# Patient Record
Sex: MALE | Race: WHITE | NOT HISPANIC OR LATINO | Employment: OTHER | URBAN - METROPOLITAN AREA
[De-identification: names, ages, dates, MRNs, and addresses within clinical notes are randomized per-mention and may not be internally consistent; named-entity substitution may affect disease eponyms.]

---

## 2017-04-20 ENCOUNTER — OFFICE VISIT (OUTPATIENT)
Dept: CARDIOLOGY | Facility: CLINIC | Age: 82
End: 2017-04-20
Attending: INTERNAL MEDICINE
Payer: MEDICARE

## 2017-04-20 VITALS
SYSTOLIC BLOOD PRESSURE: 130 MMHG | BODY MASS INDEX: 28.95 KG/M2 | WEIGHT: 191 LBS | DIASTOLIC BLOOD PRESSURE: 80 MMHG | HEIGHT: 68 IN | HEART RATE: 79 BPM

## 2017-04-20 DIAGNOSIS — I73.9: Primary | ICD-10-CM

## 2017-04-20 DIAGNOSIS — R53.83 FATIGUE, UNSPECIFIED TYPE: ICD-10-CM

## 2017-04-20 DIAGNOSIS — I25.10 CORONARY ARTERY DISEASE INVOLVING NATIVE CORONARY ARTERY OF NATIVE HEART WITHOUT ANGINA PECTORIS: ICD-10-CM

## 2017-04-20 DIAGNOSIS — I65.23 BILATERAL CAROTID ARTERY STENOSIS: ICD-10-CM

## 2017-04-20 PROCEDURE — 99214 OFFICE O/P EST MOD 30 MIN: CPT | Performed by: INTERNAL MEDICINE

## 2017-04-20 NOTE — LETTER
4/20/2017    Yefri Sotelo MD  [facility]   [address]  [facility] [address]    RE: Dangelo Hernández       Dear Colleague,    REASON FOR VISIT:  Followup for peripheral arterial disease.      PRIMARY CARE PHYSICIAN:  Dr. Yefri Sotelo.      I had the pleasure of seeing Dangelo Hernández at the [facility] in [place] this morning.  He is a very pleasant 81-year-old gentleman with known coronary artery disease, status post PCI to the LAD in 2010, mantle cell non-Hodgkin's lymphoma status post radiation, mild to moderate bilateral internal carotid artery stenosis and peripheral vascular disease with chronic right lower extremity claudication.      I saw him last September for an evaluation of his right lower extremity claudication.  ABIs prior to that visit were significantly abnormal in the right leg suggesting significant inflow disease.  His left leg did not have any significant arterial insufficiency.  We discussed therapeutic options at that point.  He opted to continue with a walking program.      Since our last visit, the patient's right lower extremity symptoms have remained stable.  He appears to be still limited.  He now gets right calf tightness with minimal activity such as walking a quarter of a mile.  Symptoms go away with rest.  He denies any resting symptoms or other symptoms concerning for critical limb ischemia.      His main concern today is that of fatigue and dizziness which have been ongoing over the last few months.  He has daytime somnolence and all he wants to do is sleep all day.  The dizzy spells are usually more noticeable when he gets up in the morning but persist in the background all day.  He has not had any episodes where he lost consciousness.  He denies palpitations.  He denies heart failure symptoms including orthopnea, PND or lower extremity edema.     Outpatient Encounter Prescriptions as of 4/20/2017   Medication Sig Dispense  Refill     omeprazole (PRILOSEC) 20 MG capsule Take 1 capsule (20 mg) by mouth daily (Patient not taking: Reported on 6/29/2017) 90 capsule 4     fluticasone (FLONASE) 50 MCG/ACT nasal spray Spray 2 sprays into both nostrils daily 48 g 03     azelastine (ASTELIN) 0.1 % nasal spray Spray 1 spray into both nostrils 2 times daily 3 Bottle 3     acetaminophen (TYLENOL) 325 MG tablet Take 325-650 mg by mouth every 6 hours as needed for mild pain or pain       atorvastatin (LIPITOR) 80 MG tablet Take 1 tablet (80 mg) by mouth daily 90 tablet 3     metoprolol (TOPROL-XL) 25 MG 24 hr tablet Take 1 tablet (25 mg) by mouth daily 90 tablet 12     Glucosamine-Chondroitin (OSTEO BI-FLEX REGULAR STRENGTH PO) Take 1 tablet by mouth daily       gabapentin 8 % GEL Apply thinly 2-3 times per day. 30 g 3     ASPIRIN 81 MG PO TABS ONE DAILY  3     MULTI VITAMIN MENS OR 1 tablet daily       [DISCONTINUED] triamcinolone (KENALOG) 0.5 % cream Apply topically daily 6    15 gram tubes at one time 90 g 1     No facility-administered encounter medications on file as of 4/20/2017.       IMPRESSION, REPORT AND PLAN:   1.  Peripheral arterial disease.   2.  Right lower extremity claudication, stable.   3.  Known mild to moderate bilateral carotid artery stenosis.   4.  Coronary artery disease status post prior PCI, stable.   5.  Fatigue.   6.  Dizziness.      He remains stable from a peripheral arterial disease standpoint.  However, he is still limited by the right lower extremity claudication.  This is most likely due to an SFA or popliteal disease.  I did explain to the patient that if his symptoms interfere with his quality of life, then revascularization is a good option for him.  He will think about this and will let me know if he wants to proceed with an angiogram and possible revascularization.      Given the chronic dizziness, I will obtain a repeat carotid ultrasound, although I doubt progression of his carotid disease explains the  dizziness. He is scheduled to see a neurologist in the upcoming weeks.      I will also obtain an echocardiogram given the fatigue as well as overnight oximetry.  I suspect he has undiagnosed sleep apnea.      It was a pleasure seeing Mr. Hernández in the clinic this morning.  I appreciate the opportunity to be part of this patient's care.     Sincerely,    Robinson Robbins MD

## 2017-04-20 NOTE — PROGRESS NOTES
HPI and Plan:   See dictation    Orders Placed This Encounter   Procedures     US Carotid Bilateral     Overnight oximetry study     Echocardiogram       No orders of the defined types were placed in this encounter.      Medications Discontinued During This Encounter   Medication Reason     triamcinolone (KENALOG) 0.5 % cream Stopped by Patient         Encounter Diagnoses   Name Primary?     Claudication, class III (H) Yes     Fatigue, unspecified type      Coronary artery disease involving native coronary artery of native heart without angina pectoris      Bilateral carotid artery stenosis        CURRENT MEDICATIONS:  Current Outpatient Prescriptions   Medication Sig Dispense Refill     omeprazole (PRILOSEC) 20 MG capsule Take 1 capsule (20 mg) by mouth daily 90 capsule 4     fluticasone (FLONASE) 50 MCG/ACT nasal spray Spray 2 sprays into both nostrils daily 48 g 03     azelastine (ASTELIN) 0.1 % nasal spray Spray 1 spray into both nostrils 2 times daily 3 Bottle 3     acetaminophen (TYLENOL) 325 MG tablet Take 325-650 mg by mouth every 6 hours as needed for mild pain or pain       atorvastatin (LIPITOR) 80 MG tablet Take 1 tablet (80 mg) by mouth daily 90 tablet 3     metoprolol (TOPROL-XL) 25 MG 24 hr tablet Take 1 tablet (25 mg) by mouth daily 90 tablet 12     Glucosamine-Chondroitin (OSTEO BI-FLEX REGULAR STRENGTH PO) Take 1 tablet by mouth daily       gabapentin 8 % GEL Apply thinly 2-3 times per day. 30 g 3     ASPIRIN 81 MG PO TABS ONE DAILY  3     MULTI VITAMIN MENS OR 1 tablet daily         ALLERGIES     Allergies   Allergen Reactions     Amoxicillin Diarrhea            Lisinopril Cough       PAST MEDICAL HISTORY:  Past Medical History:   Diagnosis Date      Coronary artery disease, 4/ 2010, status post drug-eluting stent placement to the LAD and balloon angioplasty to the obtuse marginal.  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    LDL goal <70      Coronary artery disease     stents placed 6/10/2013     Cryptogenic organizing pneumonia (H) 2012     GERD (gastroesophageal reflux disease)      History of blood disorder 2011     Hypertension      Hypertension goal BP (blood pressure) < 140/90 2010     Malignant neoplasm (H)     BMT; Mantle cell lymphoma     Mantle Cell Lymphoma S/P Autologous Transplant 2010       PAST SURGICAL HISTORY:  Past Surgical History:   Procedure Laterality Date     BRONCHOSCOPY (RIGID OR FLEXIBLE), DIAGNOSTIC  2011    Procedure:COMBINED BRONCHOSCOPY (RIGID OR FLEXIBLE), LAVAGE; Surgeon:REYNA BAILEY; Location:[facility]     COLONOSCOPY       ENT SURGERY      tonsils     ORTHOPEDIC SURGERY      rt knee arthroscopy     SURGICAL HISTORY OF -       tonsils     SURGICAL HISTORY OF -   84    1.Exam under anesthesia, 2.Arthroscopy, 3.Arthroscopic medial meniscectomy, 4.arthroscopic trimming of patellar articular cartilage.     SURGICAL HISTORY OF -       vasectomy     SURGICAL HISTORY OF -   90    colonoscopy     SURGICAL HISTORY OF -   10/20/92    flexible sigmoidoscopy     SURGICAL HISTORY OF -   2000    colonoscopy and polypectomy     THORACOSCOPIC BIOPSY LUNG  2011    Procedure:THORACOSCOPIC BIOPSY LUNG; Video Assisted Right Thoracoscopy with Biopsy; Surgeon:JIM EPSTEIN; Location:[facility]       FAMILY HISTORY:  Family History   Problem Relation Age of Onset     CANCER Mother      Lung--did not smoke     Cardiovascular Father      MI age 73     Lipids Sister      Hypertension Sister      CANCER Sister      Ovarian- at age 65       SOCIAL HISTORY:  Social History     Social History     Marital status:      Spouse name: N/A     Number of children: N/A     Years of education: N/A     Occupational History      Retired     Social History Main Topics     Smoking status: Former Smoker      "Types: Cigarettes     Quit date: 1/1/1975     Smokeless tobacco: Former User      Comment: started smoking at 21 years of age     Alcohol use 0.0 oz/week     0 Standard drinks or equivalent per week      Comment: every other day     Drug use: No     Sexual activity: Yes     Partners: Female     Other Topics Concern     Parent/Sibling W/ Cabg, Mi Or Angioplasty Before 65f 55m? No     Special Diet No     Exercise Yes     active     Social History Narrative       Review of Systems:  Skin:  not assessed       Eyes:  Positive for glasses    ENT:  Negative      Respiratory:  Positive for dyspnea on exertion     Cardiovascular:    Positive for;fatigue;dizziness chest discomfort   Gastroenterology: Negative      Genitourinary:  Negative      Musculoskeletal:  Positive for joint stiffness;back pain    Neurologic:  Positive for numbness or tingling of feet    Psychiatric:  Negative      Heme/Lymph/Imm:  Positive for allergies    Endocrine:  Negative        Physical Exam:  Vitals: /80  Pulse 79  Ht 1.727 m (5' 8\")  Wt 86.6 kg (191 lb)  BMI 29.04 kg/m2    Constitutional:  cooperative;in no acute distress        Skin:  warm and dry to the touch;no apparent skin lesions or masses noted        Head:  normocephalic        Eyes:           ENT:  no pallor or cyanosis        Neck:  carotid pulses are full and equal bilaterally;JVP normal;no carotid bruit        Chest:  clear to auscultation          Cardiac: no murmurs, gallops or rubs detected irregularly irregular rhythm                Abdomen:  abdomen soft;no masses;non-tender        Vascular:     2+         1+   2+         1+            Extremities and Back:  no edema              Neurological:  no gross motor deficits              CC  Robinson Robbins MD   [facility]  [address]  [place] [address]              "

## 2017-04-20 NOTE — MR AVS SNAPSHOT
After Visit Summary   4/20/2017    Dangelo Hernández    MRN: [id number]           Patient Information     Date Of Birth          1935        Visit Information        Provider Department      4/20/2017 9:15 AM Robinson Robbins MD [facility] [place]        Today's Diagnoses     Claudication, class III (H)    -  1    Fatigue, unspecified type        Coronary artery disease involving native coronary artery of native heart without angina pectoris        Bilateral carotid artery stenosis           Follow-ups after your visit        Your next 10 appointments already scheduled     May 02, 2017 10:30 AM CDT   Ech Complete with [id number]   [facility] CV Echocardiography ([facility] at [facility])    [address]  [place] [id number] [phone]           1.  Please bring or wear a comfortable two-piece outfit. 2.  You may eat, drink and take your normal medicines. 3.  For any questions that cannot be answered, please contact the ordering physician            May 02, 2017 11:30 AM CDT   US CAROTID BILATERAL with [id number]   [facility] MVI Ultrasound ([facility])    [address]  [address]  [place] [address]   [phone]           Please bring a list of your medicines (including vitamins, minerals and over-the-counter drugs). Also, tell your doctor about any allergies you may have. Wear comfortable clothes and leave your valuables at home.  You do not need to do anything special to prepare for your exam.  Please call the Imaging Department at your exam site with any questions.            Oct 05, 2017 10:00 AM CDT   Masonic Lab Draw with  MASONIC LAB DRAW   [facility] Masonic Lab Draw ([facility])    [address] [id number] [phone]            Oct 05, 2017 10:30 AM CDT   Return with Abida Lott  "MD   [facility] Blood and Marrow Transplant ([facility])    [address] [address] [phone]              Future tests that were ordered for you today     Open Future Orders        Priority Expected Expires Ordered    US Carotid Bilateral Routine 2017    Echocardiogram Routine 2017    Overnight oximetry study Routine 2017            Who to contact     If you have questions or need follow up information about today's clinic visit or your schedule please contact [facility] [place] directly at [phone].  Normal or non-critical lab and imaging results will be communicated to you by [employer], letter or phone within 4 business days after the clinic has received the results. If you do not hear from us within 7 days, please contact the clinic through [employer] or phone. If you have a critical or abnormal lab result, we will notify you by phone as soon as possible.  Submit refill requests through [employer] or call your pharmacy and they will forward the refill request to us. Please allow 3 business days for your refill to be completed.          Additional Information About Your Visit        [employer] Information     [employer] lets you send messages to your doctor, view your test results, renew your prescriptions, schedule appointments and more. To sign up, go to [email or web address] . Click on \"Log in\" on the left side of the screen, which will take you to the Welcome page. Then click on \"Sign up Now\" on the right side of the page.     You will be asked to enter the access code listed below, as well as some personal information. Please follow the directions to create your username and password.     Your access code is: [email or web address]  Expires: 2017 10:51 AM     Your access code will  in 90 days. If you need help or a new code, please call your " "[facility] or [phone].        Care EveryWhere ID     This is your Care EveryWhere ID. This could be used by other organizations to access your [place] medical records  [id number]        Your Vitals Were     Pulse Height BMI (Body Mass Index)             79 1.727 m (5' 8\") 29.04 kg/m2          Blood Pressure from Last 3 Encounters:   04/20/17 130/80   10/06/16 143/77   09/13/16 115/63    Weight from Last 3 Encounters:   04/20/17 86.6 kg (191 lb)   10/06/16 81.4 kg (179 lb 7.3 oz)   09/13/16 84.8 kg (187 lb)              We Performed the Following     Follow-Up with Cardiologist        Primary Care Provider Office Phone # Fax #    Yefri Sotelo [phone]       [facility] [address] [facility] [address]        Thank you!     Thank you for choosing [facility] [place]  for your care. Our goal is always to provide you with excellent care. Hearing back from our patients is one way we can continue to improve our services. Please take a few minutes to complete the written survey that you may receive in the mail after your visit with us. Thank you!             Your Updated Medication List - Protect others around you: Learn how to safely use, store and throw away your medicines at www.disposemymeds.org.          This list is accurate as of: 4/20/17 10:51 AM.  Always use your most recent med list.                   Brand Name Dispense Instructions for use    aspirin 81 MG tablet      ONE DAILY       atorvastatin 80 MG tablet    LIPITOR    90 tablet    Take 1 tablet (80 mg) by mouth daily       azelastine 0.1 % spray    ASTELIN    3 Bottle    Spray 1 spray into both nostrils 2 times daily       fluticasone 50 MCG/ACT spray    FLONASE    48 g    Spray 2 sprays into both nostrils daily       gabapentin 8 % Gel topical PLO cream     30 g    Apply thinly 2-3 times per day.       metoprolol 25 MG 24 hr tablet    TOPROL-XL    90 tablet    " Take 1 tablet (25 mg) by mouth daily       MULTI VITAMIN MENS PO      1 tablet daily       omeprazole 20 MG CR capsule    priLOSEC    90 capsule    Take 1 capsule (20 mg) by mouth daily       OSTEO BI-FLEX REGULAR STRENGTH PO      Take 1 tablet by mouth daily       TYLENOL 325 MG tablet   Generic drug:  acetaminophen      Take 325-650 mg by mouth every 6 hours as needed for mild pain or pain

## 2017-04-21 NOTE — PROGRESS NOTES
REASON FOR VISIT:  Followup for peripheral arterial disease.      PRIMARY CARE PHYSICIAN:  Dr. Yefri Sotelo.      HISTORY OF PRESENT ILLNESS:  I had the pleasure of seeing Dangelo Hernández at the [facility] in [place] this morning.  He is a very pleasant 81-year-old gentleman with known coronary artery disease, status post PCI to the LAD in 2010, mantle cell non-Hodgkin's lymphoma status post radiation, mild to moderate bilateral internal carotid artery stenosis and peripheral vascular disease with chronic right lower extremity claudication.      I saw him last September for an evaluation of his right lower extremity claudication.  ABIs prior to that visit were significantly abnormal in the right leg suggesting significant inflow disease.  His left leg did not have any significant arterial insufficiency.  We discussed therapeutic options at that point.  He opted to continue with a walking program.      Since our last visit, the patient's right lower extremity symptoms have remained stable.  He appears to be still limited.  He now gets right calf tightness with minimal activity such as walking a quarter of a mile.  Symptoms go away with rest.  He denies any resting symptoms or other symptoms concerning for critical limb ischemia.      His main concern today is that of fatigue and dizziness which have been ongoing over the last few months.  He has daytime somnolence and all he wants to do is sleep all day.  The dizzy spells are usually more noticeable when he gets up in the morning but persist in the background all day.  He has not had any episodes where he lost consciousness.  He denies palpitations.  He denies heart failure symptoms including orthopnea, PND or lower extremity edema.      IMPRESSION, REPORT AND PLAN:   1.  Peripheral arterial disease.   2.  Right lower extremity claudication, stable.   3.  Known mild to moderate bilateral carotid artery stenosis.   4.  Coronary artery  disease status post prior PCI, stable.   5.  Fatigue.   6.  Dizziness.      He remains stable from a peripheral arterial disease standpoint.  However, he is still limited by the right lower extremity claudication.  This is most likely due to an SFA or popliteal disease.  I did explain to the patient that if his symptoms interfere with his quality of life, then revascularization is a good option for him.  He will think about this and will let me know if he wants to proceed with an angiogram and possible revascularization.      Given the chronic dizziness, I will obtain a repeat carotid ultrasound, although I doubt progression of his carotid disease explains the dizziness. He is scheduled to see a neurologist in the upcoming weeks.      I will also obtain an echocardiogram given the fatigue as well as overnight oximetry.  I suspect he has undiagnosed sleep apnea.      It was a pleasure seeing Mr. Hernández in the clinic this morning.  I appreciate the opportunity to be part of this patient's care.      cc:   [place] [address]         NOBLE SAMUEL MD             D: 2017 10:49   T: 2017 03:33   MT: HERMAN      Name:     ISAIAH HERNÁNDEZ   MRN:      [id number]        Account:      [id number]   :      1935           Service Date: 2017      Document: [id number]

## 2017-05-02 ENCOUNTER — HOSPITAL ENCOUNTER (OUTPATIENT)
Dept: CARDIOLOGY | Facility: CLINIC | Age: 82
Discharge: HOME | End: 2017-05-02
Attending: INTERNAL MEDICINE | Admitting: INTERNAL MEDICINE
Payer: MEDICARE

## 2017-05-02 ENCOUNTER — HOSPITAL ENCOUNTER (OUTPATIENT)
Dept: ULTRASOUND IMAGING | Facility: CLINIC | Age: 82
Discharge: HOME | End: 2017-05-02
Attending: INTERNAL MEDICINE | Admitting: INTERNAL MEDICINE
Payer: MEDICARE

## 2017-05-02 ENCOUNTER — TRANSFERRED RECORDS (OUTPATIENT)
Dept: CARDIOLOGY | Facility: CLINIC | Age: 82
End: 2017-05-02

## 2017-05-02 DIAGNOSIS — I25.10 CORONARY ARTERY DISEASE INVOLVING NATIVE CORONARY ARTERY OF NATIVE HEART WITHOUT ANGINA PECTORIS: ICD-10-CM

## 2017-05-02 DIAGNOSIS — I65.23 BILATERAL CAROTID ARTERY STENOSIS: ICD-10-CM

## 2017-05-02 DIAGNOSIS — R53.83 FATIGUE, UNSPECIFIED TYPE: ICD-10-CM

## 2017-05-02 PROCEDURE — 93306 TTE W/DOPPLER COMPLETE: CPT | Mod: 26 | Performed by: INTERNAL MEDICINE

## 2017-05-02 PROCEDURE — 93880 EXTRACRANIAL BILAT STUDY: CPT | Mod: 26 | Performed by: INTERNAL MEDICINE

## 2017-05-02 PROCEDURE — 93880 EXTRACRANIAL BILAT STUDY: CPT

## 2017-05-02 RX ADMIN — SULFUR HEXAFLUORIDE 5 ML: KIT at 11:15

## 2017-05-04 ENCOUNTER — TELEPHONE (OUTPATIENT)
Dept: CARDIOLOGY | Facility: CLINIC | Age: 82
End: 2017-05-04

## 2017-05-04 DIAGNOSIS — I65.23 BILATERAL CAROTID ARTERY STENOSIS: Primary | ICD-10-CM

## 2017-05-04 NOTE — TELEPHONE ENCOUNTER
Notes Recorded by Robinson Robbins MD on 5/3/2017 at 5:02 PM  Ultrasound reviewed. Moderate left ICA stenosis and mild right ICA stenosis. Repeat ultrasound in 1 yr    Tried to call patient to review results above. Left voicemail with Dr. Robbins's result and left team 4 number to call with any questions or concerns. US for 1 year.

## 2017-05-04 NOTE — TELEPHONE ENCOUNTER
Notes Recorded by Robinson Robbins MD on 5/3/2017 at 5:07 PM  Echo reviewed. No significant abnormality      Spoke to patient about US result below and echo result above. Pt verbalized understanding. No further questions.

## 2017-06-29 ENCOUNTER — ONCOLOGY VISIT (OUTPATIENT)
Dept: TRANSPLANT | Facility: CLINIC | Age: 82
End: 2017-06-29
Attending: INTERNAL MEDICINE
Payer: MEDICARE

## 2017-06-29 ENCOUNTER — APPOINTMENT (OUTPATIENT)
Dept: LAB | Facility: CLINIC | Age: 82
End: 2017-06-29
Attending: INTERNAL MEDICINE
Payer: MEDICARE

## 2017-06-29 VITALS
HEART RATE: 60 BPM | WEIGHT: 192.4 LBS | BODY MASS INDEX: 29.25 KG/M2 | TEMPERATURE: 97.6 F | OXYGEN SATURATION: 96 % | RESPIRATION RATE: 16 BRPM | SYSTOLIC BLOOD PRESSURE: 146 MMHG | DIASTOLIC BLOOD PRESSURE: 77 MMHG

## 2017-06-29 DIAGNOSIS — E78.00 HYPERCHOLESTEROLEMIA: Primary | ICD-10-CM

## 2017-06-29 DIAGNOSIS — K21.9 GASTROESOPHAGEAL REFLUX DISEASE WITHOUT ESOPHAGITIS: ICD-10-CM

## 2017-06-29 DIAGNOSIS — C83.10 MANTLE CELL LYMPHOMA, UNSPECIFIED BODY REGION (H): ICD-10-CM

## 2017-06-29 LAB
ALBUMIN SERPL-MCNC: 3.8 G/DL (ref 3.4–5)
ALP SERPL-CCNC: 74 U/L (ref 40–150)
ALT SERPL W P-5'-P-CCNC: 32 U/L (ref 0–70)
ANION GAP SERPL CALCULATED.3IONS-SCNC: 9 MMOL/L (ref 3–14)
AST SERPL W P-5'-P-CCNC: 18 U/L (ref 0–45)
BASOPHILS # BLD AUTO: 0 10E9/L (ref 0–0.2)
BASOPHILS NFR BLD AUTO: 0.7 %
BILIRUB SERPL-MCNC: 0.5 MG/DL (ref 0.2–1.3)
BUN SERPL-MCNC: 19 MG/DL (ref 7–30)
CALCIUM SERPL-MCNC: 9.1 MG/DL (ref 8.5–10.1)
CHLORIDE SERPL-SCNC: 107 MMOL/L (ref 94–109)
CHOLEST SERPL-MCNC: 168 MG/DL
CO2 SERPL-SCNC: 25 MMOL/L (ref 20–32)
CREAT SERPL-MCNC: 1.07 MG/DL (ref 0.66–1.25)
DIFFERENTIAL METHOD BLD: NORMAL
EOSINOPHIL # BLD AUTO: 0.2 10E9/L (ref 0–0.7)
EOSINOPHIL NFR BLD AUTO: 3.6 %
ERYTHROCYTE [DISTWIDTH] IN BLOOD BY AUTOMATED COUNT: 13 % (ref 10–15)
GFR SERPL CREATININE-BSD FRML MDRD: 66 ML/MIN/1.7M2
GLUCOSE SERPL-MCNC: 95 MG/DL (ref 70–99)
HCT VFR BLD AUTO: 42.1 % (ref 40–53)
HDLC SERPL-MCNC: 62 MG/DL
HGB BLD-MCNC: 14.2 G/DL (ref 13.3–17.7)
IMM GRANULOCYTES # BLD: 0 10E9/L (ref 0–0.4)
IMM GRANULOCYTES NFR BLD: 0.4 %
LDLC SERPL CALC-MCNC: 77 MG/DL
LYMPHOCYTES # BLD AUTO: 1.4 10E9/L (ref 0.8–5.3)
LYMPHOCYTES NFR BLD AUTO: 26 %
MCH RBC QN AUTO: 32.3 PG (ref 26.5–33)
MCHC RBC AUTO-ENTMCNC: 33.7 G/DL (ref 31.5–36.5)
MCV RBC AUTO: 96 FL (ref 78–100)
MONOCYTES # BLD AUTO: 0.5 10E9/L (ref 0–1.3)
MONOCYTES NFR BLD AUTO: 8.5 %
NEUTROPHILS # BLD AUTO: 3.4 10E9/L (ref 1.6–8.3)
NEUTROPHILS NFR BLD AUTO: 60.8 %
NONHDLC SERPL-MCNC: 106 MG/DL
NRBC # BLD AUTO: 0 10*3/UL
NRBC BLD AUTO-RTO: 0 /100
PLATELET # BLD AUTO: 223 10E9/L (ref 150–450)
POTASSIUM SERPL-SCNC: 4 MMOL/L (ref 3.4–5.3)
PROT SERPL-MCNC: 7.2 G/DL (ref 6.8–8.8)
RBC # BLD AUTO: 4.4 10E12/L (ref 4.4–5.9)
SODIUM SERPL-SCNC: 140 MMOL/L (ref 133–144)
TRIGL SERPL-MCNC: 148 MG/DL
WBC # BLD AUTO: 5.5 10E9/L (ref 4–11)

## 2017-06-29 PROCEDURE — 99212 OFFICE O/P EST SF 10 MIN: CPT

## 2017-06-29 PROCEDURE — 80053 COMPREHEN METABOLIC PANEL: CPT | Performed by: INTERNAL MEDICINE

## 2017-06-29 PROCEDURE — 85025 COMPLETE CBC W/AUTO DIFF WBC: CPT | Performed by: INTERNAL MEDICINE

## 2017-06-29 PROCEDURE — 80061 LIPID PANEL: CPT | Performed by: INTERNAL MEDICINE

## 2017-06-29 PROCEDURE — 36415 COLL VENOUS BLD VENIPUNCTURE: CPT

## 2017-06-29 PROCEDURE — 36415 COLL VENOUS BLD VENIPUNCTURE: CPT | Performed by: INTERNAL MEDICINE

## 2017-06-29 NOTE — PATIENT INSTRUCTIONS
bob in 1 year with labs prior    No Template for July,2018 yet.  Patient will call to schedule    Anais  BMT

## 2017-06-29 NOTE — PROGRESS NOTES
BMT Visit  DOS 6/29/2017     Disease and Treatment History:   1. Mantle cell lymphoma, statusR-CHOP X 4 then status post autologous stem cell transplant in 10/2010 in ongoing CR  2.  Significant pulmonary issues:  --pulmonary infiltrates and BAL with Candida glabrata in 07/2011  - progressive infiltrates with VATS in 09/2011 that showed organizing pneumonia with negative tissue cultures, who was felt to have a cryptogenic organizing pneumonia.   - Treated with high-dose steroids from 10/2011 with tapering through 03/2012 with a good clinical and radiological response,  -- Subsequent relapse/progression 04/2012.  A repeat BAL on 04/19/2012 showed filamentous fungus that was not able to be speciated further, and he saw Pulmonary in 05/2012, and at that point was started back on Voriconazole on prednisone.   -- Long prednisone taper: CT imaging on 6/21/13 showed worsened pulmonary infiltrates and he underwent bronchoscopy which initially showed filamentous fungus but then grew out as Raquel Dubliniensis/Albicans. Treated with v-fend/fluconazole combination  -- Summer 2013: stress test that showed abnormalities and led to a cardiac catheterization with the placement of 3 stents.   -- Pulmonary evaluation August 2013 and CT scans showed some GGO and the question of pulmonary edema was raised given the recent cardiac issues. Thus, he was monitored without steroids  -- 10/2013 Pulmonary: Stable 9/2013 CT changes and observed due to no major symptoms    Current HPI: Overall he is doing well.I last saw him he has been doing well. He notes he has some blockage in his right leg and the cardiologist is recommending a stent. He notes no fevers or chills, no chest pain or SOB, no nausea or vomiting, no diarrhea, no rash. Breathing stable. No concerns.    REVIEW OF SYSTEMS:  A 10-point review of systems is otherwise negative.      PHYSICAL EXAMINATION:   /77  Pulse 60  Temp 97.6  F (36.4  C) (Oral)  Resp 16  Wt 87.3 kg  (192 lb 6.4 oz)  SpO2 96%  BMI 29.25 kg/m2    GENERAL:  He looks quite well.  He is in no acute distress. KPS 90  HEENT:  No icterus.  OP clear and upper plates in  LYMPH:  He has no palpable cervical or supraclavicular lymphadenopathy.   LUNGS:  Generally clear.  Right lower lung with some rhonchi that clears  CARDIAC EXAM:  Regular.   ABDOMEN:  Soft and nontender.  No HSM  EXTREMITIES: no edema, no inguinal DORIE          ASSESSMENT AND PLAN:  Mr. Delong is a very pleasant 82-year-old gentleman with mantle cell lymphoma in ongoing remission, now 6 years out from an autologous stem cell transplant.     1.  Mantle cell lymphoma.  He remains in an ongoing complete remission by clinical evaluation. Continue visits at 1 year      2.  Pulmonary.  Cryptogenic organizing pneumonia currently not being treated and stable clinically. No active symptoms. Stable    3.  Infectious disease.  No active infections. Encouraged him to get his flu shot in the fall  -- s/p 2 year vaccines 4/2013 and got MMR in 9/2013. Did get shingles shot in 2014 as well    4.  Cardiology.  current metoprolol, lipid lower drug. Will have intervention for possible stenosis in right LE artery. Check lipid panel today    5.  Hematology.  His counts are stable.     6. Renal: Normal function     FINAL PLAN:    1. RTC in 1 year with labs  2. Lipid panel today    Abida Lott MD      Addendum 6/30/2017:  Results for ISAIAH DELONG (MRN [id number]) as of 6/30/2017 14:19   Ref. Range 6/29/2017 10:15   Cholesterol Latest Ref Range: <200 mg/dL 168   HDL Cholesterol Latest Ref Range: >39 mg/dL 62   LDL Cholesterol Calculated Latest Ref Range: <100 mg/dL 77   Non HDL Cholesterol Latest Ref Range: <130 mg/dL 106   Triglycerides Latest Ref Range: <150 mg/dL 148       Called Isaiah with the results and left a message for him to call with any questions.    Abida Lott

## 2017-06-29 NOTE — MR AVS SNAPSHOT
After Visit Summary   6/29/2017    Dangelo Hernández    MRN: [id number]           Patient Information     Date Of Birth          1935        Visit Information        Provider Department      6/29/2017 11:00 AM Abida Lott MD [facility] Trans[facility]        Today's Diagnoses     Hypercholesterolemia    -  1    Gastroesophageal reflux disease without esophagitis        Mantle cell lymphoma, unspecified body region (H)              [facility] ([facility])  [address] [place] [address]  Phone: [phone]  Clinic Hours:   Monday-Thursday:7am to 7pm   Friday: 7am to 5pm   Weekends and holidays:    8am to noon (in general)  If your fever is 100.5  or greater,   call the clinic.  After hours call the   hospital at [phone] or   [phone]. Ask for the BMT   fellow on-call           Care Instructions    bob in 1 year with labs prior              Follow-ups after your visit        Future tests that were ordered for you today     Open Future Orders        Priority Expected Expires Ordered    Comprehensive metabolic panel Routine 6/7/2018 6/28/2018 6/29/2017    CBC with platelets differential Routine 6/7/2018 6/28/2018 6/29/2017            Who to contact     If you have questions or need follow up information about today's clinic visit or your schedule please contact [facility] directly at [phone].  Normal or non-critical lab and imaging results will be communicated to you by MyChart, letter or phone within 4 business days after the clinic has received the results. If you do not hear from us within 7 days, please contact the clinic through MyChart or phone. If you have a critical or abnormal lab result, we will notify you by phone as soon as possible.  Submit refill requests through [employer] or call your pharmacy and they will forward the refill request to us. Please allow 3 business days for your refill to be  "completed.          Additional Information About Your Visit        [employer] Information     [employer] lets you send messages to your doctor, view your test results, renew your prescriptions, schedule appointments and more. To sign up, go to [email or web address] . Click on \"Log in\" on the left side of the screen, which will take you to the Welcome page. Then click on \"Sign up Now\" on the right side of the page.     You will be asked to enter the access code listed below, as well as some personal information. Please follow the directions to create your username and password.     Your access code is: [email or web address]  Expires: 2017 10:51 AM     Your access code will  in 90 days. If you need help or a new code, please call your [place] clinic or [phone].        Care EveryWhere ID     This is your Care EveryWhere ID. This could be used by other organizations to access your [place] medical records  [id number]        Your Vitals Were     Pulse Temperature Respirations Pulse Oximetry BMI (Body Mass Index)       60 97.6  F (36.4  C) (Oral) 16 96% 29.25 kg/m2        Blood Pressure from Last 3 Encounters:   17 146/77   17 130/80   10/06/16 143/77    Weight from Last 3 Encounters:   17 87.3 kg (192 lb 6.4 oz)   17 86.6 kg (191 lb)   10/06/16 81.4 kg (179 lb 7.3 oz)              We Performed the Following     CBC with platelets differential     Comprehensive metabolic panel     Lipid panel reflex to direct LDL        Recent Review Flowsheet Data     BMT Recent Results Latest Ref Rng & Units 10/15/2015 2016 2016 2016 2016 10/6/2016 2017    WBC 4.0 - 11.0 10e9/L 5.2 - - - 5.9 6.7 5.5    Hemoglobin 13.3 - 17.7 g/dL 12.5(L) - - - 13.4 14.4 14.2    Platelet Count 150 - 450 10e9/L 160 - - - 199 198 223    Neutrophils (Absolute) 1.6 - 8.3 10e9/L 3.5 - - - 3.8 4.6 3.4    INR 0.86 - 1.14 - - - - - - -    Sodium 133 - 144 mmol/L 137 - 142 140 139 141 140    Potassium 3.4 - " 5.3 mmol/L 3.9 - 4.0 4.0 4.0 4.1 4.0    Chloride 94 - 109 mmol/L 105 - 108 107 107 107 107    Glucose 70 - 99 mg/dL 91 - 98 97 116(H) 89 95    Urea Nitrogen 7 - 30 mg/dL 16 - 14 18 19 19 19    Creatinine 0.66 - 1.25 mg/dL 0.95 - 1.16 1.04 1.08 1.29(H) 1.07    Calcium (Total) 8.5 - 10.1 mg/dL 8.3(L) - 9.0 8.9 9.0 9.2 9.1    Protein (Total) 6.8 - 8.8 g/dL 6.1(L) - - - 6.7(L) 7.1 7.2    Albumin 3.4 - 5.0 g/dL 3.3(L) - - - 3.9 3.9 3.8    Alkaline Phosphatase 40 - 150 U/L 57 - - - 78 71 74    AST 0 - 45 U/L 22 - - - 21 23 18    ALT 0 - 70 U/L 32 36 40 33 33 35 32    MCV 78 - 100 fl 95 - - - 95 96 96               Primary Care Provider Office Phone # Fax #    Yefri Mejia Sotelo [phone]       [place] [address]        Equal Access to Services     PETRA GRAMAJO AH: Hadii aad ku hadasho Soomaali, waaxda luqadaha, qaybta kaalmada adeegyada, waxay idiin haycharli lunsford. So [facility] [phone].    ATENCIÓN: Si habla español, tiene a mcneal disposición servicios gratuitos de asistencia lingüística. Llame al [phone].    We comply with applicable federal civil rights laws and [place] laws. We do not discriminate on the basis of race, color, national origin, age, disability sex, sexual orientation or gender identity.            Thank you!     Thank you for choosing [facility]PL[facility]  for your care. Our goal is always to provide you with excellent care. Hearing back from our patients is one way we can continue to improve our services. Please take a few minutes to complete the written survey that you may receive in the mail after your visit with us. Thank you!             Your Updated Medication List - Protect others around you: Learn how to safely use, store and throw away your medicines at www.disposemymeds.org.          This list is accurate as of: 6/29/17 11:34 AM.  Always use your most recent med list.                   Brand  Name Dispense Instructions for use Diagnosis    ALEVE PO      Take 275 mg by mouth        aspirin 81 MG tablet      ONE DAILY    Routine general medical examination at a health care facility       atorvastatin 80 MG tablet    LIPITOR    90 tablet    Take 1 tablet (80 mg) by mouth daily    Coronary artery disease involving native coronary artery of native heart with angina pectoris (H)       azelastine 0.1 % spray    ASTELIN    3 Bottle    Spray 1 spray into both nostrils 2 times daily    Eczema, unspecified type       fluticasone 50 MCG/ACT spray    FLONASE    48 g    Spray 2 sprays into both nostrils daily    PND (post-nasal drip)       gabapentin 8 % Gel topical PLO cream     30 g    Apply thinly 2-3 times per day.    Lymphoma (H), Cryptogenic organising pneumonia, Candida glabrata infection       metoprolol 25 MG 24 hr tablet    TOPROL-XL    90 tablet    Take 1 tablet (25 mg) by mouth daily    Coronary artery disease involving native coronary artery of native heart with angina pectoris (H)       MULTI VITAMIN MENS PO      1 tablet daily    Routine general medical examination at a health care facility       omeprazole 20 MG CR capsule    priLOSEC    90 capsule    Take 1 capsule (20 mg) by mouth daily    Gastroesophageal reflux disease without esophagitis       OSTEO BI-FLEX REGULAR STRENGTH PO      Take 1 tablet by mouth daily        TYLENOL 325 MG tablet   Generic drug:  acetaminophen      Take 325-650 mg by mouth every 6 hours as needed for mild pain or pain

## 2017-07-08 ENCOUNTER — HEALTH MAINTENANCE LETTER (OUTPATIENT)
Age: 82
End: 2017-07-08

## 2017-08-08 DIAGNOSIS — I25.119 CORONARY ARTERY DISEASE INVOLVING NATIVE CORONARY ARTERY OF NATIVE HEART WITH ANGINA PECTORIS (H): ICD-10-CM

## 2017-08-08 RX ORDER — METOPROLOL SUCCINATE 25 MG/1
25 TABLET, EXTENDED RELEASE ORAL DAILY
Qty: 90 TABLET | Refills: 2 | Status: SHIPPED | OUTPATIENT
Start: 2017-08-08 | End: 2017-10-03

## 2017-08-08 RX ORDER — ATORVASTATIN CALCIUM 80 MG/1
80 TABLET, FILM COATED ORAL DAILY
Qty: 90 TABLET | Refills: 2 | Status: SHIPPED | OUTPATIENT
Start: 2017-08-08 | End: 2017-10-03

## 2017-10-03 ENCOUNTER — OFFICE VISIT (OUTPATIENT)
Dept: FAMILY MEDICINE | Facility: CLINIC | Age: 82
End: 2017-10-03
Payer: MEDICARE

## 2017-10-03 VITALS
BODY MASS INDEX: 29.5 KG/M2 | SYSTOLIC BLOOD PRESSURE: 124 MMHG | WEIGHT: 194 LBS | OXYGEN SATURATION: 92 % | HEART RATE: 77 BPM | TEMPERATURE: 97.5 F | DIASTOLIC BLOOD PRESSURE: 68 MMHG

## 2017-10-03 DIAGNOSIS — L30.9 ECZEMA, UNSPECIFIED TYPE: ICD-10-CM

## 2017-10-03 DIAGNOSIS — R09.82 PND (POST-NASAL DRIP): ICD-10-CM

## 2017-10-03 DIAGNOSIS — N40.1 BENIGN PROSTATIC HYPERPLASIA WITH LOWER URINARY TRACT SYMPTOMS, SYMPTOM DETAILS UNSPECIFIED: ICD-10-CM

## 2017-10-03 DIAGNOSIS — Z23 NEED FOR PROPHYLACTIC VACCINATION AND INOCULATION AGAINST INFLUENZA: Primary | ICD-10-CM

## 2017-10-03 DIAGNOSIS — I25.119 CORONARY ARTERY DISEASE INVOLVING NATIVE CORONARY ARTERY OF NATIVE HEART WITH ANGINA PECTORIS (H): ICD-10-CM

## 2017-10-03 DIAGNOSIS — J18.9 PNEUMONIA, ORGANISM UNSPECIFIED(486): ICD-10-CM

## 2017-10-03 PROCEDURE — 90662 IIV NO PRSV INCREASED AG IM: CPT | Performed by: FAMILY MEDICINE

## 2017-10-03 PROCEDURE — G0008 ADMIN INFLUENZA VIRUS VAC: HCPCS | Performed by: FAMILY MEDICINE

## 2017-10-03 PROCEDURE — 99213 OFFICE O/P EST LOW 20 MIN: CPT | Mod: 25 | Performed by: FAMILY MEDICINE

## 2017-10-03 RX ORDER — AZITHROMYCIN 250 MG/1
TABLET, FILM COATED ORAL
Qty: 40 TABLET | Refills: 0 | Status: SHIPPED | OUTPATIENT
Start: 2017-10-03 | End: 2018-09-25

## 2017-10-03 RX ORDER — TAMSULOSIN HYDROCHLORIDE 0.4 MG/1
0.4 CAPSULE ORAL DAILY
Qty: 90 CAPSULE | Refills: 3 | Status: SHIPPED | OUTPATIENT
Start: 2017-10-03 | End: 2018-09-25

## 2017-10-03 RX ORDER — ATORVASTATIN CALCIUM 80 MG/1
80 TABLET, FILM COATED ORAL DAILY
Qty: 90 TABLET | Refills: 3 | Status: SHIPPED | OUTPATIENT
Start: 2017-10-03 | End: 2018-09-25

## 2017-10-03 RX ORDER — AZELASTINE 1 MG/ML
1 SPRAY, METERED NASAL 2 TIMES DAILY
Qty: 3 BOTTLE | Refills: 3 | Status: SHIPPED | OUTPATIENT
Start: 2017-10-03 | End: 2018-09-25

## 2017-10-03 RX ORDER — METOPROLOL SUCCINATE 25 MG/1
25 TABLET, EXTENDED RELEASE ORAL DAILY
Qty: 90 TABLET | Refills: 2 | Status: SHIPPED | OUTPATIENT
Start: 2017-10-03 | End: 2018-09-25

## 2017-10-03 RX ORDER — FLUTICASONE PROPIONATE 50 MCG
2 SPRAY, SUSPENSION (ML) NASAL DAILY
Qty: 48 G | Refills: 3 | Status: SHIPPED | OUTPATIENT
Start: 2017-10-03 | End: 2018-09-25

## 2017-10-03 ASSESSMENT — PAIN SCALES - GENERAL: PAINLEVEL: NO PAIN (0)

## 2017-10-03 NOTE — PROGRESS NOTES
SUBJECTIVE:   Dangelo Hernández is a 82 year old male who presents to clinic today for the following health issues:    Green drainage draining down the back of his throat and from his nose. He complains of pressure in the maxillary sinuses. He has no fever chills or sweats. He gets this about once a year.      Pt wants medication he had before for his sinuses and wants to keep some on hand for later he is going to [place] in 3 weeks.        Problem list and histories reviewed & adjusted, as indicated.  Additional history: as documented        Reviewed and updated as needed this visit by clinical staff  Tobacco  Allergies  Med Hx  Surg Hx  Fam Hx  Soc Hx      Reviewed and updated as needed this visit by Provider      Vitals: /68 (BP Location: Right arm, Patient Position: Chair, Cuff Size: Adult Large)  Pulse 77  Temp 97.5  F (36.4  C) (Tympanic)  Wt 194 lb (88 kg)  SpO2 92%  BMI 29.5 kg/m2  BMI= Body mass index is 29.5 kg/(m^2).    Medical, surgical, family, social histories, allergies and meds reviewed and updated.    ROS:  General: No change in weight, sleep or appetite.  Normal energy.  No fever or chills  Resp: No coughing, wheezing or shortness of breath  CV: No chest pains or palpitations  GI: No nausea, vomiting,  heartburn, abdominal pain, diarrhea, constipation or change in bowel habits    Exam:  GENERAL APPEARANCE ADULT: Alert, no acute distress  HENT: frontal sinus tenderness bilateral, maxillary sinus tenderness bilateral  NECK: No adenopathy,masses or thyromegaly  RESP: lungs clear to auscultation     ASSESSMENT:  (Z23) Need for prophylactic vaccination and inoculation against influenza  (primary encounter diagnosis)  Comment:   Plan: FLU VACCINE, INCREASED ANTIGEN, PRESV FREE, AGE        65+ [33440], ADMIN INFLUENZA (For MEDICARE         Patients ONLY) []            (J18.9) PNEUMONIA, ORGANISM NOS  Comment:   Plan: azithromycin (ZITHROMAX) 250 MG tablet            (R09.82) PND  (post-nasal drip)  Comment: Stable on medications.  Plan: fluticasone (FLONASE) 50 MCG/ACT spray            (L30.9) Eczema, unspecified type  Comment: Stable on medications.  Plan: azelastine (ASTELIN) 0.1 % spray            (I25.119) Coronary artery disease involving native coronary artery of native heart with angina pectoris (H)  Comment: Stable on medications.  Plan: metoprolol (TOPROL-XL) 25 MG 24 hr tablet,         atorvastatin (LIPITOR) 80 MG tablet            (N40.1) Benign prostatic hyperplasia with lower urinary tract symptoms, symptom details unspecified  Comment:   Plan: tamsulosin (FLOMAX) 0.4 MG capsule              PLAN:  Orders Placed This Encounter     FLU VACCINE, INCREASED ANTIGEN, PRESV FREE, AGE 65+ [30034]     ADMIN INFLUENZA (For MEDICARE Patients ONLY) []     azithromycin (ZITHROMAX) 250 MG tablet     fluticasone (FLONASE) 50 MCG/ACT spray     azelastine (ASTELIN) 0.1 % spray     metoprolol (TOPROL-XL) 25 MG 24 hr tablet     atorvastatin (LIPITOR) 80 MG tablet     tamsulosin (FLOMAX) 0.4 MG capsule       There are no Patient Instructions on file for this visit.      Yefri Sotelo               Injectable Influenza Immunization Documentation    1.  Is the person to be vaccinated sick today?   No    2. Does the person to be vaccinated have an allergy to a component   of the vaccine?   No    3. Has the person to be vaccinated ever had a serious reaction   to influenza vaccine in the past?   No    4. Has the person to be vaccinated ever had Guillain-Barré syndrome?   No    Form completed by Nadiya Caraballo CMA

## 2017-10-03 NOTE — MR AVS SNAPSHOT
"              After Visit Summary   10/3/2017    Dangelo Hernández    MRN: [id number]           Patient Information     Date Of Birth          1935        Visit Information        Provider Department      10/3/2017 3:00 PM Yefri Sotelo MD [facility]        Today's Diagnoses     Need for prophylactic vaccination and inoculation against influenza    -  1    PNEUMONIA, ORGANISM NOS        PND (post-nasal drip)        Eczema, unspecified type        Coronary artery disease involving native coronary artery of native heart with angina pectoris (H)        Benign prostatic hyperplasia with lower urinary tract symptoms, symptom details unspecified           Follow-ups after your visit        Who to contact     If you have questions or need follow up information about today's clinic visit or your schedule please contact [facility] directly at [phone].  Normal or non-critical lab and imaging results will be communicated to you by MyChart, letter or phone within 4 business days after the clinic has received the results. If you do not hear from us within 7 days, please contact the clinic through MyChart or phone. If you have a critical or abnormal lab result, we will notify you by phone as soon as possible.  Submit refill requests through [employer] or call your pharmacy and they will forward the refill request to us. Please allow 3 business days for your refill to be completed.          Additional Information About Your Visit        MyChart Information     [employer] lets you send messages to your doctor, view your test results, renew your prescriptions, schedule appointments and more. To sign up, go to [email or web address] . Click on \"Log in\" on the left side of the screen, which will take you to the Welcome page. Then click on \"Sign up Now\" on the right side of the page.     You will be asked to enter the access code listed below, as well as some personal " information. Please follow the directions to create your username and password.     Your access code is: [email or web address]  Expires: 2018  4:12 PM     Your access code will  in 90 days. If you need help or a new code, please call your [place] clinic or [phone].        Care EveryWhere ID     This is your Care EveryWhere ID. This could be used by other organizations to access your [place] medical records  [id number]        Your Vitals Were     Pulse Temperature Pulse Oximetry BMI (Body Mass Index)          77 97.5  F (36.4  C) (Tympanic) 92% 29.5 kg/m2         Blood Pressure from Last 3 Encounters:   10/03/17 124/68   17 146/77   17 130/80    Weight from Last 3 Encounters:   10/03/17 194 lb (88 kg)   17 192 lb 6.4 oz (87.3 kg)   17 191 lb (86.6 kg)              We Performed the Following     ADMIN INFLUENZA (For MEDICARE Patients ONLY) []     FLU VACCINE, INCREASED ANTIGEN, PRESV FREE, AGE 65+ [01762]          Today's Medication Changes          These changes are accurate as of: 10/3/17  4:12 PM.  If you have any questions, ask your nurse or doctor.               Start taking these medicines.        Dose/Directions    azithromycin 250 MG tablet   Commonly known as:  ZITHROMAX   Used for:  Pneumonia, organism unspecified(486)   Started by:  Yefri Sotelo MD        2 tabs today and 1 tab qday for 4 more days   Quantity:  40 tablet   Refills:  0       tamsulosin 0.4 MG capsule   Commonly known as:  FLOMAX   Used for:  Benign prostatic hyperplasia with lower urinary tract symptoms, symptom details unspecified   Started by:  Yefri Sotelo MD        Dose:  0.4 mg   Take 1 capsule (0.4 mg) by mouth daily   Quantity:  90 capsule   Refills:  3         Stop taking these medicines if you haven't already. Please contact your care team if you have questions.     omeprazole 20 MG CR capsule   Commonly known as:  priLOSEC   Stopped by:  Yefri Sotelo MD                 Where to get your medicines      These medications were sent to [id number] - [place], [place] - [address]  [address] [address]     Phone:  [phone]     atorvastatin 80 MG tablet    azelastine 0.1 % spray    azithromycin 250 MG tablet    fluticasone 50 MCG/ACT spray    metoprolol 25 MG 24 hr tablet    tamsulosin 0.4 MG capsule                Primary Care Provider Office Phone # Fax #    Yefri Mejia Sotelo -[phone] [address] [address] [facility] [address]        Equal Access to Services     [facility] AVILA : Hadii aad ku hadasho Soomaali, waaxda luqadaha, qaybta kaalmada adeegyada, waxay victoriain haydietern aderod [place]. So [facility] [phone].    ATENCIÓN: Si habla español, tiene a mcneal disposición servicios gratuitos de asistencia lingüística. [facility] [phone].    We comply with applicable federal civil rights laws and [place] laws. We do not discriminate on the basis of race, color, national origin, age, disability, sex, sexual orientation, or gender identity.            Thank you!     Thank you for choosing [facility]  for your care. Our goal is always to provide you with excellent care. Hearing back from our patients is one way we can continue to improve our services. Please take a few minutes to complete the written survey that you may receive in the mail after your visit with us. Thank you!             Your Updated Medication List - Protect others around you: Learn how to safely use, store and throw away your medicines at www.disposemymeds.org.          This list is accurate as of: 10/3/17  4:12 PM.  Always use your most recent med list.                   Brand Name Dispense Instructions for use Diagnosis    ALEVE PO      Take 275 mg by mouth        aspirin 81 MG tablet      ONE DAILY    Routine general medical examination at a health care facility       atorvastatin 80 MG  tablet    LIPITOR    90 tablet    Take 1 tablet (80 mg) by mouth daily    Coronary artery disease involving native coronary artery of native heart with angina pectoris (H)       azelastine 0.1 % spray    ASTELIN    3 Bottle    Spray 1 spray into both nostrils 2 times daily    Eczema, unspecified type       azithromycin 250 MG tablet    ZITHROMAX    40 tablet    2 tabs today and 1 tab qday for 4 more days    Pneumonia, organism unspecified(486)       fluticasone 50 MCG/ACT spray    FLONASE    48 g    Spray 2 sprays into both nostrils daily    PND (post-nasal drip)       gabapentin 8 % Gel topical PLO cream     30 g    Apply thinly 2-3 times per day.    Lymphoma (H), Cryptogenic organising pneumonia, Candida glabrata infection       metoprolol 25 MG 24 hr tablet    TOPROL-XL    90 tablet    Take 1 tablet (25 mg) by mouth daily    Coronary artery disease involving native coronary artery of native heart with angina pectoris (H)       MULTI VITAMIN MENS PO      1 tablet daily    Routine general medical examination at a health care facility       OSTEO BI-FLEX REGULAR STRENGTH PO      Take 1 tablet by mouth daily        tamsulosin 0.4 MG capsule    FLOMAX    90 capsule    Take 1 capsule (0.4 mg) by mouth daily    Benign prostatic hyperplasia with lower urinary tract symptoms, symptom details unspecified       TYLENOL 325 MG tablet   Generic drug:  acetaminophen      Take 325-650 mg by mouth every 6 hours as needed for mild pain or pain

## 2017-10-03 NOTE — NURSING NOTE
"Chief Complaint   Patient presents with     Sinus Problem     pt wants medication he had before for his sinuses and wants to keep some on hand for later he is going to [place] in 3 weeks      Flu Shot       Initial /68 (BP Location: Right arm, Patient Position: Chair, Cuff Size: Adult Large)  Pulse 77  Temp 97.5  F (36.4  C) (Tympanic)  Wt 194 lb (88 kg)  SpO2 92%  BMI 29.5 kg/m2 Estimated body mass index is 29.5 kg/(m^2) as calculated from the following:    Height as of 4/20/17: 5' 8\" (1.727 m).    Weight as of this encounter: 194 lb (88 kg).  Medication Reconciliation: complete   Nadiya Caraballo CMA       "

## 2017-10-11 ENCOUNTER — TELEPHONE (OUTPATIENT)
Dept: FAMILY MEDICINE | Facility: CLINIC | Age: 82
End: 2017-10-11

## 2017-10-11 NOTE — TELEPHONE ENCOUNTER
Reviewed with  and pt to have #40 since pt has frequent sinus infections and [place].  Confirmed with Jorge.  [place]anti

## 2017-10-11 NOTE — TELEPHONE ENCOUNTER
Reason for Call:  Other call back    Detailed comments: Rosalia from [facility] is questioning the quantity of the Z-sharif that he was prescribed.   The qty given was # 40.  Please advise.    Phone Number Patient can be reached at: Other phone number:  Rosalia Neves's [phone]    Best Time: any    Can we leave a detailed message on this number? YES    Call taken on 10/11/2017 at 10:26 AM by Soheila Lopez

## 2017-11-29 ENCOUNTER — TELEPHONE (OUTPATIENT)
Dept: FAMILY MEDICINE | Facility: CLINIC | Age: 82
End: 2017-11-29

## 2017-11-29 DIAGNOSIS — L30.9 ECZEMA, UNSPECIFIED TYPE: ICD-10-CM

## 2017-11-29 NOTE — TELEPHONE ENCOUNTER
Per fax received from [facility] - Azelastine is not covered by patient's Insurance Company  Dr. Sotelo - Please choose:  1.  Change medication that is not covered to a different medication and send new prescription to patient's pharmacy?  2.  Patient will need to pay for the non-covered medication out-of-pocket?   3.  Try for Prior Authorization with Insurance Company to get medication covered?        P.A. Phone #:  [phone]       P.A.  ID#:   [id number]       Key:  [id number]       Patient Last Name:  Edgar       :   1935

## 2017-11-30 NOTE — TELEPHONE ENCOUNTER
I'm not able to find any active coverage for patient.  The Key listed below does not work and I've tried BC/BS, and two different BC/BS Medicare accounts and nothing is showing as active.

## 2017-11-30 NOTE — TELEPHONE ENCOUNTER
Pt in [place].  Will go to [facility] there, today to make sure his Insurance is correct/up to date.  Rerun PA?  [email or web address]

## 2017-12-01 RX ORDER — AZELASTINE 1 MG/ML
SPRAY, METERED NASAL
Qty: 3 BOTTLE | Refills: 0 | Status: SHIPPED | OUTPATIENT
Start: 2017-12-01 | End: 2018-09-25

## 2017-12-01 NOTE — TELEPHONE ENCOUNTER
Will have to wait to see if we hear from [place] [facility].  All insurance we have listed indicates patient does not have active coverage.

## 2018-02-12 DIAGNOSIS — R09.82 PND (POST-NASAL DRIP): ICD-10-CM

## 2018-02-12 NOTE — TELEPHONE ENCOUNTER
"Requested Prescriptions   Pending Prescriptions Disp Refills     fluticasone (FLONASE) 50 MCG/ACT spray [Pharmacy Med Name: FLUTICASONE 50MCG ZAKIYA SP (120SP) RX] 48 mL 0     Sig: SHAKE LIQUID AND USE 2 SPRAYS IN EACH NOSTRIL DAILY    Inhaled Steroids Protocol Passed    2/12/2018  2:36 PM       Passed - Patient is age 12 or older       Passed - Recent or future visit with authorizing provider's specialty    Patient had office visit in the last year or has a visit in the next 30 days with authorizing provider.  See \"Patient Info\" tab in inbasket, or \"Choose Columns\" in Meds & Orders section of the refill encounter.             Last Written Prescription Date:  10/3/17  Last Fill Quantity: 48g,  # refills: 3   Last office visit: 10/3/2017 with prescribing provider:  10/3/17   Future Office Visit:      "

## 2018-02-13 RX ORDER — FLUTICASONE PROPIONATE 50 MCG
SPRAY, SUSPENSION (ML) NASAL
Qty: 48 ML | Refills: 1 | Status: SHIPPED | OUTPATIENT
Start: 2018-02-13 | End: 2018-09-25

## 2018-04-23 ENCOUNTER — ONCOLOGY VISIT (OUTPATIENT)
Dept: TRANSPLANT | Facility: CLINIC | Age: 83
End: 2018-04-23
Attending: INTERNAL MEDICINE
Payer: MEDICARE

## 2018-04-23 VITALS
SYSTOLIC BLOOD PRESSURE: 122 MMHG | BODY MASS INDEX: 29.19 KG/M2 | TEMPERATURE: 97.4 F | DIASTOLIC BLOOD PRESSURE: 80 MMHG | HEART RATE: 69 BPM | OXYGEN SATURATION: 95 % | WEIGHT: 192 LBS

## 2018-04-23 DIAGNOSIS — E78.5 HYPERLIPIDEMIA LDL GOAL <100: Primary | ICD-10-CM

## 2018-04-23 DIAGNOSIS — C83.10 MANTLE CELL LYMPHOMA, UNSPECIFIED BODY REGION (H): ICD-10-CM

## 2018-04-23 DIAGNOSIS — R53.83 FATIGUE, UNSPECIFIED TYPE: ICD-10-CM

## 2018-04-23 DIAGNOSIS — E78.5 HYPERLIPIDEMIA LDL GOAL <100: ICD-10-CM

## 2018-04-23 DIAGNOSIS — M54.6 THORACIC BACK PAIN, UNSPECIFIED BACK PAIN LATERALITY, UNSPECIFIED CHRONICITY: ICD-10-CM

## 2018-04-23 LAB
ALBUMIN SERPL-MCNC: 3.9 G/DL (ref 3.4–5)
ALP SERPL-CCNC: 71 U/L (ref 40–150)
ALT SERPL W P-5'-P-CCNC: 34 U/L (ref 0–70)
ANION GAP SERPL CALCULATED.3IONS-SCNC: 8 MMOL/L (ref 3–14)
AST SERPL W P-5'-P-CCNC: 24 U/L (ref 0–45)
BASOPHILS # BLD AUTO: 0.2 10E9/L (ref 0–0.2)
BASOPHILS NFR BLD AUTO: 3.5 %
BILIRUB SERPL-MCNC: 0.6 MG/DL (ref 0.2–1.3)
BUN SERPL-MCNC: 19 MG/DL (ref 7–30)
CALCIUM SERPL-MCNC: 9.2 MG/DL (ref 8.5–10.1)
CHLORIDE SERPL-SCNC: 109 MMOL/L (ref 94–109)
CHOLEST SERPL-MCNC: 170 MG/DL
CO2 SERPL-SCNC: 24 MMOL/L (ref 20–32)
CREAT SERPL-MCNC: 1.06 MG/DL (ref 0.66–1.25)
DIFFERENTIAL METHOD BLD: ABNORMAL
EOSINOPHIL # BLD AUTO: 0.5 10E9/L (ref 0–0.7)
EOSINOPHIL NFR BLD AUTO: 8 %
ERYTHROCYTE [DISTWIDTH] IN BLOOD BY AUTOMATED COUNT: 13.2 % (ref 10–15)
GFR SERPL CREATININE-BSD FRML MDRD: 67 ML/MIN/1.7M2
GLUCOSE SERPL-MCNC: 96 MG/DL (ref 70–99)
HCT VFR BLD AUTO: 42.4 % (ref 40–53)
HDLC SERPL-MCNC: 54 MG/DL
HGB BLD-MCNC: 14.9 G/DL (ref 13.3–17.7)
LDLC SERPL CALC-MCNC: 79 MG/DL
LYMPHOCYTES # BLD AUTO: 1.5 10E9/L (ref 0.8–5.3)
LYMPHOCYTES NFR BLD AUTO: 25.7 %
MCH RBC QN AUTO: 33.3 PG (ref 26.5–33)
MCHC RBC AUTO-ENTMCNC: 35.1 G/DL (ref 31.5–36.5)
MCV RBC AUTO: 95 FL (ref 78–100)
MONOCYTES # BLD AUTO: 0.9 10E9/L (ref 0–1.3)
MONOCYTES NFR BLD AUTO: 14.2 %
NEUTROPHILS # BLD AUTO: 2.9 10E9/L (ref 1.6–8.3)
NEUTROPHILS NFR BLD AUTO: 48.6 %
NONHDLC SERPL-MCNC: 116 MG/DL
PLATELET # BLD AUTO: 216 10E9/L (ref 150–450)
PLATELET # BLD EST: ABNORMAL 10*3/UL
POTASSIUM SERPL-SCNC: 4.3 MMOL/L (ref 3.4–5.3)
PROT SERPL-MCNC: 7.4 G/DL (ref 6.8–8.8)
RBC # BLD AUTO: 4.47 10E12/L (ref 4.4–5.9)
RBC MORPH BLD: NORMAL
SODIUM SERPL-SCNC: 140 MMOL/L (ref 133–144)
TRIGL SERPL-MCNC: 185 MG/DL
TSH SERPL DL<=0.005 MIU/L-ACNC: 3.44 MU/L (ref 0.4–4)
WBC # BLD AUTO: 6 10E9/L (ref 4–11)

## 2018-04-23 PROCEDURE — 85025 COMPLETE CBC W/AUTO DIFF WBC: CPT | Performed by: INTERNAL MEDICINE

## 2018-04-23 PROCEDURE — 80053 COMPREHEN METABOLIC PANEL: CPT | Performed by: INTERNAL MEDICINE

## 2018-04-23 PROCEDURE — G0463 HOSPITAL OUTPT CLINIC VISIT: HCPCS

## 2018-04-23 PROCEDURE — 80061 LIPID PANEL: CPT | Performed by: INTERNAL MEDICINE

## 2018-04-23 PROCEDURE — 36415 COLL VENOUS BLD VENIPUNCTURE: CPT | Performed by: INTERNAL MEDICINE

## 2018-04-23 PROCEDURE — 84443 ASSAY THYROID STIM HORMONE: CPT | Performed by: INTERNAL MEDICINE

## 2018-04-23 ASSESSMENT — PAIN SCALES - GENERAL: PAINLEVEL: NO PAIN (0)

## 2018-04-23 NOTE — PATIENT INSTRUCTIONS
MRI thoracic spine    Ortho spine referral    [place] bmt in 1 year with labs prior    Pt wants to come in early May/mid May for his 1 year f/u so he can stay in [place] longer. Pt said he would call to schedule as it gets closer and I added him to the wait list.     Cesilia

## 2018-04-23 NOTE — NURSING NOTE
"Oncology Rooming Note    April 23, 2018 11:42 AM   Dangelo Hernández is a 82 year old male who presents for:    No chief complaint on file.    Initial Vitals: /80 (BP Location: Right arm, Patient Position: Right side, Cuff Size: Adult Regular)  Pulse 69  Temp 97.4  F (36.3  C) (Oral)  Wt 87.1 kg (192 lb)  SpO2 95%  BMI 29.19 kg/m2 Estimated body mass index is 29.19 kg/(m^2) as calculated from the following:    Height as of 4/20/17: 1.727 m (5' 8\").    Weight as of this encounter: 87.1 kg (192 lb). Body surface area is 2.04 meters squared.  No Pain (0) Comment: Data Unavailable   No LMP for male patient.  Allergies reviewed: Yes  Medications reviewed: Yes    Medications: Medication refills not needed today.  Pharmacy name entered into [employer]:    [facility] - [place], [place] - [address] [facility] [address] [address] [facility] [address] [facility] AT [facility] & [address] [address] - [place] [address] [address] AT [facility] OF [facility]    Clinical concerns: pt is taking a multivitamin that has a little extra vitamin.   Pt said he is a little sleepy.     6 minutes for nursing intake (face to face time)     Herminia Hinojosa MA                "
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
## 2018-04-28 ENCOUNTER — OFFICE VISIT (OUTPATIENT)
Dept: URGENT CARE | Facility: URGENT CARE | Age: 83
End: 2018-04-28
Payer: MEDICARE

## 2018-04-28 VITALS
TEMPERATURE: 97.7 F | SYSTOLIC BLOOD PRESSURE: 154 MMHG | BODY MASS INDEX: 29.65 KG/M2 | HEART RATE: 88 BPM | WEIGHT: 195 LBS | OXYGEN SATURATION: 96 % | DIASTOLIC BLOOD PRESSURE: 79 MMHG

## 2018-04-28 DIAGNOSIS — S05.02XA ABRASION OF LEFT CORNEA, INITIAL ENCOUNTER: Primary | ICD-10-CM

## 2018-04-28 PROCEDURE — 99213 OFFICE O/P EST LOW 20 MIN: CPT | Performed by: NURSE PRACTITIONER

## 2018-04-28 RX ORDER — POLYMYXIN B SULFATE AND TRIMETHOPRIM 1; 10000 MG/ML; [USP'U]/ML
1 SOLUTION OPHTHALMIC EVERY 4 HOURS
Qty: 3 ML | Refills: 0 | Status: SHIPPED | OUTPATIENT
Start: 2018-04-28 | End: 2018-05-05

## 2018-04-28 NOTE — PATIENT INSTRUCTIONS
Continue to monitor if not improving should be seen by opthalmology or in the Emergency department.    Follow-up with your primary care provider next week and as needed.    Indications for emergent return to emergency department discussed with patient, who verbalized good understanding and agreement.  Patient understands the limitations of today's evaluation.         Corneal Abrasion    You have received a scratch or scrape (abrasion) to your cornea. The cornea is the clear part in the front of the eye. This sensitive area is very painful when injured. You may make tears frequently, and your vision may be blurry until the injury heals. You may be sensitive to light.  This part of the body heals quickly. You can expect the pain to go away within 24 to 48 hours. If the abrasion is large or deep, your doctor may apply an eye patch, although this is not always done. An antibiotic ointment or eye drops may also be used to prevent infection.  Numbing drops may be used to relieve the pain temporarily so that your eyes can be examined. But these drops can t be prescribed for home use because that would prevent healing and lead to more serious problems. Also, if you can t feel your eye, there is a chance of accidentally injuring it further without knowing it.  Home care    A cold pack may be applied over the eye (or eye patch) for 20 minutes at a time, to reduce pain. To make a cold pack, put ice cubes in a plastic bag that seals at the top. Wrap the bag in a clean, thin towel or cloth.    You may use acetaminophen or ibuprofen to control pain, unless another pain medicine was prescribed. Note: If you have chronic liver or kidney disease or have ever had a stomach ulcer or GI bleeding, talk with your doctor before using these medicines.    Rest your eyes and don t read until symptoms are gone.    If you use contact lenses, don t wear them until all symptoms are gone.    If your vision is affected by the corneal abrasion or  if an eye patch was applied, don t drive a motor vehicle or operate machinery until all symptoms are gone. You may have trouble judging distances using only one eye.    If your eyes are sensitive to light, try wearing sunglasses, or stay indoors until symptoms go away.  Follow-up care  Follow up with your healthcare provider, or as advised.    If no patch was put on your eye and the pain continues for more than 48 hours, you should have another exam. Contact your healthcare provider to arrange this.    If your eye was patched and you were asked to remove the patch yourself, see your healthcare provider. Contact your healthcare provider if you still have pain after the patch is removed.    If you were given a return appointment for patch removal and re-examination, be sure to keep the appointment. Leaving the patch in place longer than advised could be harmful.  When to seek medical advice  Call your healthcare provider right away if any of these occur.    Increasing eye pain or pain that does not improve after 24 hours    Discharge from the eye    Increasing redness of the eye or swelling of the eyelids    Worsening vision    Symptoms get worse after the abrasion has healed  Date Last Reviewed: 7/1/2017 2000-2017 [employer]. [address] [place], [place] [id number]. All rights reserved. This information is not intended as a substitute for professional medical care. Always follow your healthcare professional's instructions.        Particle in the Eye  The conjunctiva is a thin membrane in the eye. It covers the white of the eye and the inside of the eyelid. A very small object such as an eyelash or dirt can become trapped under the eyelid. This is called a conjunctival foreign body. This can be very irritating to the eye, no matter how small the object is. If the exam shows that you no longer have a particle in your eye, any discomfort should go away within the next 24 hours.  Home care     Hold a  cool compress over the sore eye to relieve pain and swelling.     Put a cool compress on the eye that hurts. A cool compress is a towel soaked in cool water. Do this 3 to 4 times a day. It will help ease redness and swelling.    You can use artificial tears to ease irritation and redness, unless another medicine was prescribed. You can buy artificial tears at drugstores.    You can use over-the-counter pain medicine such as acetaminophen or ibuprofen to control pain, unless another pain medicine was prescribed. If you have chronic liver or kidney disease, or if you have ever had a stomach ulcer or gastrointestinal bleeding, talk with your doctor before using these medicines.    If the foreign body causes a scratch on your cornea, the healthcare provider will likely prescribe an antibiotic eye drop.  Follow-up care  Follow up with your healthcare provider, or as advised.  When to seek medical advice  Contact your healthcare provider right away if any of these occur:    Increased swelling of the eyelid    Increased pain or redness in the eye    Drainage from the eye    Redness in the skin around the eye  Date Last Reviewed: 5/1/2017 2000-2017 [employer]. [address] [place] [address], [place] [address]. All rights reserved. This information is not intended as a substitute for professional medical care. Always follow your healthcare professional's instructions.

## 2018-04-28 NOTE — MR AVS SNAPSHOT
After Visit Summary   4/28/2018    Dangelo Hernández    MRN: [id number]           Patient Information     Date Of Birth          1935        Visit Information        Provider Department      4/28/2018 9:10 AM Caridad Solorio APRN CNP [facility]        Today's Diagnoses     Abrasion of left cornea, initial encounter    -  1      Care Instructions    Continue to monitor if not improving should be seen by opthalmology or in the Emergency department.    Follow-up with your primary care provider next week and as needed.    Indications for emergent return to emergency department discussed with patient, who verbalized good understanding and agreement.  Patient understands the limitations of today's evaluation.         Corneal Abrasion    You have received a scratch or scrape (abrasion) to your cornea. The cornea is the clear part in the front of the eye. This sensitive area is very painful when injured. You may make tears frequently, and your vision may be blurry until the injury heals. You may be sensitive to light.  This part of the body heals quickly. You can expect the pain to go away within 24 to 48 hours. If the abrasion is large or deep, your doctor may apply an eye patch, although this is not always done. An antibiotic ointment or eye drops may also be used to prevent infection.  Numbing drops may be used to relieve the pain temporarily so that your eyes can be examined. But these drops can t be prescribed for home use because that would prevent healing and lead to more serious problems. Also, if you can t feel your eye, there is a chance of accidentally injuring it further without knowing it.  Home care    A cold pack may be applied over the eye (or eye patch) for 20 minutes at a time, to reduce pain. To make a cold pack, put ice cubes in a plastic bag that seals at the top. Wrap the bag in a clean, thin towel or cloth.    You may use acetaminophen or  ibuprofen to control pain, unless another pain medicine was prescribed. Note: If you have chronic liver or kidney disease or have ever had a stomach ulcer or GI bleeding, talk with your doctor before using these medicines.    Rest your eyes and don t read until symptoms are gone.    If you use contact lenses, don t wear them until all symptoms are gone.    If your vision is affected by the corneal abrasion or if an eye patch was applied, don t drive a motor vehicle or operate machinery until all symptoms are gone. You may have trouble judging distances using only one eye.    If your eyes are sensitive to light, try wearing sunglasses, or stay indoors until symptoms go away.  Follow-up care  Follow up with your healthcare provider, or as advised.    If no patch was put on your eye and the pain continues for more than 48 hours, you should have another exam. Contact your healthcare provider to arrange this.    If your eye was patched and you were asked to remove the patch yourself, see your healthcare provider. Contact your healthcare provider if you still have pain after the patch is removed.    If you were given a return appointment for patch removal and re-examination, be sure to keep the appointment. Leaving the patch in place longer than advised could be harmful.  When to seek medical advice  Call your healthcare provider right away if any of these occur.    Increasing eye pain or pain that does not improve after 24 hours    Discharge from the eye    Increasing redness of the eye or swelling of the eyelids    Worsening vision    Symptoms get worse after the abrasion has healed  Date Last Reviewed: 7/1/2017 2000-2017 [employer]. [address] [place] [address], [place] [address]. All rights reserved. This information is not intended as a substitute for professional medical care. Always follow your healthcare professional's instructions.        Particle in the Eye  The conjunctiva is a thin membrane in the  eye. It covers the white of the eye and the inside of the eyelid. A very small object such as an eyelash or dirt can become trapped under the eyelid. This is called a conjunctival foreign body. This can be very irritating to the eye, no matter how small the object is. If the exam shows that you no longer have a particle in your eye, any discomfort should go away within the next 24 hours.  Home care     Hold a cool compress over the sore eye to relieve pain and swelling.     Put a cool compress on the eye that hurts. A cool compress is a towel soaked in cool water. Do this 3 to 4 times a day. It will help ease redness and swelling.    You can use artificial tears to ease irritation and redness, unless another medicine was prescribed. You can buy artificial tears at [employer].    You can use over-the-counter pain medicine such as acetaminophen or ibuprofen to control pain, unless another pain medicine was prescribed. If you have chronic liver or kidney disease, or if you have ever had a stomach ulcer or gastrointestinal bleeding, talk with your doctor before using these medicines.    If the foreign body causes a scratch on your cornea, the healthcare provider will likely prescribe an antibiotic eye drop.  Follow-up care  Follow up with your healthcare provider, or as advised.  When to seek medical advice  Contact your healthcare provider right away if any of these occur:    Increased swelling of the eyelid    Increased pain or redness in the eye    Drainage from the eye    Redness in the skin around the eye  Date Last Reviewed: 5/1/2017 2000-2017 [employer]. [address] [place] [address]. All rights reserved. This information is not intended as a substitute for professional medical care. Always follow your healthcare professional's instructions.                Follow-ups after your visit        Your next 10 appointments already scheduled     May 18, 2018 11:00 AM CDT   (Arrive by 10:45  AM)   MR THORACIC SPINE W/O & W CONTRAST with UUMR2   [facility] [place] ([facility] [place])    [address] [facility] [address] [phone]           Take your medicines as usual, unless your doctor tells you not to. Bring a list of your current medicines to your exam (including vitamins, minerals and over-the-counter drugs).  You may or may not receive intravenous (IV) contrast for this exam pending the discretion of the Radiologist.  You do not need to do anything special to prepare.  The MRI machine uses a strong magnet. Please wear clothes without metal (snaps, zippers). A sweatsuit works well, or we may give you a hospital gown.  Please remove any body piercings and hair extensions before you arrive. You will also remove watches, jewelry, hairpins, wallets, dentures, partial dental plates and hearing aids. You may wear contact lenses, and you may be able to wear your rings. We have a safe place to keep your personal items, but it is safer to leave them at home.  **IMPORTANT** THE INSTRUCTIONS BELOW ARE ONLY FOR THOSE PATIENTS WHO HAVE BEEN PRESCRIBED SEDATION OR GENERAL ANESTHESIA DURING THEIR MRI PROCEDURE:  IF YOUR DOCTOR PRESCRIBED ORAL SEDATION (take medicine to help you relax during your exam):   You must get the medicine from your doctor (oral medication) before you arrive. Bring the medicine to the exam. Do not take it at home. You ll be told when to take it upon arriving for your exam.   Arrive one hour early. Bring someone who can take you home after the test. Your medicine will make you sleepy. After the exam, you may not drive, take a bus or take a taxi by yourself.  IF YOUR DOCTOR PRESCRIBED IV SEDATION:   Arrive one hour early. Bring someone who can take you home after the test. Your medicine will make you sleepy. After the exam, you may not drive, take a bus or take a taxi by yourself.   No eating 6 hours before your exam. You may  "have clear liquids up until 4 hours before your exam. (Clear liquids include water, clear tea, black coffee and fruit juice without pulp.)  IF YOUR DOCTOR PRESCRIBED ANESTHESIA (be asleep for your exam):   Arrive 1 1/2 hours early. Bring someone who can take you home after the test. You may not drive, take a bus or take a taxi by yourself.   No eating 8 hours before your exam. You may have clear liquids up until 4 hours before your exam. (Clear liquids include water, clear tea, black coffee and fruit juice without pulp.)   You will spend four to five hours in the recovery room.  Please call the Imaging Department at your exam site with any questions.            May 18, 2018  2:40 PM CDT   (Arrive by 2:25 PM)   New Patient Visit with Ludin Schulz MD   [facility] ([facility])    [address] [place] [address] [phone]              Who to contact     If you have questions or need follow up information about today's clinic visit or your schedule please contact [facility] directly at [phone].  Normal or non-critical lab and imaging results will be communicated to you by MyChart, letter or phone within 4 business days after the clinic has received the results. If you do not hear from us within 7 days, please contact the clinic through [employer] (by [employer])hart or phone. If you have a critical or abnormal lab result, we will notify you by phone as soon as possible.  Submit refill requests through [employer] or call your pharmacy and they will forward the refill request to us. Please allow 3 business days for your refill to be completed.          Additional Information About Your Visit        MyChart Information     [employer] lets you send messages to your doctor, view your test results, renew your prescriptions, schedule appointments and more. To sign up, go to [email or web address] . Click on \"Log in\" on the left side of " "the screen, which will take you to the Welcome page. Then click on \"Sign up Now\" on the right side of the page.     You will be asked to enter the access code listed below, as well as some personal information. Please follow the directions to create your username and password.     Your access code is: [email or web address]  Expires: 2018  6:31 AM     Your access code will  in 90 days. If you need help or a new code, please call your [facility] or [phone].        Care EveryWhere ID     This is your Care EveryWhere ID. This could be used by other organizations to access your [facility] medical records  [id number]        Your Vitals Were     Pulse Temperature Pulse Oximetry BMI (Body Mass Index)          88 97.7  F (36.5  C) (Tympanic) 96% 29.65 kg/m2         Blood Pressure from Last 3 Encounters:   18 154/79   18 122/80   10/03/17 124/68    Weight from Last 3 Encounters:   18 195 lb (88.5 kg)   18 192 lb (87.1 kg)   10/03/17 194 lb (88 kg)              Today, you had the following     No orders found for display         Today's Medication Changes          These changes are accurate as of 18  9:34 AM.  If you have any questions, ask your nurse or doctor.               Start taking these medicines.        Dose/Directions    trimethoprim-polymyxin b ophthalmic solution   Commonly known as:  POLYTRIM   Used for:  Abrasion of left cornea, initial encounter        Dose:  1 drop   Place 1 drop Into the left eye every 4 hours for 7 days   Quantity:  3 mL   Refills:  0            Where to get your medicines      These medications were sent to [facility] [address] [place], [place] - [address] [place] [address] AT [facility] & [address]  [address] [facility] [address]     Phone:  [phone]     trimethoprim-polymyxin b ophthalmic solution                Primary Care Provider Office Phone # Fax #    Yefri Sotelo [phone]       [address]  [facility] [address]        Equal Access to Services     JOSESITOTARYN AVILA : Hadii josefa medina hadmaria alejandrasally Sogarrisonali, waaxda luqadaha, qaybta kagabygarrison villalobos, dipesh bettsfernandothania lunsford. So [facility] [phone].    ATENCIÓN: Si habla español, tiene a mcneal disposición servicios gratuitos de asistencia lingüística. Llame al [phone].    We comply with applicable federal civil rights laws and [place] laws. We do not discriminate on the basis of race, color, national origin, age, disability, sex, sexual orientation, or gender identity.            Thank you!     Thank you for choosing [facility]  for your care. Our goal is always to provide you with excellent care. Hearing back from our patients is one way we can continue to improve our services. Please take a few minutes to complete the written survey that you may receive in the mail after your visit with us. Thank you!             Your Updated Medication List - Protect others around you: Learn how to safely use, store and throw away your medicines at www.disposemymeds.org.          This list is accurate as of 4/28/18  9:34 AM.  Always use your most recent med list.                   Brand Name Dispense Instructions for use Diagnosis    ALEVE PO      Take 275 mg by mouth        aspirin 81 MG tablet      ONE DAILY    Routine general medical examination at a health care facility       atorvastatin 80 MG tablet    LIPITOR    90 tablet    Take 1 tablet (80 mg) by mouth daily    Coronary artery disease involving native coronary artery of native heart with angina pectoris (H)       * azelastine 0.1 % spray    ASTELIN    3 Bottle    Spray 1 spray into both nostrils 2 times daily    Eczema, unspecified type       * azelastine 0.1 % spray    ASTELIN    3 Bottle    SPRAY 1 SPRAY INTO BOTH NOSTRILS TWICE DAILY    Eczema, unspecified type       azithromycin 250 MG tablet    ZITHROMAX    40 tablet    2 tabs today and 1 tab qday for 4 more  days    Pneumonia, organism unspecified(486)       * fluticasone 50 MCG/ACT spray    FLONASE    48 g    Spray 2 sprays into both nostrils daily    PND (post-nasal drip)       * fluticasone 50 MCG/ACT spray    FLONASE    48 mL    SHAKE LIQUID AND USE 2 SPRAYS IN EACH NOSTRIL DAILY    PND (post-nasal drip)       gabapentin 8 % Gel topical PLO cream     30 g    Apply thinly 2-3 times per day.    Lymphoma (H), Cryptogenic organising pneumonia, Candida glabrata infection       metoprolol succinate 25 MG 24 hr tablet    TOPROL-XL    90 tablet    Take 1 tablet (25 mg) by mouth daily    Coronary artery disease involving native coronary artery of native heart with angina pectoris (H)       MULTI VITAMIN MENS PO      1 tablet daily    Routine general medical examination at a health care facility       OSTEO BI-FLEX REGULAR STRENGTH PO      Take 1 tablet by mouth daily        tamsulosin 0.4 MG capsule    FLOMAX    90 capsule    Take 1 capsule (0.4 mg) by mouth daily    Benign prostatic hyperplasia with lower urinary tract symptoms, symptom details unspecified       trimethoprim-polymyxin b ophthalmic solution    POLYTRIM    3 mL    Place 1 drop Into the left eye every 4 hours for 7 days    Abrasion of left cornea, initial encounter       TYLENOL 325 MG tablet   Generic drug:  acetaminophen      Take 325-650 mg by mouth every 6 hours as needed for mild pain or pain        * Notice:  This list has 4 medication(s) that are the same as other medications prescribed for you. Read the directions carefully, and ask your doctor or other care provider to review them with you.

## 2018-04-28 NOTE — PROGRESS NOTES
SUBJECTIVE:  Dangelo Hernández is a 82 year old male who presents complaining of moderate left eye discomfort and watering for 1 day(s).   Details:  Associated Signs and Syptoms: none  Treatment measures tried include: none  Contact wearer : Yes     Past Medical History:   Diagnosis Date      Coronary artery disease, 4/ 2010, status post drug-eluting stent placement to the LAD and balloon angioplasty to the obtuse marginal.  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       Social History   Substance Use Topics     Smoking status: Former Smoker     Types: Cigarettes     Quit date: 1/1/1975     Smokeless tobacco: Former User      Comment: started smoking at 21 years of age     Alcohol use 0.0 oz/week     0 Standard drinks or equivalent per week      Comment: every other day     Current Outpatient Prescriptions   Medication Sig Dispense Refill     trimethoprim-polymyxin b (POLYTRIM) ophthalmic solution Place 1 drop Into the left eye every 4 hours for 7 days 3 mL 0     acetaminophen (TYLENOL) 325 MG tablet Take 325-650 mg by mouth every 6 hours as needed for mild pain or pain       ASPIRIN 81 MG PO TABS ONE DAILY  3     atorvastatin (LIPITOR) 80 MG tablet Take 1 tablet (80 mg) by mouth daily 90 tablet 3     azelastine (ASTELIN) 0.1 % spray Spray 1 spray into both nostrils 2 times daily 3 Bottle 3     azelastine (ASTELIN) 0.1 % spray SPRAY 1 SPRAY INTO BOTH NOSTRILS TWICE DAILY  3 Bottle 0     azithromycin (ZITHROMAX) 250 MG tablet 2 tabs today and 1 tab qday for 4 more days 40 tablet 0     fluticasone (FLONASE) 50 MCG/ACT spray Spray 2 sprays into both nostrils daily 48 g 03     fluticasone (FLONASE) 50 MCG/ACT spray SHAKE LIQUID AND USE 2 SPRAYS IN EACH NOSTRIL DAILY 48 mL 1     gabapentin 8 % GEL Apply thinly 2-3 times per day. 30 g 3     Glucosamine-Chondroitin (OSTEO BI-FLEX REGULAR STRENGTH PO) Take 1 tablet by mouth daily       metoprolol (TOPROL-XL) 25 MG 24 hr tablet Take 1 tablet (25 mg) by mouth daily 90 tablet 2     MULTI VITAMIN MENS OR 1 tablet daily       Naproxen Sodium (ALEVE PO) Take 275 mg by mouth       tamsulosin (FLOMAX) 0.4 MG capsule Take 1 capsule (0.4 mg) by mouth daily 90 capsule 3       ROS:  ROS neg other than the symptoms noted above in the HPI.      OBJECTIVE:  /79 (BP Location: Right arm)  Pulse 88  Temp 97.7  F (36.5  C) (Tympanic)  Wt 195 lb (88.5 kg)  SpO2 96%  BMI 29.65 kg/m2  General: no acute distress  Eye exam: Left eye: lids normal; PERRL, EOM's intact; mild conjunctival injection;  foreign body noted; Proparacaine used for local anesthesia; Fluorescein stain; 1 areas mild corneal abrasion noted lower lateral cornea.  Rinsed with saline Foreign body irrigated out.   EXAM:  Constitutional: healthy, alert and no distress   Cardiovascular: negative, PMI normal. No lifts, heaves, or thrills. RRR. No murmurs, clicks gallops or rub  Respiratory: negative, Percussion normal. Good diaphragmatic excursion. Lungs clear  ENT: ENT exam normal, no neck nodes or sinus tenderness  SKIN: no suspicious lesions or rashes  LYMPH: Normal cervical lymph nodes    ASSESSMENT:    ICD-10-CM    1. Abrasion of left cornea, initial encounter S05.02XA trimethoprim-polymyxin b (POLYTRIM) ophthalmic solution       Patient Instructions     Continue to monitor if not improving should be seen by opthalmology or in the Emergency department.    Follow-up with your primary  care provider next week and as needed.    Indications for emergent return to emergency department discussed with patient, who verbalized good understanding and agreement.  Patient understands the limitations of today's evaluation.         Corneal Abrasion    You have received a scratch or scrape (abrasion) to your cornea. The cornea is the clear part in the front of the eye. This sensitive area is very painful when injured. You may make tears frequently, and your vision may be blurry until the injury heals. You may be sensitive to light.  This part of the body heals quickly. You can expect the pain to go away within 24 to 48 hours. If the abrasion is large or deep, your doctor may apply an eye patch, although this is not always done. An antibiotic ointment or eye drops may also be used to prevent infection.  Numbing drops may be used to relieve the pain temporarily so that your eyes can be examined. But these drops can t be prescribed for home use because that would prevent healing and lead to more serious problems. Also, if you can t feel your eye, there is a chance of accidentally injuring it further without knowing it.  Home care    A cold pack may be applied over the eye (or eye patch) for 20 minutes at a time, to reduce pain. To make a cold pack, put ice cubes in a plastic bag that seals at the top. Wrap the bag in a clean, thin towel or cloth.    You may use acetaminophen or ibuprofen to control pain, unless another pain medicine was prescribed. Note: If you have chronic liver or kidney disease or have ever had a stomach ulcer or GI bleeding, talk with your doctor before using these medicines.    Rest your eyes and don t read until symptoms are gone.    If you use contact lenses, don t wear them until all symptoms are gone.    If your vision is affected by the corneal abrasion or if an eye patch was applied, don t drive a motor vehicle or operate machinery until all symptoms are gone. You may have trouble  judging distances using only one eye.    If your eyes are sensitive to light, try wearing sunglasses, or stay indoors until symptoms go away.  Follow-up care  Follow up with your healthcare provider, or as advised.    If no patch was put on your eye and the pain continues for more than 48 hours, you should have another exam. Contact your healthcare provider to arrange this.    If your eye was patched and you were asked to remove the patch yourself, see your healthcare provider. Contact your healthcare provider if you still have pain after the patch is removed.    If you were given a return appointment for patch removal and re-examination, be sure to keep the appointment. Leaving the patch in place longer than advised could be harmful.  When to seek medical advice  Call your healthcare provider right away if any of these occur.    Increasing eye pain or pain that does not improve after 24 hours    Discharge from the eye    Increasing redness of the eye or swelling of the eyelids    Worsening vision    Symptoms get worse after the abrasion has healed  Date Last Reviewed: 7/1/2017 2000-2017 [employer]. [address] [place] [address]. All rights reserved. This information is not intended as a substitute for professional medical care. Always follow your healthcare professional's instructions.        Particle in the Eye  The conjunctiva is a thin membrane in the eye. It covers the white of the eye and the inside of the eyelid. A very small object such as an eyelash or dirt can become trapped under the eyelid. This is called a conjunctival foreign body. This can be very irritating to the eye, no matter how small the object is. If the exam shows that you no longer have a particle in your eye, any discomfort should go away within the next 24 hours.  Home care     Hold a cool compress over the sore eye to relieve pain and swelling.     Put a cool compress on the eye that hurts. A cool compress is  a towel soaked in cool water. Do this 3 to 4 times a day. It will help ease redness and swelling.    You can use artificial tears to ease irritation and redness, unless another medicine was prescribed. You can buy artificial tears at drugstores.    You can use over-the-counter pain medicine such as acetaminophen or ibuprofen to control pain, unless another pain medicine was prescribed. If you have chronic liver or kidney disease, or if you have ever had a stomach ulcer or gastrointestinal bleeding, talk with your doctor before using these medicines.    If the foreign body causes a scratch on your cornea, the healthcare provider will likely prescribe an antibiotic eye drop.  Follow-up care  Follow up with your healthcare provider, or as advised.  When to seek medical advice  Contact your healthcare provider right away if any of these occur:    Increased swelling of the eyelid    Increased pain or redness in the eye    Drainage from the eye    Redness in the skin around the eye  Date Last Reviewed: 5/1/2017 2000-2017 [employer]. [address] [place] [address], [place] [address]. All rights reserved. This information is not intended as a substitute for professional medical care. Always follow your healthcare professional's instructions.            MURALI Vera CNP

## 2018-05-14 NOTE — TELEPHONE ENCOUNTER
FUTURE VISIT INFORMATION      FUTURE VISIT INFORMATION:    Date: 5/18/18    Time:     Location: [facility]  REFERRAL INFORMATION:    Referring provider:  Dr. Abida Lott    Referring providers clinic:   BMT    Reason for visit/diagnosis  Thoracic back pain, appt per pt with BMT, Records and referral in [facility]    RECORDS REQUESTED FROM:       Clinic name Comments Records Status Imaging Status    Dr. Abida Lott referring internal internal                                   RECORDS STATUS

## 2018-05-18 ENCOUNTER — OFFICE VISIT (OUTPATIENT)
Dept: ORTHOPEDICS | Facility: CLINIC | Age: 83
End: 2018-05-18
Payer: MEDICARE

## 2018-05-18 ENCOUNTER — TELEPHONE (OUTPATIENT)
Dept: PALLIATIVE MEDICINE | Facility: CLINIC | Age: 83
End: 2018-05-18

## 2018-05-18 ENCOUNTER — HOSPITAL ENCOUNTER (OUTPATIENT)
Dept: MRI IMAGING | Facility: CLINIC | Age: 83
Discharge: HOME | End: 2018-05-18
Attending: INTERNAL MEDICINE | Admitting: INTERNAL MEDICINE
Payer: MEDICARE

## 2018-05-18 ENCOUNTER — PRE VISIT (OUTPATIENT)
Dept: ORTHOPEDICS | Facility: CLINIC | Age: 83
End: 2018-05-18

## 2018-05-18 VITALS — BODY MASS INDEX: 30.61 KG/M2 | WEIGHT: 195 LBS | HEIGHT: 67 IN

## 2018-05-18 DIAGNOSIS — M54.6 THORACIC BACK PAIN, UNSPECIFIED BACK PAIN LATERALITY, UNSPECIFIED CHRONICITY: ICD-10-CM

## 2018-05-18 DIAGNOSIS — M54.14 RADICULAR PAIN OF THORACIC REGION: ICD-10-CM

## 2018-05-18 DIAGNOSIS — M51.24 THORACIC DISC HERNIATION: Primary | ICD-10-CM

## 2018-05-18 PROCEDURE — 72157 MRI CHEST SPINE W/O & W/DYE: CPT

## 2018-05-18 PROCEDURE — A9585 GADOBUTROL INJECTION: HCPCS | Performed by: INTERNAL MEDICINE

## 2018-05-18 PROCEDURE — 25000128 H RX IP 250 OP 636: Performed by: INTERNAL MEDICINE

## 2018-05-18 RX ORDER — GADOBUTROL 604.72 MG/ML
10 INJECTION INTRAVENOUS ONCE
Status: COMPLETED | OUTPATIENT
Start: 2018-05-18 | End: 2018-05-18

## 2018-05-18 RX ADMIN — GADOBUTROL 9 ML: 604.72 INJECTION INTRAVENOUS at 11:31

## 2018-05-18 ASSESSMENT — ENCOUNTER SYMPTOMS
NECK PAIN: 0
MYALGIAS: 1
MUSCLE WEAKNESS: 1
STIFFNESS: 1
ARTHRALGIAS: 1
JOINT SWELLING: 0
BACK PAIN: 1
MUSCLE CRAMPS: 1
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
## 2018-05-18 NOTE — MR AVS SNAPSHOT
After Visit Summary   5/18/2018    Dangelo Hernández    MRN: [id number]           Patient Information     Date Of Birth          1935        Visit Information        Provider Department      5/18/2018 2:40 PM Ludin Schulz MD [facility]        Today's Diagnoses     Thoracic disc herniation    -  1       Follow-ups after your visit        Your next 10 appointments already scheduled     Jun 01, 2018  9:45 AM CDT   Radiology Injections with Mejia Quesada MD   [facility] ([facility])    [address]  [address]  [facility] [place] [address]   [phone]            Brennan 15, 2018 12:30 PM CDT   (Arrive by 12:15 PM)   Return Visit with Ludin Schulz MD   [facility] ([facility])    [address]  4th [address]  [place] [id number] [phone]            May 09, 2019 11:30 AM CDT   [facility] with  [facility]   [facility] ([facility])    [address]  [address]  [place] [id number] [phone]            May 09, 2019 12:00 PM CDT   Return with Abida Lott MD   [facility] ([facility])    [address]  [address]  [place] [id number] [phone]              Future tests that were ordered for you today     Open Future Orders        Priority Expected Expires Ordered    X-ray cervical/thoracic epidural injection Routine 5/18/2018 5/18/2019 5/18/2018            Who to contact     Please call your clinic at [phone] to:    Ask questions about your health    Make or cancel appointments    Discuss your medicines    Learn about your test results    Speak to your doctor            Additional Information About Your Visit        MyChart Information     [employer] is an electronic gateway that provides easy, online  "access to your medical records. With [employer], you can request a clinic appointment, read your test results, renew a prescription or communicate with your care team.     To sign up for [employer] visit the website at [email or web address] (formerly [email or web address])   You will be asked to enter the access code listed below, as well as some personal information. Please follow the directions to create your username and password.     Your access code is: [email or web address]  Expires: 2018  6:31 AM     Your access code will  in 90 days. If you need help or a new code, please contact your [facility] or call [phone] for assistance.        Care EveryWhere ID     This is your Care EveryWhere ID. This could be used by other organizations to access your [place] medical records  [id number]        Your Vitals Were     Height BMI (Body Mass Index)                1.702 m (5' 7\") 30.54 kg/m2           Blood Pressure from Last 3 Encounters:   18 154/79   18 122/80   10/03/17 124/68    Weight from Last 3 Encounters:   18 88.5 kg (195 lb)   18 88.5 kg (195 lb)   18 87.1 kg (192 lb)               Primary Care Provider Office Phone # Fax #    Yefri Mejia Sotelo [phone] [address] [address]        Equal Access to Services     [facility]: Hadii josefa ku hadasho Soomaali, waaxda luqadaha, qaybta kaalmada adeegyada, dipesh lunsford. So [facility] [phone].    ATENCIÓN: Si habla español, tiene a mcneal disposición servicios gratuitos de asistencia lingüística. Llame al [phone].    We comply with applicable federal civil rights laws and [place] laws. We do not discriminate on the basis of race, color, national origin, age, disability, sex, sexual orientation, or gender identity.            Thank you!     Thank you for choosing [facility]  for your care. Our goal is always to provide you with excellent " care. Hearing back from our patients is one way we can continue to improve our services. Please take a few minutes to complete the written survey that you may receive in the mail after your visit with us. Thank you!             Your Updated Medication List - Protect others around you: Learn how to safely use, store and throw away your medicines at www.disposemymeds.org.          This list is accurate as of 5/18/18  3:19 PM.  Always use your most recent med list.                   Brand Name Dispense Instructions for use Diagnosis    ALEVE PO      Take 275 mg by mouth        aspirin 81 MG tablet      ONE DAILY    Routine general medical examination at a health care facility       atorvastatin 80 MG tablet    LIPITOR    90 tablet    Take 1 tablet (80 mg) by mouth daily    Coronary artery disease involving native coronary artery of native heart with angina pectoris (H)       * azelastine 0.1 % spray    ASTELIN    3 Bottle    Spray 1 spray into both nostrils 2 times daily    Eczema, unspecified type       * azelastine 0.1 % spray    ASTELIN    3 Bottle    SPRAY 1 SPRAY INTO BOTH NOSTRILS TWICE DAILY    Eczema, unspecified type       azithromycin 250 MG tablet    ZITHROMAX    40 tablet    2 tabs today and 1 tab qday for 4 more days    Pneumonia, organism unspecified(486)       * fluticasone 50 MCG/ACT spray    FLONASE    48 g    Spray 2 sprays into both nostrils daily    PND (post-nasal drip)       * fluticasone 50 MCG/ACT spray    FLONASE    48 mL    SHAKE LIQUID AND USE 2 SPRAYS IN EACH NOSTRIL DAILY    PND (post-nasal drip)       gabapentin 8 % Gel topical PLO cream     30 g    Apply thinly 2-3 times per day.    Lymphoma (H), Cryptogenic organising pneumonia, Candida glabrata infection       metoprolol succinate 25 MG 24 hr tablet    TOPROL-XL    90 tablet    Take 1 tablet (25 mg) by mouth daily    Coronary artery disease involving native coronary artery of native heart with angina pectoris (H)       MULTI VITAMIN  MENS PO      1 tablet daily    Routine general medical examination at a health care facility       OSTEO BI-FLEX REGULAR STRENGTH PO      Take 1 tablet by mouth daily        tamsulosin 0.4 MG capsule    FLOMAX    90 capsule    Take 1 capsule (0.4 mg) by mouth daily    Benign prostatic hyperplasia with lower urinary tract symptoms, symptom details unspecified       TYLENOL 325 MG tablet   Generic drug:  acetaminophen      Take 325-650 mg by mouth every 6 hours as needed for mild pain or pain        * Notice:  This list has 4 medication(s) that are the same as other medications prescribed for you. Read the directions carefully, and ask your doctor or other care provider to review them with you.

## 2018-06-01 ENCOUNTER — HOSPITAL ENCOUNTER (OUTPATIENT)
Dept: RADIOLOGY | Facility: CLINIC | Age: 83
Discharge: HOME | End: 2018-06-01
Attending: PREVENTIVE MEDICINE | Admitting: PREVENTIVE MEDICINE
Payer: MEDICARE

## 2018-06-01 ENCOUNTER — RADIOLOGY INJECTION OFFICE VISIT (OUTPATIENT)
Dept: PALLIATIVE MEDICINE | Facility: CLINIC | Age: 83
End: 2018-06-01
Payer: MEDICARE

## 2018-06-01 VITALS — SYSTOLIC BLOOD PRESSURE: 135 MMHG | RESPIRATION RATE: 14 BRPM | HEART RATE: 77 BPM | DIASTOLIC BLOOD PRESSURE: 89 MMHG

## 2018-06-01 DIAGNOSIS — M51.34 THORACIC DEGENERATIVE DISC DISEASE: ICD-10-CM

## 2018-06-01 DIAGNOSIS — M51.24 THORACIC DISC HERNIATION: ICD-10-CM

## 2018-06-01 DIAGNOSIS — M47.24 OSTEOARTHRITIS OF SPINE WITH RADICULOPATHY, THORACIC REGION: ICD-10-CM

## 2018-06-01 DIAGNOSIS — M51.24 THORACIC DISC HERNIATION: Primary | ICD-10-CM

## 2018-06-01 PROCEDURE — 25000125 ZZHC RX 250: Performed by: ANESTHESIOLOGY

## 2018-06-01 PROCEDURE — 27210202 XR CERVICAL/THORACIC EPIDURAL INJ, INCL IMAGING: Mod: TC

## 2018-06-01 PROCEDURE — 62321 NJX INTERLAMINAR CRV/THRC: CPT | Performed by: ANESTHESIOLOGY

## 2018-06-01 PROCEDURE — 25000128 H RX IP 250 OP 636: Performed by: ANESTHESIOLOGY

## 2018-06-01 RX ORDER — DEXAMETHASONE SODIUM PHOSPHATE 10 MG/ML
20 INJECTION, SOLUTION INTRAMUSCULAR; INTRAVENOUS ONCE
Status: COMPLETED | OUTPATIENT
Start: 2018-06-01 | End: 2018-06-01

## 2018-06-01 RX ORDER — BUPIVACAINE HYDROCHLORIDE 5 MG/ML
10 INJECTION, SOLUTION PERINEURAL ONCE
Status: COMPLETED | OUTPATIENT
Start: 2018-06-01 | End: 2018-06-01

## 2018-06-01 RX ORDER — LIDOCAINE HYDROCHLORIDE 10 MG/ML
5 INJECTION, SOLUTION EPIDURAL; INFILTRATION; INTRACAUDAL; PERINEURAL ONCE
Status: COMPLETED | OUTPATIENT
Start: 2018-06-01 | End: 2018-06-01

## 2018-06-01 RX ORDER — LIDOCAINE HYDROCHLORIDE AND EPINEPHRINE 10; 10 MG/ML; UG/ML
1 INJECTION, SOLUTION INFILTRATION; PERINEURAL ONCE
Status: COMPLETED | OUTPATIENT
Start: 2018-06-01 | End: 2018-06-01

## 2018-06-01 RX ORDER — IOPAMIDOL 612 MG/ML
3 INJECTION, SOLUTION INTRATHECAL ONCE
Status: COMPLETED | OUTPATIENT
Start: 2018-06-01 | End: 2018-06-01

## 2018-06-01 RX ORDER — METHYLPREDNISOLONE ACETATE 40 MG/ML
40 INJECTION, SUSPENSION INTRA-ARTICULAR; INTRALESIONAL; INTRAMUSCULAR; SOFT TISSUE ONCE
Status: COMPLETED | OUTPATIENT
Start: 2018-06-01 | End: 2018-06-01

## 2018-06-01 RX ADMIN — IOPAMIDOL 2 ML: 612 INJECTION, SOLUTION INTRATHECAL at 10:56

## 2018-06-01 RX ADMIN — DEXAMETHASONE SODIUM PHOSPHATE 5 MG: 10 INJECTION, SOLUTION INTRAMUSCULAR; INTRAVENOUS at 10:53

## 2018-06-01 RX ADMIN — METHYLPREDNISOLONE ACETATE 40 MG: 40 INJECTION, SUSPENSION INTRA-ARTICULAR; INTRALESIONAL; INTRAMUSCULAR; SOFT TISSUE at 10:53

## 2018-06-01 RX ADMIN — LIDOCAINE HYDROCHLORIDE 4 ML: 10 INJECTION, SOLUTION EPIDURAL; INFILTRATION; INTRACAUDAL; PERINEURAL at 10:55

## 2018-06-01 RX ADMIN — BUPIVACAINE HYDROCHLORIDE 1 ML: 5 INJECTION, SOLUTION EPIDURAL; INTRACAUDAL at 10:54

## 2018-06-01 RX ADMIN — LIDOCAINE HYDROCHLORIDE,EPINEPHRINE BITARTRATE 1 ML: 10; .01 INJECTION, SOLUTION INFILTRATION; PERINEURAL at 10:55

## 2018-06-01 NOTE — DISCHARGE INSTRUCTIONS
[facility]   Procedure Discharge Instructions    Today you saw:    Dr. Mejia Quesada     You had an:  Epidural steroid injection       Medications used:  Lidocaine   Bupivacaine   Dexamethasone Depo-medrol  IsoVue            Be cautious when walking. Numbness and/or weakness in the lower extremities may occur for up to 6-8 hours after the procedure due to effect of the local anesthetic    Do not drive for 6 hours. The effect of the local anesthetic could slow your reflexes.     You may resume your regular activities after 24 hours    Avoid strenuous activity for the first 24 hours    You may shower, however avoid swimming, tub baths or hot tubs for 24 hours following your procedure    You may have a mild to moderate increase in pain for several days following the injection.    It may take up to 14 days for the steroid medication to start working although you may feel the effect as early as a few days after the procedure.       You may use ice packs for 10-15 minutes, 3 to 4 times a day at the injection site for comfort    Do not use heat to painful areas for 6 to 8 hours. This will give the local anesthetic time to wear off and prevent you from accidentally burning your skin.     You may use anti-inflammatory medications (such as Ibuprofen or Aleve or Advil) or Tylenol for pain control if necessary    If you experience any of the following, call the Pain Clinic during work hours at [phone] or the Provider Line after hours at [phone]:  -Fever over 100 degree F  -Swelling, bleeding, redness, drainage, warmth at the injection site  -Progressive weakness or numbness in your legs or arms  -Loss of bowel or bladder function  -Unusual headache that is not relieved by Tylenol or other pain reliever  -Unusual new onset of pain that is not improving

## 2018-06-01 NOTE — IP AVS SNAPSHOT
MRN:[id number]                      After Visit Summary   6/1/2018    Dangelo Hernández    MRN: [id number]           Visit Information        Provider Department      6/1/2018  9:45 AM [facility]           Review of your medicines      UNREVIEWED medicines. Ask your doctor about these medicines        Dose / Directions    ALEVE PO        Dose:  275 mg   Take 275 mg by mouth   Refills:  0       aspirin 81 MG tablet   Used for:  Routine general medical examination at a health care facility        ONE DAILY   Refills:  3       atorvastatin 80 MG tablet   Commonly known as:  LIPITOR   Used for:  Coronary artery disease involving native coronary artery of native heart with angina pectoris (H)        Dose:  80 mg   Take 1 tablet (80 mg) by mouth daily   Quantity:  90 tablet   Refills:  3       * azelastine 0.1 % spray   Commonly known as:  ASTELIN   Used for:  Eczema, unspecified type        Dose:  1 spray   Spray 1 spray into both nostrils 2 times daily   Quantity:  3 Bottle   Refills:  3       * azelastine 0.1 % spray   Commonly known as:  ASTELIN   Used for:  Eczema, unspecified type        SPRAY 1 SPRAY INTO BOTH NOSTRILS TWICE DAILY   Quantity:  3 Bottle   Refills:  0       azithromycin 250 MG tablet   Commonly known as:  ZITHROMAX   Used for:  Pneumonia, organism unspecified(486)        2 tabs today and 1 tab qday for 4 more days   Quantity:  40 tablet   Refills:  0       * fluticasone 50 MCG/ACT spray   Commonly known as:  FLONASE   Used for:  PND (post-nasal drip)        Dose:  2 spray   Spray 2 sprays into both nostrils daily   Quantity:  48 g   Refills:  03       * fluticasone 50 MCG/ACT spray   Commonly known as:  FLONASE   Used for:  PND (post-nasal drip)        SHAKE LIQUID AND USE 2 SPRAYS IN EACH NOSTRIL DAILY   Quantity:  48 mL   Refills:  1       gabapentin 8 % Gel topical PLO cream   Used for:  Lymphoma (H), Cryptogenic organising pneumonia, Candida  glabrata infection        Apply thinly 2-3 times per day.   Quantity:  30 g   Refills:  3       metoprolol succinate 25 MG 24 hr tablet   Commonly known as:  TOPROL-XL   Used for:  Coronary artery disease involving native coronary artery of native heart with angina pectoris (H)        Dose:  25 mg   Take 1 tablet (25 mg) by mouth daily   Quantity:  90 tablet   Refills:  2       MULTI VITAMIN MENS PO   Used for:  Routine general medical examination at a health care facility        1 tablet daily   Refills:  0       OSTEO BI-FLEX REGULAR STRENGTH PO        Dose:  1 tablet   Take 1 tablet by mouth daily   Refills:  0       tamsulosin 0.4 MG capsule   Commonly known as:  FLOMAX   Used for:  Benign prostatic hyperplasia with lower urinary tract symptoms, symptom details unspecified        Dose:  0.4 mg   Take 1 capsule (0.4 mg) by mouth daily   Quantity:  90 capsule   Refills:  3       TYLENOL 325 MG tablet   Generic drug:  acetaminophen        Dose:  325-650 mg   Take 325-650 mg by mouth every 6 hours as needed for mild pain or pain   Refills:  0       * Notice:  This list has 4 medication(s) that are the same as other medications prescribed for you. Read the directions carefully, and ask your doctor or other care provider to review them with you.             Protect others around you: Learn how to safely use, store and throw away your medicines at www.disposemymeds.org.         Follow-ups after your visit        Your next 10 appointments already scheduled     Brennan 15, 2018 12:30 PM CDT   (Arrive by 12:15 PM)   Return Visit with Ludin Schulz MD   [facility] ([facility] [place])    [address]  4th [address]  [facility] [id number] [phone]            May 09, 2019 11:30 AM CDT   [facility] with  [facility]   [facility] ([facility])    [address]  [address]  [facility] [id number] [phone]             May 09, 2019 12:00 PM CDT   Return with Abida Lott MD   [facility] ([facility] [place])    [address]  [address]  [place] [address] [phone]               Care Instructions        Further instructions from your care team       [facility]   Procedure Discharge Instructions    Today you saw:    Dr. Mejia Quesada     You had an:  Epidural steroid injection       Medications used:  Lidocaine   Bupivacaine   Dexamethasone Depo-medrol  IsoVue            Be cautious when walking. Numbness and/or weakness in the lower extremities may occur for up to 6-8 hours after the procedure due to effect of the local anesthetic    Do not drive for 6 hours. The effect of the local anesthetic could slow your reflexes.     You may resume your regular activities after 24 hours    Avoid strenuous activity for the first 24 hours    You may shower, however avoid swimming, tub baths or hot tubs for 24 hours following your procedure    You may have a mild to moderate increase in pain for several days following the injection.    It may take up to 14 days for the steroid medication to start working although you may feel the effect as early as a few days after the procedure.       You may use ice packs for 10-15 minutes, 3 to 4 times a day at the injection site for comfort    Do not use heat to painful areas for 6 to 8 hours. This will give the local anesthetic time to wear off and prevent you from accidentally burning your skin.     You may use anti-inflammatory medications (such as Ibuprofen or Aleve or Advil) or Tylenol for pain control if necessary    If you experience any of the following, call the Pain Clinic during work hours at [phone] or the Provider Line after hours at [phone]:  -Fever over 100 degree F  -Swelling, bleeding, redness, drainage, warmth at the injection site  -Progressive weakness or numbness in your legs or  "arms  -Loss of bowel or bladder function  -Unusual headache that is not relieved by Tylenol or other pain reliever  -Unusual new onset of pain that is not improving         Additional Information About Your Visit        [email or web address] Information     [employer] lets you send messages to your doctor, view your test results, renew your prescriptions, schedule appointments and more. To sign up, go to [email or web address] . Click on \"Log in\" on the left side of the screen, which will take you to the Welcome page. Then click on \"Sign up Now\" on the right side of the page.     You will be asked to enter the access code listed below, as well as some personal information. Please follow the directions to create your username and password.     Your access code is: [email or web address]  Expires: 2018  6:31 AM     Your access code will  in 90 days. If you need help or a new code, please call your [facility] clinic or [phone].        Care EveryWhere ID     This is your Care EveryWhere ID. This could be used by other organizations to access your [facility] medical records  [id number]        Your Vitals Were     Blood Pressure Pulse Respirations             141/76 77 18          Primary Care Provider Office Phone # Fax #    Yefri Mejia Sotelo [phone]      Equal Access to Services     PETRA GRAMAJO AH: Hadii josefa ku hadasho Soomaali, waaxda luqadaha, qaybta kaalmada adeegyada, dipesh kaminski . So [facility] [phone].    ATENCIÓN: Si habla español, tiene a mcneal disposición servicios gratuitos de asistencia lingüística. Llame al [phone].    We comply with applicable federal civil rights laws and [place] laws. We do not discriminate on the basis of race, color, national origin, age, disability, sex, sexual orientation, or gender identity.            Thank you!     Thank you for choosing [facility] for your care. Our goal is always to provide you with excellent care. Hearing back from our " patients is one way we can continue to improve our services. Please take a few minutes to complete the written survey that you may receive in the mail after you visit with us. Thank you!             Medication List: This is a list of all your medications and when to take them. Check marks below indicate your daily home schedule. Keep this list as a reference.      Medications           Morning Afternoon Evening Bedtime As Needed    ALEVE PO   Take 275 mg by mouth                                aspirin 81 MG tablet   ONE DAILY                                atorvastatin 80 MG tablet   Commonly known as:  LIPITOR   Take 1 tablet (80 mg) by mouth daily                                * azelastine 0.1 % spray   Commonly known as:  ASTELIN   Spray 1 spray into both nostrils 2 times daily                                * azelastine 0.1 % spray   Commonly known as:  ASTELIN   SPRAY 1 SPRAY INTO BOTH NOSTRILS TWICE DAILY                                azithromycin 250 MG tablet   Commonly known as:  ZITHROMAX   2 tabs today and 1 tab qday for 4 more days                                * fluticasone 50 MCG/ACT spray   Commonly known as:  FLONASE   Spray 2 sprays into both nostrils daily                                * fluticasone 50 MCG/ACT spray   Commonly known as:  FLONASE   SHAKE LIQUID AND USE 2 SPRAYS IN EACH NOSTRIL DAILY                                gabapentin 8 % Gel topical PLO cream   Apply thinly 2-3 times per day.                                metoprolol succinate 25 MG 24 hr tablet   Commonly known as:  TOPROL-XL   Take 1 tablet (25 mg) by mouth daily                                MULTI VITAMIN MENS PO   1 tablet daily                                OSTEO BI-FLEX REGULAR STRENGTH PO   Take 1 tablet by mouth daily                                tamsulosin 0.4 MG capsule   Commonly known as:  FLOMAX   Take 1 capsule (0.4 mg) by mouth daily                                TYLENOL 325 MG tablet   Take 325-650 mg  by mouth every 6 hours as needed for mild pain or pain   Generic drug:  acetaminophen                                * Notice:  This list has 4 medication(s) that are the same as other medications prescribed for you. Read the directions carefully, and ask your doctor or other care provider to review them with you.

## 2018-06-01 NOTE — PROGRESS NOTES
Pre-procedure Intake    Have you been fasting? No     If yes, for how long?    Are you taking a prescribed blood thinner such as coumadin, Plavix, Xarelto?    No    If yes, when did you take your last dose?     Do you take aspirin?  Yes -   ASA    If cervical procedure, have you held aspirin for 6 days?   No     Do you have any allergies to contrast dye, iodine, steroid and/or numbing medications?  NO    Are you currently taking antibiotics or have an active infection?  NO    Have you had a fever/elevated temperature within the past week? NO    Are you currently taking oral steroids? NO    Do you have a ? Yes       Are you pregnant or breastfeeding?  Not Applicable    Are the vital signs normal?  Yes

## 2018-06-01 NOTE — PROGRESS NOTES
Pre procedure Diagnosis: thoracic radiculopathy, thoracic degenerative disc disease   Post procedure Diagnosis: Same  Procedure performed: thoracic interlaminar epidural steroid injection at T6-7, fluoroscopically guided, contrast controlled  Anesthesia: local  Complications: multiple attempts were required to access epidural space due to bony contact and arthritic spine  Operators: Mejia Quesada MD     Indications:   Dangelo Hernández is a 82 year old male was sent by Dr. Ludin Schulz for thoracic epidural steroid injection.  The patient has a history of chronic mid thoracic pain with a tightness around his chest wall.  Examination shows tenderness to palpation at approximately T6-7 with mildly restricted movement due to discomfort.  he has tried conservative treatment including physical therapy, medications.    MRI was done on 5/18/18 which showed   Findings:  No significant change in chronic T6 compression fracture with  associated 40-50% loss of anterior vertebral body height.   Stable chronic T12 superior end plate compression fracture with  approximately 30-40% loss of anterior vertebral body height.     No retropulsed fracture fragments or suspicious marrow signal  abnormality. No new fracture.     Normal alignment of the thoracic vertebrae. Slight exaggeration of the  normal thoracic kyphosis. Multilevel disc degeneration with loss of  disc height and intradiscal T2 signal. Central disc protrusions at  T6-7 indents the ventral aspect of the cord. Small right central disc  protrusion at T7-T8. No significant spinal canal or neural foraminal  stenosis. Normal cord signal. The tip the conus medullaris is at  approximately L1. No abnormal foci of intrathecal enhancement.  Paraspinous soft tissues are unremarkable.         Impression:   1. No significant change since 8/31/2015. 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  2. Stable mild thoracic spondylosis. No spinal canal or neural  foraminal  stenosis.       Options/alternatives, benefits and risks were discussed with the patient including but not limited to bleeding, infection, no pain relief, tissue trauma, exposure to radiation, reaction to medications, spinal cord injury, dural puncture, weakness, numbness and headache.  Questions were answered to his satisfaction and he wishes to proceed. Voluntary informed consent was obtained and signed.     Vitals were reviewed: Yes  141/76    77 16  Allergies were reviewed:  Yes   Medications were reviewed:  Yes   Pre-procedure pain score: 3/10    Procedure:  The patient's medical history, medications, and allergies were reviewed and reconciled.  After obtaining signed informed consent, the patient was brought into the procedure suite and was placed in a prone position on the procedure table.   A Pause for the Cause was performed.  Patient was prepped and draped in the usual sterile fashion.     The T6-7 interspace was identified with AP fluoroscopy.  A total of 4 ml of 1% lidocaine was used to anesthetize the skin and subcutaneous tissues for a left paramedian approach ( midline approach was attempted but unsuccessful due to bony contact).    A 20 gauge 3.5 inch Touhy needle was advanced utilizing intermittent AP and Lateral fluoroscopy and air for loss of resistance.  The epidural space was encountered on the third pass without difficulties.  Aspiration for blood and CSF was negative.  Needle position was verified by injecting 2 ml of Isovue 300 utilizing real-time fluoroscopy that showed good needle placement and epidural spread without signs of intravascular or intrathecal uptake.  Isovue wasted:  13 ml.    Then, after repeated negative aspiration for blood or CSF, a test dose was performed by injecting 1 ml of Lidocaine 1% with epinephrine into the epidural space which was negative for heart rate or blood pressure changes, tinnitus, metallic taste, lower extremity paresthesias, or other complaints.    Then,  after repeated negative aspiration for blood or CSF, a combination of Depomedrol 40 mg, Dexamethasone 5 mg, Bupivicaine 0.5% 1 ml, diluted with 2.5 ml of normal saline to a total injectate volume of 5 ml was injected into the epidural space in a slow and incremental fashion and the needle was restyletted and withdrawn.  All injected medications were preservative free.    The injection site was cleaned and a sterile dressing was applied.    The patient tolerated the procedure well without complications and was taken to the recovery room for continued observation.    Images were saved to PACS.    Post-procedure pain score: 0/10  Follow-up includes:   -f/u phone call in one week  -f/u with referring provider    Mejia Quesada MD  [facility] [place]

## 2018-06-01 NOTE — IP AVS SNAPSHOT
[facility]    [address]    [facility] [address]    Phone:  [phone]    Fax:  [phone]                                       After Visit Summary   6/1/2018    Dangelo Hernández    MRN: [id number]           After Visit Summary Signature Page     I have received my discharge instructions, and my questions have been answered. I have discussed any challenges I see with this plan with the nurse or doctor.    ..........................................................................................................................................  Patient/Patient Representative Signature      ..........................................................................................................................................  Patient Representative Print Name and Relationship to Patient    ..................................................               ................................................  Date                                            Time    ..........................................................................................................................................  Reviewed by Signature/Title    ...................................................              ..............................................  Date                                                            Time

## 2018-06-08 ENCOUNTER — TELEPHONE (OUTPATIENT)
Dept: RADIOLOGY | Facility: CLINIC | Age: 83
End: 2018-06-08

## 2018-06-08 NOTE — TELEPHONE ENCOUNTER
Patient had a thoracic interlaminar epidural steroid injection at T6-7 on 6/1/18.  Called patient for an update.      Left message that we were calling for an update about how he was doing after the injection.  LM that if he has any problems or questions to call the clinic at [phone].

## 2018-06-15 ENCOUNTER — OFFICE VISIT (OUTPATIENT)
Dept: ORTHOPEDICS | Facility: CLINIC | Age: 83
End: 2018-06-15
Payer: MEDICARE

## 2018-06-15 VITALS — WEIGHT: 195 LBS | HEIGHT: 67 IN | BODY MASS INDEX: 30.61 KG/M2

## 2018-06-15 DIAGNOSIS — M54.16 LUMBAR RADICULAR PAIN: Primary | ICD-10-CM

## 2018-06-15 DIAGNOSIS — M51.26 LUMBAR DISC HERNIATION: ICD-10-CM

## 2018-06-15 NOTE — MR AVS SNAPSHOT
After Visit Summary   6/15/2018    Dangelo Hernández    MRN: [id number]           Patient Information     Date Of Birth          1935        Visit Information        Provider Department      6/15/2018 12:30 PM Ludin Schulz MD [facility]        Today's Diagnoses     Lumbar radicular pain    -  1      Care Instructions    The patient will be contacted with the results.           Follow-ups after your visit        Your next 10 appointments already scheduled     Jun 19, 2018  2:45 PM CDT   MR LUMBAR SPINE W/O CONTRAST with [id number]   [facility] MRI ([facility])    [address] [address]   [phone]           Take your medicines as usual, unless your doctor tells you not to. Bring a list of your current medicines to your exam (including vitamins, minerals and over-the-counter drugs). Also bring the results of similar scans you may have had.  Please remove any body piercings and hair extensions before you arrive.  Follow your doctor s orders. If you do not, we may have to postpone your exam.  You may or may not receive IV contrast for this exam pending the discretion of the Radiologist.  You do not need to do anything special to prepare.  The MRI machine uses a strong magnet. Please wear clothes without metal (snaps, zippers). A sweatsuit works well, or we may give you a hospital gown.   **IMPORTANT** THE INSTRUCTIONS BELOW ARE ONLY FOR THOSE PATIENTS WHO HAVE BEEN PRESCRIBED SEDATION OR GENERAL ANESTHESIA DURING THEIR MRI PROCEDURE:  IF YOUR DOCTOR PRESCRIBED ORAL SEDATION (take medicine to help you relax during your exam):   You must get the medicine from your doctor (oral medication) before you arrive. Bring the medicine to the exam. Do not take it at home. You ll be told when to take it upon arriving for your exam.   Arrive one hour early. Bring someone who can take you home after the test. Your medicine will make you sleepy.  After the exam, you may not drive, take a bus or take a taxi by yourself.  IF YOUR DOCTOR PRESCRIBED IV SEDATION:   Arrive one hour early. Bring someone who can take you home after the test. Your medicine will make you sleepy. After the exam, you may not drive, take a bus or take a taxi by yourself.   No eating 6 hours before your exam. You may have clear liquids up until 4 hours before your exam. (Clear liquids include water, clear tea, black coffee and fruit juice without pulp.)  IF YOUR DOCTOR PRESCRIBED ANESTHESIA (be asleep for your exam):   Arrive 1 1/2 hours early. Bring someone who can take you home after the test. You may not drive, take a bus or take a taxi by yourself.   No eating 8 hours before your exam. You may have clear liquids up until 4 hours before your exam. (Clear liquids include water, clear tea, black coffee and fruit juice without pulp.)   You will spend four to five hours in the recovery room.  Please call the Imaging Department at your exam site with any questions.            May 09, 2019 11:30 AM CDT   [facility] Draw with  [facility] DRAW   [facility] Lab Draw ([facility])    [address] [place] [address]  [address] [place] [id number] [phone]            May 09, 2019 12:00 PM CDT   Return with Abida Lott MD   [facility] Blood and Marrow Transplant ([facility])    [address] [place] [address]  [address] [place] [id number] [phone]              Future tests that were ordered for you today     Open Future Orders        Priority Expected Expires Ordered    MRI Lumbar spine w/o contrast Routine  6/15/2019 6/15/2018            Who to contact     Please call your clinic at [phone] to:    Ask questions about your health    Make or cancel appointments    Discuss your medicines    Learn about your test results    Speak to your doctor            Additional Information About Your Visit        Care EveryWhere ID      "This is your Care EveryWhere ID. This could be used by other organizations to access your [place] medical records  [id number]        Your Vitals Were     Height BMI (Body Mass Index)                1.702 m (5' 7\") 30.54 kg/m2           Blood Pressure from Last 3 Encounters:   06/01/18 135/89   04/28/18 154/79   04/23/18 122/80    Weight from Last 3 Encounters:   06/15/18 88.5 kg (195 lb)   05/18/18 88.5 kg (195 lb)   04/28/18 88.5 kg (195 lb)               Primary Care Provider Office Phone # Fax #    Yefri Mejia Sotelo [phone] [address] [facility] [address]        Equal Access to Services     PETRA GRAMAJO : Hadii josefa medina hadasho Soomaali, waaxda luqadaha, qaybta kaalmada adeegyada, dipesh kaminski . So [facility] [phone].    ATENCIÓN: Si habla español, tiene a mcneal disposición servicios gratuitos de asistencia lingüística. [facility] [phone].    We comply with applicable federal civil rights laws and [place] laws. We do not discriminate on the basis of race, color, national origin, age, disability, sex, sexual orientation, or gender identity.            Thank you!     Thank you for choosing [facility]  for your care. Our goal is always to provide you with excellent care. Hearing back from our patients is one way we can continue to improve our services. Please take a few minutes to complete the written survey that you may receive in the mail after your visit with us. Thank you!             Your Updated Medication List - Protect others around you: Learn how to safely use, store and throw away your medicines at www.disposemymeds.org.          This list is accurate as of 6/15/18  1:33 PM.  Always use your most recent med list.                   Brand Name Dispense Instructions for use Diagnosis    ALEVE PO      Take 275 mg by mouth        aspirin 81 MG tablet      ONE DAILY    Routine general medical examination at a [facility]" facility       atorvastatin 80 MG tablet    LIPITOR    90 tablet    Take 1 tablet (80 mg) by mouth daily    Coronary artery disease involving native coronary artery of native heart with angina pectoris (H)       * azelastine 0.1 % spray    ASTELIN    3 Bottle    Spray 1 spray into both nostrils 2 times daily    Eczema, unspecified type       * azelastine 0.1 % spray    ASTELIN    3 Bottle    SPRAY 1 SPRAY INTO BOTH NOSTRILS TWICE DAILY    Eczema, unspecified type       azithromycin 250 MG tablet    ZITHROMAX    40 tablet    2 tabs today and 1 tab qday for 4 more days    Pneumonia, organism unspecified(486)       * fluticasone 50 MCG/ACT spray    FLONASE    48 g    Spray 2 sprays into both nostrils daily    PND (post-nasal drip)       * fluticasone 50 MCG/ACT spray    FLONASE    48 mL    SHAKE LIQUID AND USE 2 SPRAYS IN EACH NOSTRIL DAILY    PND (post-nasal drip)       gabapentin 8 % Gel topical PLO cream     30 g    Apply thinly 2-3 times per day.    Lymphoma (H), Cryptogenic organising pneumonia, Candida glabrata infection       metoprolol succinate 25 MG 24 hr tablet    TOPROL-XL    90 tablet    Take 1 tablet (25 mg) by mouth daily    Coronary artery disease involving native coronary artery of native heart with angina pectoris (H)       MULTI VITAMIN MENS PO      1 tablet daily    Routine general medical examination at a health care facility       OSTEO BI-FLEX REGULAR STRENGTH PO      Take 1 tablet by mouth daily        tamsulosin 0.4 MG capsule    FLOMAX    90 capsule    Take 1 capsule (0.4 mg) by mouth daily    Benign prostatic hyperplasia with lower urinary tract symptoms, symptom details unspecified       TYLENOL 325 MG tablet   Generic drug:  acetaminophen      Take 325-650 mg by mouth every 6 hours as needed for mild pain or pain        * Notice:  This list has 4 medication(s) that are the same as other medications prescribed for you. Read the directions carefully, and ask your doctor or other care provider  to review them with you.

## 2018-06-15 NOTE — PROGRESS NOTES
HISTORY OF PRESENT ILLNESS  Mr. Hernández is a pleasant 83 year old year old male who presents to clinic today for followup for T6/7 translaminar injection  He states that he also has lower leg and hip pain and low back pain when he walks and bends a lot.   He does feel improvement since his thoracic injection  He wants to know what else can be done about his low back      MEDICAL HISTORY  Patient Active Problem List   Diagnosis     Tobacco use disorder     Benign Colon Polyps     Mantle Cell Lymphoma S/P Autologous Transplant     Combined hyperlipidemia      Coronary artery disease, 4/ 2010, status post drug-eluting stent placement to the LAD and balloon angioplasty to the obtuse marginal.      Hypertension goal BP (blood pressure) < 140/90     Encounter for long-term current use of medication     History of blood disorder     Pneumonia, candidial (H)     Pneumonia in mycoses (H)     Acute renal failure with other specified pathological lesion in kidney (H)     GERD (gastroesophageal reflux disease)     Cryptogenic organizing pneumonia (H)     Advanced directives, counseling/discussion       Current Outpatient Prescriptions   Medication Sig Dispense Refill     acetaminophen (TYLENOL) 325 MG tablet Take 325-650 mg by mouth every 6 hours as needed for mild pain or pain       ASPIRIN 81 MG PO TABS ONE DAILY  3     atorvastatin (LIPITOR) 80 MG tablet Take 1 tablet (80 mg) by mouth daily 90 tablet 3     azelastine (ASTELIN) 0.1 % spray SPRAY 1 SPRAY INTO BOTH NOSTRILS TWICE DAILY 3 Bottle 0     azelastine (ASTELIN) 0.1 % spray Spray 1 spray into both nostrils 2 times daily 3 Bottle 3     azithromycin (ZITHROMAX) 250 MG tablet 2 tabs today and 1 tab qday for 4 more days 40 tablet 0     fluticasone (FLONASE) 50 MCG/ACT spray SHAKE LIQUID AND USE 2 SPRAYS IN EACH NOSTRIL DAILY 48 mL 1     fluticasone (FLONASE) 50 MCG/ACT spray Spray 2 sprays into both nostrils daily 48 g 03     gabapentin 8 % GEL Apply thinly 2-3 times per  "day. 30 g 3     Glucosamine-Chondroitin (OSTEO BI-FLEX REGULAR STRENGTH PO) Take 1 tablet by mouth daily       metoprolol (TOPROL-XL) 25 MG 24 hr tablet Take 1 tablet (25 mg) by mouth daily 90 tablet 2     MULTI VITAMIN MENS OR 1 tablet daily       Naproxen Sodium (ALEVE PO) Take 275 mg by mouth       tamsulosin (FLOMAX) 0.4 MG capsule Take 1 capsule (0.4 mg) by mouth daily 90 capsule 3       Allergies   Allergen Reactions     Amoxicillin Diarrhea            Lisinopril Cough       Family History   Problem Relation Age of Onset     CANCER Mother      Lung--did not smoke     Cardiovascular Father      MI age 73     Lipids Sister      Hypertension Sister      CANCER Sister      Ovarian- at age 65       Additional medical/Social/Surgical histories reviewed in [facility] and updated as appropriate.     REVIEW OF SYSTEMS (6/15/2018)  10 point ROS of systems including Constitutional, Eyes, Respiratory, Cardiovascular, Gastroenterology, Genitourinary, Integumentary, Musculoskeletal, Psychiatric were all negative except for pertinent positives noted in my HPI.     PHYSICAL EXAM  Vitals:    06/15/18 1245   Weight: 88.5 kg (195 lb)   Height: 1.702 m (5' 7\")     Vital Signs: Ht 1.702 m (5' 7\")  Wt 88.5 kg (195 lb)  BMI 30.54 kg/m2 Patient declined being weighed. Body mass index is 30.54 kg/(m^2).    General  - normal appearance, in no obvious distress  CV  - normal peripheral perfusion  Pulm  - normal respiratory pattern, non-labored  Musculoskeletal - lumbar spine  - stance: normal gait without limp, no obvious leg length discrepancy, normal heel and toe walk  - inspection: normal bone and joint alignment, no obvious scoliosis  - palpation: no paravertebral or bony tenderness  - ROM: flexion exacerbates pain, normal extension, sidebending, rotation  - strength: lower extremities 5/5 in all planes  - special tests:  (+) straight leg raise- bilaterally  (+) slump test- low back  Neuro  - patellar and Achilles DTRs 2+ " bilaterally, bilateral lower extremity sensory deficit throughout L5 distribution, grossly normal coordination, normal muscle tone  Skin  - no ecchymosis, erythema, warmth, or induration, no obvious rash  Psych  - interactive, appropriate, normal mood and affect    ASSESSMENT & PLAN  84 Yo male with thoracic and lumbar ddd, radicular pain, improved, not resolved  Reviewed and ordered lumbar MRI  Ordered NISHA for after MRI  Referral to pain clinic  Cont. HEP/PT exercises  F/u after NISHA for lumbar spine    Ludin Schulz MD, CAQSM

## 2018-06-15 NOTE — LETTER
6/15/2018       RE: Dangelo Hernández  [address] [facility] [address]     Dear Colleague,    Thank you for referring your patient, Dangelo Hernández, to the [facility] at [facility]. Please see a copy of my visit note below.    HISTORY OF PRESENT ILLNESS  Mr. Hernández is a pleasant 83 year old year old male who presents to clinic today for followup for T6/7 translaminar injection  He states that he also has lower leg and hip pain and low back pain when he walks and bends a lot.   He does feel improvement since his thoracic injection  He wants to know what else can be done about his low back      MEDICAL HISTORY  Patient Active Problem List   Diagnosis     Tobacco use disorder     Benign Colon Polyps     Mantle Cell Lymphoma S/P Autologous Transplant     Combined hyperlipidemia      Coronary artery disease, 4/ 2010, status post drug-eluting stent placement to the LAD and balloon angioplasty to the obtuse marginal.      Hypertension goal BP (blood pressure) < 140/90     Encounter for long-term current use of medication     History of blood disorder     Pneumonia, candidial (H)     Pneumonia in mycoses (H)     Acute renal failure with other specified pathological lesion in kidney (H)     GERD (gastroesophageal reflux disease)     Cryptogenic organizing pneumonia (H)     Advanced directives, counseling/discussion       Current Outpatient Prescriptions   Medication Sig Dispense Refill     acetaminophen (TYLENOL) 325 MG tablet Take 325-650 mg by mouth every 6 hours as needed for mild pain or pain       ASPIRIN 81 MG PO TABS ONE DAILY  3     atorvastatin (LIPITOR) 80 MG tablet Take 1 tablet (80 mg) by mouth daily 90 tablet 3     azelastine (ASTELIN) 0.1 % spray SPRAY 1 SPRAY INTO BOTH NOSTRILS TWICE DAILY 3 Bottle 0     azelastine (ASTELIN) 0.1 % spray Spray 1 spray into both nostrils 2 times daily 3 Bottle 3     azithromycin (ZITHROMAX) 250 MG tablet 2 tabs  "today and 1 tab qday for 4 more days 40 tablet 0     fluticasone (FLONASE) 50 MCG/ACT spray SHAKE LIQUID AND USE 2 SPRAYS IN EACH NOSTRIL DAILY 48 mL 1     fluticasone (FLONASE) 50 MCG/ACT spray Spray 2 sprays into both nostrils daily 48 g 03     gabapentin 8 % GEL Apply thinly 2-3 times per day. 30 g 3     Glucosamine-Chondroitin (OSTEO BI-FLEX REGULAR STRENGTH PO) Take 1 tablet by mouth daily       metoprolol (TOPROL-XL) 25 MG 24 hr tablet Take 1 tablet (25 mg) by mouth daily 90 tablet 2     MULTI VITAMIN MENS OR 1 tablet daily       Naproxen Sodium (ALEVE PO) Take 275 mg by mouth       tamsulosin (FLOMAX) 0.4 MG capsule Take 1 capsule (0.4 mg) by mouth daily 90 capsule 3       Allergies   Allergen Reactions     Amoxicillin Diarrhea            Lisinopril Cough       Family History   Problem Relation Age of Onset     CANCER Mother      Lung--did not smoke     Cardiovascular Father      MI age 73     Lipids Sister      Hypertension Sister      CANCER Sister      Ovarian- at age 65       Additional medical/Social/Surgical histories reviewed in [facility] and updated as appropriate.     REVIEW OF SYSTEMS (6/15/2018)  10 point ROS of systems including Constitutional, Eyes, Respiratory, Cardiovascular, Gastroenterology, Genitourinary, Integumentary, Musculoskeletal, Psychiatric were all negative except for pertinent positives noted in my HPI.     PHYSICAL EXAM  Vitals:    06/15/18 1245   Weight: 88.5 kg (195 lb)   Height: 1.702 m (5' 7\")     Vital Signs: Ht 1.702 m (5' 7\")  Wt 88.5 kg (195 lb)  BMI 30.54 kg/m2 Patient declined being weighed. Body mass index is 30.54 kg/(m^2).    General  - normal appearance, in no obvious distress  CV  - normal peripheral perfusion  Pulm  - normal respiratory pattern, non-labored  Musculoskeletal - lumbar spine  - stance: normal gait without limp, no obvious leg length discrepancy, normal heel and toe walk  - inspection: normal bone and joint alignment, no obvious scoliosis  - " palpation: no paravertebral or bony tenderness  - ROM: flexion exacerbates pain, normal extension, sidebending, rotation  - strength: lower extremities 5/5 in all planes  - special tests:  (+) straight leg raise- bilaterally  (+) slump test- low back  Neuro  - patellar and Achilles DTRs 2+ bilaterally, bilateral lower extremity sensory deficit throughout L5 distribution, grossly normal coordination, normal muscle tone  Skin  - no ecchymosis, erythema, warmth, or induration, no obvious rash  Psych  - interactive, appropriate, normal mood and affect    ASSESSMENT & PLAN  82 Yo male with thoracic and lumbar ddd, radicular pain, improved, not resolved  Reviewed and ordered lumbar MRI  Ordered NISHA for after MRI  Referral to pain clinic  Cont. HEP/PT exercises  F/u after NISHA for lumbar spine    Ludin Schulz MD, CAQSM

## 2018-06-19 ENCOUNTER — HOSPITAL ENCOUNTER (OUTPATIENT)
Dept: MRI IMAGING | Facility: CLINIC | Age: 83
Discharge: HOME | End: 2018-06-19
Attending: PREVENTIVE MEDICINE | Admitting: PREVENTIVE MEDICINE
Payer: MEDICARE

## 2018-06-19 DIAGNOSIS — M54.16 LUMBAR RADICULAR PAIN: ICD-10-CM

## 2018-06-19 PROCEDURE — 72148 MRI LUMBAR SPINE W/O DYE: CPT

## 2018-06-19 NOTE — PROGRESS NOTES
MRI Lumbar Spine without contrast enhancement completed.      Images and History sent to radiologist at [facility]      GIULIA Munguia(R)(CT)(MR)  MRI Technolgist

## 2018-06-19 NOTE — PROGRESS NOTES
"MRI Technologist:  [facility]  Care Flow Form / SBAR  =================================================================    1. Patient was greeted by MRI Technologist in MRI.  2. MRI Technologist confirmed patient Identifiers per [place] Policy.    A. Spelling of Last Name    B. Date of Birth    C. Confirmation of Examination and body part  3. MRI Technologist reviews MRI Screening Form then signed.  4. MRI Technologist explained procedure to patient.    A. MRI Technologist confirms with patient their understanding of information.  5. Patient is taken into MRI Suite by: Ana Maria  6. Patient is position for MRI examination by:  Ana Maria  7. Patient is given \"Patient Comfort Aides\"    A. Headphones for music of patients choosing    B. Emergency Call light with explanations of use    C. Bolster under knees  8. Patient is asked if any other wishes. none  9. MRI Examination is Started    A. MRI Technologist confirms with patient that they can hear them.    B. MRI Technologist talked to patient in between sequences.  =================================================================  AFTER MRI PROCEDURE:  =================================================================    10 MRI Examination is Completed    A. MRI Technologist goes into room to assist patient off table    B. MRI Technologist explains to patient about process for getting results.    C. MRI Technologist confirms with patient the information.    D. Patient is asked to take survey for [employer]      Patient Responds:  Yes    E. Patient is walked back to changing room by assistant    F. Patient is asked if any more questions.    F. Patient shown to [employer] Survey Station and then shown way out.  =================================================================    GIULIA Munguia(R)(CT)(MR)  MRI Technologist  [facility]    "

## 2018-06-25 ENCOUNTER — TELEPHONE (OUTPATIENT)
Dept: ORTHOPEDICS | Facility: CLINIC | Age: 83
End: 2018-06-25

## 2018-06-25 NOTE — TELEPHONE ENCOUNTER
[facility] Call Center    Phone Message    May a detailed message be left on voicemail: yes    Reason for Call: Requesting Results   Name/type of test: MRI  Date of test: 06/19/2018  Was test done at a location other than [facility] (Please fill in the location if not [facility])?: Yes: [facility]      Action Taken: Message routed to:  [facility] Orthopedics

## 2018-06-27 ENCOUNTER — TELEPHONE (OUTPATIENT)
Dept: PALLIATIVE MEDICINE | Facility: CLINIC | Age: 83
End: 2018-06-27

## 2018-06-27 DIAGNOSIS — M51.369 DDD (DEGENERATIVE DISC DISEASE), LUMBAR: Primary | ICD-10-CM

## 2018-06-27 NOTE — TELEPHONE ENCOUNTER
Received call from patient who stated that he had really good relief from his injection for the first three days. States that it is still working somewhat and that Dr. Schulz stated he could get another injection and patient is wanting another injection. Advised that I don't have an order so I am unable to schedule and also that it may be too soon to repeat injection (done 6/1). Phone #[phone]      Lay Zhou    [facility]

## 2018-06-29 ENCOUNTER — TELEPHONE (OUTPATIENT)
Dept: ORTHOPEDICS | Facility: CLINIC | Age: 83
End: 2018-06-29

## 2018-06-29 NOTE — TELEPHONE ENCOUNTER
The patient was contacted today and a voicemail was left.  The patient was contacted today to go over his lumbar MRI. Dr. Schulz is recommending a lumbar NISHA if the patient would like to proceed. The patient was left with our call back number to return my call.

## 2018-06-29 NOTE — TELEPHONE ENCOUNTER
[facility] Call Center    Phone Message    May a detailed message be left on voicemail: yes    Reason for Call: Other: Pt is waiting for MRI results, please call to discuss     Action Taken: Message routed to:  [facility]

## 2018-06-29 NOTE — TELEPHONE ENCOUNTER
Pt had a thoracic NISHA on 6/1/18. The referral was from Dr. Schulz at [facility].  If he feels pt needs another injection then he needs to place an order if indicated.    Called cell and relayed the above.  He says they are not calling him back.    Routed to Luz Umana ATC to review.    Tatianna Alexander RN-BSN  [facility]

## 2018-07-02 ENCOUNTER — TELEPHONE (OUTPATIENT)
Dept: PALLIATIVE MEDICINE | Facility: CLINIC | Age: 83
End: 2018-07-02

## 2018-07-02 NOTE — TELEPHONE ENCOUNTER
Left voicemail for patient to schedule Lumbar NISHA.        Kinsey TORRES    [facility]

## 2018-07-03 ENCOUNTER — TELEPHONE (OUTPATIENT)
Dept: ORTHOPEDICS | Facility: CLINIC | Age: 83
End: 2018-07-03

## 2018-07-03 DIAGNOSIS — M51.24 THORACIC DISC HERNIATION: Primary | ICD-10-CM

## 2018-07-03 NOTE — TELEPHONE ENCOUNTER
Pre-screening Questions for Radiology Injections:    Injection to be done at which interventional clinic site? [facility] [place] [facility] [place]   [facility] instruct patient to arrive 30 minutes before the scheduled appointment time.     Procedure ordered by Dr. Schulz (under Imaging)    Procedure ordered? Lumbar Epidural Steroid Injection    What insurance would patient like us to bill for this procedure? Medicare, Blue Plus      Worker's comp or MVA (motor vehicle accident) -Any injection DO NOT SCHEDULE and route to Kinsey Meza.      [employer] - For SI joint injections, DO NOT SCHEDULE and route Elvi Malcolm. [employer] NO PA REQUIRED EFFECTIVE 11/1/2017      [employer]- MBB's must be scheduled at LEAST two weeks apart      [employer] - Any injection besides hip/shoulder/knee joint DO NOT SCHEDULE and route to Elvi Malcolm. She will obtain PA and call pt back to schedule procedure or notify pt of denial.       [employer]-Route to Elvi for review    Any chance of pregnancy? NO   If YES, do NOT schedule and route to RN pool    Is an  needed? No     Patient has a drive home? (mandatory) YES: INFORMED    Is patient taking any blood thinners (plavix, coumadin, jantoven, warfarin, heparin, pradaxa or dabigatran )? No   If hold needed, do NOT schedule, route to RN pool     Is patient taking any aspirin products? Yes - Pt takes 81mg daily; instructed to hold 0 day(s) prior to procedure.      If more than 325mg/day do NOT schedule; route to RN pool     For CERVICAL procedures, hold all aspirin products for 6 days.      Does the patient have a bleeding or clotting disorder? No     If YES, okay to schedule AND route to RN nurse pool    **For any patients with platelet count <100, must be forwarded to provider**    Is patient diabetic?  No  If YES, have them bring their glucometer.    Does patient have an active infection or treated for one within the past week? No     Is patient currently  taking any antibiotics?  No     For patients on chronic, preventative, or prophylactic antibiotics, procedures may be scheduled.     For patients on antibiotics for active or recent infection:    Alvino Aranda Burton, Snitzer-antibiotic course must have been completed for 4 days    Is patient currently taking any steroid medications? (i.e. Prednisone, Medrol)  No     For patients on steroid medications:    Alvino Aranda Burton, Snitzer-steroid course must have been completed for 4 days    Reviewed with patient:  If you are started on any steroids or antibiotics between now and your appointment, you must contact us because it may affect our ability to perform your procedure.  Yes    Is patient actively being treated for cancer or immunocompromised? No  If YES, do NOT schedule and route to RN pool     Are you able to get on and off an exam table with minimal or no assistance? Yes  If NO, do NOT schedule and route to RN pool    Are you able to roll over and lay on your stomach with minimal or no assistance? Yes  If NO, do NOT schedule and route to RN pool     Any allergies to contrast dye, iodine, shellfish, or numbing and steroid medications? No  If YES, route to RN pool AND add allergy information to appointment notes    Allergies: Amoxicillin and Lisinopril      Has the patient had a flu shot or any other vaccinations within 7 days before or after the procedure.  No     Does patient have an MRI/CT?  YES: MRI  (SI joint, hip injections, lumbar sympathetic blocks, and stellate ganglion blocks do not require an MRI)    Was the MRI done w/in the last 3 years?  Yes    Was MRI done at [facility]? Yes      If not, where was it done? N/A       If MRI was not done at [facility], [facility] or [facility] do NOT schedule and route to nursing.  If pt has an imaging disc, the injection may be scheduled but pt has to bring disc to appt. If they show up w/out disc the injection cannot be done    Reminders  (please tell patient if applicable):       Instructed pt to arrive 30 minutes early for IV start if this is for a cervical procedure, ALL sympathetic (stellate ganglion, hypogastric, or lumbar sympathetic block) and all sedation procedures (RFA, spinal cord stimulation trials).  Not Applicable   -IVs are not routinely placed for Dr. Bazzi cervical cases   -Dr. Rivera: IVs for cervical ESIs and cervical TBDs (not CMBBs/facet inj)      If NPO for sedation, informed patient that it is okay to take medications with sips of water (except if they are to hold blood thinners).  Not Applicable   *DO take blood pressure medication if it is prescribed*      If this is for a cervical NISHA, informed patient that aspirin needs to be held for 6 days.   Not Applicable      For all patients not having spinal cord stimulator (SCS) trials or radiofrequency ablations (RFAs), informed patient:    IV sedation is not provided for this procedure.  If you feel that an oral anti-anxiety medication is needed, you can discuss this further with your referring provider or primary care provider.  The Pain Clinic provider will discuss specifics of what the procedure includes at your appointment.  Most procedures last 10-20 minutes.  We use numbing medications to help with any discomfort during the procedure.  Not Applicable      Do not schedule procedures requiring IV placement in the first appointment of the day or first appointment after lunch. Do NOT schedule at 0745, 0815 or 1245. N/A      For patients 85 or older we recommend having an adult stay w/ them for the remainder of the day.   N/A    Does the patient have any questions?  NO  Lay Zhou  [facility]

## 2018-07-03 NOTE — TELEPHONE ENCOUNTER
The patient was contacted today and a voicemail was left. It was explained to the patient that he can have a repeated Thoracic NISHA, at least one week after the lumbar NISHA. An order was placed today.

## 2018-07-09 ENCOUNTER — TELEPHONE (OUTPATIENT)
Dept: PALLIATIVE MEDICINE | Facility: CLINIC | Age: 83
End: 2018-07-09

## 2018-07-09 NOTE — TELEPHONE ENCOUNTER
Pre-screening Questions for Radiology Injections:     Injection to be done at which interventional clinic site? [facility] [place]                        [facility] instruct patient to arrive 30 minutes before the scheduled appointment time.      Procedure ordered by Dr. Schulz (under Imaging)     Procedure ordered? Thoracic Epidural Steroid Injection     What insurance would patient like us to bill for this procedure? Medicare, Blue Plus       Worker's comp or MVA (motor vehicle accident) -Any injection DO NOT SCHEDULE and route to Kinsey Meza.       [employer] - For SI joint injections, DO NOT SCHEDULE and route Elvi Malcolm. [employer] NO PA REQUIRED EFFECTIVE 11/1/2017       [employer]- MBB's must be scheduled at LEAST two weeks apart       [employer] - Any injection besides hip/shoulder/knee joint DO NOT SCHEDULE and route to Elvi Malcolm. She will obtain PA and call pt back to schedule procedure or notify pt of denial.        [employer]-Route to Elvi for review     Any chance of pregnancy? NO   If YES, do NOT schedule and route to RN pool     Is an  needed? No     Patient has a drive home? (mandatory) YES: INFORMED     Is patient taking any blood thinners (plavix, coumadin, jantoven, warfarin, heparin, pradaxa or dabigatran )? No   If hold needed, do NOT schedule, route to RN pool     Is patient taking any aspirin products? Yes - Pt takes 81mg daily; instructed to hold 0 day(s) prior to procedure.      If more than 325mg/day do NOT schedule; route to RN pool     For CERVICAL procedures, hold all aspirin products for 6 days.      Does the patient have a bleeding or clotting disorder? No     If YES, okay to schedule AND route to RN nurse pool    **For any patients with platelet count <100, must be forwarded to provider**     Is patient diabetic?  No  If YES, have them bring their glucometer.     Does patient have an active infection or treated for one within the past  week? No     Is patient currently taking any antibiotics?  No     For patients on chronic, preventative, or prophylactic antibiotics, procedures may be scheduled.     For patients on antibiotics for active or recent infection:    Alvino Aranda Burton, Snitzer-antibiotic course must have been completed for 4 days     Is patient currently taking any steroid medications? (i.e. Prednisone, Medrol)  No     For patients on steroid medications:    Alvino Aranda Burton, Snitzer-steroid course must have been completed for 4 days     Reviewed with patient:  If you are started on any steroids or antibiotics between now and your appointment, you must contact us because it may affect our ability to perform your procedure.  Yes     Is patient actively being treated for cancer or immunocompromised? No  If YES, do NOT schedule and route to RN pool      Are you able to get on and off an exam table with minimal or no assistance? Yes  If NO, do NOT schedule and route to RN pool     Are you able to roll over and lay on your stomach with minimal or no assistance? Yes  If NO, do NOT schedule and route to RN pool     Any allergies to contrast dye, iodine, shellfish, or numbing and steroid medications? No  If YES, route to RN pool AND add allergy information to appointment notes     Allergies: Amoxicillin and Lisinopril      Has the patient had a flu shot or any other vaccinations within 7 days before or after the procedure.  No     Does patient have an MRI/CT?  YES: MRI  (SI joint, hip injections, lumbar sympathetic blocks, and stellate ganglion blocks do not require an MRI)    Was the MRI done w/in the last 3 years?  Yes    Was MRI done at [facility]? Yes      If not, where was it done? N/A        If MRI was not done at [facility], [facility] or [facility] do NOT schedule and route to nursing.  If pt has an imaging disc, the injection may be scheduled but pt has to bring disc to appt. If they show up w/out disc the  injection cannot be done     Reminders (please tell patient if applicable):       Instructed pt to arrive 30 minutes early for IV start if this is for a cervical procedure, ALL sympathetic (stellate ganglion, hypogastric, or lumbar sympathetic block) and all sedation procedures (RFA, spinal cord stimulation trials).  Not Applicable   -IVs are not routinely placed for Dr. Bazzi cervical cases   -Dr. Rivera: IVs for cervical ESIs and cervical TBDs (not CMBBs/facet inj)       If NPO for sedation, informed patient that it is okay to take medications with sips of water (except if they are to hold blood thinners).  Not Applicable                        *DO take blood pressure medication if it is prescribed*       If this is for a cervical NISHA, informed patient that aspirin needs to be held for 6 days.   Not Applicable       For all patients not having spinal cord stimulator (SCS) trials or radiofrequency ablations (RFAs), informed patient:     IV sedation is not provided for this procedure.  If you feel that an oral anti-anxiety medication is needed, you can discuss this further with your referring provider or primary care provider.  The Pain Clinic provider will discuss specifics of what the procedure includes at your appointment.  Most procedures last 10-20 minutes.  We use numbing medications to help with any discomfort during the procedure.  Not Applicable       Do not schedule procedures requiring IV placement in the first appointment of the day or first appointment after lunch. Do NOT schedule at 0745, 0815 or 1245. N/A       For patients 85 or older we recommend having an adult stay w/ them for the remainder of the day.   N/A     Does the patient have any questions?  RISHI TORRES    [facility]

## 2018-07-19 ENCOUNTER — RADIOLOGY INJECTION OFFICE VISIT (OUTPATIENT)
Dept: PALLIATIVE MEDICINE | Facility: CLINIC | Age: 83
End: 2018-07-19
Payer: MEDICARE

## 2018-07-19 ENCOUNTER — HOSPITAL ENCOUNTER (OUTPATIENT)
Dept: RADIOLOGY | Facility: CLINIC | Age: 83
Discharge: HOME | End: 2018-07-19
Attending: PREVENTIVE MEDICINE | Admitting: PREVENTIVE MEDICINE
Payer: MEDICARE

## 2018-07-19 VITALS — RESPIRATION RATE: 16 BRPM | SYSTOLIC BLOOD PRESSURE: 147 MMHG | DIASTOLIC BLOOD PRESSURE: 68 MMHG | HEART RATE: 66 BPM

## 2018-07-19 DIAGNOSIS — M54.16 LUMBAR RADICULOPATHY: Primary | ICD-10-CM

## 2018-07-19 DIAGNOSIS — M99.43 CONNECTIVE TISSUE STENOSIS OF NEURAL CANAL OF LUMBAR REGION: ICD-10-CM

## 2018-07-19 DIAGNOSIS — M51.369 DDD (DEGENERATIVE DISC DISEASE), LUMBAR: ICD-10-CM

## 2018-07-19 PROCEDURE — 25000125 ZZHC RX 250: Performed by: ANESTHESIOLOGY

## 2018-07-19 PROCEDURE — 25000128 H RX IP 250 OP 636: Performed by: ANESTHESIOLOGY

## 2018-07-19 PROCEDURE — 62323 NJX INTERLAMINAR LMBR/SAC: CPT | Performed by: ANESTHESIOLOGY

## 2018-07-19 PROCEDURE — 27210202 XR LUMBAR TRANSLAMINAR INJ INCL IMAGING: Mod: TC

## 2018-07-19 RX ORDER — LIDOCAINE HYDROCHLORIDE AND EPINEPHRINE 10; 10 MG/ML; UG/ML
1 INJECTION, SOLUTION INFILTRATION; PERINEURAL ONCE
Status: COMPLETED | OUTPATIENT
Start: 2018-07-19 | End: 2018-07-19

## 2018-07-19 RX ORDER — DEXAMETHASONE SODIUM PHOSPHATE 10 MG/ML
20 INJECTION, SOLUTION INTRAMUSCULAR; INTRAVENOUS ONCE
Status: COMPLETED | OUTPATIENT
Start: 2018-07-19 | End: 2018-07-19

## 2018-07-19 RX ORDER — METHYLPREDNISOLONE ACETATE 40 MG/ML
40 INJECTION, SUSPENSION INTRA-ARTICULAR; INTRALESIONAL; INTRAMUSCULAR; SOFT TISSUE ONCE
Status: COMPLETED | OUTPATIENT
Start: 2018-07-19 | End: 2018-07-19

## 2018-07-19 RX ORDER — BUPIVACAINE HYDROCHLORIDE 5 MG/ML
10 INJECTION, SOLUTION PERINEURAL ONCE
Status: COMPLETED | OUTPATIENT
Start: 2018-07-19 | End: 2018-07-19

## 2018-07-19 RX ORDER — LIDOCAINE HYDROCHLORIDE 10 MG/ML
5 INJECTION, SOLUTION EPIDURAL; INFILTRATION; INTRACAUDAL; PERINEURAL ONCE
Status: COMPLETED | OUTPATIENT
Start: 2018-07-19 | End: 2018-07-19

## 2018-07-19 RX ORDER — IOPAMIDOL 612 MG/ML
3 INJECTION, SOLUTION INTRATHECAL ONCE
Status: COMPLETED | OUTPATIENT
Start: 2018-07-19 | End: 2018-07-19

## 2018-07-19 RX ADMIN — DEXAMETHASONE SODIUM PHOSPHATE 5 MG: 10 INJECTION, SOLUTION INTRAMUSCULAR; INTRAVENOUS at 07:57

## 2018-07-19 RX ADMIN — LIDOCAINE HYDROCHLORIDE,EPINEPHRINE BITARTRATE 1.5 ML: 10; .01 INJECTION, SOLUTION INFILTRATION; PERINEURAL at 07:57

## 2018-07-19 RX ADMIN — IOPAMIDOL 3 ML: 612 INJECTION, SOLUTION INTRATHECAL at 07:57

## 2018-07-19 RX ADMIN — METHYLPREDNISOLONE ACETATE 40 MG: 40 INJECTION, SUSPENSION INTRA-ARTICULAR; INTRALESIONAL; INTRAMUSCULAR; SOFT TISSUE at 07:57

## 2018-07-19 RX ADMIN — BUPIVACAINE HYDROCHLORIDE 1 ML: 5 INJECTION, SOLUTION EPIDURAL; INTRACAUDAL at 07:57

## 2018-07-19 RX ADMIN — LIDOCAINE HYDROCHLORIDE 1 ML: 10 INJECTION, SOLUTION EPIDURAL; INFILTRATION; INTRACAUDAL; PERINEURAL at 07:58

## 2018-07-19 ASSESSMENT — PAIN SCALES - GENERAL
PAINLEVEL: MILD PAIN (3)
PAINLEVEL: MILD PAIN (2)

## 2018-07-19 NOTE — PROGRESS NOTES
Pre procedure Diagnosis: lumbar radiculopathy, lumbar degenerative disc disease   Post procedure Diagnosis: Same  Procedure performed: lumbar interlaminar epidural steroid injection at L4-5, fluoroscopically guided, contrast controlled  Anesthesia: local  Complications: none  Operators: Mejia Quesada MD     Indications:   Dangelo Hernández is a 83 year old male was sent by Dr. Schulz for lumbar epidural steroid injection.  The patient has a history of chronic lower back pain with pain into the hips and lower extremities.  Examination shows lumbar tenderness, mildly antalgic and forward flexed gait with equivocal SLR.  he has tried conservative treatment including activity modifications, medications, and interventional procedures.    MRI was done on 6/19/18 which showed   FINDINGS: Five lumbar vertebrae are assumed. Apophyseal joint  degenerative arthrosis is noted, greatest at L4-L5 and L5-S1, without  periarticular marrow edema or degenerative spondylolisthesis. There is  multilevel degenerative disc disease, most advanced at L4-L5 where  there is also right lateral mild discogenic endplate reactive marrow  edema. Vertebral body heights are normal. The conus medullaris is  unremarkable in appearance on the sagittal images.      L1-L2: No disc bulge or herniation. No central or foraminal stenosis.     L2-L3: Minimal annular bulging. No central or foraminal stenosis.     L3-L4: Diffuse annular bulge and mild bilateral foraminal stenosis. No  central stenosis.     L4-L5: Prominence of the epidural fat, ligamentum flavum thickening,  and diffuse annular bulge results in moderate to severe central  stenosis. Moderate to severe right and moderate left foraminal  stenosis.     L5-S1: No disc bulge or herniation. No central or foraminal stenosis.       IMPRESSION:  1. Multilevel degenerative disc disease, most advanced at L4-L5 where  there is also mild right lateral discogenic endplate reactive  marrow  edema.  2. 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  3. L4-L5 moderate to severe central stenosis.  4. Multilevel foraminal stenosis, greatest on the right at L4-L5.    Options/alternatives, benefits and risks were discussed with the patient including but not limited to bleeding, infection, no pain relief, tissue trauma, exposure to radiation, reaction to medications, spinal cord injury, dural puncture, weakness, numbness and headache.  Questions were answered to his satisfaction and he wishes to proceed. Voluntary informed consent was obtained and signed.     Vitals were reviewed: Yes  /63 (Patient Position: Sitting)  Pulse 89  Resp 16  Allergies were reviewed:  Yes   Medications were reviewed:  Yes   Pre-procedure pain score: 3/10 lower back and 4/10 lower extremity    Procedure:  The patient's medical history, medications, and allergies were reviewed and reconciled.  After obtaining signed informed consent, the patient was brought into the procedure suite and was placed in a prone position on the procedure table.   A Pause for the Cause was performed.  Patient was prepped and draped in the usual sterile fashion.     The L4-5 interspace was identified with AP fluoroscopy.  A total of 3ml of 1% lidocaine was used to anesthetize the skin and subcutaneous tissues for a midline approach.    A 20 gauge 3.5 inch Touhy needle was advanced utilizing intermittent AP and Lateral fluoroscopy and air for loss of resistance.  The epidural space was encountered on the first pass without difficulties.  Aspiration for blood and CSF was negative.  Needle position was verified by injecting 1 ml of Isovue 300 utilizing real-time fluoroscopy that showed good needle placement and epidural spread without signs of intravascular or intrathecal uptake.  Isovue wasted:  14 ml.    Then, after repeated negative aspiration for blood or CSF, a test dose was performed by injecting 1.5 ml of Lidocaine 1%  with epinephrine into the epidural space which was negative for heart rate or blood pressure changes, tinnitus, metallic taste, lower extremity paresthesias, or other complaints.    Then, after repeated negative aspiration for blood or CSF, a combination of Depomedrol 40 mg, Dexamethasone 5 mg, Bupivicaine 0.5% 1 ml, diluted with 2.5 ml of normal saline to a total injectate volume of 5 ml was injected into the epidural space in a slow and incremental fashion and the needle was restyletted and withdrawn.  All injected medications were preservative free.    The injection site was cleaned and a sterile dressing was applied.    The patient tolerated the procedure well without complications and was taken to the recovery room for continued observation.    Images were saved to PACS.    Post-procedure pain score: 3/10 lower back and 0/10 lower extremity  Follow-up includes:   -f/u phone call in one week  -f/u with referring provider    Mejia Quesada MD  [facility]

## 2018-07-19 NOTE — NURSING NOTE
Pre-procedure Intake    Have you been fasting? NA    If yes, for how long?     Are you taking a prescribed blood thinner such as coumadin, Plavix, Xarelto?    No    If yes, when did you take your last dose?     Do you take aspirin?  Yes -   ASA    If cervical procedure, have you held aspirin for 6 days?   NA    Do you have any allergies to contrast dye, iodine, steroid and/or numbing medications?  NO    Are you currently taking antibiotics or have an active infection?  NO    Have you had a fever/elevated temperature within the past week? NO    Are you currently taking oral steroids? NO    Do you have a ? Yes       Are you pregnant or breastfeeding?  Not Applicable    Are the vital signs normal?  Yes

## 2018-07-19 NOTE — MR AVS SNAPSHOT
MRN:[id number]                      After Visit Summary   7/19/2018    Dangelo Hernández    MRN: [id number]           Visit Information        Provider Department      7/19/2018 7:45 AM Mejia Camp MD [facility]        Your next 10 appointments already scheduled     Aug 07, 2018  7:45 AM CDT   Radiology Injections with Mejia Quesada MD   [facility] ([facility])    [address]  [address]  [place] [address]   [phone]            May 09, 2019 11:30 AM CDT   [facility] with  [facility]   [facility] Draw ([facility])    [address]  [address]  [place] [id number] [phone]            May 09, 2019 12:00 PM CDT   Return with Abida Lott MD   [facility] Blood and Marrow Transplant ([facility])    [address]  [address]  [place] [id number] [phone]              Care Instructions    [facility]   Procedure Discharge Instructions    Today you saw:    Dr. Mejia Quesada      You had an: Lumbar Epidural steroid injection        Medications used:  Lidocaine   Marcaine   Decadron Depo-medrol Iso Lazarus            Be cautious when walking. Numbness and/or weakness in the lower extremities may occur for up to 6-8 hours after the procedure due to effect of the local anesthetic    Do not drive for 6 hours. The effect of the local anesthetic could slow your reflexes.     You may resume your regular activities after 24 hours    Avoid strenuous activity for the first 24 hours    You may shower, however avoid swimming, tub baths or hot tubs for 24 hours following your procedure    You may have a mild to moderate increase in pain for several days following the injection.    It may take up to 14 days for the steroid medication to start working although you  may feel the effect as early as a few days after the procedure.       You may use ice packs for 10-15 minutes, 3 to 4 times a day at the injection site for comfort    Do not use heat to painful areas for 6 to 8 hours. This will give the local anesthetic time to wear off and prevent you from accidentally burning your skin.     You may use anti-inflammatory medications (such as Ibuprofen or Aleve or Advil) or Tylenol for pain control if necessary    If you experience any of the following, call the pain center nursing line during work hours at [phone] or the after hours provider line at [phone]:  -Fever over 100 degree F  -Swelling, bleeding, redness, drainage, warmth at the injection site  -Progressive weakness or numbness in your legs  -Loss of bowel or bladder function  -Unusual headache that is not relieved by Tylenol or other pain reliever  -Unusual new onset of pain that is not improving      Phone #s:  Appointment line: [phone];  Nurse line: [phone]                 Care EveryWhere ID     This is your Care EveryWhere ID. This could be used by other organizations to access your [place] medical records  [id number]        Equal Access to Services     PETRA GRAMAJO : Chaparrita Gama, chente erwin, dipesh knight. So [facility] [phone].    ATENCIÓN: Si habla español, tiene a mcneal disposición servicios gratuitos de asistencia lingüística. Aryan al [phone].    We comply with applicable federal civil rights laws and [place] laws. We do not discriminate on the basis of race, color, national origin, age, disability, sex, sexual orientation, or gender identity.

## 2018-07-19 NOTE — PATIENT INSTRUCTIONS
[facility]   Procedure Discharge Instructions    Today you saw:    Dr. Mejia Quesada      You had an: Lumbar Epidural steroid injection        Medications used:  Lidocaine   Marcaine   Decadron Depo-medrol Iso Lazarus            Be cautious when walking. Numbness and/or weakness in the lower extremities may occur for up to 6-8 hours after the procedure due to effect of the local anesthetic    Do not drive for 6 hours. The effect of the local anesthetic could slow your reflexes.     You may resume your regular activities after 24 hours    Avoid strenuous activity for the first 24 hours    You may shower, however avoid swimming, tub baths or hot tubs for 24 hours following your procedure    You may have a mild to moderate increase in pain for several days following the injection.    It may take up to 14 days for the steroid medication to start working although you may feel the effect as early as a few days after the procedure.       You may use ice packs for 10-15 minutes, 3 to 4 times a day at the injection site for comfort    Do not use heat to painful areas for 6 to 8 hours. This will give the local anesthetic time to wear off and prevent you from accidentally burning your skin.     You may use anti-inflammatory medications (such as Ibuprofen or Aleve or Advil) or Tylenol for pain control if necessary    If you experience any of the following, call the pain center nursing line during work hours at [phone] or the after hours provider line at [phone]:  -Fever over 100 degree F  -Swelling, bleeding, redness, drainage, warmth at the injection site  -Progressive weakness or numbness in your legs  -Loss of bowel or bladder function  -Unusual headache that is not relieved by Tylenol or other pain reliever  -Unusual new onset of pain that is not improving      Phone #s:  Appointment line: [phone];  Nurse line: [phone]

## 2018-07-27 ENCOUNTER — TELEPHONE (OUTPATIENT)
Dept: RADIOLOGY | Facility: CLINIC | Age: 83
End: 2018-07-27

## 2018-07-27 NOTE — TELEPHONE ENCOUNTER
Patient had a lumbar interlaminar epidural steroid injection at L4-5  on 7/19/18.  Called patient for an update.      Left message that we were calling for an update about how he was doing after the injection.  LM that if he has any problems or questions to call the clinic at [phone].

## 2018-08-07 ENCOUNTER — RADIOLOGY INJECTION OFFICE VISIT (OUTPATIENT)
Dept: PALLIATIVE MEDICINE | Facility: CLINIC | Age: 83
End: 2018-08-07
Payer: MEDICARE

## 2018-08-07 ENCOUNTER — HOSPITAL ENCOUNTER (OUTPATIENT)
Dept: RADIOLOGY | Facility: CLINIC | Age: 83
Discharge: HOME | End: 2018-08-07
Attending: PREVENTIVE MEDICINE | Admitting: PREVENTIVE MEDICINE
Payer: MEDICARE

## 2018-08-07 VITALS — SYSTOLIC BLOOD PRESSURE: 143 MMHG | DIASTOLIC BLOOD PRESSURE: 65 MMHG | HEART RATE: 62 BPM | RESPIRATION RATE: 15 BRPM

## 2018-08-07 DIAGNOSIS — M51.24 THORACIC DISC HERNIATION: ICD-10-CM

## 2018-08-07 DIAGNOSIS — M47.24 OSTEOARTHRITIS OF SPINE WITH RADICULOPATHY, THORACIC REGION: ICD-10-CM

## 2018-08-07 DIAGNOSIS — M51.34 THORACIC DEGENERATIVE DISC DISEASE: Primary | ICD-10-CM

## 2018-08-07 PROCEDURE — 25000125 ZZHC RX 250: Performed by: ANESTHESIOLOGY

## 2018-08-07 PROCEDURE — 25000128 H RX IP 250 OP 636: Performed by: ANESTHESIOLOGY

## 2018-08-07 PROCEDURE — 27210202 XR CERVICAL/THORACIC EPIDURAL INJ, INCL IMAGING: Mod: TC

## 2018-08-07 PROCEDURE — 62321 NJX INTERLAMINAR CRV/THRC: CPT | Performed by: ANESTHESIOLOGY

## 2018-08-07 RX ORDER — DEXAMETHASONE SODIUM PHOSPHATE 10 MG/ML
20 INJECTION, SOLUTION INTRAMUSCULAR; INTRAVENOUS ONCE
Status: DISCONTINUED | OUTPATIENT
Start: 2018-08-07 | End: 2018-08-07 | Stop reason: CLARIF

## 2018-08-07 RX ORDER — DEXAMETHASONE SODIUM PHOSPHATE 10 MG/ML
10 INJECTION, SOLUTION INTRAMUSCULAR; INTRAVENOUS ONCE
Status: COMPLETED | OUTPATIENT
Start: 2018-08-07 | End: 2018-08-07

## 2018-08-07 RX ORDER — EPINEPHRINE 1 MG/ML
1 INJECTION, SOLUTION, CONCENTRATE INTRAVENOUS ONCE
Status: DISCONTINUED | OUTPATIENT
Start: 2018-08-07 | End: 2018-08-07 | Stop reason: CLARIF

## 2018-08-07 RX ORDER — LIDOCAINE HYDROCHLORIDE AND EPINEPHRINE 10; 10 MG/ML; UG/ML
1 INJECTION, SOLUTION INFILTRATION; PERINEURAL ONCE
Status: COMPLETED | OUTPATIENT
Start: 2018-08-07 | End: 2018-08-07

## 2018-08-07 RX ORDER — METHYLPREDNISOLONE ACETATE 40 MG/ML
40 INJECTION, SUSPENSION INTRA-ARTICULAR; INTRALESIONAL; INTRAMUSCULAR; SOFT TISSUE ONCE
Status: COMPLETED | OUTPATIENT
Start: 2018-08-07 | End: 2018-08-07

## 2018-08-07 RX ORDER — LIDOCAINE HYDROCHLORIDE 10 MG/ML
5 INJECTION, SOLUTION EPIDURAL; INFILTRATION; INTRACAUDAL; PERINEURAL ONCE
Status: COMPLETED | OUTPATIENT
Start: 2018-08-07 | End: 2018-08-07

## 2018-08-07 RX ORDER — IOPAMIDOL 612 MG/ML
15 INJECTION, SOLUTION INTRATHECAL ONCE
Status: COMPLETED | OUTPATIENT
Start: 2018-08-07 | End: 2018-08-07

## 2018-08-07 RX ADMIN — LIDOCAINE HYDROCHLORIDE,EPINEPHRINE BITARTRATE 1.5 ML: 10; .01 INJECTION, SOLUTION INFILTRATION; PERINEURAL at 08:10

## 2018-08-07 RX ADMIN — METHYLPREDNISOLONE ACETATE 40 MG: 40 INJECTION, SUSPENSION INTRA-ARTICULAR; INTRALESIONAL; INTRAMUSCULAR; SOFT TISSUE at 08:10

## 2018-08-07 RX ADMIN — LIDOCAINE HYDROCHLORIDE 5 ML: 10 INJECTION, SOLUTION EPIDURAL; INFILTRATION; INTRACAUDAL; PERINEURAL at 08:01

## 2018-08-07 RX ADMIN — IOPAMIDOL 1.5 ML: 612 INJECTION, SOLUTION INTRATHECAL at 08:01

## 2018-08-07 RX ADMIN — DEXAMETHASONE SODIUM PHOSPHATE 5 MG: 10 INJECTION, SOLUTION INTRAMUSCULAR; INTRAVENOUS at 08:01

## 2018-08-07 ASSESSMENT — PAIN SCALES - GENERAL
PAINLEVEL: MILD PAIN (3)
PAINLEVEL: MILD PAIN (3)

## 2018-08-07 NOTE — NURSING NOTE
Pre-procedure Intake    Have you been fasting? No     If yes, for how long?     Are you taking a prescribed blood thinner such as coumadin, Plavix, Xarelto?    No    If yes, when did you take your last dose?     Do you take aspirin?  Yes -   ASA    If cervical procedure, have you held aspirin for 6 days?   NA    Do you have any allergies to contrast dye, iodine, steroid and/or numbing medications?  NO    Are you currently taking antibiotics or have an active infection?  NO    Have you had a fever/elevated temperature within the past week? NO    Are you currently taking oral steroids? NO    Do you have a ? Yes       Are you pregnant or breastfeeding?  Not Applicable    Are the vital signs normal?  Yes

## 2018-08-07 NOTE — MR AVS SNAPSHOT
MRN:[id number]                      After Visit Summary   8/7/2018    Dangelo Hernández    MRN: [id number]           Visit Information        Provider Department      8/7/2018 7:45 AM Mejia Camp MD [facility] [place]        Your next 10 appointments already scheduled     May 09, 2019 11:30 AM CDT   Masonic Lab Draw with  MASONIC LAB DRAW   [facility] Masonic Lab Draw ([facility])    [address]  [address]  [facility] [address]   [phone]            May 09, 2019 12:00 PM CDT   Return with Abida Lott MD   [facility] Blood and Marrow Transplant ([facility])    [address]  [address]  [facility] [address]   [phone]              Care Instructions    [facility]   Procedure Discharge Instructions    Today you saw:    Dr. Mejia Quesada     You had an:  Thoracic Epidural steroid injection      Medications used:  Lidocaine   Bupivacaine   Dexamethasone Depo-medrol             Be cautious when walking. Numbness and/or weakness in the lower extremities may occur for up to 6-8 hours after the procedure due to effect of the local anesthetic    Do not drive for 6 hours. The effect of the local anesthetic could slow your reflexes.     You may resume your regular activities after 24 hours    Avoid strenuous activity for the first 24 hours    You may shower, however avoid swimming, tub baths or hot tubs for 24 hours following your procedure    You may have a mild to moderate increase in pain for several days following the injection.    It may take up to 14 days for the steroid medication to start working although you may feel the effect as early as a few days after the procedure.       You may use ice packs for 10-15 minutes, 3 to 4 times a day at the injection site for comfort    Do not use heat to painful areas for 6 to 8 hours. This will give the local  anesthetic time to wear off and prevent you from accidentally burning your skin.     You may use anti-inflammatory medications (such as Ibuprofen or Aleve or Advil) or Tylenol for pain control if necessary    If you experience any of the following, call the Pain Clinic during work hours at [phone] or the Provider Line after hours at [phone]:  -Fever over 100 degree F  -Swelling, bleeding, redness, drainage, warmth at the injection site  -Progressive weakness or numbness in your legs or arms  -Loss of bowel or bladder function  -Unusual headache that is not relieved by Tylenol or other pain reliever  -Unusual new onset of pain that is not improving             Care EveryWhere ID     This is your Care EveryWhere ID. This could be used by other organizations to access your [facility] medical records  [id number]        Equal Access to Services     PETRA GRAMAJO : Chaparrita Gama, chente erwin, dipesh knight. So [facility] [phone].    ATENCIÓN: Si habla español, tiene a mcneal disposición servicios gratuitos de asistencia lingüística. Llame al [phone].    We comply with applicable federal civil rights laws and [place] laws. We do not discriminate on the basis of race, color, national origin, age, disability, sex, sexual orientation, or gender identity.

## 2018-08-07 NOTE — PROGRESS NOTES
Pre procedure Diagnosis: thoracic radiculopathy, thoracic degenerative disc disease   Post procedure Diagnosis: Same  Procedure performed: thoracic interlaminar epidural steroid injection at T6-7, fluoroscopically guided, contrast controlled  Anesthesia: local  Complications: none  Operators: Mejia Quesada MD      Indications:   Dangelo Hernández is a 82 year old male was sent by Dr. Ludin Schulz for repeat thoracic epidural steroid injection.  The patient has a history of chronic mid thoracic pain with a tightness around his chest wall.  Examination shows tenderness to palpation at approximately T6-7 with mildly restricted movement due to discomfort.  he has tried conservative treatment including physical therapy, medications.     MRI was done on 5/18/18 which showed   Findings:  No significant change in chronic T6 compression fracture with  associated 40-50% loss of anterior vertebral body height.   Stable chronic T12 superior end plate compression fracture with  approximately 30-40% loss of anterior vertebral body height.      No retropulsed fracture fragments or suspicious marrow signal  abnormality. No new fracture.      Normal alignment of the thoracic vertebrae. Slight exaggeration of the  normal thoracic kyphosis. Multilevel disc degeneration with loss of  disc height and intradiscal T2 signal. Central disc protrusions at  T6-7 indents the ventral aspect of the cord. Small right central disc  protrusion at T7-T8. No significant spinal canal or neural foraminal  stenosis. Normal cord signal. The tip the conus medullaris is at  approximately L1. No abnormal foci of intrathecal enhancement.  Paraspinous soft tissues are unremarkable.        Impression:   1. No significant change since 8/31/2015. 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  2. Stable mild thoracic spondylosis. No spinal canal or neural  foraminal stenosis.      Options/alternatives, benefits and risks were discussed with the  patient including but not limited to bleeding, infection, no pain relief, tissue trauma, exposure to radiation, reaction to medications, spinal cord injury, dural puncture, weakness, numbness and headache.  Questions were answered to his satisfaction and he wishes to proceed. Voluntary informed consent was obtained and signed.      Vitals were reviewed: Yes  /65  Pulse 62  Resp 15  Allergies were reviewed:  Yes   Medications were reviewed:  Yes   Pre-procedure pain score: 3/10     Procedure:  The patient's medical history, medications, and allergies were reviewed and reconciled.  After obtaining signed informed consent, the patient was brought into the procedure suite and was placed in a prone position on the procedure table.   A Pause for the Cause was performed.  Patient was prepped and draped in the usual sterile fashion.      The T6-7 interspace was identified with AP fluoroscopy.  A total of 2 ml of 1% lidocaine was used to anesthetize the skin and subcutaneous tissues for a left paramedian approach.    A 20 gauge 3.5 inch Touhy needle was advanced utilizing intermittent AP and Lateral fluoroscopy and air for loss of resistance.  The epidural space was encountered on the first pass without difficulties.  Aspiration for blood and CSF was negative.  Needle position was verified by injecting 1.5 ml of Isovue 300 utilizing real-time fluoroscopy that showed good needle placement and epidural spread without signs of intravascular or intrathecal uptake.  Isovue wasted:  13.5 ml.     Then, after repeated negative aspiration for blood or CSF, a test dose was performed by injecting 1.5 ml of Lidocaine 1% with epinephrine into the epidural space which was negative for heart rate or blood pressure changes, tinnitus, metallic taste, lower extremity paresthesias, or other complaints.     Then, after repeated negative aspiration for blood or CSF, a combination of Depomedrol 40 mg, Dexamethasone 5 mg, Bupivicaine 0.5%  1 ml, diluted with 2.5 ml of normal saline to a total injectate volume of 5 ml was injected into the epidural space in a slow and incremental fashion and the needle was restyletted and withdrawn.  All injected medications were preservative free.     The injection site was cleaned and a sterile dressing was applied.     The patient tolerated the procedure well without complications and was taken to the recovery room for continued observation.    Images were saved to PACS.     Post-procedure pain score: 3/10  Follow-up includes:   -f/u phone call in one week  -f/u with referring provider     Mejia Quesada MD  [facility]

## 2018-08-07 NOTE — IP AVS SNAPSHOT
[facility]    [address]    [facility] [address]    Phone:  [phone]    Fax:  [phone]                                       After Visit Summary   8/7/2018    Dangelo Hernández    MRN: [id number]           After Visit Summary Signature Page     I have received my discharge instructions, and my questions have been answered. I have discussed any challenges I see with this plan with the nurse or doctor.    ..........................................................................................................................................  Patient/Patient Representative Signature      ..........................................................................................................................................  Patient Representative Print Name and Relationship to Patient    ..................................................               ................................................  Date                                            Time    ..........................................................................................................................................  Reviewed by Signature/Title    ...................................................              ..............................................  Date                                                            Time

## 2018-08-07 NOTE — PATIENT INSTRUCTIONS
[facility]   Procedure Discharge Instructions    Today you saw:    Dr. Mejia Quesada     You had an:  Thoracic Epidural steroid injection      Medications used:  Lidocaine   Bupivacaine   Dexamethasone Depo-medrol IsoVue            Be cautious when walking. Numbness and/or weakness in the lower extremities may occur for up to 6-8 hours after the procedure due to effect of the local anesthetic    Do not drive for 6 hours. The effect of the local anesthetic could slow your reflexes.     You may resume your regular activities after 24 hours    Avoid strenuous activity for the first 24 hours    You may shower, however avoid swimming, tub baths or hot tubs for 24 hours following your procedure    You may have a mild to moderate increase in pain for several days following the injection.    It may take up to 14 days for the steroid medication to start working although you may feel the effect as early as a few days after the procedure.       You may use ice packs for 10-15 minutes, 3 to 4 times a day at the injection site for comfort    Do not use heat to painful areas for 6 to 8 hours. This will give the local anesthetic time to wear off and prevent you from accidentally burning your skin.     You may use anti-inflammatory medications (such as Ibuprofen or Aleve or Advil) or Tylenol for pain control if necessary    If you experience any of the following, call the Pain Clinic during work hours at [phone] or the Provider Line after hours at [phone]:  -Fever over 100 degree F  -Swelling, bleeding, redness, drainage, warmth at the injection site  -Progressive weakness or numbness in your legs or arms  -Loss of bowel or bladder function  -Unusual headache that is not relieved by Tylenol or other pain reliever  -Unusual new onset of pain that is not improving

## 2018-08-10 ENCOUNTER — HOSPITAL ENCOUNTER (EMERGENCY)
Facility: CLINIC | Age: 83
Discharge: HOME | End: 2018-08-10
Attending: EMERGENCY MEDICINE | Admitting: EMERGENCY MEDICINE
Payer: MEDICARE

## 2018-08-10 ENCOUNTER — APPOINTMENT (OUTPATIENT)
Dept: CT IMAGING | Facility: CLINIC | Age: 83
End: 2018-08-10
Attending: EMERGENCY MEDICINE
Payer: MEDICARE

## 2018-08-10 VITALS
TEMPERATURE: 97.9 F | RESPIRATION RATE: 18 BRPM | OXYGEN SATURATION: 97 % | SYSTOLIC BLOOD PRESSURE: 121 MMHG | HEART RATE: 86 BPM | DIASTOLIC BLOOD PRESSURE: 66 MMHG

## 2018-08-10 DIAGNOSIS — S01.81XA FACIAL LACERATION, INITIAL ENCOUNTER: ICD-10-CM

## 2018-08-10 DIAGNOSIS — S09.90XA CLOSED HEAD INJURY WITHOUT LOSS OF CONSCIOUSNESS, INITIAL ENCOUNTER: ICD-10-CM

## 2018-08-10 PROCEDURE — 99284 EMERGENCY DEPT VISIT MOD MDM: CPT | Mod: 25 | Performed by: EMERGENCY MEDICINE

## 2018-08-10 PROCEDURE — 12014 RPR F/E/E/N/L/M 5.1-7.5 CM: CPT | Mod: Z6 | Performed by: EMERGENCY MEDICINE

## 2018-08-10 PROCEDURE — 99284 EMERGENCY DEPT VISIT MOD MDM: CPT | Mod: 25

## 2018-08-10 PROCEDURE — 70450 CT HEAD/BRAIN W/O DYE: CPT

## 2018-08-10 PROCEDURE — 12014 RPR F/E/E/N/L/M 5.1-7.5 CM: CPT

## 2018-08-10 ASSESSMENT — ENCOUNTER SYMPTOMS
WEAKNESS: 0
WOUND: 1
CARDIOVASCULAR NEGATIVE: 1
NAUSEA: 0
DIZZINESS: 0
MUSCULOSKELETAL NEGATIVE: 1
CHILLS: 0
HEADACHES: 0
COUGH: 0
VOMITING: 0
NECK STIFFNESS: 0
SHORTNESS OF BREATH: 0
CHEST TIGHTNESS: 0
FEVER: 0
CONFUSION: 0
NECK PAIN: 0
NUMBNESS: 0
SORE THROAT: 0

## 2018-08-10 NOTE — ED NOTES
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 Left side of forehead. Lac about 2 inches long. Bleeding at present. 4x4's applied and some pressure. Denies loc or getting dizzy and falling. Pt states after this happened started feeling weak in the knees.

## 2018-08-10 NOTE — ED AVS SNAPSHOT
[facility] Emergency Department    [address] [address]    Phone:  [phone]    Fax:  [phone]                                       Dangelo Hernández   MRN: [id number]    Department:  [facility]   Date of Visit:  8/10/2018           Patient Information     Date Of Birth          1935        Your diagnoses for this visit were:     Closed head injury without loss of consciousness, initial encounter     Facial laceration, initial encounter        You were seen by Maykel Goodwin MD.      Follow-up Information     Follow up with Yefri Sotelo MD.    Specialties:  Family Practice, Occupational Medicine    Why:  For suture removal        Follow up with [facility].    Specialty:  EMERGENCY MEDICINE    Why:  If symptoms worsen, see had injury instruction sheet.  Or for evidence of wound infection.     Contact information:    [address] [place] [address] [phone]    Additional information:    The medical center is located at   [address] [place] [address] (between [facility] and   [facility] in [place], [place] north   of [place]).      Discharge References/Attachments     HEAD INJURY, NO WAKE-UP (ADULT) (ENGLISH)    LACERATION, FACE: SUTURE OR TAPE (CHILD) (ENGLISH)      Your next 10 appointments already scheduled     Sep 06, 2018 10:30 AM CDT   US CAROTID BILATERAL with VINEET   [facility] Echocardiography ([facility])    [address] [place] [id number] [phone]           Please bring a list of your medicines (including vitamins, minerals and over-the-counter drugs). Also, tell your doctor about any allergies you may have. Wear comfortable clothes and leave your valuables at home.  You do not need to do anything special to prepare for your exam.  Please call the Imaging Department at your exam site with any questions.            Sep 12, 2018  9:15 AM CDT    Return Visit with Robinson Robbins MD   [facility] ([facility])    [address]  [place] [address] [phone] OPT 2            May 09, 2019 11:30 AM CDT   Masonic Lab Draw with  MASONIC LAB DRAW   [facility] Masonic Lab Draw ([facility])    [address]  [address]  [facility] [id number] [phone]            May 09, 2019 12:00 PM CDT   Return with Abida Lott MD   [facility] Blood and Marrow Transplant ([facility])    [address]  [address]  [facility] [id number] [phone]              24 Hour Appointment Hotline       To make an appointment at any [facility], call [phone] ([phone]). If you don't have a family doctor or clinic, we will help you find one. [facility] are conveniently located to serve the needs of you and your family.             Review of your medicines      Our records show that you are taking the medicines listed below. If these are incorrect, please call your family doctor or clinic.        Dose / Directions Last dose taken    ALEVE PO   Dose:  275 mg        Take 275 mg by mouth   Refills:  0        aspirin 81 MG tablet        ONE DAILY   Refills:  3        atorvastatin 80 MG tablet   Commonly known as:  LIPITOR   Dose:  80 mg   Quantity:  90 tablet        Take 1 tablet (80 mg) by mouth daily   Refills:  3        * azelastine 0.1 % nasal spray   Commonly known as:  ASTELIN   Dose:  1 spray   Quantity:  3 Bottle        Spray 1 spray into both nostrils 2 times daily   Refills:  3        * azelastine 0.1 % nasal spray   Commonly known as:  ASTELIN   Quantity:  3 Bottle        SPRAY 1 SPRAY INTO BOTH NOSTRILS TWICE DAILY   Refills:  0        azithromycin 250 MG tablet   Commonly known as:  ZITHROMAX   Quantity:  40 tablet        2 tabs today and 1 tab qday for 4 more days   Refills:  0        * fluticasone 50  MCG/ACT spray   Commonly known as:  FLONASE   Dose:  2 spray   Quantity:  48 g        Spray 2 sprays into both nostrils daily   Refills:  03        * fluticasone 50 MCG/ACT spray   Commonly known as:  FLONASE   Quantity:  48 mL        SHAKE LIQUID AND USE 2 SPRAYS IN EACH NOSTRIL DAILY   Refills:  1        gabapentin 8 % Gel topical PLO cream   Quantity:  30 g        Apply thinly 2-3 times per day.   Refills:  3        metoprolol succinate 25 MG 24 hr tablet   Commonly known as:  TOPROL-XL   Dose:  25 mg   Quantity:  90 tablet        Take 1 tablet (25 mg) by mouth daily   Refills:  2        MULTI VITAMIN MENS PO        1 tablet daily   Refills:  0        OSTEO BI-FLEX REGULAR STRENGTH PO   Dose:  1 tablet        Take 1 tablet by mouth daily   Refills:  0        tamsulosin 0.4 MG capsule   Commonly known as:  FLOMAX   Dose:  0.4 mg   Quantity:  90 capsule        Take 1 capsule (0.4 mg) by mouth daily   Refills:  3        TYLENOL 325 MG tablet   Dose:  325-650 mg   Generic drug:  acetaminophen        Take 325-650 mg by mouth every 6 hours as needed for mild pain or pain   Refills:  0        * Notice:  This list has 4 medication(s) that are the same as other medications prescribed for you. Read the directions carefully, and ask your doctor or other care provider to review them with you.            Procedures and tests performed during your visit     CT Head w/o Contrast      Orders Needing Specimen Collection     None      Pending Results     No orders found from 8/8/2018 to 8/11/2018.            Pending Culture Results     No orders found from 8/8/2018 to 8/11/2018.            Pending Results Instructions     If you had any lab results that were not finalized at the time of your Discharge, you can call the ED Lab Result RN at [phone]. You will be contacted by this team for any positive Lab results or changes in treatment. The nurses are available 7 days a week from 10A to 6:30P.  You can leave a message 24 hours  per day and they will return your call.        Test Results From Your Hospital Stay        8/10/2018  7:25 PM      Narrative     CT SCAN OF THE HEAD WITHOUT CONTRAST   8/10/2018 7:05 PM     HISTORY: Left forehead closed head injury and laceration.    TECHNIQUE:  Axial images of the head and coronal reformations without  IV contrast material. Radiation dose for this scan was reduced using  automated exposure control, adjustment of the mA and/or kV according  to patient size, or iterative reconstruction technique.    COMPARISON: None.    FINDINGS:  There is generalized atrophy of the brain.  There is low  attenuation in the white matter of the cerebral hemispheres consistent  with sequelae of small vessel ischemic disease. There is no evidence  of intracranial hemorrhage, mass, acute infarct or anomaly.     The visualized portions of the sinuses and mastoids appear normal.  There is a left lateral frontotemporal scalp hematoma with no  underlying fracture.        Impression     IMPRESSION:   1. Left lateral frontotemporal scalp hematoma.  2. No evidence of fracture or intracranial trauma.  3.  Atrophy of the brain.  White matter changes consistent with  sequelae of small vessel ischemic disease.      KIMMY PORTILLO MD                Thank you for choosing [facility]       Thank you for choosing [facility] for your care. Our goal is always to provide you with excellent care. Hearing back from our patients is one way we can continue to improve our services. Please take a few minutes to complete the written survey that you may receive in the mail after you visit with us. Thank you!        Care EveryWhere ID     This is your Care EveryWhere ID. This could be used by other organizations to access your [facility] medical records  [id number]        Equal Access to Services     PETRA GRAMAJO : Chaparrita Gama, chente erwin, dipesh knight. So [facility]  [phone].    ATENCIÓN: Si habla español, tiene a mcneal disposición servicios gratuitos de asistencia lingüística. Llame al [phone].    We comply with applicable federal civil rights laws and [place] laws. We do not discriminate on the basis of race, color, national origin, age, disability, sex, sexual orientation, or gender identity.            After Visit Summary       This is your record. Keep this with you and show to your community pharmacist(s) and doctor(s) at your next visit.

## 2018-08-10 NOTE — ED PROVIDER NOTES
"  History     Chief Complaint   Patient presents with     Head Injury     was working on his trailer, was struck in the head by an unknown object.     HPI  Dangelo Hernández is a 83 year old male who struck in the head by a piece of angle iron that he was removing from a wall while working on his trailer.  Injury occurred a short time ago.  No LOC.  No headache, nausea or vomiting.  He has a large, deep left forehead laceration.  No visual or neurologic deficit or abnormality.  No neck or back pain or injury.  No other injury or trauma.  He is unsure if his tetanus immunization status current are up-to-date.  Anticoagulation therapy: Baby aspirin daily, no other anticoagulants and therapy.  He was alone but has family in the area, here with him now.    Previous Records Reviewed:  Td/Tdap  09/26/2013            Problem List:    Patient Active Problem List    Diagnosis Date Noted     Acute renal failure with other specified pathological lesion in kidney (H) 11/27/2011     Priority: High     Problem list name updated by automated process. Provider to review       Hypertension goal BP (blood pressure) < 140/90 12/09/2010     Priority: High     Advanced directives, counseling/discussion 08/12/2015     Priority: Medium     Patient does not have an Advance/Health Care Directive (HCD), given \"What is Advance Care Planning?\" flyer.    Yumiko Segura  August 12, 2015         Cryptogenic organizing pneumonia (H) 12/07/2012     Priority: Medium     GERD (gastroesophageal reflux disease) 05/02/2012     Priority: Medium     Pneumonia, candidial (H) 09/09/2011     Priority: Medium     Pneumonia in mycoses (H) 09/09/2011     Priority: Medium     History of blood disorder 04/06/2011     Priority: Medium     Encounter for long-term current use of medication 03/16/2011     Priority: Medium     Problem list name updated by automated process. Provider to review        Coronary artery disease, 4/ 2010, status post drug-eluting stent " placement to the LAD and balloon angioplasty to the obtuse marginal.  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          Past Medical History:    Past Medical History:   Diagnosis Date      Coronary artery disease, 4/ 2010, status post drug-eluting stent placement to the LAD and balloon angioplasty to the obtuse marginal.  12/8/2010     Acute kidney failure NEC 11/27/2011     Arthritis      Basal cell carcinoma      Blood transfusion      Colon polyps      Combined hyperlipidemia 10/31/2010     Coronary artery disease      Cryptogenic organizing pneumonia (H) 12/7/2012     GERD (gastroesophageal reflux disease)      History of blood disorder 4/6/2011     Hypertension      Hypertension goal BP (blood pressure) < 140/90 12/9/2010     Malignant neoplasm (H)      Mantle Cell Lymphoma S/P Autologous Transplant 5/12/2010       Past Surgical History:    Past Surgical History:   Procedure Laterality Date     BRONCHOSCOPY (RIGID OR FLEXIBLE), DIAGNOSTIC  7/14/2011    Procedure:COMBINED BRONCHOSCOPY (RIGID OR FLEXIBLE), LAVAGE; Surgeon:REYNA BAILEY; Location:[facility]     COLONOSCOPY       ENT SURGERY      tonsils     ORTHOPEDIC SURGERY      rt knee arthroscopy     SURGICAL HISTORY OF -       tonsils     SURGICAL HISTORY OF -   1/19/84    1.Exam under anesthesia, 2.Arthroscopy, 3.Arthroscopic medial meniscectomy, 4.arthroscopic trimming of patellar articular cartilage.     SURGICAL HISTORY OF -       vasectomy     SURGICAL HISTORY OF -   2/19/90    colonoscopy     SURGICAL HISTORY OF -   10/20/92    flexible sigmoidoscopy     SURGICAL HISTORY OF -   6/14/2000    colonoscopy and polypectomy      THORACOSCOPIC BIOPSY LUNG  2011    Procedure:THORACOSCOPIC BIOPSY LUNG; Video Assisted Right Thoracoscopy with Biopsy; Surgeon:JIM EPSTEIN; Location: OR       Family History:    Family History   Problem Relation Age of Onset     Cancer Mother      Lung--did not smoke     Cardiovascular Father      MI age 73     Lipids Sister      Hypertension Sister      Cancer Sister      Ovarian- at age 65       Social History:  Marital Status:   [5]  Social History   Substance Use Topics     Smoking status: Former Smoker     Types: Cigarettes     Quit date: 1975     Smokeless tobacco: Former User      Comment: started smoking at 21 years of age     Alcohol use 0.0 oz/week     0 Standard drinks or equivalent per week      Comment: every other day        Medications:      acetaminophen (TYLENOL) 325 MG tablet   ASPIRIN 81 MG PO TABS   atorvastatin (LIPITOR) 80 MG tablet   azelastine (ASTELIN) 0.1 % spray   azelastine (ASTELIN) 0.1 % spray   azithromycin (ZITHROMAX) 250 MG tablet   fluticasone (FLONASE) 50 MCG/ACT spray   fluticasone (FLONASE) 50 MCG/ACT spray   gabapentin 8 % GEL   Glucosamine-Chondroitin (OSTEO BI-FLEX REGULAR STRENGTH PO)   metoprolol (TOPROL-XL) 25 MG 24 hr tablet   MULTI VITAMIN MENS OR   Naproxen Sodium (ALEVE PO)   tamsulosin (FLOMAX) 0.4 MG capsule       Review of Systems   Constitutional: Negative for chills and fever.   HENT: Negative.  Negative for sore throat.    Eyes: Negative for visual disturbance.   Respiratory: Negative for cough, chest tightness and shortness of breath.    Cardiovascular: Negative.    Gastrointestinal: Negative for nausea and vomiting.   Genitourinary: Negative.  Negative for decreased urine volume and urgency.   Musculoskeletal: Negative.  Negative for neck pain and neck stiffness.   Skin: Positive for wound ( Left forehead laceration).   Neurological: Negative for dizziness, weakness, numbness and headaches.   Psychiatric/Behavioral: Negative  for behavioral problems and confusion.   All other systems reviewed and are negative.      Physical Exam   BP: 144/72  Pulse: 86  Temp: 97.5  F (36.4  C)  Resp: 18  SpO2: 99 %      Physical Exam   Constitutional: He is oriented to person, place, and time. He appears well-developed and well-nourished. No distress.   HENT:   Head: Normocephalic. Head is with contusion and with laceration. Head is without raccoon's eyes and without Hoover's sign.       Right Ear: Tympanic membrane, external ear and ear canal normal. No drainage. No hemotympanum.   Left Ear: Tympanic membrane, external ear and ear canal normal. No drainage. No hemotympanum.   Nose: Nose normal. No rhinorrhea.   Mouth/Throat: Oropharynx is clear and moist.   Eyes: Conjunctivae and EOM are normal. Pupils are equal, round, and reactive to light. No scleral icterus.   Neck: Normal range of motion and full passive range of motion without pain. Neck supple. No JVD present. No spinous process tenderness and no muscular tenderness present. No tracheal deviation and normal range of motion present. No thyromegaly present.   Cardiovascular: Normal rate, regular rhythm, normal heart sounds and intact distal pulses.    Pulmonary/Chest: Effort normal and breath sounds normal. No respiratory distress.   Musculoskeletal: Normal range of motion. He exhibits no edema.        Cervical back: Normal. He exhibits normal range of motion, no tenderness and no bony tenderness.        Thoracic back: Normal. He exhibits normal range of motion, no tenderness and no bony tenderness.        Lumbar back: Normal. He exhibits normal range of motion, no tenderness and no bony tenderness.   Neurological: He is alert and oriented to person, place, and time. He has normal strength. No cranial nerve deficit ( 2-12 intact) or sensory deficit. Coordination and gait normal. GCS eye subscore is 4. GCS verbal subscore is 5. GCS motor subscore is 6.   Skin: Skin is warm and dry. No rash noted. He  is not diaphoretic. No erythema. No pallor.   Psychiatric: He has a normal mood and affect. His behavior is normal.   Nursing note and vitals reviewed.      ED Course     ED Course     Laceration repair  Date/Time: 8/10/2018 8:15 PM  Performed by: MONICA MAYORGA  Authorized by: MONICA MAYORGA   Consent: Verbal consent obtained.  Risks and benefits: risks, benefits and alternatives were discussed  Consent given by: patient  Patient understanding: patient states understanding of the procedure being performed  Patient consent: the patient's understanding of the procedure matches consent given  Imaging studies: imaging studies available  Body area: head/neck  Location details: forehead  Laceration length: 6 cm  Contaminated: Wound is clean, uncontaminated.  Foreign bodies: no foreign bodies  Tendon involvement: none  Nerve involvement: none  Vascular damage: yes  Anesthesia: local infiltration    Anesthesia:  Local Anesthetic: bupivacaine 0.25% without epinephrine    Sedation:  Patient sedated: no  Preparation: Patient was prepped and draped in the usual sterile fashion.  Irrigation solution: saline  Amount of cleaning: standard  Debridement: none  Degree of undermining: none  Skin closure: Ethilon (12 sutures of 5-0 Ethilon)  Subcutaneous closure: 4-0 Vicryl (5 subQ sutures)  Number of sutures: 17  Technique: complex (2 layer closure)  Approximation: close  Approximation difficulty: complex (2 layer closure)  Dressing: antibiotic ointment  Patient tolerance: Patient tolerated the procedure well with no immediate complications                       Results for orders placed or performed during the hospital encounter of 08/10/18 (from the past 24 hour(s))   CT Head w/o Contrast    Narrative    CT SCAN OF THE HEAD WITHOUT CONTRAST   8/10/2018 7:05 PM     HISTORY: Left forehead closed head injury and laceration.    TECHNIQUE:  Axial images of the head and coronal reformations without  IV contrast material. Radiation  dose for this scan was reduced using  automated exposure control, adjustment of the mA and/or kV according  to patient size, or iterative reconstruction technique.    COMPARISON: None.    FINDINGS:  There is generalized atrophy of the brain.  There is low  attenuation in the white matter of the cerebral hemispheres consistent  with sequelae of small vessel ischemic disease. There is no evidence  of intracranial hemorrhage, mass, acute infarct or anomaly.     The visualized portions of the sinuses and mastoids appear normal.  There is a left lateral frontotemporal scalp hematoma with no  underlying fracture.      Impression    IMPRESSION:   1. Left lateral frontotemporal scalp hematoma.  2. No evidence of fracture or intracranial trauma.  3.  Atrophy of the brain.  White matter changes consistent with  sequelae of small vessel ischemic disease.      KIMMY PORTILLO MD       Medications - No data to display    Assessments & Plan (with Medical Decision Making)   Elderly patient with close head injury without LOC, and large deep left forehead laceration which was closed primarily. Neurologically and stable and intact throughout his ED evaluation with GCS 15 at time of discharge.  Head CT negative.  He was discharged with head injury instructions and wound care instructions and will have sutures removed in clinic in 5 days.  Family will monitor him and check on him frequently.    I have reviewed the nursing notes.    I have reviewed the findings, diagnosis, plan and need for follow up with the patient.      New Prescriptions    No medications on file       Final diagnoses:   Closed head injury without loss of consciousness, initial encounter   Facial laceration, initial encounter       8/10/2018   [facility] EMERGENCY DEPARTMENT     Maykel Goodwin MD  08/10/18 4805

## 2018-08-10 NOTE — ED AVS SNAPSHOT
[facility] Emergency Department    [address] [address]    Phone:  [phone]    Fax:  [phone]                                       Dangelo Hernández   MRN: [id number]    Department:  [facility] Emergency Department   Date of Visit:  8/10/2018           After Visit Summary Signature Page     I have received my discharge instructions, and my questions have been answered. I have discussed any challenges I see with this plan with the nurse or doctor.    ..........................................................................................................................................  Patient/Patient Representative Signature      ..........................................................................................................................................  Patient Representative Print Name and Relationship to Patient    ..................................................               ................................................  Date                                            Time    ..........................................................................................................................................  Reviewed by Signature/Title    ...................................................              ..............................................  Date                                                            Time

## 2018-08-14 ENCOUNTER — TELEPHONE (OUTPATIENT)
Dept: PALLIATIVE MEDICINE | Facility: CLINIC | Age: 83
End: 2018-08-14

## 2018-08-14 NOTE — TELEPHONE ENCOUNTER
Patient had a thoracic interlaminar epidural steroid injection at T6-7  on 8/7/18.  Called patient for an update.      Left message that we were calling for an update about how he was doing after the injection.  LM that if he has any problems or questions to call the clinic at [phone].

## 2018-08-16 ENCOUNTER — ALLIED HEALTH/NURSE VISIT (OUTPATIENT)
Dept: FAMILY MEDICINE | Facility: CLINIC | Age: 83
End: 2018-08-16
Payer: MEDICARE

## 2018-08-16 DIAGNOSIS — Z48.02 VISIT FOR SUTURE REMOVAL: Primary | ICD-10-CM

## 2018-08-16 PROCEDURE — 99207 ZZC NO CHARGE NURSE ONLY: CPT

## 2018-08-16 NOTE — MR AVS SNAPSHOT
After Visit Summary   8/16/2018    Dangelo Hernández    MRN: [id number]           Patient Information     Date Of Birth          1935        Visit Information        Provider Department      8/16/2018 10:30 AM FL NB RN [facility]        Today's Diagnoses     Visit for suture removal    -  1       Follow-ups after your visit        Your next 10 appointments already scheduled     Sep 06, 2018 10:30 AM CDT   US CAROTID BILATERAL with WYECHVUS   [facility]chocardi[facility] ([facility])    [address] [facility] [address] [phone]           Please bring a list of your medicines (including vitamins, minerals and over-the-counter drugs). Also, tell your doctor about any allergies you may have. Wear comfortable clothes and leave your valuables at home.  You do not need to do anything special to prepare for your exam.  Please call the Imaging Department at your exam site with any questions.            Sep 12, 2018  9:15 AM CDT   Return Visit with Robinson Robbnis MD   [facility] ([facility])    [address] [place] [id number] [phone] OPT 2            May 09, 2019 11:30 AM CDT   [facility] Lab Draw with  [facility]AS[facility] LAB DRAW   [facility] Masonic Lab Draw ([facility])    [address] [place] [address]  [address] [place] [address] [phone]            May 09, 2019 12:00 PM CDT   Return with Abida Lott MD   [facility] Blood and Marrow Transplant ([facility])    [address] [place] [address]  [address] [place] [id number] [phone]              Who to contact     If you have questions or need follow up information about today's clinic visit or your schedule please contact [facility] directly at [phone].  Normal or non-critical lab and imaging results will be communicated to you by MyChart, letter  or phone within 4 business days after the clinic has received the results. If you do not hear from us within 7 days, please contact the clinic through EDANhart or phone. If you have a critical or abnormal lab result, we will notify you by phone as soon as possible.  Submit refill requests through [employer] or call your pharmacy and they will forward the refill request to us. Please allow 3 business days for your refill to be completed.          Additional Information About Your Visit        Care EveryWhere ID     This is your Care EveryWhere ID. This could be used by other organizations to access your [place] medical records  [id number]         Blood Pressure from Last 3 Encounters:   08/10/18 121/66   08/07/18 143/65   07/19/18 147/68    Weight from Last 3 Encounters:   06/15/18 195 lb (88.5 kg)   05/18/18 195 lb (88.5 kg)   04/28/18 195 lb (88.5 kg)              Today, you had the following     No orders found for display       Primary Care Provider    Yefri Sotelo MD       No address on file        Equal Access to Services     [facility]: Hadii aad ku hadasho Soomaali, waaxda luqadaha, qaybta kaalmada adeegyada, waxay victoriain emely kaminski . So [facility] [phone].    ATENCIÓN: Si habla español, tiene a mcneal disposición servicios gratuitos de asistencia lingüística. Ll[facility] [phone].    We comply with applicable federal civil rights laws and [place] laws. We do not discriminate on the basis of race, color, national origin, age, disability, sex, sexual orientation, or gender identity.            Thank you!     Thank you for choosing [facility]  for your care. Our goal is always to provide you with excellent care. Hearing back from our patients is one way we can continue to improve our services. Please take a few minutes to complete the written survey that you may receive in the mail after your visit with us. Thank you!             Your Updated Medication List -  Protect others around you: Learn how to safely use, store and throw away your medicines at www.disposemymeds.org.          This list is accurate as of 8/16/18 10:52 AM.  Always use your most recent med list.                   Brand Name Dispense Instructions for use Diagnosis    ALEVE PO      Take 275 mg by mouth        aspirin 81 MG tablet      ONE DAILY    Routine general medical examination at a health care facility       atorvastatin 80 MG tablet    LIPITOR    90 tablet    Take 1 tablet (80 mg) by mouth daily    Coronary artery disease involving native coronary artery of native heart with angina pectoris (H)       * azelastine 0.1 % nasal spray    ASTELIN    3 Bottle    Spray 1 spray into both nostrils 2 times daily    Eczema, unspecified type       * azelastine 0.1 % nasal spray    ASTELIN    3 Bottle    SPRAY 1 SPRAY INTO BOTH NOSTRILS TWICE DAILY    Eczema, unspecified type       azithromycin 250 MG tablet    ZITHROMAX    40 tablet    2 tabs today and 1 tab qday for 4 more days    Pneumonia, organism unspecified(486)       * fluticasone 50 MCG/ACT spray    FLONASE    48 g    Spray 2 sprays into both nostrils daily    PND (post-nasal drip)       * fluticasone 50 MCG/ACT spray    FLONASE    48 mL    SHAKE LIQUID AND USE 2 SPRAYS IN EACH NOSTRIL DAILY    PND (post-nasal drip)       gabapentin 8 % Gel topical PLO cream     30 g    Apply thinly 2-3 times per day.    Lymphoma (H), Cryptogenic organising pneumonia, Candida glabrata infection       metoprolol succinate 25 MG 24 hr tablet    TOPROL-XL    90 tablet    Take 1 tablet (25 mg) by mouth daily    Coronary artery disease involving native coronary artery of native heart with angina pectoris (H)       MULTI VITAMIN MENS PO      1 tablet daily    Routine general medical examination at a health care facility       OSTEO BI-FLEX REGULAR STRENGTH PO      Take 1 tablet by mouth daily        tamsulosin 0.4 MG capsule    FLOMAX    90 capsule    Take 1 capsule (0.4 mg)  by mouth daily    Benign prostatic hyperplasia with lower urinary tract symptoms, symptom details unspecified       TYLENOL 325 MG tablet   Generic drug:  acetaminophen      Take 325-650 mg by mouth every 6 hours as needed for mild pain or pain        * Notice:  This list has 4 medication(s) that are the same as other medications prescribed for you. Read the directions carefully, and ask your doctor or other care provider to review them with you.

## 2018-08-16 NOTE — NURSING NOTE
Dangelo Hernández presents to the clinic today for removal of sutures.  The patient has had the sutures in place for 6 days.  There has been no history of infection or drainage.  12 sutures are seen located on the left side of his head.  The wound is healing well with no signs of infection.  Tetanus status is up to date.   All sutures were easily removed today.  Routine wound care discussed.  The patient will follow up as needed.

## 2018-09-06 ENCOUNTER — HOSPITAL ENCOUNTER (OUTPATIENT)
Dept: CARDIOLOGY | Facility: CLINIC | Age: 83
Discharge: HOME | End: 2018-09-06
Attending: FAMILY MEDICINE | Admitting: FAMILY MEDICINE
Payer: MEDICARE

## 2018-09-06 DIAGNOSIS — I65.23 BILATERAL CAROTID ARTERY STENOSIS: ICD-10-CM

## 2018-09-06 PROCEDURE — 93880 EXTRACRANIAL BILAT STUDY: CPT

## 2018-09-06 PROCEDURE — 93880 EXTRACRANIAL BILAT STUDY: CPT | Mod: 26 | Performed by: INTERNAL MEDICINE

## 2018-09-07 ENCOUNTER — TELEPHONE (OUTPATIENT)
Dept: PALLIATIVE MEDICINE | Facility: CLINIC | Age: 83
End: 2018-09-07

## 2018-09-07 NOTE — TELEPHONE ENCOUNTER
Pt calling in wanting another Injection with Dr. Sweetie Quesada in lower back for a f/u. Please place new order.      Karly COYNE    [facility] [place]

## 2018-09-07 NOTE — TELEPHONE ENCOUNTER
Writer called pt back and informed that he will need to call Dr. Schulz for injection order.    Basil Ambriz, RN  Care Coordinator   [facility] [place]

## 2018-09-12 ENCOUNTER — OFFICE VISIT (OUTPATIENT)
Dept: CARDIOLOGY | Facility: CLINIC | Age: 83
End: 2018-09-12
Payer: MEDICARE

## 2018-09-12 VITALS
HEART RATE: 73 BPM | DIASTOLIC BLOOD PRESSURE: 79 MMHG | BODY MASS INDEX: 30.13 KG/M2 | HEIGHT: 67 IN | WEIGHT: 192 LBS | SYSTOLIC BLOOD PRESSURE: 136 MMHG

## 2018-09-12 DIAGNOSIS — E78.2 COMBINED HYPERLIPIDEMIA: ICD-10-CM

## 2018-09-12 DIAGNOSIS — I10 HYPERTENSION GOAL BP (BLOOD PRESSURE) < 140/90: ICD-10-CM

## 2018-09-12 DIAGNOSIS — R06.09 DYSPNEA ON EXERTION: Primary | ICD-10-CM

## 2018-09-12 DIAGNOSIS — I73.9: ICD-10-CM

## 2018-09-12 DIAGNOSIS — R53.83 OTHER FATIGUE: ICD-10-CM

## 2018-09-12 PROCEDURE — 99214 OFFICE O/P EST MOD 30 MIN: CPT | Performed by: INTERNAL MEDICINE

## 2018-09-12 NOTE — PROGRESS NOTES
HPI and Plan:   See dictation    Orders Placed This Encounter   Procedures     NM Lexiscan stress test (nuc card)     US Low Ext Arterial Dop Seg Pres w Ex (PANTERA with Exercise)       No orders of the defined types were placed in this encounter.      There are no discontinued medications.      Encounter Diagnoses   Name Primary?     Hypertension goal BP (blood pressure) < 140/90      Combined hyperlipidemia      Dyspnea on exertion Yes     Other fatigue      Claudication, class III (H)        CURRENT MEDICATIONS:  Current Outpatient Prescriptions   Medication Sig Dispense Refill     acetaminophen (TYLENOL) 325 MG tablet Take 325-650 mg by mouth every 6 hours as needed for mild pain or pain       ASPIRIN 81 MG PO TABS ONE DAILY  3     atorvastatin (LIPITOR) 80 MG tablet Take 1 tablet (80 mg) by mouth daily 90 tablet 3     azelastine (ASTELIN) 0.1 % spray SPRAY 1 SPRAY INTO BOTH NOSTRILS TWICE DAILY 3 Bottle 0     azelastine (ASTELIN) 0.1 % spray Spray 1 spray into both nostrils 2 times daily 3 Bottle 3     fluticasone (FLONASE) 50 MCG/ACT spray SHAKE LIQUID AND USE 2 SPRAYS IN EACH NOSTRIL DAILY 48 mL 1     fluticasone (FLONASE) 50 MCG/ACT spray Spray 2 sprays into both nostrils daily 48 g 03     Glucosamine-Chondroitin (OSTEO BI-FLEX REGULAR STRENGTH PO) Take 1 tablet by mouth daily       metoprolol (TOPROL-XL) 25 MG 24 hr tablet Take 1 tablet (25 mg) by mouth daily 90 tablet 2     MULTI VITAMIN MENS OR 1 tablet daily       Naproxen Sodium (ALEVE PO) Take 275 mg by mouth       tamsulosin (FLOMAX) 0.4 MG capsule Take 1 capsule (0.4 mg) by mouth daily 90 capsule 3     azithromycin (ZITHROMAX) 250 MG tablet 2 tabs today and 1 tab qday for 4 more days 40 tablet 0     gabapentin 8 % GEL Apply thinly 2-3 times per day. 30 g 3       ALLERGIES     Allergies   Allergen Reactions     Amoxicillin Diarrhea            Lisinopril Cough       PAST MEDICAL HISTORY:  Past Medical History:   Diagnosis Date      Coronary artery disease,  2010, status post drug-eluting stent placement to the LAD and balloon angioplasty to the obtuse marginal.  2010     Acute kidney failure NEC 2011     Arthritis     osteoarthritis     Basal cell carcinoma      Blood transfusion      Colon polyps      Combined hyperlipidemia 10/31/2010    LDL goal <70      Coronary artery disease     stents placed 6/10/2013     Cryptogenic organizing pneumonia (H) 2012     GERD (gastroesophageal reflux disease)      History of blood disorder 2011     Hypertension      Hypertension goal BP (blood pressure) < 140/90 2010     Malignant neoplasm (H)     BMT; Mantle cell lymphoma     Mantle Cell Lymphoma S/P Autologous Transplant 2010       PAST SURGICAL HISTORY:  Past Surgical History:   Procedure Laterality Date     BRONCHOSCOPY (RIGID OR FLEXIBLE), DIAGNOSTIC  2011    Procedure:COMBINED BRONCHOSCOPY (RIGID OR FLEXIBLE), LAVAGE; Surgeon:REYNA BAILEY; Location:[facility]     COLONOSCOPY       ENT SURGERY      tonsils     ORTHOPEDIC SURGERY      rt knee arthroscopy     SURGICAL HISTORY OF -       tonsils     SURGICAL HISTORY OF -   84    1.Exam under anesthesia, 2.Arthroscopy, 3.Arthroscopic medial meniscectomy, 4.arthroscopic trimming of patellar articular cartilage.     SURGICAL HISTORY OF -       vasectomy     SURGICAL HISTORY OF -   90    colonoscopy     SURGICAL HISTORY OF -   10/20/92    flexible sigmoidoscopy     SURGICAL HISTORY OF -   2000    colonoscopy and polypectomy     THORACOSCOPIC BIOPSY LUNG  2011    Procedure:THORACOSCOPIC BIOPSY LUNG; Video Assisted Right Thoracoscopy with Biopsy; Surgeon:JIM EPSTEIN; Location: OR       FAMILY HISTORY:  Family History   Problem Relation Age of Onset     Cancer Mother      Lung--did not smoke     Cardiovascular Father      MI age 73     Lipids Sister      Hypertension Sister      Cancer Sister      Ovarian- at age 65       SOCIAL HISTORY:  Social History     Social  "History     Marital status:      Spouse name: N/A     Number of children: N/A     Years of education: N/A     Occupational History      Retired     Social History Main Topics     Smoking status: Former Smoker     Types: Cigarettes     Quit date: 1/1/1975     Smokeless tobacco: Former User      Comment: started smoking at 21 years of age     Alcohol use 0.0 oz/week     0 Standard drinks or equivalent per week      Comment: every other day     Drug use: No     Sexual activity: Yes     Partners: Female     Other Topics Concern     Parent/Sibling W/ Cabg, Mi Or Angioplasty Before 65f 55m? No     Special Diet No     Exercise Yes     active     Social History Narrative       Review of Systems:  Skin:  Negative       Eyes:  Positive for glasses    ENT:  Negative      Respiratory:  Positive for dyspnea on exertion     Cardiovascular:    Positive for;lightheadedness    Gastroenterology: Negative      Genitourinary:  not assessed      Musculoskeletal:  Positive for joint stiffness;back pain    Neurologic:  Positive for numbness or tingling of feet    Psychiatric:  Negative      Heme/Lymph/Imm:  Positive for allergies    Endocrine:  Negative        Physical Exam:  Vitals: /79  Pulse 73  Ht 1.702 m (5' 7\")  Wt 87.1 kg (192 lb)  BMI 30.07 kg/m2    Constitutional:  cooperative;in no acute distress        Skin:  warm and dry to the touch;no apparent skin lesions or masses noted          Head:  normocephalic        Eyes:           Lymph:      ENT:  no pallor or cyanosis        Neck:  carotid pulses are full and equal bilaterally;JVP normal;no carotid bruit        Respiratory:  clear to auscultation         Cardiac: no murmurs, gallops or rubs detected irregularly irregular rhythm                  2+         1+   2+         1+            GI:  abdomen soft;no masses;non-tender        Extremities and Muscular Skeletal:  no edema              Neurological:  no gross motor deficits        Psych:           CC  No " referring provider defined for this encounter.

## 2018-09-12 NOTE — LETTER
9/12/2018    Yefri Sotelo MD  [address]  [facility] [address]    RE: Dangelo Hernández       Dear Colleague,    I had the pleasure of seeing Dangelo Hernánedz in the [facility].    HPI and Plan:   See dictation    Orders Placed This Encounter   Procedures     NM Lexiscan stress test (nuc card)     US Low Ext Arterial Dop Seg Pres w Ex (PANTERA with Exercise)       No orders of the defined types were placed in this encounter.      There are no discontinued medications.      Encounter Diagnoses   Name Primary?     Hypertension goal BP (blood pressure) < 140/90      Combined hyperlipidemia      Dyspnea on exertion Yes     Other fatigue      Claudication, class III (H)        CURRENT MEDICATIONS:  Current Outpatient Prescriptions   Medication Sig Dispense Refill     acetaminophen (TYLENOL) 325 MG tablet Take 325-650 mg by mouth every 6 hours as needed for mild pain or pain       ASPIRIN 81 MG PO TABS ONE DAILY  3     atorvastatin (LIPITOR) 80 MG tablet Take 1 tablet (80 mg) by mouth daily 90 tablet 3     azelastine (ASTELIN) 0.1 % spray SPRAY 1 SPRAY INTO BOTH NOSTRILS TWICE DAILY 3 Bottle 0     azelastine (ASTELIN) 0.1 % spray Spray 1 spray into both nostrils 2 times daily 3 Bottle 3     fluticasone (FLONASE) 50 MCG/ACT spray SHAKE LIQUID AND USE 2 SPRAYS IN EACH NOSTRIL DAILY 48 mL 1     fluticasone (FLONASE) 50 MCG/ACT spray Spray 2 sprays into both nostrils daily 48 g 03     Glucosamine-Chondroitin (OSTEO BI-FLEX REGULAR STRENGTH PO) Take 1 tablet by mouth daily       metoprolol (TOPROL-XL) 25 MG 24 hr tablet Take 1 tablet (25 mg) by mouth daily 90 tablet 2     MULTI VITAMIN MENS OR 1 tablet daily       Naproxen Sodium (ALEVE PO) Take 275 mg by mouth       tamsulosin (FLOMAX) 0.4 MG capsule Take 1 capsule (0.4 mg) by mouth daily 90 capsule 3     azithromycin (ZITHROMAX) 250 MG tablet 2 tabs today and 1 tab qday for 4 more days 40 tablet 0     gabapentin 8 % GEL  Apply thinly 2-3 times per day. 30 g 3       ALLERGIES     Allergies   Allergen Reactions     Amoxicillin Diarrhea            Lisinopril Cough       PAST MEDICAL HISTORY:  Past Medical History:   Diagnosis Date      Coronary artery disease, 4/ 2010, status post drug-eluting stent placement to the LAD and balloon angioplasty to the obtuse marginal.  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       PAST SURGICAL HISTORY:  Past Surgical History:   Procedure Laterality Date     BRONCHOSCOPY (RIGID OR FLEXIBLE), DIAGNOSTIC  7/14/2011    Procedure:COMBINED BRONCHOSCOPY (RIGID OR FLEXIBLE), LAVAGE; Surgeon:REYNA BAILEY; Location:[facility]     COLONOSCOPY       ENT SURGERY      tonsils     ORTHOPEDIC SURGERY      rt knee arthroscopy     SURGICAL HISTORY OF -       tonsils     SURGICAL HISTORY OF -   1/19/84    1.Exam under anesthesia, 2.Arthroscopy, 3.Arthroscopic medial meniscectomy, 4.arthroscopic trimming of patellar articular cartilage.     SURGICAL HISTORY OF -       vasectomy     SURGICAL HISTORY OF -   2/19/90    colonoscopy     SURGICAL HISTORY OF -   10/20/92    flexible sigmoidoscopy     SURGICAL HISTORY OF -   6/14/2000    colonoscopy and polypectomy     THORACOSCOPIC BIOPSY LUNG  9/12/2011    Procedure:THORACOSCOPIC BIOPSY LUNG; Video Assisted Right Thoracoscopy with Biopsy; Surgeon:JIM EPSTEIN; Location: OR       FAMILY HISTORY:  Family History   Problem  "Relation Age of Onset     Cancer Mother      Lung--did not smoke     Cardiovascular Father      MI age 73     Lipids Sister      Hypertension Sister      Cancer Sister      Ovarian- at age 65       SOCIAL HISTORY:  Social History     Social History     Marital status:      Spouse name: N/A     Number of children: N/A     Years of education: N/A     Occupational History      Retired     Social History Main Topics     Smoking status: Former Smoker     Types: Cigarettes     Quit date: 1975     Smokeless tobacco: Former User      Comment: started smoking at 21 years of age     Alcohol use 0.0 oz/week     0 Standard drinks or equivalent per week      Comment: every other day     Drug use: No     Sexual activity: Yes     Partners: Female     Other Topics Concern     Parent/Sibling W/ Cabg, Mi Or Angioplasty Before 65f 55m? No     Special Diet No     Exercise Yes     active     Social History Narrative       Review of Systems:  Skin:  Negative       Eyes:  Positive for glasses    ENT:  Negative      Respiratory:  Positive for dyspnea on exertion     Cardiovascular:    Positive for;lightheadedness    Gastroenterology: Negative      Genitourinary:  not assessed      Musculoskeletal:  Positive for joint stiffness;back pain    Neurologic:  Positive for numbness or tingling of feet    Psychiatric:  Negative      Heme/Lymph/Imm:  Positive for allergies    Endocrine:  Negative        Physical Exam:  Vitals: /79  Pulse 73  Ht 1.702 m (5' 7\")  Wt 87.1 kg (192 lb)  BMI 30.07 kg/m2    Constitutional:  cooperative;in no acute distress        Skin:  warm and dry to the touch;no apparent skin lesions or masses noted          Head:  normocephalic        Eyes:           Lymph:      ENT:  no pallor or cyanosis        Neck:  carotid pulses are full and equal bilaterally;JVP normal;no carotid bruit        Respiratory:  clear to auscultation         Cardiac: no murmurs, gallops or rubs detected irregularly irregular " rhythm                  2+         1+   2+         1+            GI:  abdomen soft;no masses;non-tender        Extremities and Muscular Skeletal:  no edema              Neurological:  no gross motor deficits        Psych:           CC  No referring provider defined for this encounter.                Thank you for allowing me to participate in the care of your patient.      Sincerely,     Robinson Robbins MD, MD     [facility]    cc:   No referring provider defined for this encounter.

## 2018-09-12 NOTE — PROGRESS NOTES
Service Date: 09/12/2018      REASON FOR VISIT:  Followup for peripheral arterial disease and coronary artery disease.      HISTORY OF PRESENT ILLNESS:  I had the pleasure of seeing Dangelo Hernández at the [facility] in [place] this morning.  He is a very pleasant 83-year-old gentleman with known coronary artery disease, status post prior PCI to the LAD in 2010, mantle cell non-Hodgkin's lymphoma status post radiation, mild to moderate bilateral carotid artery stenosis and peripheral arterial disease with chronic right lower extremity claudication.      I last saw him over a year and a half ago.  At that time he complained of persistent right lower extremity claudication which was not lifestyle limiting.  Since then, his symptoms have progressed.  The claudication appears to be interfering with his quality of life at this point.  He can walk perhaps a block before he has to stop because of calf tightness.  He denies any resting symptoms.      Additionally, he has developed progressive dyspnea on exertion over the past year.  He gets short of breath if he climbs a flight of stairs.  He denies chest pain, palpitations, presyncope or syncope.      IMPRESSION, REPORT AND PLAN:   1.  Coronary artery disease, status post prior PCI.   2.  Progressive dyspnea on exertion.   3.  Peripheral arterial disease with lifestyle-limiting right lower extremity claudication.   4.  Fatigue.      He unfortunately has developed progressive right lower extremity claudication.  The symptoms have progressed over the last year and a half and appear to be now interfering with his quality of life.  I recommended that we do an PANTERA with exercise to assess the severity of his arterial insufficiency.  If the PANTERA is severely abnormal, then we certainly could consider an angiogram with an eye towards endovascular revascularization, which he is favoring at this point.      Additionally, the patient's dyspnea has progressed  over the last year.  This is quite concerning for him.  He has known coronary artery disease and has had previous LAD stenting.  Therefore, we need to rule out ischemia as a potential cause of his worsening dyspnea.  We will do a nuclear stress test to rule out underlying ischemia.      I will discuss the results of the aforementioned studies when they become available.      It was a pleasure seeing Mr. Hernández in the clinic this morning.  I appreciate the opportunity to be part of this patient's care.         NOBLE SAMUEL MD             D: 2018   T: 2018   MT: HERMAN      Name:     ISAIAH HERNÁNDEZ   MRN:      [id number]        Account:      [id number]   :      1935           Service Date: 2018      Document: [id number]

## 2018-09-12 NOTE — LETTER
9/12/2018      Yferi Sotelo MD  [address]  [facility] [address]      RE: Dangelo Hernández       Dear Colleague,    I had the pleasure of seeing Dangelo Hernández in the [facility].    Service Date: 09/12/2018      REASON FOR VISIT:  Followup for peripheral arterial disease and coronary artery disease.      HISTORY OF PRESENT ILLNESS:  I had the pleasure of seeing Dangelo Hernández at the [facility] in [place] this morning.  He is a very pleasant 83-year-old gentleman with known coronary artery disease, status post prior PCI to the LAD in 2010, mantle cell non-Hodgkin's lymphoma status post radiation, mild to moderate bilateral carotid artery stenosis and peripheral arterial disease with chronic right lower extremity claudication.      I last saw him over a year and a half ago.  At that time he complained of persistent right lower extremity claudication which was not lifestyle limiting.  Since then, his symptoms have progressed.  The claudication appears to be interfering with his quality of life at this point.  He can walk perhaps a block before he has to stop because of calf tightness.  He denies any resting symptoms.      Additionally, he has developed progressive dyspnea on exertion over the past year.  He gets short of breath if he climbs a flight of stairs.  He denies chest pain, palpitations, presyncope or syncope.      IMPRESSION, REPORT AND PLAN:   1.  Coronary artery disease, status post prior PCI.   2.  Progressive dyspnea on exertion.   3.  Peripheral arterial disease with lifestyle-limiting right lower extremity claudication.   4.  Fatigue.      He unfortunately has developed progressive right lower extremity claudication.  The symptoms have progressed over the last year and a half and appear to be now interfering with his quality of life.  I recommended that we do an PANTERA with exercise to assess the severity of his arterial  insufficiency.  If the PANTERA is severely abnormal, then we certainly could consider an angiogram with an eye towards endovascular revascularization, which he is favoring at this point.      Additionally, the patient's dyspnea has progressed over the last year.  This is quite concerning for him.  He has known coronary artery disease and has had previous LAD stenting.  Therefore, we need to rule out ischemia as a potential cause of his worsening dyspnea.  We will do a nuclear stress test to rule out underlying ischemia.      I will discuss the results of the aforementioned studies when they become available.      It was a pleasure seeing Mr. Hernández in the clinic this morning.  I appreciate the opportunity to be part of this patient's care.         NOBLE SAMUEL MD             D: 2018   T: 2018   MT: HERMAN      Name:     ISAIAH HERNÁNDEZ   MRN:      [id number]        Account:      [id number]   :      1935           Service Date: 2018      Document: [id number]         Outpatient Encounter Prescriptions as of 2018   Medication Sig Dispense Refill     acetaminophen (TYLENOL) 325 MG tablet Take 325-650 mg by mouth every 6 hours as needed for mild pain or pain       ASPIRIN 81 MG PO TABS ONE DAILY  3     atorvastatin (LIPITOR) 80 MG tablet Take 1 tablet (80 mg) by mouth daily 90 tablet 3     azelastine (ASTELIN) 0.1 % spray SPRAY 1 SPRAY INTO BOTH NOSTRILS TWICE DAILY 3 Bottle 0     azelastine (ASTELIN) 0.1 % spray Spray 1 spray into both nostrils 2 times daily 3 Bottle 3     fluticasone (FLONASE) 50 MCG/ACT spray SHAKE LIQUID AND USE 2 SPRAYS IN EACH NOSTRIL DAILY 48 mL 1     fluticasone (FLONASE) 50 MCG/ACT spray Spray 2 sprays into both nostrils daily 48 g 03     Glucosamine-Chondroitin (OSTEO BI-FLEX REGULAR STRENGTH PO) Take 1 tablet by mouth daily       metoprolol (TOPROL-XL) 25 MG 24 hr tablet Take 1 tablet (25 mg) by mouth daily 90 tablet 2     MULTI VITAMIN MENS OR 1 tablet daily        Naproxen Sodium (ALEVE PO) Take 275 mg by mouth       tamsulosin (FLOMAX) 0.4 MG capsule Take 1 capsule (0.4 mg) by mouth daily 90 capsule 3     azithromycin (ZITHROMAX) 250 MG tablet 2 tabs today and 1 tab qday for 4 more days 40 tablet 0     gabapentin 8 % GEL Apply thinly 2-3 times per day. 30 g 3     No facility-administered encounter medications on file as of 9/12/2018.        Again, thank you for allowing me to participate in the care of your patient.      Sincerely,    Robinson Robbins MD, MD     [facility]

## 2018-09-12 NOTE — MR AVS SNAPSHOT
After Visit Summary   9/12/2018    Dangelo Hernández    MRN: [id number]           Patient Information     Date Of Birth          1935        Visit Information        Provider Department      9/12/2018 9:15 AM Robinson Robbins MD [facility]        Today's Diagnoses     Dyspnea on exertion    -  1    Hypertension goal BP (blood pressure) < 140/90        Combined hyperlipidemia        Other fatigue        Claudication, class III (H)           Follow-ups after your visit        Your next 10 appointments already scheduled     May 09, 2019 11:30 AM CDT   Masonic Lab Draw with  MASONIC LAB DRAW   [facility] Masonic Lab Draw ([facility])    [address]  [address]  [facility] [id number] [phone]            May 09, 2019 12:00 PM CDT   Return with Abida Lott MD   [facility] Blood and Marrow Transplant ([facility])    [address]  [address]  [facility] [id number] [phone]              Future tests that were ordered for you today     Open Future Orders        Priority Expected Expires Ordered    US Low Ext Arterial Dop Seg Pres w Ex (PANTERA with Exercise) Routine 9/19/2018 9/12/2019 9/12/2018    NM Lexiscan stress test (nuc card) Routine 9/19/2018 9/12/2019 9/12/2018            Who to contact     If you have questions or need follow up information about today's clinic visit or your schedule please contact [facility] directly at [phone].  Normal or non-critical lab and imaging results will be communicated to you by MyChart, letter or phone within 4 business days after the clinic has received the results. If you do not hear from us within 7 days, please contact the clinic through MyChart or phone. If you have a critical or abnormal lab result, we will notify you by phone as soon as possible.  Submit refill requests through [employer] or  "call your pharmacy and they will forward the refill request to us. Please allow 3 business days for your refill to be completed.          Additional Information About Your Visit        Care EveryWhere ID     This is your Care EveryWhere ID. This could be used by other organizations to access your [place] medical records  [id number]        Your Vitals Were     Pulse Height BMI (Body Mass Index)             73 1.702 m (5' 7\") 30.07 kg/m2          Blood Pressure from Last 3 Encounters:   09/12/18 136/79   08/10/18 121/66   08/07/18 143/65    Weight from Last 3 Encounters:   09/12/18 87.1 kg (192 lb)   06/15/18 88.5 kg (195 lb)   05/18/18 88.5 kg (195 lb)               Primary Care Provider Office Phone # Fax #    Yefri Mejia Sotelo [phone] [address] [facility] [address]        Equal Access to Services     PETRA GRAMAJO AH: Hadii aad ku hadasho Soomaali, waaxda luqadaha, qaybta kaalmada adeegyada, waxay victoriain haydietern mariana khwilliam kaminski . So [facility] [phone].    ATENCIÓN: Si habla español, tiene a mcneal disposición servicios gratuitos de asistencia lingüística. Chikisame al [phone].    We comply with applicable federal civil rights laws and [place] laws. We do not discriminate on the basis of race, color, national origin, age, disability, sex, sexual orientation, or gender identity.            Thank you!     Thank you for choosing [facility]  for your care. Our goal is always to provide you with excellent care. Hearing back from our patients is one way we can continue to improve our services. Please take a few minutes to complete the written survey that you may receive in the mail after your visit with us. Thank you!             Your Updated Medication List - Protect others around you: Learn how to safely use, store and throw away your medicines at www.disposemymeds.org.          This list is accurate as of 9/12/18 11:14 AM.  Always use " your most recent med list.                   Brand Name Dispense Instructions for use Diagnosis    ALEVE PO      Take 275 mg by mouth        aspirin 81 MG tablet      ONE DAILY    Routine general medical examination at a health care facility       atorvastatin 80 MG tablet    LIPITOR    90 tablet    Take 1 tablet (80 mg) by mouth daily    Coronary artery disease involving native coronary artery of native heart with angina pectoris (H)       * azelastine 0.1 % nasal spray    ASTELIN    3 Bottle    Spray 1 spray into both nostrils 2 times daily    Eczema, unspecified type       * azelastine 0.1 % nasal spray    ASTELIN    3 Bottle    SPRAY 1 SPRAY INTO BOTH NOSTRILS TWICE DAILY    Eczema, unspecified type       azithromycin 250 MG tablet    ZITHROMAX    40 tablet    2 tabs today and 1 tab qday for 4 more days    Pneumonia, organism unspecified(486)       * fluticasone 50 MCG/ACT spray    FLONASE    48 g    Spray 2 sprays into both nostrils daily    PND (post-nasal drip)       * fluticasone 50 MCG/ACT spray    FLONASE    48 mL    SHAKE LIQUID AND USE 2 SPRAYS IN EACH NOSTRIL DAILY    PND (post-nasal drip)       gabapentin 8 % Gel topical PLO cream     30 g    Apply thinly 2-3 times per day.    Lymphoma (H), Cryptogenic organising pneumonia, Candida glabrata infection       metoprolol succinate 25 MG 24 hr tablet    TOPROL-XL    90 tablet    Take 1 tablet (25 mg) by mouth daily    Coronary artery disease involving native coronary artery of native heart with angina pectoris (H)       MULTI VITAMIN MENS PO      1 tablet daily    Routine general medical examination at a health care facility       OSTEO BI-FLEX REGULAR STRENGTH PO      Take 1 tablet by mouth daily        tamsulosin 0.4 MG capsule    FLOMAX    90 capsule    Take 1 capsule (0.4 mg) by mouth daily    Benign prostatic hyperplasia with lower urinary tract symptoms, symptom details unspecified       TYLENOL 325 MG tablet   Generic drug:  acetaminophen      Take  325-650 mg by mouth every 6 hours as needed for mild pain or pain        * Notice:  This list has 4 medication(s) that are the same as other medications prescribed for you. Read the directions carefully, and ask your doctor or other care provider to review them with you.

## 2018-09-19 ENCOUNTER — HOSPITAL ENCOUNTER (OUTPATIENT)
Dept: NUCLEAR MEDICINE | Facility: CLINIC | Age: 83
Setting detail: NUCLEAR MEDICINE
Discharge: HOME | End: 2018-09-19
Attending: INTERNAL MEDICINE | Admitting: INTERNAL MEDICINE
Payer: MEDICARE

## 2018-09-19 DIAGNOSIS — R06.09 DYSPNEA ON EXERTION: ICD-10-CM

## 2018-09-19 PROCEDURE — 78452 HT MUSCLE IMAGE SPECT MULT: CPT | Mod: 26 | Performed by: INTERNAL MEDICINE

## 2018-09-19 PROCEDURE — A9502 TC99M TETROFOSMIN: HCPCS | Performed by: INTERNAL MEDICINE

## 2018-09-19 PROCEDURE — 78452 HT MUSCLE IMAGE SPECT MULT: CPT

## 2018-09-19 PROCEDURE — 93017 CV STRESS TEST TRACING ONLY: CPT

## 2018-09-19 PROCEDURE — 25000128 H RX IP 250 OP 636: Performed by: INTERNAL MEDICINE

## 2018-09-19 PROCEDURE — 93016 CV STRESS TEST SUPVJ ONLY: CPT | Performed by: INTERNAL MEDICINE

## 2018-09-19 PROCEDURE — 34300033 ZZH RX 343: Performed by: INTERNAL MEDICINE

## 2018-09-19 PROCEDURE — 93018 CV STRESS TEST I&R ONLY: CPT | Performed by: INTERNAL MEDICINE

## 2018-09-19 RX ORDER — REGADENOSON 0.08 MG/ML
0.4 INJECTION, SOLUTION INTRAVENOUS ONCE
Status: COMPLETED | OUTPATIENT
Start: 2018-09-19 | End: 2018-09-19

## 2018-09-19 RX ADMIN — TETROFOSMIN 10.1 MCI.: 1.38 INJECTION, POWDER, LYOPHILIZED, FOR SOLUTION INTRAVENOUS at 08:05

## 2018-09-19 RX ADMIN — TETROFOSMIN 30.2 MCI.: 1.38 INJECTION, POWDER, LYOPHILIZED, FOR SOLUTION INTRAVENOUS at 10:00

## 2018-09-19 RX ADMIN — REGADENOSON 0.4 MG: 0.08 INJECTION, SOLUTION INTRAVENOUS at 09:54

## 2018-09-20 ENCOUNTER — TELEPHONE (OUTPATIENT)
Dept: CARDIOLOGY | Facility: CLINIC | Age: 83
End: 2018-09-20

## 2018-09-20 DIAGNOSIS — I65.23 BILATERAL CAROTID ARTERY STENOSIS: Primary | ICD-10-CM

## 2018-09-20 NOTE — TELEPHONE ENCOUNTER
Patient returned call and left VM advising he would like to review results of his NM lexiscan stress test. RN returned patient's call and left VM for patient to call clinic to discuss.

## 2018-09-20 NOTE — TELEPHONE ENCOUNTER
Notes Recorded by Robinson Robbins MD on 9/19/2018 at 1:29 PM  Moderate bilateral ICA stenosis. Repeat ultrasound in 1 yr    Notes Recorded by Robinson Robbins MD on 9/19/2018 at 2:09 PM  Normal ischemia on stress    Orders placed for repeat carotid US in 1 year. Contacted patient to review stress test and carotid US results as well as Dr. Robbins's comments. Patient did not answer. Left message for patient to call back.

## 2018-09-21 NOTE — TELEPHONE ENCOUNTER
Spoke with patient regarding results and Dr. Robbins's recommendations and comments. Patient verbalized understanding and agreed with plan of care. No further questions.

## 2018-09-25 ENCOUNTER — OFFICE VISIT (OUTPATIENT)
Dept: FAMILY MEDICINE | Facility: CLINIC | Age: 83
End: 2018-09-25
Payer: MEDICARE

## 2018-09-25 VITALS
OXYGEN SATURATION: 94 % | RESPIRATION RATE: 18 BRPM | HEART RATE: 87 BPM | DIASTOLIC BLOOD PRESSURE: 68 MMHG | HEIGHT: 67 IN | BODY MASS INDEX: 29.98 KG/M2 | WEIGHT: 191 LBS | TEMPERATURE: 98.6 F | SYSTOLIC BLOOD PRESSURE: 128 MMHG

## 2018-09-25 DIAGNOSIS — L30.9 ECZEMA, UNSPECIFIED TYPE: ICD-10-CM

## 2018-09-25 DIAGNOSIS — Z23 NEED FOR PROPHYLACTIC VACCINATION AND INOCULATION AGAINST INFLUENZA: Primary | ICD-10-CM

## 2018-09-25 DIAGNOSIS — N40.1 BENIGN PROSTATIC HYPERPLASIA WITH LOWER URINARY TRACT SYMPTOMS, SYMPTOM DETAILS UNSPECIFIED: ICD-10-CM

## 2018-09-25 DIAGNOSIS — I25.119 CORONARY ARTERY DISEASE INVOLVING NATIVE CORONARY ARTERY OF NATIVE HEART WITH ANGINA PECTORIS (H): ICD-10-CM

## 2018-09-25 DIAGNOSIS — J18.9 PNEUMONIA, ORGANISM UNSPECIFIED(486): ICD-10-CM

## 2018-09-25 PROCEDURE — 99213 OFFICE O/P EST LOW 20 MIN: CPT | Mod: 25 | Performed by: FAMILY MEDICINE

## 2018-09-25 PROCEDURE — 90662 IIV NO PRSV INCREASED AG IM: CPT | Performed by: FAMILY MEDICINE

## 2018-09-25 PROCEDURE — G0008 ADMIN INFLUENZA VIRUS VAC: HCPCS | Performed by: FAMILY MEDICINE

## 2018-09-25 RX ORDER — AZITHROMYCIN 250 MG/1
TABLET, FILM COATED ORAL
Qty: 24 TABLET | Refills: 0 | Status: SHIPPED | OUTPATIENT
Start: 2018-09-25 | End: 2019-09-10

## 2018-09-25 RX ORDER — TAMSULOSIN HYDROCHLORIDE 0.4 MG/1
0.4 CAPSULE ORAL DAILY
Qty: 90 CAPSULE | Refills: 3 | Status: SHIPPED | OUTPATIENT
Start: 2018-09-25 | End: 2019-09-10

## 2018-09-25 RX ORDER — ATORVASTATIN CALCIUM 80 MG/1
80 TABLET, FILM COATED ORAL DAILY
Qty: 90 TABLET | Refills: 3 | Status: SHIPPED | OUTPATIENT
Start: 2018-09-25 | End: 2019-09-10

## 2018-09-25 RX ORDER — AZELASTINE 1 MG/ML
1 SPRAY, METERED NASAL 2 TIMES DAILY
Qty: 3 BOTTLE | Refills: 3 | Status: SHIPPED | OUTPATIENT
Start: 2018-09-25 | End: 2020-09-11

## 2018-09-25 RX ORDER — METOPROLOL SUCCINATE 25 MG/1
25 TABLET, EXTENDED RELEASE ORAL DAILY
Qty: 90 TABLET | Refills: 3 | Status: SHIPPED | OUTPATIENT
Start: 2018-09-25 | End: 2019-09-10

## 2018-09-25 ASSESSMENT — PAIN SCALES - GENERAL: PAINLEVEL: NO PAIN (0)

## 2018-09-25 NOTE — MR AVS SNAPSHOT
After Visit Summary   9/25/2018    Dangelo Hernández    MRN: [id number]           Patient Information     Date Of Birth          1935        Visit Information        Provider Department      9/25/2018 9:20 AM Dony Acuña MD [facility]        Today's Diagnoses     Need for prophylactic vaccination and inoculation against influenza    -  1    Coronary artery disease involving native coronary artery of native heart with angina pectoris (H)        Eczema, unspecified type        Benign prostatic hyperplasia with lower urinary tract symptoms, symptom details unspecified        PNEUMONIA, ORGANISM NOS           Follow-ups after your visit        Your next 10 appointments already scheduled     Oct 04, 2018  3:00 PM CDT   US LOWER EXTREMITY ARTERIAL DOPPLER SEG PRESSURES W EXER BILAT with WYECHVUS   [facility] Echocardiography ([facility])    [address] [address] [phone]           How do I prepare for my exam? (Food and drink instructions) No Food and Drink Restrictions.  How do I prepare for my exam? (Other instructions) You do not need to do anything special to prepare for your exam.  What should I wear: Wear comfortable clothes.  How long does the exam take: Most ultrasounds take 30 to 60 minutes.  What should I bring: Bring a list of your medicines, including vitamins, minerals and over-the-counter drugs. It is safest to leave personal items at home.  Do I need a :  No  is needed.  What do I need to tell my doctor: Tell your doctor about any allergies you may have.  What should I do after the exam: No restrictions, You may resume normal activities.  What is this test: An ultrasound uses sound waves to make pictures of the body. Sound waves do not cause pain. The only discomfort may be the pressure of the wand against your skin or full bladder.  Who should I call with questions: If you have any questions, please  "call the Imaging Department where you will have your exam. Directions, parking instructions, and other information is available on our website, [email or web address].            May 09, 2019 11:30 AM CDT   Masonic Lab Draw with  MASONIC LAB DRAW   [facility] Masonic Lab Draw ([facility])    [address]  [address]  [facility] [id number] [phone]            May 09, 2019 12:00 PM CDT   Return with Abida Lott MD   [facility] Blood and Marrow Transplant ([facility])    [address]  [address]  [facility] [id number] [phone]              Who to contact     If you have questions or need follow up information about today's clinic visit or your schedule please contact [facility] directly at [phone].  Normal or non-critical lab and imaging results will be communicated to you by MyChart, letter or phone within 4 business days after the clinic has received the results. If you do not hear from us within 7 days, please contact the clinic through MyChart or phone. If you have a critical or abnormal lab result, we will notify you by phone as soon as possible.  Submit refill requests through [employer] or call your pharmacy and they will forward the refill request to us. Please allow 3 business days for your refill to be completed.          Additional Information About Your Visit        Care EveryWhere ID     This is your Care EveryWhere ID. This could be used by other organizations to access your [place] medical records  [id number]        Your Vitals Were     Pulse Temperature Respirations Height Pulse Oximetry BMI (Body Mass Index)    87 98.6  F (37  C) 18 5' 7\" (1.702 m) 94% 29.91 kg/m2       Blood Pressure from Last 3 Encounters:   09/25/18 128/68   09/12/18 136/79   08/10/18 121/66    Weight from Last 3 Encounters:   09/25/18 191 lb (86.6 kg)   09/12/18 192 lb (87.1 kg)   06/15/18 195 lb (88.5 kg)              We " Performed the Following     ADMIN INFLUENZA (For MEDICARE Patients ONLY) []     FLU VACCINE, INCREASED ANTIGEN, PRESV FREE, AGE 65+ [95901]          Today's Medication Changes          These changes are accurate as of 9/25/18 10:11 AM.  If you have any questions, ask your nurse or doctor.               These medicines have changed or have updated prescriptions.        Dose/Directions    azelastine 0.1 % nasal spray   Commonly known as:  ASTELIN   This may have changed:  Another medication with the same name was removed. Continue taking this medication, and follow the directions you see here.   Used for:  Eczema, unspecified type   Changed by:  Dony Acuña MD        Dose:  1 spray   Spray 1 spray into both nostrils 2 times daily   Quantity:  3 Bottle   Refills:  3         Stop taking these medicines if you haven't already. Please contact your care team if you have questions.     fluticasone 50 MCG/ACT spray   Commonly known as:  FLONASE   Stopped by:  Dony Acuña MD                Where to get your medicines      [facility] [address] [place] [address] AT [facility] [address] [address]     Phone:  [phone]     atorvastatin 80 MG tablet    azelastine 0.1 % nasal spray    azithromycin 250 MG tablet    metoprolol succinate 25 MG 24 hr tablet    tamsulosin 0.4 MG capsule                Primary Care Provider Office Phone # Fax #    Yefri Mejia Sotelo [phone] [address] [facility] [address]        Equal Access to Services     [facility] AH: Hadii aad ku hadasho Soomaali, waaxda luqadaha, qaybta kaalmada adeegyada, waxay victoriain haycharli lunsford. So [facility] [phone].    ATENCIÓN: Si habla español, tiene a mcneal disposición servicios gratuitos de asistencia lingüística. Llame al [phone].    We comply with applicable federal civil rights laws and [place] laws.  We do not discriminate on the basis of race, color, national origin, age, disability, sex, sexual orientation, or gender identity.            Thank you!     Thank you for choosing [facility]  for your care. Our goal is always to provide you with excellent care. Hearing back from our patients is one way we can continue to improve our services. Please take a few minutes to complete the written survey that you may receive in the mail after your visit with us. Thank you!             Your Updated Medication List - Protect others around you: Learn how to safely use, store and throw away your medicines at www.disposemymeds.org.          This list is accurate as of 9/25/18 10:11 AM.  Always use your most recent med list.                   Brand Name Dispense Instructions for use Diagnosis    ALEVE PO      Take 275 mg by mouth        aspirin 81 MG tablet      ONE DAILY    Routine general medical examination at a health care facility       atorvastatin 80 MG tablet    LIPITOR    90 tablet    Take 1 tablet (80 mg) by mouth daily    Coronary artery disease involving native coronary artery of native heart with angina pectoris (H)       azelastine 0.1 % nasal spray    ASTELIN    3 Bottle    Spray 1 spray into both nostrils 2 times daily    Eczema, unspecified type       azithromycin 250 MG tablet    ZITHROMAX    24 tablet    2 tabs today and 1 tab qday for 4 more days    Pneumonia, organism unspecified(486)       gabapentin 8 % Gel topical PLO cream     30 g    Apply thinly 2-3 times per day.    Lymphoma (H), Cryptogenic organising pneumonia, Candida glabrata infection       metoprolol succinate 25 MG 24 hr tablet    TOPROL-XL    90 tablet    Take 1 tablet (25 mg) by mouth daily    Coronary artery disease involving native coronary artery of native heart with angina pectoris (H)       MULTI VITAMIN MENS PO      1 tablet daily    Routine general medical examination at a health care facility       OSTEO BI-FLEX  REGULAR STRENGTH PO      Take 1 tablet by mouth daily        tamsulosin 0.4 MG capsule    FLOMAX    90 capsule    Take 1 capsule (0.4 mg) by mouth daily    Benign prostatic hyperplasia with lower urinary tract symptoms, symptom details unspecified       TYLENOL 325 MG tablet   Generic drug:  acetaminophen      Take 325-650 mg by mouth every 6 hours as needed for mild pain or pain

## 2018-09-25 NOTE — PROGRESS NOTES
"  SUBJECTIVE:   Dangelo Hernández is a 83 year old male who presents to clinic today for the following health issues:      Hyperlipidemia Follow-Up      Rate your low fat/cholesterol diet?: good    Taking statin?  No    Other lipid medications/supplements?:  none    Hypertension Follow-up      Outpatient blood pressures are not being checked.    Low Salt Diet: not monitoring salt      Amount of exercise or physical activity: None    Problems taking medications regularly: No    Medication side effects: none    Diet: regular (no restrictions)            Problem list and histories reviewed & adjusted, as indicated.  Additional history:         Reviewed and updated as needed this visit by clinical staff  Tobacco  Allergies  Meds       Reviewed and updated as needed this visit by Provider      Further history obtained, clarified or corrected by physician:  He is going to [place] for the winter and he wants refills of his prescriptions.  He has always been given Zithromax in the past that he can use while he is down there if he gets respiratory illness.    OBJECTIVE:  /68  Pulse 87  Temp 98.6  F (37  C)  Resp 18  Ht 5' 7\" (1.702 m)  Wt 191 lb (86.6 kg)  SpO2 94%  BMI 29.91 kg/m2  LUNGS: clear to auscultation, normal breath sounds  CV: RRR without murmur  ABD: BS+, soft, nontender, no masses, no hepatosplenomegaly  EXTREMITIES: without joint tenderness, swelling or erythema.  No muscle tenderness or abnormality.  SKIN: No rashes or abnormalities  NEURO:non focal exam    ASSESSMENT:     Coronary artery disease involving native coronary artery of native heart with angina pectoris (H)  Eczema, unspecified type  Benign prostatic hyperplasia with lower urinary tract symptoms, symptom details unspecified  Pneumonia, organism unspecified(486)  Need for prophylactic vaccination and inoculation against influenza    PLAN:  Orders Placed This Encounter     FLU VACCINE, INCREASED ANTIGEN, PRESV FREE, AGE 65+ [95715] "     ADMIN INFLUENZA (For MEDICARE Patients ONLY) []     atorvastatin (LIPITOR) 80 MG tablet     azelastine (ASTELIN) 0.1 % nasal spray     metoprolol succinate (TOPROL-XL) 25 MG 24 hr tablet     tamsulosin (FLOMAX) 0.4 MG capsule     azithromycin (ZITHROMAX) 250 MG tablet           Injectable Influenza Immunization Documentation    1.  Is the person to be vaccinated sick today?   No    2. Does the person to be vaccinated have an allergy to a component   of the vaccine?   No  Egg Allergy Algorithm Link    3. Has the person to be vaccinated ever had a serious reaction   to influenza vaccine in the past?   No    4. Has the person to be vaccinated ever had Guillain-Barré syndrome?   No    Form completed by Yani Segura CMA

## 2019-01-24 ENCOUNTER — TELEPHONE (OUTPATIENT)
Dept: FAMILY MEDICINE | Facility: CLINIC | Age: 84
End: 2019-01-24

## 2019-01-24 NOTE — TELEPHONE ENCOUNTER
Reason for Call:  Other prescription    Detailed comments: Pt is now in [place], and wanting to fill remainder of the Zithromax that was prescribed on  18, pharmacy has called and wanting to know if that is possible is was written for 24 tabs, pt at that time got 6 tables please advise.   Disp Refills Start End JJ   azithromycin (ZITHROMAX) 250 MG tablet 24 tablet 0 2018  No   Si tabs today and 1 tab qday for 4 more days   Sent to pharmacy as: azithromycin (ZITHROMAX) 250 MG tablet   Class: E-Prescribe   Order: [id number]   E-Prescribing Status: Receipt confirmed by pharmacy (2018  9:44 AM CDT)     Rosalia manuel [facility] in [place], [phone]   Call taken on 2019 at 1:10 PM by Natalie Mcgarry

## 2019-05-09 ENCOUNTER — ONCOLOGY VISIT (OUTPATIENT)
Dept: TRANSPLANT | Facility: CLINIC | Age: 84
End: 2019-05-09
Attending: INTERNAL MEDICINE
Payer: MEDICARE

## 2019-05-09 VITALS
SYSTOLIC BLOOD PRESSURE: 123 MMHG | BODY MASS INDEX: 30.56 KG/M2 | WEIGHT: 195.1 LBS | OXYGEN SATURATION: 97 % | DIASTOLIC BLOOD PRESSURE: 69 MMHG | TEMPERATURE: 97.5 F | HEART RATE: 98 BPM

## 2019-05-09 DIAGNOSIS — Z00.00 HEALTHCARE MAINTENANCE: ICD-10-CM

## 2019-05-09 DIAGNOSIS — C83.10 MANTLE CELL LYMPHOMA, UNSPECIFIED BODY REGION (H): ICD-10-CM

## 2019-05-09 DIAGNOSIS — Z00.00 HEALTHCARE MAINTENANCE: Primary | ICD-10-CM

## 2019-05-09 DIAGNOSIS — R06.02 SOB (SHORTNESS OF BREATH): ICD-10-CM

## 2019-05-09 DIAGNOSIS — J84.89 ORGANIZING PNEUMONIA (H): ICD-10-CM

## 2019-05-09 DIAGNOSIS — J84.89 ORGANIZING PNEUMONIA (H): Primary | ICD-10-CM

## 2019-05-09 DIAGNOSIS — R53.83 FATIGUE, UNSPECIFIED TYPE: ICD-10-CM

## 2019-05-09 LAB
ALBUMIN SERPL-MCNC: 3.9 G/DL (ref 3.4–5)
ALP SERPL-CCNC: 86 U/L (ref 40–150)
ALT SERPL W P-5'-P-CCNC: 37 U/L (ref 0–70)
ANION GAP SERPL CALCULATED.3IONS-SCNC: 8 MMOL/L (ref 3–14)
AST SERPL W P-5'-P-CCNC: 29 U/L (ref 0–45)
BASOPHILS # BLD AUTO: 0 10E9/L (ref 0–0.2)
BASOPHILS NFR BLD AUTO: 0.6 %
BILIRUB SERPL-MCNC: 0.6 MG/DL (ref 0.2–1.3)
BUN SERPL-MCNC: 15 MG/DL (ref 7–30)
CALCIUM SERPL-MCNC: 9.5 MG/DL (ref 8.5–10.1)
CHLORIDE SERPL-SCNC: 108 MMOL/L (ref 94–109)
CHOLEST SERPL-MCNC: 170 MG/DL
CO2 SERPL-SCNC: 24 MMOL/L (ref 20–32)
CREAT SERPL-MCNC: 1.04 MG/DL (ref 0.66–1.25)
DIFFERENTIAL METHOD BLD: NORMAL
EOSINOPHIL # BLD AUTO: 0.2 10E9/L (ref 0–0.7)
EOSINOPHIL NFR BLD AUTO: 3.5 %
ERYTHROCYTE [DISTWIDTH] IN BLOOD BY AUTOMATED COUNT: 13.2 % (ref 10–15)
GFR SERPL CREATININE-BSD FRML MDRD: 67 ML/MIN/{1.73_M2}
GLUCOSE SERPL-MCNC: 95 MG/DL (ref 70–99)
HCT VFR BLD AUTO: 42.3 % (ref 40–53)
HDLC SERPL-MCNC: 50 MG/DL
HGB BLD-MCNC: 14 G/DL (ref 13.3–17.7)
IMM GRANULOCYTES # BLD: 0 10E9/L (ref 0–0.4)
IMM GRANULOCYTES NFR BLD: 0.2 %
LDLC SERPL CALC-MCNC: 82 MG/DL
LYMPHOCYTES # BLD AUTO: 1.5 10E9/L (ref 0.8–5.3)
LYMPHOCYTES NFR BLD AUTO: 23.2 %
MCH RBC QN AUTO: 31.5 PG (ref 26.5–33)
MCHC RBC AUTO-ENTMCNC: 33.1 G/DL (ref 31.5–36.5)
MCV RBC AUTO: 95 FL (ref 78–100)
MONOCYTES # BLD AUTO: 0.5 10E9/L (ref 0–1.3)
MONOCYTES NFR BLD AUTO: 7.3 %
NEUTROPHILS # BLD AUTO: 4.1 10E9/L (ref 1.6–8.3)
NEUTROPHILS NFR BLD AUTO: 65.2 %
NONHDLC SERPL-MCNC: 120 MG/DL
NRBC # BLD AUTO: 0 10*3/UL
NRBC BLD AUTO-RTO: 0 /100
PLATELET # BLD AUTO: 221 10E9/L (ref 150–450)
POTASSIUM SERPL-SCNC: 4 MMOL/L (ref 3.4–5.3)
PROT SERPL-MCNC: 7.3 G/DL (ref 6.8–8.8)
RBC # BLD AUTO: 4.45 10E12/L (ref 4.4–5.9)
SODIUM SERPL-SCNC: 140 MMOL/L (ref 133–144)
TRIGL SERPL-MCNC: 194 MG/DL
TSH SERPL DL<=0.005 MIU/L-ACNC: 2.8 MU/L (ref 0.4–4)
WBC # BLD AUTO: 6.3 10E9/L (ref 4–11)

## 2019-05-09 PROCEDURE — G0463 HOSPITAL OUTPT CLINIC VISIT: HCPCS | Mod: ZF

## 2019-05-09 PROCEDURE — 36415 COLL VENOUS BLD VENIPUNCTURE: CPT

## 2019-05-09 PROCEDURE — 84443 ASSAY THYROID STIM HORMONE: CPT | Performed by: INTERNAL MEDICINE

## 2019-05-09 PROCEDURE — 80053 COMPREHEN METABOLIC PANEL: CPT | Performed by: INTERNAL MEDICINE

## 2019-05-09 PROCEDURE — 82784 ASSAY IGA/IGD/IGG/IGM EACH: CPT | Performed by: INTERNAL MEDICINE

## 2019-05-09 PROCEDURE — 85025 COMPLETE CBC W/AUTO DIFF WBC: CPT | Performed by: INTERNAL MEDICINE

## 2019-05-09 PROCEDURE — 80061 LIPID PANEL: CPT

## 2019-05-09 ASSESSMENT — PAIN SCALES - GENERAL: PAINLEVEL: NO PAIN (0)

## 2019-05-09 NOTE — PROGRESS NOTES
"BMT Visit  5/9/2019     Disease and Treatment History:   1. Mantle cell lymphoma, statusR-CHOP X 4 then status post autologous stem cell transplant in 10/2010 in ongoing CR  2.  Significant pulmonary issues:  --pulmonary infiltrates and BAL with Candida glabrata in 07/2011  - progressive infiltrates with VATS in 09/2011 that showed organizing pneumonia with negative tissue cultures, who was felt to have a cryptogenic organizing pneumonia.   - Treated with high-dose steroids from 10/2011 with tapering through 03/2012 with a good clinical and radiological response,  -- Subsequent relapse/progression 04/2012.  A repeat BAL on 04/19/2012 showed filamentous fungus that was not able to be speciated further, and he saw Pulmonary in 05/2012, and at that point was started back on Voriconazole on prednisone.   -- Long prednisone taper: CT imaging on 6/21/13 showed worsened pulmonary infiltrates and he underwent bronchoscopy which initially showed filamentous fungus but then grew out as Raquel Dubliniensis/Albicans. Treated with v-fend/fluconazole combination  -- Summer 2013: stress test that showed abnormalities and led to a cardiac catheterization with the placement of 3 stents.   -- Pulmonary evaluation August 2013 and CT scans showed some GGO and the question of pulmonary edema was raised given the recent cardiac issues. Thus, he was monitored without steroids  -- 10/2013 Pulmonary: Stable 9/2013 CT changes and observed due to no major symptoms    Current HPI: I last saw him a year ago. Since then he notes that he is \"running out of gas\" much more. A bit more coughing and sputum and more LEES. No chest pain, no fevers or chills, no chest pain, no nausea or vomiting, no diarrhea, no bleeding or bruising. Does have some joint aches and pains more in the hips and his lower back is still an issue. Wants to get back into see the spine doc he saw last year.     REVIEW OF SYSTEMS:  A 10-point review of systems is otherwise " negative.      PHYSICAL EXAMINATION:   /69 (BP Location: Right arm, Patient Position: Sitting, Cuff Size: Adult Regular)   Pulse 98   Temp 97.5  F (36.4  C) (Oral)   Wt 88.5 kg (195 lb 1.6 oz)   SpO2 97%   BMI 30.56 kg/m      GENERAL:  He looks quite well.  He is in no acute distress. KPS 90  HEENT:  No icterus.  OP clear and upper plates in  LYMPH:  He has no palpable cervical or supraclavicular lymphadenopathy.   LUNGS:  Generally clear.  Right lower lung with some rhonchi. No wheezing  CARDIAC EXAM:  Regular.   ABDOMEN:  Soft and nontender.  No HSM  EXTREMITIES: no edema, no inguinal DORIE     Labs:  Results for ISAIAH DELONG (MRN [id number]) as of 5/9/2019 11:37   Ref. Range 5/9/2019 10:29   Sodium Latest Ref Range: 133 - 144 mmol/L 140   Potassium Latest Ref Range: 3.4 - 5.3 mmol/L 4.0   Chloride Latest Ref Range: 94 - 109 mmol/L 108   Carbon Dioxide Latest Ref Range: 20 - 32 mmol/L 24   Urea Nitrogen Latest Ref Range: 7 - 30 mg/dL 15   Creatinine Latest Ref Range: 0.66 - 1.25 mg/dL 1.04   GFR Estimate Latest Ref Range: >60 mL/min/1.73_m2 67   GFR Estimate If Black Latest Ref Range: >60 mL/min/1.73_m2 77   Calcium Latest Ref Range: 8.5 - 10.1 mg/dL 9.5   Anion Gap Latest Ref Range: 3 - 14 mmol/L 8   Albumin Latest Ref Range: 3.4 - 5.0 g/dL 3.9   Protein Total Latest Ref Range: 6.8 - 8.8 g/dL 7.3   Bilirubin Total Latest Ref Range: 0.2 - 1.3 mg/dL 0.6   Alkaline Phosphatase Latest Ref Range: 40 - 150 U/L 86   ALT Latest Ref Range: 0 - 70 U/L 37   AST Latest Ref Range: 0 - 45 U/L 29   TSH Latest Ref Range: 0.40 - 4.00 mU/L 2.80   Glucose Latest Ref Range: 70 - 99 mg/dL 95   WBC Latest Ref Range: 4.0 - 11.0 10e9/L 6.3   Hemoglobin Latest Ref Range: 13.3 - 17.7 g/dL 14.0   Hematocrit Latest Ref Range: 40.0 - 53.0 % 42.3   Platelet Count Latest Ref Range: 150 - 450 10e9/L 221   RBC Count Latest Ref Range: 4.4 - 5.9 10e12/L 4.45   MCV Latest Ref Range: 78 - 100 fl 95   MCH Latest Ref Range: 26.5 -  33.0 pg 31.5   MCHC Latest Ref Range: 31.5 - 36.5 g/dL 33.1   RDW Latest Ref Range: 10.0 - 15.0 % 13.2   Diff Method Unknown Automated Method   % Neutrophils Latest Units: % 65.2   % Lymphocytes Latest Units: % 23.2   % Monocytes Latest Units: % 7.3   % Eosinophils Latest Units: % 3.5   % Basophils Latest Units: % 0.6   % Immature Granulocytes Latest Units: % 0.2   Nucleated RBCs Latest Ref Range: 0 /100 0   Absolute Neutrophil Latest Ref Range: 1.6 - 8.3 10e9/L 4.1   Absolute Lymphocytes Latest Ref Range: 0.8 - 5.3 10e9/L 1.5   Absolute Monocytes Latest Ref Range: 0.0 - 1.3 10e9/L 0.5   Absolute Eosinophils Latest Ref Range: 0.0 - 0.7 10e9/L 0.2   Absolute Basophils Latest Ref Range: 0.0 - 0.2 10e9/L 0.0   Abs Immature Granulocytes Latest Ref Range: 0 - 0.4 10e9/L 0.0   Absolute Nucleated RBC Unknown 0.0          ASSESSMENT AND PLAN:  Mr. Hernández is a very pleasant 82-year-old gentleman with mantle cell lymphoma in ongoing remission, now nearing 9 years out from an autologous stem cell transplant.     1.  Mantle cell lymphoma.  He remains in an ongoing complete remission by clinical evaluation. Continue visits at 1 year. As he initially presented with elevated WBC with circulating mantle cells I think that clinical exam and CBC is adequate for following this far out from his initial transplant and deferring imaging.     2.  Pulmonary.  Cryptogenic organizing pneumonia currently not being treated. Last PFTs in 2013 and hasn't seen pulmonary for a few years. Seems more short winded and some lung changes on exam so want him to re-establish with pulmonary. Ordered PFTs and pulmonary consult    3.  Infectious disease.  No active infections. Encouraged him to get his flu shot in the fall  -- s/p 2 year vaccines 4/2013 and got MMR in 9/2013. Did get shingles shot in 2014 as well    4.  Cardiology.  current metoprolol, lipid lower drug. Repeat lipids today    5.  Hematology.  His counts are stable.     6. Renal: Normal  function    7. Ortho: Ongoing back pain: Encouraged him to go see the doc he saw last year about another injection which he thought helped some       FINAL PLAN:    Pulmonary referral and PFTs within next 2-4 weeks     bob bmt in 1 year with labs    Abida Lott

## 2019-05-09 NOTE — NURSING NOTE
"Oncology Rooming Note    May 9, 2019 11:27 AM   Dangelo Hernández is a 83 year old male who presents for:    Chief Complaint   Patient presents with     RECHECK     RTN-Mantle cell lymphoma     Initial Vitals: There were no vitals taken for this visit. Estimated body mass index is 30.56 kg/m  as calculated from the following:    Height as of 9/25/18: 1.702 m (5' 7\").    Weight as of an earlier encounter on 5/9/19: 88.5 kg (195 lb 1.6 oz). There is no height or weight on file to calculate BSA.  Data Unavailable Comment: Data Unavailable   No LMP for male patient.  Allergies reviewed: Yes  Medications reviewed: Yes    Medications: Medication refills not needed today.  Pharmacy name entered into [facility]:    [facility] - [place], [place] - [address] [id number] - [place], [place] - [address] [facility] & [address] [facility] ST[facility] [id number] - [place], [place] - [facility] [address] [facility]    Clinical concerns: Referral to spine doctor that saw last year.    Ivon Cain [facility]              "

## 2019-05-10 LAB — IGG SERPL-MCNC: 475 MG/DL (ref 695–1620)

## 2019-05-16 ENCOUNTER — ANCILLARY PROCEDURE (OUTPATIENT)
Dept: GENERAL RADIOLOGY | Facility: CLINIC | Age: 84
End: 2019-05-16
Attending: INTERNAL MEDICINE
Payer: MEDICARE

## 2019-05-16 DIAGNOSIS — R06.02 SOB (SHORTNESS OF BREATH): ICD-10-CM

## 2019-05-16 DIAGNOSIS — J84.89 ORGANIZING PNEUMONIA (H): ICD-10-CM

## 2019-05-19 LAB
DLCOCOR-%PRED-PRE: 52 %
DLCOCOR-PRE: 11.49 ML/MIN/MMHG
DLCOUNC-%PRED-PRE: 51 %
DLCOUNC-PRE: 11.29 ML/MIN/MMHG
DLCOUNC-PRED: 22.07 ML/MIN/MMHG
ERV-%PRED-PRE: 105 %
ERV-PRE: 0.58 L
ERV-PRED: 0.55 L
EXPTIME-PRE: 7.34 SEC
FEF2575-%PRED-PRE: 86 %
FEF2575-PRE: 1.52 L/SEC
FEF2575-PRED: 1.76 L/SEC
FEFMAX-%PRED-PRE: 96 %
FEFMAX-PRE: 6.23 L/SEC
FEFMAX-PRED: 6.45 L/SEC
FEV1-%PRED-PRE: 80 %
FEV1-PRE: 1.93 L
FEV1FEV6-PRE: 77 %
FEV1FEV6-PRED: 76 %
FEV1FVC-PRE: 76 %
FEV1FVC-PRED: 76 %
FEV1SVC-PRE: 73 %
FEV1SVC-PRED: 60 %
FIFMAX-PRE: 3.66 L/SEC
FRCPLETH-%PRED-PRE: 56 %
FRCPLETH-PRE: 2.05 L
FRCPLETH-PRED: 3.62 L
FVC-%PRED-PRE: 79 %
FVC-PRE: 2.53 L
FVC-PRED: 3.2 L
IC-%PRED-PRE: 60 %
IC-PRE: 2.06 L
IC-PRED: 3.43 L
RVPLETH-%PRED-PRE: 52 %
RVPLETH-PRE: 1.47 L
RVPLETH-PRED: 2.78 L
TLCPLETH-%PRED-PRE: 63 %
TLCPLETH-PRE: 4.11 L
TLCPLETH-PRED: 6.52 L
VA-%PRED-PRE: 59 %
VA-PRE: 3.37 L
VC-%PRED-PRE: 66 %
VC-PRE: 2.64 L
VC-PRED: 3.98 L

## 2019-05-22 NOTE — TELEPHONE ENCOUNTER
RECORDS STATUS - ALL OTHER DIAGNOSIS      RECORDS RECEIVED FROM: [facility]   DATE RECEIVED: 5/22/19   NOTES STATUS DETAILS   OFFICE NOTE from referring provider Abida Fleming MD, last OV 5/9/19   OFFICE NOTE from medical oncologist Epic    DISCHARGE SUMMARY from hospital NA    DISCHARGE REPORT from the ER NA    [facility], 4/19/12,  7/14/11    Thoroscopy - 9/12/11   MEDICATION LIST [facility]    CLINICAL TRIAL TREATMENTS TO DATE     LABS     PATHOLOGY REPORTS [facility] 9/12/11   ANYTHING RELATED TO DIAGNOSIS Epic 5/9/19   GENONOMIC TESTING     TYPE:     IMAGING (NEED IMAGES & REPORT)     CT SCANS     MRI     MAMMO     ULTRASOUND     PET     XR PACS 5/16/19

## 2019-05-23 ENCOUNTER — OFFICE VISIT (OUTPATIENT)
Dept: PULMONOLOGY | Facility: CLINIC | Age: 84
End: 2019-05-23
Attending: INTERNAL MEDICINE
Payer: MEDICARE

## 2019-05-23 ENCOUNTER — PRE VISIT (OUTPATIENT)
Dept: PULMONOLOGY | Facility: CLINIC | Age: 84
End: 2019-05-23

## 2019-05-23 VITALS
WEIGHT: 196 LBS | DIASTOLIC BLOOD PRESSURE: 73 MMHG | BODY MASS INDEX: 30.7 KG/M2 | TEMPERATURE: 97.1 F | OXYGEN SATURATION: 95 % | SYSTOLIC BLOOD PRESSURE: 122 MMHG | HEART RATE: 88 BPM

## 2019-05-23 DIAGNOSIS — R06.02 SOB (SHORTNESS OF BREATH): Primary | ICD-10-CM

## 2019-05-23 PROCEDURE — G0463 HOSPITAL OUTPT CLINIC VISIT: HCPCS | Mod: ZF

## 2019-05-23 NOTE — NURSING NOTE
"Oncology Rooming Note    May 23, 2019 5:35 PM   Dangelo Hernández is a 83 year old male who presents for:    Chief Complaint   Patient presents with     Consult     New Eval- Lung nodule     Initial Vitals: There were no vitals taken for this visit. Estimated body mass index is 30.56 kg/m  as calculated from the following:    Height as of 9/25/18: 1.702 m (5' 7\").    Weight as of 5/9/19: 88.5 kg (195 lb 1.6 oz). There is no height or weight on file to calculate BSA.  Data Unavailable Comment: Data Unavailable   No LMP for male patient.  Allergies reviewed: Yes  Medications reviewed: Yes    Medications: Medication refills not needed today.  Pharmacy name entered into [facility]:    [facility] - [place], [place] - [address] [place] [address] DRUG STORE [id number] - [place], [place] - [address] [facility] AT [facility] & [address] [facility] [id number] - [place], [place] - [address] [facility] [place] [facility]    Clinical concerns:  none    Ivon Cain [facility]              "

## 2019-05-23 NOTE — PROGRESS NOTES
Reason for Visit  Dangelo Hernández is a 83 year old year old male who is being seen for Consult (New Eval- Lung nodule)    Pulmonary HPI  Mr. Hernández is a 82 yo gentleman with a past medical history significant for mantle cell lymphoma s/p autologous PBSCT in 10/10 who was subsequently noted to have C. Glabarata on bronchial washings for which he was treated with V-Fend. At the completion of therapy, he continued to be SOB and a subsequent VATS biopsy revealed , therapy with steroids was initiated for 6 months and a few months after completion of therapy his clinical and radiographic findings had relapsed and he was re-initiated on steroids and V-Fend for filamentous fungi on a BAL.    Last seen almost 4 years ago. Since his last visit he developed SOB for the past 1-1.5 years; since that time he has had a gradual worsening in his symptoms.  Now is only able to walk 1/2 a block at that time, can only walk one flight of stairs without stopping.  When he was last seen he could walk one mile and at least one flight of stairs.  Has a productive cough of thick clear secretions, no blood.  No fevers or chills.  No chest pain.  Denies any respiratory illnesses from the time he was last seen and 1-1.5 years ago- no pneumonia.  Since the return of his symptoms he has not seen anyone in clinic for his increased SOB.  Chest x-ray done on 5-16 showed increased bilateral fluffy infiltrates, PFT's with restrictive lung volumes and significant change compared to 2013..      The patient was seen and examined by Rajendra Flanagan           Current Outpatient Medications   Medication     acetaminophen (TYLENOL) 325 MG tablet     ASPIRIN 81 MG PO TABS     atorvastatin (LIPITOR) 80 MG tablet     azelastine (ASTELIN) 0.1 % nasal spray     azithromycin (ZITHROMAX) 250 MG tablet     fluticasone (FLONASE) 50 MCG/ACT nasal spray     Glucosamine-Chondroitin (OSTEO BI-FLEX REGULAR STRENGTH PO)     metoprolol succinate (TOPROL-XL) 25 MG  24 hr tablet     MULTI VITAMIN MENS OR     Naproxen Sodium (ALEVE PO)     tamsulosin (FLOMAX) 0.4 MG capsule     gabapentin 8 % GEL     No current facility-administered medications for this visit.      Allergies   Allergen Reactions     Amoxicillin Diarrhea            Lisinopril Cough     Past Medical History:   Diagnosis Date      Coronary artery disease, 4/ 2010, status post drug-eluting stent placement to the LAD and balloon angioplasty to the obtuse marginal.  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       Past Surgical History:   Procedure Laterality Date     BRONCHOSCOPY (RIGID OR FLEXIBLE), DIAGNOSTIC  7/14/2011    Procedure:COMBINED BRONCHOSCOPY (RIGID OR FLEXIBLE), LAVAGE; Surgeon:REYNA BAILEY; Location:[facility]     COLONOSCOPY       ENT SURGERY      tonsils     ORTHOPEDIC SURGERY      rt knee arthroscopy     SURGICAL HISTORY OF -       tonsils     SURGICAL HISTORY OF -   1/19/84    1.Exam under anesthesia, 2.Arthroscopy, 3.Arthroscopic medial meniscectomy, 4.arthroscopic trimming of patellar articular cartilage.     SURGICAL HISTORY OF -       vasectomy     SURGICAL HISTORY OF -   2/19/90    colonoscopy     SURGICAL HISTORY OF -   10/20/92    flexible sigmoidoscopy     SURGICAL HISTORY OF -   6/14/2000    colonoscopy and polypectomy     THORACOSCOPIC BIOPSY LUNG  9/12/2011    Procedure:THORACOSCOPIC BIOPSY LUNG; Video Assisted Right Thoracoscopy with Biopsy;  Surgeon:JIM EPSTEIN; Location: OR       Social History     Socioeconomic History     Marital status:      Spouse name: Not on file     Number of children: Not on file     Years of education: Not on file     Highest education level: Not on file   Occupational History     Employer: RETIRED   Social Needs     Financial resource strain: Not on file     Food insecurity:     Worry: Not on file     Inability: Not on file     Transportation needs:     Medical: Not on file     Non-medical: Not on file   Tobacco Use     Smoking status: Former Smoker     Types: Cigarettes     Last attempt to quit: 1975     Years since quittin.4     Smokeless tobacco: Former User     Tobacco comment: started smoking at 21 years of age   Substance and Sexual Activity     Alcohol use: Yes     Alcohol/week: 0.0 oz     Comment: every other day     Drug use: No     Sexual activity: Yes     Partners: Female   Lifestyle     Physical activity:     Days per week: Not on file     Minutes per session: Not on file     Stress: Not on file   Relationships     Social connections:     Talks on phone: Not on file     Gets together: Not on file     Attends Latter-day service: Not on file     Active member of club or organization: Not on file     Attends meetings of clubs or organizations: Not on file     Relationship status: Not on file     Intimate partner violence:     Fear of current or ex partner: Not on file     Emotionally abused: Not on file     Physically abused: Not on file     Forced sexual activity: Not on file   Other Topics Concern     Parent/sibling w/ CABG, MI or angioplasty before 65F 55M? No      Service Not Asked     Blood Transfusions Not Asked     Caffeine Concern Not Asked     Occupational Exposure Not Asked     Hobby Hazards Not Asked     Sleep Concern Not Asked     Stress Concern Not Asked     Weight Concern Not Asked     Special Diet No     Back Care Not Asked     Exercise Yes     Comment: active      Bike Helmet Not Asked     Seat Belt Not Asked     Self-Exams Not Asked   Social History Narrative     Not on file     FH:  Reviewed with patient in clinic.  No family history of pulmonary disease  ROS Pulmonary  A complete ROS was otherwise negative except as noted in the HPI.  /73 (BP Location: Right arm, Patient Position: Sitting, Cuff Size: Adult Regular)   Pulse 88   Temp 97.1  F (36.2  C) (Oral)   Wt 88.9 kg (196 lb)   SpO2 95%   BMI 30.70 kg/m    Exam:   GENERAL APPEARANCE: Well developed, well nourished, alert, and in no apparent distress.  EYES: PERRL, EOMI  HENT: Nasal mucosa with no edema and no hyperemia. No nasal polyps.  EARS: Canals clear, TMs normal  MOUTH: Oral mucosa is moist, without any lesions, no tonsillar enlargement, no oropharyngeal exudate.  NECK: supple, no masses, no thyromegaly.  LYMPHATICS: No significant axillary, cervical, or supraclavicular nodes.  RESP: Mildly decreaed air flow throughout.  No crackles. No rhonchi. No wheezes.  CV: Normal S1, S2, regular rhythm, normal rate. No murmur.  No rub. No gallop. No LE edema.   ABDOMEN:  Bowel sounds normal, soft, nontender, no HSM or masses.   MS: extremities normal. No clubbing. No cyanosis.  SKIN: no rash on limited exam  NEURO: Mentation intact, speech normal, normal strength and tone, normal gait and stance  PSYCH: mentation appears normal. and affect normal/bright  Results:  Full pulmonary function tests were personally reviewed in clinic  FVC 2.53 (79%), FEV-1 1.95 (80%), FEV-1/FVC 76%  TLC 63%, RV 52%, FRC 56%  cDLCO 52%  No airflow limitation.  Restrictive lung volumes. Decreased diffusion capacity.  Compared to 9-26-13 the FVC and FEV-1 are significantly decreased whereas the FEV-1/FVC is unchanged.  Lung volumes are significantly decreased.     No results found for this or any previous visit (from the past 168 hour(s)).    Assessment and plan:   83 year old male with a past medical history significant for mantle cell  lymphoma s/p autologous PBSCT, HTN, and relapsed .   Had remained off prednisone for over one year.  Also with previous history of fungal pneumonia.  Now with recurrence of symptoms for past 1-1.5 years and CXR changes with decrease in PFT's.  Overall concerning for recurrence of infection or , with duration of symptoms suspect this is a recurrence of the .  Needs further invasive work-up with imaging and bronchoscopy.    1. CT chest in am- will review tomorrow  2. Six minute walk test to assess for desaturation  3. Bronchoscopy with BAL to exclude infection  4. Will hold off on starting steroids at present; if cultures from bronchoscopy are negative will start on prednisone 40 or 60 mg/day  5. F/U CT chest at time of clinic follow-up    RTC in 6 weeks.  Patient to call with any questions or concerns prior to his next clinic visit

## 2019-05-23 NOTE — LETTER
5/23/2019       RE: Dangelo Hernández  [address] [facility] [address]     Dear Colleague,    Thank you for referring your patient, Dangelo Hernández, to the [facility] at [facility]. Please see a copy of my visit note below.    Reason for Visit  Dangelo Hernández is a 83 year old year old male who is being seen for Consult (New Eval- Lung nodule)    Pulmonary HPI  Mr. 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 At the completion of therapy, he continued to be SOB and a subsequent VATS biopsy revealed , therapy with steroids was initiated for 6 months and a few months after completion of therapy his clinical and radiographic findings had relapsed and he was re-initiated on steroids and V-Fend for filamentous fungi on a BAL.    Last seen almost 4 years ago. Since his last visit he developed SOB for the past 1-1.5 years; since that time he has had a gradual worsening in his symptoms.  Now is only able to walk 1/2 a block at that time, can only walk one flight of stairs without stopping.  When he was last seen he could walk one mile and at least one flight of stairs.  Has a productive cough of thick clear secretions, no blood.  No fevers or chills.  No chest pain.  Denies any respiratory illnesses from the time he was last seen and 1-1.5 years ago- no pneumonia.  Since the return of his symptoms he has not seen anyone in clinic for his increased SOB.  Chest x-ray done on 5-16 showed increased bilateral fluffy infiltrates, PFT's with restrictive lung volumes and significant change compared to 2013..      The patient was seen and examined by Rajendra Flanagan           Current Outpatient Medications   Medication     acetaminophen (TYLENOL) 325 MG tablet      ASPIRIN 81 MG PO TABS     atorvastatin (LIPITOR) 80 MG tablet     azelastine (ASTELIN) 0.1 % nasal spray     azithromycin (ZITHROMAX) 250 MG tablet     fluticasone (FLONASE) 50 MCG/ACT nasal spray     Glucosamine-Chondroitin (OSTEO BI-FLEX REGULAR STRENGTH PO)     metoprolol succinate (TOPROL-XL) 25 MG 24 hr tablet     MULTI VITAMIN MENS OR     Naproxen Sodium (ALEVE PO)     tamsulosin (FLOMAX) 0.4 MG capsule     gabapentin 8 % GEL     No current facility-administered medications for this visit.      Allergies   Allergen Reactions     Amoxicillin Diarrhea            Lisinopril Cough     Past Medical History:   Diagnosis Date      Coronary artery disease, 4/ 2010, status post drug-eluting stent placement to the LAD and balloon angioplasty to the obtuse marginal.  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       Past Surgical History:   Procedure Laterality Date     BRONCHOSCOPY (RIGID OR FLEXIBLE), DIAGNOSTIC  7/14/2011    Procedure:COMBINED BRONCHOSCOPY (RIGID OR FLEXIBLE), LAVAGE; Surgeon:REYNA BAILEY; Location:[facility]     COLONOSCOPY       ENT SURGERY      tonsils     ORTHOPEDIC SURGERY      rt knee arthroscopy     SURGICAL HISTORY OF -       tonsils     SURGICAL HISTORY OF -   1/19/84    1.Exam under anesthesia, 2.Arthroscopy, 3.Arthroscopic medial meniscectomy, 4.arthroscopic trimming of patellar articular cartilage.     SURGICAL HISTORY OF -       vasectomy     SURGICAL  HISTORY OF -   90    colonoscopy     SURGICAL HISTORY OF -   10/20/92    flexible sigmoidoscopy     SURGICAL HISTORY OF -   2000    colonoscopy and polypectomy     THORACOSCOPIC BIOPSY LUNG  2011    Procedure:THORACOSCOPIC BIOPSY LUNG; Video Assisted Right Thoracoscopy with Biopsy; Surgeon:JIM EPSTEIN; Location: OR       Social History     Socioeconomic History     Marital status:      Spouse name: Not on file     Number of children: Not on file     Years of education: Not on file     Highest education level: Not on file   Occupational History     Employer: RETIRED   Social Needs     Financial resource strain: Not on file     Food insecurity:     Worry: Not on file     Inability: Not on file     Transportation needs:     Medical: Not on file     Non-medical: Not on file   Tobacco Use     Smoking status: Former Smoker     Types: Cigarettes     Last attempt to quit: 1975     Years since quittin.4     Smokeless tobacco: Former User     Tobacco comment: started smoking at 21 years of age   Substance and Sexual Activity     Alcohol use: Yes     Alcohol/week: 0.0 oz     Comment: every other day     Drug use: No     Sexual activity: Yes     Partners: Female   Lifestyle     Physical activity:     Days per week: Not on file     Minutes per session: Not on file     Stress: Not on file   Relationships     Social connections:     Talks on phone: Not on file     Gets together: Not on file     Attends Sikhism service: Not on file     Active member of club or organization: Not on file     Attends meetings of clubs or organizations: Not on file     Relationship status: Not on file     Intimate partner violence:     Fear of current or ex partner: Not on file     Emotionally abused: Not on file     Physically abused: Not on file     Forced sexual activity: Not on file   Other Topics Concern     Parent/sibling w/ CABG, MI or angioplasty before 65F 55M? No      Service Not Asked      Blood Transfusions Not Asked     Caffeine Concern Not Asked     Occupational Exposure Not Asked     Hobby Hazards Not Asked     Sleep Concern Not Asked     Stress Concern Not Asked     Weight Concern Not Asked     Special Diet No     Back Care Not Asked     Exercise Yes     Comment: active     Bike Helmet Not Asked     Seat Belt Not Asked     Self-Exams Not Asked   Social History Narrative     Not on file     FH:  Reviewed with patient in clinic.  No family history of pulmonary disease  ROS Pulmonary  A complete ROS was otherwise negative except as noted in the HPI.  /73 (BP Location: Right arm, Patient Position: Sitting, Cuff Size: Adult Regular)   Pulse 88   Temp 97.1  F (36.2  C) (Oral)   Wt 88.9 kg (196 lb)   SpO2 95%   BMI 30.70 kg/m     Exam:   GENERAL APPEARANCE: Well developed, well nourished, alert, and in no apparent distress.  EYES: PERRL, EOMI  HENT: Nasal mucosa with no edema and no hyperemia. No nasal polyps.  EARS: Canals clear, TMs normal  MOUTH: Oral mucosa is moist, without any lesions, no tonsillar enlargement, no oropharyngeal exudate.  NECK: supple, no masses, no thyromegaly.  LYMPHATICS: No significant axillary, cervical, or supraclavicular nodes.  RESP: Mildly decreaed air flow throughout.  No crackles. No rhonchi. No wheezes.  CV: Normal S1, S2, regular rhythm, normal rate. No murmur.  No rub. No gallop. No LE edema.   ABDOMEN:  Bowel sounds normal, soft, nontender, no HSM or masses.   MS: extremities normal. No clubbing. No cyanosis.  SKIN: no rash on limited exam  NEURO: Mentation intact, speech normal, normal strength and tone, normal gait and stance  PSYCH: mentation appears normal. and affect normal/bright  Results:  Full pulmonary function tests were personally reviewed in clinic  FVC 2.53 (79%), FEV-1 1.95 (80%), FEV-1/FVC 76%  TLC 63%, RV 52%, FRC 56%  cDLCO 52%  No airflow limitation.  Restrictive lung volumes. Decreased diffusion capacity.  Compared to 9-26-13 the FVC  and FEV-1 are significantly decreased whereas the FEV-1/FVC is unchanged.  Lung volumes are significantly decreased.     No results found for this or any previous visit (from the past 168 hour(s)).    Assessment and plan:   83 year old male with a past medical history significant for mantle cell lymphoma s/p autologous PBSCT, HTN, and relapsed .   Had remained off prednisone for over one year.  Also with previous history of fungal pneumonia.  Now with recurrence of symptoms for past 1-1.5 years and CXR changes with decrease in PFT's.  Overall concerning for recurrence of infection or , with duration of symptoms suspect this is a recurrence of the .  Needs further invasive work-up with imaging and bronchoscopy.    1. CT chest in am- will review tomorrow  2. Six minute walk test to assess for desaturation  3. Bronchoscopy with BAL to exclude infection  4. Will hold off on starting steroids at present; if cultures from bronchoscopy are negative will start on prednisone 40 or 60 mg/day  5. F/U CT chest at time of clinic follow-up    RTC in 6 weeks.  Patient to call with any questions or concerns prior to his next clinic visit            Again, thank you for allowing me to participate in the care of your patient.      Sincerely,    Rajendra Flanagan MD

## 2019-05-24 ENCOUNTER — TELEPHONE (OUTPATIENT)
Dept: PULMONOLOGY | Facility: CLINIC | Age: 84
End: 2019-05-24

## 2019-05-24 ENCOUNTER — ANCILLARY PROCEDURE (OUTPATIENT)
Dept: CT IMAGING | Facility: CLINIC | Age: 84
End: 2019-05-24
Attending: INTERNAL MEDICINE
Payer: MEDICARE

## 2019-05-24 DIAGNOSIS — R06.02 SOB (SHORTNESS OF BREATH): ICD-10-CM

## 2019-05-24 DIAGNOSIS — R93.89 ABNORMAL FINDING ON IMAGING: Primary | ICD-10-CM

## 2019-05-24 LAB
6 MIN WALK (FT): 900 FT
6 MIN WALK (M): 274 M

## 2019-05-24 NOTE — TELEPHONE ENCOUNTER
Contacted by Dr Flanagan to schedule patient with bronchoscopy BAL for next Wed 5/29/19 at 12 pm with 11 am check in on the first floor of hospital in endoscopy. Instructions include: must have , no food after midnight and no fluids after 6 am, hold medications until after exam,  will have IV sedation and throat numbed, no talking during procedure, tell nurse after exam if having SOB, chest pain or more than flecks of blood with cough, results begin to return in 1 week but may take some results 6-8 weeks, risk factors given, allow 4 hrs for total time and number given to call clinic with any questions.

## 2019-05-28 ENCOUNTER — TELEPHONE (OUTPATIENT)
Dept: GASTROENTEROLOGY | Facility: CLINIC | Age: 84
End: 2019-05-28

## 2019-05-28 NOTE — TELEPHONE ENCOUNTER
Per Dr. Perlman via Carlene Goodwin, contacted patient with request that he arrive at 0900 for 1000 Bronchoscopy 5/29/19.  Adjusted NPO time to to 0500 with no solid food /p 2200 tonight.  Pt voices comprehension.  He, patient, states he is able to come to Endoscopy at 0900 without any difficulty.     Lanette Vaughn RN  [facility] Endoscopy

## 2019-05-29 ENCOUNTER — HOSPITAL ENCOUNTER (OUTPATIENT)
Facility: CLINIC | Age: 84
Discharge: HOME | End: 2019-05-29
Attending: INTERNAL MEDICINE | Admitting: INTERNAL MEDICINE
Payer: MEDICARE

## 2019-05-29 VITALS
SYSTOLIC BLOOD PRESSURE: 133 MMHG | DIASTOLIC BLOOD PRESSURE: 64 MMHG | OXYGEN SATURATION: 92 % | HEART RATE: 67 BPM | RESPIRATION RATE: 12 BRPM

## 2019-05-29 LAB
BRONCHOSCOPY: NORMAL
COPATH REPORT: NORMAL
GRAM STN SPEC: ABNORMAL
SPECIMEN SOURCE: ABNORMAL

## 2019-05-29 PROCEDURE — 87102 FUNGUS ISOLATION CULTURE: CPT | Performed by: INTERNAL MEDICINE

## 2019-05-29 PROCEDURE — G0500 MOD SEDAT ENDO SERVICE >5YRS: HCPCS | Performed by: INTERNAL MEDICINE

## 2019-05-29 PROCEDURE — 87116 MYCOBACTERIA CULTURE: CPT | Performed by: INTERNAL MEDICINE

## 2019-05-29 PROCEDURE — 87633 RESP VIRUS 12-25 TARGETS: CPT | Performed by: INTERNAL MEDICINE

## 2019-05-29 PROCEDURE — 25000128 H RX IP 250 OP 636: Performed by: INTERNAL MEDICINE

## 2019-05-29 PROCEDURE — 25000125 ZZHC RX 250: Performed by: INTERNAL MEDICINE

## 2019-05-29 PROCEDURE — 87015 SPECIMEN INFECT AGNT CONCNTJ: CPT | Performed by: INTERNAL MEDICINE

## 2019-05-29 PROCEDURE — 87205 SMEAR GRAM STAIN: CPT | Performed by: INTERNAL MEDICINE

## 2019-05-29 PROCEDURE — 88108 CYTOPATH CONCENTRATE TECH: CPT | Performed by: INTERNAL MEDICINE

## 2019-05-29 PROCEDURE — 88312 SPECIAL STAINS GROUP 1: CPT | Performed by: INTERNAL MEDICINE

## 2019-05-29 PROCEDURE — 89051 BODY FLUID CELL COUNT: CPT | Performed by: INTERNAL MEDICINE

## 2019-05-29 PROCEDURE — 87070 CULTURE OTHR SPECIMN AEROBIC: CPT | Performed by: INTERNAL MEDICINE

## 2019-05-29 PROCEDURE — 87206 SMEAR FLUORESCENT/ACID STAI: CPT | Performed by: INTERNAL MEDICINE

## 2019-05-29 PROCEDURE — 31624 DX BRONCHOSCOPE/LAVAGE: CPT | Performed by: INTERNAL MEDICINE

## 2019-05-29 PROCEDURE — 87305 ASPERGILLUS AG IA: CPT | Performed by: INTERNAL MEDICINE

## 2019-05-29 RX ORDER — FENTANYL CITRATE 50 UG/ML
INJECTION, SOLUTION INTRAMUSCULAR; INTRAVENOUS PRN
Status: DISCONTINUED | OUTPATIENT
Start: 2019-05-29 | End: 2019-05-29 | Stop reason: HOSPADM

## 2019-05-29 RX ORDER — MAGNESIUM HYDROXIDE 1200 MG/15ML
LIQUID ORAL PRN
Status: DISCONTINUED | OUTPATIENT
Start: 2019-05-29 | End: 2019-05-29 | Stop reason: HOSPADM

## 2019-05-29 RX ORDER — LIDOCAINE HYDROCHLORIDE 20 MG/ML
SOLUTION OROPHARYNGEAL PRN
Status: DISCONTINUED | OUTPATIENT
Start: 2019-05-29 | End: 2019-05-29 | Stop reason: HOSPADM

## 2019-05-29 RX ORDER — LIDOCAINE HYDROCHLORIDE 40 MG/ML
INJECTION, SOLUTION RETROBULBAR PRN
Status: DISCONTINUED | OUTPATIENT
Start: 2019-05-29 | End: 2019-05-29 | Stop reason: HOSPADM

## 2019-05-30 LAB
APPEARANCE FLD: CLEAR
COLOR FLD: COLORLESS
FLUAV H1 2009 PAND RNA SPEC QL NAA+PROBE: NEGATIVE
FLUAV H1 RNA SPEC QL NAA+PROBE: NEGATIVE
FLUAV H3 RNA SPEC QL NAA+PROBE: NEGATIVE
FLUAV RNA SPEC QL NAA+PROBE: NEGATIVE
FLUBV RNA SPEC QL NAA+PROBE: NEGATIVE
HADV DNA SPEC QL NAA+PROBE: NEGATIVE
HADV DNA SPEC QL NAA+PROBE: NEGATIVE
HMPV RNA SPEC QL NAA+PROBE: NEGATIVE
HPIV1 RNA SPEC QL NAA+PROBE: NEGATIVE
HPIV2 RNA SPEC QL NAA+PROBE: NEGATIVE
HPIV3 RNA SPEC QL NAA+PROBE: NEGATIVE
LYMPHOCYTES NFR FLD MANUAL: 36 %
MICROBIOLOGIST REVIEW: NORMAL
MONOS+MACROS NFR FLD MANUAL: 58 %
NEUTS BAND NFR FLD MANUAL: 6 %
RHINOVIRUS RNA SPEC QL NAA+PROBE: NEGATIVE
RSV RNA SPEC QL NAA+PROBE: NEGATIVE
RSV RNA SPEC QL NAA+PROBE: NEGATIVE
SPECIMEN SOURCE FLD: NORMAL
SPECIMEN SOURCE: NORMAL
WBC # FLD AUTO: 239 /UL

## 2019-05-31 LAB
ACID FAST STN SPEC QL: NORMAL
ACID FAST STN SPEC QL: NORMAL
ASPERGILLUS GALACTOMANNAN ANTIGEN BAL: NEGATIVE
BACTERIA SPEC CULT: NORMAL
CMV DNA SPEC NAA+PROBE-ACNC: NORMAL [IU]/ML
CMV DNA SPEC NAA+PROBE-LOG#: NORMAL {LOG_IU}/ML
FIO2-PRE: 21 %
GALACTOMANNAN AG SERPL-ACNC: 0.05
SPECIMEN SOURCE: NORMAL

## 2019-06-11 ENCOUNTER — TELEPHONE (OUTPATIENT)
Dept: PULMONOLOGY | Facility: CLINIC | Age: 84
End: 2019-06-11

## 2019-06-11 DIAGNOSIS — J84.116 CRYPTOGENIC ORGANIZING PNEUMONIA (H): Primary | ICD-10-CM

## 2019-06-11 NOTE — TELEPHONE ENCOUNTER
[facility] Call Center    Phone Message    May a detailed message be left on voicemail: yes    Reason for Call: Other: Pt stated he had testing done a week ago and he would like to discuss the results, please call     Action Taken: Message routed to:  [facility]

## 2019-06-11 NOTE — TELEPHONE ENCOUNTER
[facility] Call Center    Phone Message    May a detailed message be left on voicemail: yes    Reason for Call: Other: Dangelo is calling again today asking for someone to call him with results from his biopsy.   He asked for a call back asap.      Action Taken: Message routed to:  [facility] pulm

## 2019-06-12 DIAGNOSIS — J84.116 CRYPTOGENIC ORGANIZING PNEUMONIA (H): Primary | ICD-10-CM

## 2019-06-12 RX ORDER — PREDNISONE 20 MG/1
TABLET ORAL
Qty: 68 TABLET | Refills: 0 | Status: SHIPPED | OUTPATIENT
Start: 2019-06-12 | End: 2019-09-10 | Stop reason: ALTCHOICE

## 2019-06-12 RX ORDER — SULFAMETHOXAZOLE/TRIMETHOPRIM 800-160 MG
TABLET ORAL
Qty: 12 TABLET | Refills: 3 | Status: SHIPPED | OUTPATIENT
Start: 2019-06-12 | End: 2019-09-10

## 2019-06-12 NOTE — TELEPHONE ENCOUNTER
"Patient calling wanting to know about bronch results. Everything neg except gram stain. Patient wants to know if will be treating him and if clinic can move up his apt from July. Only symptom is clear \"chunks\" of sputum with coughing at night times 3 and during day with cough times 6. Denies fevers and night sweats. Dr Flanagan will initiate Prednisone 40mg and Bactrim (1 double strength tab 3 times per week). Patient to return to clinic in 1 month with chest ct after starting Prednisone. Will make patient aware of plan.  "

## 2019-06-26 LAB
BACTERIA SPEC CULT: NORMAL
FUNGUS SPEC CULT: NORMAL
SPECIMEN SOURCE: NORMAL
SPECIMEN SOURCE: NORMAL

## 2019-07-16 ENCOUNTER — OFFICE VISIT (OUTPATIENT)
Dept: PULMONOLOGY | Facility: CLINIC | Age: 84
End: 2019-07-16
Attending: INTERNAL MEDICINE
Payer: MEDICARE

## 2019-07-16 ENCOUNTER — ANCILLARY PROCEDURE (OUTPATIENT)
Dept: CT IMAGING | Facility: CLINIC | Age: 84
End: 2019-07-16
Attending: INTERNAL MEDICINE
Payer: MEDICARE

## 2019-07-16 VITALS
TEMPERATURE: 96.9 F | SYSTOLIC BLOOD PRESSURE: 139 MMHG | OXYGEN SATURATION: 95 % | WEIGHT: 195.2 LBS | BODY MASS INDEX: 30.57 KG/M2 | DIASTOLIC BLOOD PRESSURE: 69 MMHG | HEART RATE: 66 BPM

## 2019-07-16 DIAGNOSIS — J84.89 ORGANIZING PNEUMONIA (H): Primary | ICD-10-CM

## 2019-07-16 DIAGNOSIS — J84.116 CRYPTOGENIC ORGANIZING PNEUMONIA (H): ICD-10-CM

## 2019-07-16 PROCEDURE — G0463 HOSPITAL OUTPT CLINIC VISIT: HCPCS | Mod: ZF

## 2019-07-16 RX ORDER — SULFAMETHOXAZOLE/TRIMETHOPRIM 800-160 MG
1 TABLET ORAL
Qty: 15 TABLET | Refills: 3 | Status: SHIPPED | OUTPATIENT
Start: 2019-07-16 | End: 2019-09-10

## 2019-07-16 RX ORDER — PREDNISONE 20 MG/1
40 TABLET ORAL DAILY
Qty: 60 TABLET | Refills: 3 | Status: SHIPPED | OUTPATIENT
Start: 2019-07-16 | End: 2019-10-15

## 2019-07-16 ASSESSMENT — PAIN SCALES - GENERAL: PAINLEVEL: NO PAIN (0)

## 2019-07-16 NOTE — LETTER
7/16/2019       RE: Dangelo Henrández  [address] [facility] [address]     Dear Colleague,    Thank you for referring your patient, Dangelo Hernández, to the [facility] CANCER CLINIC at [facility]. Please see a copy of my visit note below.    Reason for Visit  Dangelo Hernández is a 84 year old year old male who is being seen for No chief complaint on file.    Pulmonary HPI  Mr. 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 At the completion of therapy, he continued to be SOB and a subsequent VATS biopsy revealed , therapy with steroids was initiated for 6 months and a few months after completion of therapy his clinical and radiographic findings had relapsed and he was re-initiated on steroids and V-Fend for filamentous fungi on a BAL.    Last seen almost two months ago. After his last visit he underwent bronchoscopy with BAL, all cultures and AGM were negative from bronchoscopy.  After the return of culture results being negative he was started on prednisone.  Since his last visit he underwent a 6 minute walk test that did not show desaturation with walking.  He states his cough and SOB is not really changed. Cough is loose and productive and he can produce sputum without difficulty.  Is able to do work around his house and cabin without too much difficulty, states reaches a limit and needs to stop; overall this has not significantly changed. Review of CT today shows that all areas of GGO and infiltrate are improved but are not completely resolved.    The patient was seen and examined by Rajendra Flanagan           Current Outpatient Medications   Medication     acetaminophen (TYLENOL) 325 MG tablet     ASPIRIN 81 MG PO TABS     atorvastatin (LIPITOR) 80 MG tablet      azelastine (ASTELIN) 0.1 % nasal spray     azithromycin (ZITHROMAX) 250 MG tablet     fluticasone (FLONASE) 50 MCG/ACT nasal spray     gabapentin 8 % GEL     Glucosamine-Chondroitin (OSTEO BI-FLEX REGULAR STRENGTH PO)     metoprolol succinate (TOPROL-XL) 25 MG 24 hr tablet     MULTI VITAMIN MENS OR     Naproxen Sodium (ALEVE PO)     predniSONE (DELTASONE) 20 MG tablet     sulfamethoxazole-trimethoprim (BACTRIM DS/SEPTRA DS) 800-160 MG tablet     tamsulosin (FLOMAX) 0.4 MG capsule     No current facility-administered medications for this visit.      Allergies   Allergen Reactions     Amoxicillin Diarrhea            Lisinopril Cough     Past Medical History:   Diagnosis Date      Coronary artery disease, 4/ 2010, status post drug-eluting stent placement to the LAD and balloon angioplasty to the obtuse marginal.  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       Past Surgical History:   Procedure Laterality Date     BRONCHOSCOPY (RIGID OR FLEXIBLE), DIAGNOSTIC  7/14/2011    Procedure:COMBINED BRONCHOSCOPY (RIGID OR FLEXIBLE), LAVAGE; Surgeon:REYNA BAILEY; Location: GI     BRONCHOSCOPY (RIGID OR FLEXIBLE), DIAGNOSTIC N/A 5/29/2019    Procedure: BRONCHOSCOPY, WITH BRONCHOALVEOLAR LAVAGE;  Surgeon: Perlman, David Morris, MD;  Location: [facility]     COLONOSCOPY       ENT SURGERY      tonsils     ORTHOPEDIC SURGERY      rt knee arthroscopy     SURGICAL HISTORY OF -       tonsils      SURGICAL HISTORY OF -   84    1.Exam under anesthesia, 2.Arthroscopy, 3.Arthroscopic medial meniscectomy, 4.arthroscopic trimming of patellar articular cartilage.     SURGICAL HISTORY OF -       vasectomy     SURGICAL HISTORY OF -   90    colonoscopy     SURGICAL HISTORY OF -   10/20/92    flexible sigmoidoscopy     SURGICAL HISTORY OF -   2000    colonoscopy and polypectomy     THORACOSCOPIC BIOPSY LUNG  2011    Procedure:THORACOSCOPIC BIOPSY LUNG; Video Assisted Right Thoracoscopy with Biopsy; Surgeon:JIM EPSTEIN; Location: OR       Social History     Socioeconomic History     Marital status:      Spouse name: Not on file     Number of children: Not on file     Years of education: Not on file     Highest education level: Not on file   Occupational History     Employer: RETIRED   Social Needs     Financial resource strain: Not on file     Food insecurity:     Worry: Not on file     Inability: Not on file     Transportation needs:     Medical: Not on file     Non-medical: Not on file   Tobacco Use     Smoking status: Former Smoker     Types: Cigarettes     Last attempt to quit: 1975     Years since quittin.5     Smokeless tobacco: Former User     Tobacco comment: started smoking at 21 years of age   Substance and Sexual Activity     Alcohol use: Yes     Alcohol/week: 0.0 oz     Comment: every other day     Drug use: No     Sexual activity: Yes     Partners: Female   Lifestyle     Physical activity:     Days per week: Not on file     Minutes per session: Not on file     Stress: Not on file   Relationships     Social connections:     Talks on phone: Not on file     Gets together: Not on file     Attends Confucianist service: Not on file     Active member of club or organization: Not on file     Attends meetings of clubs or organizations: Not on file     Relationship status: Not on file     Intimate partner violence:     Fear of current or ex partner: Not on file      Emotionally abused: Not on file     Physically abused: Not on file     Forced sexual activity: Not on file   Other Topics Concern     Parent/sibling w/ CABG, MI or angioplasty before 65F 55M? No      Service Not Asked     Blood Transfusions Not Asked     Caffeine Concern Not Asked     Occupational Exposure Not Asked     Hobby Hazards Not Asked     Sleep Concern Not Asked     Stress Concern Not Asked     Weight Concern Not Asked     Special Diet No     Back Care Not Asked     Exercise Yes     Comment: active     Bike Helmet Not Asked     Seat Belt Not Asked     Self-Exams Not Asked   Social History Narrative     Not on file       ROS Pulmonary  A complete ROS was otherwise negative except as noted in the HPI.  There were no vitals taken for this visit.  Exam:   GENERAL APPEARANCE: Well developed, well nourished, alert, and in no apparent distress.  EYES: PERRL, EOMI  HENT: Nasal mucosa with no edema and no hyperemia. No nasal polyps.  EARS: Canals clear, TMs normal  MOUTH: Oral mucosa is moist, without any lesions, no tonsillar enlargement, no oropharyngeal exudate.  NECK: supple, no masses, no thyromegaly.  LYMPHATICS: No significant axillary, cervical, or supraclavicular nodes.  RESP:  Mild decreased air flow throughout.  No crackles. No rhonchi. No wheezes.  CV: Normal S1, S2, regular rhythm, normal rate. No murmur.  No rub. No gallop. No LE edema.   ABDOMEN:  Bowel sounds normal, soft, nontender, no HSM or masses.   MS: extremities normal. No clubbing. No cyanosis.  SKIN: no rash on limited exam  NEURO: Mentation intact, speech normal, normal strength and tone, normal gait and stance  PSYCH: mentation appears normal. and affect normal/bright  Results:  No results found for this or any previous visit (from the past 168 hour(s)).    Assessment and plan:   84 year old male with a past medical history significant for mantle cell lymphoma s/p autologous PBSCT, HTN, and relapsed .   Had remained off  prednisone for over one year.  Also with previous history of fungal pneumonia.   Represented to clinic with recurrence of symptoms for past 1-1.5 years and CXR changes with decrease in PFT's.  Overall concerning for recurrence of infection or , with duration of symptoms suspect this is a recurrence of the .   Bronchoscopy with BAL performed and all cultures were negative; started on prednisone 40 mg one month ago.  CT chest is significantly improved but it is interesting that he does not feel that symptoms are improved.     1. Continue steroids at 40 mg per day for another month  2. F/U CT chest at time of clinic follow-up  3. Asked patient to monitor his respiratory symptoms     RTC in  1 month.  Patient to call with any questions or concerns prior to his next clinic visit          Again, thank you for allowing me to participate in the care of your patient.      Sincerely,    Rajendra Flanagan MD       no weight loss/no fever/no night sweats/no chills

## 2019-07-16 NOTE — NURSING NOTE
"Oncology Rooming Note    July 16, 2019 2:57 PM   Dangelo Hernández is a 84 year old male who presents for:    Chief Complaint   Patient presents with     RECHECK     RTN- Cryptogenic organizing pneumonia     Initial Vitals: /69   Pulse 66   Temp 96.9  F (36.1  C) (Oral)   Wt 88.5 kg (195 lb 3.2 oz)   SpO2 95%   BMI 30.57 kg/m   Estimated body mass index is 30.57 kg/m  as calculated from the following:    Height as of 9/25/18: 1.702 m (5' 7\").    Weight as of this encounter: 88.5 kg (195 lb 3.2 oz). Body surface area is 2.05 meters squared.  No Pain (0) Comment: Data Unavailable   No LMP for male patient.  Allergies reviewed: Yes  Medications reviewed: Yes    Medications: Medication refills not needed today.  Pharmacy name entered into [employer]:    [facility] [place] - [address] [facility] [address] - [facility] [address] [facility] [address] [facility] [address] [facility] [address] - [place], [place] - [facility] [address] AT [facility] [place] [facility]    Clinical concerns: none       Yady Moses CMA              "

## 2019-07-16 NOTE — PROGRESS NOTES
Reason for Visit  Dangelo Hernández is a 84 year old year old male who is being seen for No chief complaint on file.    Pulmonary HPI  Mr. 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 At the completion of therapy, he continued to be SOB and a subsequent VATS biopsy revealed , therapy with steroids was initiated for 6 months and a few months after completion of therapy his clinical and radiographic findings had relapsed and he was re-initiated on steroids and V-Fend for filamentous fungi on a BAL.    Last seen almost two months ago. After his last visit he underwent bronchoscopy with BAL, all cultures and AGM were negative from bronchoscopy.  After the return of culture results being negative he was started on prednisone.  Since his last visit he underwent a 6 minute walk test that did not show desaturation with walking.  He states his cough and SOB is not really changed. Cough is loose and productive and he can produce sputum without difficulty.  Is able to do work around his house and cabin without too much difficulty, states reaches a limit and needs to stop; overall this has not significantly changed. Review of CT today shows that all areas of GGO and infiltrate are improved but are not completely resolved.    The patient was seen and examined by Rajendra Flanagan           Current Outpatient Medications   Medication     acetaminophen (TYLENOL) 325 MG tablet     ASPIRIN 81 MG PO TABS     atorvastatin (LIPITOR) 80 MG tablet     azelastine (ASTELIN) 0.1 % nasal spray     azithromycin (ZITHROMAX) 250 MG tablet     fluticasone (FLONASE) 50 MCG/ACT nasal spray     gabapentin 8 % GEL     Glucosamine-Chondroitin (OSTEO BI-FLEX REGULAR STRENGTH PO)     metoprolol succinate (TOPROL-XL) 25 MG 24 hr tablet     MULTI VITAMIN MENS OR      Naproxen Sodium (ALEVE PO)     predniSONE (DELTASONE) 20 MG tablet     sulfamethoxazole-trimethoprim (BACTRIM DS/SEPTRA DS) 800-160 MG tablet     tamsulosin (FLOMAX) 0.4 MG capsule     No current facility-administered medications for this visit.      Allergies   Allergen Reactions     Amoxicillin Diarrhea            Lisinopril Cough     Past Medical History:   Diagnosis Date      Coronary artery disease, 4/ 2010, status post drug-eluting stent placement to the LAD and balloon angioplasty to the obtuse marginal.  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       Past Surgical History:   Procedure Laterality Date     BRONCHOSCOPY (RIGID OR FLEXIBLE), DIAGNOSTIC  7/14/2011    Procedure:COMBINED BRONCHOSCOPY (RIGID OR FLEXIBLE), LAVAGE; Surgeon:REYNA BAILEY; Location: GI     BRONCHOSCOPY (RIGID OR FLEXIBLE), DIAGNOSTIC N/A 5/29/2019    Procedure: BRONCHOSCOPY, WITH BRONCHOALVEOLAR LAVAGE;  Surgeon: Perlman, David Morris, MD;  Location:  GI     COLONOSCOPY       ENT SURGERY      tonsils     ORTHOPEDIC SURGERY      rt knee arthroscopy     SURGICAL HISTORY OF -       tonsils     SURGICAL HISTORY OF -   1/19/84    1.Exam under anesthesia, 2.Arthroscopy, 3.Arthroscopic medial meniscectomy, 4.arthroscopic trimming of patellar articular cartilage.     SURGICAL HISTORY OF -       vasectomy     SURGICAL HISTORY OF -   2/19/90    colonoscopy     SURGICAL HISTORY OF -   10/20/92     flexible sigmoidoscopy     SURGICAL HISTORY OF -   2000    colonoscopy and polypectomy     THORACOSCOPIC BIOPSY LUNG  2011    Procedure:THORACOSCOPIC BIOPSY LUNG; Video Assisted Right Thoracoscopy with Biopsy; Surgeon:JIM EPSTEIN; Location: OR       Social History     Socioeconomic History     Marital status:      Spouse name: Not on file     Number of children: Not on file     Years of education: Not on file     Highest education level: Not on file   Occupational History     Employer: RETIRED   Social Needs     Financial resource strain: Not on file     Food insecurity:     Worry: Not on file     Inability: Not on file     Transportation needs:     Medical: Not on file     Non-medical: Not on file   Tobacco Use     Smoking status: Former Smoker     Types: Cigarettes     Last attempt to quit: 1975     Years since quittin.5     Smokeless tobacco: Former User     Tobacco comment: started smoking at 21 years of age   Substance and Sexual Activity     Alcohol use: Yes     Alcohol/week: 0.0 oz     Comment: every other day     Drug use: No     Sexual activity: Yes     Partners: Female   Lifestyle     Physical activity:     Days per week: Not on file     Minutes per session: Not on file     Stress: Not on file   Relationships     Social connections:     Talks on phone: Not on file     Gets together: Not on file     Attends [employer] service: Not on file     Active member of club or organization: Not on file     Attends meetings of clubs or organizations: Not on file     Relationship status: Not on file     Intimate partner violence:     Fear of current or ex partner: Not on file     Emotionally abused: Not on file     Physically abused: Not on file     Forced sexual activity: Not on file   Other Topics Concern     Parent/sibling w/ CABG, MI or angioplasty before 65F 55M? No      Service Not Asked     Blood Transfusions Not Asked     Caffeine Concern Not Asked      Occupational Exposure Not Asked     Hobby Hazards Not Asked     Sleep Concern Not Asked     Stress Concern Not Asked     Weight Concern Not Asked     Special Diet No     Back Care Not Asked     Exercise Yes     Comment: active     Bike Helmet Not Asked     Seat Belt Not Asked     Self-Exams Not Asked   Social History Narrative     Not on file       ROS Pulmonary  A complete ROS was otherwise negative except as noted in the HPI.  There were no vitals taken for this visit.  Exam:   GENERAL APPEARANCE: Well developed, well nourished, alert, and in no apparent distress.  EYES: PERRL, EOMI  HENT: Nasal mucosa with no edema and no hyperemia. No nasal polyps.  EARS: Canals clear, TMs normal  MOUTH: Oral mucosa is moist, without any lesions, no tonsillar enlargement, no oropharyngeal exudate.  NECK: supple, no masses, no thyromegaly.  LYMPHATICS: No significant axillary, cervical, or supraclavicular nodes.  RESP:  Mild decreased air flow throughout.  No crackles. No rhonchi. No wheezes.  CV: Normal S1, S2, regular rhythm, normal rate. No murmur.  No rub. No gallop. No LE edema.   ABDOMEN:  Bowel sounds normal, soft, nontender, no HSM or masses.   MS: extremities normal. No clubbing. No cyanosis.  SKIN: no rash on limited exam  NEURO: Mentation intact, speech normal, normal strength and tone, normal gait and stance  PSYCH: mentation appears normal. and affect normal/bright  Results:  No results found for this or any previous visit (from the past 168 hour(s)).    Assessment and plan:   84 year old male with a past medical history significant for mantle cell lymphoma s/p autologous PBSCT, HTN, and relapsed .   Had remained off prednisone for over one year.  Also with previous history of fungal pneumonia.  Represented to clinic with recurrence of symptoms for past 1-1.5 years and CXR changes with decrease in PFT's.  Overall concerning for recurrence of infection or , with duration of symptoms suspect this is a recurrence  of the .  Bronchoscopy with BAL performed and all cultures were negative; started on prednisone 40 mg one month ago.  CT chest is significantly improved but it is interesting that he does not feel that symptoms are improved.     1. Continue steroids at 40 mg per day for another month  2. F/U CT chest at time of clinic follow-up  3. Asked patient to monitor his respiratory symptoms     RTC in 1 month.  Patient to call with any questions or concerns prior to his next clinic visit

## 2019-07-25 LAB
MYCOBACTERIUM SPEC CULT: NORMAL
MYCOBACTERIUM SPEC CULT: NORMAL
SPECIMEN SOURCE: NORMAL

## 2019-08-22 ENCOUNTER — OFFICE VISIT (OUTPATIENT)
Dept: PULMONOLOGY | Facility: CLINIC | Age: 84
End: 2019-08-22
Attending: INTERNAL MEDICINE
Payer: MEDICARE

## 2019-08-22 ENCOUNTER — ANCILLARY PROCEDURE (OUTPATIENT)
Dept: CT IMAGING | Facility: CLINIC | Age: 84
End: 2019-08-22
Attending: INTERNAL MEDICINE
Payer: MEDICARE

## 2019-08-22 VITALS
WEIGHT: 196 LBS | HEIGHT: 67 IN | RESPIRATION RATE: 16 BRPM | SYSTOLIC BLOOD PRESSURE: 103 MMHG | OXYGEN SATURATION: 94 % | HEART RATE: 94 BPM | DIASTOLIC BLOOD PRESSURE: 63 MMHG | TEMPERATURE: 98 F | BODY MASS INDEX: 30.76 KG/M2

## 2019-08-22 DIAGNOSIS — J84.89 ORGANIZING PNEUMONIA (H): ICD-10-CM

## 2019-08-22 DIAGNOSIS — J84.89 ORGANIZING PNEUMONIA (H): Primary | ICD-10-CM

## 2019-08-22 PROCEDURE — G0463 HOSPITAL OUTPT CLINIC VISIT: HCPCS | Mod: ZF

## 2019-08-22 ASSESSMENT — PAIN SCALES - GENERAL: PAINLEVEL: NO PAIN (0)

## 2019-08-22 ASSESSMENT — MIFFLIN-ST. JEOR: SCORE: 1547.68

## 2019-08-22 NOTE — NURSING NOTE
"Oncology Rooming Note    August 22, 2019 3:03 PM   Dangelo Hernández is a 84 year old male who presents for:    Chief Complaint   Patient presents with     RECHECK     Return: Mantle Cell Lymphoma      Initial Vitals: /63 (BP Location: Right arm, Patient Position: Sitting, Cuff Size: Adult Regular)   Pulse 94   Temp 98  F (36.7  C) (Oral)   Resp 16   Ht 1.702 m (5' 7\")   Wt 88.9 kg (196 lb)   SpO2 94%   BMI 30.70 kg/m   Estimated body mass index is 30.7 kg/m  as calculated from the following:    Height as of this encounter: 1.702 m (5' 7\").    Weight as of this encounter: 88.9 kg (196 lb). Body surface area is 2.05 meters squared.  No Pain (0) Comment: Data Unavailable   No LMP for male patient.  Allergies reviewed: Yes  Medications reviewed: Yes    Medications: Medication refills not needed today.  Pharmacy name entered into [facility]:    [facility] - [place], [place] - [address] [id number] - [place], [place] - [address] [facility] & [address] [id number] - [place], [place] - [address] AT [facility] [place] [facility]    Clinical concerns: N/A        Linda Saini CMA              "

## 2019-08-22 NOTE — PROGRESS NOTES
Reason for Visit  Dangelo Hernández is a 84 year old year old male who is being seen for No chief complaint on file.    Pulmonary HPI  Mr. 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 At the completion of therapy, he continued to be SOB and a subsequent VATS biopsy revealed , therapy with steroids was initiated for 6 months and a few months after completion of therapy his clinical and radiographic findings had relapsed and he was re-initiated on steroids and V-Fend for filamentous fungi on a BAL.    Last seen one month ago. Since his last visit he thinks his breathing is better compared to 5-19 and thinks is also better since he was last seen on some days.  Is able to walk and was cutting wood this week. Thinks that heat and humidity significantly affects his breathing.  Still with productive cough that is chronic.  Also has noticed increased bilateral hip pain with difficulty walking.  Has been seen in Orthopedic clinic in the past, received hip and lower thoracic injections with benefit.    The patient was seen and examined by Rajendra Flanagan MD           Current Outpatient Medications   Medication     acetaminophen (TYLENOL) 325 MG tablet     ASPIRIN 81 MG PO TABS     atorvastatin (LIPITOR) 80 MG tablet     azelastine (ASTELIN) 0.1 % nasal spray     azithromycin (ZITHROMAX) 250 MG tablet     fluticasone (FLONASE) 50 MCG/ACT nasal spray     gabapentin 8 % GEL     Glucosamine-Chondroitin (OSTEO BI-FLEX REGULAR STRENGTH PO)     metoprolol succinate (TOPROL-XL) 25 MG 24 hr tablet     MULTI VITAMIN MENS OR     Naproxen Sodium (ALEVE PO)     predniSONE (DELTASONE) 20 MG tablet     predniSONE (DELTASONE) 20 MG tablet     sulfamethoxazole-trimethoprim (BACTRIM DS/SEPTRA DS) 800-160 MG tablet     sulfamethoxazole-trimethoprim  (BACTRIM DS/SEPTRA DS) 800-160 MG tablet     tamsulosin (FLOMAX) 0.4 MG capsule     No current facility-administered medications for this visit.      Allergies   Allergen Reactions     Amoxicillin Diarrhea            Lisinopril Cough     Past Medical History:   Diagnosis Date      Coronary artery disease, 4/ 2010, status post drug-eluting stent placement to the LAD and balloon angioplasty to the obtuse marginal.  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       Past Surgical History:   Procedure Laterality Date     BRONCHOSCOPY (RIGID OR FLEXIBLE), DIAGNOSTIC  7/14/2011    Procedure:COMBINED BRONCHOSCOPY (RIGID OR FLEXIBLE), LAVAGE; Surgeon:REYNA BAILEY; Location:[facility]     BRONCHOSCOPY (RIGID OR FLEXIBLE), DIAGNOSTIC N/A 5/29/2019    Procedure: BRONCHOSCOPY, WITH BRONCHOALVEOLAR LAVAGE;  Surgeon: Perlman, David Morris, MD;  Location: [facility]     COLONOSCOPY       ENT SURGERY      tonsils     ORTHOPEDIC SURGERY      rt knee arthroscopy     SURGICAL HISTORY OF -       tonsils     SURGICAL HISTORY OF -   1/19/84    1.Exam under anesthesia, 2.Arthroscopy, 3.Arthroscopic medial meniscectomy, 4.arthroscopic trimming of patellar articular cartilage.     SURGICAL HISTORY OF -       vasectomy     SURGICAL HISTORY OF -   2/19/90    colonoscopy     SURGICAL HISTORY OF -   10/20/92    flexible sigmoidoscopy     SURGICAL HISTORY OF -   6/14/2000    colonoscopy and polypectomy      THORACOSCOPIC BIOPSY LUNG  2011    Procedure:THORACOSCOPIC BIOPSY LUNG; Video Assisted Right Thoracoscopy with Biopsy; Surgeon:JIM EPSTEIN; Location: OR       Social History     Socioeconomic History     Marital status:      Spouse name: Not on file     Number of children: Not on file     Years of education: Not on file     Highest education level: Not on file   Occupational History     Employer: RETIRED   Social Needs     Financial resource strain: Not on file     Food insecurity:     Worry: Not on file     Inability: Not on file     Transportation needs:     Medical: Not on file     Non-medical: Not on file   Tobacco Use     Smoking status: Former Smoker     Types: Cigarettes     Last attempt to quit: 1975     Years since quittin.6     Smokeless tobacco: Former User     Tobacco comment: started smoking at 21 years of age   Substance and Sexual Activity     Alcohol use: Yes     Alcohol/week: 0.0 oz     Comment: every other day     Drug use: No     Sexual activity: Yes     Partners: Female   Lifestyle     Physical activity:     Days per week: Not on file     Minutes per session: Not on file     Stress: Not on file   Relationships     Social connections:     Talks on phone: Not on file     Gets together: Not on file     Attends Confucianism service: Not on file     Active member of club or organization: Not on file     Attends meetings of clubs or organizations: Not on file     Relationship status: Not on file     Intimate partner violence:     Fear of current or ex partner: Not on file     Emotionally abused: Not on file     Physically abused: Not on file     Forced sexual activity: Not on file   Other Topics Concern     Parent/sibling w/ CABG, MI or angioplasty before 65F 55M? No      Service Not Asked     Blood Transfusions Not Asked     Caffeine Concern Not Asked     Occupational Exposure Not Asked     Hobby Hazards Not Asked     Sleep Concern Not Asked     Stress Concern  Not Asked     Weight Concern Not Asked     Special Diet No     Back Care Not Asked     Exercise Yes     Comment: active     Bike Helmet Not Asked     Seat Belt Not Asked     Self-Exams Not Asked   Social History Narrative     Not on file       ROS Pulmonary  A complete ROS was otherwise negative except as noted in the HPI.  There were no vitals taken for this visit.  Exam:   GENERAL APPEARANCE: Well developed, well nourished, alert, and in no apparent distress.  EYES: PERRL, EOMI  HENT: Nasal mucosa with no edema and no hyperemia. No nasal polyps.  EARS: Canals clear, TMs normal  MOUTH: Oral mucosa is moist, without any lesions, no tonsillar enlargement, no oropharyngeal exudate.  NECK: supple, no masses, no thyromegaly.  LYMPHATICS: No significant axillary, cervical, or supraclavicular nodes.  RESP: Mildly decreased air flow throughout.  No crackles. No rhonchi. No wheezes.  CV: Normal S1, S2, regular rhythm, normal rate. No murmur.  No rub. No gallop. No LE edema.   ABDOMEN:  Bowel sounds normal, soft, nontender, no HSM or masses.   MS: extremities normal. No clubbing. No cyanosis.  SKIN: no rash on limited exam  NEURO: Mentation intact, speech normal, normal strength and tone, normal gait and stance  PSYCH: mentation appears normal. and affect normal/bright  Results:  No results found for this or any previous visit (from the past 168 hour(s)).    Assessment and plan:   84 year old male with a past medical history significant for mantle cell lymphoma s/p autologous PBSCT, HTN, and relapsed .   Had remained off prednisone for over one year.  Also with previous history of fungal pneumonia.  Represented to clinic with recurrence of symptoms for past 1-1.5 years and CXR changes with decrease in PFT's.  Overall concerning for recurrence of infection or , with duration of symptoms suspect this is a recurrence of the .  Bronchoscopy with BAL performed and all cultures were negative; started on prednisone 40 mg  one month ago.  CT chest is somewhat improved since his last visit with symptoms of shortness of breath better since May. Chronic back and hip pain, needs Orthopedics follow-up.    1. Decrease prednisone to 30 mg per day for one month then decrease to 20 mg until seen back in clinic  2. F/U CT chest at time of clinic follow-up  3. Asked patient to monitor his respiratory symptoms  4.Suggested follow-up in Orthopedics clinic for back and him pain, last seen one year ago, received injections after those visits     RTC in 2 months with repeat CT.  Patient to call with any questions or concerns prior to his next clinic visit

## 2019-08-22 NOTE — LETTER
8/22/2019       RE: Dangelo Hernández  [address] [facility] [address]     Dear Colleague,    Thank you for referring your patient, Dangelo Hernández, to the [facility] at [facility]. Please see a copy of my visit note below.    Reason for Visit  Dangelo Hernández is a 84 year old year old male who is being seen for No chief complaint on file.    Pulmonary HPI  Mr. 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 At the completion of therapy, he continued to be SOB and a subsequent VATS biopsy revealed , therapy with steroids was initiated for 6 months and a few months after completion of therapy his clinical and radiographic findings had relapsed and he was re-initiated on steroids and V-Fend for filamentous fungi on a BAL.    Last seen one month ago. Since his last visit he thinks his breathing is better compared to 5-19 and thinks is also better since he was last seen on some days.  Is able to walk and was cutting wood this week. Thinks that heat and humidity significantly affects his breathing.  Still with productive cough that is chronic.  Also has noticed increased bilateral hip pain with difficulty walking.  Has been seen in Orthopedic clinic in the past, received hip and lower thoracic injections with benefit.    The patient was seen and examined by Rajendra Flanagan MD           Current Outpatient Medications   Medication     acetaminophen (TYLENOL) 325 MG tablet     ASPIRIN 81 MG PO TABS     atorvastatin (LIPITOR) 80 MG tablet     azelastine (ASTELIN) 0.1 % nasal spray     azithromycin (ZITHROMAX) 250 MG tablet     fluticasone (FLONASE) 50 MCG/ACT nasal spray     gabapentin 8 % GEL     Glucosamine-Chondroitin (OSTEO BI-FLEX REGULAR STRENGTH PO)      metoprolol succinate (TOPROL-XL) 25 MG 24 hr tablet     MULTI VITAMIN MENS OR     Naproxen Sodium (ALEVE PO)     predniSONE (DELTASONE) 20 MG tablet     predniSONE (DELTASONE) 20 MG tablet     sulfamethoxazole-trimethoprim (BACTRIM DS/SEPTRA DS) 800-160 MG tablet     sulfamethoxazole-trimethoprim (BACTRIM DS/SEPTRA DS) 800-160 MG tablet     tamsulosin (FLOMAX) 0.4 MG capsule     No current facility-administered medications for this visit.      Allergies   Allergen Reactions     Amoxicillin Diarrhea            Lisinopril Cough     Past Medical History:   Diagnosis Date      Coronary artery disease, 4/ 2010, status post drug-eluting stent placement to the LAD and balloon angioplasty to the obtuse marginal.  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       Past Surgical History:   Procedure Laterality Date     BRONCHOSCOPY (RIGID OR FLEXIBLE), DIAGNOSTIC  7/14/2011    Procedure:COMBINED BRONCHOSCOPY (RIGID OR FLEXIBLE), LAVAGE; Surgeon:REYNA BAILEY; Location:[facility]     BRONCHOSCOPY (RIGID OR FLEXIBLE), DIAGNOSTIC N/A 5/29/2019    Procedure: BRONCHOSCOPY, WITH BRONCHOALVEOLAR LAVAGE;  Surgeon: Perlman, David Morris, MD;  Location: [facility]     COLONOSCOPY       ENT SURGERY      tonsils     ORTHOPEDIC SURGERY      rt knee arthroscopy     SURGICAL HISTORY OF -       tonsils     SURGICAL HISTORY OF -   1/19/84    1.Exam under anesthesia, 2.Arthroscopy, 3.Arthroscopic medial  meniscectomy, 4.arthroscopic trimming of patellar articular cartilage.     SURGICAL HISTORY OF -       vasectomy     SURGICAL HISTORY OF -   90    colonoscopy     SURGICAL HISTORY OF -   10/20/92    flexible sigmoidoscopy     SURGICAL HISTORY OF -   2000    colonoscopy and polypectomy     THORACOSCOPIC BIOPSY LUNG  2011    Procedure:THORACOSCOPIC BIOPSY LUNG; Video Assisted Right Thoracoscopy with Biopsy; Surgeon:JIM EPSTEIN; Location: OR       Social History     Socioeconomic History     Marital status:      Spouse name: Not on file     Number of children: Not on file     Years of education: Not on file     Highest education level: Not on file   Occupational History     Employer: RETIRED   Social Needs     Financial resource strain: Not on file     Food insecurity:     Worry: Not on file     Inability: Not on file     Transportation needs:     Medical: Not on file     Non-medical: Not on file   Tobacco Use     Smoking status: Former Smoker     Types: Cigarettes     Last attempt to quit: 1975     Years since quittin.6     Smokeless tobacco: Former User     Tobacco comment: started smoking at 21 years of age   Substance and Sexual Activity     Alcohol use: Yes     Alcohol/week: 0.0 oz     Comment: every other day     Drug use: No     Sexual activity: Yes     Partners: Female   Lifestyle     Physical activity:     Days per week: Not on file     Minutes per session: Not on file     Stress: Not on file   Relationships     Social connections:     Talks on phone: Not on file     Gets together: Not on file     Attends [employer] service: Not on file     Active member of club or organization: Not on file     Attends meetings of clubs or organizations: Not on file     Relationship status: Not on file     Intimate partner violence:     Fear of current or ex partner: Not on file     Emotionally abused: Not on file     Physically abused: Not on file     Forced sexual activity: Not on  file   Other Topics Concern     Parent/sibling w/ CABG, MI or angioplasty before 65F 55M? No      Service Not Asked     Blood Transfusions Not Asked     Caffeine Concern Not Asked     Occupational Exposure Not Asked     Hobby Hazards Not Asked     Sleep Concern Not Asked     Stress Concern Not Asked     Weight Concern Not Asked     Special Diet No     Back Care Not Asked     Exercise Yes     Comment: active     Bike Helmet Not Asked     Seat Belt Not Asked     Self-Exams Not Asked   Social History Narrative     Not on file       ROS Pulmonary  A complete ROS was otherwise negative except as noted in the HPI.  There were no vitals taken for this visit.  Exam:   GENERAL APPEARANCE: Well developed, well nourished, alert, and in no apparent distress.  EYES: PERRL, EOMI  HENT: Nasal mucosa with no edema and no hyperemia. No nasal polyps.  EARS: Canals clear, TMs normal  MOUTH: Oral mucosa is moist, without any lesions, no tonsillar enlargement, no oropharyngeal exudate.  NECK: supple, no masses, no thyromegaly.  LYMPHATICS: No significant axillary, cervical, or supraclavicular nodes.  RESP: Mildly decreased air flow throughout.  No crackles. No rhonchi. No wheezes.  CV: Normal S1, S2, regular rhythm, normal rate. No murmur.  No rub. No gallop. No LE edema.   ABDOMEN:  Bowel sounds normal, soft, nontender, no HSM or masses.   MS: extremities normal. No clubbing. No cyanosis.  SKIN: no rash on limited exam  NEURO: Mentation intact, speech normal, normal strength and tone, normal gait and stance  PSYCH: mentation appears normal. and affect normal/bright  Results:  No results found for this or any previous visit (from the past 168 hour(s)).    Assessment and plan:   84 year old male with a past medical history significant for mantle cell lymphoma s/p autologous PBSCT, HTN, and relapsed .   Had remained off prednisone for over one year.  Also with previous history of fungal pneumonia.  Represented to clinic with  recurrence of symptoms for past 1-1.5 years and CXR changes with decrease in PFT's.  Overall concerning for recurrence of infection or , with duration of symptoms suspect this is a recurrence of the .  Bronchoscopy with BAL performed and all cultures were negative; started on prednisone 40 mg one month ago.  CT chest is somewhat improved since his last visit with symptoms of shortness of breath better since May. Chronic back and hip pain, needs Orthopedics follow-up.    1. Decrease prednisone to 3 0 mg per day for one month then decrease to 20 mg until seen back in clinic  2. F/U CT chest at time of clinic follow-up  3. Asked patient to monitor his respiratory symptoms  4.Suggested follow-up in Orthopedics clinic for back and him pain, last seen one year ago, received injections after those visits     RTC in 2 months with repeat CT.  Patient to call with any questions or concerns prior to his next clinic visit          Again, thank you for allowing me to participate in the care of your patient.      Sincerely,    Rajendra Flanagan MD

## 2019-09-10 ENCOUNTER — DOCUMENTATION ONLY (OUTPATIENT)
Dept: CARE COORDINATION | Facility: CLINIC | Age: 84
End: 2019-09-10

## 2019-09-10 ENCOUNTER — OFFICE VISIT (OUTPATIENT)
Dept: FAMILY MEDICINE | Facility: CLINIC | Age: 84
End: 2019-09-10
Payer: MEDICARE

## 2019-09-10 VITALS
WEIGHT: 190 LBS | HEIGHT: 67 IN | OXYGEN SATURATION: 94 % | HEART RATE: 97 BPM | RESPIRATION RATE: 15 BRPM | TEMPERATURE: 97.8 F | SYSTOLIC BLOOD PRESSURE: 118 MMHG | DIASTOLIC BLOOD PRESSURE: 80 MMHG | BODY MASS INDEX: 29.82 KG/M2

## 2019-09-10 DIAGNOSIS — I25.119 CORONARY ARTERY DISEASE INVOLVING NATIVE CORONARY ARTERY OF NATIVE HEART WITH ANGINA PECTORIS (H): ICD-10-CM

## 2019-09-10 DIAGNOSIS — B37.0 THRUSH: ICD-10-CM

## 2019-09-10 DIAGNOSIS — Z00.00 ROUTINE GENERAL MEDICAL EXAMINATION AT A HEALTH CARE FACILITY: ICD-10-CM

## 2019-09-10 DIAGNOSIS — Z00.00 ENCOUNTER FOR MEDICARE ANNUAL WELLNESS EXAM: Primary | ICD-10-CM

## 2019-09-10 DIAGNOSIS — G31.84 MCI (MILD COGNITIVE IMPAIRMENT): ICD-10-CM

## 2019-09-10 DIAGNOSIS — N40.1 BENIGN PROSTATIC HYPERPLASIA WITH LOWER URINARY TRACT SYMPTOMS, SYMPTOM DETAILS UNSPECIFIED: ICD-10-CM

## 2019-09-10 DIAGNOSIS — Z23 NEED FOR PROPHYLACTIC VACCINATION AND INOCULATION AGAINST INFLUENZA: ICD-10-CM

## 2019-09-10 PROCEDURE — 90662 IIV NO PRSV INCREASED AG IM: CPT | Performed by: NURSE PRACTITIONER

## 2019-09-10 PROCEDURE — 99213 OFFICE O/P EST LOW 20 MIN: CPT | Mod: 25 | Performed by: NURSE PRACTITIONER

## 2019-09-10 PROCEDURE — G0438 PPPS, INITIAL VISIT: HCPCS | Performed by: NURSE PRACTITIONER

## 2019-09-10 PROCEDURE — G0008 ADMIN INFLUENZA VIRUS VAC: HCPCS | Performed by: NURSE PRACTITIONER

## 2019-09-10 RX ORDER — ATORVASTATIN CALCIUM 80 MG/1
80 TABLET, FILM COATED ORAL DAILY
Qty: 90 TABLET | Refills: 3 | Status: SHIPPED | OUTPATIENT
Start: 2019-09-10 | End: 2020-08-31

## 2019-09-10 RX ORDER — METOPROLOL SUCCINATE 25 MG/1
25 TABLET, EXTENDED RELEASE ORAL DAILY
Qty: 90 TABLET | Refills: 3 | Status: SHIPPED | OUTPATIENT
Start: 2019-09-10 | End: 2022-06-03

## 2019-09-10 RX ORDER — TAMSULOSIN HYDROCHLORIDE 0.4 MG/1
0.4 CAPSULE ORAL DAILY
Qty: 90 CAPSULE | Refills: 3 | Status: SHIPPED | OUTPATIENT
Start: 2019-09-10 | End: 2020-08-31

## 2019-09-10 RX ORDER — NYSTATIN 100000/ML
500000 SUSPENSION, ORAL (FINAL DOSE FORM) ORAL 4 TIMES DAILY
Qty: 400 ML | Refills: 0 | Status: SHIPPED | OUTPATIENT
Start: 2019-09-10 | End: 2020-07-08

## 2019-09-10 ASSESSMENT — MIFFLIN-ST. JEOR: SCORE: 1516.49

## 2019-09-10 NOTE — PROGRESS NOTES
"SUBJECTIVE:   Dangelo Hernández is a 84 year old male who presents for Preventive Visit.  {PVP to remind patient that this is not necessarily a physical exam; physical exam may or may not be done:762295::\"click delete button to remove this line now\"}  {PVP to inform patient that additional E&M charge may apply, if additional problems addressed:103399::\"click delete button to remove this line now\"}  Are you in the first 12 months of your Medicare Part B coverage?  { :756742::\"No\"}    Physical Health:    In general, how would you rate your overall physical health? { :134651}    Outside of work, how many days during the week do you exercise? { :049708}    Outside of work, approximately how many minutes a day do you exercise?{ :430176}    If you drink alcohol do you typically have >3 drinks per day or >7 drinks per week? { :621185}    Do you usually eat at least 4 servings of fruit and vegetables a day, include whole grains & fiber and avoid regularly eating high fat or \"junk\" foods? { :339029::\"Yes\"}    Do you have any problems taking medications regularly?  { :074775::\"No\"}    Do you have any side effects from medications? { :761676}    Needs assistance for the following daily activities: { :915568}    Which of the following safety concerns are present in your home?  { :365476::\"none identified\"}     Hearing impairment: { :089414}    In the past 6 months, have you been bothered by leaking of urine? { :593284}    Mental Health:    In general, how would you rate your overall mental or emotional health? { :212815}  PHQ-2 Score:      Do you feel safe in your environment? { :171377}    Do you have a Health Care Directive? { :482982}    Additional concerns to address?  {If YES, notify patient they may be responsible for a copay, coinsurance or deductible if the visit today includes services such as checking on a sore throat, having an x-ray or lab test, or treating and evaluating a new or existing condition " ":497796::\"No\"}    Fall risk:  { :817861}  {If any of the above assessments are answered yes, consider ordering appropriate referrals (Optional):719665::\"click delete button to remove this line now\"}  Cognitive Screening: { :710684}    {Do you have sleep apnea, excessive snoring or daytime drowsiness? (Optional):326865}    {Outside tests to abstract? :052573}    {additional problems to add (Optional):631312}    Reviewed and updated as needed this visit by clinical staff         Reviewed and updated as needed this visit by Provider        Social History     Tobacco Use     Smoking status: Former Smoker     Types: Cigarettes     Last attempt to quit: 1975     Years since quittin.7     Smokeless tobacco: Former User     Tobacco comment: started smoking at 21 years of age   Substance Use Topics     Alcohol use: Yes     Alcohol/week: 0.0 oz     Comment: every other day                           Current providers sharing in care for this patient include: {Rooming staff:  Please update Care Team in Rooming Activity, refresh this note and then delete this statement}  Patient Care Team:  Dony Acuña MD as PCP - General (Family Practice)  Abida Lott MD as BMT Physician  Merly Ashraf LICSW as  (Transplant)  Rajendra Flanagan MD as MD (Internal Medicine)  Yoselin Townsend, GEMINI as BMT Nurse Coordinator  Ludin Schulz MD as MD (Family Medicine - Sports Medicine)  Dony Acuña MD as Assigned PCP  Rajendra Flanagan MD as MD (Pulmonary)    The following health maintenance items are reviewed in Epic and correct as of today:  Health Maintenance   Topic Date Due     MEDICARE ANNUAL WELLNESS VISIT  2010     ZOSTER IMMUNIZATION (2 of 3) 2014     FALL RISK ASSESSMENT  10/03/2018     PHQ-2  2019     INFLUENZA VACCINE (1) 2019     ADVANCE CARE PLANNING  2020     DTAP/TDAP/TD IMMUNIZATION (6 - Td) 2023     COLONOSCOPY  10/03/2027     PNEUMOCOCCAL IMMUNIZATION " "65+ HIGH/HIGHEST RISK  Completed     IPV IMMUNIZATION  Aged Out     MENINGITIS IMMUNIZATION  Aged Out     {Chronicprobdata (Optional):776941}  {Decision Support (Optional):398396}    ROS:  {ROS COMP:834367}    OBJECTIVE:   There were no vitals taken for this visit. Estimated body mass index is 30.7 kg/m  as calculated from the following:    Height as of 19: 1.702 m (5' 7\").    Weight as of 19: 88.9 kg (196 lb).  EXAM:   {Exam :918885}    {Diagnostic Test Results (Optional):870938::\"Diagnostic Test Results:\",\"Labs reviewed in Epic\"}    ASSESSMENT / PLAN:   {Diag Picklist:193615}    End of Life Planning:  Patient currently has an advanced directive: { :823091}    COUNSELING:  {Medicare Counselin}    Estimated body mass index is 30.7 kg/m  as calculated from the following:    Height as of 19: 1.702 m (5' 7\").    Weight as of 19: 88.9 kg (196 lb).    {Weight Management Plan (ACO) Complete if BMI is abnormal-  Ages 18-64  BMI >24.9.  Age 65+ with BMI <23 or >30 (Optional):642383}     reports that he quit smoking about 44 years ago. His smoking use included cigarettes. He has quit using smokeless tobacco.  {Tobacco Cessation -- Complete if patient is a smoker (Optional):114360}    Appropriate preventive services were discussed with this patient, including applicable screening as appropriate for cardiovascular disease, diabetes, osteopenia/osteoporosis, and glaucoma.  As appropriate for age/gender, discussed screening for colorectal cancer, prostate cancer, breast cancer, and cervical cancer. Checklist reviewing preventive services available has been given to the patient.    Reviewed patients plan of care and provided an AVS. The {CarePlan:645862} for Dangelo meets the Care Plan requirement. This Care Plan has been established and reviewed with the {PATIENT, FAMILY MEMBER, CAREGIVER:294675}.    Counseling Resources:  ATP IV Guidelines  Pooled Cohorts Equation Calculator  Breast Cancer Risk " Calculator  FRAX Risk Assessment  ICSI Preventive Guidelines  Dietary Guidelines for Americans, 2010  USDA's MyPlate  ASA Prophylaxis  Lung CA Screening    Ashely Grimm NP  [facility] [place]

## 2019-09-10 NOTE — PROGRESS NOTES
"  SUBJECTIVE:   Dangelo Hernández is a 84 year old male who presents for Preventive Visit.  Are you in the first 12 months of your Medicare Part B coverage?  No    Physical Health:    In general, how would you rate your overall physical health? fair    Outside of work, how many days during the week do you exercise? 4-5 days/week    Outside of work, approximately how many minutes a day do you exercise?30-45 minutes    If you drink alcohol do you typically have >3 drinks per day or >7 drinks per week? No    Do you usually eat at least 4 servings of fruit and vegetables a day, include whole grains & fiber and avoid regularly eating high fat or \"junk\" foods? NO    Do you have any problems taking medications regularly?  No    Do you have any side effects from medications? Prednisone is making his face red and his feels like he has alot of pressure in his head.     Needs assistance for the following daily activities: no assistance needed    Which of the following safety concerns are present in your home?  lack of grab bars in the bathroom     Hearing impairment: No    In the past 6 months, have you been bothered by leaking of urine? no    Mental Health:    In general, how would you rate your overall mental or emotional health? good  PHQ-2 Score:  0    Do you feel safe in your environment? Yes    Do you have a Health Care Directive? No: Advance care planning reviewed with patient; information given to patient to review. Patient was given Health Care Directive to bring home and update.      Additional concerns to address?  YES  ENT Symptoms             Symptoms: cc Present Absent Comment   Fever/Chills   x    Fatigue  x     Muscle Aches   x    Eye Irritation  x     Sneezing   x    Nasal Goyo/Drg  x     Sinus Pressure/Pain   x    Loss of smell   x    Dental pain   x    Sore Throat x x     Swollen Glands  x     Ear Pain/Fullness   x    Cough  x     Wheeze  x     Chest Pain   x    Shortness of breath   x    Rash   x  "   Other   x      Symptom duration:  1 month   Symptom severity:  moderate    Treatments tried:  Patient is currently taking Prednisone, has had throat pain since he started taking prednisone for lung infection   Contacts:  none      Fall risk:  Fallen 2 or more times in the past year?: No  Any fall with injury in the past year?: No  click delete button to remove this line now  Cognitive Screenin) Repeat 3 items (Leader, Season, Table)    2) Clock draw: NORMAL  3) 3 item recall: Recalls 1 object   Results: 1-2 items recalled: PROBABLE COGNITIVE IMPAIRMENT    Mini-CogTM Copyright S Fadi. Licensed by the author for use in [employer]; reprinted with permission (enid@[facility]). All rights reserved.      Do you have sleep apnea, excessive snoring or daytime drowsiness?: yes, daytime drowsiness    Reviewed and updated as needed this visit by clinical staff  Tobacco  Allergies  Meds  Problems  Med Hx  Surg Hx  Fam Hx  Soc Hx          Reviewed and updated as needed this visit by Provider  Tobacco  Allergies  Meds  Problems  Med Hx  Surg Hx  Fam Hx        Social History     Tobacco Use     Smoking status: Former Smoker     Types: Cigarettes     Last attempt to quit: 1975     Years since quittin.7     Smokeless tobacco: Former User     Tobacco comment: started smoking at 21 years of age   Substance Use Topics     Alcohol use: Yes     Alcohol/week: 0.0 oz     Comment: every other day                           Current providers sharing in care for this patient include:   Patient Care Team:  Dony Acuña MD as PCP - General (Family Practice)  Abida Lott MD as BMT Physician  Merly Ashraf LICSW as  (Transplant)  Rajendra Flanagan MD as MD (Internal Medicine)  Yoselin Townsend RN as BMT Nurse Coordinator  Ludin Schulz MD as MD (Family Medicine - Sports Medicine)  Dony Acuña MD as Assigned PCP  Rajendra Flanagan MD as MD (Pulmonary)    The following  health maintenance items are reviewed in Epic and correct as of today:  Health Maintenance   Topic Date Due     MEDICARE ANNUAL WELLNESS VISIT  03/24/2010     ZOSTER IMMUNIZATION (2 of 3) 12/11/2014     FALL RISK ASSESSMENT  10/03/2018     PHQ-2  01/01/2019     INFLUENZA VACCINE (1) 09/01/2019     ADVANCE CARE PLANNING  08/12/2020     DTAP/TDAP/TD IMMUNIZATION (6 - Td) 09/26/2023     COLONOSCOPY  10/03/2027     PNEUMOCOCCAL IMMUNIZATION 65+ HIGH/HIGHEST RISK  Completed     IPV IMMUNIZATION  Aged Out     MENINGITIS IMMUNIZATION  Aged Out     BP Readings from Last 3 Encounters:   09/10/19 118/80   08/22/19 103/63   07/16/19 139/69    Wt Readings from Last 3 Encounters:   09/10/19 86.2 kg (190 lb)   08/22/19 88.9 kg (196 lb)   07/16/19 88.5 kg (195 lb 3.2 oz)                  Patient Active Problem List   Diagnosis     Tobacco use disorder     Benign Colon Polyps     Mantle Cell Lymphoma S/P Autologous Transplant     Combined hyperlipidemia      Coronary artery disease, 4/ 2010, status post drug-eluting stent placement to the LAD and balloon angioplasty to the obtuse marginal.      Hypertension goal BP (blood pressure) < 140/90     Encounter for long-term current use of medication     History of blood disorder     Pneumonia, candidial (H)     Pneumonia in mycoses     Acute renal failure with other specified pathological lesion in kidney (H)     GERD (gastroesophageal reflux disease)     Cryptogenic organizing pneumonia (H)     Advanced directives, counseling/discussion     Past Surgical History:   Procedure Laterality Date     BRONCHOSCOPY (RIGID OR FLEXIBLE), DIAGNOSTIC  7/14/2011    Procedure:COMBINED BRONCHOSCOPY (RIGID OR FLEXIBLE), LAVAGE; Surgeon:REYNA BAILEY; Location: GI     BRONCHOSCOPY (RIGID OR FLEXIBLE), DIAGNOSTIC N/A 5/29/2019    Procedure: BRONCHOSCOPY, WITH BRONCHOALVEOLAR LAVAGE;  Surgeon: Perlman, David Morris, MD;  Location:  GI     COLONOSCOPY       ENT SURGERY      tonsils     ORTHOPEDIC  SURGERY      rt knee arthroscopy     SURGICAL HISTORY OF -       tonsils     SURGICAL HISTORY OF -   84    1.Exam under anesthesia, 2.Arthroscopy, 3.Arthroscopic medial meniscectomy, 4.arthroscopic trimming of patellar articular cartilage.     SURGICAL HISTORY OF -       vasectomy     SURGICAL HISTORY OF -   90    colonoscopy     SURGICAL HISTORY OF -   10/20/92    flexible sigmoidoscopy     SURGICAL HISTORY OF -   2000    colonoscopy and polypectomy     THORACOSCOPIC BIOPSY LUNG  2011    Procedure:THORACOSCOPIC BIOPSY LUNG; Video Assisted Right Thoracoscopy with Biopsy; Surgeon:JIM EPSTEIN; Location: OR       Social History     Tobacco Use     Smoking status: Former Smoker     Types: Cigarettes     Last attempt to quit: 1975     Years since quittin.7     Smokeless tobacco: Former User     Tobacco comment: started smoking at 21 years of age   Substance Use Topics     Alcohol use: Yes     Alcohol/week: 0.0 oz     Comment: every other day     Family History   Problem Relation Age of Onset     Cancer Mother         Lung--did not smoke     Cardiovascular Father         MI age 73     Lipids Sister      Hypertension Sister      Cancer Sister         Ovarian- at age 65         Current Outpatient Medications   Medication Sig Dispense Refill     acetaminophen (TYLENOL) 325 MG tablet Take 325-650 mg by mouth every 6 hours as needed for mild pain or pain       ASPIRIN 81 MG PO TABS ONE DAILY  3     atorvastatin (LIPITOR) 80 MG tablet Take 1 tablet (80 mg) by mouth daily 90 tablet 3     azelastine (ASTELIN) 0.1 % nasal spray Spray 1 spray into both nostrils 2 times daily 3 Bottle 3     Glucosamine-Chondroitin (OSTEO BI-FLEX REGULAR STRENGTH PO) Take 1 tablet by mouth daily       metoprolol succinate ER (TOPROL-XL) 25 MG 24 hr tablet Take 1 tablet (25 mg) by mouth daily 90 tablet 3     MULTI VITAMIN MENS OR 1 tablet daily       Naproxen Sodium (ALEVE PO) Take 275 mg by mouth        "nystatin (MYCOSTATIN) 539817 UNIT/ML suspension Take 5 mLs (500,000 Units) by mouth 4 times daily 400 mL 0     predniSONE (DELTASONE) 20 MG tablet Take 2 tablets (40 mg) by mouth daily (Patient taking differently: Take 30 mg by mouth daily ) 60 tablet 3     tamsulosin (FLOMAX) 0.4 MG capsule Take 1 capsule (0.4 mg) by mouth daily 90 capsule 3     Allergies   Allergen Reactions     Amoxicillin Diarrhea            Lisinopril Cough     ROS:  CONSTITUTIONAL: NEGATIVE for fever, chills, change in weight  INTEGUMENTARY/SKIN: NEGATIVE for worrisome rashes, moles or lesions  ENT/MOUTH: POSITIVE for sore throat  RESP: NEGATIVE for significant cough or SOB  CV: NEGATIVE for chest pain, palpitations or peripheral edema' Hx of CAD and wants referral to a new cardiologist  : Hx of BHP taking Flomax which is helpful  PSYCHIATRIC: NEGATIVE for changes in mood or affect  ROS otherwise negative    OBJECTIVE:   /80   Pulse 97   Temp 97.8  F (36.6  C) (Tympanic)   Resp 15   Ht 1.695 m (5' 6.75\")   Wt 86.2 kg (190 lb)   SpO2 94%   BMI 29.98 kg/m   Estimated body mass index is 29.98 kg/m  as calculated from the following:    Height as of this encounter: 1.695 m (5' 6.75\").    Weight as of this encounter: 86.2 kg (190 lb).  EXAM:   GENERAL: healthy, alert and no distress  EYES: Eyes grossly normal to inspection, PERRL and conjunctivae and sclerae normal  HENT: normal cephalic/atraumatic, ear canals and TM's normal, nose and mouth without ulcers or lesions, oral mucous membranes moist and patient has white coating on back of tongue and upper palate with erythema  NECK: no adenopathy, no asymmetry, masses, or scars and thyroid normal to palpation  RESP: lungs clear to auscultation - no rales, rhonchi or wheezes  CV: regular rate and rhythm, normal S1 S2, no S3 or S4, no murmur, click or rub, no peripheral edema and peripheral pulses strong  ABDOMEN: soft, nontender, no hepatosplenomegaly, no masses and bowel sounds " normal  MS: no gross musculoskeletal defects noted, no edema  SKIN: no suspicious lesions or rashes  BACK: no CVA tenderness, no paralumbar tenderness  PSYCH: mentation appears normal, affect normal/bright  LYMPH: no cervical adenopathy    Diagnostic Test Results:  Labs reviewed in Epic    ASSESSMENT / PLAN:   1. Encounter for Medicare annual wellness exam  Patient seems to have some mild cognitive changes.  He knows the time but did not look at which hand was the little or big one when drawing his clock and only remembered one of 3 recall words.  He is currently living at home alone but will be having his great grand-daughter moving into his apartment in the basement.  He is getting around okay with falls.  Recommend making appointment for shingles vaccine, received flu vaccination today.  Mood is good with no depression or anxiety.  Recommend follow-up in 1 year.    2. Need for prophylactic vaccination and inoculation against influenza  - FLU VACCINE, INCREASED ANTIGEN, PRESV FREE, AGE 65+ [34280]  - Vaccine Administration, Initial [22451]    3. Coronary artery disease involving native coronary artery of native heart with angina pectoris (H)  Refill on Metoprolol and Lipitor for 1 year since he recently had labs completed.  Referral placed for Cardiology at the [facility] and phone number given to make appointment to establish with a new cardiologist since he did not like the last one and is due for yearly visit.  - atorvastatin (LIPITOR) 80 MG tablet; Take 1 tablet (80 mg) by mouth daily  Dispense: 90 tablet; Refill: 3  - metoprolol succinate ER (TOPROL-XL) 25 MG 24 hr tablet; Take 1 tablet (25 mg) by mouth daily  Dispense: 90 tablet; Refill: 3  - CARDIOLOGY EVAL ADULT REFERRAL    4. Benign prostatic hyperplasia with lower urinary tract symptoms, symptom details unspecified  Stable.  Refill given for 1 year.  - tamsulosin (FLOMAX) 0.4 MG capsule; Take 1 capsule (0.4 mg) by mouth daily  Dispense: 90 capsule; Refill:  "3    5. Thrush  Ordered Nystatin for treatment.  Follow-up in clinic if any worsening or persistent symptoms despite this treatment.  - nystatin (MYCOSTATIN) 242895 UNIT/ML suspension; Take 5 mLs (500,000 Units) by mouth 4 times daily  Dispense: 400 mL; Refill: 0    6. MCI (mild cognitive impairment)  No major concerns and he will be having a family member living with him.  Recommend follow-up in 1 year for recheck.    7. Routine general medical examination at a health care facility  Patient is here today for medication refills and exam.  Labs reviewed and were recently completed.  Patient is due for cardiology follow-up and does not want to go back to [facility].  He is seen for his treatments at the [facility] and would like referral there and this was placed in the computer with phone number provided to patient.  He does have some cognitive delays but is oriented to person, place and time and most of it is not hearing instructions and short term memory issues.  No concerns at this time for fall risk.  Patient is keeping up with vision and dental care.  Recommended Shingles vaccine.  Received flu vaccine today.  Follow-up in 1 year.      End of Life Planning:  Patient currently has an advanced directive: No.  I have verified the patient's ablity to prepare an advanced directive/make health care decisions.  Literature was provided to assist patient in preparing an advanced directive.  Recommended that he read through this with his daughter and fill it out and get it notarized and returned to clinic because he wants to be a DNR/DNI.    COUNSELING:  Reviewed preventive health counseling, as reflected in patient instructions       Regular exercise       Healthy diet/nutrition       Vision screening       Dental care       Immunizations    Vaccinated for: Influenza      Estimated body mass index is 29.98 kg/m  as calculated from the following:    Height as of this encounter: 1.695 m (5' 6.75\").    Weight as of this " encounter: 86.2 kg (190 lb).    Weight management plan: Discussed healthy diet and exercise guidelines     reports that he quit smoking about 44 years ago. His smoking use included cigarettes. He has quit using smokeless tobacco.      Appropriate preventive services were discussed with this patient, including applicable screening as appropriate for cardiovascular disease, diabetes, osteopenia/osteoporosis, and glaucoma.  As appropriate for age/gender, discussed screening for colorectal cancer, prostate cancer, breast cancer, and cervical cancer. Checklist reviewing preventive services available has been given to the patient.    Reviewed patients plan of care and provided an AVS. The Basic Care Plan (routine screening as documented in Health Maintenance) for Dangelo meets the Care Plan requirement. This Care Plan has been established and reviewed with the Patient.    Ashely Grimm NP  [facility]

## 2019-09-10 NOTE — PATIENT INSTRUCTIONS
1.  Use Nystatin as directed until symptoms subside.  2.  If you continue to have persistent symptoms, recommend follow-up in clinic.  3.  Make appointment in 1 year for labs, medication refills and follow-up.  4. Call to Schedule an appointment with Cardiology at the [facility], [phone]    Personalized Prevention Plan  You are due for the preventive services outlined below.  Your care team is available to assist you in scheduling these services.  If you have already completed any of these items, please share that information with your care team to update in your medical record.  Health Maintenance Due   Topic Date Due     Annual Wellness Visit  03/24/2010     Zoster (Shingles) Vaccine (2 of 3) 12/11/2014     FALL RISK ASSESSMENT  10/03/2018     PHQ-2  01/01/2019     Flu Vaccine (1) 09/01/2019        Preventive Health Recommendations:     See your health care provider every year to    Review health changes.     Discuss preventive care.      Review your medicines if your doctor has prescribed any.      Talk with your health care provider about whether you should have a test to screen for prostate cancer (PSA).    Every 3 years, have a diabetes test (fasting glucose). If you are at risk for diabetes, you should have this test more often.    Every 5 years, have a cholesterol test. Have this test more often if you are at risk for high cholesterol or heart disease.     Talk to with your health care provider about screening for Abdominal Aortic Aneurysm if you have a family history of AAA or have a history of smoking.    Shots:     Get a flu shot each year.     Get a tetanus shot every 10 years.     Talk to your doctor about your pneumonia vaccines. There are now two you should receive - Pneumovax (PPSV 23) and Prevnar (PCV 13).     Talk to your pharmacist about a shingles vaccine.     Talk to your doctor about the hepatitis B vaccine.  Nutrition:     Eat at least 5 servings of fruits and vegetables each day.     Eat  whole-grain bread, whole-wheat pasta and brown rice instead of white grains and rice.     Get adequate Calcium and Vitamin D.   Lifestyle    Exercise for at least 150 minutes a week (30 minutes a day, 5 days a week). This will help you control your weight and prevent disease.     Limit alcohol to one drink per day.     No smoking.     Wear sunscreen to prevent skin cancer.    See your dentist every six months for an exam and cleaning.    See your eye doctor every 1 to 2 years to screen for conditions such as glaucoma, macular degeneration, cataracts, etc.    Personalized Prevention Plan  You are due for the preventive services outlined below.  Your care team is available to assist you in scheduling these services.  If you have already completed any of these items, please share that information with your care team to update in your medical record.  Health Maintenance Due   Topic Date Due     Annual Wellness Visit  03/24/2010     Zoster (Shingles) Vaccine (2 of 3) 12/11/2014     FALL RISK ASSESSMENT  10/03/2018     PHQ-2  01/01/2019     Flu Vaccine (1) 09/01/2019

## 2019-10-01 ENCOUNTER — OFFICE VISIT (OUTPATIENT)
Dept: ORTHOPEDICS | Facility: CLINIC | Age: 84
End: 2019-10-01
Payer: MEDICARE

## 2019-10-01 DIAGNOSIS — M51.26 LUMBAR DISC HERNIATION: ICD-10-CM

## 2019-10-01 DIAGNOSIS — M51.24 THORACIC DISC HERNIATION: Primary | ICD-10-CM

## 2019-10-01 DIAGNOSIS — M54.16 LUMBAR RADICULAR PAIN: ICD-10-CM

## 2019-10-01 NOTE — PROGRESS NOTES
HISTORY OF PRESENT ILLNESS  Mr. Hernández is a pleasant 84 year old year old male who presents to clinic today for followup for lumbar pain  Has not had much improvement since last year when he had his last injection  Would like another one    MEDICAL HISTORY  Patient Active Problem List   Diagnosis     Tobacco use disorder     Benign Colon Polyps     Mantle Cell Lymphoma S/P Autologous Transplant     Combined hyperlipidemia      Coronary artery disease, 4/ 2010, status post drug-eluting stent placement to the LAD and balloon angioplasty to the obtuse marginal.      Hypertension goal BP (blood pressure) < 140/90     Encounter for long-term current use of medication     History of blood disorder     Pneumonia, candidial (H)     Pneumonia in mycoses     Acute renal failure with other specified pathological lesion in kidney (H)     GERD (gastroesophageal reflux disease)     Cryptogenic organizing pneumonia (H)     Advanced directives, counseling/discussion       Current Outpatient Medications   Medication Sig Dispense Refill     acetaminophen (TYLENOL) 325 MG tablet Take 325-650 mg by mouth every 6 hours as needed for mild pain or pain       ASPIRIN 81 MG PO TABS ONE DAILY  3     atorvastatin (LIPITOR) 80 MG tablet Take 1 tablet (80 mg) by mouth daily 90 tablet 3     azelastine (ASTELIN) 0.1 % nasal spray Spray 1 spray into both nostrils 2 times daily 3 Bottle 3     metoprolol succinate ER (TOPROL-XL) 25 MG 24 hr tablet Take 1 tablet (25 mg) by mouth daily 90 tablet 3     MULTI VITAMIN MENS OR 1 tablet daily       Naproxen Sodium (ALEVE PO) Take 275 mg by mouth       nystatin (MYCOSTATIN) 253066 UNIT/ML suspension Take 5 mLs (500,000 Units) by mouth 4 times daily 400 mL 0     predniSONE (DELTASONE) 20 MG tablet Take 2 tablets (40 mg) by mouth daily (Patient taking differently: Take 10 mg by mouth daily ) 60 tablet 3     tamsulosin (FLOMAX) 0.4 MG capsule Take 1 capsule (0.4 mg) by mouth daily 90 capsule 3      Glucosamine-Chondroitin (OSTEO BI-FLEX REGULAR STRENGTH PO) Take 1 tablet by mouth daily         Allergies   Allergen Reactions     Amoxicillin Diarrhea            Lisinopril Cough       Family History   Problem Relation Age of Onset     Cancer Mother         Lung--did not smoke     Cardiovascular Father         MI age 73     Lipids Sister      Hypertension Sister      Cancer Sister         Ovarian- at age 65       Additional medical/Social/Surgical histories reviewed in [facility] and updated as appropriate.     REVIEW OF SYSTEMS (10/1/2019)  10 point ROS of systems including Constitutional, Eyes, Respiratory, Cardiovascular, Gastroenterology, Genitourinary, Integumentary, Musculoskeletal, Psychiatric were all negative except for pertinent positives noted in my HPI.     PHYSICAL EXAM  VSS    General  - normal appearance, in no obvious distress  CV  - normal peripheral perfusion  Pulm  - normal respiratory pattern, non-labored  Musculoskeletal - lumbar spine  - stance: normal gait without limp, no obvious leg length discrepancy, normal heel and toe walk  - inspection: normal bone and joint alignment, no obvious scoliosis  - palpation: no paravertebral or bony tenderness  - ROM: flexion slightly exacerbates pain, normal extension, sidebending, rotation  - strength: lower extremities 5/5 in all planes  - special tests:  (-) straight leg raise  (+) slump test  Neuro  - patellar and Achilles DTRs 2+ bilaterally, no lower extremity sensory deficit throughout L5 distribution, grossly normal coordination, normal muscle tone  Skin  - no ecchymosis, erythema, warmth, or induration, no obvious rash  Psych  - interactive, appropriate, normal mood and affect  ASSESSMENT & PLAN  85 yo male with lumbar ddd, radicular pain, worse  Lumbar MRI ordered  Will consider lumbar NISHA  Consider PT    Ludin Schulz MD, CAQSM

## 2019-10-01 NOTE — LETTER
Date:October 2, 2019      Patient was self referred, no letter generated. Do not send.        [facility] Information

## 2019-10-01 NOTE — LETTER
10/1/2019       RE: Dangelo Hernández  [address] [facility] [address]     Dear Colleague,    Thank you for referring your patient, Dangelo Hernández, to the [facility] at [facility]. Please see a copy of my visit note below.    HISTORY OF PRESENT ILLNESS  Mr. Hernández is a pleasant 84 year old year old male who presents to clinic today for followup for lumbar pain  Has not had much improvement since last year when he had his last injection  Would like another one    MEDICAL HISTORY  Patient Active Problem List   Diagnosis     Tobacco use disorder     Benign Colon Polyps     Mantle Cell Lymphoma S/P Autologous Transplant     Combined hyperlipidemia      Coronary artery disease, 4/ 2010, status post drug-eluting stent placement to the LAD and balloon angioplasty to the obtuse marginal.      Hypertension goal BP (blood pressure) < 140/90     Encounter for long-term current use of medication     History of blood disorder     Pneumonia, candidial (H)     Pneumonia in mycoses     Acute renal failure with other specified pathological lesion in kidney (H)     GERD (gastroesophageal reflux disease)     Cryptogenic organizing pneumonia (H)     Advanced directives, counseling/discussion       Current Outpatient Medications   Medication Sig Dispense Refill     acetaminophen (TYLENOL) 325 MG tablet Take 325-650 mg by mouth every 6 hours as needed for mild pain or pain       ASPIRIN 81 MG PO TABS ONE DAILY  3     atorvastatin (LIPITOR) 80 MG tablet Take 1 tablet (80 mg) by mouth daily 90 tablet 3     azelastine (ASTELIN) 0.1 % nasal spray Spray 1 spray into both nostrils 2 times daily 3 Bottle 3     metoprolol succinate ER (TOPROL-XL) 25 MG 24 hr tablet Take 1 tablet (25 mg) by mouth daily 90 tablet 3     MULTI VITAMIN MENS OR 1 tablet daily       Naproxen Sodium (ALEVE PO) Take 275 mg by mouth       nystatin (MYCOSTATIN) 277946 UNIT/ML suspension Take 5 mLs  (500,000 Units) by mouth 4 times daily 400 mL 0     predniSONE (DELTASONE) 20 MG tablet Take 2 tablets (40 mg) by mouth daily (Patient taking differently: Take 10 mg by mouth daily ) 60 tablet 3     tamsulosin (FLOMAX) 0.4 MG capsule Take 1 capsule (0.4 mg) by mouth daily 90 capsule 3     Glucosamine-Chondroitin (OSTEO BI-FLEX REGULAR STRENGTH PO) Take 1 tablet by mouth daily         Allergies   Allergen Reactions     Amoxicillin Diarrhea            Lisinopril Cough       Family History   Problem Relation Age of Onset     Cancer Mother         Lung--did not smoke     Cardiovascular Father         MI age 73     Lipids Sister      Hypertension Sister      Cancer Sister         Ovarian- at age 65       Additional medical/Social/Surgical histories reviewed in [facility] and updated as appropriate.     REVIEW OF SYSTEMS (10/1/2019)  10 point ROS of systems including Constitutional, Eyes, Respiratory, Cardiovascular, Gastroenterology, Genitourinary, Integumentary, Musculoskeletal, Psychiatric were all negative except for pertinent positives noted in my HPI.     PHYSICAL EXAM  VSS    General  - normal appearance, in no obvious distress  CV  - normal peripheral perfusion  Pulm  - normal respiratory pattern, non-labored  Musculoskeletal - lumbar spine  - stance: normal gait without limp, no obvious leg length discrepancy, normal heel and toe walk  - inspection: normal bone and joint alignment, no obvious scoliosis  - palpation: no paravertebral or bony tenderness  - ROM: flexion slightly exacerbates pain, normal extension, sidebending, rotation  - strength: lower extremities 5/5 in all planes  - special tests:  (-) straight leg raise  (+) slump test  Neuro  - patellar and Achilles DTRs 2+ bilaterally, no lower extremity sensory deficit throughout L5 distribution, grossly normal coordination, normal muscle tone  Skin  - no ecchymosis, erythema, warmth, or induration, no obvious rash  Psych  - interactive, appropriate, normal  mood and affect  ASSESSMENT & PLAN  83 yo male with lumbar ddd, radicular pain, worse  Lumbar MRI ordered  Will consider lumbar NISHA  Consider PT    Ludin Schulz MD, CAM    Again, thank you for allowing me to participate in the care of your patient.      Sincerely,    Ludin Schulz MD

## 2019-10-01 NOTE — NURSING NOTE
Reason For Visit:   Chief Complaint   Patient presents with     RECHECK     Bilateral hip pain and stiffness       There were no vitals taken for this visit.    Pain Assessment  Patient Currently in Pain: Yes  Patient's Stated Pain Goal: 5  Primary Pain Location: Hip  Other Pain Locations: small of back     Shilpa Arnold LPN

## 2019-10-02 ENCOUNTER — HOSPITAL ENCOUNTER (OUTPATIENT)
Dept: MRI IMAGING | Facility: CLINIC | Age: 84
Discharge: HOME | End: 2019-10-02
Attending: PREVENTIVE MEDICINE | Admitting: PREVENTIVE MEDICINE
Payer: MEDICARE

## 2019-10-02 DIAGNOSIS — M51.26 LUMBAR DISC HERNIATION: ICD-10-CM

## 2019-10-02 DIAGNOSIS — M54.16 LUMBAR RADICULAR PAIN: ICD-10-CM

## 2019-10-02 PROCEDURE — 72148 MRI LUMBAR SPINE W/O DYE: CPT

## 2019-10-15 ENCOUNTER — HOSPITAL ENCOUNTER (INPATIENT)
Facility: CLINIC | Age: 84
LOS: 2 days | Discharge: HOME | DRG: 247 | End: 2019-10-17
Attending: INTERNAL MEDICINE | Admitting: INTERNAL MEDICINE
Payer: MEDICARE

## 2019-10-15 ENCOUNTER — APPOINTMENT (OUTPATIENT)
Dept: GENERAL RADIOLOGY | Facility: CLINIC | Age: 84
End: 2019-10-15
Attending: STUDENT IN AN ORGANIZED HEALTH CARE EDUCATION/TRAINING PROGRAM
Payer: MEDICARE

## 2019-10-15 ENCOUNTER — HOSPITAL ENCOUNTER (EMERGENCY)
Facility: CLINIC | Age: 84
Discharge: ANOTHER HOSPITAL | End: 2019-10-15
Attending: STUDENT IN AN ORGANIZED HEALTH CARE EDUCATION/TRAINING PROGRAM | Admitting: STUDENT IN AN ORGANIZED HEALTH CARE EDUCATION/TRAINING PROGRAM
Payer: MEDICARE

## 2019-10-15 VITALS
WEIGHT: 190 LBS | SYSTOLIC BLOOD PRESSURE: 153 MMHG | TEMPERATURE: 98 F | OXYGEN SATURATION: 96 % | DIASTOLIC BLOOD PRESSURE: 87 MMHG | RESPIRATION RATE: 32 BRPM | BODY MASS INDEX: 29.98 KG/M2 | HEART RATE: 89 BPM

## 2019-10-15 DIAGNOSIS — M54.16 LUMBAR RADICULOPATHY: Primary | ICD-10-CM

## 2019-10-15 DIAGNOSIS — R06.02 SHORT OF BREATH ON EXERTION: ICD-10-CM

## 2019-10-15 DIAGNOSIS — M51.369 LUMBAR DEGENERATIVE DISC DISEASE: ICD-10-CM

## 2019-10-15 DIAGNOSIS — R79.89 ELEVATED TROPONIN: ICD-10-CM

## 2019-10-15 DIAGNOSIS — I24.9 ACS (ACUTE CORONARY SYNDROME) (H): Primary | ICD-10-CM

## 2019-10-15 LAB
ALBUMIN SERPL-MCNC: 3.7 G/DL (ref 3.4–5)
ALP SERPL-CCNC: 71 U/L (ref 40–150)
ALT SERPL W P-5'-P-CCNC: 46 U/L (ref 0–70)
ANION GAP SERPL CALCULATED.3IONS-SCNC: 8 MMOL/L (ref 3–14)
AST SERPL W P-5'-P-CCNC: 23 U/L (ref 0–45)
BASOPHILS # BLD AUTO: 0 10E9/L (ref 0–0.2)
BASOPHILS NFR BLD AUTO: 0.2 %
BILIRUB SERPL-MCNC: 0.5 MG/DL (ref 0.2–1.3)
BUN SERPL-MCNC: 24 MG/DL (ref 7–30)
CALCIUM SERPL-MCNC: 9.2 MG/DL (ref 8.5–10.1)
CHLORIDE SERPL-SCNC: 107 MMOL/L (ref 94–109)
CO2 SERPL-SCNC: 25 MMOL/L (ref 20–32)
CREAT SERPL-MCNC: 1.09 MG/DL (ref 0.66–1.25)
DIFFERENTIAL METHOD BLD: ABNORMAL
EOSINOPHIL # BLD AUTO: 0 10E9/L (ref 0–0.7)
EOSINOPHIL NFR BLD AUTO: 0.2 %
ERYTHROCYTE [DISTWIDTH] IN BLOOD BY AUTOMATED COUNT: 15.1 % (ref 10–15)
ERYTHROCYTE [DISTWIDTH] IN BLOOD BY AUTOMATED COUNT: 15.4 % (ref 10–15)
GFR SERPL CREATININE-BSD FRML MDRD: 63 ML/MIN/{1.73_M2}
GLUCOSE SERPL-MCNC: 147 MG/DL (ref 70–99)
HCT VFR BLD AUTO: 41 % (ref 40–53)
HCT VFR BLD AUTO: 42.7 % (ref 40–53)
HGB BLD-MCNC: 13.9 G/DL (ref 13.3–17.7)
HGB BLD-MCNC: 13.9 G/DL (ref 13.3–17.7)
IMM GRANULOCYTES # BLD: 0.1 10E9/L (ref 0–0.4)
IMM GRANULOCYTES NFR BLD: 1.2 %
LYMPHOCYTES # BLD AUTO: 0.9 10E9/L (ref 0.8–5.3)
LYMPHOCYTES NFR BLD AUTO: 10 %
MCH RBC QN AUTO: 31.9 PG (ref 26.5–33)
MCH RBC QN AUTO: 32.7 PG (ref 26.5–33)
MCHC RBC AUTO-ENTMCNC: 32.6 G/DL (ref 31.5–36.5)
MCHC RBC AUTO-ENTMCNC: 33.9 G/DL (ref 31.5–36.5)
MCV RBC AUTO: 97 FL (ref 78–100)
MCV RBC AUTO: 98 FL (ref 78–100)
MONOCYTES # BLD AUTO: 0.4 10E9/L (ref 0–1.3)
MONOCYTES NFR BLD AUTO: 4.4 %
NEUTROPHILS # BLD AUTO: 7.2 10E9/L (ref 1.6–8.3)
NEUTROPHILS NFR BLD AUTO: 84 %
NRBC # BLD AUTO: 0 10*3/UL
NRBC BLD AUTO-RTO: 0 /100
PLATELET # BLD AUTO: 188 10E9/L (ref 150–450)
PLATELET # BLD AUTO: 190 10E9/L (ref 150–450)
POTASSIUM SERPL-SCNC: 4.3 MMOL/L (ref 3.4–5.3)
PROT SERPL-MCNC: 6.9 G/DL (ref 6.8–8.8)
RBC # BLD AUTO: 4.25 10E12/L (ref 4.4–5.9)
RBC # BLD AUTO: 4.36 10E12/L (ref 4.4–5.9)
SODIUM SERPL-SCNC: 140 MMOL/L (ref 133–144)
TROPONIN I SERPL-MCNC: 0.12 UG/L (ref 0–0.04)
TROPONIN I SERPL-MCNC: 0.17 UG/L (ref 0–0.04)
WBC # BLD AUTO: 8.3 10E9/L (ref 4–11)
WBC # BLD AUTO: 8.6 10E9/L (ref 4–11)

## 2019-10-15 PROCEDURE — 25000128 H RX IP 250 OP 636: Performed by: STUDENT IN AN ORGANIZED HEALTH CARE EDUCATION/TRAINING PROGRAM

## 2019-10-15 PROCEDURE — 96375 TX/PRO/DX INJ NEW DRUG ADDON: CPT | Performed by: STUDENT IN AN ORGANIZED HEALTH CARE EDUCATION/TRAINING PROGRAM

## 2019-10-15 PROCEDURE — 25000132 ZZH RX MED GY IP 250 OP 250 PS 637: Mod: GY | Performed by: PHYSICIAN ASSISTANT

## 2019-10-15 PROCEDURE — 90471 IMMUNIZATION ADMIN: CPT | Performed by: STUDENT IN AN ORGANIZED HEALTH CARE EDUCATION/TRAINING PROGRAM

## 2019-10-15 PROCEDURE — 84484 ASSAY OF TROPONIN QUANT: CPT | Performed by: STUDENT IN AN ORGANIZED HEALTH CARE EDUCATION/TRAINING PROGRAM

## 2019-10-15 PROCEDURE — 85025 COMPLETE CBC W/AUTO DIFF WBC: CPT | Performed by: STUDENT IN AN ORGANIZED HEALTH CARE EDUCATION/TRAINING PROGRAM

## 2019-10-15 PROCEDURE — 99285 EMERGENCY DEPT VISIT HI MDM: CPT | Mod: 25 | Performed by: STUDENT IN AN ORGANIZED HEALTH CARE EDUCATION/TRAINING PROGRAM

## 2019-10-15 PROCEDURE — 99223 1ST HOSP IP/OBS HIGH 75: CPT | Mod: AI | Performed by: PHYSICIAN ASSISTANT

## 2019-10-15 PROCEDURE — 71045 X-RAY EXAM CHEST 1 VIEW: CPT

## 2019-10-15 PROCEDURE — 85027 COMPLETE CBC AUTOMATED: CPT | Performed by: PHYSICIAN ASSISTANT

## 2019-10-15 PROCEDURE — 36415 COLL VENOUS BLD VENIPUNCTURE: CPT | Performed by: PHYSICIAN ASSISTANT

## 2019-10-15 PROCEDURE — 80053 COMPREHEN METABOLIC PANEL: CPT | Performed by: STUDENT IN AN ORGANIZED HEALTH CARE EDUCATION/TRAINING PROGRAM

## 2019-10-15 PROCEDURE — 93010 ELECTROCARDIOGRAM REPORT: CPT | Mod: Z6 | Performed by: STUDENT IN AN ORGANIZED HEALTH CARE EDUCATION/TRAINING PROGRAM

## 2019-10-15 PROCEDURE — 027034Z DILATION OF CORONARY ARTERY, ONE ARTERY WITH DRUG-ELUTING INTRALUMINAL DEVICE, PERCUTANEOUS APPROACH: ICD-10-PCS | Performed by: INTERNAL MEDICINE

## 2019-10-15 PROCEDURE — 85520 HEPARIN ASSAY: CPT | Performed by: INTERNAL MEDICINE

## 2019-10-15 PROCEDURE — 96374 THER/PROPH/DIAG INJ IV PUSH: CPT | Performed by: STUDENT IN AN ORGANIZED HEALTH CARE EDUCATION/TRAINING PROGRAM

## 2019-10-15 PROCEDURE — 36415 COLL VENOUS BLD VENIPUNCTURE: CPT | Performed by: INTERNAL MEDICINE

## 2019-10-15 PROCEDURE — B2111ZZ FLUOROSCOPY OF MULTIPLE CORONARY ARTERIES USING LOW OSMOLAR CONTRAST: ICD-10-PCS | Performed by: INTERNAL MEDICINE

## 2019-10-15 PROCEDURE — 21000001 ZZH R&B HEART CARE

## 2019-10-15 PROCEDURE — 90715 TDAP VACCINE 7 YRS/> IM: CPT | Performed by: STUDENT IN AN ORGANIZED HEALTH CARE EDUCATION/TRAINING PROGRAM

## 2019-10-15 PROCEDURE — 25000132 ZZH RX MED GY IP 250 OP 250 PS 637: Mod: GY | Performed by: STUDENT IN AN ORGANIZED HEALTH CARE EDUCATION/TRAINING PROGRAM

## 2019-10-15 PROCEDURE — 84484 ASSAY OF TROPONIN QUANT: CPT | Performed by: PHYSICIAN ASSISTANT

## 2019-10-15 RX ORDER — ASPIRIN 81 MG/1
324 TABLET, CHEWABLE ORAL ONCE
Status: COMPLETED | OUTPATIENT
Start: 2019-10-15 | End: 2019-10-15

## 2019-10-15 RX ORDER — SENNOSIDES 8.6 MG
650 CAPSULE ORAL EVERY 4 HOURS PRN
COMMUNITY
Start: 2023-01-01 | End: 2023-01-01

## 2019-10-15 RX ORDER — LISINOPRIL 2.5 MG/1
2.5 TABLET ORAL DAILY
Status: DISCONTINUED | OUTPATIENT
Start: 2019-10-16 | End: 2019-10-15

## 2019-10-15 RX ORDER — NALOXONE HYDROCHLORIDE 0.4 MG/ML
.1-.4 INJECTION, SOLUTION INTRAMUSCULAR; INTRAVENOUS; SUBCUTANEOUS
Status: DISCONTINUED | OUTPATIENT
Start: 2019-10-15 | End: 2019-10-16

## 2019-10-15 RX ORDER — ATORVASTATIN CALCIUM 80 MG/1
80 TABLET, FILM COATED ORAL DAILY
Status: DISCONTINUED | OUTPATIENT
Start: 2019-10-16 | End: 2019-10-17 | Stop reason: HOSPADM

## 2019-10-15 RX ORDER — TAMSULOSIN HYDROCHLORIDE 0.4 MG/1
0.4 CAPSULE ORAL DAILY
Status: DISCONTINUED | OUTPATIENT
Start: 2019-10-16 | End: 2019-10-17 | Stop reason: HOSPADM

## 2019-10-15 RX ORDER — LISINOPRIL 2.5 MG/1
2.5 TABLET ORAL DAILY
Status: DISCONTINUED | OUTPATIENT
Start: 2019-10-15 | End: 2019-10-15

## 2019-10-15 RX ORDER — ACETAMINOPHEN 650 MG/1
650 SUPPOSITORY RECTAL EVERY 4 HOURS PRN
Status: DISCONTINUED | OUTPATIENT
Start: 2019-10-15 | End: 2019-10-17 | Stop reason: HOSPADM

## 2019-10-15 RX ORDER — HYDRALAZINE HYDROCHLORIDE 20 MG/ML
10 INJECTION INTRAMUSCULAR; INTRAVENOUS EVERY 4 HOURS PRN
Status: DISCONTINUED | OUTPATIENT
Start: 2019-10-15 | End: 2019-10-17 | Stop reason: HOSPADM

## 2019-10-15 RX ORDER — PREDNISONE 20 MG/1
20 TABLET ORAL DAILY
Status: DISCONTINUED | OUTPATIENT
Start: 2019-10-16 | End: 2019-10-17 | Stop reason: HOSPADM

## 2019-10-15 RX ORDER — ALUMINA, MAGNESIA, AND SIMETHICONE 2400; 2400; 240 MG/30ML; MG/30ML; MG/30ML
30 SUSPENSION ORAL EVERY 4 HOURS PRN
Status: DISCONTINUED | OUTPATIENT
Start: 2019-10-15 | End: 2019-10-17 | Stop reason: HOSPADM

## 2019-10-15 RX ORDER — ONDANSETRON 2 MG/ML
4 INJECTION INTRAMUSCULAR; INTRAVENOUS EVERY 6 HOURS PRN
Status: DISCONTINUED | OUTPATIENT
Start: 2019-10-15 | End: 2019-10-17 | Stop reason: HOSPADM

## 2019-10-15 RX ORDER — ONDANSETRON 4 MG/1
4 TABLET, ORALLY DISINTEGRATING ORAL EVERY 6 HOURS PRN
Status: DISCONTINUED | OUTPATIENT
Start: 2019-10-15 | End: 2019-10-17 | Stop reason: HOSPADM

## 2019-10-15 RX ORDER — METOPROLOL SUCCINATE 25 MG/1
25 TABLET, EXTENDED RELEASE ORAL DAILY
Status: DISCONTINUED | OUTPATIENT
Start: 2019-10-16 | End: 2019-10-17 | Stop reason: HOSPADM

## 2019-10-15 RX ORDER — ASPIRIN 81 MG/1
81 TABLET, CHEWABLE ORAL DAILY
Status: DISCONTINUED | OUTPATIENT
Start: 2019-10-16 | End: 2019-10-16

## 2019-10-15 RX ORDER — HEPARIN SODIUM 10000 [USP'U]/100ML
12 INJECTION, SOLUTION INTRAVENOUS CONTINUOUS
Status: DISCONTINUED | OUTPATIENT
Start: 2019-10-15 | End: 2019-10-15 | Stop reason: HOSPADM

## 2019-10-15 RX ORDER — ACETAMINOPHEN 500 MG
1000 TABLET ORAL 2 TIMES DAILY
Status: DISCONTINUED | OUTPATIENT
Start: 2019-10-15 | End: 2019-10-17 | Stop reason: HOSPADM

## 2019-10-15 RX ORDER — LIDOCAINE 40 MG/G
CREAM TOPICAL
Status: DISCONTINUED | OUTPATIENT
Start: 2019-10-15 | End: 2019-10-17 | Stop reason: HOSPADM

## 2019-10-15 RX ORDER — PREDNISONE 20 MG/1
5 TABLET ORAL DAILY
COMMUNITY
End: 2021-05-20

## 2019-10-15 RX ORDER — PROCHLORPERAZINE MALEATE 5 MG
5 TABLET ORAL EVERY 6 HOURS PRN
Status: DISCONTINUED | OUTPATIENT
Start: 2019-10-15 | End: 2019-10-17 | Stop reason: HOSPADM

## 2019-10-15 RX ORDER — HEPARIN SODIUM 10000 [USP'U]/100ML
450 INJECTION, SOLUTION INTRAVENOUS CONTINUOUS
Status: DISCONTINUED | OUTPATIENT
Start: 2019-10-15 | End: 2019-10-16

## 2019-10-15 RX ORDER — ACETAMINOPHEN 325 MG/1
650 TABLET ORAL EVERY 4 HOURS PRN
Status: DISCONTINUED | OUTPATIENT
Start: 2019-10-15 | End: 2019-10-17 | Stop reason: HOSPADM

## 2019-10-15 RX ORDER — PROCHLORPERAZINE 25 MG
12.5 SUPPOSITORY, RECTAL RECTAL EVERY 12 HOURS PRN
Status: DISCONTINUED | OUTPATIENT
Start: 2019-10-15 | End: 2019-10-17 | Stop reason: HOSPADM

## 2019-10-15 RX ORDER — NYSTATIN 100000/ML
500000 SUSPENSION, ORAL (FINAL DOSE FORM) ORAL 4 TIMES DAILY
Status: DISCONTINUED | OUTPATIENT
Start: 2019-10-15 | End: 2019-10-17 | Stop reason: HOSPADM

## 2019-10-15 RX ADMIN — NYSTATIN 500000 UNITS: 100000 SUSPENSION ORAL at 22:00

## 2019-10-15 RX ADMIN — HEPARIN SODIUM 12 UNITS/KG/HR: 10000 INJECTION, SOLUTION INTRAVENOUS at 17:48

## 2019-10-15 RX ADMIN — Medication 4500 UNITS: at 17:48

## 2019-10-15 RX ADMIN — ACETAMINOPHEN 1000 MG: 500 TABLET, FILM COATED ORAL at 21:13

## 2019-10-15 RX ADMIN — ASPIRIN 81 MG 324 MG: 81 TABLET ORAL at 15:57

## 2019-10-15 RX ADMIN — CLOSTRIDIUM TETANI TOXOID ANTIGEN (FORMALDEHYDE INACTIVATED), CORYNEBACTERIUM DIPHTHERIAE TOXOID ANTIGEN (FORMALDEHYDE INACTIVATED), BORDETELLA PERTUSSIS TOXOID ANTIGEN (GLUTARALDEHYDE INACTIVATED), BORDETELLA PERTUSSIS FILAMENTOUS HEMAGGLUTININ ANTIGEN (FORMALDEHYDE INACTIVATED), BORDETELLA PERTUSSIS PERTACTIN ANTIGEN, AND BORDETELLA PERTUSSIS FIMBRIAE 2/3 ANTIGEN 0.5 ML: 5; 2; 2.5; 5; 3; 5 INJECTION, SUSPENSION INTRAMUSCULAR at 14:59

## 2019-10-15 NOTE — Clinical Note
The first balloon was inserted into the left anterior descending and proximal left anterior descending.Max pressure = 14 rae. Total duration = 28 seconds.

## 2019-10-15 NOTE — Clinical Note
Stent deployed in the proximal left anterior descending. Max pressure = 11 rae. Total duration = 21 seconds.

## 2019-10-15 NOTE — ED NOTES
Pt here today with skin tear to the LLE from a fall in his garden a few days ago. Feeling SOB more this morning than usual. Better now. On RA. No s/s of distress.

## 2019-10-15 NOTE — ED NOTES
..DATE:  10/15/2019   TIME OF RECEIPT FROM LAB:  0912  LAB TEST:  trop  LAB VALUE:  0.121  RESULTS GIVEN WITH READ-BACK TO (PROVIDER)chintan  TIME LAB VALUE REPORTED TO PROVIDER:   1535

## 2019-10-15 NOTE — Clinical Note
The first balloon was inserted into the left anterior descending.Max pressure = 16 rae. Total duration = 20 seconds.     Max pressure = 18 rae. Total duration = 20 seconds.    Balloon reinflated a second time: Max pressure = 18 rae. Total duration = 20 seconds.

## 2019-10-15 NOTE — Clinical Note
The first balloon was inserted into the left anterior descending and proximal left anterior descending.Max pressure = 12 rae. Total duration = 23 seconds.

## 2019-10-15 NOTE — PHARMACY-ANTICOAGULATION SERVICE
Patient to start the heparin C-V/Vascular protocol with a goal anti 10a level of 0.15-0.35. Using a HT of 66.75 inches and a WT of 86.2 kg, a dosing WT of 73.8 kg was calculated. Based on this dosing WT, give patient a heparin bolus of 4500 units from the bag and then start a drip at 700 units/hr. Check the patient's anti 10a level 6 hours after starting the drip at ~0000.   Per C-V/Vascular protocol.    Missy MullerD.

## 2019-10-15 NOTE — Clinical Note
The first balloon was inserted into the left anterior descending.Max pressure = 16 rae. Total duration = 18 seconds.     Max pressure = 16 rae. Total duration = 20 seconds.    Balloon reinflated a second time: Max pressure = 16 rae. Total duration = 20 seconds.

## 2019-10-15 NOTE — Clinical Note
The first balloon was inserted into the left anterior descending and proximal left anterior descending.Max pressure = 14 rae. Total duration = 25 seconds.

## 2019-10-15 NOTE — ED PROVIDER NOTES
"  History     Chief Complaint   Patient presents with     Shortness of Breath     SOB for 1 month     Laceration     Lac L FA 1 week ago     HPI  Dangelo Hernández is a 84 year old male with past medical history which includes hypertension, mantle cell lymphoma S/P autologous stem cell transplant with subsequent cryptogenic organizing pneumonia and candidal pneumonia evaluation of 1 month of progressive shortness of breath with exertion.  Patient says that today in particular his symptoms were worse with ambulation, however he denies chest pain or discomfort associated with symptoms.  Regardless he is concerned about the possibility of heart condition as he has known CAD with previous ODIN.  He denies fever, chills, chest pain, back pain, abdominal pain, or gastrointestinal symptoms.  He is scheduled to meet with his pulmonologist Dr. Flanagan of [facility] later this month.  Secondarily patient says that he suffered a left forearm skin injury 2 weeks ago and the wound is slow to heal.     Allergies:  Allergies   Allergen Reactions     Amoxicillin Diarrhea            Lisinopril Cough       Problem List:    Patient Active Problem List    Diagnosis Date Noted     Acute renal failure with other specified pathological lesion in kidney (H) 11/27/2011     Priority: High     Problem list name updated by automated process. Provider to review       Hypertension goal BP (blood pressure) < 140/90 12/09/2010     Priority: High     Advanced directives, counseling/discussion 08/12/2015     Priority: Medium     Patient does not have an Advance/Health Care Directive (HCD), given \"What is Advance Care Planning?\" flyer.    Yumiko Segura  August 12, 2015         Cryptogenic organizing pneumonia (H) 12/07/2012     Priority: Medium     GERD (gastroesophageal reflux disease) 05/02/2012     Priority: Medium     Pneumonia, candidial (H) 09/09/2011     Priority: Medium     Pneumonia in mycoses 09/09/2011     Priority: Medium     History of blood " disorder 04/06/2011     Priority: Medium     Encounter for long-term current use of medication 03/16/2011     Priority: Medium     Problem list name updated by automated process. Provider to review        Coronary artery disease, 4/ 2010, status post drug-eluting stent placement to the LAD and balloon angioplasty to the obtuse marginal.  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          Past Medical History:    Past Medical History:   Diagnosis Date      Coronary artery disease, 4/ 2010, status post drug-eluting stent placement to the LAD and balloon angioplasty to the obtuse marginal.  12/8/2010     Acute kidney failure NEC 11/27/2011     Arthritis      Basal cell carcinoma      Blood transfusion      Colon polyps      Combined hyperlipidemia 10/31/2010     Coronary artery disease      Cryptogenic organizing pneumonia (H) 12/7/2012     GERD (gastroesophageal reflux disease)      History of blood disorder 4/6/2011     Hypertension      Hypertension goal BP (blood pressure) < 140/90 12/9/2010     Malignant neoplasm (H)      Mantle Cell Lymphoma S/P Autologous Transplant 5/12/2010       Past Surgical History:    Past Surgical History:   Procedure Laterality Date     BRONCHOSCOPY (RIGID OR FLEXIBLE), DIAGNOSTIC  7/14/2011    Procedure:COMBINED BRONCHOSCOPY (RIGID OR FLEXIBLE), LAVAGE; Surgeon:REYNA BAILEY; Location: GI     BRONCHOSCOPY (RIGID OR FLEXIBLE), DIAGNOSTIC N/A 5/29/2019    Procedure: BRONCHOSCOPY, WITH BRONCHOALVEOLAR LAVAGE;  Surgeon: Perlman, David Morris, MD;  Location: [facility] GI     COLONOSCOPY       ENT SURGERY      tonsils     ORTHOPEDIC SURGERY      rt knee arthroscopy      SURGICAL HISTORY OF -       tonsils     SURGICAL HISTORY OF -   86    1.Exam under anesthesia, 2.Arthroscopy, 3.Arthroscopic medial meniscectomy, 4.arthroscopic trimming of patellar articular cartilage.     SURGICAL HISTORY OF -       vasectomy     SURGICAL HISTORY OF -   90    colonoscopy     SURGICAL HISTORY OF -   10/20/92    flexible sigmoidoscopy     SURGICAL HISTORY OF -   2000    colonoscopy and polypectomy     THORACOSCOPIC BIOPSY LUNG  2011    Procedure:THORACOSCOPIC BIOPSY LUNG; Video Assisted Right Thoracoscopy with Biopsy; Surgeon:JIM EPSTEIN; Location: OR       Family History:    Family History   Problem Relation Age of Onset     Cancer Mother         Lung--did not smoke     Cardiovascular Father         MI age 73     Lipids Sister      Hypertension Sister      Cancer Sister         Ovarian- at age 65       Social History:  Marital Status:   [5]  Social History     Tobacco Use     Smoking status: Former Smoker     Types: Cigarettes     Last attempt to quit: 1975     Years since quittin.8     Smokeless tobacco: Former User     Tobacco comment: started smoking at 21 years of age   Substance Use Topics     Alcohol use: Yes     Alcohol/week: 0.0 standard drinks     Comment: every other day     Drug use: No        Medications:    acetaminophen (TYLENOL 8 HOUR ARTHRITIS PAIN) 650 MG CR tablet  ASPIRIN 81 MG PO TABS  atorvastatin (LIPITOR) 80 MG tablet  azelastine (ASTELIN) 0.1 % nasal spray  metoprolol succinate ER (TOPROL-XL) 25 MG 24 hr tablet  MULTI VITAMIN MENS OR  nystatin (MYCOSTATIN) 720292 UNIT/ML suspension  predniSONE (DELTASONE) 20 MG tablet  tamsulosin (FLOMAX) 0.4 MG capsule          Review of Systems  Constitutional:  Negative for fever or recent illness.  Cardiovascular:  Negative for chest pain or pressure.  Respiratory: Positive for shortness of breath with minimal exertional activity.  For cough or hemoptysis.  Gastrointestinal:   Negative for abdominal pain, nausea, vomiting, or diarrhea.    Musculoskeletal: Here for neck or back pain.  Neurological:  Negative for headache.    All others reviewed and are negative.      Physical Exam   BP: 123/76  Pulse: 117( irreg)  Heart Rate: 95  Temp: 98  F (36.7  C)  Resp: 26  Weight: 86.2 kg (190 lb)  SpO2: 98 %      Physical Exam  Constitutional:  Well developed, well nourished.  Appears nontoxic and in no acute distress.    HENT:  Normocephalic and atraumatic.  Symmetric in appearance.  Eyes:  Conjunctivae are normal.  Neck:  Neck supple.  Cardiovascular:  No cyanosis.  RRR, not tachycardic on exam.  No audible murmurs noted.  No lower extremity edema or asymmetry.   Respiratory:  Effort normal without sign of respiratory distress.  CTAB without diminished regions.    Gastrointestinal:  Soft nondistended abdomen.  Nontender and without guarding.  No rigidity or rebound tenderness.  Negative Huang's sign.  Negative McBurney's point.    Musculoskeletal:  Moves extremities spontaneously.  Neurological:  Patient is alert.  Skin:  Skin is warm and dry.  Psychiatric:  Normal mood and affect.      ED Course        Procedures                 EKG Interpretation:      Interpreted by: Philippe Kelley  Time reviewed: Upon completion    Symptoms at time of EKG: Asymptomatic   Rhythm: Sinus  Rate: Normal  Axis: Left  Conduction: None atypical   ST Segments/ T Waves: No pathologic ST-elevations or T-wave abnormalities.  Q Waves: None  Comparison to prior: Similar morphology to previous     Clinical Impression: No sign of ischemia         Critical Care time:  none               Results for orders placed or performed during the hospital encounter of 10/15/19 (from the past 24 hour(s))   CBC with platelets differential   Result Value Ref Range    WBC 8.6 4.0 - 11.0 10e9/L    RBC Count 4.36 (L) 4.4 - 5.9 10e12/L    Hemoglobin 13.9 13.3 - 17.7 g/dL    Hematocrit 42.7 40.0 - 53.0 %    MCV 98 78 - 100 fl    MCH 31.9  26.5 - 33.0 pg    MCHC 32.6 31.5 - 36.5 g/dL    RDW 15.1 (H) 10.0 - 15.0 %    Platelet Count 190 150 - 450 10e9/L    Diff Method Automated Method     % Neutrophils 84.0 %    % Lymphocytes 10.0 %    % Monocytes 4.4 %    % Eosinophils 0.2 %    % Basophils 0.2 %    % Immature Granulocytes 1.2 %    Nucleated RBCs 0 0 /100    Absolute Neutrophil 7.2 1.6 - 8.3 10e9/L    Absolute Lymphocytes 0.9 0.8 - 5.3 10e9/L    Absolute Monocytes 0.4 0.0 - 1.3 10e9/L    Absolute Eosinophils 0.0 0.0 - 0.7 10e9/L    Absolute Basophils 0.0 0.0 - 0.2 10e9/L    Abs Immature Granulocytes 0.1 0 - 0.4 10e9/L    Absolute Nucleated RBC 0.0    Comprehensive metabolic panel   Result Value Ref Range    Sodium 140 133 - 144 mmol/L    Potassium 4.3 3.4 - 5.3 mmol/L    Chloride 107 94 - 109 mmol/L    Carbon Dioxide 25 20 - 32 mmol/L    Anion Gap 8 3 - 14 mmol/L    Glucose 147 (H) 70 - 99 mg/dL    Urea Nitrogen 24 7 - 30 mg/dL    Creatinine 1.09 0.66 - 1.25 mg/dL    GFR Estimate 63 >60 mL/min/[1.73_m2]    GFR Estimate If Black 73 >60 mL/min/[1.73_m2]    Calcium 9.2 8.5 - 10.1 mg/dL    Bilirubin Total 0.5 0.2 - 1.3 mg/dL    Albumin 3.7 3.4 - 5.0 g/dL    Protein Total 6.9 6.8 - 8.8 g/dL    Alkaline Phosphatase 71 40 - 150 U/L    ALT 46 0 - 70 U/L    AST 23 0 - 45 U/L   Troponin I   Result Value Ref Range    Troponin I ES 0.121 (HH) 0.000 - 0.045 ug/L       Medications   Tdap (tetanus-diphtheria-acell pertussis) (ADACEL) injection 0.5 mL (0.5 mLs Intramuscular Given 10/15/19 0038)   aspirin (ASA) chewable tablet 324 mg (324 mg Oral Given 10/15/19 7478)       Assessments & Plan (with Medical Decision Making)   Dangelo Hernández is a 84 year old male who presented to the department for evaluation of 1 month of progressive shortness of breath with dyspnea out of proportion to baseline ambulatory status, however today seemed increasingly severe with minimal ambulation.  He denies chest pain, fever or respiratory infectious symptoms.  Patient was mildly  tachypneic and tachycardic on arrival but readily improved while resting on the gurney.  Lung sounds clear to auscultation, no lower extremity pain or swelling, low suspicion for CHF or VTE.  EKG morphology similar to previous on file and without sign of active ischemia, however to his troponin is significantly elevated at 0.121 and previous values on file were within reference range.  Etiology of elevated troponin is unknown at this time, no history of CHF or ARF, must consider recent cardiac injury.  Consulted [facility]ist who has recommended chest radiograph and anticoagulation.  Although he is asymptomatic at rest in the department, symptoms were more intense today and could represent acute injury.  The patient and his significant other have verbalized an understanding, all questions answered, and in agreement with the transfer plan at this time.        Disclaimer:  This note consists of symbols derived from keyboarding, dictation, and/or voice recognition software.  As a result, there may be errors in the script that have gone undetected.  Please consider this when interpreting information found in the chart.        I have reviewed the nursing notes.    I have reviewed the findings, diagnosis, plan and need for follow up with the patient.       New Prescriptions    No medications on file       Final diagnoses:   Short of breath on exertion   Elevated troponin       10/15/2019   [facility] EMERGENCY DEPARTMENT     Philippe Kelley DO  10/15/19 1709

## 2019-10-16 ENCOUNTER — SURGERY (OUTPATIENT)
Age: 84
End: 2019-10-16
Payer: MEDICARE

## 2019-10-16 ENCOUNTER — APPOINTMENT (OUTPATIENT)
Dept: CARDIOLOGY | Facility: CLINIC | Age: 84
DRG: 247 | End: 2019-10-16
Attending: PHYSICIAN ASSISTANT
Payer: MEDICARE

## 2019-10-16 LAB
CHOLEST SERPL-MCNC: 138 MG/DL
CHOLEST SERPL-MCNC: 140 MG/DL
HDLC SERPL-MCNC: 51 MG/DL
HDLC SERPL-MCNC: 54 MG/DL
KCT BLD-ACNC: 336 SEC (ref 75–150)
KCT BLD-ACNC: 364 SEC (ref 75–150)
LDLC SERPL CALC-MCNC: 66 MG/DL
LDLC SERPL CALC-MCNC: 71 MG/DL
LMWH PPP CHRO-ACNC: 0.99 IU/ML
LMWH PPP CHRO-ACNC: 1.08 IU/ML
LMWH PPP CHRO-ACNC: 1.17 IU/ML
NONHDLC SERPL-MCNC: 86 MG/DL
NONHDLC SERPL-MCNC: 87 MG/DL
TRIGL SERPL-MCNC: 101 MG/DL
TRIGL SERPL-MCNC: 78 MG/DL
TROPONIN I SERPL-MCNC: 0.16 UG/L (ref 0–0.04)

## 2019-10-16 PROCEDURE — 93454 CORONARY ARTERY ANGIO S&I: CPT | Performed by: INTERNAL MEDICINE

## 2019-10-16 PROCEDURE — C1725 CATH, TRANSLUMIN NON-LASER: HCPCS | Performed by: INTERNAL MEDICINE

## 2019-10-16 PROCEDURE — 92928 PRQ TCAT PLMT NTRAC ST 1 LES: CPT | Mod: LD | Performed by: INTERNAL MEDICINE

## 2019-10-16 PROCEDURE — 25800030 ZZH RX IP 258 OP 636: Performed by: NURSE PRACTITIONER

## 2019-10-16 PROCEDURE — 99153 MOD SED SAME PHYS/QHP EA: CPT | Performed by: INTERNAL MEDICINE

## 2019-10-16 PROCEDURE — 93010 ELECTROCARDIOGRAM REPORT: CPT | Performed by: INTERNAL MEDICINE

## 2019-10-16 PROCEDURE — 93005 ELECTROCARDIOGRAM TRACING: CPT

## 2019-10-16 PROCEDURE — 25000128 H RX IP 250 OP 636: Performed by: INTERNAL MEDICINE

## 2019-10-16 PROCEDURE — 85520 HEPARIN ASSAY: CPT | Performed by: PHYSICIAN ASSISTANT

## 2019-10-16 PROCEDURE — C1874 STENT, COATED/COV W/DEL SYS: HCPCS | Performed by: INTERNAL MEDICINE

## 2019-10-16 PROCEDURE — 25000132 ZZH RX MED GY IP 250 OP 250 PS 637: Mod: GY | Performed by: PHYSICIAN ASSISTANT

## 2019-10-16 PROCEDURE — 93454 CORONARY ARTERY ANGIO S&I: CPT | Mod: 26 | Performed by: INTERNAL MEDICINE

## 2019-10-16 PROCEDURE — 36415 COLL VENOUS BLD VENIPUNCTURE: CPT | Performed by: INTERNAL MEDICINE

## 2019-10-16 PROCEDURE — C1769 GUIDE WIRE: HCPCS | Performed by: INTERNAL MEDICINE

## 2019-10-16 PROCEDURE — 27210794 ZZH OR GENERAL SUPPLY STERILE: Performed by: INTERNAL MEDICINE

## 2019-10-16 PROCEDURE — 40000264 ECHOCARDIOGRAM COMPLETE

## 2019-10-16 PROCEDURE — 25000131 ZZH RX MED GY IP 250 OP 636 PS 637: Mod: GY | Performed by: PHYSICIAN ASSISTANT

## 2019-10-16 PROCEDURE — 21000001 ZZH R&B HEART CARE

## 2019-10-16 PROCEDURE — 99152 MOD SED SAME PHYS/QHP 5/>YRS: CPT | Performed by: INTERNAL MEDICINE

## 2019-10-16 PROCEDURE — 85347 COAGULATION TIME ACTIVATED: CPT

## 2019-10-16 PROCEDURE — 25000132 ZZH RX MED GY IP 250 OP 250 PS 637: Mod: GY | Performed by: INTERNAL MEDICINE

## 2019-10-16 PROCEDURE — 36415 COLL VENOUS BLD VENIPUNCTURE: CPT | Performed by: PHYSICIAN ASSISTANT

## 2019-10-16 PROCEDURE — 93306 TTE W/DOPPLER COMPLETE: CPT | Mod: 26 | Performed by: INTERNAL MEDICINE

## 2019-10-16 PROCEDURE — C1760 CLOSURE DEV, VASC: HCPCS | Performed by: INTERNAL MEDICINE

## 2019-10-16 PROCEDURE — 99232 SBSQ HOSP IP/OBS MODERATE 35: CPT | Performed by: STUDENT IN AN ORGANIZED HEALTH CARE EDUCATION/TRAINING PROGRAM

## 2019-10-16 PROCEDURE — C9600 PERC DRUG-EL COR STENT SING: HCPCS | Mod: LD | Performed by: INTERNAL MEDICINE

## 2019-10-16 PROCEDURE — 85520 HEPARIN ASSAY: CPT | Performed by: INTERNAL MEDICINE

## 2019-10-16 PROCEDURE — 40000902 ZZH STATISTIC WOC PT EDUCATION, 16-30 MIN

## 2019-10-16 PROCEDURE — G0463 HOSPITAL OUTPT CLINIC VISIT: HCPCS

## 2019-10-16 PROCEDURE — 25000132 ZZH RX MED GY IP 250 OP 250 PS 637: Mod: GY | Performed by: STUDENT IN AN ORGANIZED HEALTH CARE EDUCATION/TRAINING PROGRAM

## 2019-10-16 PROCEDURE — 25000132 ZZH RX MED GY IP 250 OP 250 PS 637: Mod: GY | Performed by: NURSE PRACTITIONER

## 2019-10-16 PROCEDURE — 80061 LIPID PANEL: CPT | Performed by: PHYSICIAN ASSISTANT

## 2019-10-16 PROCEDURE — 84484 ASSAY OF TROPONIN QUANT: CPT | Performed by: PHYSICIAN ASSISTANT

## 2019-10-16 PROCEDURE — C1887 CATHETER, GUIDING: HCPCS | Performed by: INTERNAL MEDICINE

## 2019-10-16 PROCEDURE — 99222 1ST HOSP IP/OBS MODERATE 55: CPT | Mod: 25 | Performed by: INTERNAL MEDICINE

## 2019-10-16 PROCEDURE — 25500064 ZZH RX 255 OP 636: Performed by: INTERNAL MEDICINE

## 2019-10-16 PROCEDURE — 25000125 ZZHC RX 250: Performed by: INTERNAL MEDICINE

## 2019-10-16 PROCEDURE — 80061 LIPID PANEL: CPT | Performed by: INTERNAL MEDICINE

## 2019-10-16 DEVICE — STENT SYNERGY DRUG ELUTING 3.00X16MM  H7493926016300: Type: IMPLANTABLE DEVICE | Status: FUNCTIONAL

## 2019-10-16 RX ORDER — NALOXONE HYDROCHLORIDE 0.4 MG/ML
.1-.4 INJECTION, SOLUTION INTRAMUSCULAR; INTRAVENOUS; SUBCUTANEOUS
Status: DISCONTINUED | OUTPATIENT
Start: 2019-10-16 | End: 2019-10-17 | Stop reason: HOSPADM

## 2019-10-16 RX ORDER — NALOXONE HYDROCHLORIDE 0.4 MG/ML
.2-.4 INJECTION, SOLUTION INTRAMUSCULAR; INTRAVENOUS; SUBCUTANEOUS
Status: ACTIVE | OUTPATIENT
Start: 2019-10-16 | End: 2019-10-17

## 2019-10-16 RX ORDER — LORAZEPAM 2 MG/ML
0.5 INJECTION INTRAMUSCULAR
Status: DISCONTINUED | OUTPATIENT
Start: 2019-10-16 | End: 2019-10-16 | Stop reason: HOSPADM

## 2019-10-16 RX ORDER — IOPAMIDOL 755 MG/ML
INJECTION, SOLUTION INTRAVASCULAR
Status: DISCONTINUED | OUTPATIENT
Start: 2019-10-16 | End: 2019-10-16 | Stop reason: HOSPADM

## 2019-10-16 RX ORDER — FENTANYL CITRATE 50 UG/ML
INJECTION, SOLUTION INTRAMUSCULAR; INTRAVENOUS
Status: DISCONTINUED | OUTPATIENT
Start: 2019-10-16 | End: 2019-10-16 | Stop reason: HOSPADM

## 2019-10-16 RX ORDER — FENTANYL CITRATE 50 UG/ML
25-50 INJECTION, SOLUTION INTRAMUSCULAR; INTRAVENOUS
Status: ACTIVE | OUTPATIENT
Start: 2019-10-16 | End: 2019-10-17

## 2019-10-16 RX ORDER — DOBUTAMINE HYDROCHLORIDE 200 MG/100ML
2-20 INJECTION INTRAVENOUS CONTINUOUS PRN
Status: DISCONTINUED | OUTPATIENT
Start: 2019-10-16 | End: 2019-10-16 | Stop reason: HOSPADM

## 2019-10-16 RX ORDER — EPTIFIBATIDE 2 MG/ML
2 INJECTION, SOLUTION INTRAVENOUS CONTINUOUS PRN
Status: DISCONTINUED | OUTPATIENT
Start: 2019-10-16 | End: 2019-10-16 | Stop reason: HOSPADM

## 2019-10-16 RX ORDER — LIDOCAINE 40 MG/G
CREAM TOPICAL
Status: DISCONTINUED | OUTPATIENT
Start: 2019-10-16 | End: 2019-10-16

## 2019-10-16 RX ORDER — NITROGLYCERIN 0.4 MG/1
0.4 TABLET SUBLINGUAL EVERY 5 MIN PRN
Status: DISCONTINUED | OUTPATIENT
Start: 2019-10-16 | End: 2019-10-17 | Stop reason: HOSPADM

## 2019-10-16 RX ORDER — HEPARIN SODIUM 10000 [USP'U]/100ML
100-1000 INJECTION, SOLUTION INTRAVENOUS CONTINUOUS PRN
Status: DISCONTINUED | OUTPATIENT
Start: 2019-10-16 | End: 2019-10-16 | Stop reason: HOSPADM

## 2019-10-16 RX ORDER — SODIUM CHLORIDE 9 MG/ML
INJECTION, SOLUTION INTRAVENOUS CONTINUOUS
Status: DISCONTINUED | OUTPATIENT
Start: 2019-10-16 | End: 2019-10-16 | Stop reason: HOSPADM

## 2019-10-16 RX ORDER — DIPHENHYDRAMINE HCL 25 MG
25 CAPSULE ORAL EVERY 6 HOURS PRN
Status: DISCONTINUED | OUTPATIENT
Start: 2019-10-16 | End: 2019-10-17 | Stop reason: HOSPADM

## 2019-10-16 RX ORDER — ASPIRIN 81 MG/1
81 TABLET ORAL DAILY
Status: DISCONTINUED | OUTPATIENT
Start: 2019-10-17 | End: 2019-10-17 | Stop reason: HOSPADM

## 2019-10-16 RX ORDER — EPTIFIBATIDE 2 MG/ML
180 INJECTION, SOLUTION INTRAVENOUS EVERY 10 MIN PRN
Status: DISCONTINUED | OUTPATIENT
Start: 2019-10-16 | End: 2019-10-16 | Stop reason: HOSPADM

## 2019-10-16 RX ORDER — POTASSIUM CHLORIDE 1500 MG/1
20 TABLET, EXTENDED RELEASE ORAL
Status: DISCONTINUED | OUTPATIENT
Start: 2019-10-16 | End: 2019-10-16 | Stop reason: HOSPADM

## 2019-10-16 RX ORDER — SODIUM CHLORIDE 9 MG/ML
INJECTION, SOLUTION INTRAVENOUS CONTINUOUS
Status: ACTIVE | OUTPATIENT
Start: 2019-10-16 | End: 2019-10-16

## 2019-10-16 RX ORDER — ATROPINE SULFATE 0.1 MG/ML
0.5 INJECTION INTRAVENOUS EVERY 5 MIN PRN
Status: ACTIVE | OUTPATIENT
Start: 2019-10-16 | End: 2019-10-17

## 2019-10-16 RX ORDER — ARGATROBAN 1 MG/ML
150 INJECTION, SOLUTION INTRAVENOUS
Status: DISCONTINUED | OUTPATIENT
Start: 2019-10-16 | End: 2019-10-16 | Stop reason: HOSPADM

## 2019-10-16 RX ORDER — ARGATROBAN 1 MG/ML
350 INJECTION, SOLUTION INTRAVENOUS
Status: DISCONTINUED | OUTPATIENT
Start: 2019-10-16 | End: 2019-10-16 | Stop reason: HOSPADM

## 2019-10-16 RX ORDER — HEPARIN SODIUM 1000 [USP'U]/ML
INJECTION, SOLUTION INTRAVENOUS; SUBCUTANEOUS
Status: DISCONTINUED | OUTPATIENT
Start: 2019-10-16 | End: 2019-10-16 | Stop reason: HOSPADM

## 2019-10-16 RX ORDER — LORAZEPAM 0.5 MG/1
0.5 TABLET ORAL
Status: DISCONTINUED | OUTPATIENT
Start: 2019-10-16 | End: 2019-10-16 | Stop reason: HOSPADM

## 2019-10-16 RX ORDER — NITROGLYCERIN 20 MG/100ML
.07-2 INJECTION INTRAVENOUS CONTINUOUS PRN
Status: DISCONTINUED | OUTPATIENT
Start: 2019-10-16 | End: 2019-10-16 | Stop reason: HOSPADM

## 2019-10-16 RX ORDER — FLUMAZENIL 0.1 MG/ML
0.2 INJECTION, SOLUTION INTRAVENOUS
Status: ACTIVE | OUTPATIENT
Start: 2019-10-16 | End: 2019-10-17

## 2019-10-16 RX ORDER — ACETAMINOPHEN 325 MG/1
650 TABLET ORAL EVERY 4 HOURS PRN
Status: DISCONTINUED | OUTPATIENT
Start: 2019-10-16 | End: 2019-10-16

## 2019-10-16 RX ADMIN — ATORVASTATIN CALCIUM 80 MG: 80 TABLET, FILM COATED ORAL at 10:31

## 2019-10-16 RX ADMIN — NYSTATIN 500000 UNITS: 100000 SUSPENSION ORAL at 10:32

## 2019-10-16 RX ADMIN — DIPHENHYDRAMINE HYDROCHLORIDE 25 MG: 25 CAPSULE ORAL at 19:06

## 2019-10-16 RX ADMIN — ACETAMINOPHEN 1000 MG: 500 TABLET, FILM COATED ORAL at 10:32

## 2019-10-16 RX ADMIN — METOPROLOL SUCCINATE 25 MG: 25 TABLET, EXTENDED RELEASE ORAL at 10:31

## 2019-10-16 RX ADMIN — LIDOCAINE HYDROCHLORIDE 10 ML: 10 INJECTION, SOLUTION EPIDURAL; INFILTRATION; INTRACAUDAL; PERINEURAL at 15:50

## 2019-10-16 RX ADMIN — ASPIRIN 325 MG: 325 TABLET, DELAYED RELEASE ORAL at 12:48

## 2019-10-16 RX ADMIN — IOPAMIDOL 160 ML: 755 INJECTION, SOLUTION INTRAVENOUS at 16:30

## 2019-10-16 RX ADMIN — FENTANYL CITRATE 50 MCG: 50 INJECTION, SOLUTION INTRAMUSCULAR; INTRAVENOUS at 15:47

## 2019-10-16 RX ADMIN — NYSTATIN 500000 UNITS: 100000 SUSPENSION ORAL at 21:05

## 2019-10-16 RX ADMIN — ACETAMINOPHEN 1000 MG: 500 TABLET, FILM COATED ORAL at 21:04

## 2019-10-16 RX ADMIN — NYSTATIN 500000 UNITS: 100000 SUSPENSION ORAL at 12:48

## 2019-10-16 RX ADMIN — TICAGRELOR 180 MG: 90 TABLET ORAL at 16:03

## 2019-10-16 RX ADMIN — TAMSULOSIN HYDROCHLORIDE 0.4 MG: 0.4 CAPSULE ORAL at 10:32

## 2019-10-16 RX ADMIN — PREDNISONE 20 MG: 20 TABLET ORAL at 10:31

## 2019-10-16 RX ADMIN — MIDAZOLAM 1 MG: 1 INJECTION INTRAMUSCULAR; INTRAVENOUS at 15:53

## 2019-10-16 RX ADMIN — FENTANYL CITRATE 50 MCG: 50 INJECTION, SOLUTION INTRAMUSCULAR; INTRAVENOUS at 15:53

## 2019-10-16 RX ADMIN — HEPARIN SODIUM 8000 UNITS: 1000 INJECTION, SOLUTION INTRAVENOUS; SUBCUTANEOUS at 16:03

## 2019-10-16 RX ADMIN — MIDAZOLAM 1 MG: 1 INJECTION INTRAMUSCULAR; INTRAVENOUS at 15:47

## 2019-10-16 RX ADMIN — NYSTATIN 500000 UNITS: 100000 SUSPENSION ORAL at 19:06

## 2019-10-16 RX ADMIN — HUMAN ALBUMIN MICROSPHERES AND PERFLUTREN 9 ML: 10; .22 INJECTION, SOLUTION INTRAVENOUS at 12:15

## 2019-10-16 RX ADMIN — SODIUM CHLORIDE: 9 INJECTION, SOLUTION INTRAVENOUS at 12:51

## 2019-10-16 ASSESSMENT — ACTIVITIES OF DAILY LIVING (ADL)
ADLS_ACUITY_SCORE: 13

## 2019-10-16 NOTE — H&P
Admitted:     10/15/2019      PRIMARY CARE PROVIDER:  Dony Acuña MD.        CHIEF COMPLAINT:  Progressive dyspnea on exertion.      HISTORY OF PRESENT ILLNESS:  Dangelo Hernández is an 84-year-old gentleman with past medical history of mantle cell lymphoma, status post autologous transplant with subsequent development of relapse cryptogenic organizing pneumonia, currently on daily prednisone, hyperlipidemia, hypertension, coronary artery disease, GERD, chronic back pain as well as lung and mild cognitive impairment who presented today as a direct admission from [facility] due to findings of an NSTEMI.  The patient initially presented to the Emergency Department at [facility] for evaluation of progressive shortness of breath with exertion as well as a wound check.  The patient reports a 1-month history of progressive dyspnea on exertion.  The patient reports that his breathing is somewhat labored at baseline.  He does have a chronic productive cough related to his underlying pulmonary changes.  However, he admits to significant symptom worsening over the past 1 month.  He states that he at this point is minimally able to ambulate 50 feet without development of severe shortness of breath requiring him to rest.  Today, he reports similar symptoms upon minimal exertion, however, had interval development of chest tightness described as a band-like distribution across anterior inferior chest.  He also describes as a burning sensation substernally and radiating up into the jaw line which he initially attributed to a probable thrush infection given his chronic steroid use; however, does note that the sensation had not improved with his nystatin suspension.  Out of concern, he elected to present to the Emergency Department for further evaluation.      On arrival in the Emergency Department, the patient was noted to be hemodynamically stable.  EKG was obtained and was negative for ST or T-wave changes.   Basic laboratory studies included a CMP, CBC and troponin which was significant for an elevated troponin at 0.121.  Patient also had a chest x-ray obtained which was negative for acute findings.  He was started on a heparin drip and discussed with Hospitalist Service for NSTEMI.      The patient presently is evaluated in hospital room.  He currently is resting comfortably in bed.  Denies any shortness of breath, chest pain, jaw pain or recurrence of a burning sensation.  He also states that the chest tightness has resolved.  He states that within the last month he has been in his otherwise normal state of health.  He is due to follow up with his pulmonologist next week with a repeat noncontrast CT scan and reevaluation of his chronic steroids.  Otherwise, denies any change in chronic cough.  No recent fevers, chills.      PAST MEDICAL HISTORY:     1.  Mantle cell lymphoma, status post autologous transplant with subsequent development of a cryptogenic organizing pneumonia, status post treatment with Vfend.  Patient has since relapsed.  He is followed closely by Pulmonology at the [facility] as an outpatient.  Most recent bronchoscopy and BAL were negative for evidence of infection.  He at this time maintains on prednisone 20 mg daily, due to follow up within 1 week.   2.  Hypertension.   3.  Hyperlipidemia.   4.  Coronary artery disease, status post drug-eluting stent to the LAD as well as a balloon angioplasty to the obtuse marginal in 04/2010.  The patient has been followed with serial Lexiscan stress imaging, most recently performed in 09/2018 which was negative for wall motion abnormalities and indicated an EF of 66%.   5.  GERD.   6.  Chronic back pain.   7.  Carotid artery stenosis.   8.  Carotid ultrasound performed in 09/2018 indicated 50%-69% stenosis of the left ICA, unchanged from prior, as well less than 50% stenosis in the right ICA.   9.  BPH.   10.  Mild cognitive impairment.      PAST  SURGICAL HISTORY:   1.  Lung biopsy.   2.  Tonsillectomy, remote.   3.  Bronchoscopy.   4.  Coronary angiogram.      PRIOR TO ADMISSION MEDICATIONS:    Prior to Admission medications    Medication Sig Last Dose Taking? Auth Provider   acetaminophen (TYLENOL 8 HOUR ARTHRITIS PAIN) 650 MG CR tablet Take 1,300 mg by mouth 2 times daily 10/15/2019 at am Yes Reported, Patient   aspirin (ASA) 81 MG EC tablet Take 1 tablet (81 mg) by mouth daily  Yes Steve Dickerson MD   atorvastatin (LIPITOR) 80 MG tablet Take 1 tablet (80 mg) by mouth daily 10/15/2019 at Unknown time Yes Ashely Grimm NP   azelastine (ASTELIN) 0.1 % nasal spray Spray 1 spray into both nostrils 2 times daily 10/14/2019 at pm Yes Dony Acuña MD   metoprolol succinate ER (TOPROL-XL) 25 MG 24 hr tablet Take 1 tablet (25 mg) by mouth daily 10/15/2019 at Unknown time Yes Ashely Grimm NP   MULTI VITAMIN MENS OR Take 1 tablet by mouth daily  10/15/2019 at Unknown time Yes Reported, Patient   nystatin (MYCOSTATIN) 457665 UNIT/ML suspension Take 5 mLs (500,000 Units) by mouth 4 times daily 10/15/2019 at am Yes Ashely Grimm NP   predniSONE (DELTASONE) 20 MG tablet Take 20 mg by mouth daily 10/15/2019 at am Yes Unknown, Entered By History   tamsulosin (FLOMAX) 0.4 MG capsule Take 1 capsule (0.4 mg) by mouth daily 10/15/2019 at Unknown time Yes Ashely Grimm NP   ticagrelor (BRILINTA) 90 MG tablet Take 1 tablet (90 mg) by mouth every 12 hours   Dony Acuña MD           ALLERGIES:   1.  AMOXICILLIN, CAUSING DIARRHEA.   2.  LISINOPRIL, CAUSING A COUGH.      FAMILY HISTORY:  Father with history of coronary artery disease with MI at the age of 73.  Mother with history of lung cancer.      SOCIAL HISTORY:  The patient currently lives in [place], [place].  He is a former smoker, quit approximately 45-50 years ago.  Does not consume alcohol on a regular basis.      REVIEW OF SYSTEMS:  A 10-point review of systems was performed  and is otherwise negative.  Please refer to HPI.      PHYSICAL EXAMINATION:   VITAL SIGNS:  Temperature 97.8, heart rate of 98, respiratory rate 20, blood pressure 144/92, SpO2 97% on room air.   GENERAL:  This is a well-developed, well-nourished gentleman who appears comfortable.   HEENT:  Head is normocephalic.  Pupils equal, round, react to light bilaterally.  EOMs are intact bilaterally.  Nose, mouth are patent.  Mucous membranes are moist.   LUNGS:  Clear to auscultation bilaterally without wheeze or crackles.   CARDIOVASCULAR:  Regular rate and rhythm.  There is a soft systolic murmur present.  Normal S1 and S2.   ABDOMEN:  Obese, nontender.   EXTREMITIES:  The patient is spontaneously moving bilateral upper and lower extremities.  Radial and pedal pulses are 2+ bilaterally.   SKIN:  Warm and dry, no rash, no pedal edema.      LABORATORY DATA AND IMAGING:  As reviewed in Epic and as noted in HPI.      ASSESSMENT AND PLAN:  Dangelo Hernández is an 84-year-old gentleman with past medical history of coronary artery disease, hyperlipidemia, hypertension, GERD and mantle cell lymphoma, status post autologous transplant who presented to the Emergency Department for a 1-month history of progressive dyspnea on exertion.  He was identified to have elevated troponin with symptoms concerning for cardiac etiology.  He is being admitted under inpatient status for further evaluation.   1.  Non-ST elevation myocardial infarction.  The patient with a history of coronary artery disease, status post drug-eluting stent placement to the LAD in 2010.  Most recent Lexiscan stress imaging was performed in 09/2018 and was without wall motion abnormalities with an EF of 66%.  The patient reports progressive dyspnea on exertion with minimal exertion today, associated with substernal chest discomfort, burning sensation substernally with radiation into the jaw.  The patient with associated elevated troponin initially to 0.121, on repeat here  on arrival at 0.165.  We will continue on IV heparin infusion.  He will receive daily baby aspirin, statin and beta blocker.  The patient has reported ALLERGY TO  LISINOPRIL as an outpatient.  Will attempt losartan low-dose.  Cardiology consult has been requested.  Echocardiogram will be ordered.  The patient will be monitored on telemetry.   2.  History of mantle cell lymphoma, status post autologous transplant with subsequent development of a relapse cryptogenic organizing pneumonia.  Currently followed by Pulmonology as an outpatient.  Has chronic cough, has remained unchanged.  The most recent cultures have been negative for abrupt infection.  At this time, we will continue on patient's prior to admission prednisone 20 mg daily   3.  Left forearm skin tear.  The patient reports a skin tear approximately 2 weeks ago.  He has been attempting to treat this with warm water soaks each day, but states that the wound has been nonhealing.  Will ask Wound Care to evaluate.   4.  Benign prostatic hypertrophy.  Continue prior to admission tamsulosin.   5.  Deep venous thrombosis prophylaxis, on heparin drip.      This patient was staffed with Dr. Yamile Rajan who independently interviewed and evaluated patient and was in agreement with the above-mentioned plan.         YAMILE RAJAN MD       As dictated by AZAEL HILARIO PA-C            D: 10/15/2019   T: 10/15/2019   MT: AIDA      Name:     ISAIAH DELONG   MRN:      [id number]        Account:      [id number]   :      1935        Admitted:     10/15/2019                   Document: [id number]       cc: Dony Acuña MD

## 2019-10-16 NOTE — PLAN OF CARE
VSS on RA.  Tele: SR.  Denies pain or shortness of breath.  BR until 1845.  R groin site CDI.  Plan for CR in AM then hopeful discharge.  Pt mention a MD had suggested a carotid US and would like this done prior to discharge if able.  Call light within reach.  Will continue to monitor.

## 2019-10-16 NOTE — PROGRESS NOTES
[facility]  WO Nurse Inpatient Wound Assessment   Reason for consultation: Evaluate and treat Left anterior forearm wound     Assessment  Left anterior forearm wound due to Trauma. Pt report sustaining a skin tear at home approx 2 weeks ago that has been slow to heal. He has been soaking daily. No evidence of prior skin flap remnants noted. Wound is down to dermis with small amount of slough noted. Periwound intact and WNL.   Status: initial assessment    Treatment Plan  Left anterior forearm wound: Daily and PRN when soiled  1. Cleanse with soap and water or microklenz  2. Apply iodosorb to wound bed using q-tip or directly to mepilex- about the size of a pea   3. Label Mepilex with T, date/time and initials. Once home may use gauze and tape if prefers  4. Apply melilex so that wound is in center of dressing     Orders Written  WO Nurse follow-up plan:signing off  Nursing to notify the Provider(s) and re-consult the WO Nurse if wound(s) deteriorates or new skin concern.    Patient History  According to provider note(s):  Dangelo Hernández is an 84-year-old gentleman with past medical history of coronary artery disease, hyperlipidemia, hypertension, GERD and mantle cell lymphoma, status post autologous transplant who presented to the Emergency Department for a 1-month history of progressive dyspnea on exertion.  He was identified to have elevated troponin with symptoms concerning for cardiac etiology.  He is being admitted under inpatient status for further evaluation.     Objective Data  Containment of urine/stool: Continent of bowel and Continent of bladder    Active Diet Order  Orders Placed This Encounter      NPO for Medical/Clinical Reasons Except for: Meds      Output:   I/O last 3 completed shifts:  In: -   Out: 700 [Urine:700]    Risk Assessment:   Sensory Perception: (P) 3-->slightly limited  Moisture: (P) 4-->rarely moist  Activity: (P) 3-->walks occasionally  Mobility: (P) 3-->slightly  limited  Nutrition: (P) 3-->adequate  Friction and Shear: (P) 3-->no apparent problem  Raul Score: (P) 19                          Labs:   Recent Labs   Lab 10/15/19  2034 10/15/19  1415   ALBUMIN  --  3.7   HGB 13.9 13.9   WBC 8.3 8.6       Physical Exam  Skin inspection: focused left arm    Wound Location:  Left anterior forearm    Date of last photo 10/19/19  Wound History:  The patient reports a skin tear approximately 2 weeks ago.  He has been attempting to treat this with warm water soaks each day, but states that the wound has been nonhealing  Measurements (length x width x depth, in cm) 1.5 cm x 1.2 cm  x  0.1 cm   Wound Base:  dermis, granulation tissue and slough  Tunneling N/A  Undermining N/A  Palpation of the wound bed: normal   Periwound skin: intact, no evidence of previous skin flap present  Color: normal and consistent with surrounding tissue  Temperature: normal   Drainage:, moderate  Description of drainage: serosanguinous  Odor: none  Pain: during dressing change, tender    Interventions  Current support surface: Standard  Atmos Air mattress  Current off-loading measures: Pillows under calves  Visual inspection of wound(s) completed  Wound Care: done per plan of care  Supplies: gathered, placed at the bedside, discussed with RN, discussed with family and discussed with patient  Education provided: plan of care and wound progress  Discussed plan of care with Patient, Family and Nurse    Carlene Woodson RN

## 2019-10-16 NOTE — PLAN OF CARE
Neuro- A&Ox4  Most Recent Vitals- Temp: 97.8  F (36.6  C) Temp src: Oral BP: 98/49   Heart Rate: 79 Resp: 18 SpO2: 96 % O2 Device: None (Room air)    Tele/Cardiac- SR, denies CP, HR dropped into high 30's on two separate occasions, however HR increased and returned to baseline after 6-8 seconds.  Resp- stable on RA  Activity- SBA  Pain- denies  Drips- Heparin @ 700units/hr  Drains/Tubes- PIV  Skin- LUE skin tear-WOC to see today  GI/- WDL  Aggression Color- Green  Plan- Cardiology consult  Misc- Pt has been NPO since midnight    Yady Keen, RN

## 2019-10-16 NOTE — PROGRESS NOTES
[facility]    Medicine Progress Note - Hospitalist Service       Date of Admission:  10/15/2019  Date of Service: 10/16/2019    Assessment & Plan      Dangelo Hernández is an 84-year-old gentleman with past medical history of coronary artery disease, hyperlipidemia, hypertension, GERD and mantle cell lymphoma, status post autologous transplant who presented to the Emergency Department for a 1-month history of progressive dyspnea on exertion.  He was identified to have elevated troponin with symptoms concerning for cardiac etiology.  He is being admitted under inpatient status for further evaluation.     1.  Non-ST elevation myocardial infarction.  The patient with a history of coronary artery disease, status post drug-eluting stent placement to the LAD in 2010.  Most recent Lexiscan stress imaging was performed in 09/2018 and was without wall motion abnormalities with an EF of 66%.  The patient reports progressive dyspnea on exertion with minimal exertion today, associated with substernal chest discomfort, burning sensation substernally with radiation into the jaw.  The patient with associated elevated troponin initially to 0.121, on repeat here on arrival at 0.165.    Plan:  - We will continue on IV heparin infusion.    - daily baby aspirin, statin and beta blocker.   - The patient has reported ALLERGY TO  LISINOPRIL as an outpatient. Thus on  losartan low-dose.    - Cardiology consult has been requested -> plan for coronary angiogram  - ECHO pending  - Continue telemetry    2.  History of mantle cell lymphoma, status post autologous transplant with subsequent development of a relapse cryptogenic organizing pneumonia.   Assessment: Currently followed by Pulmonology as an outpatient.  Has chronic cough, has remained unchanged.  The most recent cultures have been negative for abrupt infection.   Plan:  - continue on patient's prior to admission prednisone 20 mg daily.     3.  Left forearm skin tear.  The  patient reports a skin tear approximately 2 weeks ago.  He has been attempting to treat this with warm water soaks each day, but states that the wound has been nonhealing.  Will ask Wound Care to evaluate.     4.  Benign prostatic hypertrophy.  Continue prior to admission tamsulosin.       Diet: NPO for Medical/Clinical Reasons Except for: Meds    DVT Prophylaxis: Heparin  Ascencio Catheter: not present  Code Status: DNR/DNI      Disposition Plan   Expected discharge: 1-2 days, recommended to prior living arrangement once Cardiology work up completed.  Entered: Steve Dickerson MD 10/16/2019, 1:24 PM       The patient's care was discussed with the Bedside Nurse and Patient.    Steve Dickerson MD  Hospitalist Service  [facility]    ______________________________________________________________________    Interval History     No CP/SOB  No nausea/vomiting or abdominal pain  No fevers or chills. No new cough or joint pain.     Data reviewed today: I reviewed all medications, new labs and imaging results over the last 24 hours. I personally reviewed no images or EKG's today.    Physical Exam   Vital Signs: Temp: 98.4  F (36.9  C) Temp src: Oral BP: (!) 143/59   Heart Rate: 82 Resp: 18 SpO2: 98 % O2 Device: None (Room air)    Weight: 188 lbs 9.6 oz    Constitutional: awake, alert, cooperative, no apparent distress.   Respiratory: CTABL   Cardiovascular: RRR with no m/r/g   GI: Normal bowel sounds, soft, non-distended, non-tender.   Skin: normal skin color, texture, turgor   Musculoskeletal: There is no redness, warmth, or swelling of the joints.  Neurologic: Awake, alert, oriented to name, place and time.  Neuropsychiatric: normal mood and affect      Data   Recent Labs   Lab 10/16/19  0240 10/15/19  2034 10/15/19  1415   WBC  --  8.3 8.6   HGB  --  13.9 13.9   MCV  --  97 98   PLT  --  188 190   NA  --   --  140   POTASSIUM  --   --  4.3   CHLORIDE  --   --  107   CO2  --   --  25   BUN  --   --  24   CR  --   --   1.09   ANIONGAP  --   --  8   CHAVEZ  --   --  9.2   GLC  --   --  147*   ALBUMIN  --   --  3.7   PROTTOTAL  --   --  6.9   BILITOTAL  --   --  0.5   ALKPHOS  --   --  71   ALT  --   --  46   AST  --   --  23   TROPI 0.156* 0.165* 0.121*     Recent Results (from the past 24 hour(s))   XR Chest Port 1 View    Narrative    CHEST ONE VIEW PORTABLE  10/15/2019 6:17 PM     HISTORY: Shortness of breath.     COMPARISON: Chest x-ray 5/16/2019.      Impression    IMPRESSION: Portable chest. Lungs are clear. Heart is normal in size.  No pneumothorax.         GAIL GONZALEZ MD     Medications     - MEDICATION INSTRUCTIONS -       - MEDICATION INSTRUCTIONS -       - MEDICATION INSTRUCTIONS -       HEParin 450 Units/hr (10/16/19 1250)     - MEDICATION INSTRUCTIONS -       - MEDICATION INSTRUCTIONS -       - MEDICATION INSTRUCTIONS -       sodium chloride 150 mL/hr at 10/16/19 1251       acetaminophen  1,000 mg Oral BID     aspirin  325 mg Oral Daily     atorvastatin  80 mg Oral Daily     metoprolol succinate ER  25 mg Oral Daily     nystatin  500,000 Units Oral 4x Daily     predniSONE  20 mg Oral Daily     sodium chloride (PF)  3 mL Intracatheter Q8H     tamsulosin  0.4 mg Oral Daily

## 2019-10-16 NOTE — PROVIDER NOTIFICATION
DATE:  10/16/2019   TIME OF RECEIPT FROM LAB:  0142  LAB TEST:  Hep 10A  LAB VALUE:  1.17  RESULTS GIVEN WITH READ-BACK TO (PROVIDER):  Ludin Brooks [facility]    TIME LAB VALUE REPORTED TO PROVIDER:   0143    Heparin gtt stopped and dosing adjusted to be restarted at new rate at 0256

## 2019-10-16 NOTE — PROVIDER NOTIFICATION
MD Notification    Notified Person: MD    Notified Person Name: Dr Ramirez-Hospitalist    Notification Date/Time: 10/15/19    Notification Interaction: FYI page sent    Purpose of Notification: troponin increased from 0.121 to 0.165    Orders Received: NA FYI page sent    Comments:

## 2019-10-16 NOTE — CONSULTS
Pt with know CAD s/p LAD and OM stents now with accelerated angina for one month with LEES    trops elevated  IV Heparin    Angiogram ordered   Consent signed    Orders in    FULL CONSULT DICTATED #[id number]  ROSELYN--SEE OTHER NOTE  ALL THE CONSULT WAS DONE BY ME

## 2019-10-16 NOTE — CONSULTS
Consult Date:  10/16/2019   ALL OBSERVATIONS PLANS ARE MINE PT EXAMINED BY ME  40 MIN VISIT ROSELYN  REASON FOR CONSULTATION:  Shortness of breath and new chest pain with elevated troponins and with a history of coronary artery disease      REQUESTING PROVIDER:  Bjorn An PA-C      HISTORY OF PRESENT ILLNESS:  Dangelo Hernández is an 84-year-old gentleman who historically has been followed by Dr. Robbins in our group and has an appointment next week with Dr. Ashraf to establish care with a new cardiologist.      He has a medical history significant of mantle cell lymphoma, status post autologous transplant with subsequent development of relapse cryptogenic-organizing pneumonia, prednisone-dependent.  He has dyslipidemia, hypertension with previous stenting to the LAD in 2010 and subsequent atherectomy with placement of 3 drug-eluting stents to the circumflex in 2013.  He has esophageal reflux disease and chronic back pain.  Chart also indicates he had acute renal failure in 2011; the details are unclear.  His renal function has normalized to a creatinine of 1.09.      He fell and had a wound occur on his left forearm, and he reports going into the emergency room to have this assessed yesterday.  While he was there, he related a history of progressive shortness of breath above his baseline upon exertion, as well as chest tightness like a band across his chest.  This was reminiscent of his previous cardiac symptoms.  This can occur at 50 feet or less, predictably with activity, and it resolves with rest.  This is associated with a burning up to the jaw.  He relates this to probable chronic thrush; however, it was not relieved with his nystatin.      Twelve-lead EKG shows no compelling ST-T wave changes.  Troponin is mildly elevated at 0.121, 0.165, 0.156.  He is on IV heparin infusion and is currently pain-free.      The patient had a nuclear stress test 1 year ago that showed no evidence of ischemia.        OTHER  LABORATORY DATA:  Sodium 140, potassium 4.3, BUN 24, creatinine 1.09.  ALT 46, AST 23.  Last LDL in May of this year was 82, HDL 50 and triglycerides 194.  Previous A1c a few years ago was normal.  TSH 2.8 done in 05/2019.  Hemoglobin 13.9, WBC normal, platelet count 188,000; he states he took Plavix in the past subsequent to previous stenting with no evidence of bleeding.      PAST MEDICAL HISTORY:     1.  Mantle cell lymphoma, status post autologous transplant with subsequent development of cryptogenic-organizing pneumonia, status post treatment with Vfend with relapse.  He is followed closely by Pulmonology (Dr. Flanagan, [facility]).  Recent workup was negative for infection, and he remains on 20 mg of prednisone.   2.  Hypertension.   3.  Dyslipidemia.   4.  Coronary artery disease, status post LAD stents in 2010 with atherectomy and placement of 3 drug-eluting stents to the circumflex in 2013.   5.  Known carotid disease at 50% to 69% bilaterally.   6.  Claudication, which he states improved and he canceled his PANTERA that was suggested by Dr. Robbins.   7.  Benign prostatic hypertrophy.   6.  Mild cognitive impairment.      PAST SURGICAL HISTORY:  Lung biopsy, tonsillectomy, bronchoscopies.      ALLERGIES TO MEDICATIONS:  INCLUDE AMOXICILLIN, WHICH CAUSES DIARRHEA, AND LISINOPRIL, WHICH CAUSES A COUGH.      FAMILY HISTORY:  His father had an MI at the age of 73.  His mother has lung cancer.      SOCIAL HISTORY:  He lives in [place], [place].  He is a previous smoker but quit in the 1970s.  He has approximately a 6-pack of beer every 3 months.  He is a retired  and farris.  He was previously in the [employer].  He is a  and has 2 children.      REVIEW OF SYSTEMS:  A full 10-point review of systems was performed and is otherwise negative, other than outlined in the HPI.      PHYSICAL EXAMINATION:   VITAL SIGNS:  Afebrile 98.4, heart rate 82 in a sinus rhythm.  Blood pressure  variable between 98/49 up to 143/59, respiratory rate 18, O2 sat 98% on room air.   GENERAL:  This is a well-developed, well-nourished gentleman who is in no acute distress. Currently states he is pain-free.   HEENT:  The head is normocephalic, atraumatic.  Pupils are round and reactive to light and accommodation.  EOMs are intact.  Mucous membranes are moist.   CHEST:  Normal respiratory excursion.   LUNGS:  A few tubular breath sounds at the left base, otherwise clear with no wheezes or crackles.   CARDIOVASCULAR:  S1, S2 with a soft systolic murmur heard at the left upper sternal border.  I also hear this at the base and the apex.  Regular rate and rhythm.   ABDOMEN:  Soft, nontender with no obvious hepatosplenomegaly.  N   NECK:  Bilateral carotid bruits.  Normal JVP.   EXTREMITIES:  No femoral bruits.  Pedal pulses are weak, 1+ at best.  He admits to a reduced sensation in his feet from chemotherapy and a sense of them feeling cold.   SKIN:  Warm and dry.  He has an abrasion that is bandaged on his left forearm.      ASSESSMENT AND PLAN:  This patient has been seen in tandem by Dr. Crandall and his plan has fully been reviewed with him.  This is a patient with known coronary artery disease who now presents with symptoms consistent with accelerated angina associated with shortness of breath, some radiation to the jaw with elevated troponin level.  No compelling EKG changes consistent with likely non-STEMI.        1.  We would recommend a coronary angiogram.  I reviewed the risks of this procedure including infection, bleeding, iatrogenic vessel wall trauma, heart attack, stroke, reactions to contrast, nephropathy and death.  He accepts the risks and is willing to proceed.  Consents have been signed.  He has no obvious contraindication to dual-antiplatelet therapy and has tolerated this well in the past.  As I noted, he has had previous interventions to his LAD and circumflex, including rotablader to the  circumflex with calcifications with 3 drug-eluting stents.   2.  Left ventricular function historically has been preserved.  An echocardiogram is ordered.  A previous echo showed preserved LV function with aortic sclerosis.  No evidence of significant stenosis.  Right heart function is normal.  RVSP previously could not be approximated due to inadequate TR jet.   3.  Dyslipidemia:  He is on Lipitor 80 mg a day.  I will order a lipid profile to reassess since it has been many months since this has been checked.   4.  Hypertension:  Variable control.  Continue metoprolol 25 mg per day.  He has a cough from lisinopril, but certainly an ARB could be considered pending his blood pressures upcoming.   5.  History of mantle cell lymphoma:  Status post autologous transplant with subsequent development of relapse cryptogenic-organizing pneumonia, currently on steroids, managed by Pulmonology at the [facility].  Chronic prednisone dose at 20 mg per day currently.   6.  Left forearm skin tear:  Occurred 2 weeks ago.  Wound Care evaluation has been requested.   7.  Benign prostatic hypertrophy.   8.  Peripheral arterial disease with previous claudication, which he states has resolved.  He canceled an PANTERA that was ordered in the past.  He has bilateral carotid disease, 50% to 69%.  An ultrasound should be performed as an outpatient, as it has been about 1 year since he has been imaged.      Thank you very much for this consultation.  We will be happy to follow with you.         BETTY DEMPSEY MD       As dictated by NATASHA BANEGAS NP            D: 10/16/2019   T: 10/16/2019   MT: MARIO      Name:     ISAIAH DELONG   MRN:      [id number]        Account:       [id number]   :      1935           Consult Date:  10/16/2019      Document: [id number]       cc: AZAEL HILARIO PA-C

## 2019-10-16 NOTE — PLAN OF CARE
RN: pt alert, oriented. Soft spoken, good sense of humor.  Up with SBA to bathroom, voided and had BM today.  Denies chest pain, nausea, headache, and states not short of breath.  Systolic murmur noted, lungs sound clear slightly diminished bases.  Dressing placed to L arm skin tear.  Heparin adjusted as ordered, Normal saline started 2 hrs prior to angiogram.  To angio in 1500 hour.  Plan pending results of angio; pt hopes to have a good supper tonight.

## 2019-10-16 NOTE — PHARMACY-ADMISSION MEDICATION HISTORY
Admission medication history interview status for the (Not on file)  admission is complete. See EPIC admission navigator for prior to admission medications     Medication history source reliability:Good    Actions taken by pharmacist (provider contacted, etc): Med list completed at [facility] by med scribe     Additional medication history information not noted on PTA med list :None    Medication reconciliation/reorder completed by provider prior to medication history? No    Time spent in this activity: 5 minutes    Prior to Admission medications    Medication Sig Last Dose Taking? Auth Provider   acetaminophen (TYLENOL 8 HOUR ARTHRITIS PAIN) 650 MG CR tablet Take 1,300 mg by mouth 2 times daily 10/15/2019 at am Yes Reported, Patient   ASPIRIN 81 MG PO TABS ONE DAILY 10/15/2019 at Unknown time Yes    atorvastatin (LIPITOR) 80 MG tablet Take 1 tablet (80 mg) by mouth daily 10/15/2019 at Unknown time Yes Ashely Grimm NP   azelastine (ASTELIN) 0.1 % nasal spray Spray 1 spray into both nostrils 2 times daily 10/14/2019 at pm Yes Dony Acuña MD   metoprolol succinate ER (TOPROL-XL) 25 MG 24 hr tablet Take 1 tablet (25 mg) by mouth daily 10/15/2019 at Unknown time Yes Ashely Grimm NP   MULTI VITAMIN MENS OR Take 1 tablet by mouth daily  10/15/2019 at Unknown time Yes Reported, Patient   nystatin (MYCOSTATIN) 661438 UNIT/ML suspension Take 5 mLs (500,000 Units) by mouth 4 times daily 10/15/2019 at am Yes Ashely Grimm NP   predniSONE (DELTASONE) 20 MG tablet Take 20 mg by mouth daily 10/15/2019 at am Yes Unknown, Entered By History   tamsulosin (FLOMAX) 0.4 MG capsule Take 1 capsule (0.4 mg) by mouth daily 10/15/2019 at Unknown time Yes Ashely Grimm NP

## 2019-10-16 NOTE — CONSULTS
PT SEEN AND EXAMINED BY ME  No clear evidence of ACS (pt troponin flat elevated) given history of progressive symptoms and prior pci most expeditious would be cath today  Pt agrees, risk/benefit discussed with pt and no contraindication to DAPT   Last echo 2017  Has syst murmur and given lung issue will get echo today also

## 2019-10-16 NOTE — PLAN OF CARE
PT/CR:  Orders received and chart reviewed. Per chart review, trops trending up 0.121 to 0.165. Plan for coronary angiogram this date. Will hold PT/CR.

## 2019-10-17 ENCOUNTER — APPOINTMENT (OUTPATIENT)
Dept: PHYSICAL THERAPY | Facility: CLINIC | Age: 84
DRG: 247 | End: 2019-10-17
Attending: PHYSICIAN ASSISTANT
Payer: MEDICARE

## 2019-10-17 ENCOUNTER — APPOINTMENT (OUTPATIENT)
Dept: ULTRASOUND IMAGING | Facility: CLINIC | Age: 84
DRG: 247 | End: 2019-10-17
Attending: STUDENT IN AN ORGANIZED HEALTH CARE EDUCATION/TRAINING PROGRAM
Payer: MEDICARE

## 2019-10-17 VITALS
SYSTOLIC BLOOD PRESSURE: 122 MMHG | WEIGHT: 187.17 LBS | DIASTOLIC BLOOD PRESSURE: 70 MMHG | OXYGEN SATURATION: 98 % | BODY MASS INDEX: 29.54 KG/M2 | RESPIRATION RATE: 18 BRPM | TEMPERATURE: 97.5 F

## 2019-10-17 LAB
ANION GAP SERPL CALCULATED.3IONS-SCNC: 7 MMOL/L (ref 3–14)
BUN SERPL-MCNC: 18 MG/DL (ref 7–30)
CALCIUM SERPL-MCNC: 8.6 MG/DL (ref 8.5–10.1)
CHLORIDE SERPL-SCNC: 105 MMOL/L (ref 94–109)
CO2 SERPL-SCNC: 26 MMOL/L (ref 20–32)
CREAT SERPL-MCNC: 1.05 MG/DL (ref 0.66–1.25)
ERYTHROCYTE [DISTWIDTH] IN BLOOD BY AUTOMATED COUNT: 15.5 % (ref 10–15)
GFR SERPL CREATININE-BSD FRML MDRD: 66 ML/MIN/{1.73_M2}
GLUCOSE SERPL-MCNC: 114 MG/DL (ref 70–99)
HCT VFR BLD AUTO: 36.9 % (ref 40–53)
HGB BLD-MCNC: 12.2 G/DL (ref 13.3–17.7)
MCH RBC QN AUTO: 32 PG (ref 26.5–33)
MCHC RBC AUTO-ENTMCNC: 33.1 G/DL (ref 31.5–36.5)
MCV RBC AUTO: 97 FL (ref 78–100)
PLATELET # BLD AUTO: 160 10E9/L (ref 150–450)
POTASSIUM SERPL-SCNC: 3.8 MMOL/L (ref 3.4–5.3)
RBC # BLD AUTO: 3.81 10E12/L (ref 4.4–5.9)
SODIUM SERPL-SCNC: 138 MMOL/L (ref 133–144)
WBC # BLD AUTO: 7 10E9/L (ref 4–11)

## 2019-10-17 PROCEDURE — 85027 COMPLETE CBC AUTOMATED: CPT | Performed by: STUDENT IN AN ORGANIZED HEALTH CARE EDUCATION/TRAINING PROGRAM

## 2019-10-17 PROCEDURE — 97161 PT EVAL LOW COMPLEX 20 MIN: CPT | Mod: GP

## 2019-10-17 PROCEDURE — 25000132 ZZH RX MED GY IP 250 OP 250 PS 637: Mod: GY | Performed by: STUDENT IN AN ORGANIZED HEALTH CARE EDUCATION/TRAINING PROGRAM

## 2019-10-17 PROCEDURE — 25000132 ZZH RX MED GY IP 250 OP 250 PS 637: Mod: GY | Performed by: INTERNAL MEDICINE

## 2019-10-17 PROCEDURE — 36415 COLL VENOUS BLD VENIPUNCTURE: CPT | Performed by: STUDENT IN AN ORGANIZED HEALTH CARE EDUCATION/TRAINING PROGRAM

## 2019-10-17 PROCEDURE — 93880 EXTRACRANIAL BILAT STUDY: CPT

## 2019-10-17 PROCEDURE — 80048 BASIC METABOLIC PNL TOTAL CA: CPT | Performed by: STUDENT IN AN ORGANIZED HEALTH CARE EDUCATION/TRAINING PROGRAM

## 2019-10-17 PROCEDURE — 25000132 ZZH RX MED GY IP 250 OP 250 PS 637: Mod: GY | Performed by: PHYSICIAN ASSISTANT

## 2019-10-17 PROCEDURE — 97110 THERAPEUTIC EXERCISES: CPT | Mod: GP

## 2019-10-17 PROCEDURE — 99239 HOSP IP/OBS DSCHRG MGMT >30: CPT | Performed by: STUDENT IN AN ORGANIZED HEALTH CARE EDUCATION/TRAINING PROGRAM

## 2019-10-17 PROCEDURE — 99232 SBSQ HOSP IP/OBS MODERATE 35: CPT | Performed by: INTERNAL MEDICINE

## 2019-10-17 PROCEDURE — 25000131 ZZH RX MED GY IP 250 OP 636 PS 637: Mod: GY | Performed by: PHYSICIAN ASSISTANT

## 2019-10-17 RX ADMIN — ASPIRIN 81 MG: 81 TABLET, DELAYED RELEASE ORAL at 09:52

## 2019-10-17 RX ADMIN — DIPHENHYDRAMINE HYDROCHLORIDE 25 MG: 25 CAPSULE ORAL at 09:57

## 2019-10-17 RX ADMIN — ATORVASTATIN CALCIUM 80 MG: 80 TABLET, FILM COATED ORAL at 09:52

## 2019-10-17 RX ADMIN — METOPROLOL SUCCINATE 25 MG: 25 TABLET, EXTENDED RELEASE ORAL at 09:52

## 2019-10-17 RX ADMIN — TICAGRELOR 90 MG: 90 TABLET ORAL at 06:19

## 2019-10-17 RX ADMIN — PREDNISONE 20 MG: 20 TABLET ORAL at 09:52

## 2019-10-17 RX ADMIN — ACETAMINOPHEN 1000 MG: 500 TABLET, FILM COATED ORAL at 09:52

## 2019-10-17 RX ADMIN — TAMSULOSIN HYDROCHLORIDE 0.4 MG: 0.4 CAPSULE ORAL at 09:52

## 2019-10-17 RX ADMIN — NYSTATIN 500000 UNITS: 100000 SUSPENSION ORAL at 09:52

## 2019-10-17 ASSESSMENT — ACTIVITIES OF DAILY LIVING (ADL)
ADLS_ACUITY_SCORE: 13

## 2019-10-17 NOTE — CONSULTS
Medication coverage check for Brilinta. $25 monthly copay.    Lay Galvez CphT  [facility] Discharge Pharmacy Liaison  Liaison Cell: [phone]

## 2019-10-17 NOTE — PROGRESS NOTES
[facility]    Cardiology Progress Note    Date of Service: 10/17/2019   pt seen and examined by me-all decisions observations mine   Visit time 40min  aria  HOME TODAY PER IM  Assessment & Plan   Dangelo Hernández is a 84 year old male with past medical history of Mantle cell lymphoma, status post autologous transplant with subsequent development of cryptogenic-organizing pneumonia, status post treatment with Vfend with relapse.  He is followed closely by Pulmonology (Dr. Flanagan, [facility]) on 20 mg of prednisone, Hypertension, Dyslipidemia, Coronary artery disease, status post LAD stents in 2010 with atherectomy and placement of 3 drug-eluting stents to the circumflex in 2013,Known carotid disease at 50% to 69% bilaterally,Claudication, which he states improved and he canceled his PANTERA that was suggested by Dr. Robbins,Benign prostatic hypertrophy,Mild cognitive impairment. This patient was admitted on 10/15/2019 with symptoms of one month progressive LEES associated with exertional chest tightness.      1. Non-stemi  Hx  Coronary artery disease, status post LAD stents in 2010 with atherectomy and placement of 3 drug-eluting stents to the circumflex in 2013.   Trop 0.165    THIS ADMIT: S/p coronary angiogram with ODIN to proximal LAD(3.0 X 16mm Synergy ODIN) , I reviewed film nice result in Lad prox, mod dz in Lcx   RCA   but pt codominant and the rca was  Likely small will need follow-up nuc gxt in 1 yr to follow-up on lcx    LAD provides collateral flow to LAD  Circumflex 40%  RCA occluded with collateral flow from LAD  Brilinta/ASA minimum of one year  EF 55-60%; RV preserved  Metoprolol  Ace stopped due to low BP  Creatinine normal post angiogram  platlet and hemoglobin stable  Cardiac rehab  Home today if tolerates rehab  He wants to keep his visit next week with Dr. Ashraf who he wants to follow with long term    2.  Hypertension-controlled  Add ace back in future if BP  alllows    3.  Dyslipidemia-continue Lipitor 80mg daily  LDL 71 HDL 51 TG normal    5.  Known carotid disease at 50% to 69% bilaterally.   Carotid ultrasound ordered by hospitalist-moderate dz no change    6.  Claudication, which he states improved and he canceled his PANTERA that was suggested by Dr. Robbins.   7.  Benign prostatic hypertrophy.   8.  Mild cognitive impairment.   9. Chronic lung issues as above  10. Mild to moderate aortic stenosis with mean gradient 16 MANA 1.2cm2; mild AI    Follow up with Dr. Flanagan at the [facility] for pulmonary care  Dr. Ashraf for Cardiology care     Pt seen and examined by me  All decisions mine    Interval History   No chest pain  Review of Systems:  The Review of Systems is negative other than noted in the HPI    Physical Exam   Temp: 97.5  F (36.4  C) Temp src: Oral BP: 102/64   Heart Rate: 95 Resp: 18 SpO2: 98 % O2 Device: None (Room air)    Vitals:    10/15/19 2001 10/16/19 0703 10/17/19 0440   Weight: 87.7 kg (193 lb 4.8 oz) 85.5 kg (188 lb 9.6 oz) 84.9 kg (187 lb 2.7 oz)     Vital Signs with Ranges  Temp:  [97.5  F (36.4  C)-97.7  F (36.5  C)] 97.5  F (36.4  C)  Heart Rate:  [77-95] 95  Resp:  [18] 18  BP: (102-134)/(58-86) 102/64  SpO2:  [96 %-98 %] 98 %  I/O last 3 completed shifts:  In: 967.5 [P.O.:600; I.V.:367.5]  Out: 400 [Urine:400]    Constitutional     alert and oriented, in no acute distress.     Skin     warm and dry to touch    ENT     no pallor or cyanosis    Neck    Supple, JVP normal,bilateral carotid bruits    Chest     no tenderness to palpation     Lungs  Fibrotic crackles 1/2 up right lung and left base    Cardiac  regular rhythm, S1 normal, S2 normal, No S3 or S4, I/VI systolic murmurs lUBS no rubs    Abdomen     abdomen soft, bowel sounds normoactive, no hepatosplenomegaly    Extremities and Back     no clubbing, cyanosis. No edema observed.  Right groin with soft hematoma; no hum or bruit   Reduce pulses in feet      Neurological     no gross motor deficits  noted, affect appropriate, oriented to time, person and place.        Medications     Continuing ACE inhibitor/ARB/ARNI from home medication list OR ACE inhibitor/ARB order already placed during this visit       - MEDICATION INSTRUCTIONS -       - MEDICATION INSTRUCTIONS -       - MEDICATION INSTRUCTIONS -       - MEDICATION INSTRUCTIONS -       - MEDICATION INSTRUCTIONS -       - MEDICATION INSTRUCTIONS -       - MEDICATION INSTRUCTIONS -         acetaminophen  1,000 mg Oral BID     aspirin  81 mg Oral Daily     atorvastatin  80 mg Oral Daily     metoprolol succinate ER  25 mg Oral Daily     nystatin  500,000 Units Oral 4x Daily     predniSONE  20 mg Oral Daily     sodium chloride (PF)  3 mL Intracatheter Q8H     tamsulosin  0.4 mg Oral Daily     ticagrelor  90 mg Oral Q12H          Data:     ROUTINE IP LABS (Last four results)  BMP  Recent Labs   Lab 10/17/19  0527 10/15/19  1415    140   POTASSIUM 3.8 4.3   CHLORIDE 105 107   CHAVEZ 8.6 9.2   CO2 26 25   BUN 18 24   CR 1.05 1.09   * 147*     CHOLESTEROL/HEPATIC  Recent Labs   Lab 10/16/19  1704 10/16/19  0240 10/15/19  1415   CHOL 138 140  --    TRIG 78 101  --    LDL 71 66  --    HDL 51 54  --    ALT  --   --  46   AST  --   --  23     CBC  Recent Labs   Lab 10/17/19  0527 10/15/19  2034 10/15/19  1415   WBC 7.0 8.3 8.6   RBC 3.81* 4.25* 4.36*   HGB 12.2* 13.9 13.9   HCT 36.9* 41.0 42.7   MCV 97 97 98   MCH 32.0 32.7 31.9   MCHC 33.1 33.9 32.6   RDW 15.5* 15.4* 15.1*    188 190     TROP:   Recent Labs   Lab 10/16/19  0240 10/15/19  2034 10/15/19  1415   TROPI 0.156* 0.165* 0.121*      BNP:  No results for input(s): NTBNPI in the last 168 hours.  INRNo lab results found in last 7 days.  TSH   Date Value Ref Range Status   05/09/2019 2.80 0.40 - 4.00 mU/L Final       EKG results:  Reviewed if available     Imaging:  No results found for this or any previous visit (from the past 24 hour(s)).  Telemetry:  sinus

## 2019-10-17 NOTE — PLAN OF CARE
Discharge Planner PT   Patient plan for discharge: Home, agreeable to phase 2 outpatient cardiac rehab  Current status: Pt is 84 year old male adm on 10/15/19 with SOB, underwent angio on 10/16/19 with stent placed to LAD. At baseline, pt is independent without use of assistive device, lives alone in own house with several stairs to enter. PT/CR orders received, chart reviewed, evaluation completed and treatment initiated. Pt is independent with bed mobility, transfers, and ambulation. Pt able to negotiate 5 stairs without difficulty. Pt amb 10 minutes in halls at good pace, vital signs stable, OMNI rating of 1/10. CR education handouts provided.  Barriers to return to prior living situation: None from a mobility standpoint  Recommendations for discharge: Home with outpatient cardiac rehab phase 2  Rationale for recommendations: Pt is able to complete all mobility necessary for discharge to home, recommend phase 2 cardiac rehab as an outpatient to further progress aerobic activity level in monitored setting and for education to promote optimal heart health.       Entered by: Yumiko Mcknight 10/17/2019 2:47 PM      Cardiac Rehab Discharge Summary    Reason for therapy discharge:    Discharged to home with outpatient therapy.    Progress towards therapy goal(s). See goals on Care Plan in [facility] electronic health record for goal details.  Goals not met.  Barriers to achieving goals:   discharge from facility and discharge on same date as initial evaluation.    Therapy recommendation(s):    Continued therapy is recommended.  Rationale/Recommendations:  phase 2 outpatient cardiac rehab to further progress activity in monitored setting and for education to promote optimal heart health.

## 2019-10-17 NOTE — PLAN OF CARE
Neuro- A&Ox4  Most Recent Vitals- Temp: 97.7  F (36.5  C) Temp src: Oral BP: 107/58   Heart Rate: 77 Resp: 18 SpO2: 96 % O2 Device: None (Room air)    Tele/Cardiac- SR, denies CP  Resp- stable on RA  Activity- Independent  Pain- denies  Drips- none, SL  Drains/Tubes- PIV  Skin- R groin site from angiogram had a slow leak overnight, thrombix applied, pressure held, and new dressing applied. Now currently stable and not leaking. LUE wound-WOC following  GI/- WDL  Aggression Color- Green  Plan- Cardiac rehab today and then probable discharge home.  Misc- Pt is requesting to get carotid US done while in the hospital.    Yady Keen RN

## 2019-10-17 NOTE — CONSULTS
"NUTRITION EDUCATION    REASON FOR ASSESSMENT:  Nutrition education on American Heart Association (AHA) Heart Healthy Diet.    NUTRITION HISTORY:  Information obtained from patient.   - Dangelo thinks he's probably heard about the heart healthy diet before, but doesn't recall a formal education session.   - He has had \"high numbers\" since his blood lipid levels were first checked back in his 40s. At that time he was put on Lipitor. \"My hands never used to get cold when my cholesterol levels were high.. then when they improved my hands and feet would get cold.\"     - Pt does not do much cooking at home. He may do hamburgers, baked potatoes on the grill. If not, he'll \"go to town\". He does not frequent one particular restaurant.   - He eats fruit everyday, but likely does not get enough vegetables.   - Likes beef, occasionally eats fish/chicken.   - [place] - \"I should be there now\". Unsure if he will f/u in cardiac rehab due to this.    CURRENT DIET ORDER:  Low sat fat, Na <2400 mg    NUTRITION DIAGNOSIS:  Food- and nutrition-related knowledge deficit R/t heart healthy diet AEB patient verbalizes no formal diet ed in past, relies on restaurant meals.      INTERVENTIONS:  Nutrition Prescription:    Recommended AHA Heart Healthy Diet    Implementation:     Nutrition Education (Content):  a) reviewed Heart Healthy Diet guidelines  b) provided heart healthy diet handout    Nutrition Education (Application):  a) Discussed current eating habits and recommended alternative food choices    Anticipate good compliance    Diet Education - refer to Education flowsheet    Goals:    Patient verbalizes understanding of diet    All of the above goals met during education session    Follow Up/Monitoring:    Provided RD contact information for future questions    Jessica Núñez RD,   [facility] [place], [address], [place]   Pager: [phone]  Weekend Pager: [phone]      "

## 2019-10-17 NOTE — DISCHARGE SUMMARY
[facility]  Hospitalist Discharge Summary       Date of Admission:  10/15/2019  Date of Discharge:  10/17/2019  Discharging Provider: Steve Dickerson MD      Discharge Diagnoses     ACS  NSTEMI    Follow-ups Needed After Discharge   Follow-up Appointments     Follow-up and recommended labs and tests       Follow up with primary care provider, Dony Acuña, within 7 days for   hospital follow- up.  The following labs/tests are recommended: None.           Unresulted Labs Ordered in the Past 30 Days of this Admission     No orders found from 9/15/2019 to 10/16/2019.        Discharge Disposition   Discharged to home  Condition at discharge: Stable    Hospital Course      Dangelo Hernández is an 84-year-old gentleman with past medical history of coronary artery disease, hyperlipidemia, hypertension, GERD and mantle cell lymphoma, status post autologous transplant who presented to the Emergency Department for a 1-month history of progressive dyspnea on exertion.  He was identified to have elevated troponin with symptoms concerning for cardiac etiology/ACS.    1.  Non-ST elevation myocardial infarction       Hyperlipidemia.  Assessment: The patient with a history of coronary artery disease, status post drug-eluting stent placement to the LAD in 2010.  Most recent Lexiscan stress imaging was performed in 09/2018 and was without wall motion abnormalities with an EF of 66%.  The patient reports progressive dyspnea on exertion with minimal exertion today, associated with substernal chest discomfort, burning sensation substernally with radiation into the jaw.  The patient with associated elevated troponin initially to 0.121, on repeat here on arrival at 0.165.  Coronary angiogram 10/16, s/p Successful PCI with ODIN placement in the proximal LAD.  Plan:  - daily baby aspirin 81 mg lifelong, atorvastatin 80 mg daily and Metoprolol 25 mg daily.   - Brilinta BID for 1 year  - The patient has reported ALLERGY TO   LISINOPRIL as an outpatient.    ECHO 10/16  The left ventricle is normal in size. Left ventricular systolic function is  normal. The visual ejection fraction is estimated at 55-60%. Grade I or early  diastolic dysfunction. Diastolic Doppler findings (E/E' ratio and/or other  parameters) suggest left ventricular filling pressures are increased. No  regional wall motion abnormalities noted. There is no thrombus seen in the  left ventricle.  The right ventricle is normal size. The right ventricular systolic function is  normal.  Trace to mild mitral and tricuspid regurgitation.  There is mild (1+) aortic regurgitation. Mild to moderate valvular aortic  stenosis. The mean AoV pressure gradient is 16.3 mmHg. The peak AoV pressure  gradient is 28.0 mmHg. The calculated aortic valve are is 1.2 cm^2.  No pericardial effusion.  In direct comparison to the previous study dated 05/02/2017, there has been a  mild interval progression in transaortic gradients.  -------------------------------------------------------------------------------------  2.  History of mantle cell lymphoma, status post autologous transplant with subsequent development of a relapse cryptogenic organizing pneumonia.   Assessment: Currently followed by Pulmonology as an outpatient.  Has chronic cough, has remained unchanged.  The most recent cultures have been negative for abrupt infection.   Plan:  - continue on patient's prior to admission prednisone 20 mg daily.     3.  Left forearm skin tear.  The patient reports a skin tear approximately 2 weeks ago. Stable, follow as outpatient.    4.  Benign prostatic hypertrophy.  Continue prior to admission tamsulosin.      5.  Known carotid disease at 50% to 69% bilaterally.   Carotid ultrasound this admission shows only moderate dz, overall no change compared to 09/2018 carotid US.     6. Mild Cognitive Impairment. Stable.     Consultations This Hospital Stay   CARDIAC REHAB IP CONSULT  CARDIOLOGY IP  CONSULT  PHARMACY TO DOSE HEPARIN  WOUND OSTOMY CONTINENCE NURSE  IP CONSULT  NUTRITION SERVICES ADULT IP CONSULT  CARDIAC REHAB IP CONSULT  PHARMACY IP CONSULT  PHARMACY IP CONSULT  PHARMACY LIAISON FOR MEDICATION COVERAGE CONSULT  SMOKING CESSATION PROGRAM IP CONSULT  SMOKING CESSATION PROGRAM IP CONSULT    Code Status   DNR/DNI    Time Spent on this Encounter   I, Steve Dickerson MD, personally saw the patient today and spent greater than 30 minutes discharging this patient.       Steve Dickerson MD  [facility]  ______________________________________________________________________    Physical Exam   Vital Signs: Temp: 97.5  F (36.4  C) Temp src: Oral BP: 114/64   Heart Rate: 97 Resp: 18 SpO2: 98 % O2 Device: None (Room air)    Weight: 187 lbs 2.73 oz       Primary Care Physician   Dony Acuña    Discharge Orders      CARDIAC REHAB REFERRAL      Reason for your hospital stay    You had chest pain and needed cardiology evaluation.     Follow-up and recommended labs and tests     Follow up with primary care provider, Dony Acuña, within 7 days for hospital follow- up.  The following labs/tests are recommended: None.     Activity    Your activity upon discharge: activity as tolerated     Diet    Follow this diet upon discharge: Orders Placed This Encounter      Combination Diet Low Saturated Fat Na <2400mg Diet       Significant Results and Procedures   Most Recent 3 CBC's:  Recent Labs   Lab Test 10/17/19  0527 10/15/19  2034 10/15/19  1415   WBC 7.0 8.3 8.6   HGB 12.2* 13.9 13.9   MCV 97 97 98    188 190     Most Recent 3 BMP's:  Recent Labs   Lab Test 10/17/19  0527 10/15/19  1415 05/09/19  1029    140 140   POTASSIUM 3.8 4.3 4.0   CHLORIDE 105 107 108   CO2 26 25 24   BUN 18 24 15   CR 1.05 1.09 1.04   ANIONGAP 7 8 8   CHAVEZ 8.6 9.2 9.5   * 147* 95     Most Recent 2 LFT's:  Recent Labs   Lab Test 10/15/19  1415 05/09/19  1029   AST 23 29   ALT 46 37   ALKPHOS 71 86   BILITOTAL 0.5  0.6     Most Recent 3 INR's:  Recent Labs   Lab Test 09/29/15  1115 09/29/15  1022 06/10/13  0655   INR 0.94 Unsatisfactory specimen - clotted 0.97     Most Recent 3 Troponin's:  Recent Labs   Lab Test 10/16/19  0240 10/15/19  2034 10/15/19  1415   TROPI 0.156* 0.165* 0.121*     Most Recent 3 BNP's:  Recent Labs   Lab Test 09/29/15  1115 09/29/15  1022   NTBNPI 184 177   ,   Results for orders placed or performed during the hospital encounter of 10/15/19   XR Chest Port 1 View    Narrative    CHEST ONE VIEW PORTABLE  10/15/2019 6:17 PM     HISTORY: Shortness of breath.     COMPARISON: Chest x-ray 5/16/2019.      Impression    IMPRESSION: Portable chest. Lungs are clear. Heart is normal in size.  No pneumothorax.         GAIL GONZALEZ MD       Discharge Medications   Current Discharge Medication List      START taking these medications    Details   aspirin (ASA) 81 MG EC tablet Take 1 tablet (81 mg) by mouth daily  Qty: 30 tablet, Refills: 0    Comments: Future refills by PCP Dr. Dony Acuña with phone number [phone].  Associated Diagnoses: ACS (acute coronary syndrome) (H)      ticagrelor (BRILINTA) 90 MG tablet Take 1 tablet (90 mg) by mouth every 12 hours  Qty: 60 tablet, Refills: 0    Comments: Future refills by PCP Dr. Dony Acuña with phone number [phone].  Associated Diagnoses: ACS (acute coronary syndrome) (H)         CONTINUE these medications which have NOT CHANGED    Details   acetaminophen (TYLENOL 8 HOUR ARTHRITIS PAIN) 650 MG CR tablet Take 1,300 mg by mouth 2 times daily      atorvastatin (LIPITOR) 80 MG tablet Take 1 tablet (80 mg) by mouth daily  Qty: 90 tablet, Refills: 3    Associated Diagnoses: Coronary artery disease involving native coronary artery of native heart with angina pectoris (H)      azelastine (ASTELIN) 0.1 % nasal spray Spray 1 spray into both nostrils 2 times daily  Qty: 3 Bottle, Refills: 3    Associated Diagnoses: Eczema, unspecified type      metoprolol succinate ER  (TOPROL-XL) 25 MG 24 hr tablet Take 1 tablet (25 mg) by mouth daily  Qty: 90 tablet, Refills: 3    Associated Diagnoses: Coronary artery disease involving native coronary artery of native heart with angina pectoris (H)      MULTI VITAMIN MENS OR Take 1 tablet by mouth daily     Associated Diagnoses: Routine general medical examination at a health care facility      nystatin (MYCOSTATIN) 906435 UNIT/ML suspension Take 5 mLs (500,000 Units) by mouth 4 times daily  Qty: 400 mL, Refills: 0    Associated Diagnoses: Thrush      predniSONE (DELTASONE) 20 MG tablet Take 20 mg by mouth daily      tamsulosin (FLOMAX) 0.4 MG capsule Take 1 capsule (0.4 mg) by mouth daily  Qty: 90 capsule, Refills: 3    Associated Diagnoses: Benign prostatic hyperplasia with lower urinary tract symptoms, symptom details unspecified         STOP taking these medications       ASPIRIN 81 MG PO TABS Comments:   Reason for Stopping:             Allergies   Allergies   Allergen Reactions     Amoxicillin Diarrhea            Lisinopril Cough

## 2019-10-17 NOTE — DISCHARGE INSTRUCTIONS
Left anterior forearm wound: Daily and PRN when soiled  1. Cleanse with soap and water or microklenz  2. Apply iodosorb to wound bed using q-tip or directly to mepilex- about the size of a pea   3. Label Mepilex with T, date/time and initials. Once home may use gauze and tape if prefers  4. Apply melilex so that wound is in center of dressing     Cardiac Angiogram Discharge Instructions - Femoral    After you go home:      Have an adult stay with you until tomorrow.    Drink extra fluids for 2 days.    You may resume your normal diet.    No smoking       For 24 hours - due to the sedation you received:    Relax and take it easy.    Do NOT make any important or legal decisions.    Do NOT drive or operate machines at home or at work.    Do NOT drink alcohol.    Care of Groin Puncture Site:      For the first 24 hrs - check the puncture site every 1-2 hours while awake.    For 2 days, when you cough, sneeze, laugh or move your bowels, hold your hand over the puncture site and press firmly.    Remove the bandaid after 24 hours. If there is minor oozing, apply another bandaid and remove it after 12 hours.    It is normal to have a small bruise or pea size lump at the site.    You may shower tomorrow. Do NOT take a bath, or use a hot tub or pool for at least 3 days. Do NOT scrub the site. Do not use lotion or powder near the puncture site.    Activity:            For 2 days:    No stooping or squatting    Do NOT do any heavy activity such as exercise, lifting, or straining.     No housework, yard work or any activity that make you sweat    Do NOT lift more than 10 pounds    Bleeding:      If you start bleeding from the site in your groin, lie down flat and press firmly on/above the site for 10 minutes.     Once bleeding stops, lay flat for 2 hours.     Call [facility] as soon as you can.       Call 911 right away if you have heavy bleeding or bleeding that does not stop.      Medicines:      If you are taking an  antiplatelet medication such as Plavix, Brilinta or Effient, do not stop taking it until you talk to your cardiologist.      If you are on Metformin (Glucophage), do not restart it until you have blood tests (within 2 to 3 days after discharge).  After you have your blood drawn, you may restart the Metformin.     Take your medications, including blood thinners, unless your provider tells you not to.  If you take Coumadin (Warfarin), have your INR checked by your provider in  3-5 days. Call your clinic to schedule this.    If you have stopped any medicines, check with your provider about when to restart them.    Follow Up Appointments:      Follow up with [facility] Nurse Practitioner at [facility] of patient preference in 7-10 days.    Call the clinic if:      You have increased pain or a large or growing hard lump around the site.    The site is red, swollen, hot or tender.    Blood or fluid is draining from the site.    You have chills or a fever greater than 101 F (38 C).    Your leg feels numb, cool or changes color.    You have hives, a rash or unusual itching.    New pain in the back or belly that you cannot control with Tylenol.    Any questions or concerns.          [facility] [place]:    [phone] [facility] (7 days a week)

## 2019-10-17 NOTE — PROGRESS NOTES
10/17/19 1132   Quick Adds   Type of Visit Initial PT Evaluation   Living Environment   Lives With alone   Living Arrangements house   Home Accessibility stairs to enter home   Number of Stairs, Main Entrance 3   Stair Railings, Main Entrance railings on both sides of stairs   Transportation Anticipated car, drives self   Living Environment Comment Pt reports [place] in [place], will soon be travelling to [place] where he spends the winter.   Self-Care   Usual Activity Tolerance good   Current Activity Tolerance moderate   Regular Exercise No   Equipment Currently Used at Home none   Activity/Exercise/Self-Care Comment Pt does not do any formal exercise but reports he is active taking care of his own home and land.   Functional Level Prior   Ambulation 0-->independent   Transferring 0-->independent   Fall history within last six months no   Prior Functional Level Comment Pt reports he is independent without use of assistive device, drives, cooks, cleans, manages own meds   General Information   Onset of Illness/Injury or Date of Surgery - Date 10/15/19   Referring Physician Robinson Robbins MD   Patient/Family Goals Statement To go home   Pertinent History of Current Problem (include personal factors and/or comorbidities that impact the POC) Pt is 84 year old male adm on 10/15/19 with SOB and increased troponin, angio done 10/16/19 with stent placed to LAD.    Precautions/Limitations no known precautions/limitations   Cognitive Status Examination   Orientation orientation to person, place and time   Level of Consciousness alert   Pain Assessment   Patient Currently in Pain No   Range of Motion (ROM)   ROM Comment B LE ROM WFL   Strength   Strength Comments B LE strength WFL   Bed Mobility   Bed Mobility Comments Independent   Transfer Skills   Transfer Comments Independent   Gait   Gait Comments Independent   Balance   Balance Comments No LOB with standing functional tasks   Sensory Examination  "  Sensory Perception Comments Denies numbness/tingling   General Therapy Interventions   Planned Therapy Interventions risk factor education;home program guidelines;progressive activity/exercise   Clinical Impression   Criteria for Skilled Therapeutic Intervention yes, treatment indicated   PT Diagnosis Impaired activity tolerance   Influenced by the following impairments Decreased endurance   Functional limitations due to impairments Decreased ability to participate in daily tasks   Clinical Presentation Stable/Uncomplicated   Clinical Presentation Rationale Current presentation, [facility]   Clinical Decision Making (Complexity) Low complexity   Therapy Frequency 2x/day   Predicted Duration of Therapy Intervention (days/wks) 2 days   Anticipated Discharge Disposition Home with Outpatient Therapy   Risk & Benefits of therapy have been explained Yes   Patient, Family & other staff in agreement with plan of care Yes   [facility] [employer] TM \"6 Clicks\"   2016, Trustees of [facility], under license to [employer].  All rights reserved.   6 Clicks Short Forms Basic Mobility Inpatient Short Form   [facility]  \"6 Clicks\" V.2 Basic Mobility Inpatient Short Form   1. Turning from your back to your side while in a flat bed without using bedrails? 4 - None   2. Moving from lying on your back to sitting on the side of a flat bed without using bedrails? 4 - None   3. Moving to and from a bed to a chair (including a wheelchair)? 4 - None   4. Standing up from a chair using your arms (e.g., wheelchair, or bedside chair)? 4 - None   5. To walk in hospital room? 4 - None   6. Climbing 3-5 steps with a railing? 4 - None   Basic Mobility Raw Score (Score out of 24.Lower scores equate to lower levels of function) 24   Total Evaluation Time   Total Evaluation Time (Minutes) 10     "

## 2019-10-17 NOTE — PLAN OF CARE
VSS on RA.  Tele: SR.  Up ind.  R groin site CDI.  AVS explained to pt and grand daughter.  All questions answered.  Medications sent with pt.

## 2019-10-18 ENCOUNTER — OFFICE VISIT (OUTPATIENT)
Dept: FAMILY MEDICINE | Facility: CLINIC | Age: 84
End: 2019-10-18
Payer: MEDICARE

## 2019-10-18 ENCOUNTER — PATIENT OUTREACH (OUTPATIENT)
Dept: CARE COORDINATION | Facility: CLINIC | Age: 84
End: 2019-10-18

## 2019-10-18 ENCOUNTER — TELEPHONE (OUTPATIENT)
Dept: CARDIOLOGY | Facility: CLINIC | Age: 84
End: 2019-10-18

## 2019-10-18 VITALS
HEART RATE: 106 BPM | WEIGHT: 195 LBS | DIASTOLIC BLOOD PRESSURE: 68 MMHG | TEMPERATURE: 96.9 F | RESPIRATION RATE: 20 BRPM | OXYGEN SATURATION: 97 % | SYSTOLIC BLOOD PRESSURE: 120 MMHG | BODY MASS INDEX: 30.77 KG/M2

## 2019-10-18 DIAGNOSIS — Z71.89 OTHER SPECIFIED COUNSELING: ICD-10-CM

## 2019-10-18 DIAGNOSIS — I24.9 ACS (ACUTE CORONARY SYNDROME) (H): ICD-10-CM

## 2019-10-18 LAB — INTERPRETATION ECG - MUSE: NORMAL

## 2019-10-18 PROCEDURE — 99213 OFFICE O/P EST LOW 20 MIN: CPT | Performed by: FAMILY MEDICINE

## 2019-10-18 NOTE — TELEPHONE ENCOUNTER
Patient was evaluated by cardiology while inpatient for NSTEMI ( S/p coronary angiogram with ODIN to proximal LAD(3.0 X 16mm Synergy ODIN). Called patient and left VM to discuss any post hospital d/c questions he may have, review medication changes, and confirm f/u appts. RN advised patient in  that he has an apt scheduled on 10/24/19 with Dr. Ashraf. Patient advised in  to call clinic with any cardiac related questions or concerns prior to this scheduled courtney't. RN also advised patient in  to call clinic ASAP if he did not receive his rx for Brilinta.               10/17/19 cardiology progress note  Assessment & Plan     Dangelo Hernández is a 84 year old male with past medical history of Mantle cell lymphoma, status post autologous transplant with subsequent development of cryptogenic-organizing pneumonia, status post treatment with Vfend with relapse.  He is followed closely by Pulmonology (Dr. Flanagan, [facility]) on 20 mg of prednisone, Hypertension, Dyslipidemia, Coronary artery disease, status post LAD stents in 2010 with atherectomy and placement of 3 drug-eluting stents to the circumflex in 2013,Known carotid disease at 50% to 69% bilaterally,Claudication, which he states improved and he canceled his PANTERA that was suggested by Dr. Robbins,Benign prostatic hypertrophy,Mild cognitive impairment. This patient was admitted on 10/15/2019 with symptoms of one month progressive LEES associated with exertional chest tightness.        1. Non-stemi  Hx  Coronary artery disease, status post LAD stents in 2010 with atherectomy and placement of 3 drug-eluting stents to the circumflex in 2013.   Trop 0.165     THIS ADMIT: S/p coronary angiogram with ODIN to proximal LAD(3.0 X 16mm Synergy ODIN) , I reviewed film nice result in Lad prox, mod dz in Lcx   RCA   but pt codominant and the rca was  Likely small will need follow-up nuc gxt in 1 yr to follow-up on lcx     LAD provides collateral flow to  LAD  Circumflex 40%  RCA occluded with collateral flow from LAD  Brilinta/ASA minimum of one year  EF 55-60%; RV preserved  Metoprolol  Ace stopped due to low BP  Creatinine normal post angiogram  platlet and hemoglobin stable  Cardiac rehab  Home today if tolerates rehab  He wants to keep his visit next week with Dr. Ashraf who he wants to follow with long term     2.  Hypertension-controlled  Add ace back in future if BP alllows     3.  Dyslipidemia-continue Lipitor 80mg daily  LDL 71 HDL 51 TG normal     5.  Known carotid disease at 50% to 69% bilaterally.   Carotid ultrasound ordered by hospitalist-moderate dz no change     6.  Claudication, which he states improved and he canceled his PANTERA that was suggested by Dr. Robbins.   7.  Benign prostatic hypertrophy.   8.  Mild cognitive impairment.   9. Chronic lung issues as above  10. Mild to moderate aortic stenosis with mean gradient 16 MANA 1.2cm2; mild AI     [facility]  Dr. Ashraf for Cardiology care     Pt seen and examined by me  All decisions mine

## 2019-10-18 NOTE — PROGRESS NOTES
Clinic Care Coordination Contact    Situation: Patient chart reviewed by care coordinator.    Background: 84 year old male inpatient at [facility] from 10/15/19 to 10/17/19 with diagnosi of ACS and NSTEMI with stent placement.     Assessment: patient has medical history of coronary artery disease, hyperlipidemia, hypertension, GERD and mantle cell lymphoma, status post autologous transplant. Patient had stent placement in 2010 with most recent Lexiscan in 9/2018 with ejection fraction of 66%    Patient was seen this am by PCP in follow up.    Patient has had call from cardiology specialty care coordinator today and has follow up appointments scheduled for next week. Patient has been referred to cardiac rehab.      Plan/Recommendations: Will not open to primary clinic care coordination at this time.  Remain availalle if needed.      Paris Badillo RN, [facility] - Primary Care Clinic RN Coordinator  [facility]   10/18/2019    11:45 AM  [phone]

## 2019-10-18 NOTE — PROGRESS NOTES
Subjective     Dangelo Hernández is a 84 year old male who presents to clinic today for the following health issues:    HPI     Chief Complaint   Patient presents with     Hypertension     Derm Problem     left forearm has a sore that is not healing patient states he thinks the sore has been there x 2 weeks .       Hypertension Follow-up      Do you check your blood pressure regularly outside of the clinic? Yes     Are you following a low salt diet? Yes    Are your blood pressures ever more than 140 on the top number (systolic) OR more   than 90 on the bottom number (diastolic), for example 140/90? No      How many servings of fruits and vegetables do you eat daily?  2-3    On average, how many sweetened beverages do you drink each day (soda, juice, sweet tea, etc)?   0    How many days per week do you miss taking your medication? 0                Reviewed and updated as needed this visit by Provider         Review of Systems         Objective    /68   Pulse 106   Temp 96.9  F (36.1  C)   Resp 20   Wt 88.5 kg (195 lb)   SpO2 97%   BMI 30.77 kg/m    Body mass index is 30.77 kg/m .  Physical Exam               Further history obtained, clarified or corrected by physician:  He is doing well after stent placement earlier this week.  He is not having any pain or discomfort.  He will follow-up with cardiology next week.    OBJECTIVE:  /68   Pulse 106   Temp 96.9  F (36.1  C)   Resp 20   Wt 88.5 kg (195 lb)   SpO2 97%   BMI 30.77 kg/m    LUNGS: clear to auscultation, normal breath sounds  CV: RRR without murmur  ABD: BS+, soft, nontender, no masses, no hepatosplenomegaly  EXTREMITIES: without joint tenderness, swelling or erythema.  No muscle tenderness or abnormality.  SKIN: No rashes or abnormalities  NEURO:non focal exam    ASSESSMENT:  ACS (acute coronary syndrome) (H)    PLAN:  I filled out a handicap parking sticker for him  Gradual increase of activity  Follow-up with cardiology as  scheduled.

## 2019-10-18 NOTE — PATIENT INSTRUCTIONS
Our Clinic hours are:  Mondays    7:20 am - 7 pm  Tues -  Fri  7:20 am - 5 pm    Clinic Phone: [phone]    The clinic lab opens at 7:30 am Mon - Fri and appointments are required.    [facility]. [phone]  Monday  8 am - 7pm  Tues - Fri 8 am - 5:30 pm

## 2019-10-22 ENCOUNTER — ANCILLARY PROCEDURE (OUTPATIENT)
Dept: CT IMAGING | Facility: CLINIC | Age: 84
End: 2019-10-22
Attending: INTERNAL MEDICINE
Payer: MEDICARE

## 2019-10-22 ENCOUNTER — OFFICE VISIT (OUTPATIENT)
Dept: PULMONOLOGY | Facility: CLINIC | Age: 84
End: 2019-10-22
Attending: INTERNAL MEDICINE
Payer: MEDICARE

## 2019-10-22 VITALS
SYSTOLIC BLOOD PRESSURE: 118 MMHG | OXYGEN SATURATION: 98 % | TEMPERATURE: 97.5 F | RESPIRATION RATE: 18 BRPM | WEIGHT: 194.8 LBS | HEART RATE: 98 BPM | DIASTOLIC BLOOD PRESSURE: 73 MMHG | BODY MASS INDEX: 30.74 KG/M2

## 2019-10-22 DIAGNOSIS — J84.89 ORGANIZING PNEUMONIA (H): Primary | ICD-10-CM

## 2019-10-22 DIAGNOSIS — J84.89 ORGANIZING PNEUMONIA (H): ICD-10-CM

## 2019-10-22 ASSESSMENT — PAIN SCALES - GENERAL: PAINLEVEL: NO PAIN (0)

## 2019-10-22 NOTE — NURSING NOTE
"Oncology Rooming Note    October 22, 2019 2:50 PM   Dangelo Hernández is a 84 year old male who presents for:    Chief Complaint   Patient presents with     Oncology Clinic Visit     Pt is here for a rtn for Pneumonia      Initial Vitals: Blood Pressure 118/73   Pulse 98   Temperature 97.5  F (36.4  C) (Oral)   Respiration 18   Weight 88.4 kg (194 lb 12.8 oz)   Oxygen Saturation 98%   Body Mass Index 30.74 kg/m   Estimated body mass index is 30.74 kg/m  as calculated from the following:    Height as of 9/10/19: 1.695 m (5' 6.75\").    Weight as of this encounter: 88.4 kg (194 lb 12.8 oz). Body surface area is 2.04 meters squared.  No Pain (0) Comment: Data Unavailable   No LMP for male patient.  Allergies reviewed: Yes  Medications reviewed: Yes    Medications: Medication refills not needed today.  Pharmacy name entered into [employer]:    [facility] - [place], [place] - [address] [id number] - [facility] [address] [facility] [address] [id number] - [place], [place] - [address] AT [facility] [place] [facility]    Clinical concerns: none       Herminia Hinojosa MA              "

## 2019-10-22 NOTE — PROGRESS NOTES
Reason for Visit  Dangelo Hernández is a 84 year old year old male who is being seen for Oncology Clinic Visit (Pt is here for a rtn for Pneumonia )    Pulmonary HPI  Mr. 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 At the completion of therapy, he continued to be SOB and a subsequent VATS biopsy revealed , therapy with steroids was initiated for 6 months and a few months after completion of therapy his clinical and radiographic findings had relapsed and he was re-initiated on steroids and V-Fend for filamentous fungi on a BAL.    Last seen two months ago.  Presented to ED on 10-15 with abrupt onset of increased SOB; EKG did not reveal any acute changes but troponin was elevated. Admitted to the hospital and cardiac cath was performed with PCI with ODIN applied to the LAD. Was on 20 mg of prednisone at that admission. States since his last clinic visit that he does not feel significantly different in terms of SOB.  Has had some increase in cough. Tries to remain active but is limited by breathing. Weight is unchanged since he has been on prednisone.  Did not feel significantly better on 40 mg of prednisone although CT significantly improved.  Review of repeat CT today does not show any significant improvement compared to 8-19.  Is planning on going to [place] for several months for Winter.    The patient was seen and examined by Rajendra Flanagan MD           Current Outpatient Medications   Medication     acetaminophen (TYLENOL 8 HOUR ARTHRITIS PAIN) 650 MG CR tablet     aspirin (ASA) 81 MG EC tablet     atorvastatin (LIPITOR) 80 MG tablet     azelastine (ASTELIN) 0.1 % nasal spray     metoprolol succinate ER (TOPROL-XL) 25 MG 24 hr tablet     MULTI VITAMIN MENS OR     nystatin (MYCOSTATIN) 111237 UNIT/ML suspension      predniSONE (DELTASONE) 20 MG tablet     tamsulosin (FLOMAX) 0.4 MG capsule     ticagrelor (BRILINTA) 90 MG tablet     No current facility-administered medications for this visit.      Allergies   Allergen Reactions     Amoxicillin Diarrhea            Lisinopril Cough     Past Medical History:   Diagnosis Date      Coronary artery disease, 4/ 2010, status post drug-eluting stent placement to the LAD and balloon angioplasty to the obtuse marginal.  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       Past Surgical History:   Procedure Laterality Date     BRONCHOSCOPY (RIGID OR FLEXIBLE), DIAGNOSTIC  7/14/2011    Procedure:COMBINED BRONCHOSCOPY (RIGID OR FLEXIBLE), LAVAGE; Surgeon:REYNA BAILEY; Location:[facility]     BRONCHOSCOPY (RIGID OR FLEXIBLE), DIAGNOSTIC N/A 5/29/2019    Procedure: BRONCHOSCOPY, WITH BRONCHOALVEOLAR LAVAGE;  Surgeon: Perlman, David Morris, MD;  Location: [facility]     COLONOSCOPY       CV HEART CATHETERIZATION WITH POSSIBLE INTERVENTION N/A 10/16/2019    Procedure: Heart Catheterization with Possible Intervention;  Surgeon: Robinson Robbins MD;  Location:  HEART CARDIAC CATH LAB     ENT SURGERY      tonsils     ORTHOPEDIC SURGERY      rt knee arthroscopy     SURGICAL HISTORY OF -       tonsils     SURGICAL HISTORY OF -   1/19/84    1.Exam under anesthesia, 2.Arthroscopy, 3.Arthroscopic medial meniscectomy, 4.arthroscopic trimming of patellar articular cartilage.      SURGICAL HISTORY OF -       vasectomy     SURGICAL HISTORY OF -   90    colonoscopy     SURGICAL HISTORY OF -   10/20/92    flexible sigmoidoscopy     SURGICAL HISTORY OF -   2000    colonoscopy and polypectomy     THORACOSCOPIC BIOPSY LUNG  2011    Procedure:THORACOSCOPIC BIOPSY LUNG; Video Assisted Right Thoracoscopy with Biopsy; Surgeon:JIM EPSTEIN; Location: OR       Social History     Socioeconomic History     Marital status:      Spouse name: Not on file     Number of children: Not on file     Years of education: Not on file     Highest education level: Not on file   Occupational History     Employer: RETIRED   Social Needs     Financial resource strain: Not on file     Food insecurity:     Worry: Not on file     Inability: Not on file     Transportation needs:     Medical: Not on file     Non-medical: Not on file   Tobacco Use     Smoking status: Former Smoker     Types: Cigarettes     Last attempt to quit: 1975     Years since quittin.8     Smokeless tobacco: Former User     Tobacco comment: started smoking at 21 years of age   Substance and Sexual Activity     Alcohol use: Yes     Alcohol/week: 0.0 standard drinks     Comment: every other day     Drug use: No     Sexual activity: Yes     Partners: Female   Lifestyle     Physical activity:     Days per week: Not on file     Minutes per session: Not on file     Stress: Not on file   Relationships     Social connections:     Talks on phone: Not on file     Gets together: Not on file     Attends Voodoo service: Not on file     Active member of club or organization: Not on file     Attends meetings of clubs or organizations: Not on file     Relationship status: Not on file     Intimate partner violence:     Fear of current or ex partner: Not on file     Emotionally abused: Not on file     Physically abused: Not on file     Forced sexual activity: Not on file   Other Topics Concern     Parent/sibling w/ CABG,  MI or angioplasty before 65F 55M? No      Service Not Asked     Blood Transfusions Not Asked     Caffeine Concern Not Asked     Occupational Exposure Not Asked     Hobby Hazards Not Asked     Sleep Concern Not Asked     Stress Concern Not Asked     Weight Concern Not Asked     Special Diet No     Back Care Not Asked     Exercise Yes     Comment: active     Bike Helmet Not Asked     Seat Belt Not Asked     Self-Exams Not Asked   Social History Narrative     Not on file       ROS Pulmonary  A complete ROS was otherwise negative except as noted in the HPI.  /73   Pulse 98   Temp 97.5  F (36.4  C) (Oral)   Resp 18   Wt 88.4 kg (194 lb 12.8 oz)   SpO2 98%   BMI 30.74 kg/m    Exam:   GENERAL APPEARANCE: Well developed, well nourished, alert, and in no apparent distress.  EYES: PERRL, EOMI  HENT: Nasal mucosa with no edema and no hyperemia. No nasal polyps.  EARS: Canals clear, TMs normal  MOUTH: Oral mucosa is moist, without any lesions, no tonsillar enlargement, no oropharyngeal exudate.  NECK: supple, no masses, no thyromegaly.  LYMPHATICS: No significant axillary, cervical, or supraclavicular nodes.  RESP: Mild decreased air flow throughout.  Crackles in bilateral bases. No rhonchi. No wheezes.  CV: Normal S1, S2, regular rhythm, normal rate. No murmur.  No rub. No gallop. No LE edema.   ABDOMEN:  Bowel sounds normal, soft, nontender, no HSM or masses.   MS: extremities normal. No clubbing. No cyanosis.  SKIN: no rash on limited exam  NEURO: Mentation intact, speech normal, normal strength and tone, normal gait and stance  PSYCH: mentation appears normal. and affect normal/bright  Results:  Recent Results (from the past 168 hour(s))   Troponin I - Now then in 6 hours x 2    Collection Time: 10/15/19  8:34 PM   Result Value Ref Range    Troponin I ES 0.165 (HH) 0.000 - 0.045 ug/L   CBC with platelets    Collection Time: 10/15/19  8:34 PM   Result Value Ref Range    WBC 8.3 4.0 - 11.0 10e9/L    RBC  Count 4.25 (L) 4.4 - 5.9 10e12/L    Hemoglobin 13.9 13.3 - 17.7 g/dL    Hematocrit 41.0 40.0 - 53.0 %    MCV 97 78 - 100 fl    MCH 32.7 26.5 - 33.0 pg    MCHC 33.9 31.5 - 36.5 g/dL    RDW 15.4 (H) 10.0 - 15.0 %    Platelet Count 188 150 - 450 10e9/L   Heparin 10a Level    Collection Time: 10/15/19 11:52 PM   Result Value Ref Range    Heparin 10A Level 1.17 (HH) IU/mL   Troponin I - Now then in 6 hours x 2    Collection Time: 10/16/19  2:40 AM   Result Value Ref Range    Troponin I ES 0.156 (HH) 0.000 - 0.045 ug/L   Lipid Profile    Collection Time: 10/16/19  2:40 AM   Result Value Ref Range    Cholesterol 140 <200 mg/dL    Triglycerides 101 <150 mg/dL    HDL Cholesterol 54 >39 mg/dL    LDL Cholesterol Calculated 66 <100 mg/dL    Non HDL Cholesterol 86 <130 mg/dL   Heparin Xa (10a) Level    Collection Time: 10/16/19  5:41 AM   Result Value Ref Range    Heparin 10A Level 0.99 IU/mL   Heparin Xa level (AM Draw)    Collection Time: 10/16/19  9:12 AM   Result Value Ref Range    Heparin 10A Level 1.08 (HH) IU/mL   Activated clotting time POCT    Collection Time: 10/16/19  4:10 PM   Result Value Ref Range    Activated Clot Time 364 (H) 75 - 150 sec   Activated clotting time POCT    Collection Time: 10/16/19  4:20 PM   Result Value Ref Range    Activated Clot Time 336 (H) 75 - 150 sec   Lipid Profile    Collection Time: 10/16/19  5:04 PM   Result Value Ref Range    Cholesterol 138 <200 mg/dL    Triglycerides 78 <150 mg/dL    HDL Cholesterol 51 >39 mg/dL    LDL Cholesterol Calculated 71 <100 mg/dL    Non HDL Cholesterol 87 <130 mg/dL   EKG 12-lead, tracing only     Collection Time: 10/16/19  5:27 PM   Result Value Ref Range    Interpretation ECG Click View Image link to view waveform and result    CBC with platelets    Collection Time: 10/17/19  5:27 AM   Result Value Ref Range    WBC 7.0 4.0 - 11.0 10e9/L    RBC Count 3.81 (L) 4.4 - 5.9 10e12/L    Hemoglobin 12.2 (L) 13.3 - 17.7 g/dL    Hematocrit 36.9 (L) 40.0 - 53.0 %     MCV 97 78 - 100 fl    MCH 32.0 26.5 - 33.0 pg    MCHC 33.1 31.5 - 36.5 g/dL    RDW 15.5 (H) 10.0 - 15.0 %    Platelet Count 160 150 - 450 10e9/L   Basic metabolic panel    Collection Time: 10/17/19  5:27 AM   Result Value Ref Range    Sodium 138 133 - 144 mmol/L    Potassium 3.8 3.4 - 5.3 mmol/L    Chloride 105 94 - 109 mmol/L    Carbon Dioxide 26 20 - 32 mmol/L    Anion Gap 7 3 - 14 mmol/L    Glucose 114 (H) 70 - 99 mg/dL    Urea Nitrogen 18 7 - 30 mg/dL    Creatinine 1.05 0.66 - 1.25 mg/dL    GFR Estimate 66 >60 mL/min/[1.73_m2]    GFR Estimate If Black 76 >60 mL/min/[1.73_m2]    Calcium 8.6 8.5 - 10.1 mg/dL       Assessment and plan:   84 year old male with a past medical history significant for mantle cell lymphoma s/p autologous PBSCT, HTN, and relapsed . Had remained off prednisone for over one year.  Also with previous history of fungal pneumonia.  Represented to clinic with recurrence of symptoms for past 1-1.5 years and CXR changes with decrease in PFT's.  Overall concerning for recurrence of infection or , with duration of symptoms suspected this was a recurrence of the .  Bronchoscopy with BAL performed and all cultures were negative; started on prednisone 40 mg.  CT chest improved while on 40 mg of prednisone but leveled off with decrease to 20 mg; symptoms never fully improved.  Recently found to ischemic cardiac changes- s/p stent but has not felt significantly better after that procedure.  Unclear if has ongoing cardiac ischemia, not fully treated  or another underlying interstitial lung disease.  Some component of deconditioning is likely present.  Unclear if prednisone is helping him at this time.    1. Decrease prednisone to 10 mg per day  2. Follow-up with Cardiologist regarding if needs further work-up for cardiac ischemia  3. Agree with CardoPulmonary rehab  4. If not better after Cardiac rehab will need to re-examine lung issue- would need lung biopsy (via bronchoscope) when safe to  do off of antiplatelet therapy versus trial of higher dose of prednisone.    RTC will be arranged in 6 months with return from [place], will schedule CT at that time.  Asked him to contact clinic if develops increased SOB or cough while in [place].

## 2019-10-22 NOTE — LETTER
10/22/2019       RE: Dangelo Hernández  [address] [facility] [address]     Dear Colleague,    Thank you for referring your patient, Dangelo Hernández, to the [facility] at [facility]. Please see a copy of my visit note below.    Reason for Visit  Dangelo Hernández is a 84 year old year old male who is being seen for Oncology Clinic Visit (Pt is here for a rtn for Pneumonia )    Pulmonary HPI  Mr. 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 At the completion of therapy, he continued to be SOB and a subsequent VATS biopsy revealed , therapy with steroids was initiated for 6 months and a few months after completion of therapy his clinical and radiographic findings had relapsed and he was re-initiated on steroids and V-Fend for filamentous fungi on a BAL.    Last seen two months ago.  Presented to ED on 10-15 with abrupt onset of increased SOB; EKG did not reveal any acute changes but troponin was elevated. Admitted to the hospital and cardiac cath was performed with PCI with ODIN applied to the LAD. Was on 20 mg of prednisone at that admission. States since his last clinic visit that he does not feel significantly different in terms of SOB.  Has had some increase in cough. Tries to remain active but is limited by breathing. Weight is unchanged since he has been on prednisone.  Did not feel significantly better on 40 mg of prednisone although CT significantly improved.  Review of repeat CT today does not show any significant improvement compared to 8-19.  Is planning on going to [place] for several months for Winter.    The patient was seen and examined by Rajendra Flanagan MD           Current Outpatient Medications   Medication     acetaminophen  (TYLENOL 8 HOUR ARTHRITIS PAIN) 650 MG CR tablet     aspirin (ASA) 81 MG EC tablet     atorvastatin (LIPITOR) 80 MG tablet     azelastine (ASTELIN) 0.1 % nasal spray     metoprolol succinate ER (TOPROL-XL) 25 MG 24 hr tablet     MULTI VITAMIN MENS OR     nystatin (MYCOSTATIN) 316384 UNIT/ML suspension     predniSONE (DELTASONE) 20 MG tablet     tamsulosin (FLOMAX) 0.4 MG capsule     ticagrelor (BRILINTA) 90 MG tablet     No current facility-administered medications for this visit.      Allergies   Allergen Reactions     Amoxicillin Diarrhea            Lisinopril Cough     Past Medical History:   Diagnosis Date      Coronary artery disease, 4/ 2010, status post drug-eluting stent placement to the LAD and balloon angioplasty to the obtuse marginal.  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       Past Surgical History:   Procedure Laterality Date     BRONCHOSCOPY (RIGID OR FLEXIBLE), DIAGNOSTIC  7/14/2011    Procedure:COMBINED BRONCHOSCOPY (RIGID OR FLEXIBLE), LAVAGE; Surgeon:REYNA BAILEY; Location: GI     BRONCHOSCOPY (RIGID OR FLEXIBLE), DIAGNOSTIC N/A 5/29/2019    Procedure: BRONCHOSCOPY, WITH BRONCHOALVEOLAR LAVAGE;  Surgeon: Perlman, David Morris, MD;  Location:  GI     COLONOSCOPY       CV HEART CATHETERIZATION WITH POSSIBLE INTERVENTION N/A 10/16/2019    Procedure: Heart Catheterization with Possible Intervention;  Surgeon: Robinson Robbins MD;  Location: [facility]  CARDIAC CATH LAB     ENT SURGERY      tonsils     ORTHOPEDIC SURGERY      rt knee arthroscopy     SURGICAL HISTORY OF -       tonsils     SURGICAL HISTORY OF -   86    1.Exam under anesthesia, 2.Arthroscopy, 3.Arthroscopic medial meniscectomy, 4.arthroscopic trimming of patellar articular cartilage.     SURGICAL HISTORY OF -       vasectomy     SURGICAL HISTORY OF -   90    colonoscopy     SURGICAL HISTORY OF -   10/20/92    flexible sigmoidoscopy     SURGICAL HISTORY OF -   2000    colonoscopy and polypectomy     THORACOSCOPIC BIOPSY LUNG  2011    Procedure:THORACOSCOPIC BIOPSY LUNG; Video Assisted Right Thoracoscopy with Biopsy; Surgeon:JIM EPSTEIN; Location: OR       Social History     Socioeconomic History     Marital status:      Spouse name: Not on file     Number of children: Not on file     Years of education: Not on file     Highest education level: Not on file   Occupational History     Employer: RETIRED   Social Needs     Financial resource strain: Not on file     Food insecurity:     Worry: Not on file     Inability: Not on file     Transportation needs:     Medical: Not on file     Non-medical: Not on file   Tobacco Use     Smoking status: Former Smoker     Types: Cigarettes     Last attempt to quit: 1975     Years since quittin.8     Smokeless tobacco: Former User     Tobacco comment: started smoking at 21 years of age   Substance and Sexual Activity     Alcohol use: Yes     Alcohol/week: 0.0 standard drinks     Comment: every other day     Drug use: No     Sexual activity: Yes     Partners: Female   Lifestyle     Physical activity:     Days per week: Not on file     Minutes per session: Not on file     Stress: Not on file   Relationships     Social connections:     Talks on phone: Not on file     Gets together: Not on file     Attends Sikhism service: Not on file     Active member of club or organization: Not on file     Attends meetings of clubs or  organizations: Not on file     Relationship status: Not on file     Intimate partner violence:     Fear of current or ex partner: Not on file     Emotionally abused: Not on file     Physically abused: Not on file     Forced sexual activity: Not on file   Other Topics Concern     Parent/sibling w/ CABG, MI or angioplasty before 65F 55M? No      Service Not Asked     Blood Transfusions Not Asked     Caffeine Concern Not Asked     Occupational Exposure Not Asked     Hobby Hazards Not Asked     Sleep Concern Not Asked     Stress Concern Not Asked     Weight Concern Not Asked     Special Diet No     Back Care Not Asked     Exercise Yes     Comment: active     Bike Helmet Not Asked     Seat Belt Not Asked     Self-Exams Not Asked   Social History Narrative     Not on file       ROS Pulmonary  A complete ROS was otherwise negative except as noted in the HPI.  /73   Pulse 98   Temp 97.5  F (36.4  C) (Oral)   Resp 18   Wt 88.4 kg (194 lb 12.8 oz)   SpO2 98%   BMI 30.74 kg/m     Exam:   GENERAL APPEARANCE: Well developed, well nourished, alert, and in no apparent distress.  EYES: PERRL, EOMI  HENT: Nasal mucosa with no edema and no hyperemia. No nasal polyps.  EARS: Canals clear, TMs normal  MOUTH: Oral mucosa is moist, without any lesions, no tonsillar enlargement, no oropharyngeal exudate.  NECK: supple, no masses, no thyromegaly.  LYMPHATICS: No significant axillary, cervical, or supraclavicular nodes.  RESP: Mild decreased air flow throughout.  Crackles in bilateral bases. No rhonchi. No wheezes.  CV: Normal S1, S2, regular rhythm, normal rate. No murmur.  No rub. No gallop. No LE edema.   ABDOMEN:  Bowel sounds normal, soft, nontender, no HSM or masses.   MS: extremities normal. No clubbing. No cyanosis.  SKIN: no rash on limited exam  NEURO: Mentation intact, speech normal, normal strength and tone, normal gait and stance  PSYCH: mentation appears normal. and affect normal/bright  Results:  Recent  Results (from the past 168 hour(s))   Troponin I - Now then in 6 hours x 2    Collection Time: 10/15/19  8:34 PM   Result Value Ref Range    Troponin I ES 0.165 (HH) 0.000 - 0.045 ug/L   CBC with platelets    Collection Time: 10/15/19  8:34 PM   Result Value Ref Range    WBC 8.3 4.0 - 11.0 10e9/L    RBC Count 4.25 (L) 4.4 - 5.9 10e12/L    Hemoglobin 13.9 13.3 - 17.7 g/dL    Hematocrit 41.0 40.0 - 53.0 %    MCV 97 78 - 100 fl    MCH 32.7 26.5 - 33.0 pg    MCHC 33.9 31.5 - 36.5 g/dL    RDW 15.4 (H) 10.0 - 15.0 %    Platelet Count 188 150 - 450 10e9/L   Heparin 10a Level    Collection Time: 10/15/19 11:52 PM   Result Value Ref Range    Heparin 10A Level 1.17 (HH) IU/mL   Troponin I - Now then in 6 hours x 2    Collection Time: 10/16/19  2:40 AM   Result Value Ref Range    Troponin I ES 0.156 (HH) 0.000 - 0.045 ug/L   Lipid Profile    Collection Time: 10/16/19  2:40 AM   Result Value Ref Range    Cholesterol 140 <200 mg/dL    Triglycerides 101 <150 mg/dL    HDL Cholesterol 54 >39 mg/dL    LDL Cholesterol Calculated 66 <100 mg/dL    Non HDL Cholesterol 86 <130 mg/dL   Heparin Xa (10a) Level    Collection Time: 10/16/19  5:41 AM   Result Value Ref Range    Heparin 10A Level 0.99 IU/mL   Heparin Xa level (AM Draw)    Collection Time: 10/16/19  9:12 AM   Result Value Ref Range    Heparin 10A Level 1.08 (HH) IU/mL   Activated clotting time POCT    Collection Time: 10/16/19  4:10 PM   Result Value Ref Range    Activated Clot Time 364 (H) 75 - 150 sec   Activated clotting time POCT    Collection Time: 10/16/19  4:20 PM   Result Value Ref Range    Activated Clot Time 336 (H) 75 - 150 sec   Lipid Profile    Collection Time: 10/16/19  5:04 PM   Result Value Ref Range    Cholesterol 138 <200 mg/dL    Triglycerides 78 <150 mg/dL    HDL Cholesterol 51 >39 mg/dL    LDL Cholesterol Calculated 71 <100 mg/dL    Non HDL Cholesterol 87 <130 mg/dL   EKG 12-lead, tracing only     Collection Time: 10/16/19  5:27 PM   Result Value Ref Range     Interpretation ECG Click View Image link to view waveform and result    CBC with platelets    Collection Time: 10/17/19  5:27 AM   Result Value Ref Range    WBC 7.0 4.0 - 11.0 10e9/L    RBC Count 3.81 (L) 4.4 - 5.9 10e12/L    Hemoglobin 12.2 (L) 13.3 - 17.7 g/dL    Hematocrit 36.9 (L) 40.0 - 53.0 %    MCV 97 78 - 100 fl    MCH 32.0 26.5 - 33.0 pg    MCHC 33.1 31.5 - 36.5 g/dL    RDW 15.5 (H) 10.0 - 15.0 %    Platelet Count 160 150 - 450 10e9/L   Basic metabolic panel    Collection Time: 10/17/19  5:27 AM   Result Value Ref Range    Sodium 138 133 - 144 mmol/L    Potassium 3.8 3.4 - 5.3 mmol/L    Chloride 105 94 - 109 mmol/L    Carbon Dioxide 26 20 - 32 mmol/L    Anion Gap 7 3 - 14 mmol/L    Glucose 114 (H) 70 - 99 mg/dL    Urea Nitrogen 18 7 - 30 mg/dL    Creatinine 1.05 0.66 - 1.25 mg/dL    GFR Estimate 66 >60 mL/min/[1.73_m2]    GFR Estimate If Black 76 >60 mL/min/[1.73_m2]    Calcium 8.6 8.5 - 10.1 mg/dL       Assessment and plan:   84 year old male with a past medical history significant for mantle cell lymphoma s/p autologous PBSCT, HTN, and relapsed . Had remained off prednisone for over one year.  Also with previous history of fungal pneumonia.  Represented to clinic with recurrence of symptoms for past 1-1.5 years and CXR changes with decrease in PFT's.  Overall concerning for recurrence of infection or , with duration of symptoms suspected this was a recurrence of the .  Bronchoscopy with BAL performed and all cultures were negative; started on prednisone 40 mg.  CT chest improved while on 40 mg of prednisone but leveled off with decrease to 20 mg; symptoms never fully improved.  Recently found to ischemic cardiac changes- s/p stent but has not felt significantly better after that procedure.  Unclear if has ongoing cardiac ischemia, not fully treated  or another underlying interstitial lung disease.  Some component of deconditioning is likely present.  Unclear if prednisone is helping him at  this time.    1. Decrease prednisone to 10 mg per day  2. Follow-up with Cardiologist regarding if needs further work-up for cardiac ischemia  3. Agree with CardoPulmonary rehab  4. If not better after Cardiac rehab will need to re-examine lung issue- would need lung biopsy (via bronchoscope) when safe to do off of antiplatelet therapy versus trial of higher dose of prednisone.    RTC will be arranged in 6 months with return from [place], will schedule CT at that time.  Asked him to contact clinic if develops increased SOB or cough while in [place].          Again, thank you for allowing me to participate in the care of your patient.      Sincerely,    Rajendra Flanagan MD

## 2019-10-23 ENCOUNTER — TELEPHONE (OUTPATIENT)
Dept: PULMONOLOGY | Facility: CLINIC | Age: 84
End: 2019-10-23

## 2019-10-23 NOTE — TELEPHONE ENCOUNTER
Contacted by patient to state that he was to see Dr Flanagan yesterday and should have asked for Zpak for weakness and SOB. He denies cough and sputum production as well as fevers. Messaged Dr Flanagan about any advisement he has for patient.    Left voice mail that Dr Flanagan wants patient to see cardiology tomorrow and wait for their evaluation to determine treatment plan.

## 2019-10-24 ENCOUNTER — OFFICE VISIT (OUTPATIENT)
Dept: CARDIOLOGY | Facility: CLINIC | Age: 84
End: 2019-10-24
Payer: MEDICARE

## 2019-10-24 ENCOUNTER — HOSPITAL ENCOUNTER (OUTPATIENT)
Dept: CARDIAC REHAB | Facility: CLINIC | Age: 84
End: 2019-10-24
Attending: INTERNAL MEDICINE
Payer: MEDICARE

## 2019-10-24 VITALS
WEIGHT: 193.5 LBS | BODY MASS INDEX: 30.37 KG/M2 | SYSTOLIC BLOOD PRESSURE: 100 MMHG | DIASTOLIC BLOOD PRESSURE: 60 MMHG | HEIGHT: 67 IN | HEART RATE: 84 BPM

## 2019-10-24 DIAGNOSIS — E78.2 COMBINED HYPERLIPIDEMIA: ICD-10-CM

## 2019-10-24 DIAGNOSIS — I24.9 ACS (ACUTE CORONARY SYNDROME) (H): ICD-10-CM

## 2019-10-24 DIAGNOSIS — I25.118 CORONARY ARTERY DISEASE OF NATIVE HEART WITH STABLE ANGINA PECTORIS, UNSPECIFIED VESSEL OR LESION TYPE (H): ICD-10-CM

## 2019-10-24 DIAGNOSIS — I65.23 BILATERAL CAROTID ARTERY STENOSIS: Primary | ICD-10-CM

## 2019-10-24 DIAGNOSIS — I10 HYPERTENSION GOAL BP (BLOOD PRESSURE) < 140/90: ICD-10-CM

## 2019-10-24 PROCEDURE — 40000116 ZZH STATISTIC OP CR VISIT

## 2019-10-24 PROCEDURE — 93797 PHYS/QHP OP CAR RHAB WO ECG: CPT

## 2019-10-24 PROCEDURE — 93798 PHYS/QHP OP CAR RHAB W/ECG: CPT

## 2019-10-24 PROCEDURE — 40000575 ZZH STATISTIC OP CARDIAC VISIT #2

## 2019-10-24 PROCEDURE — 99214 OFFICE O/P EST MOD 30 MIN: CPT | Performed by: INTERNAL MEDICINE

## 2019-10-24 RX ORDER — CLOPIDOGREL BISULFATE 75 MG/1
75 TABLET ORAL DAILY
Qty: 90 TABLET | Refills: 3 | Status: SHIPPED | OUTPATIENT
Start: 2019-10-24 | End: 2022-06-03

## 2019-10-24 RX ORDER — LOSARTAN POTASSIUM 25 MG/1
25 TABLET ORAL DAILY
Qty: 90 TABLET | Refills: 3 | Status: SHIPPED | OUTPATIENT
Start: 2019-10-24 | End: 2022-06-03

## 2019-10-24 ASSESSMENT — MIFFLIN-ST. JEOR: SCORE: 1532.37

## 2019-10-24 NOTE — PATIENT INSTRUCTIONS
Stop Brillinta in the morning.    Start Plavix with a 600 mg (8 tablet) loading dose tomorrow morning, then take 75 mg daily.     Stop metoprolol. Start losartan 25 mg daily.     Stress test in [place] and then see one of our providers for follow up. We will do a lab then too.    Carotid ultrasound and of the legs next fall to look for progression.    [facility]  [phone]

## 2019-10-24 NOTE — LETTER
10/24/2019    Dony Acuña MD  [id number]  [facility] [address]    RE: Dangelo Hernández       Dear Colleague,    I had the pleasure of seeing Dangelo Hernández in the [facility].    HPI and Plan:   We had the pleasure of seeing Dangelo Hernández as a shared visit today in cardiology clinic for hospital and follow up. He is a pleasant 84 year old patient of Dr. Ashraf.    Mr. Hernández has a past medical history significant for coronary artery disease.  He had PCI to the LAD in 2010 and then to OM1 in 2013.  I have reviewed his cath images from 2013, and he did have significant disease in his RCA at that time.  They did FFR of the ostial RCA, but there was a significant mid RCA lesion; FFR was recorded at 0.94.  It is a very tortuous vessel, however.  Mr. Hernández reports after his first stent in 2010, his shortness of breath completely went away and he felt like a new person.  He felt a little bit better after his stent in 2013, but his symptoms of shortness of breath have continued.  He has slowed down as a result of this.  He has PAD and Dr. Robbins previously recommend ABIs for further evaluation last year, the patient did not set this up. Previous PANTERA 8/2013 showed .55 of the right leg with exercise.  He has a history of mantle cell non-Hodgkin's lymphoma for which he underwent whole body radiation, but that is felt to be in remission.  He has bilateral carotid artery stenosis with an ultrasound done this year showing bilateral internal carotid artery and 50%-69% of the left internal carotid artery.     He presented to [facility] with NSTEMI 10/17/19. He had proximal LAD stenting with a 3.0 x 16 mm Synergy ODIN. RCA developed . The left circumflex had moderate disease and will need further evaluation. His echocardiogram whose EF 55-60%, mean AoV gradietn of 16.3 with MANA 1.2.    Today he is a little disappointed that he still has pretty significant  shortness of breath.  His O2 sat monitor at home consistently reads above 97%.  He is been seen by pulmonology this week.  They are interested in doing a biopsy, he will have to wait at least 6 months of antiplatelet therapy and ideally a year before he could hold dual antiplatelet therapy for biopsy.  We discussed that he is on Brilinta and this can worsen shortness of breath.  We also discussed the beta-blockade can exasperate symptoms.  Will make adjustments to those medications.  He denies any recurrent chest pain.  He also denies any claudication symptoms.  His walking is mostly limited by breathing, he gets breathless just walking from the lobby into the exam room.     Labs Reviewed: Cholesterol 138, LDL 71, creatinine 1.05    Physical Exam  Please see Below     Assessment and Plan  1. Recent NSTEMI with proximal LAD stenting. Preserved EF on echo.  of RCA and moderate L Circumflex disease. On statin., ASA, toprol and Brilinta. Will swticth Brillinta to Plavix with a loading dose. Dobutamine stress echo to look at residual circ lesion.  2. Shortness of breath. His home O2 sats are consistently in the upper 90s. This is being treated by pulmonology. He is on prednisone and antifungals. However, we will switch Brillinta to plavix to see if this imporves his symptoms.  We will also switch beta-blocker to ARB.  3. Carotid aretery stenosis. Will repeat carotid US in a year. Continue statin and ASA.  4. Hypertension. Will switch beta blocker to losartan and check labs in a week, maybe this will help his respiratory symptoms.  5. PAD. Previously abnormal ABIs. Will repeat in a year. Continue asa and statin.     Thank you for allowing me to care for Dangelo Hernández today.    MURALI Doan, CNP  Cardiology    This is a shared visit between myself and MURALI Coto. I was present for all of the visit. This patient was seen and examined by me. We discussed the patient and plan of care including  medication changes. I agree with the plan outlined above that was mutually arranged.   Dr. Hannah Ashraf MD    Voice recognition software was used for this note, I have reviewed this note, but errors may have been missed.    No orders of the defined types were placed in this encounter.    No orders of the defined types were placed in this encounter.    There are no discontinued medications.      CURRENT MEDICATIONS:  Current Outpatient Medications   Medication Sig Dispense Refill     acetaminophen (TYLENOL 8 HOUR ARTHRITIS PAIN) 650 MG CR tablet Take 1,300 mg by mouth 2 times daily       aspirin (ASA) 81 MG EC tablet Take 1 tablet (81 mg) by mouth daily 30 tablet 0     atorvastatin (LIPITOR) 80 MG tablet Take 1 tablet (80 mg) by mouth daily 90 tablet 3     azelastine (ASTELIN) 0.1 % nasal spray Spray 1 spray into both nostrils 2 times daily 3 Bottle 3     metoprolol succinate ER (TOPROL-XL) 25 MG 24 hr tablet Take 1 tablet (25 mg) by mouth daily 90 tablet 3     MULTI VITAMIN MENS OR Take 1 tablet by mouth daily        nystatin (MYCOSTATIN) 946843 UNIT/ML suspension Take 5 mLs (500,000 Units) by mouth 4 times daily 400 mL 0     predniSONE (DELTASONE) 20 MG tablet Take 20 mg by mouth daily       tamsulosin (FLOMAX) 0.4 MG capsule Take 1 capsule (0.4 mg) by mouth daily 90 capsule 3     ticagrelor (BRILINTA) 90 MG tablet Take 1 tablet (90 mg) by mouth every 12 hours 60 tablet 11       ALLERGIES     Allergies   Allergen Reactions     Amoxicillin Diarrhea            Lisinopril Cough       PAST MEDICAL HISTORY:  Past Medical History:   Diagnosis Date      Coronary artery disease, 4/ 2010, status post drug-eluting stent placement to the LAD and balloon angioplasty to the obtuse marginal.  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    LDL goal <70      Coronary artery disease      stents placed 6/10/2013     Cryptogenic organizing pneumonia (H) 2012     GERD (gastroesophageal reflux disease)      History of blood disorder 2011     Hypertension      Hypertension goal BP (blood pressure) < 140/90 2010     Malignant neoplasm (H)     BMT; Mantle cell lymphoma     Mantle Cell Lymphoma S/P Autologous Transplant 2010       PAST SURGICAL HISTORY:  Past Surgical History:   Procedure Laterality Date     BRONCHOSCOPY (RIGID OR FLEXIBLE), DIAGNOSTIC  2011    Procedure:COMBINED BRONCHOSCOPY (RIGID OR FLEXIBLE), LAVAGE; Surgeon:REYNA BAILEY; Location: GI     BRONCHOSCOPY (RIGID OR FLEXIBLE), DIAGNOSTIC N/A 2019    Procedure: BRONCHOSCOPY, WITH BRONCHOALVEOLAR LAVAGE;  Surgeon: Perlman, David Morris, MD;  Location:  GI     COLONOSCOPY       CV HEART CATHETERIZATION WITH POSSIBLE INTERVENTION N/A 10/16/2019    Procedure: Heart Catheterization with Possible Intervention;  Surgeon: Robinson Robbins MD;  Location:  HEART CARDIAC CATH LAB     ENT SURGERY      tonsils     ORTHOPEDIC SURGERY      rt knee arthroscopy     SURGICAL HISTORY OF -       tonsils     SURGICAL HISTORY OF -   84    1.Exam under anesthesia, 2.Arthroscopy, 3.Arthroscopic medial meniscectomy, 4.arthroscopic trimming of patellar articular cartilage.     SURGICAL HISTORY OF -       vasectomy     SURGICAL HISTORY OF -   90    colonoscopy     SURGICAL HISTORY OF -   10/20/92    flexible sigmoidoscopy     SURGICAL HISTORY OF -   2000    colonoscopy and polypectomy     THORACOSCOPIC BIOPSY LUNG  2011    Procedure:THORACOSCOPIC BIOPSY LUNG; Video Assisted Right Thoracoscopy with Biopsy; Surgeon:JIM EPSTEIN; Location: OR       FAMILY HISTORY:  Family History   Problem Relation Age of Onset     Cancer Mother         Lung--did not smoke     Cardiovascular Father         MI age 73     Lipids Sister      Hypertension Sister      Cancer Sister         Ovarian- at age 65        SOCIAL HISTORY:  Social History     Socioeconomic History     Marital status:      Spouse name: Not on file     Number of children: Not on file     Years of education: Not on file     Highest education level: Not on file   Occupational History     Employer: RETIRED   Social Needs     Financial resource strain: Not on file     Food insecurity:     Worry: Not on file     Inability: Not on file     Transportation needs:     Medical: Not on file     Non-medical: Not on file   Tobacco Use     Smoking status: Former Smoker     Types: Cigarettes     Last attempt to quit: 1975     Years since quittin.8     Smokeless tobacco: Former User     Tobacco comment: started smoking at 21 years of age   Substance and Sexual Activity     Alcohol use: Yes     Alcohol/week: 0.0 standard drinks     Comment: every other day     Drug use: No     Sexual activity: Yes     Partners: Female   Lifestyle     Physical activity:     Days per week: Not on file     Minutes per session: Not on file     Stress: Not on file   Relationships     Social connections:     Talks on phone: Not on file     Gets together: Not on file     Attends Congregational service: Not on file     Active member of club or organization: Not on file     Attends meetings of clubs or organizations: Not on file     Relationship status: Not on file     Intimate partner violence:     Fear of current or ex partner: Not on file     Emotionally abused: Not on file     Physically abused: Not on file     Forced sexual activity: Not on file   Other Topics Concern     Parent/sibling w/ CABG, MI or angioplasty before 65F 55M? No      Service Not Asked     Blood Transfusions Not Asked     Caffeine Concern Not Asked     Occupational Exposure Not Asked     Hobby Hazards Not Asked     Sleep Concern Not Asked     Stress Concern Not Asked     Weight Concern Not Asked     Special Diet No     Back Care Not Asked     Exercise Yes     Comment: active     Bike Helmet Not  Asked     Seat Belt Not Asked     Self-Exams Not Asked   Social History Narrative     Not on file       Review of Systems:  Skin:          Eyes:         ENT:         Respiratory:          Cardiovascular:         Gastroenterology:        Genitourinary:         Musculoskeletal:         Neurologic:         Psychiatric:         Heme/Lymph/Imm:         Endocrine:           Physical Exam:  Vitals: There were no vitals taken for this visit.    Constitutional:           Skin:             Head:           Eyes:           Lymph:      ENT:           Neck:           Respiratory:            Cardiac:                                                           GI:           Extremities and Muscular Skeletal:                 Neurological:           Psych:       No diagnosis found.    Recent Lab Results:  LIPID RESULTS:  Lab Results   Component Value Date    CHOL 138 10/16/2019    HDL 51 10/16/2019    LDL 71 10/16/2019    TRIG 78 10/16/2019    CHOLHDLRATIO 2.6 06/17/2015       LIVER ENZYME RESULTS:  Lab Results   Component Value Date    AST 23 10/15/2019    ALT 46 10/15/2019       CBC RESULTS:  Lab Results   Component Value Date    WBC 7.0 10/17/2019    RBC 3.81 (L) 10/17/2019    HGB 12.2 (L) 10/17/2019    HCT 36.9 (L) 10/17/2019    MCV 97 10/17/2019    MCH 32.0 10/17/2019    MCHC 33.1 10/17/2019    RDW 15.5 (H) 10/17/2019     10/17/2019       BMP RESULTS:  Lab Results   Component Value Date     10/17/2019    POTASSIUM 3.8 10/17/2019    CHLORIDE 105 10/17/2019    CO2 26 10/17/2019    ANIONGAP 7 10/17/2019     (H) 10/17/2019    BUN 18 10/17/2019    CR 1.05 10/17/2019    GFRESTIMATED 66 10/17/2019    GFRESTBLACK 76 10/17/2019    CHAVEZ 8.6 10/17/2019        A1C RESULTS:  Lab Results   Component Value Date    A1C 5.5 09/13/2016       INR RESULTS:  Lab Results   Component Value Date    INR 0.94 09/29/2015    INR Unsatisfactory specimen - clotted 09/29/2015           CC  Dony Acuña MD  [id number] [facility]  [place] [address]                    Thank you for allowing me to participate in the care of your patient.      Sincerely,     Hannah Ashraf MD     [facility]    cc:   Dony Acuña MD  [address] [place] [address]

## 2019-10-24 NOTE — LETTER
10/24/2019    Dony Acuña MD  [address]  [facility] [address]    RE: Dangelo Hernández       Dear Colleague,    I had the pleasure of seeing Dangelo Hernández in the [facility].    HPI and Plan:   We had the pleasure of seeing Dangelo Hernández as a shared visit today in cardiology clinic for hospital and follow up. He is a pleasant 84 year old patient of Dr. Ashraf.    Mr. Hernández has a past medical history significant for coronary artery disease.  He had PCI to the LAD in 2010 and then to OM1 in 2013.  I have reviewed his cath images from 2013, and he did have significant disease in his RCA at that time.  They did FFR of the ostial RCA, but there was a significant mid RCA lesion; FFR was recorded at 0.94.  It is a very tortuous vessel, however.  Mr. Hernández reports after his first stent in 2010, his shortness of breath completely went away and he felt like a new person.  He felt a little bit better after his stent in 2013, but his symptoms of shortness of breath have continued.  He has slowed down as a result of this.  He has PAD and Dr. Robbins previously recommend ABIs for further evaluation last year, the patient did not set this up. Previous PANTERA 8/2013 showed .55 of the right leg with exercise.  He has a history of mantle cell non-Hodgkin's lymphoma for which he underwent whole body radiation, but that is felt to be in remission.  He has bilateral carotid artery stenosis with an ultrasound done this year showing bilateral internal carotid artery and 50%-69% of the left internal carotid artery.     He presented to [facility] with NSTEMI 10/17/19. He had proximal LAD stenting with a 3.0 x 16 mm Synergy ODIN. RCA developed . The left circumflex had moderate disease and will need further evaluation. His echocardiogram whose EF 55-60%, mean AoV gradietn of 16.3 with MANA 1.2.    Today he is a little disappointed that he still has pretty significant  shortness of breath.  His O2 sat monitor at home consistently reads above 97%.  He is been seen by pulmonology this week.  They are interested in doing a biopsy, he will have to wait at least 6 months of antiplatelet therapy and ideally a year before he could hold dual antiplatelet therapy for biopsy.  We discussed that he is on Brilinta and this can worsen shortness of breath.  We also discussed the beta-blockade can exasperate symptoms.  Will make adjustments to those medications.  He denies any recurrent chest pain.  He also denies any claudication symptoms.  His walking is mostly limited by breathing, he gets breathless just walking from the lobby into the exam room.     Labs Reviewed: Cholesterol 138, LDL 71, creatinine 1.05    Physical Exam  Please see Below     Assessment and Plan  1. Recent NSTEMI with proximal LAD stenting. Preserved EF on echo.  of RCA and moderate L Circumflex disease. On statin., ASA, toprol and Brilinta. Will swticth Brillinta to Plavix with a loading dose. Dobutamine stress echo to look at residual circ lesion.  2. Shortness of breath. His home O2 sats are consistently in the upper 90s. This is being treated by pulmonology. He is on prednisone and antifungals. However, we will switch Brillinta to plavix to see if this imporves his symptoms.  We will also switch beta-blocker to ARB.  3. Carotid aretery stenosis. Will repeat carotid US in a year. Continue statin and ASA.  4. Hypertension. Will switch beta blocker to losartan and check labs in a week, maybe this will help his respiratory symptoms.  5. PAD. Previously abnormal ABIs. Will repeat in a year. Continue asa and statin.     Thank you for allowing me to care for Dangelo Hernández today.    MURALI Doan, CNP  Cardiology    This is a shared visit between myself and MURALI Coto. I was present for all of the visit. This patient was seen and examined by me. We discussed the patient and plan of care including  medication changes. I agree with the plan outlined above that was mutually arranged.   Dr. Hannah Ashraf MD    Voice recognition software was used for this note, I have reviewed this note, but errors may have been missed.    No orders of the defined types were placed in this encounter.    No orders of the defined types were placed in this encounter.    There are no discontinued medications.      CURRENT MEDICATIONS:  Current Outpatient Medications   Medication Sig Dispense Refill     acetaminophen (TYLENOL 8 HOUR ARTHRITIS PAIN) 650 MG CR tablet Take 1,300 mg by mouth 2 times daily       aspirin (ASA) 81 MG EC tablet Take 1 tablet (81 mg) by mouth daily 30 tablet 0     atorvastatin (LIPITOR) 80 MG tablet Take 1 tablet (80 mg) by mouth daily 90 tablet 3     azelastine (ASTELIN) 0.1 % nasal spray Spray 1 spray into both nostrils 2 times daily 3 Bottle 3     metoprolol succinate ER (TOPROL-XL) 25 MG 24 hr tablet Take 1 tablet (25 mg) by mouth daily 90 tablet 3     MULTI VITAMIN MENS OR Take 1 tablet by mouth daily        nystatin (MYCOSTATIN) 799663 UNIT/ML suspension Take 5 mLs (500,000 Units) by mouth 4 times daily 400 mL 0     predniSONE (DELTASONE) 20 MG tablet Take 20 mg by mouth daily       tamsulosin (FLOMAX) 0.4 MG capsule Take 1 capsule (0.4 mg) by mouth daily 90 capsule 3     ticagrelor (BRILINTA) 90 MG tablet Take 1 tablet (90 mg) by mouth every 12 hours 60 tablet 11       ALLERGIES     Allergies   Allergen Reactions     Amoxicillin Diarrhea            Lisinopril Cough       PAST MEDICAL HISTORY:  Past Medical History:   Diagnosis Date      Coronary artery disease, 4/ 2010, status post drug-eluting stent placement to the LAD and balloon angioplasty to the obtuse marginal.  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    LDL goal <70      Coronary artery disease      stents placed 6/10/2013     Cryptogenic organizing pneumonia (H) 2012     GERD (gastroesophageal reflux disease)      History of blood disorder 2011     Hypertension      Hypertension goal BP (blood pressure) < 140/90 2010     Malignant neoplasm (H)     BMT; Mantle cell lymphoma     Mantle Cell Lymphoma S/P Autologous Transplant 2010       PAST SURGICAL HISTORY:  Past Surgical History:   Procedure Laterality Date     BRONCHOSCOPY (RIGID OR FLEXIBLE), DIAGNOSTIC  2011    Procedure:COMBINED BRONCHOSCOPY (RIGID OR FLEXIBLE), LAVAGE; Surgeon:REYNA BAILEY; Location: GI     BRONCHOSCOPY (RIGID OR FLEXIBLE), DIAGNOSTIC N/A 2019    Procedure: BRONCHOSCOPY, WITH BRONCHOALVEOLAR LAVAGE;  Surgeon: Perlman, David Morris, MD;  Location:  GI     COLONOSCOPY       CV HEART CATHETERIZATION WITH POSSIBLE INTERVENTION N/A 10/16/2019    Procedure: Heart Catheterization with Possible Intervention;  Surgeon: Robinson Robbins MD;  Location:  HEART CARDIAC CATH LAB     ENT SURGERY      tonsils     ORTHOPEDIC SURGERY      rt knee arthroscopy     SURGICAL HISTORY OF -       tonsils     SURGICAL HISTORY OF -   86    1.Exam under anesthesia, 2.Arthroscopy, 3.Arthroscopic medial meniscectomy, 4.arthroscopic trimming of patellar articular cartilage.     SURGICAL HISTORY OF -       vasectomy     SURGICAL HISTORY OF -   90    colonoscopy     SURGICAL HISTORY OF -   10/20/92    flexible sigmoidoscopy     SURGICAL HISTORY OF -   2000    colonoscopy and polypectomy     THORACOSCOPIC BIOPSY LUNG  2011    Procedure:THORACOSCOPIC BIOPSY LUNG; Video Assisted Right Thoracoscopy with Biopsy; Surgeon:JIM EPSTEIN; Location: OR       FAMILY HISTORY:  Family History   Problem Relation Age of Onset     Cancer Mother         Lung--did not smoke     Cardiovascular Father         MI age 73     Lipids Sister      Hypertension Sister      Cancer Sister         Ovarian- at age 65        SOCIAL HISTORY:  Social History     Socioeconomic History     Marital status:      Spouse name: Not on file     Number of children: Not on file     Years of education: Not on file     Highest education level: Not on file   Occupational History     Employer: RETIRED   Social Needs     Financial resource strain: Not on file     Food insecurity:     Worry: Not on file     Inability: Not on file     Transportation needs:     Medical: Not on file     Non-medical: Not on file   Tobacco Use     Smoking status: Former Smoker     Types: Cigarettes     Last attempt to quit: 1975     Years since quittin.8     Smokeless tobacco: Former User     Tobacco comment: started smoking at 21 years of age   Substance and Sexual Activity     Alcohol use: Yes     Alcohol/week: 0.0 standard drinks     Comment: every other day     Drug use: No     Sexual activity: Yes     Partners: Female   Lifestyle     Physical activity:     Days per week: Not on file     Minutes per session: Not on file     Stress: Not on file   Relationships     Social connections:     Talks on phone: Not on file     Gets together: Not on file     Attends Caodaism service: Not on file     Active member of club or organization: Not on file     Attends meetings of clubs or organizations: Not on file     Relationship status: Not on file     Intimate partner violence:     Fear of current or ex partner: Not on file     Emotionally abused: Not on file     Physically abused: Not on file     Forced sexual activity: Not on file   Other Topics Concern     Parent/sibling w/ CABG, MI or angioplasty before 65F 55M? No      Service Not Asked     Blood Transfusions Not Asked     Caffeine Concern Not Asked     Occupational Exposure Not Asked     Hobby Hazards Not Asked     Sleep Concern Not Asked     Stress Concern Not Asked     Weight Concern Not Asked     Special Diet No     Back Care Not Asked     Exercise Yes     Comment: active     Bike Helmet Not  Asked     Seat Belt Not Asked     Self-Exams Not Asked   Social History Narrative     Not on file       Review of Systems:  Skin:          Eyes:         ENT:         Respiratory:          Cardiovascular:         Gastroenterology:        Genitourinary:         Musculoskeletal:         Neurologic:         Psychiatric:         Heme/Lymph/Imm:         Endocrine:           Physical Exam:  Vitals: There were no vitals taken for this visit.    Constitutional:           Skin:             Head:           Eyes:           Lymph:      ENT:           Neck:           Respiratory:            Cardiac:                                                           GI:           Extremities and Muscular Skeletal:                 Neurological:           Psych:       No diagnosis found.    Recent Lab Results:  LIPID RESULTS:  Lab Results   Component Value Date    CHOL 138 10/16/2019    HDL 51 10/16/2019    LDL 71 10/16/2019    TRIG 78 10/16/2019    CHOLHDLRATIO 2.6 06/17/2015       LIVER ENZYME RESULTS:  Lab Results   Component Value Date    AST 23 10/15/2019    ALT 46 10/15/2019       CBC RESULTS:  Lab Results   Component Value Date    WBC 7.0 10/17/2019    RBC 3.81 (L) 10/17/2019    HGB 12.2 (L) 10/17/2019    HCT 36.9 (L) 10/17/2019    MCV 97 10/17/2019    MCH 32.0 10/17/2019    MCHC 33.1 10/17/2019    RDW 15.5 (H) 10/17/2019     10/17/2019       BMP RESULTS:  Lab Results   Component Value Date     10/17/2019    POTASSIUM 3.8 10/17/2019    CHLORIDE 105 10/17/2019    CO2 26 10/17/2019    ANIONGAP 7 10/17/2019     (H) 10/17/2019    BUN 18 10/17/2019    CR 1.05 10/17/2019    GFRESTIMATED 66 10/17/2019    GFRESTBLACK 76 10/17/2019    CHAVEZ 8.6 10/17/2019        A1C RESULTS:  Lab Results   Component Value Date    A1C 5.5 09/13/2016       INR RESULTS:  Lab Results   Component Value Date    INR 0.94 09/29/2015    INR Unsatisfactory specimen - clotted 09/29/2015           Thank you for allowing me to participate in the care of  your patient.    Sincerely,     Hannah Ashraf MD     [facility]

## 2019-10-24 NOTE — PROGRESS NOTES
HPI and Plan:   We had the pleasure of seeing Dangelo Hernández as a shared visit today in cardiology clinic for hospital and follow up. He is a pleasant 84 year old patient of Dr. Ashraf.    Mr. Hernández has a past medical history significant for coronary artery disease.  He had PCI to the LAD in 2010 and then to OM1 in 2013.  I have reviewed his cath images from 2013, and he did have significant disease in his RCA at that time.  They did FFR of the ostial RCA, but there was a significant mid RCA lesion; FFR was recorded at 0.94.  It is a very tortuous vessel, however.  Mr. Hernández reports after his first stent in 2010, his shortness of breath completely went away and he felt like a new person.  He felt a little bit better after his stent in 2013, but his symptoms of shortness of breath have continued.  He has slowed down as a result of this.  He has PAD and Dr. Robbins previously recommend ABIs for further evaluation last year, the patient did not set this up. Previous PANTERA 8/2013 showed .55 of the right leg with exercise.  He has a history of mantle cell non-Hodgkin's lymphoma for which he underwent whole body radiation, but that is felt to be in remission.  He has bilateral carotid artery stenosis with an ultrasound done this year showing bilateral internal carotid artery and 50%-69% of the left internal carotid artery.     He presented to [facility] with NSTEMI 10/17/19. He had proximal LAD stenting with a 3.0 x 16 mm Synergy ODIN. RCA developed . The left circumflex had moderate disease and will need further evaluation. His echocardiogram whose EF 55-60%, mean AoV gradietn of 16.3 with MANA 1.2.    Today he is a little disappointed that he still has pretty significant shortness of breath.  His O2 sat monitor at home consistently reads above 97%.  He is been seen by pulmonology this week.  They are interested in doing a biopsy, he will have to wait at least 6 months of antiplatelet therapy and  ideally a year before he could hold dual antiplatelet therapy for biopsy.  We discussed that he is on Brilinta and this can worsen shortness of breath.  We also discussed the beta-blockade can exasperate symptoms.  Will make adjustments to those medications.  He denies any recurrent chest pain.  He also denies any claudication symptoms.  His walking is mostly limited by breathing, he gets breathless just walking from the lobby into the exam room.     Labs Reviewed: Cholesterol 138, LDL 71, creatinine 1.05    Physical Exam  Please see Below     Assessment and Plan  1. Recent NSTEMI with proximal LAD stenting. Preserved EF on echo.  of RCA and moderate L Circumflex disease. On statin., ASA, toprol and Brilinta. Will swticth Brillinta to Plavix with a loading dose. Dobutamine stress echo to look at residual circ lesion.  2. Shortness of breath. His home O2 sats are consistently in the upper 90s. This is being treated by pulmonology. He is on prednisone and antifungals. However, we will switch Brillinta to plavix to see if this imporves his symptoms.  We will also switch beta-blocker to ARB.  3. Carotid aretery stenosis. Will repeat carotid US in a year. Continue statin and ASA.  4. Hypertension. Will switch beta blocker to losartan and check labs in a week, maybe this will help his respiratory symptoms.  5. PAD. Previously abnormal ABIs. Will repeat in a year. Continue asa and statin.     Thank you for allowing me to care for Dangelo Hernández today.    MURALI Doan, CNP  Cardiology    This is a shared visit between myself and MURALI Coto. I was present for all of the visit. This patient was seen and examined by me. We discussed the patient and plan of care including medication changes. I agree with the plan outlined above that was mutually arranged.   Dr. Hannah Ashraf MD    Voice recognition software was used for this note, I have reviewed this note, but errors may have been  missed.    No orders of the defined types were placed in this encounter.    No orders of the defined types were placed in this encounter.    There are no discontinued medications.      CURRENT MEDICATIONS:  Current Outpatient Medications   Medication Sig Dispense Refill     acetaminophen (TYLENOL 8 HOUR ARTHRITIS PAIN) 650 MG CR tablet Take 1,300 mg by mouth 2 times daily       aspirin (ASA) 81 MG EC tablet Take 1 tablet (81 mg) by mouth daily 30 tablet 0     atorvastatin (LIPITOR) 80 MG tablet Take 1 tablet (80 mg) by mouth daily 90 tablet 3     azelastine (ASTELIN) 0.1 % nasal spray Spray 1 spray into both nostrils 2 times daily 3 Bottle 3     metoprolol succinate ER (TOPROL-XL) 25 MG 24 hr tablet Take 1 tablet (25 mg) by mouth daily 90 tablet 3     MULTI VITAMIN MENS OR Take 1 tablet by mouth daily        nystatin (MYCOSTATIN) 770127 UNIT/ML suspension Take 5 mLs (500,000 Units) by mouth 4 times daily 400 mL 0     predniSONE (DELTASONE) 20 MG tablet Take 20 mg by mouth daily       tamsulosin (FLOMAX) 0.4 MG capsule Take 1 capsule (0.4 mg) by mouth daily 90 capsule 3     ticagrelor (BRILINTA) 90 MG tablet Take 1 tablet (90 mg) by mouth every 12 hours 60 tablet 11       ALLERGIES     Allergies   Allergen Reactions     Amoxicillin Diarrhea            Lisinopril Cough       PAST MEDICAL HISTORY:  Past Medical History:   Diagnosis Date      Coronary artery disease, 4/ 2010, status post drug-eluting stent placement to the LAD and balloon angioplasty to the obtuse marginal.  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    LDL goal <70      Coronary artery disease     stents placed 6/10/2013     Cryptogenic organizing pneumonia (H) 12/7/2012     GERD (gastroesophageal reflux disease)      History of blood disorder 4/6/2011     Hypertension      Hypertension goal BP (blood  pressure) < 140/90 2010     Malignant neoplasm (H)     BMT; Mantle cell lymphoma     Mantle Cell Lymphoma S/P Autologous Transplant 2010       PAST SURGICAL HISTORY:  Past Surgical History:   Procedure Laterality Date     BRONCHOSCOPY (RIGID OR FLEXIBLE), DIAGNOSTIC  2011    Procedure:COMBINED BRONCHOSCOPY (RIGID OR FLEXIBLE), LAVAGE; Surgeon:REYNA BAILEY; Location: GI     BRONCHOSCOPY (RIGID OR FLEXIBLE), DIAGNOSTIC N/A 2019    Procedure: BRONCHOSCOPY, WITH BRONCHOALVEOLAR LAVAGE;  Surgeon: Perlman, David Morris, MD;  Location:  GI     COLONOSCOPY       CV HEART CATHETERIZATION WITH POSSIBLE INTERVENTION N/A 10/16/2019    Procedure: Heart Catheterization with Possible Intervention;  Surgeon: Robinson Robbins MD;  Location:  HEART CARDIAC CATH LAB     ENT SURGERY      tonsils     ORTHOPEDIC SURGERY      rt knee arthroscopy     SURGICAL HISTORY OF -       tonsils     SURGICAL HISTORY OF -   86    1.Exam under anesthesia, 2.Arthroscopy, 3.Arthroscopic medial meniscectomy, 4.arthroscopic trimming of patellar articular cartilage.     SURGICAL HISTORY OF -       vasectomy     SURGICAL HISTORY OF -   90    colonoscopy     SURGICAL HISTORY OF -   10/20/92    flexible sigmoidoscopy     SURGICAL HISTORY OF -   2000    colonoscopy and polypectomy     THORACOSCOPIC BIOPSY LUNG  2011    Procedure:THORACOSCOPIC BIOPSY LUNG; Video Assisted Right Thoracoscopy with Biopsy; Surgeon:JMI EPSTEIN; Location: OR       FAMILY HISTORY:  Family History   Problem Relation Age of Onset     Cancer Mother         Lung--did not smoke     Cardiovascular Father         MI age 73     Lipids Sister      Hypertension Sister      Cancer Sister         Ovarian- at age 65       SOCIAL HISTORY:  Social History     Socioeconomic History     Marital status:      Spouse name: Not on file     Number of children: Not on file     Years of education: Not on file     Highest education  level: Not on file   Occupational History     Employer: RETIRED   Social Needs     Financial resource strain: Not on file     Food insecurity:     Worry: Not on file     Inability: Not on file     Transportation needs:     Medical: Not on file     Non-medical: Not on file   Tobacco Use     Smoking status: Former Smoker     Types: Cigarettes     Last attempt to quit: 1975     Years since quittin.8     Smokeless tobacco: Former User     Tobacco comment: started smoking at 21 years of age   Substance and Sexual Activity     Alcohol use: Yes     Alcohol/week: 0.0 standard drinks     Comment: every other day     Drug use: No     Sexual activity: Yes     Partners: Female   Lifestyle     Physical activity:     Days per week: Not on file     Minutes per session: Not on file     Stress: Not on file   Relationships     Social connections:     Talks on phone: Not on file     Gets together: Not on file     Attends Jain service: Not on file     Active member of club or organization: Not on file     Attends meetings of clubs or organizations: Not on file     Relationship status: Not on file     Intimate partner violence:     Fear of current or ex partner: Not on file     Emotionally abused: Not on file     Physically abused: Not on file     Forced sexual activity: Not on file   Other Topics Concern     Parent/sibling w/ CABG, MI or angioplasty before 65F 55M? No      Service Not Asked     Blood Transfusions Not Asked     Caffeine Concern Not Asked     Occupational Exposure Not Asked     Hobby Hazards Not Asked     Sleep Concern Not Asked     Stress Concern Not Asked     Weight Concern Not Asked     Special Diet No     Back Care Not Asked     Exercise Yes     Comment: active     Bike Helmet Not Asked     Seat Belt Not Asked     Self-Exams Not Asked   Social History Narrative     Not on file       Review of Systems:  Skin:          Eyes:         ENT:         Respiratory:          Cardiovascular:          Gastroenterology:        Genitourinary:         Musculoskeletal:         Neurologic:         Psychiatric:         Heme/Lymph/Imm:         Endocrine:           Physical Exam:  Vitals: There were no vitals taken for this visit.    Constitutional:           Skin:             Head:           Eyes:           Lymph:      ENT:           Neck:           Respiratory:            Cardiac:                                                           GI:           Extremities and Muscular Skeletal:                 Neurological:           Psych:       No diagnosis found.    Recent Lab Results:  LIPID RESULTS:  Lab Results   Component Value Date    CHOL 138 10/16/2019    HDL 51 10/16/2019    LDL 71 10/16/2019    TRIG 78 10/16/2019    CHOLHDLRATIO 2.6 06/17/2015       LIVER ENZYME RESULTS:  Lab Results   Component Value Date    AST 23 10/15/2019    ALT 46 10/15/2019       CBC RESULTS:  Lab Results   Component Value Date    WBC 7.0 10/17/2019    RBC 3.81 (L) 10/17/2019    HGB 12.2 (L) 10/17/2019    HCT 36.9 (L) 10/17/2019    MCV 97 10/17/2019    MCH 32.0 10/17/2019    MCHC 33.1 10/17/2019    RDW 15.5 (H) 10/17/2019     10/17/2019       BMP RESULTS:  Lab Results   Component Value Date     10/17/2019    POTASSIUM 3.8 10/17/2019    CHLORIDE 105 10/17/2019    CO2 26 10/17/2019    ANIONGAP 7 10/17/2019     (H) 10/17/2019    BUN 18 10/17/2019    CR 1.05 10/17/2019    GFRESTIMATED 66 10/17/2019    GFRESTBLACK 76 10/17/2019    CHAVEZ 8.6 10/17/2019        A1C RESULTS:  Lab Results   Component Value Date    A1C 5.5 09/13/2016       INR RESULTS:  Lab Results   Component Value Date    INR 0.94 09/29/2015    INR Unsatisfactory specimen - clotted 09/29/2015           CC  Dony Acuña MD  [address] [place] [address]

## 2019-10-25 ENCOUNTER — TELEPHONE (OUTPATIENT)
Dept: ORTHOPEDICS | Facility: CLINIC | Age: 84
End: 2019-10-25

## 2019-10-25 NOTE — TELEPHONE ENCOUNTER
Daughter called to schedule NISHA for her father. No order has been placed for this. We cannot schedule unless an order has been placed in [facility] or faxed to [phone].      Karly COYNE    [facility] [place]

## 2019-10-25 NOTE — TELEPHONE ENCOUNTER
Called higinio back and left a message letting her know I called over to the [facility] number to get more information why they werent able to schedule. They said they see the order for 10/15/19 by Dr. Schulz and told me to have the patient call [phone] to schedule. I also faxed the order over to the number she provided below so they absolutley should have the order.

## 2019-10-25 NOTE — TELEPHONE ENCOUNTER
Spoke with pts daughter and got her in touch with [facility] to schedule NISHA for her father.

## 2019-10-25 NOTE — TELEPHONE ENCOUNTER
[facility] Call Center    Phone Message    May a detailed message be left on voicemail: yes    Reason for Call: Other: Pts daughter is calling in requesting that the order get sent today. She states her father is in alot of pain and need the injection ASAP.      Action Taken: Message routed to:  [facility] Ortho

## 2019-10-25 NOTE — TELEPHONE ENCOUNTER
[facility] Call Center    Phone Message    May a detailed message be left on voicemail: yes    Reason for Call: Other: Pts daughter calling. Need to discuss injection. Please call her today to discuss, there was miscommunication.      Action Taken: Message routed to:  [facility] Ortho

## 2019-10-28 ENCOUNTER — TELEPHONE (OUTPATIENT)
Dept: PALLIATIVE MEDICINE | Facility: CLINIC | Age: 84
End: 2019-10-28

## 2019-10-28 NOTE — TELEPHONE ENCOUNTER
Order received from Dr. Schulz at [facility] for a Lumbar NISHA - L4-5 TF Left. Order is also listed under Imaging tab.       Routing for scheduling.         Lay Zhou    [facility]

## 2019-10-28 NOTE — TELEPHONE ENCOUNTER
Pre-screening Questions for Radiology Injections:    Injection to be done at which interventional clinic site? [facility]    Instruct patient to arrive as directed prior to the scheduled appointment time:    [place] minutes before      [place]: 30 minutes before; if IV needed 1 hour before     Procedure ordered by Dr. Schulz    Procedure ordered? Lumbar NISHA - L4-5 TF Left      Transforaminal Cervical NISHA - Dr. Sweetie Quesada ONLY    What insurance would patient like us to bill for this procedure? Medicare       Worker's comp or MVA (motor vehicle accident) -Any injection DO NOT SCHEDULE and route to Elvi Gold.      [email or web address] insurance - For SI joint injections, DO NOT SCHEDULE and route Elvi Malcolm.       [employer] - Any injection besides hip/shoulder/knee joint DO NOT SCHEDULE and route to Elvi Gold. She will obtain PA and call pt back to schedule procedure or notify pt of denial.       [employer]-Route to [place] for review      **BCBS- ALL need to be routed to [place] for review if a PA is needed**      IF SCHEDULING IN [place] AND NEEDS A PA, IT IS OKAY TO SCHEDULE. [place] HANDLES THEIR OWN PA'S AFTER THE PATIENT IS SCHEDULED. PLEASE SCHEDULE AT LEAST 1 WEEK OUT SO A PA CAN BE OBTAINED.    Any chance of pregnancy? Not Applicable   If YES, do NOT schedule and route to RN pool    Is an  needed? No     Patient has a drive home? (mandatory) YES: informed     Is patient taking any blood thinners (plavix, coumadin, jantoven, warfarin, heparin, pradaxa or dabigatran )? Yes - Plavix    If hold needed, do NOT schedule, route to RN pool     Is patient taking any aspirin products (includes Excedrin and Fiorinal)? Yes - Pt takes 81mg daily; instructed to hold 0 day(s) prior to procedure.      If more than 325mg/day do NOT schedule; route to RN pool     For CERVICAL procedures, hold all aspirin products for 6 days.     Tell pt that if aspirin product is not held for 6 days, the procedure WILL BE  cancelled.      Does the patient have a bleeding or clotting disorder? No     If YES, okay to schedule AND route to RN nurse pool    For any patients with platelet count <100, must be forwarded to provider    Is patient diabetic?  No  If YES, instruct them to bring their glucometer.    Does patient have an active infection or treated for one within the past week? No     Is patient currently taking any antibiotics?  No     For patients on chronic, preventative, or prophylactic antibiotics, procedures may be scheduled.     For patients on antibiotics for active or recent infection:antibiotic course must have been completed for 4 days    Is patient currently taking any steroid medications? (i.e. Prednisone, Medrol)  Yes - Prednisone chronic      For patients on steroid medications, course must have been completed for 4 days    Reviewed with patient:  If you are started on any steroids or antibiotics between now and your appointment, you must contact us because the procedure may need to be cancelled.  Yes    Is patient actively being treated for cancer or immunocompromised? No  If YES, do NOT schedule and route to RN pool     Are you able to get on and off an exam table with minimal or no assistance? Yes  If NO, do NOT schedule and route to RN pool    Are you able to roll over and lay on your stomach with minimal or no assistance? Yes  If NO, do NOT schedule and route to RN pool     Any allergies to contrast dye, iodine, shellfish, or numbing and steroid medications? No  If YES, route to RN pool AND add allergy information to appointment notes    Allergies: Amoxicillin and Lisinopril      Has the patient had a flu shot or any other vaccinations within 7 days before or after the procedure.  No     Does patient have an MRI/CT?  YES: 2019  Check Procedure Scheduling Grid to see if required.      Was the MRI done within the last 3 years?  Yes    If yes, where was the MRI done i.e.[facility], [facility], [place], [facility]  Ortho etc?       If no, do not schedule and route to RN pool    If MRI was not done at [facility], [facility] or [facility] do NOT schedule and route to RN pool.      If pt has an imaging disc, the injection MAY be scheduled but pt has to bring disc to appt.     If they show up without the disc the injection cannot be done    Reminders (please tell patient if applicable):       Instructed pt to arrive 30 minutes early for IV start if required. (Check Procedure Scheduling Grid)  Not Applicable      If celiac plexus block, informed patient NPO for 6 hours and that it is okay to take medications with sips of water, especially blood pressure medications  Not Applicable         If this is for a cervical procedure, informed patient that aspirin needs to be held for 6 days.   Not Applicable      For all patients not having spinal cord stimulator (SCS) trials or radiofrequency ablations (RFAs), informed patient:    IV sedation is not provided for this procedure.  If you feel that an oral anti-anxiety medication is needed, you can discuss this further with your referring provider or primary care provider.  The Pain Clinic provider will discuss specifics of what the procedure includes at your appointment.  Most procedures last 10-20 minutes.  We use numbing medications to help with any discomfort during the procedure.  Not Applicable      Do not schedule procedures requiring IV placement in the first appointment of the day or first appointment after lunch. Do NOT schedule at 0745, 0815 or 1245.       For patients 85 or older we recommend having an adult stay w/ them for the remainder of the day.       Does the patient have any questions?  NO  Stephy Saravia  [facility]

## 2019-10-29 ENCOUNTER — TELEPHONE (OUTPATIENT)
Dept: PULMONOLOGY | Facility: CLINIC | Age: 84
End: 2019-10-29

## 2019-10-29 DIAGNOSIS — R06.00 DYSPNEA: Primary | ICD-10-CM

## 2019-10-29 RX ORDER — AZITHROMYCIN 250 MG/1
TABLET, FILM COATED ORAL
Qty: 6 TABLET | Refills: 2 | Status: ON HOLD | OUTPATIENT
Start: 2019-10-29 | End: 2019-11-14

## 2019-10-29 NOTE — TELEPHONE ENCOUNTER
Daughter calling back to schedule this injection asap. She stated it has been 10 days. Pt is taking Plavix and I informed her of the process.      Karly COYNE    [facility] [place]

## 2019-10-29 NOTE — TELEPHONE ENCOUNTER
Contacted by patient to state that he continues to be SOB, wheezing, lg amounts of sputum (white) and cough. Saw cardiology 11/24/19. Chest ct from 10/22/19 note probable organizing pneumonia. Patient requesting apt with Dr Flanagan and Klever. Will message Dr Flanagan.    Dr Flanagan authorized Zpak with refills. Patient asking for Pen IM as well. Stated that Dr Flanagan is authorizing Zpak at this time only. Placed Zpak to pharmacy. Patient planning on leaving for [place] soon. He will return call to clinic with status update.

## 2019-10-30 NOTE — TELEPHONE ENCOUNTER
Patients daughter called wondering when her dad can get this injection.     Please call Hilda to schedule [phone]       Stephy Saravia    [facility]

## 2019-10-31 NOTE — TELEPHONE ENCOUNTER
Patients daughter called and stated they will be having this injection somewhere else.      Stephy Saravia    [facility]

## 2019-11-04 ENCOUNTER — HOSPITAL ENCOUNTER (OUTPATIENT)
Dept: CARDIAC REHAB | Facility: CLINIC | Age: 84
End: 2019-11-04
Attending: INTERNAL MEDICINE
Payer: MEDICARE

## 2019-11-04 PROCEDURE — 40000575 ZZH STATISTIC OP CARDIAC VISIT #2

## 2019-11-04 PROCEDURE — 93797 PHYS/QHP OP CAR RHAB WO ECG: CPT | Mod: 59

## 2019-11-04 PROCEDURE — 93798 PHYS/QHP OP CAR RHAB W/ECG: CPT

## 2019-11-04 PROCEDURE — 40000116 ZZH STATISTIC OP CR VISIT

## 2019-11-06 ENCOUNTER — HOSPITAL ENCOUNTER (OUTPATIENT)
Dept: CARDIAC REHAB | Facility: CLINIC | Age: 84
End: 2019-11-06
Attending: INTERNAL MEDICINE
Payer: MEDICARE

## 2019-11-06 DIAGNOSIS — M51.369 LUMBAR DEGENERATIVE DISC DISEASE: Primary | ICD-10-CM

## 2019-11-06 PROCEDURE — 93798 PHYS/QHP OP CAR RHAB W/ECG: CPT

## 2019-11-06 PROCEDURE — 40000116 ZZH STATISTIC OP CR VISIT

## 2019-11-07 ENCOUNTER — HOSPITAL ENCOUNTER (OUTPATIENT)
Facility: CLINIC | Age: 84
Discharge: HOME | End: 2019-11-07
Admitting: PHYSICIAN ASSISTANT
Payer: MEDICARE

## 2019-11-07 ENCOUNTER — HOSPITAL ENCOUNTER (OUTPATIENT)
Dept: GENERAL RADIOLOGY | Facility: CLINIC | Age: 84
End: 2019-11-07
Attending: PREVENTIVE MEDICINE
Payer: MEDICARE

## 2019-11-07 VITALS
RESPIRATION RATE: 18 BRPM | SYSTOLIC BLOOD PRESSURE: 143 MMHG | DIASTOLIC BLOOD PRESSURE: 77 MMHG | TEMPERATURE: 95.5 F | OXYGEN SATURATION: 99 %

## 2019-11-07 DIAGNOSIS — M51.369 LUMBAR DEGENERATIVE DISC DISEASE: ICD-10-CM

## 2019-11-07 PROCEDURE — 25000128 H RX IP 250 OP 636: Performed by: PHYSICIAN ASSISTANT

## 2019-11-07 PROCEDURE — 25000125 ZZHC RX 250: Performed by: PHYSICIAN ASSISTANT

## 2019-11-07 PROCEDURE — 62323 NJX INTERLAMINAR LMBR/SAC: CPT

## 2019-11-07 PROCEDURE — 40000863 ZZH STATISTIC RADIOLOGY XRAY, US, CT, MAR, NM

## 2019-11-07 PROCEDURE — 25500064 ZZH RX 255 OP 636: Performed by: PHYSICIAN ASSISTANT

## 2019-11-07 RX ORDER — LIDOCAINE HYDROCHLORIDE 10 MG/ML
5 INJECTION, SOLUTION EPIDURAL; INFILTRATION; INTRACAUDAL; PERINEURAL ONCE
Status: COMPLETED | OUTPATIENT
Start: 2019-11-07 | End: 2019-11-07

## 2019-11-07 RX ORDER — LIDOCAINE HYDROCHLORIDE 10 MG/ML
30 INJECTION, SOLUTION EPIDURAL; INFILTRATION; INTRACAUDAL; PERINEURAL ONCE
Status: COMPLETED | OUTPATIENT
Start: 2019-11-07 | End: 2019-11-07

## 2019-11-07 RX ORDER — IOPAMIDOL 408 MG/ML
10 INJECTION, SOLUTION INTRATHECAL ONCE
Status: COMPLETED | OUTPATIENT
Start: 2019-11-07 | End: 2019-11-07

## 2019-11-07 RX ORDER — DEXAMETHASONE SODIUM PHOSPHATE 10 MG/ML
20 INJECTION, SOLUTION INTRAMUSCULAR; INTRAVENOUS ONCE
Status: COMPLETED | OUTPATIENT
Start: 2019-11-07 | End: 2019-11-07

## 2019-11-07 RX ADMIN — LIDOCAINE HYDROCHLORIDE 3 ML: 10 INJECTION, SOLUTION EPIDURAL; INFILTRATION; INTRACAUDAL; PERINEURAL at 09:41

## 2019-11-07 RX ADMIN — LIDOCAINE HYDROCHLORIDE 2.5 ML: 10 INJECTION, SOLUTION EPIDURAL; INFILTRATION; INTRACAUDAL; PERINEURAL at 09:35

## 2019-11-07 RX ADMIN — IOPAMIDOL 4.5 ML: 408 INJECTION, SOLUTION INTRATHECAL at 09:37

## 2019-11-07 RX ADMIN — DEXAMETHASONE SODIUM PHOSPHATE 20 MG: 10 INJECTION, SOLUTION INTRAMUSCULAR; INTRAVENOUS at 09:37

## 2019-11-07 NOTE — PROGRESS NOTES
Care Suites Admission Nursing Note    Reason for admission: steroid epidural injection  CS arrival time: 0820  Accompanied by: family  Name/phone of DC : [phone]  Medications held: none  Consent signed: to be done by MD at bedside  Abnormal assessment/labs: none  Education/questions answered: given to pt  Plan: Proceed

## 2019-11-07 NOTE — PROGRESS NOTES
RADIOLOGY PROCEDURE NOTE  Patient name: Dangelo Hernández  MRN: [id number]  : 1935    Pre-procedure diagnosis: Low back pain  Post-procedure diagnosis: Same    Procedure Date/Time: 2019  9:46 AM  Procedure: Caudal NISHA  Estimated blood loss: None  Specimen(s) collected with description: none  The patient tolerated the procedure well with no immediate complications.  Significant findings:none    See imaging dictation for procedural details.    Provider name: KAROLYN Carranza  Assistant(s):None

## 2019-11-07 NOTE — DISCHARGE INSTRUCTIONS
Steroid Injection Discharge Instructions     After you go home:      You may resume your normal diet.    Care of Puncture Site:      If you have a bandaid on your puncture site, you may remove it the next morning    You may shower tomorrow    No bath tubs, whirlpools or swimming for at least 3 days     Activity:      You may go back to normal activity in 24 hours    You should let pain be your guide as to the extent of your activities    Maintain any activity limitations as ordered by your provider    Do NOT drive a vehicle if you develop numbness in your arm or leg    Medicines:      You may resume all medications    Resume your Warfarin/Coumadin at your regular dose today. Follow up with your provider to have your INR rechecked    Resume your Platelet Inhibitors and Aspirin tomorrow at your regular dose    For minor pain, you may take Acetaminophen (Tylenol) or Ibuprofen (Advil)    Pain:       You may experience increased or different pain over the next 24-48 hours    For the next 48 hrs - you may use ice packs for discomfort     Call your primary care doctor if:      You have severe pain that does not improve with pain medication    You have chills or a fever greater than 101 F (38 C)    The site is red, swollen, hot or tender    New problems with your bowel or bladder    Any questions or concerns    Other Instructions:      New numbness down your leg post injection is temporary and may last for up to 6 hours. You may need assistance with activity until your leg has normal sensation.    If you are diabetic, monitor your blood sugar closely. Contact the provider who manages your diabetes to help you control your blood sugar if needed.    For Your Information:      A steroid was injected to help decrease swelling and may help to reduce pain. It may take up to 7-10 days to obtain full results.    Some patients will get lasting relief from a single injection. Others may require up to 3 injections to get results. If  you have more than one steroid injection, they should be given 2 weeks apart.    Side effects of your steroid injection are mild and will go away in 2-3 days  - Insomnia  - Heartburn  - Flushed face  - Water retention  - Increased appetite  - Increased blood sugar      If you have questions call:        [facility] Dept @ [phone]

## 2019-11-07 NOTE — PROGRESS NOTES
Care Suites Discharge Summary    Discharge Criteria:   Discharge Criteria met per MD orders: Yes.   Vital signs stable.     Pt demonstrates ability to ambulate safely: Yes.  (See discharge questionnaire for additional information)    Discharge instructions & education:   Discharge instructions reviewed with patient and DTR. Patient verbalizes understanding.   Additional patient education provided:  Steroid epidural injection    Medications:   Patient will be discharging on new medications- No. Patient verbalizes reason for use, start date, and side effects NA.    Items returned to patient:   Home and hospital acquired medications returned to patient NA   Listed belongings gathered and returned to patient: Yes    Patient discharged to home with DTR.     Tenzin Sam RN

## 2019-11-08 ENCOUNTER — TELEPHONE (OUTPATIENT)
Dept: CARDIOLOGY | Facility: CLINIC | Age: 84
End: 2019-11-08

## 2019-11-08 NOTE — TELEPHONE ENCOUNTER
Pt reported to stress test nurse that he was having episode of CP this AM.    Called pt:  Pt states currently not having any symptoms. Pt had Lumbar injection yesterday.  Cancelled rehab d/t post injection recommendations.    CP this morning radiating down both arms while making breakfast. Also had SOB.  Pt then took morning meds and continued with breakfast.  Pt waited 45 minutes and then took NTG x1 and all sypmtoms resolved. /86  during episode.  Post NTG 99/59 . Pt states he was worried about taking a NTG and did not know if he should until he called his daughter.  Current /66 , pt denies any symptoms.    Reviewed NTG action and protocol for taking as well as when to seek medical attention.  Pt verbalized understanding.  Advised to call back if not feeling well.    Stress test and OV early next week.    PAUL BOBBY RN on 11/8/2019 at 11:48 AM

## 2019-11-12 ENCOUNTER — APPOINTMENT (OUTPATIENT)
Dept: GENERAL RADIOLOGY | Facility: CLINIC | Age: 84
End: 2019-11-12
Attending: EMERGENCY MEDICINE
Payer: MEDICARE

## 2019-11-12 ENCOUNTER — HOSPITAL ENCOUNTER (INPATIENT)
Facility: CLINIC | Age: 84
LOS: 2 days | Discharge: HOME | DRG: 196 | End: 2019-11-14
Attending: HOSPITALIST | Admitting: HOSPITALIST
Payer: MEDICARE

## 2019-11-12 ENCOUNTER — HOSPITAL ENCOUNTER (EMERGENCY)
Facility: CLINIC | Age: 84
Discharge: ANOTHER HOSPITAL | End: 2019-11-12
Attending: EMERGENCY MEDICINE | Admitting: EMERGENCY MEDICINE
Payer: MEDICARE

## 2019-11-12 VITALS
RESPIRATION RATE: 10 BRPM | OXYGEN SATURATION: 97 % | TEMPERATURE: 98 F | DIASTOLIC BLOOD PRESSURE: 79 MMHG | SYSTOLIC BLOOD PRESSURE: 118 MMHG | HEIGHT: 66 IN | HEART RATE: 100 BPM | BODY MASS INDEX: 31.18 KG/M2 | WEIGHT: 194 LBS

## 2019-11-12 DIAGNOSIS — R06.00 DYSPNEA, UNSPECIFIED TYPE: Primary | ICD-10-CM

## 2019-11-12 DIAGNOSIS — I25.119 CORONARY ARTERY DISEASE INVOLVING NATIVE CORONARY ARTERY OF NATIVE HEART WITH ANGINA PECTORIS (H): ICD-10-CM

## 2019-11-12 DIAGNOSIS — I24.9 ACS (ACUTE CORONARY SYNDROME) (H): ICD-10-CM

## 2019-11-12 DIAGNOSIS — I25.10 CORONARY ARTERY DISEASE INVOLVING NATIVE CORONARY ARTERY, ANGINA PRESENCE UNSPECIFIED, UNSPECIFIED WHETHER NATIVE OR TRANSPLANTED HEART: ICD-10-CM

## 2019-11-12 DIAGNOSIS — R79.89 ELEVATED TROPONIN LEVEL: ICD-10-CM

## 2019-11-12 DIAGNOSIS — R06.02 SHORTNESS OF BREATH: ICD-10-CM

## 2019-11-12 LAB
ALBUMIN SERPL-MCNC: 3.9 G/DL (ref 3.4–5)
ALP SERPL-CCNC: 72 U/L (ref 40–150)
ALT SERPL W P-5'-P-CCNC: 38 U/L (ref 0–70)
ANION GAP SERPL CALCULATED.3IONS-SCNC: 5 MMOL/L (ref 3–14)
AST SERPL W P-5'-P-CCNC: 20 U/L (ref 0–45)
BASOPHILS # BLD AUTO: 0 10E9/L (ref 0–0.2)
BASOPHILS NFR BLD AUTO: 0.2 %
BILIRUB SERPL-MCNC: 0.5 MG/DL (ref 0.2–1.3)
BUN SERPL-MCNC: 17 MG/DL (ref 7–30)
CALCIUM SERPL-MCNC: 9.1 MG/DL (ref 8.5–10.1)
CHLORIDE SERPL-SCNC: 106 MMOL/L (ref 94–109)
CO2 SERPL-SCNC: 29 MMOL/L (ref 20–32)
CREAT SERPL-MCNC: 1.1 MG/DL (ref 0.66–1.25)
DIFFERENTIAL METHOD BLD: ABNORMAL
EOSINOPHIL # BLD AUTO: 0.1 10E9/L (ref 0–0.7)
EOSINOPHIL NFR BLD AUTO: 1 %
ERYTHROCYTE [DISTWIDTH] IN BLOOD BY AUTOMATED COUNT: 14.3 % (ref 10–15)
GFR SERPL CREATININE-BSD FRML MDRD: 62 ML/MIN/{1.73_M2}
GLUCOSE SERPL-MCNC: 136 MG/DL (ref 70–99)
HCT VFR BLD AUTO: 42.9 % (ref 40–53)
HGB BLD-MCNC: 14 G/DL (ref 13.3–17.7)
IMM GRANULOCYTES # BLD: 0.2 10E9/L (ref 0–0.4)
IMM GRANULOCYTES NFR BLD: 1.3 %
LMWH PPP CHRO-ACNC: 1.12 IU/ML
LMWH PPP CHRO-ACNC: <0.1 IU/ML
LYMPHOCYTES # BLD AUTO: 1.3 10E9/L (ref 0.8–5.3)
LYMPHOCYTES NFR BLD AUTO: 11.4 %
MAGNESIUM SERPL-MCNC: 2.2 MG/DL (ref 1.6–2.3)
MCH RBC QN AUTO: 32.3 PG (ref 26.5–33)
MCHC RBC AUTO-ENTMCNC: 32.6 G/DL (ref 31.5–36.5)
MCV RBC AUTO: 99 FL (ref 78–100)
MONOCYTES # BLD AUTO: 0.7 10E9/L (ref 0–1.3)
MONOCYTES NFR BLD AUTO: 6.2 %
NEUTROPHILS # BLD AUTO: 9.4 10E9/L (ref 1.6–8.3)
NEUTROPHILS NFR BLD AUTO: 79.9 %
NRBC # BLD AUTO: 0 10*3/UL
NRBC BLD AUTO-RTO: 0 /100
NT-PROBNP SERPL-MCNC: 528 PG/ML (ref 0–1800)
PHOSPHATE SERPL-MCNC: 2.9 MG/DL (ref 2.5–4.5)
PLATELET # BLD AUTO: 281 10E9/L (ref 150–450)
POTASSIUM SERPL-SCNC: 3.7 MMOL/L (ref 3.4–5.3)
PROT SERPL-MCNC: 7.1 G/DL (ref 6.8–8.8)
RBC # BLD AUTO: 4.33 10E12/L (ref 4.4–5.9)
SODIUM SERPL-SCNC: 140 MMOL/L (ref 133–144)
T4 FREE SERPL-MCNC: 1.16 NG/DL (ref 0.76–1.46)
TROPONIN I SERPL-MCNC: 0.11 UG/L (ref 0–0.04)
TROPONIN I SERPL-MCNC: 0.13 UG/L (ref 0–0.04)
TROPONIN I SERPL-MCNC: 0.17 UG/L (ref 0–0.04)
TSH SERPL DL<=0.005 MIU/L-ACNC: 4.22 MU/L (ref 0.4–4)
WBC # BLD AUTO: 11.8 10E9/L (ref 4–11)

## 2019-11-12 PROCEDURE — 84439 ASSAY OF FREE THYROXINE: CPT | Performed by: EMERGENCY MEDICINE

## 2019-11-12 PROCEDURE — 84100 ASSAY OF PHOSPHORUS: CPT | Performed by: EMERGENCY MEDICINE

## 2019-11-12 PROCEDURE — 84443 ASSAY THYROID STIM HORMONE: CPT | Performed by: EMERGENCY MEDICINE

## 2019-11-12 PROCEDURE — 93010 ELECTROCARDIOGRAM REPORT: CPT | Performed by: INTERNAL MEDICINE

## 2019-11-12 PROCEDURE — 25000132 ZZH RX MED GY IP 250 OP 250 PS 637: Mod: GY | Performed by: EMERGENCY MEDICINE

## 2019-11-12 PROCEDURE — 93005 ELECTROCARDIOGRAM TRACING: CPT

## 2019-11-12 PROCEDURE — 85520 HEPARIN ASSAY: CPT | Performed by: EMERGENCY MEDICINE

## 2019-11-12 PROCEDURE — 96366 THER/PROPH/DIAG IV INF ADDON: CPT

## 2019-11-12 PROCEDURE — 80053 COMPREHEN METABOLIC PANEL: CPT | Performed by: EMERGENCY MEDICINE

## 2019-11-12 PROCEDURE — 99285 EMERGENCY DEPT VISIT HI MDM: CPT | Mod: 25

## 2019-11-12 PROCEDURE — 84484 ASSAY OF TROPONIN QUANT: CPT | Performed by: PHYSICIAN ASSISTANT

## 2019-11-12 PROCEDURE — 83880 ASSAY OF NATRIURETIC PEPTIDE: CPT | Performed by: EMERGENCY MEDICINE

## 2019-11-12 PROCEDURE — 25000132 ZZH RX MED GY IP 250 OP 250 PS 637: Mod: GY | Performed by: PHYSICIAN ASSISTANT

## 2019-11-12 PROCEDURE — 71045 X-RAY EXAM CHEST 1 VIEW: CPT

## 2019-11-12 PROCEDURE — 96365 THER/PROPH/DIAG IV INF INIT: CPT

## 2019-11-12 PROCEDURE — 93010 ELECTROCARDIOGRAM REPORT: CPT | Mod: Z6 | Performed by: EMERGENCY MEDICINE

## 2019-11-12 PROCEDURE — 99291 CRITICAL CARE FIRST HOUR: CPT | Mod: 25 | Performed by: EMERGENCY MEDICINE

## 2019-11-12 PROCEDURE — 21000001 ZZH R&B HEART CARE

## 2019-11-12 PROCEDURE — 84484 ASSAY OF TROPONIN QUANT: CPT | Performed by: EMERGENCY MEDICINE

## 2019-11-12 PROCEDURE — 36415 COLL VENOUS BLD VENIPUNCTURE: CPT | Performed by: HOSPITALIST

## 2019-11-12 PROCEDURE — 85025 COMPLETE CBC W/AUTO DIFF WBC: CPT | Performed by: EMERGENCY MEDICINE

## 2019-11-12 PROCEDURE — 99223 1ST HOSP IP/OBS HIGH 75: CPT | Mod: AI | Performed by: PHYSICIAN ASSISTANT

## 2019-11-12 PROCEDURE — 85520 HEPARIN ASSAY: CPT | Performed by: HOSPITALIST

## 2019-11-12 PROCEDURE — 96375 TX/PRO/DX INJ NEW DRUG ADDON: CPT

## 2019-11-12 PROCEDURE — 25000128 H RX IP 250 OP 636: Performed by: EMERGENCY MEDICINE

## 2019-11-12 PROCEDURE — 36415 COLL VENOUS BLD VENIPUNCTURE: CPT | Performed by: PHYSICIAN ASSISTANT

## 2019-11-12 PROCEDURE — 83735 ASSAY OF MAGNESIUM: CPT | Performed by: EMERGENCY MEDICINE

## 2019-11-12 RX ORDER — POLYETHYLENE GLYCOL 3350 17 G/17G
17 POWDER, FOR SOLUTION ORAL DAILY PRN
Status: DISCONTINUED | OUTPATIENT
Start: 2019-11-12 | End: 2019-11-14 | Stop reason: HOSPADM

## 2019-11-12 RX ORDER — LOSARTAN POTASSIUM 25 MG/1
25 TABLET ORAL DAILY
Status: DISCONTINUED | OUTPATIENT
Start: 2019-11-13 | End: 2019-11-14 | Stop reason: HOSPADM

## 2019-11-12 RX ORDER — BISACODYL 10 MG
10 SUPPOSITORY, RECTAL RECTAL DAILY PRN
Status: DISCONTINUED | OUTPATIENT
Start: 2019-11-12 | End: 2019-11-14 | Stop reason: HOSPADM

## 2019-11-12 RX ORDER — METOPROLOL SUCCINATE 25 MG/1
25 TABLET, EXTENDED RELEASE ORAL DAILY
Status: DISCONTINUED | OUTPATIENT
Start: 2019-11-12 | End: 2019-11-14 | Stop reason: HOSPADM

## 2019-11-12 RX ORDER — PROCHLORPERAZINE MALEATE 5 MG
5 TABLET ORAL EVERY 6 HOURS PRN
Status: DISCONTINUED | OUTPATIENT
Start: 2019-11-12 | End: 2019-11-14 | Stop reason: HOSPADM

## 2019-11-12 RX ORDER — TAMSULOSIN HYDROCHLORIDE 0.4 MG/1
0.4 CAPSULE ORAL DAILY
Status: DISCONTINUED | OUTPATIENT
Start: 2019-11-13 | End: 2019-11-14 | Stop reason: HOSPADM

## 2019-11-12 RX ORDER — AMOXICILLIN 250 MG
1 CAPSULE ORAL 2 TIMES DAILY PRN
Status: DISCONTINUED | OUTPATIENT
Start: 2019-11-12 | End: 2019-11-14 | Stop reason: HOSPADM

## 2019-11-12 RX ORDER — NALOXONE HYDROCHLORIDE 0.4 MG/ML
.1-.4 INJECTION, SOLUTION INTRAMUSCULAR; INTRAVENOUS; SUBCUTANEOUS
Status: DISCONTINUED | OUTPATIENT
Start: 2019-11-12 | End: 2019-11-13

## 2019-11-12 RX ORDER — PREDNISONE 5 MG/1
5 TABLET ORAL DAILY
Status: DISCONTINUED | OUTPATIENT
Start: 2019-11-13 | End: 2019-11-14 | Stop reason: HOSPADM

## 2019-11-12 RX ORDER — AMOXICILLIN 250 MG
2 CAPSULE ORAL 2 TIMES DAILY PRN
Status: DISCONTINUED | OUTPATIENT
Start: 2019-11-12 | End: 2019-11-14 | Stop reason: HOSPADM

## 2019-11-12 RX ORDER — HEPARIN SODIUM 10000 [USP'U]/100ML
12 INJECTION, SOLUTION INTRAVENOUS CONTINUOUS
Status: DISCONTINUED | OUTPATIENT
Start: 2019-11-12 | End: 2019-11-12 | Stop reason: HOSPADM

## 2019-11-12 RX ORDER — LIDOCAINE 40 MG/G
CREAM TOPICAL
Status: DISCONTINUED | OUTPATIENT
Start: 2019-11-12 | End: 2019-11-14 | Stop reason: HOSPADM

## 2019-11-12 RX ORDER — ATORVASTATIN CALCIUM 80 MG/1
80 TABLET, FILM COATED ORAL DAILY
Status: DISCONTINUED | OUTPATIENT
Start: 2019-11-13 | End: 2019-11-14 | Stop reason: HOSPADM

## 2019-11-12 RX ORDER — PROCHLORPERAZINE 25 MG
12.5 SUPPOSITORY, RECTAL RECTAL EVERY 12 HOURS PRN
Status: DISCONTINUED | OUTPATIENT
Start: 2019-11-12 | End: 2019-11-14 | Stop reason: HOSPADM

## 2019-11-12 RX ORDER — ASPIRIN 81 MG/1
243 TABLET, CHEWABLE ORAL ONCE
Status: COMPLETED | OUTPATIENT
Start: 2019-11-12 | End: 2019-11-12

## 2019-11-12 RX ORDER — CLOPIDOGREL BISULFATE 75 MG/1
75 TABLET ORAL DAILY
Status: DISCONTINUED | OUTPATIENT
Start: 2019-11-13 | End: 2019-11-14 | Stop reason: HOSPADM

## 2019-11-12 RX ORDER — ACETAMINOPHEN 325 MG/1
650 TABLET ORAL EVERY 4 HOURS PRN
Status: DISCONTINUED | OUTPATIENT
Start: 2019-11-12 | End: 2019-11-14 | Stop reason: HOSPADM

## 2019-11-12 RX ORDER — ONDANSETRON 2 MG/ML
4 INJECTION INTRAMUSCULAR; INTRAVENOUS EVERY 6 HOURS PRN
Status: DISCONTINUED | OUTPATIENT
Start: 2019-11-12 | End: 2019-11-14 | Stop reason: HOSPADM

## 2019-11-12 RX ORDER — ASPIRIN 81 MG/1
81 TABLET ORAL DAILY
Status: DISCONTINUED | OUTPATIENT
Start: 2019-11-13 | End: 2019-11-14 | Stop reason: HOSPADM

## 2019-11-12 RX ORDER — ALUMINA, MAGNESIA, AND SIMETHICONE 2400; 2400; 240 MG/30ML; MG/30ML; MG/30ML
30 SUSPENSION ORAL EVERY 4 HOURS PRN
Status: DISCONTINUED | OUTPATIENT
Start: 2019-11-12 | End: 2019-11-14 | Stop reason: HOSPADM

## 2019-11-12 RX ORDER — ACETAMINOPHEN 500 MG
1000 TABLET ORAL 2 TIMES DAILY
Status: DISCONTINUED | OUTPATIENT
Start: 2019-11-12 | End: 2019-11-14 | Stop reason: HOSPADM

## 2019-11-12 RX ORDER — MORPHINE SULFATE 2 MG/ML
1 INJECTION, SOLUTION INTRAMUSCULAR; INTRAVENOUS
Status: DISCONTINUED | OUTPATIENT
Start: 2019-11-12 | End: 2019-11-14 | Stop reason: HOSPADM

## 2019-11-12 RX ORDER — HEPARIN SODIUM 10000 [USP'U]/100ML
450 INJECTION, SOLUTION INTRAVENOUS CONTINUOUS
Status: DISCONTINUED | OUTPATIENT
Start: 2019-11-12 | End: 2019-11-13

## 2019-11-12 RX ORDER — ACETAMINOPHEN 650 MG/1
650 SUPPOSITORY RECTAL EVERY 4 HOURS PRN
Status: DISCONTINUED | OUTPATIENT
Start: 2019-11-12 | End: 2019-11-14 | Stop reason: HOSPADM

## 2019-11-12 RX ORDER — HYDROCODONE BITARTRATE AND ACETAMINOPHEN 5; 325 MG/1; MG/1
1-2 TABLET ORAL EVERY 4 HOURS PRN
Status: DISCONTINUED | OUTPATIENT
Start: 2019-11-12 | End: 2019-11-14 | Stop reason: HOSPADM

## 2019-11-12 RX ORDER — NITROGLYCERIN 0.4 MG/1
0.4 TABLET SUBLINGUAL EVERY 5 MIN PRN
Status: DISCONTINUED | OUTPATIENT
Start: 2019-11-12 | End: 2019-11-14 | Stop reason: HOSPADM

## 2019-11-12 RX ORDER — ONDANSETRON 4 MG/1
4 TABLET, ORALLY DISINTEGRATING ORAL EVERY 6 HOURS PRN
Status: DISCONTINUED | OUTPATIENT
Start: 2019-11-12 | End: 2019-11-14 | Stop reason: HOSPADM

## 2019-11-12 RX ADMIN — METOPROLOL SUCCINATE 25 MG: 25 TABLET, EXTENDED RELEASE ORAL at 19:32

## 2019-11-12 RX ADMIN — ASPIRIN 81 MG 243 MG: 81 TABLET ORAL at 11:41

## 2019-11-12 RX ADMIN — Medication 4440 UNITS: at 11:40

## 2019-11-12 RX ADMIN — HEPARIN SODIUM 12 UNITS/KG/HR: 10000 INJECTION, SOLUTION INTRAVENOUS at 11:40

## 2019-11-12 ASSESSMENT — ACTIVITIES OF DAILY LIVING (ADL)
ADLS_ACUITY_SCORE: 13
RETIRED_EATING: 0-->INDEPENDENT
TOILETING: 0-->INDEPENDENT
FALL_HISTORY_WITHIN_LAST_SIX_MONTHS: NO
AMBULATION: 0-->INDEPENDENT
BATHING: 0-->INDEPENDENT
DRESS: 0-->INDEPENDENT
COGNITION: 0 - NO COGNITION ISSUES REPORTED
RETIRED_COMMUNICATION: 0-->UNDERSTANDS/COMMUNICATES WITHOUT DIFFICULTY
TRANSFERRING: 0-->INDEPENDENT
SWALLOWING: 0-->SWALLOWS FOODS/LIQUIDS WITHOUT DIFFICULTY

## 2019-11-12 ASSESSMENT — MIFFLIN-ST. JEOR
SCORE: 1522.73
SCORE: 1536.34

## 2019-11-12 NOTE — ED NOTES
Bed: ED19  Expected date:   Expected time:   Means of arrival:   Comments:  coming from stress testing

## 2019-11-12 NOTE — ED PROVIDER NOTES
"  History     Chief Complaint   Patient presents with     Shortness of Breath     pt sent to ED from cardiac stress testing with c/o SOB and tachycardia, hx of CP 2 days a go that radiats into his jaw     HPI  Dangelo Hernández is a 84 year old male who presents from Cardiac services where he presented for a scheduled Dobutamine Stress Echo, and was found to have heart rate of 110-120 and complaint of shortness of breath. He reports he continues to have chest pains, resolved with NTG SL, and dypsnea with and without exertion since successful PCI with ODIN placement in the proximal LAD after presentation for ACS and NSTEMI  ~ 1 month ago, 10/15/19.  He reports he felt his heart rate was fast last night and checked his heart rate and it was 123 at ~ 2000 pm. No syncope, but he had chest tightness. This am he had heart rate 124 with no chest pain. Sats have been 97-98% on RA at home.  No hx of COPD, he does not smoke.    Allergies:  Allergies   Allergen Reactions     Amoxicillin Diarrhea            Lisinopril Cough       Problem List:    Patient Active Problem List    Diagnosis Date Noted     Acute renal failure with other specified pathological lesion in kidney (H) 11/27/2011     Priority: High     Problem list name updated by automated process. Provider to review       Hypertension goal BP (blood pressure) < 140/90 12/09/2010     Priority: High     ACS (acute coronary syndrome) (H) 10/15/2019     Priority: Medium     Advanced directives, counseling/discussion 08/12/2015     Priority: Medium     Patient does not have an Advance/Health Care Directive (HCD), given \"What is Advance Care Planning?\" flyer.    Yumiko Segura  August 12, 2015         Cryptogenic organizing pneumonia (H) 12/07/2012     Priority: Medium     GERD (gastroesophageal reflux disease) 05/02/2012     Priority: Medium     Pneumonia, candidial (H) 09/09/2011     Priority: Medium     Pneumonia in mycoses 09/09/2011     Priority: Medium     History of " blood disorder 04/06/2011     Priority: Medium     Encounter for long-term current use of medication 03/16/2011     Priority: Medium     Problem list name updated by automated process. Provider to review        Coronary artery disease, 4/ 2010, status post drug-eluting stent placement to the LAD and balloon angioplasty to the obtuse marginal.  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          Past Medical History:    Past Medical History:   Diagnosis Date      Coronary artery disease, 4/ 2010, status post drug-eluting stent placement to the LAD and balloon angioplasty to the obtuse marginal.  12/8/2010     Acute kidney failure NEC 11/27/2011     Arthritis      Basal cell carcinoma      Blood transfusion      Colon polyps      Combined hyperlipidemia 10/31/2010     Coronary artery disease      Cryptogenic organizing pneumonia (H) 12/7/2012     GERD (gastroesophageal reflux disease)      History of blood disorder 4/6/2011     Hypertension      Hypertension goal BP (blood pressure) < 140/90 12/9/2010     Malignant neoplasm (H)      Mantle Cell Lymphoma S/P Autologous Transplant 5/12/2010       Past Surgical History:    Past Surgical History:   Procedure Laterality Date     BRONCHOSCOPY (RIGID OR FLEXIBLE), DIAGNOSTIC  7/14/2011    Procedure:COMBINED BRONCHOSCOPY (RIGID OR FLEXIBLE), LAVAGE; Surgeon:REYNA BAILEY; Location:[facility]     BRONCHOSCOPY (RIGID OR FLEXIBLE), DIAGNOSTIC N/A 5/29/2019    Procedure: BRONCHOSCOPY, WITH BRONCHOALVEOLAR LAVAGE;  Surgeon: Perlman, David Morris, MD;  Location: [facility]     COLONOSCOPY       CV HEART CATHETERIZATION WITH POSSIBLE INTERVENTION N/A 10/16/2019     Procedure: Heart Catheterization with Possible Intervention;  Surgeon: Robinson Robbins MD;  Location:  HEART CARDIAC CATH LAB     CV HEART CATHETERIZATION WITH POSSIBLE INTERVENTION N/A 2019    Procedure: Heart Catheterization with Possible Intervention;  Surgeon: Hannah Ashraf MD;  Location:  HEART CARDIAC CATH LAB     ENT SURGERY      tonsils     ORTHOPEDIC SURGERY      rt knee arthroscopy     SURGICAL HISTORY OF -       tonsils     SURGICAL HISTORY OF -   84    1.Exam under anesthesia, 2.Arthroscopy, 3.Arthroscopic medial meniscectomy, 4.arthroscopic trimming of patellar articular cartilage.     SURGICAL HISTORY OF -       vasectomy     SURGICAL HISTORY OF -   90    colonoscopy     SURGICAL HISTORY OF -   10/20/92    flexible sigmoidoscopy     SURGICAL HISTORY OF -   2000    colonoscopy and polypectomy     THORACOSCOPIC BIOPSY LUNG  2011    Procedure:THORACOSCOPIC BIOPSY LUNG; Video Assisted Right Thoracoscopy with Biopsy; Surgeon:JIM EPSTEIN; Location: OR       Family History:    Family History   Problem Relation Age of Onset     Cancer Mother         Lung--did not smoke     Cardiovascular Father         MI age 73     Lipids Sister      Hypertension Sister      Cancer Sister         Ovarian- at age 65       Social History:  Marital Status:   [5]  Social History     Tobacco Use     Smoking status: Former Smoker     Packs/day: 0.50     Years: 19.00     Pack years: 9.50     Types: Cigarettes     Start date:      Last attempt to quit: 1975     Years since quittin.9     Smokeless tobacco: Former User     Types: Snuff     Tobacco comment: started smoking at 21 years of age   Substance Use Topics     Alcohol use: Yes     Alcohol/week: 0.0 standard drinks     Comment: occ.      Drug use: No        Medications:    acetaminophen (TYLENOL 8 HOUR ARTHRITIS PAIN) 650 MG CR tablet  aspirin (ASA) 81 MG EC tablet  atorvastatin (LIPITOR) 80 MG  "tablet  azelastine (ASTELIN) 0.1 % nasal spray  clopidogrel (PLAVIX) 75 MG tablet  losartan (COZAAR) 25 MG tablet  metoprolol succinate ER (TOPROL-XL) 25 MG 24 hr tablet  MULTI VITAMIN MENS OR  nystatin (MYCOSTATIN) 500434 UNIT/ML suspension  predniSONE (DELTASONE) 20 MG tablet  Specialty Vitamins Products (PROSTATE PO)  tamsulosin (FLOMAX) 0.4 MG capsule        Review of Systems  As mentioned above in the history present illness.  All other systems were reviewed and are negative.    Physical Exam   BP: 125/78  Pulse: 112  Heart Rate: 102  Temp: 98  F (36.7  C)  Resp: 18  Height: 167.6 cm (5' 6\")  Weight: 89.4 kg (197 lb)  SpO2: 95 %      Physical Exam  Vitals signs and nursing note reviewed.   Constitutional:       General: He is not in acute distress.     Appearance: Normal appearance. He is well-developed. He is not ill-appearing or diaphoretic.   HENT:      Head: Normocephalic and atraumatic.      Right Ear: External ear normal.      Left Ear: External ear normal.      Nose: Nose normal.      Mouth/Throat:      Mouth: Mucous membranes are moist.   Eyes:      General: No scleral icterus.     Extraocular Movements: Extraocular movements intact.      Conjunctiva/sclera: Conjunctivae normal.   Neck:      Musculoskeletal: Normal range of motion and neck supple.      Trachea: No tracheal deviation.   Cardiovascular:      Rate and Rhythm: Normal rate and regular rhythm.      Heart sounds: Normal heart sounds. No murmur. No friction rub. No gallop.    Pulmonary:      Effort: Pulmonary effort is normal. No respiratory distress.      Breath sounds: Normal breath sounds. No wheezing, rhonchi or rales.   Abdominal:      General: There is no distension.      Palpations: Abdomen is soft.      Tenderness: There is no abdominal tenderness.   Musculoskeletal: Normal range of motion.         General: No tenderness.      Right lower leg: No edema.      Left lower leg: No edema.   Skin:     General: Skin is warm and dry.      " Coloration: Skin is not pale.      Findings: No erythema or rash.   Neurological:      General: No focal deficit present.      Mental Status: He is alert and oriented to person, place, and time.      Coordination: Coordination normal.   Psychiatric:         Mood and Affect: Mood normal.         Behavior: Behavior normal.         ED Course        Procedures               EKG Interpretation:      Interpreted by Maykel Goodwin MD, MD  Time reviewed: Upon completion   Symptoms at time of EKG: Shortness of breath  Rhythm: Sinus tachycardia  Rate: 103  Axis: Left axis deviation  Ectopy: Single PVC  Conduction: Poor R wave progression, late transition, possible apical infarct, left anterior fascicular block  ST Segments/ T Waves: No ST-T wave changes  Q Waves: Inferior  Comparison to prior: Unchanged from 10/15/2019  Clinical Impression:  sinus tachycardia, rate 103, single PVC, otherwise no acute EKG changes.       Results for orders placed or performed during the hospital encounter of 11/12/19   Heparin 10a Level     Status: Abnormal   Result Value Ref Range    Heparin 10A Level 1.12 (HH) IU/mL   Troponin I     Status: Abnormal   Result Value Ref Range    Troponin I ES 0.114 (H) 0.000 - 0.045 ug/L   Troponin I     Status: Abnormal   Result Value Ref Range    Troponin I ES 0.134 (HH) 0.000 - 0.045 ug/L   Heparin Xa level (AM Draw)     Status: None   Result Value Ref Range    Heparin 10A Level 0.60 IU/mL   Troponin I     Status: Abnormal   Result Value Ref Range    Troponin I ES 0.135 (HH) 0.000 - 0.045 ug/L   Heparin 10a Level     Status: None   Result Value Ref Range    Heparin 10A Level 0.48 IU/mL   EKG 12-lead, tracing only     Status: None   Result Value Ref Range    Interpretation ECG Click View Image link to view waveform and result    Activated clotting time POCT     Status: Abnormal   Result Value Ref Range    Activated Clot Time 396 (H) 75 - 150 sec   Activated clotting time POCT     Status: Abnormal   Result  Value Ref Range    Activated Clot Time 235 (H) 75 - 150 sec   Activated clotting time POCT     Status: Abnormal   Result Value Ref Range    Activated Clot Time 167 (H) 75 - 150 sec   Results for orders placed or performed during the hospital encounter of 11/12/19   CBC with platelets differential     Status: Abnormal   Result Value Ref Range    WBC 11.8 (H) 4.0 - 11.0 10e9/L    RBC Count 4.33 (L) 4.4 - 5.9 10e12/L    Hemoglobin 14.0 13.3 - 17.7 g/dL    Hematocrit 42.9 40.0 - 53.0 %    MCV 99 78 - 100 fl    MCH 32.3 26.5 - 33.0 pg    MCHC 32.6 31.5 - 36.5 g/dL    RDW 14.3 10.0 - 15.0 %    Platelet Count 281 150 - 450 10e9/L    Diff Method Automated Method     % Neutrophils 79.9 %    % Lymphocytes 11.4 %    % Monocytes 6.2 %    % Eosinophils 1.0 %    % Basophils 0.2 %    % Immature Granulocytes 1.3 %    Nucleated RBCs 0 0 /100    Absolute Neutrophil 9.4 (H) 1.6 - 8.3 10e9/L    Absolute Lymphocytes 1.3 0.8 - 5.3 10e9/L    Absolute Monocytes 0.7 0.0 - 1.3 10e9/L    Absolute Eosinophils 0.1 0.0 - 0.7 10e9/L    Absolute Basophils 0.0 0.0 - 0.2 10e9/L    Abs Immature Granulocytes 0.2 0 - 0.4 10e9/L    Absolute Nucleated RBC 0.0    Comprehensive metabolic panel     Status: Abnormal   Result Value Ref Range    Sodium 140 133 - 144 mmol/L    Potassium 3.7 3.4 - 5.3 mmol/L    Chloride 106 94 - 109 mmol/L    Carbon Dioxide 29 20 - 32 mmol/L    Anion Gap 5 3 - 14 mmol/L    Glucose 136 (H) 70 - 99 mg/dL    Urea Nitrogen 17 7 - 30 mg/dL    Creatinine 1.10 0.66 - 1.25 mg/dL    GFR Estimate 62 >60 mL/min/[1.73_m2]    GFR Estimate If Black 72 >60 mL/min/[1.73_m2]    Calcium 9.1 8.5 - 10.1 mg/dL    Bilirubin Total 0.5 0.2 - 1.3 mg/dL    Albumin 3.9 3.4 - 5.0 g/dL    Protein Total 7.1 6.8 - 8.8 g/dL    Alkaline Phosphatase 72 40 - 150 U/L    ALT 38 0 - 70 U/L    AST 20 0 - 45 U/L   Troponin I     Status: Abnormal   Result Value Ref Range    Troponin I ES 0.172 () 0.000 - 0.045 ug/L   TSH with free T4 reflex     Status: Abnormal    Result Value Ref Range    TSH 4.22 (H) 0.40 - 4.00 mU/L   Nt probnp inpatient (BNP)     Status: None   Result Value Ref Range    N-Terminal Pro BNP Inpatient 528 0 - 1,800 pg/mL   T4 free     Status: None   Result Value Ref Range    T4 Free 1.16 0.76 - 1.46 ng/dL   Heparin Xa (10a) Level     Status: None   Result Value Ref Range    Heparin 10A Level <0.10 IU/mL   Magnesium     Status: None   Result Value Ref Range    Magnesium 2.2 1.6 - 2.3 mg/dL   Phosphorus     Status: None   Result Value Ref Range    Phosphorus 2.9 2.5 - 4.5 mg/dL           Medications   aspirin (ASA) chewable tablet 243 mg (243 mg Oral Given 11/12/19 1141)   heparin ANTICOAGULANT  Loading Dose bolus dose from infusion pump 4,440 Units (4,440 Units Intravenous Given 11/12/19 1140)       11:01 AM - Troponin elevated 0.172.  Last troponin 10/16/19 was elevated 0.156 with admission (10/15-10/17/2019) with ACS and NSTEMI.  At that time he had successful PCI with ODIN placement in the proximal LAD.    12:12 PM - Reviewed the case with the Hospitalist on-call at [facility], Dr. Rizvi.  He accepted care of the patient in transfer there where cardiology services are available for consultation.      Critical Care time:  was 40 minutes for this patient excluding procedures.    Assessments & Plan (with Medical Decision Making)   84-year-old male with recent non-ST elevation MI and proximal LAD stenting (proximally 1 month ago) who presents from cardiac services where a dobutamine stress echo study was to be performed.  He was found to have sinus tachycardia with heart rate 110-120s with complaint of persistent recurrent chest pains and shortness of breath since his recent coronary artery stenting.  He had no acute EKG changes but mildly elevated troponin.  He does not currently appear to have anginal symptoms.  He was given aspirin and heparin was initiated.  He will be transferred to [facility] where Cardiology services  are available for his continued care and further evaluation.    I have reviewed the nursing notes.    I have reviewed the findings, diagnosis, plan and need for follow up with the patient.    Discharge Medication List as of 11/12/2019  3:51 PM          Final diagnoses:   Shortness of breath   Elevated troponin level   Coronary artery disease involving native coronary artery, angina presence unspecified, unspecified whether native or transplanted heart       11/12/2019   [facility] EMERGENCY DEPARTMENT     Maykel Goodwin MD  11/16/19 [id number]

## 2019-11-12 NOTE — PHARMACY-ANTICOAGULATION SERVICE
Patient to start the heparin C-V/Vascular protocol with a goal anti 10a level of 0.15-0.35. Using a HT of 66 inches and a WT of 89.4 kg, a dosing WT of 74 kg was calculated. Based on this dosing WT, give patient a heparin bolus of 4440 units from the bag and then start a drip at 900 units/hr. Check the patient's anti 10a level 6 hours after starting the drip at ~1730.   Per C-V/Vascular protocol.    Jayne Griffiths [facility] on 11/12/2019 at 11:31 AM

## 2019-11-12 NOTE — ED TRIAGE NOTES
Lives alone at home  Drove to stress test by daughter  Dobutamine echo  unable to   Stent placed one month ago, hasn't felt well since  Tachycardia with standing and activity  CP radiating into jaw 2 days ago, takes nitroglycerin with relief  SOB intermittent   No swelling or pain currently

## 2019-11-12 NOTE — H&P
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     Troponin elevation - myocardial injury, NSTEMI vs demand ischemia  CAD s/p PCI 2013, 10/2019 (ODIN to proximal LAD)  Mild-Moderate Aortic Stenosis  Bilateral carotid artery stenosis  Worsening LEES, had an episode of chest pain on 11/9/19 lasting several minutes and radiating to bilateral upper arms, associated with SOB and palpitations which resolved with nitroglycerin SL. EKG ST, LAD, without acute ischemic changes. CXR cardiomegaly and possible mild pulm edema. Trop 0.172 in ED. Pro- mildly elevated.   Per chart review the patient is known to have a residual 50-70% RCA lesion as of 2013 coronary angiogram and actually underwent coronary angiogram recently due to NSTEMI on 10/16/19 where he received ODIN to proximal LAD and has been on DAPT. Ost RCA 99% stenosis, prox % stenosis. Rt PDA with collateral flow from mid LAD.   ECHO 10/2019: EF 55-60%. Grade I or early diastolic dysfxn. No RWMA. Trace-mild MR/TR. Mild 1+ AR. Mild to mod valvular aortic stenosis.   B/l Carotid US 10/2019: 50-69% stenosis to bilateral ICA  [place] [facility] [place]iology consultation - defer to them for additional imaging/procedure  - NPO after midnight in case of need for procedure   - Continue to trend troponin  - Continue Heparin gtt, pharmacy to dose  - ASA, Statin, Continue PTA Plavix    Mild Leukocytosis  Likely reactive,  very mild.   - Recheck in AM    Mantle cell lymphoma s/p autologus stem cell transplant   Hx recent cryptogenic organizing pneumonia  Hx candidal pneumonia  Followed by Pulmonology outpatient. Chronic cough. CXR stable cardiomegaly and possible mild pulm edema. Had repeat Chest CT 10/22/19 - Saw Dr. Flanagan from Pulmonology 10/22/19, recently finished Azithromycin pack and has been weaning down steroids. Was to complete cardiopulmonary rehab and follow up.   - Resume PTA prednisone which pt is on a wean with Pulmonology   - Could consider repeating chest CT to follow up on PNA and Pulmonary consultation pending cardiology evaluation  - If cardiac etiology thought less likely then would order the above and be suspicious that pneumonia could be driving demand ischemia  - Given troponin elevation is above prior levels when pt had NSTEMI will hold off on imaging until seen by Cardiology     Hypertension, treated  Hyperlipidemia  PTA losartan 25mg/d. Previously discontinued Toprol XL 25mg/d  - Resume ARB, BB (restart, has been off) with hold parameters  - Resume statin as above    Mild Cognitive Impairment: Stable    Benign Prostatic Hypertrophy: Resume PTA tamsulosin 0.4mg/d     Diet: Cardiac  DVT Prophylaxis: Heparin gtt  Ascencio Catheter: not present  Code Status: DNR/DNI- d/w pt on admission    Disposition Plan   Expected discharge: 2 - 3 days, recommended to prior living arrangement once cilnically improved and cardiology workup complete.  Entered: Bell Tom PA-C 11/12/2019, 6:43 PM     This patient was discussed with Dr. Nagy of the Hospitalist Service who agrees with current plans as outlined above.     The patient's care was discussed with the Attending Physician, Dr. Nagy, Bedside Nurse and Patient.    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 The patient denied chest pain or discomfort day of presentation but reports significant chest pain radiating into his bilateral upper arms x3 days ago lasting several minutes with associated SOB and palpitations which was relieved with nitroglycerin. He notes he was hypertensive at the time and HR 120s per his home BP cuff. He did not seek medical treatment since his symptoms resolved. He arrived for stress test today and was found to be tachycardic and c/o SOB since stent placement 1 month ago, he reports never feeling better since stent placed, where in the past he has always felt better after stents were placed. Per chart review the patient is known to have a residual 50-70% RCA lesion as of 2013 coronary angiogram and actually underwent coronary angiogram recently due to NSTEMI on 10/16/19 where he received ODIN to proximal LAD and has been on DAPT. Ost RCA 99% stenosis, prox % stenosis. Rt PDA with collateral flow from mid LAD.     In the ED pt mildly tachycardic at times HR 90s-110s, BPs 90s-110s/60s-70s. No hypoxia. Afebrile. CMP wnl. Pro-. TSH 4.22/T4 1.16. Mild leukocytosis likely reactive at 11.8. EKG sinus tachycardia 103 bpm, LAD, no ST-T changes, no TWI. CXR with enlarged cardiac silhouette unchanged from prior, likely mild pulmonary edema. No pleural effusion or PTX. Troponin positive at 0.172 therefore he was initiated on heparin gtt and transferred to [facility] for Cardiology consultation due to elevation of troponin suspicous for  NSTEMI vs. Demand ischemia. During my exam the patient is well appearing. He currently denies chest pain, palpitations, N/V, or lightheadedness/dizziness. No GI symptoms. Overall all feeling well at rest. He denies recent fevers or chills. Sputum white and unchanged. Has been treated with Z-pack by pulmonary recently as well and followed by them for hx organizing pneumonia which he is still on prednisone for.     Review of Systems    The 10 point Review of Systems is negative other than noted in the HPI or here.     Past Medical History    I have reviewed this patient's medical history and updated it with pertinent information if needed.   Past Medical History:   Diagnosis Date      Coronary artery disease, 4/ 2010, status post drug-eluting stent placement to the LAD and balloon angioplasty to the obtuse marginal.  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       Past Surgical History   I have reviewed this patient's surgical history and updated it with pertinent information if needed.  Past Surgical History:   Procedure Laterality Date     BRONCHOSCOPY (RIGID OR FLEXIBLE), DIAGNOSTIC  7/14/2011    Procedure:COMBINED BRONCHOSCOPY (RIGID OR FLEXIBLE), LAVAGE; Surgeon:REYNA BAILEY; Location:[facility]     BRONCHOSCOPY (RIGID OR FLEXIBLE), DIAGNOSTIC N/A 5/29/2019    Procedure: BRONCHOSCOPY, WITH BRONCHOALVEOLAR LAVAGE;  Surgeon: Perlman, David Morris, MD;  Location:  GI      COLONOSCOPY       CV HEART CATHETERIZATION WITH POSSIBLE INTERVENTION N/A 10/16/2019    Procedure: Heart Catheterization with Possible Intervention;  Surgeon: Robinson Robbins MD;  Location:  HEART CARDIAC CATH LAB     ENT SURGERY      tonsils     ORTHOPEDIC SURGERY      rt knee arthroscopy     SURGICAL HISTORY OF -       tonsils     SURGICAL HISTORY OF -   84    1.Exam under anesthesia, 2.Arthroscopy, 3.Arthroscopic medial meniscectomy, 4.arthroscopic trimming of patellar articular cartilage.     SURGICAL HISTORY OF -       vasectomy     SURGICAL HISTORY OF -   90    colonoscopy     SURGICAL HISTORY OF -   10/20/92    flexible sigmoidoscopy     SURGICAL HISTORY OF -   2000    colonoscopy and polypectomy     THORACOSCOPIC BIOPSY LUNG  2011    Procedure:THORACOSCOPIC BIOPSY LUNG; Video Assisted Right Thoracoscopy with Biopsy; Surgeon:JIM EPSTEIN; Location: OR       Social History   I have reviewed this patient's social history and updated it with pertinent information if needed.  Social History     Tobacco Use     Smoking status: Former Smoker     Packs/day: 0.50     Years: 19.00     Pack years: 9.50     Types: Cigarettes     Start date:      Last attempt to quit: 1975     Years since quittin.8     Smokeless tobacco: Former User     Types: Snuff     Tobacco comment: started smoking at 21 years of age   Substance Use Topics     Alcohol use: Yes     Alcohol/week: 0.0 standard drinks     Comment: occ.      Drug use: No       Family History   I have reviewed this patient's family history and updated it with pertinent information if needed.   Family History   Problem Relation Age of Onset     Cancer Mother         Lung--did not smoke     Cardiovascular Father         MI age 73     Lipids Sister      Hypertension Sister      Cancer Sister         Ovarian- at age 65     Prior to Admission Medications   Prior to Admission Medications   Prescriptions Last Dose Informant  Patient Reported? Taking?   MULTI VITAMIN MENS OR 11/12/2019 at Unknown time Self Yes Yes   Sig: Take 1 tablet by mouth daily    Specialty Vitamins Products (PROSTATE PO) 11/12/2019 at Unknown time Self Yes Yes   Sig: Take 1 tablet by mouth 2 times daily Super Beta Prostate   acetaminophen (TYLENOL 8 HOUR ARTHRITIS PAIN) 650 MG CR tablet Past Week at Unknown time Self Yes Yes   Sig: Take 1,300 mg by mouth 2 times daily   aspirin (ASA) 81 MG EC tablet 11/12/2019 at Unknown time Self No Yes   Sig: Take 1 tablet (81 mg) by mouth daily   atorvastatin (LIPITOR) 80 MG tablet 11/12/2019 at Unknown time Self No Yes   Sig: Take 1 tablet (80 mg) by mouth daily   azelastine (ASTELIN) 0.1 % nasal spray prn Self No No   Sig: Spray 1 spray into both nostrils 2 times daily   Patient taking differently: Spray 1 spray into both nostrils 2 times daily as needed    azithromycin (ZITHROMAX Z-TREY) 250 MG tablet finished course Self No No   Sig: Take 2 tabs 1 st day and 1 tab daily for 4 days.   clopidogrel (PLAVIX) 75 MG tablet 11/12/2019 at Unknown time Self No Yes   Sig: Take 1 tablet (75 mg) by mouth daily First loading dose is 600 mg (8 tablets), then once daily   losartan (COZAAR) 25 MG tablet 11/12/2019 at Unknown time Self No Yes   Sig: Take 1 tablet (25 mg) by mouth daily   metoprolol succinate ER (TOPROL-XL) 25 MG 24 hr tablet Past Month at on hold  Self No Yes   Sig: Take 1 tablet (25 mg) by mouth daily   nystatin (MYCOSTATIN) 693970 UNIT/ML suspension Past Month at Unknown time Self No Yes   Sig: Take 5 mLs (500,000 Units) by mouth 4 times daily   predniSONE (DELTASONE) 20 MG tablet 11/12/2019 at Unknown time Self Yes Yes   Sig: Take 5 mg by mouth daily Weaning off   tamsulosin (FLOMAX) 0.4 MG capsule 11/12/2019 at Unknown time Self No Yes   Sig: Take 1 capsule (0.4 mg) by mouth daily      Facility-Administered Medications: None     Allergies   Allergies   Allergen Reactions     Amoxicillin Diarrhea            Lisinopril  Cough       Physical Exam   Vital Signs: Temp: 98.2  F (36.8  C) Temp src: Oral BP: 136/74   Heart Rate: 106 Resp: 20 SpO2: 98 % O2 Device: None (Room air)    Weight: 0 lbs 0 oz     Physical Exam  Constitutional:       General: He is awake. He is not in acute distress.     Appearance: Normal appearance. He is obese.   HENT:      Head: Normocephalic and atraumatic.   Eyes:      Extraocular Movements: Extraocular movements intact.   Cardiovascular:      Rate and Rhythm: Normal rate and regular rhythm.      Pulses: Normal pulses.      Heart sounds: S1 normal and S2 normal. Murmur present. Diastolic murmur present with a grade of 2/4.   Pulmonary:      Effort: Pulmonary effort is normal. No respiratory distress.      Breath sounds: Normal breath sounds. No wheezing, rhonchi or rales.   Abdominal:      General: Bowel sounds are normal. There is no distension.      Palpations: Abdomen is soft.      Tenderness: There is no tenderness.      Comments: Obese abdomen   Musculoskeletal: Normal range of motion.      Right lower leg: No edema.      Left lower leg: No edema.   Skin:     General: Skin is warm and dry.   Neurological:      General: No focal deficit present.      Mental Status: He is alert and oriented to person, place, and time. Mental status is at baseline.   Psychiatric:         Mood and Affect: Mood normal.         Behavior: Behavior is cooperative.         Data   Data reviewed today: I reviewed all medications, new labs and imaging results over the last 24 hours. I personally reviewed the EKG tracing showing see HPI.    Recent Labs   Lab 11/12/19  1029   WBC 11.8*   HGB 14.0   MCV 99         POTASSIUM 3.7   CHLORIDE 106   CO2 29   BUN 17   CR 1.10   ANIONGAP 5   CHAVEZ 9.1   *   ALBUMIN 3.9   PROTTOTAL 7.1   BILITOTAL 0.5   ALKPHOS 72   ALT 38   AST 20   TROPI 0.172*     Recent Results (from the past 24 hour(s))   XR Chest Port 1 View    Narrative    XR CHEST PORT 1 VW 11/12/2019 12:07  PM    COMPARISON: 10/15/2019    HISTORY: Dyspnea, elevated troponin.      Impression    IMPRESSION: Enlarged cardiac silhouette is again seen and unchanged.  Likely mild pulmonary edema. No pleural effusion or pneumothorax.    DAVID SAEED MD

## 2019-11-13 LAB
KCT BLD-ACNC: 167 SEC (ref 75–150)
KCT BLD-ACNC: 235 SEC (ref 75–150)
KCT BLD-ACNC: 396 SEC (ref 75–150)
LMWH PPP CHRO-ACNC: 0.48 IU/ML
LMWH PPP CHRO-ACNC: 0.6 IU/ML
TROPONIN I SERPL-MCNC: 0.14 UG/L (ref 0–0.04)

## 2019-11-13 PROCEDURE — 99207 ZZC CDG-MDM COMPONENT: MEETS MODERATE - UP CODED: CPT | Performed by: HOSPITALIST

## 2019-11-13 PROCEDURE — C1769 GUIDE WIRE: HCPCS | Performed by: INTERNAL MEDICINE

## 2019-11-13 PROCEDURE — 36415 COLL VENOUS BLD VENIPUNCTURE: CPT | Performed by: HOSPITALIST

## 2019-11-13 PROCEDURE — C1887 CATHETER, GUIDING: HCPCS | Performed by: INTERNAL MEDICINE

## 2019-11-13 PROCEDURE — 21000001 ZZH R&B HEART CARE

## 2019-11-13 PROCEDURE — 99223 1ST HOSP IP/OBS HIGH 75: CPT | Mod: 25 | Performed by: INTERNAL MEDICINE

## 2019-11-13 PROCEDURE — 25000131 ZZH RX MED GY IP 250 OP 636 PS 637: Mod: GY | Performed by: PHYSICIAN ASSISTANT

## 2019-11-13 PROCEDURE — 99153 MOD SED SAME PHYS/QHP EA: CPT | Performed by: INTERNAL MEDICINE

## 2019-11-13 PROCEDURE — 25000132 ZZH RX MED GY IP 250 OP 250 PS 637: Mod: GY | Performed by: INTERNAL MEDICINE

## 2019-11-13 PROCEDURE — 99233 SBSQ HOSP IP/OBS HIGH 50: CPT | Performed by: HOSPITALIST

## 2019-11-13 PROCEDURE — 25000128 H RX IP 250 OP 636: Performed by: INTERNAL MEDICINE

## 2019-11-13 PROCEDURE — 27210794 ZZH OR GENERAL SUPPLY STERILE: Performed by: INTERNAL MEDICINE

## 2019-11-13 PROCEDURE — 84484 ASSAY OF TROPONIN QUANT: CPT | Performed by: PHYSICIAN ASSISTANT

## 2019-11-13 PROCEDURE — 25000125 ZZHC RX 250: Performed by: INTERNAL MEDICINE

## 2019-11-13 PROCEDURE — 93571 IV DOP VEL&/PRESS C FLO 1ST: CPT | Performed by: INTERNAL MEDICINE

## 2019-11-13 PROCEDURE — 99152 MOD SED SAME PHYS/QHP 5/>YRS: CPT | Performed by: INTERNAL MEDICINE

## 2019-11-13 PROCEDURE — B2111ZZ FLUOROSCOPY OF MULTIPLE CORONARY ARTERIES USING LOW OSMOLAR CONTRAST: ICD-10-PCS | Performed by: INTERNAL MEDICINE

## 2019-11-13 PROCEDURE — 36415 COLL VENOUS BLD VENIPUNCTURE: CPT | Performed by: PHYSICIAN ASSISTANT

## 2019-11-13 PROCEDURE — 85520 HEPARIN ASSAY: CPT | Performed by: HOSPITALIST

## 2019-11-13 PROCEDURE — 25000132 ZZH RX MED GY IP 250 OP 250 PS 637: Mod: GY | Performed by: PHYSICIAN ASSISTANT

## 2019-11-13 PROCEDURE — 4A023N7 MEASUREMENT OF CARDIAC SAMPLING AND PRESSURE, LEFT HEART, PERCUTANEOUS APPROACH: ICD-10-PCS | Performed by: INTERNAL MEDICINE

## 2019-11-13 PROCEDURE — 85347 COAGULATION TIME ACTIVATED: CPT

## 2019-11-13 PROCEDURE — 93458 L HRT ARTERY/VENTRICLE ANGIO: CPT | Performed by: INTERNAL MEDICINE

## 2019-11-13 PROCEDURE — C1894 INTRO/SHEATH, NON-LASER: HCPCS | Performed by: INTERNAL MEDICINE

## 2019-11-13 PROCEDURE — 4A033BC MEASUREMENT OF ARTERIAL PRESSURE, CORONARY, PERCUTANEOUS APPROACH: ICD-10-PCS | Performed by: INTERNAL MEDICINE

## 2019-11-13 PROCEDURE — 25000128 H RX IP 250 OP 636: Performed by: HOSPITALIST

## 2019-11-13 PROCEDURE — 25800030 ZZH RX IP 258 OP 636: Performed by: INTERNAL MEDICINE

## 2019-11-13 RX ORDER — NITROGLYCERIN 5 MG/ML
VIAL (ML) INTRAVENOUS
Status: DISCONTINUED | OUTPATIENT
Start: 2019-11-13 | End: 2019-11-13 | Stop reason: HOSPADM

## 2019-11-13 RX ORDER — LORAZEPAM 0.5 MG/1
0.5 TABLET ORAL
Status: DISCONTINUED | OUTPATIENT
Start: 2019-11-13 | End: 2019-11-13 | Stop reason: HOSPADM

## 2019-11-13 RX ORDER — FENTANYL CITRATE 50 UG/ML
INJECTION, SOLUTION INTRAMUSCULAR; INTRAVENOUS
Status: DISCONTINUED | OUTPATIENT
Start: 2019-11-13 | End: 2019-11-13 | Stop reason: HOSPADM

## 2019-11-13 RX ORDER — FENTANYL CITRATE 50 UG/ML
25-50 INJECTION, SOLUTION INTRAMUSCULAR; INTRAVENOUS
Status: DISPENSED | OUTPATIENT
Start: 2019-11-13 | End: 2019-11-14

## 2019-11-13 RX ORDER — FLUMAZENIL 0.1 MG/ML
0.2 INJECTION, SOLUTION INTRAVENOUS
Status: ACTIVE | OUTPATIENT
Start: 2019-11-13 | End: 2019-11-14

## 2019-11-13 RX ORDER — VERAPAMIL HYDROCHLORIDE 2.5 MG/ML
INJECTION, SOLUTION INTRAVENOUS
Status: DISCONTINUED | OUTPATIENT
Start: 2019-11-13 | End: 2019-11-13 | Stop reason: HOSPADM

## 2019-11-13 RX ORDER — LORAZEPAM 2 MG/ML
0.5 INJECTION INTRAMUSCULAR
Status: DISCONTINUED | OUTPATIENT
Start: 2019-11-13 | End: 2019-11-13 | Stop reason: HOSPADM

## 2019-11-13 RX ORDER — ADENOSINE 3 MG/ML
INJECTION, SOLUTION INTRAVENOUS
Status: DISCONTINUED | OUTPATIENT
Start: 2019-11-13 | End: 2019-11-13 | Stop reason: HOSPADM

## 2019-11-13 RX ORDER — POTASSIUM CHLORIDE 1500 MG/1
20 TABLET, EXTENDED RELEASE ORAL
Status: COMPLETED | OUTPATIENT
Start: 2019-11-13 | End: 2019-11-13

## 2019-11-13 RX ORDER — SODIUM CHLORIDE 9 MG/ML
INJECTION, SOLUTION INTRAVENOUS CONTINUOUS
Status: DISCONTINUED | OUTPATIENT
Start: 2019-11-13 | End: 2019-11-13 | Stop reason: HOSPADM

## 2019-11-13 RX ORDER — NALOXONE HYDROCHLORIDE 0.4 MG/ML
.2-.4 INJECTION, SOLUTION INTRAMUSCULAR; INTRAVENOUS; SUBCUTANEOUS
Status: DISCONTINUED | OUTPATIENT
Start: 2019-11-13 | End: 2019-11-13

## 2019-11-13 RX ORDER — IOPAMIDOL 755 MG/ML
INJECTION, SOLUTION INTRAVASCULAR
Status: DISCONTINUED | OUTPATIENT
Start: 2019-11-13 | End: 2019-11-13 | Stop reason: HOSPADM

## 2019-11-13 RX ORDER — ATROPINE SULFATE 0.1 MG/ML
0.5 INJECTION INTRAVENOUS EVERY 5 MIN PRN
Status: DISPENSED | OUTPATIENT
Start: 2019-11-13 | End: 2019-11-14

## 2019-11-13 RX ORDER — HEPARIN SODIUM 1000 [USP'U]/ML
INJECTION, SOLUTION INTRAVENOUS; SUBCUTANEOUS
Status: DISCONTINUED | OUTPATIENT
Start: 2019-11-13 | End: 2019-11-13 | Stop reason: HOSPADM

## 2019-11-13 RX ORDER — SODIUM CHLORIDE 9 MG/ML
INJECTION, SOLUTION INTRAVENOUS CONTINUOUS
Status: DISCONTINUED | OUTPATIENT
Start: 2019-11-13 | End: 2019-11-14

## 2019-11-13 RX ORDER — LIDOCAINE 40 MG/G
CREAM TOPICAL
Status: DISCONTINUED | OUTPATIENT
Start: 2019-11-13 | End: 2019-11-13 | Stop reason: HOSPADM

## 2019-11-13 RX ORDER — NALOXONE HYDROCHLORIDE 0.4 MG/ML
.1-.4 INJECTION, SOLUTION INTRAMUSCULAR; INTRAVENOUS; SUBCUTANEOUS
Status: DISCONTINUED | OUTPATIENT
Start: 2019-11-13 | End: 2019-11-14 | Stop reason: HOSPADM

## 2019-11-13 RX ORDER — ACETAMINOPHEN 325 MG/1
650 TABLET ORAL EVERY 4 HOURS PRN
Status: DISCONTINUED | OUTPATIENT
Start: 2019-11-13 | End: 2019-11-13

## 2019-11-13 RX ADMIN — ATORVASTATIN CALCIUM 80 MG: 80 TABLET, FILM COATED ORAL at 08:34

## 2019-11-13 RX ADMIN — SODIUM CHLORIDE: 9 INJECTION, SOLUTION INTRAVENOUS at 13:50

## 2019-11-13 RX ADMIN — CLOPIDOGREL BISULFATE 75 MG: 75 TABLET ORAL at 08:34

## 2019-11-13 RX ADMIN — PREDNISONE 5 MG: 5 TABLET ORAL at 08:34

## 2019-11-13 RX ADMIN — HEPARIN SODIUM 600 UNITS/HR: 10000 INJECTION, SOLUTION INTRAVENOUS at 06:08

## 2019-11-13 RX ADMIN — TAMSULOSIN HYDROCHLORIDE 0.4 MG: 0.4 CAPSULE ORAL at 08:34

## 2019-11-13 RX ADMIN — ASPIRIN 81 MG: 81 TABLET, DELAYED RELEASE ORAL at 08:34

## 2019-11-13 RX ADMIN — POTASSIUM CHLORIDE 20 MEQ: 1500 TABLET, EXTENDED RELEASE ORAL at 13:55

## 2019-11-13 RX ADMIN — METOPROLOL SUCCINATE 25 MG: 25 TABLET, EXTENDED RELEASE ORAL at 08:34

## 2019-11-13 RX ADMIN — LOSARTAN POTASSIUM 25 MG: 25 TABLET ORAL at 08:34

## 2019-11-13 ASSESSMENT — ACTIVITIES OF DAILY LIVING (ADL)
ADLS_ACUITY_SCORE: 13

## 2019-11-13 NOTE — PROGRESS NOTES
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 He was found to have troponin elevation and was concern for NSTEMI.      Troponin elevation - myocardial injury, unable to specify NSTEMI or demand ischemia.  CAD s/p PCI 2013, 10/2019 (ODIN to proximal LAD)  Mild-Moderate Aortic Stenosis  Bilateral carotid artery stenosis  Dyspnea on exertion  Worsening LEES, had an episode of chest pain on 11/9/19 lasting several minutes and radiating to bilateral upper arms, associated with SOB and palpitations which resolved with nitroglycerin SL. EKG sinus tachycardia, LAD, without acute ischemic changes. CXR cardiomegaly and possible mild pulm edema. Trop 0.172 in ED. Pro-.  Recent angiogram (10/16/19) ODIN to pLAD and on DAPT. Ost RCA 99% stenosis, prox % . Rt PDA with collateral flow from mid LAD.   ECHO: EF 55-60%. Grade I or early diastolic dysfxn. No RWMA. Trace-mild MR/TR. Mild 1+ AR. Mild to mod valvular aortic stenosis.   B/l Carotid US 10/2019: 50-69% stenosis to bilateral ICA  - Started on heparin drip, Cardiology consulted, has angiogram today, no new obstructing lesion per hand off (no report available)      - ASA, Statin, metoprolol XL and Plavix continued.    See below regarding LEES.    Mild Leukocytosis  Likely reactive, very mild.   - Recheck in AM    Mantle cell lymphoma s/p autologus stem cell transplant   Hx recent cryptogenic organizing pneumonia  Hx candidal pneumonia  Followed by Pulmonology outpatient. Chronic cough. He had stem cell transplant in 10/2010, issues with  pulmonary infiltrates and BAL with Candida glabrata in 07/2011, and unfortunate progression on VATS in 09/2011 that showed organizing pneumonia with negative tissue cultures, felt to have a , treated with high-dose steroids with a good clinical and radiological response, but unfortunate subsequent progression in 04/2012. A repeat BAL on 04/19/2012 showed filamentous fungus that was not able to be speciated further, and was treated with Voriconazole and prednisone.   -now with ongoing symptoms, followed by Dr Flanagan, had CT chest in august and October, has been on tapering dose of prednisone but continues to feel dyspneic on exertion. CXR this admission shows interstitial opacity, reported as mild pulm edema but probnp is not markedly elevated. Concern if this represents persistent , vs worsened dyspnea is due to decreased dose of prednisone.  - may need further eval with CT chest   - Resumed PTA prednisone which pt is on a wean with Pulmonology   -will consult pulmonary for their recommendation     Hypertension, treated  Hyperlipidemia  PTA losartan 25mg/d. Previously discontinued Toprol XL 25mg/d  - Resumed ARB, BB (restart, has been off) with hold parameters  - Resume statin as above    Mild Cognitive Impairment: Stable    Benign Prostatic Hypertrophy: Resume PTA tamsulosin 0.4mg/d     Diet: NPO per Anesthesia Guidelines for Procedure/Surgery Except for: Meds    DVT Prophylaxis: Pneumatic Compression Devices  Ascencio Catheter: not present  Code Status: Full Code During Procedure      Disposition Plan   Expected discharge: 2 days likely, recommended to prior living arrangement once pulm eval and recommendations..  Entered: Celeste Nagy MD 11/13/2019, 5:53 PM       The patient's care was discussed with the Bedside Nurse and Patient.    Celeste Nagy MD  Hospitalist Service  [facility]    ______________________________________________________________________    Interval History   Patient  was evaluated just prior to angiogram. He reports cough, dyspnea and fatigue but no chest pain.   -no nausea, abd pain.  -afebrile    Data reviewed today: I reviewed all medications, new labs and imaging results over the last 24 hours. I personally reviewed no images or EKG's today.    Physical Exam   Vital Signs: Temp: 97.7  F (36.5  C) Temp src: Oral BP: 112/70   Heart Rate: 89 Resp: 16 SpO2: 97 % O2 Device: Nasal cannula Oxygen Delivery: 2 LPM  Weight: 189 lbs 6 oz    General: AAOx3, appears comfortable.  HEENT: PERRLA EOMI. Mucosa moist. Moon face.  Lungs: Bilateral equal air entry. Dim but clear to auscultation, normal work of breathing.   CVS: S1S2 regular, no tachycardia. Soft systolic murmur.   Abdomen: Soft, obese/distended, non tender. BS heard.  MSK: No edema or deformities.  Neuro: AAOX3. CN 2-12 normal. Strength symmetrical.  Skin: No rash.     Data   Recent Labs   Lab 11/13/19  0221 11/12/19  2230 11/12/19  1817 11/12/19  1029   WBC  --   --   --  11.8*   HGB  --   --   --  14.0   MCV  --   --   --  99   PLT  --   --   --  281   NA  --   --   --  140   POTASSIUM  --   --   --  3.7   CHLORIDE  --   --   --  106   CO2  --   --   --  29   BUN  --   --   --  17   CR  --   --   --  1.10   ANIONGAP  --   --   --  5   CHAVEZ  --   --   --  9.1   GLC  --   --   --  136*   ALBUMIN  --   --   --  3.9   PROTTOTAL  --   --   --  7.1   BILITOTAL  --   --   --  0.5   ALKPHOS  --   --   --  72   ALT  --   --   --  38   AST  --   --   --  20   TROPI 0.135* 0.134* 0.114* 0.172*     No results found for this or any previous visit (from the past 24 hour(s)).  Medications     Continuing ACE inhibitor/ARB/ARNI from home medication list OR ACE inhibitor/ARB order already placed during this visit       - MEDICATION INSTRUCTIONS -       - MEDICATION INSTRUCTIONS -       - MEDICATION INSTRUCTIONS -       HEParin 450 Units/hr (11/13/19 5796)     - MEDICATION INSTRUCTIONS -       - MEDICATION INSTRUCTIONS -         acetaminophen   1,000 mg Oral BID     aspirin  81 mg Oral Daily     atorvastatin  80 mg Oral Daily     clopidogrel  75 mg Oral Daily     losartan  25 mg Oral Daily     metoprolol succinate ER  25 mg Oral Daily     predniSONE  5 mg Oral Daily     sodium chloride (PF)  3 mL Intracatheter Q8H     tamsulosin  0.4 mg Oral Daily

## 2019-11-13 NOTE — PLAN OF CARE
A&OX4, VSS on RA except for tachy to 114 with activity. Tele has been ST.Denies chest pain n&V. SOB with activity. Up Independently. Critical lab for hep 10A of 1.12. Hep gtt stopped for an hour. Resumed at 700 units/hr. Next recheck at 2:00 in the morning. On cardiac diet. NPO after midnight. Cards consult pending. Trops trending down. Continue to monitor.

## 2019-11-13 NOTE — CONSULTS
CARDIOLOGY CONSULT    REASON FOR CONSULT:  NSTEMI    PRIMARY CARE PHYSICIAN:  Dony Acuña    HISTORY OF PRESENT ILLNESS:  Mr. Hernández is a pleasant 85 y/o gentleman with PMH significant for coronary artery disease s/p PCI to the LAD in 2010 and then to OM1 in 2013. He presented to [facility] with NSTEMI 10/17/19. He had proximal LAD stenting with a 3.0 x 16 mm Synergy ODIN. RCA developed . The left circumflex had moderate disease and will need further evaluation. His echocardiogram whose EF 55-60%, mean AoV gradient of 16.3 with MANA 1.2.  He also follows with pulmonary for  and fungal pneumonia.  He is on a prednisone taper and recently finished azithromycin.  Since his cath in October he continues to feel poorly.  He is short of breath with minimal ambulation.  He states he continues to have chest pressure similar to prior to his cath.  He had an episode of more intense chest pressure on Saturday that radiated to both arms.  He noted his HR was in the 120's at the time.  He presented for an outpatient dobutamine stress echocardiogram yesterday as ordered by Dr. Ashraf for noninvasive assessment of LCx ischemia and he complained of chest discomfort and not feeling well.  The test was canceled and he was referred to the ED.  He has recently been seen by pulmonary who does not want to do further pulmonary work up or make changes to his steroids until ongoing cardiac ischemia has been ruled out.    In the ED his ECG demonstrated sinus rhythm, no ST segment changes.  His troponin has been mildly elevated and flat.  Renal function is normal.  No arrhythmias identified on telemetry.          PAST MEDICAL HISTORY:  Past Medical History:   Diagnosis Date      Coronary artery disease, 4/ 2010, status post drug-eluting stent placement to the LAD and balloon angioplasty to the obtuse marginal.  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       MEDICATIONS:  Current Facility-Administered Medications   Medication     acetaminophen (TYLENOL) Suppository 650 mg     acetaminophen (TYLENOL) tablet 1,000 mg     acetaminophen (TYLENOL) tablet 650 mg     alum & mag hydroxide-simethicone (MYLANTA ES/MAALOX  ES) suspension 30 mL     aspirin EC tablet 81 mg     atorvastatin (LIPITOR) tablet 80 mg     bisacodyl (DULCOLAX) Suppository 10 mg     clopidogrel (PLAVIX) tablet 75 mg     Continuing ACE inhibitor/ARB/ARNI from home medication list OR ACE inhibitor/ARB/ARNI order already placed during this visit     Continuing aspirin from home medication list OR daily aspirin order already placed during this visit     Continuing beta blocker from home medication list OR beta blocker order already placed during this visit     Continuing statin from home medication list OR statin order already placed during this visit     heparin 25,000 units in 0.45% NaCl 250 mL infusion     HOLD: nitroGLYcerin IF     HYDROcodone-acetaminophen (NORCO) 5-325 MG per tablet 1-2 tablet     lidocaine (LMX4) cream     lidocaine 1 % 0.1-1 mL     losartan (COZAAR) tablet 25 mg     medication instruction     metoprolol succinate ER (TOPROL-XL) 24 hr tablet 25 mg     morphine (PF) injection 1 mg     naloxone (NARCAN) injection 0.1-0.4 mg     nitroGLYcerin (NITROSTAT) sublingual tablet 0.4 mg     ondansetron (ZOFRAN-ODT) ODT tab 4 mg    Or     ondansetron (ZOFRAN) injection 4 mg     Patient is already receiving anticoagulation  with heparin, enoxaparin (LOVENOX), warfarin (COUMADIN)  or other anticoagulant medication     polyethylene glycol (MIRALAX/GLYCOLAX) Packet 17 g     predniSONE (DELTASONE) tablet 5 mg     prochlorperazine (COMPAZINE) injection 5 mg    Or     prochlorperazine (COMPAZINE) tablet 5 mg    Or     prochlorperazine (COMPAZINE) Suppository 12.5 mg     senna-docusate (SENOKOT-S/PERICOLACE) 8.6-50 MG per tablet 1 tablet    Or     senna-docusate (SENOKOT-S/PERICOLACE) 8.6-50 MG per tablet 2 tablet     sodium chloride (PF) 0.9% PF flush 3 mL     sodium chloride (PF) 0.9% PF flush 3 mL     tamsulosin (FLOMAX) capsule 0.4 mg       ALLERGIES:  Allergies   Allergen Reactions     Amoxicillin Diarrhea            Lisinopril Cough       SOCIAL HISTORY:  I have reviewed this patient's social history and updated it with pertinent information if needed. Dangelo Hernández  reports that he quit smoking about 44 years ago. His smoking use included cigarettes. He started smoking about 63 years ago. He has a 9.50 pack-year smoking history. He has quit using smokeless tobacco.  His smokeless tobacco use included snuff. He reports current alcohol use. He reports that he does not use drugs.    FAMILY HISTORY:  I have reviewed this patient's family history and updated it with pertinent information if needed.   Family History   Problem Relation Age of Onset     Cancer Mother         Lung--did not smoke     Cardiovascular Father         MI age 73     Lipids Sister      Hypertension Sister      Cancer Sister         Ovarian- at age 65       REVIEW OF SYSTEMS:  A complete ROS was obtained and the pertinent positives are outlined in the history of present illness above.  The remainder of systems is negative.      PHYSICAL EXAM:  Temp: 97.6  F (36.4  C) Temp src: Oral BP: 122/74   Heart Rate: 71 Resp: 16 SpO2: 97 % O2 Device: None (Room air)    Vital Signs with Ranges  Temp:  [97.3  F (36.3  C)-98.2  F (36.8  C)] 97.6  F (36.4  C)  Pulse:   [] 100  Heart Rate:  [] 71  Resp:  [10-27] 16  BP: ()/(56-81) 122/74  SpO2:  [95 %-98 %] 97 %  189 lbs 6 oz    Constitutional: awake, alert, no distress  Eyes: PERRL, sclera nonicteric  ENT: trachea midline  Respiratory: rare dry crackle bilaterally  Cardiovascular: RRR no m/r/g, no JVD  GI: nondistended, nontender, bowel sounds present  Lymph/Hematologic: no lymphadenopathy  Skin: dry, no rash  Musculoskeletal: good muscle tone, strength 5/5 in upper and lower extremities  Neurologic: no focal deficits  Neuropsychiatric: appropriate affact    DATA:  Labs:   Reviewed in EPIC    EKG: Reviewed personally.  Normal sinus rhythm, poor R wave progression anteriorly, no diagnostic ST segment changes            Assessment and Plan  1. Recurrent chest pain, persistent SOB:  Occurring in the setting of concomitant pulmonary and cardiac disease.  Modest troponin elevation and flat not suggestive of ACS.  However, no other obvious cause for elevated troponin.  Recent NSTEMI with proximal LAD stenting. Preserved EF on echo.  of RCA and moderate L Circumflex disease which may be more significant.   2. Coronary artery disease  3.   with candida pneumonia:  Recently completed azithromycin and on steroid taper.    4. Carotid aretery stenosis  5. Hypertension.     RECOMMENDATIONS:  1. Given ongoing symptoms, recommend proceeding with diagnostic coronary angiogram to reassess LAD stent and assess LCx disease, his RCA is known to be occluded.  He is agreeable to full code status for the duration of the procedure.   Risks/benefits/alternatives were discussed with the patient and he is agreeable to proceeding.    2. Continue ASA, statin, plavix.   3. Further recommendations to follow.      Devika Garcia MD  Cardiology - [facility]  Pager:  [phone]  November 13, 2019

## 2019-11-13 NOTE — PROGRESS NOTES
DATE:  11/12/2019   TIME OF RECEIPT FROM LAB: 6132  LAB TEST:  Troponin  LAB VALUE:  0.134  TIME LAB VALUE REPORTED TO PROVIDER:   Lower then previous troponin, no complaints of CP at this time, Heparin gtt running.

## 2019-11-13 NOTE — PLAN OF CARE
VSS. HR SR, SB down to 35 at sleep, pt asymptomatic w/ low HR. No complaints of pain. Pt up independently in room.

## 2019-11-13 NOTE — PLAN OF CARE
Pt here going to have angio today. A&O. VSS. Tele Nsr.   Npo diet for angio.  Up with sba. Denies pain. Pt scoring  green on the Aggression Stop Light Tool. Plan:  waiting for a time for angio.

## 2019-11-14 ENCOUNTER — APPOINTMENT (OUTPATIENT)
Dept: CT IMAGING | Facility: CLINIC | Age: 84
DRG: 196 | End: 2019-11-14
Attending: INTERNAL MEDICINE
Payer: MEDICARE

## 2019-11-14 VITALS
BODY MASS INDEX: 30.63 KG/M2 | SYSTOLIC BLOOD PRESSURE: 108 MMHG | HEART RATE: 90 BPM | DIASTOLIC BLOOD PRESSURE: 65 MMHG | TEMPERATURE: 97.7 F | OXYGEN SATURATION: 95 % | RESPIRATION RATE: 17 BRPM | WEIGHT: 189.8 LBS

## 2019-11-14 LAB — INTERPRETATION ECG - MUSE: NORMAL

## 2019-11-14 PROCEDURE — 25000132 ZZH RX MED GY IP 250 OP 250 PS 637: Mod: GY | Performed by: PHYSICIAN ASSISTANT

## 2019-11-14 PROCEDURE — 99232 SBSQ HOSP IP/OBS MODERATE 35: CPT | Performed by: INTERNAL MEDICINE

## 2019-11-14 PROCEDURE — 71250 CT THORAX DX C-: CPT

## 2019-11-14 PROCEDURE — 25000131 ZZH RX MED GY IP 250 OP 636 PS 637: Mod: GY | Performed by: PHYSICIAN ASSISTANT

## 2019-11-14 PROCEDURE — 40000894 ZZH STATISTIC OT IP EVAL DEFER: Performed by: OCCUPATIONAL THERAPIST

## 2019-11-14 PROCEDURE — 99239 HOSP IP/OBS DSCHRG MGMT >30: CPT | Performed by: HOSPITALIST

## 2019-11-14 RX ADMIN — LOSARTAN POTASSIUM 25 MG: 25 TABLET ORAL at 08:38

## 2019-11-14 RX ADMIN — ACETAMINOPHEN 1000 MG: 500 TABLET, FILM COATED ORAL at 08:38

## 2019-11-14 RX ADMIN — CLOPIDOGREL BISULFATE 75 MG: 75 TABLET ORAL at 08:38

## 2019-11-14 RX ADMIN — ATORVASTATIN CALCIUM 80 MG: 80 TABLET, FILM COATED ORAL at 08:38

## 2019-11-14 RX ADMIN — METOPROLOL SUCCINATE 25 MG: 25 TABLET, EXTENDED RELEASE ORAL at 08:38

## 2019-11-14 RX ADMIN — ASPIRIN 81 MG: 81 TABLET, DELAYED RELEASE ORAL at 08:38

## 2019-11-14 RX ADMIN — PREDNISONE 5 MG: 5 TABLET ORAL at 08:38

## 2019-11-14 RX ADMIN — TAMSULOSIN HYDROCHLORIDE 0.4 MG: 0.4 CAPSULE ORAL at 08:38

## 2019-11-14 ASSESSMENT — ACTIVITIES OF DAILY LIVING (ADL)
ADLS_ACUITY_SCORE: 13

## 2019-11-14 NOTE — DISCHARGE INSTRUCTIONS
Pulmonology recommends follow up with Dr Flanagan at the [facility] prior to leaving for [place] this winter.

## 2019-11-14 NOTE — PROGRESS NOTES
Care Coordination:    -Met with pt to assist with PCP and Pulmonary appointment.  Pt states he plans on leaving tomorrow and fly out to [place].  Pt states he will call Dr. Acuña early tomorrow morning to get in same day for an appointment. He also states that he will call Dr. Flanagan at the [facility] and see if he can be seen tomorrow before he flies out.        Stella Benites RN, BAN  Inpatient Care Coordination  [address] [address]  [email or web address]   Office: [phone]  Fax: [phone]  Gender pronouns: she/her/hers  Employed by [employer] [employer]

## 2019-11-14 NOTE — PLAN OF CARE
Pt's left radial and right groin sites are CD&I without hematomas, denies pain is LEES with activity, has a pulmonology consult ordered, satting mid to upper 90's on room air, LS clear, Tele SR, BM today, voiding well, up with a SBA

## 2019-11-14 NOTE — CONSULTS
Addendum.  CT scan of chest shows no change in infiltrates since 10/22/19.  I recommend follow up with Dr Flanagan at the [facility] prior to leaving for [place] this winter.  Discussed with nursing.  No changes to prednisone 5mg daily.  OK to discharge  to home today.  Thanks  PJW    PULMONOLOGY CONSULTATION  Date of service: 11/14/2019    [facility]  _____________________________________________________    Dangelo Hernández  84 year old male  [id number]  [address] [facility] [address]    Primary Care Provider:  Dony Acuña  Admission Date: 11/12/2019  Hospital Attending Physician: Vero serraot. providers found    ________________________________________    CHIEF COMPLAINT : I was asked to see this patient by  for evaluation of dyspnea with pulmonary infiltrates.  Informant: Patient, EMR    HISTORY OF PRESENT ILLNESS     aDngelo Hernández is a 84 year old male with past medical history significant for Mantle cell lymphoma status post metallic stem cell transplant complicated by cryptogenic organizing pneumonia and candidal pneumonia approximate 4 years ago. Upon returning from [place] this spring, he presented to Dr. Rajendra Flanagan, at the [facility] for concerns of dyspnea. CT scan of the chest demonstrated interstitial infiltrates, and he underwent bronchoscopy with broncheoalveolar lavage 5/29/2019 which was unrevealing. Started on prednisone for presumed recurrent cryptogenic organizing pneumonia at 40 mg,this was gradually decreased after he apparently had a improvement in infiltrates seen on CT scan in July and August 2019. Seen in October, by Dr. Flanagan, CT scan of the chest demonstrated no change in infiltrates, and prednisone was further reduced to 10 mg, and now 5 mg daily. He continues to complain of persistent cough productive of clear sputum and marked dyspnea on exertion unable to walk a half block.    Admitted to [facility] as a  direct admission from the ER 11/12/19 with worsening dyspnea on exertion, and episode of chest pain, palpitations with elevated troponin, and concern for a NSTEMI.  Angiography performed reportedly negative.    Pulmonary consultation is requested for further evaluation and treatment of infiltrates and dyspnea.      CURRENT RESPIRATORY SYMPTOMS:  Sinus congestion/drainage/pain:  denies  Cough: present  Sputum: clear sputum  Wheeze:  denies  Dyspnea: Marked  Chest Discomfort: denies  Paroxysmal nocturnal dyspnea/Orthopnea:  denies      HOME MEDICATIONS   Pulmonary meds: none  Home Oxygen:none  Medications Prior to Admission   Medication Sig Dispense Refill Last Dose     acetaminophen (TYLENOL 8 HOUR ARTHRITIS PAIN) 650 MG CR tablet Take 1,300 mg by mouth 2 times daily   Past Week at Unknown time     aspirin (ASA) 81 MG EC tablet Take 1 tablet (81 mg) by mouth daily 30 tablet 0 11/12/2019 at Unknown time     atorvastatin (LIPITOR) 80 MG tablet Take 1 tablet (80 mg) by mouth daily 90 tablet 3 11/12/2019 at Unknown time     clopidogrel (PLAVIX) 75 MG tablet Take 1 tablet (75 mg) by mouth daily First loading dose is 600 mg (8 tablets), then once daily 90 tablet 3 11/12/2019 at Unknown time     losartan (COZAAR) 25 MG tablet Take 1 tablet (25 mg) by mouth daily 90 tablet 3 11/12/2019 at Unknown time     metoprolol succinate ER (TOPROL-XL) 25 MG 24 hr tablet Take 1 tablet (25 mg) by mouth daily 90 tablet 3 Past Month at on hold      MULTI VITAMIN MENS OR Take 1 tablet by mouth daily    11/12/2019 at Unknown time     nystatin (MYCOSTATIN) 147354 UNIT/ML suspension Take 5 mLs (500,000 Units) by mouth 4 times daily 400 mL 0 Past Month at Unknown time     predniSONE (DELTASONE) 20 MG tablet Take 5 mg by mouth daily Weaning off   11/12/2019 at Unknown time     Specialty Vitamins Products (PROSTATE PO) Take 1 tablet by mouth 2 times daily Super Beta Prostate   11/12/2019 at Unknown time     tamsulosin (FLOMAX) 0.4 MG capsule Take  1 capsule (0.4 mg) by mouth daily 90 capsule 3 11/12/2019 at Unknown time     azelastine (ASTELIN) 0.1 % nasal spray Spray 1 spray into both nostrils 2 times daily (Patient taking differently: Spray 1 spray into both nostrils 2 times daily as needed ) 3 Bottle 3 prn     azithromycin (ZITHROMAX Z-TREY) 250 MG tablet Take 2 tabs 1 st day and 1 tab daily for 4 days. 6 tablet 2 finished course       PAST MEDICAL HISTORY      Past Medical History:   Diagnosis Date      Coronary artery disease, 4/ 2010, status post drug-eluting stent placement to the LAD and balloon angioplasty to the obtuse marginal.  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     Past Surgical History:   Procedure Laterality Date     BRONCHOSCOPY (RIGID OR FLEXIBLE), DIAGNOSTIC  7/14/2011    Procedure:COMBINED BRONCHOSCOPY (RIGID OR FLEXIBLE), LAVAGE; Surgeon:REYNA BAILEY; Location:[facility]     BRONCHOSCOPY (RIGID OR FLEXIBLE), DIAGNOSTIC N/A 5/29/2019    Procedure: BRONCHOSCOPY, WITH BRONCHOALVEOLAR LAVAGE;  Surgeon: Perlman, David Morris, MD;  Location:  GI     COLONOSCOPY       CV HEART CATHETERIZATION WITH POSSIBLE INTERVENTION N/A 10/16/2019    Procedure: Heart Catheterization with Possible Intervention;  Surgeon: Robinson Robbins MD;  Location:  HEART CARDIAC CATH LAB     ENT SURGERY      tonsils     ORTHOPEDIC SURGERY      rt knee arthroscopy     SURGICAL HISTORY OF -       tonsils     SURGICAL HISTORY OF  -   86    1.Exam under anesthesia, 2.Arthroscopy, 3.Arthroscopic medial meniscectomy, 4.arthroscopic trimming of patellar articular cartilage.     SURGICAL HISTORY OF -       vasectomy     SURGICAL HISTORY OF -   90    colonoscopy     SURGICAL HISTORY OF -   10/20/92    flexible sigmoidoscopy     SURGICAL HISTORY OF -   2000    colonoscopy and polypectomy     THORACOSCOPIC BIOPSY LUNG  2011    Procedure:THORACOSCOPIC BIOPSY LUNG; Video Assisted Right Thoracoscopy with Biopsy; Surgeon:JIM EPSTEIN; Location:[facility] OR       ALLERGIES     Allergies   Allergen Reactions     Amoxicillin Diarrhea            Lisinopril Cough       SOCIAL / SUBSTANCE HISTORY     Social History     Socioeconomic History     Marital status:      Spouse name: Not on file     Number of children: Not on file     Years of education: Not on file     Highest education level: Not on file   Occupational History     Employer: RETIRED   Social Needs     Financial resource strain: Not on file     Food insecurity:     Worry: Not on file     Inability: Not on file     Transportation needs:     Medical: Not on file     Non-medical: Not on file   Tobacco Use     Smoking status: Former Smoker     Packs/day: 0.50     Years: 19.00     Pack years: 9.50     Types: Cigarettes     Start date:      Last attempt to quit: 1975     Years since quittin.8     Smokeless tobacco: Former User     Types: Snuff     Tobacco comment: started smoking at 21 years of age   Substance and Sexual Activity     Alcohol use: Yes     Alcohol/week: 0.0 standard drinks     Comment: occ.      Drug use: No     Sexual activity: Yes     Partners: Female   Lifestyle     Physical activity:     Days per week: Not on file     Minutes per session: Not on file     Stress: Not on file   Relationships     Social connections:     Talks on phone: Not on file     Gets together: Not on file     Attends Quaker service: Not on file     Active member of  club or organization: Not on file     Attends meetings of clubs or organizations: Not on file     Relationship status: Not on file     Intimate partner violence:     Fear of current or ex partner: Not on file     Emotionally abused: Not on file     Physically abused: Not on file     Forced sexual activity: Not on file   Other Topics Concern     Parent/sibling w/ CABG, MI or angioplasty before 65F 55M? No      Service Not Asked     Blood Transfusions Not Asked     Caffeine Concern Not Asked     Occupational Exposure Not Asked     Hobby Hazards Not Asked     Sleep Concern Not Asked     Stress Concern Not Asked     Weight Concern Not Asked     Special Diet No     Back Care Not Asked     Exercise Yes     Comment: active     Bike Helmet Not Asked     Seat Belt Not Asked     Self-Exams Not Asked   Social History Narrative     Not on file         FAMILY HISTORY     Family History   Problem Relation Age of Onset     Cancer Mother         Lung--did not smoke     Cardiovascular Father         MI age 73     Lipids Sister      Hypertension Sister      Cancer Sister         Ovarian- at age 65       REVIEW OF SYSTEMS   A comprehensive review of systems was negative except for items noted in HPI/Subjective.    PHYSICAL EXAMINATION   Temp (24hrs), Av  F (36.7  C), Min:97.7  F (36.5  C), Max:98.2  F (36.8  C)     /70   Pulse 90   Temp 98.2  F (36.8  C) (Oral)   Resp 17   Wt 86.1 kg (189 lb 12.8 oz)   SpO2 93%   BMI 30.63 kg/m   RA    Intake/Output Summary (Last 24 hours) at 2019 0934  Last data filed at 2019 0842  Gross per 24 hour   Intake 160 ml   Output 400 ml   Net -240 ml      In: 131.38 [P.O.:40; I.V.:91.38]  Out: 400 [Urine:400]  Patient Vitals for the past 96 hrs:   Weight   19 0028 86.1 kg (189 lb 12.8 oz)   19 0500 85.9 kg (189 lb 6 oz)      Body mass index is 30.63 kg/m .      CONSTITUTIONAL/GENERAL APPEARANCE: Alert  male. No Apparent Distress.  PSYCHIATRIC: Pleasant  and appropriate mood and affect. Oriented x 3.  EARS, NOSE,THROAT,MOUTH: External ears and nose overall normal. moist oral mucosa.   NECK: Neck appearance normal. No neck masses and the thyroid not enlarged.   RESPIRATORY: Non-labored effort. Lungs are clear to auscultation without wheezes or crackles.  CARDIOVASCULAR: S1, S2, regular rate and rhythm. Extremities with no edema.  ABDOMEN: round, soft, non-tender, non-distended, no palpable masses. No presence of hernia.  LYMPHATIC: no cervical or axillary lymphadenopathy.  SKIN: Skin color was normal. No clubbing or cyanosis. No palpable skin abnormalities.    LABORATORY ASSESSMENT    No lab results found in last 7 days.  Recent Labs   Lab 11/12/19  1029   WBC 11.8*   RBC 4.33*   HGB 14.0   HCT 42.9   MCV 99   MCH 32.3   MCHC 32.6   RDW 14.3        Recent Labs   Lab 11/12/19  1029      POTASSIUM 3.7   CHLORIDE 106   CO2 29   ANIONGAP 5   BUN 17   CR 1.10   GFRESTIMATED 62   GFRESTBLACK 72   CHAVEZ 9.1     No lab results found in last 7 days.  No lab results found in last 7 days.    Additional labs and/or comments:    IMAGING      XR CHEST PORT 1 VW 11/12/2019 12:07 PM     COMPARISON: 10/15/2019     HISTORY: Dyspnea, elevated troponin.                                                                      IMPRESSION: Enlarged cardiac silhouette is again seen and unchanged.  Likely mild pulmonary edema. No pleural effusion or pneumothorax.      EXAMINATION: CT CHEST W/O CONTRAST, 10/22/2019 2:20 PM     CLINICAL HISTORY: Shortness of breath; f/u organizing pneumonia.     COMPARISON: 8/22/2019 multiple previous dating back to 10/16/2010.     TECHNIQUE: CT imaging obtained through the chest without contrast.  Coronal and axial MIP reformatted images obtained.      FINDINGS:  Mediastinum: Normal size heart, aorta, pulmonary artery. Moderate to  severe calcification and coronary arteries. No significant mediastinal  lymphadenopathy. Central tracheobronchial tree is  patent. Scattered  calcifications of the thoracic aorta and great vessels.     Upper abdomen: Limited noncontrast evaluation of the upper abdomen.     Bones and soft tissues: Degenerative changes of the thoracal lumbar  spine. No concerning osseous or soft tissue findings. Mild bilateral  gynecomastia.     Lungs: Diffuse bilateral groundglass and reticular opacities of lungs  appear significantly changed from 8/22/2019. No pleural effusion or  pneumothorax.                                                                         IMPRESSION:   Unchanged diffuse perihilar groundglass opacities of lungs again  consistent with organizing pneumonia. No new airspace opacities       PFT & OTHER TESTING   Pulmonary function studies  Performed 5/16/2019 show forced vital capacity of 2.53 L, FEV1 1.93 L. FEV1 over FVC ratio 76% total lung capacity 4.11 L.    ASSESSMENT / PLAN      ACTIVE HOSPITAL PROBLEMS  Active Problems:    ACS (acute coronary syndrome) (H)      ASSESSMENT : 84-year-old gentleman, status post mantle cell lymphoma treated with bone marrow transplant and complicated by a cryptogenic organizing pneumoniamore than 4 years ago. Now presents with increasing dyspnea, with groundglass reticular infiltrates in the lungs,  Treated for presumed cryptogenic organizing pneumonia after negative bronchoscopy with BAL in May 2019. Symptoms of dyspnea are persistent, and worsening in spite of prednisone, it may be worse now with prednisone taper.    Etiology of dyspnea, as well as pulmonary infiltrates is unclear. Discussed briefly with Dr. Nagy.  We'll proceed to CT scan of the chest without IV contrast to see if there is any change in infiltrates in the last few weeks, after tapering prednisone further from 20-5 mg.    Thank you very much for allowing which has been Mr. Hernández's care. If you've any questions please do hesitate to contact me.      Rajendra Owusu M.D.  [facility]  [facility]  [place]  [phone]  Pager [phone]    Dyspnea  Pulmonary infiltrates  Cough

## 2019-11-14 NOTE — PLAN OF CARE
Discharge Planner OT   Patient plan for discharge: N/A  Current status: OT/CR Orders rec'd and chart reviewed. Pt admitted due to worsening dyspnea on exertion, episode of CP and palpitations. He was found to have troponin elevation and was concern for NSTEMI, s/p angio per chart  (-) for intervention. Per nursing, pt SBA with functional mobility and ADL's and pt discharging home today. Pt had ODIN 10/19. Recommend cont'd OP CR and would benefit from OP pulmonary rehab. No inpt OT/CR warranted, defer to OP CR.   Barriers to return to prior living situation: none   Recommendations for discharge: per nursing, pt discharging home today. Recommend resumption of OP CR and recommend OP pulmonary rehab.   Rationale for recommendations: Pt with no needed intervention, s/p angio, will require cont'd OP CR for monitored progressive exercise and risk factor education and modification.  OT/CR order completed.        Entered by: Gris Everett 11/14/2019 10:03 AM

## 2019-11-14 NOTE — PLAN OF CARE
A&Ox4. VSS on RA. Denies chest pain or SOB. L/s clear/diminished. Infrequent productive coughs. Seen by Pulmonology. Chest CT done. Left radial site and Right groin site intact/soft/no hematoma. CMS intact except pedal pulse present w/ doppler. Tele SR. Tolerating low fat/na diet. Up independently in room.      Pt discharged to home at 1520, ride given by Pt's daughter. Discharge instruction reviewed w/ Pt, questions answered. Sent all personal belongings w/ Pt.

## 2019-11-14 NOTE — PROGRESS NOTES
[facility]    Cardiology Progress Note    Date of Service (when I saw the patient): 2019            Assessment and Plan:     Assessment and Plan  1. Recurrent chest pain, persistent SOB: Coronary angiogram demonstrated patent LAD stent, nonobstructive disease elsewhere, normal LVEDP.    2. Coronary artery disease  3.   with candida pneumonia:  Recently completed azithromycin and on steroid taper.    4. Peripheral arterial disease, carotid aretery stenosis, Subclavian stenosis noted on cath.    5. Hypertension.      RECOMMENDATIONS:  1. Shortness of breath not cardiac in origin.  Agree with pursuing further pulmonary work up.    2. Continue ASA, statin, plavix.   3. Cardiology will sign off.  Follow up with cardiology upon discharge in 4 weeks.        Devika Garcia MD [facility]  Cardiology            Interval History:     Underwent coronary angiogram yesterday.  Patent LAD stent, nonobstructive coronary artery disease elsewhere.  Normal LVEDP             Medications:       acetaminophen  1,000 mg Oral BID     aspirin  81 mg Oral Daily     atorvastatin  80 mg Oral Daily     clopidogrel  75 mg Oral Daily     losartan  25 mg Oral Daily     metoprolol succinate ER  25 mg Oral Daily     predniSONE  5 mg Oral Daily     sodium chloride (PF)  3 mL Intracatheter Q8H     tamsulosin  0.4 mg Oral Daily            Physical Exam:   Blood pressure 121/70, pulse 90, temperature 98.2  F (36.8  C), temperature source Oral, resp. rate 17, weight 86.1 kg (189 lb 12.8 oz), SpO2 93 %.  Vitals:    19 0500 19 0028   Weight: 85.9 kg (189 lb 6 oz) 86.1 kg (189 lb 12.8 oz)       Intake/Output Summary (Last 24 hours) at 2019 1344  Last data filed at 2019 0842  Gross per 24 hour   Intake 160 ml   Output 400 ml   Net -240 ml           Vital Sign Ranges  Temperature Temp  Av  F (36.7  C)  Min: 97.7  F (36.5  C)  Max: 98.2  F (36.8  C)   Blood pressure Systolic (24hrs), Av , Min:83 , Max:136         Diastolic (24hrs), Av, Min:46, Max:89      Pulse Pulse  Av.7  Min: 88  Max: 96   Respirations Resp  Av.3  Min: 16  Max: 17   Pulse oximetry SpO2  Av.4 %  Min: 93 %  Max: 98 %         EXAM:    Constitutional:    in no apparent distress    Skin:    normal    Head:    Normocephalic, atramatic    Eyes:    pupils equal, round and reactive to light, extra ocular muscles intact, sclera clear, conjunctiva normal    Neck:    JVP    Lungs:    CTAB   Cardiovascular:    S1, S2 RRR no m/r/g, no JVD   Abdomen:    normal bowel sounds    Extremities and Back:    no cyanosis or clubbing and No edema    Neurological:    No gross or focal neurologic abnormalities               Data:     Recent Labs   Lab Test 19  1029 10/17/19  0527  09/29/15  1115 09/29/15  1022   WBC 11.8* 7.0   < > 5.6 Unsatisfactory specimen - clotted  [facility] IN ED, 09.29.15 1055 BY TD     HGB 14.0 12.2*   < > 13.7 Unsatisfactory specimen - clotted  [facility] IN ED, 09.29.15 1055 BY TD     MCV 99 97   < > 96 Unsatisfactory specimen - clotted  [facility] IN ED, 09.29.15 1055 BY TD      160   < > 209 Unsatisfactory specimen - clotted  [facility] IN ED, 09.29.15 1055 BY TD     INR  --   --   --  0.94 Unsatisfactory specimen - clotted    < > = values in this interval not displayed.      Recent Labs   Lab Test 19  1029 10/17/19  0527    138   POTASSIUM 3.7 3.8   CHLORIDE 106 105   BUN 17 18   CR 1.10 1.05     Recent Labs   Lab 19  1029   *     Recent Labs   Lab Test 19  1029 10/15/19  1415   ALT 38 46   AST 20 23     Troponin I ES   Date Value Ref Range Status   2019 0.135 () 0.000 - 0.045 ug/L Final     Comment:     The 99th percentile for upper reference range is 0.045 ug/L.  Troponin values   in the range of 0.045 - 0.120 ug/L may be associated with risks of adverse   clinical events.  Critical result, provider not notified due to previous critical result   notification.     2019 0.134 () 0.000 -  0.045 ug/L Final     Comment:     The 99th percentile for upper reference range is 0.045 ug/L.  Troponin values   in the range of 0.045 - 0.120 ug/L may be associated with risks of adverse   clinical events.  Critical Value called to and read back by  TATYANA CALDERON [place] [facility] [place] 2322 MV     11/12/2019 0.114 (H) 0.000 - 0.045 ug/L Final     Comment:     The 99th percentile for upper reference range is 0.045 ug/L.  Troponin values   in the range of 0.045 - 0.120 ug/L may be associated with risks of adverse   clinical events.     11/12/2019 0.172 (HH) 0.000 - 0.045 ug/L Final     Comment:     The 99th percentile for upper reference range is 0.045 ug/L.  Troponin values   in the range of 0.045 - 0.120 ug/L may be associated with risks of adverse   clinical events.  Critical Value called to and read back by  PARMINDER JOHNSON BY RP 11/12/19 1101     10/16/2019 0.156 (HH) 0.000 - 0.045 ug/L Final     Comment:     The 99th percentile for upper reference range is 0.045 ug/L.  Troponin values   in the range of 0.045 - 0.120 ug/L may be associated with risks of adverse   clinical events.  Critical result, provider not notified due to previous critical result   notification.           Recent Labs   Lab Test 11/12/19  1029 09/12/11  1100 09/12/11  0608   MAG 2.2 2.1 2.1       Lab Results   Component Value Date    CHOL 138 10/16/2019     Lab Results   Component Value Date    HDL 51 10/16/2019     Lab Results   Component Value Date    LDL 71 10/16/2019     Lab Results   Component Value Date    TRIG 78 10/16/2019     Lab Results   Component Value Date    CHOLHDLRATIO 2.6 06/17/2015          TSH   Date Value Ref Range Status   11/12/2019 4.22 (H) 0.40 - 4.00 mU/L Final       Devika Garcia MD, FACC  Cardiology

## 2019-11-15 ENCOUNTER — OFFICE VISIT (OUTPATIENT)
Dept: FAMILY MEDICINE | Facility: CLINIC | Age: 84
End: 2019-11-15
Payer: MEDICARE

## 2019-11-15 ENCOUNTER — PATIENT OUTREACH (OUTPATIENT)
Dept: CARE COORDINATION | Facility: CLINIC | Age: 84
End: 2019-11-15

## 2019-11-15 ENCOUNTER — TELEPHONE (OUTPATIENT)
Dept: CARDIOLOGY | Facility: CLINIC | Age: 84
End: 2019-11-15

## 2019-11-15 VITALS
RESPIRATION RATE: 16 BRPM | WEIGHT: 189 LBS | DIASTOLIC BLOOD PRESSURE: 58 MMHG | SYSTOLIC BLOOD PRESSURE: 100 MMHG | BODY MASS INDEX: 30.51 KG/M2 | OXYGEN SATURATION: 98 % | HEART RATE: 105 BPM

## 2019-11-15 DIAGNOSIS — Z71.89 OTHER SPECIFIED COUNSELING: ICD-10-CM

## 2019-11-15 DIAGNOSIS — I24.9 ACS (ACUTE CORONARY SYNDROME) (H): ICD-10-CM

## 2019-11-15 DIAGNOSIS — I25.119 CORONARY ARTERY DISEASE INVOLVING NATIVE CORONARY ARTERY OF NATIVE HEART WITH ANGINA PECTORIS (H): Primary | ICD-10-CM

## 2019-11-15 DIAGNOSIS — I10 HYPERTENSION GOAL BP (BLOOD PRESSURE) < 140/90: ICD-10-CM

## 2019-11-15 PROCEDURE — 99214 OFFICE O/P EST MOD 30 MIN: CPT | Performed by: FAMILY MEDICINE

## 2019-11-15 NOTE — TELEPHONE ENCOUNTER
Patient was evaluated by cardiology while inpatient for chest pain and SOB. PMH of CAD with ODIN placed in 2010 and 2013. 11/13/19: coronary angiogram showed no cardiac cause of symptoms. F/U with pulmonology regarding SOB. Writer attempted to call patient to discuss any post hospital d/c questions he may have, review medication changes, and confirm f/u appts, but no answer. VM left and pt instructed to call back with any medication questions, if experiencing any non emergent chest pain, increased SOB or lightheadedness. Instructed that LRA access site should be without signs of infection, swelling or drainage. RN left reminder with patient that he needs to schedule a cardiology RAYSA OV in 4 weeks. Scheduling phone numbers provided for [place] and [facility], as well as writer's phone number. Patient advised to call clinic with any cardiac related questions or concerns prior to this raysa't.  CODY Danielson RN.

## 2019-11-15 NOTE — DISCHARGE SUMMARY
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  No follow up labs or test are needed.    Follow up with your pulmonologist in 1-2 weeks.               Unresulted Labs Ordered in the Past 30 Days of this Admission     Date and Time Order Name Status Description    11/12/2019 1029 T4 free In process       These results will be followed up by nonw    Discharge Disposition   Discharged to home  Condition at discharge: Stable    Hospital Course   Dangelo Hernández is an 84 year old male with PMH significant for HTN, CAD s/p PCI in 2013, mantle cell lymphoma s/p autologous stem cell transplant, cryptogenic organizing pneumonia and candidal pneumonia who was admitted to [facility] as a direct admission from [facility] ER on 11/12/2019 with worsening dyspnea on exertion, episode of CP and palpitations. He was found to have troponin elevation and was concern for NSTEMI.      Troponin elevation - myocardial injury, NSTEMI/demand ischemia.  CAD s/p PCI 2013, and recent 10/2019 (ODIN to proximal LAD)  Mild-Moderate Aortic Stenosis  Bilateral carotid artery stenosis  Dyspnea on exertion  Worsening LEES, had an episode of chest pain on 11/9/19 lasting several minutes and radiating to bilateral upper arms, associated with SOB and palpitations which resolved with nitroglycerin SL. EKG  sinus tachycardia, LAD, without acute ischemic changes. CXR cardiomegaly and possible mild pulm edema. Trop 0.172 in ED. Pro-.  Recent angiogram (10/16/19) ODIN to pLAD and on DAPT. Ost RCA 99% stenosis, prox % . Rt PDA with collateral flow from mid LAD.   ECHO: EF 55-60%. Grade I or early diastolic dysfxn. No RWMA. Trace-mild MR/TR. Mild 1+ AR. Mild to mod valvular aortic stenosis.   B/l Carotid US 10/2019: 50-69% stenosis to bilateral ICA  - Started on heparin drip, Cardiology consulted, had angiogram, no new obstructing lesion per hand off (no report available)      - ASA, Statin, metoprolol XL and Plavix continued.    See below regarding LEES.    Mild Leukocytosis  Likely reactive, very mild.   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 Chronic cough. He had stem cell transplant in 10/2010, issues with pulmonary infiltrates and BAL with Candida glabrata in 07/2011, and unfortunate progression on VATS in 09/2011 that showed organizing pneumonia with negative tissue cultures, felt to have a , treated with high-dose steroids with a good clinical and radiological response, but unfortunate subsequent progression in 04/2012. A repeat BAL on 04/19/2012 showed filamentous fungus that was not able to be speciated further, and was treated with Voriconazole and prednisone.   -now with ongoing symptoms, followed by Dr Flanagan, had CT chest in august and October, has been on tapering dose of prednisone but continues to feel dyspneic on exertion. CXR this admission showed interstitial opacity, reported as mild pulm edema but probnp is not markedly elevated. Concern if this represents persistent , vs worsened dyspnea is due to decreased dose of prednisone.  - Resumed PTA prednisone which pt is on a wean.  - Pulmonary consulted, CT chest non contrast was obtained,  which was unchanged from recent one in October. No medications were changed per pulmonary. Recommended follow up with his pulmonologist Dr Flanagan.      Hypertension, treated  Hyperlipidemia  PTA losartan 25mg/d. Previously discontinued Toprol XL 25mg/d  - Resumed ARB, BB (restart, has been off) with hold parameters  - Resume statin as above    Mild Cognitive Impairment: Stable    Benign Prostatic Hypertrophy: Resume PTA tamsulosin 0.4mg/d    Consultations This Hospital Stay   CARDIAC REHAB IP CONSULT  PHARMACY TO DOSE HEPARIN  CARDIOLOGY IP CONSULT  PHARMACY IP CONSULT  PHARMACY IP CONSULT  PULMONARY IP CONSULT  SMOKING CESSATION PROGRAM IP CONSULT  SMOKING CESSATION PROGRAM IP CONSULT    Code Status   DNR/DNI    Time Spent on this Encounter   I, Celeste Nagy MD, personally saw the patient today and spent greater than 30 minutes discharging this patient.       Celeste Nagy MD  [facility]  ______________________________________________________________________    Physical Exam   Vital Signs: Temp: 97.7  F (36.5  C) Temp src: Oral BP: 108/65 Pulse: 90 Heart Rate: 87 Resp: 17 SpO2: 95 % O2 Device: None (Room air)    Weight: 189 lbs 12.8 oz     General: AAOx3, appears comfortable.  HEENT: PERRLA EOMI. Mucosa moist. Moon face.  Lungs: Bilateral equal air entry. Dim but clear to auscultation, normal work of breathing.   CVS: S1S2 regular, no tachycardia. Soft systolic murmur.   Abdomen: Soft, obese/distended, non tender. BS heard.  MSK: No edema or deformities.  Neuro: AAOX3. CN 2-12 normal. Strength symmetrical.  Skin: No rash.        Primary Care Physician   Dony Acuña    Discharge Orders      CARDIAC REHAB REFERRAL      Discharge Order: F/U with Cardiac  RAYSA      Reason for your hospital stay    Dyspnea, unclear cause.     Follow-up and recommended labs and tests     Follow up with primary care provider, Dony Acuña, within 7 days for hospital follow- up.  No follow up labs or test are  needed.    Follow up with your pulmonologist in 1-2 weeks.     Activity    Your activity upon discharge: activity as tolerated     DNR/DNI     Diet    Follow this diet upon discharge: Orders Placed This Encounter      Low Saturated Fat Na <2400 mg       Significant Results and Procedures   Most Recent 3 CBC's:  Recent Labs   Lab Test 11/12/19  1029 10/17/19  0527 10/15/19  2034   WBC 11.8* 7.0 8.3   HGB 14.0 12.2* 13.9   MCV 99 97 97    160 188     Most Recent 3 BMP's:  Recent Labs   Lab Test 11/12/19  1029 10/17/19  0527 10/15/19  1415    138 140   POTASSIUM 3.7 3.8 4.3   CHLORIDE 106 105 107   CO2 29 26 25   BUN 17 18 24   CR 1.10 1.05 1.09   ANIONGAP 5 7 8   CHAVEZ 9.1 8.6 9.2   * 114* 147*     Most Recent 2 LFT's:  Recent Labs   Lab Test 11/12/19  1029 10/15/19  1415   AST 20 23   ALT 38 46   ALKPHOS 72 71   BILITOTAL 0.5 0.5     Most Recent 3 INR's:  Recent Labs   Lab Test 09/29/15  1115 09/29/15  1022 06/10/13  0655   INR 0.94 Unsatisfactory specimen - clotted 0.97     Most Recent TSH and T4:  Recent Labs   Lab Test 11/12/19  1029   TSH 4.22*   T4 1.16   ,   Results for orders placed or performed during the hospital encounter of 11/12/19   CT Chest w/o Contrast    Narrative    CT CHEST WITHOUT CONTRAST 11/14/2019 10:54 AM     HISTORY: Interstitial lung disease; progression of dyspnea, now  tapering prednisone. Compare to CT scans since 5/2019.    COMPARISON: October 22, 2019    TECHNIQUE: Volumetric helical acquisition of CT images of the chest  from the clavicles to the kidneys were acquired without IV contrast.  Radiation dose for this scan was reduced using automated exposure  control, adjustment of the mA and/or kV according to patient size, or  iterative reconstruction technique.    FINDINGS: Nonspecific interstitial and groundglass changes in the  central aspects of both upper lobes and the periphery of both lower  lobes right worse than left noted. No pleural or pericardial  effusion.  There are extensive coronary vascular calcifications and/or stents  consistent with coronary artery disease. No acute findings in the  visualized upper abdomen.      Impression    IMPRESSION: No significant change in interstitial and groundglass  changes in both upper lobes and lower lobes since the comparison  study, these remain nonspecific.    NARCISA LUCERO MD       Discharge Medications   Discharge Medication List as of 11/14/2019  2:53 PM      CONTINUE these medications which have NOT CHANGED    Details   acetaminophen (TYLENOL 8 HOUR ARTHRITIS PAIN) 650 MG CR tablet Take 1,300 mg by mouth 2 times daily, Historical      aspirin (ASA) 81 MG EC tablet Take 1 tablet (81 mg) by mouth daily, Disp-30 tablet, R-0, E-PrescribeFuture refills by PCP Dr. Dony Acuña with phone number [phone].      atorvastatin (LIPITOR) 80 MG tablet Take 1 tablet (80 mg) by mouth daily, Disp-90 tablet, R-3, E-Prescribe      azelastine (ASTELIN) 0.1 % nasal spray Spray 1 spray into both nostrils 2 times daily, Disp-3 Bottle, R-3, E-Prescribe      clopidogrel (PLAVIX) 75 MG tablet Take 1 tablet (75 mg) by mouth daily First loading dose is 600 mg (8 tablets), then once daily, Disp-90 tablet, R-3, E-Prescribe      losartan (COZAAR) 25 MG tablet Take 1 tablet (25 mg) by mouth daily, Disp-90 tablet, R-3, E-Prescribe      metoprolol succinate ER (TOPROL-XL) 25 MG 24 hr tablet Take 1 tablet (25 mg) by mouth daily, Disp-90 tablet, R-3, E-Prescribe      MULTI VITAMIN MENS OR Take 1 tablet by mouth daily , Historical      nystatin (MYCOSTATIN) 319311 UNIT/ML suspension Take 5 mLs (500,000 Units) by mouth 4 times dailyDisp-400 mL, [id number]      predniSONE (DELTASONE) 20 MG tablet Take 5 mg by mouth daily Weaning off, Historical      Specialty Vitamins Products (PROSTATE PO) Take 1 tablet by mouth 2 times daily Super Beta Prostate, Historical      tamsulosin (FLOMAX) 0.4 MG capsule Take 1 capsule (0.4 mg) by mouth daily,  Disp-90 capsule, R-3, E-Prescribe         STOP taking these medications       azithromycin (ZITHROMAX Z-TREY) 250 MG tablet Comments:   Reason for Stopping:             Allergies   Allergies   Allergen Reactions     Amoxicillin Diarrhea            Lisinopril Cough

## 2019-11-15 NOTE — PATIENT INSTRUCTIONS
Our Clinic hours are:  Mondays    7:20 am - 7 pm  Tues -  Fri  7:20 am - 5 pm    Clinic Phone: [phone]    The clinic lab opens at 7:30 am Mon - Fri and appointments are required.    [facility]. [phone]  Monday  8 am - 7pm  Tues - Fri 8 am - 5:30 pm

## 2019-11-15 NOTE — PROGRESS NOTES
Clinic Care Coordination Contact    Situation: Patient chart reviewed by care coordinator.    Background: 84 year old male that was inpatient in October with NSTEMI and again from 11/12/19 to 11/14/19 with dyspnea and acute coronary syndrome.     Assessment: patient was seen today by PCP accompanied by one of his daughters. Patient has also been contacted by cardiology nurse for follow up.      Patient's focus is to get to [place] within the next week.     Plan/Recommendations: Will not open to clinic care coordination at this time.     Paris Badillo RN, [facility] - Primary Care Clinic RN Coordinator  [facility]   11/15/2019    2:38 PM  [phone]

## 2019-11-15 NOTE — PROGRESS NOTES
Subjective     Dangelo Hernández is a 84 year old male who presents to clinic today for the following health issues:    HPI       Hospital Follow-up Visit:    Hospital/Nursing Home/IP Rehab Facility: [facility]  Date of Admission: 11/12/19  Date of Discharge: 11/14/19  Reason(s) for Admission: Shortness of breath             Problems taking medications regularly:  None       Medication changes since discharge: None       Problems adhering to non-medication therapy:  None    Summary of hospitalization:  [facility] discharge summary reviewed  Diagnostic Tests/Treatments reviewed.  Follow up needed: none  Other Healthcare Providers Involved in Patient s Care:         None  Update since discharge: improved.     Post Discharge Medication Reconciliation: .  Plan of care communicated with      Coding guidelines for this visit:  Type of Medical   Decision Making Face-to-Face Visit       within 7 Days of discharge Face-to-Face Visit        within 14 days of discharge   Moderate Complexity [id number]   High Complexity [id number]                    Reviewed and updated as needed this visit by Provider         Review of Systems         Objective    There were no vitals taken for this visit.  There is no height or weight on file to calculate BMI.  Physical Exam               Further history obtained, clarified or corrected by physician:  He was hospitalized at [facility].  Discharge summary says he had non-STEMI.  He says he does not know what was found.  He says he still feels poorly.  He is unable to describe his symptoms other than they develop with almost any exertion.  He denies chest pain and he denies significant shortness of breath though on further questioning says with certain activities he does feel short of breath.  He seems to also developed some tachycardia which is bothersome to him because apparently he can feel that.  He is wondering about something to make him feel better.  He thinks  perhaps he is has allergies and needs to be tested for that.  He thinks he needs a penicillin shot because that has always made him well in the past.  He is planning to move to [place] as early as next week.    OBJECTIVE:  /58   Pulse 105   Resp 16   Wt 85.7 kg (189 lb)   SpO2 98%   BMI 30.51 kg/m    LUNGS: clear to auscultation, normal breath sounds  CV: RRR without murmur  ABD: BS+, soft, nontender, no masses, no hepatosplenomegaly  EXTREMITIES: without joint tenderness, swelling or erythema.  No muscle tenderness or abnormality.  SKIN: No rashes or abnormalities  NEURO:non focal exam    ASSESSMENT:     Coronary artery disease involving native coronary artery of native heart with angina pectoris (H)  ACS (acute coronary syndrome) (H)  Hypertension goal BP (blood pressure) < 140/90    PLAN:  I had a long discussion with him and his daughter trying to explain to him his cardiovascular system problems.  I explained that there may be some level of heart failure and I also explained that recent MI requires some time for recovery and that cardiac rehab is his best strategy at this point.  He is really not understanding that there is not a quick fix to his issues.  He really wants to go down to [place] next week and I told him that would be fine but that he needs to get plugged into a clinic and to cardiac rehab when he is down there.  I also explained to him about being very careful and/or avoiding significant exertion.

## 2019-11-18 ENCOUNTER — HOSPITAL ENCOUNTER (OUTPATIENT)
Dept: CARDIAC REHAB | Facility: CLINIC | Age: 84
End: 2019-11-18
Attending: INTERNAL MEDICINE
Payer: MEDICARE

## 2019-11-18 PROCEDURE — 40000116 ZZH STATISTIC OP CR VISIT

## 2019-11-18 PROCEDURE — 93798 PHYS/QHP OP CAR RHAB W/ECG: CPT

## 2019-11-18 NOTE — PROGRESS NOTES
Cardiac Rehab Discharge Summary    Reason for discharge:    Pt is moving to [place] on Wed. Nov. 20, 2019. Pt plans on participating in Phase 2 cardiac rehab there.     Progress towards goals:  Goals partially met.  Barriers to achieving goals:   discharge from facility.    Recommendation(s):    Continued therapy is recommended.  Rationale/Recommendations:  Pt plans on participating in cardiac rehab in [place]. .     Pt signed a release of information to take records to [place] for Phase 2 cardiac rehab.    Pt was hospitalized from 11/12/19 to 11/14/19 at [facility] and was told he had an MI.  Angio performed on 11/13/19, no intervention.  Pt continues to feel SOB and has a slightly elevated resting HR (low 100's).  Pt was seen by Dr. Acuña on 11/15/19 and is aware of his plans to continue cardiac rehab in [place].     Physician cosignature/electronic signature indicates agreements with the ITP document and approval of discharge.    Zulma Brizuela MA, [facility]  Cardiac Rehab

## 2020-05-06 DIAGNOSIS — C83.10 MANTLE CELL LYMPHOMA, UNSPECIFIED BODY REGION (H): ICD-10-CM

## 2020-05-06 DIAGNOSIS — C83.10 MANTLE CELL LYMPHOMA, UNSPECIFIED BODY REGION (H): Primary | ICD-10-CM

## 2020-05-06 LAB
ALBUMIN SERPL-MCNC: 3.7 G/DL (ref 3.4–5)
ALP SERPL-CCNC: 112 U/L (ref 40–150)
ALT SERPL W P-5'-P-CCNC: 27 U/L (ref 0–70)
ANION GAP SERPL CALCULATED.3IONS-SCNC: 6 MMOL/L (ref 3–14)
AST SERPL W P-5'-P-CCNC: 22 U/L (ref 0–45)
BASOPHILS # BLD AUTO: 0 10E9/L (ref 0–0.2)
BASOPHILS NFR BLD AUTO: 0.5 %
BILIRUB SERPL-MCNC: 0.3 MG/DL (ref 0.2–1.3)
BUN SERPL-MCNC: 16 MG/DL (ref 7–30)
CALCIUM SERPL-MCNC: 8.9 MG/DL (ref 8.5–10.1)
CHLORIDE SERPL-SCNC: 106 MMOL/L (ref 94–109)
CO2 SERPL-SCNC: 25 MMOL/L (ref 20–32)
CREAT SERPL-MCNC: 1.05 MG/DL (ref 0.66–1.25)
DIFFERENTIAL METHOD BLD: ABNORMAL
EOSINOPHIL # BLD AUTO: 0.4 10E9/L (ref 0–0.7)
EOSINOPHIL NFR BLD AUTO: 5.8 %
ERYTHROCYTE [DISTWIDTH] IN BLOOD BY AUTOMATED COUNT: 14.7 % (ref 10–15)
GFR SERPL CREATININE-BSD FRML MDRD: 65 ML/MIN/{1.73_M2}
GLUCOSE SERPL-MCNC: 89 MG/DL (ref 70–99)
HCT VFR BLD AUTO: 36.1 % (ref 40–53)
HGB BLD-MCNC: 12.2 G/DL (ref 13.3–17.7)
LYMPHOCYTES # BLD AUTO: 1.6 10E9/L (ref 0.8–5.3)
LYMPHOCYTES NFR BLD AUTO: 25.4 %
MCH RBC QN AUTO: 30.7 PG (ref 26.5–33)
MCHC RBC AUTO-ENTMCNC: 33.8 G/DL (ref 31.5–36.5)
MCV RBC AUTO: 91 FL (ref 78–100)
MONOCYTES # BLD AUTO: 0.6 10E9/L (ref 0–1.3)
MONOCYTES NFR BLD AUTO: 9.4 %
NEUTROPHILS # BLD AUTO: 3.7 10E9/L (ref 1.6–8.3)
NEUTROPHILS NFR BLD AUTO: 58.9 %
PLATELET # BLD AUTO: 264 10E9/L (ref 150–450)
POTASSIUM SERPL-SCNC: 3.9 MMOL/L (ref 3.4–5.3)
PROT SERPL-MCNC: 7 G/DL (ref 6.8–8.8)
RBC # BLD AUTO: 3.97 10E12/L (ref 4.4–5.9)
SODIUM SERPL-SCNC: 137 MMOL/L (ref 133–144)
WBC # BLD AUTO: 6.3 10E9/L (ref 4–11)

## 2020-05-06 PROCEDURE — 80053 COMPREHEN METABOLIC PANEL: CPT | Performed by: INTERNAL MEDICINE

## 2020-05-06 PROCEDURE — 85025 COMPLETE CBC W/AUTO DIFF WBC: CPT | Performed by: INTERNAL MEDICINE

## 2020-05-06 PROCEDURE — 36415 COLL VENOUS BLD VENIPUNCTURE: CPT | Performed by: INTERNAL MEDICINE

## 2020-05-07 ENCOUNTER — VIRTUAL VISIT (OUTPATIENT)
Dept: TRANSPLANT | Facility: CLINIC | Age: 85
End: 2020-05-07
Attending: INTERNAL MEDICINE
Payer: MEDICARE

## 2020-05-07 DIAGNOSIS — Z94.81 STATUS POST AUTOLOGOUS BONE MARROW TRANSPLANT (H): Primary | ICD-10-CM

## 2020-05-07 NOTE — PROGRESS NOTES
"Dangelo Hernández is a 84 year old male who is being evaluated via a billable telephone visit.      The patient has been notified of following:     \"This telephone visit will be conducted via a call between you and your physician/provider. We have found that certain health care needs can be provided without the need for a physical exam.  This service lets us provide the care you need with a short phone conversation.  If a prescription is necessary we can send it directly to your pharmacy.  If lab work is needed we can place an order for that and you can then stop by our lab to have the test done at a later time.    Telephone visits are billed at different rates depending on your insurance coverage. During this emergency period, for some insurers they may be billed the same as an in-person visit.  Please reach out to your insurance provider with any questions.    If during the course of the call the physician/provider feels a telephone visit is not appropriate, you will not be charged for this service.\"    Patient has given verbal consent for Telephone visit?  Yes    What phone number would you like to be contacted at? [phone]    How would you like to obtain your AVS? None    I have reviewed and updated the patient's allergies and medication list.     Concerns: arteries cleaned out by his heart and 3 or 4 stents put in while he was in [place]    Luz Belle LPN        BMT Telephone Visit  May 7, 2020    Disease and Treatment History:   1. Mantle cell lymphoma, status R-CHOP X 4 then status post autologous stem cell transplant in 10/2010 in ongoing CR  2.  Significant pulmonary issues:  --pulmonary infiltrates and BAL with Candida glabrata in 07/2011  - progressive infiltrates with VATS in 09/2011 that showed organizing pneumonia with negative tissue cultures, who was felt to have a cryptogenic organizing pneumonia.   - Treated with high-dose steroids from 10/2011 with tapering through 03/2012 with a good " clinical and radiological response,  -- Subsequent relapse/progression 04/2012.  A repeat BAL on 04/19/2012 showed filamentous fungus that was not able to be speciated further, and he saw Pulmonary in 05/2012, and at that point was started back on Voriconazole on prednisone.   -- Long prednisone taper: CT imaging on 6/21/13 showed worsened pulmonary infiltrates and he underwent bronchoscopy which initially showed filamentous fungus but then grew out as Raquel Dubliniensis/Albicans. Treated with v-fend/fluconazole combination  -- Summer 2013: stress test that showed abnormalities and led to a cardiac catheterization with the placement of 3 stents.   -- Pulmonary evaluation August 2013 and CT scans showed some GGO and the question of pulmonary edema was raised given the recent cardiac issues. Thus, he was monitored without steroids  -- 10/2013 Pulmonary: Stable 9/2013 CT changes and observed due to no major symptoms     Current HPI: I last saw him a year ago. Doing a phone visit today due to COVID. Since I last saw Dangelo he notes he had a heart stent back in 10/2019 after being more SOB. He had another angiogram in [place] and got 3-4 more stents. He feels better after the stents but still having some breathing issues. He is stable able to walk to the end of his driveway which is about 0.8 miles away and work in his shop. He is off prednisone now for the last couple months. He is scheduled to see Dr. Flanagan via phone 5/12/2020. He notes he is overall doing pretty well. No chest pain, no GI issues.        REVIEW OF SYSTEMS:  A 10-point review of systems is otherwise negative.           Labs:  Results for DANGELO DELONG (MRN [id number]) as of 5/7/2020 14:01   Ref. Range 5/6/2020 15:26   Sodium Latest Ref Range: 133 - 144 mmol/L 137   Potassium Latest Ref Range: 3.4 - 5.3 mmol/L 3.9   Chloride Latest Ref Range: 94 - 109 mmol/L 106   Carbon Dioxide Latest Ref Range: 20 - 32 mmol/L 25   Urea Nitrogen Latest Ref  Range: 7 - 30 mg/dL 16   Creatinine Latest Ref Range: 0.66 - 1.25 mg/dL 1.05   GFR Estimate Latest Ref Range: >60 mL/min/1.73_m2 65   GFR Estimate If Black Latest Ref Range: >60 mL/min/1.73_m2 76   Calcium Latest Ref Range: 8.5 - 10.1 mg/dL 8.9   Anion Gap Latest Ref Range: 3 - 14 mmol/L 6   Albumin Latest Ref Range: 3.4 - 5.0 g/dL 3.7   Protein Total Latest Ref Range: 6.8 - 8.8 g/dL 7.0   Bilirubin Total Latest Ref Range: 0.2 - 1.3 mg/dL 0.3   Alkaline Phosphatase Latest Ref Range: 40 - 150 U/L 112   ALT Latest Ref Range: 0 - 70 U/L 27   AST Latest Ref Range: 0 - 45 U/L 22   Glucose Latest Ref Range: 70 - 99 mg/dL 89   WBC Latest Ref Range: 4.0 - 11.0 10e9/L 6.3   Hemoglobin Latest Ref Range: 13.3 - 17.7 g/dL 12.2 (L)   Hematocrit Latest Ref Range: 40.0 - 53.0 % 36.1 (L)   Platelet Count Latest Ref Range: 150 - 450 10e9/L 264   RBC Count Latest Ref Range: 4.4 - 5.9 10e12/L 3.97 (L)   MCV Latest Ref Range: 78 - 100 fl 91   MCH Latest Ref Range: 26.5 - 33.0 pg 30.7   MCHC Latest Ref Range: 31.5 - 36.5 g/dL 33.8   RDW Latest Ref Range: 10.0 - 15.0 % 14.7   Diff Method Unknown Automated Method   % Neutrophils Latest Units: % 58.9   % Lymphocytes Latest Units: % 25.4   % Monocytes Latest Units: % 9.4   % Eosinophils Latest Units: % 5.8   % Basophils Latest Units: % 0.5   Absolute Neutrophil Latest Ref Range: 1.6 - 8.3 10e9/L 3.7   Absolute Lymphocytes Latest Ref Range: 0.8 - 5.3 10e9/L 1.6   Absolute Monocytes Latest Ref Range: 0.0 - 1.3 10e9/L 0.6   Absolute Eosinophils Latest Ref Range: 0.0 - 0.7 10e9/L 0.4   Absolute Basophils Latest Ref Range: 0.0 - 0.2 10e9/L 0.0        ASSESSMENT AND PLAN:  Mr. Hernández is a very pleasant 84-year-old gentleman with mantle cell lymphoma in ongoing remission, now nearing 10 years out from an autologous stem cell transplant.      1.  Mantle cell lymphoma.  He remains in an ongoing complete remission by clinical evaluation. Continue visits at 1 year. As he initially presented with  elevated WBC with circulating mantle cells I think that clinical exam and CBC is adequate for following this far out from his initial transplant and deferring imaging. Will repeat labs in 1 year with visit with me again     2.  Pulmonary.  Cryptogenic organizing pneumonia followed by pulmonary     3.  Infectious disease.  No active infections. Encouraged him to get his flu shot in the fall  -- s/p 2 year vaccines 4/2013 and got MMR in 9/2013. Did get shingles shot in 2014 as well     4.  Cardiology.  current metoprolol, lipid lower drug. Recent multiple stent placement and doing better     5.  Hematology.  His counts are stable.      6. Renal: Normal function     7. Ortho: Ongoing back pain: Encouraged him to go see the doc he saw last year about another injection which he thought helped some         Call Start Time: 2:02 pm  Call End Time:  2:13  Total Time: 11 minutes     Final Plan:  - Oren bmt and labs in 1 year    Abida Lott MD

## 2020-05-12 ENCOUNTER — VIRTUAL VISIT (OUTPATIENT)
Dept: PULMONOLOGY | Facility: CLINIC | Age: 85
End: 2020-05-12
Attending: INTERNAL MEDICINE
Payer: MEDICARE

## 2020-05-12 DIAGNOSIS — R05.9 COUGH: Primary | ICD-10-CM

## 2020-05-12 RX ORDER — AZITHROMYCIN 250 MG/1
250 TABLET, FILM COATED ORAL DAILY
Qty: 7 TABLET | Refills: 1 | Status: SHIPPED | OUTPATIENT
Start: 2020-05-12 | End: 2020-09-01

## 2020-05-12 NOTE — PROGRESS NOTES
"Dangelo Hernández is a 84 year old male who is being evaluated via a billable telephone visit.      The patient has been notified of following:     \"This telephone visit will be conducted via a call between you and your physician/provider. We have found that certain health care needs can be provided without the need for a physical exam.  This service lets us provide the care you need with a short phone conversation.  If a prescription is necessary we can send it directly to your pharmacy.  If lab work is needed we can place an order for that and you can then stop by our lab to have the test done at a later time.    Telephone visits are billed at different rates depending on your insurance coverage. During this emergency period, for some insurers they may be billed the same as an in-person visit.  Please reach out to your insurance provider with any questions.    If during the course of the call the physician/provider feels a telephone visit is not appropriate, you will not be charged for this service.\"    Patient has given verbal consent for Telephone visit? Yes    What phone number would you like to be contacted at?     How would you like to obtain your AVS?       Mr. 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 At the completion of therapy, he continued to be SOB and a subsequent VATS biopsy revealed , therapy with steroids was initiated for 6 months and a few months after completion of therapy his clinical and radiographic findings had relapsed and he was re-initiated on steroids and V-Fend for filamentous fungi on a BAL.    Last seen in clinic 6 months ago.  At that time he had been treated with prednisone for presumed relapse of his  but did not have a significant improvement in his symptoms of SOB and LEES with the " re-initiation of steroids (although he did have recurrence of his GGO that significantly improved with the use of prednisone).  Prior to that visit he underwent re-evaluation of his cardiac disease and was found to have an increase in troponin, underwent cardiac cath the PTCA of LAD and placement of stent. Was feeling somewhat better after that intervention.  Review of his CT in 10-19 did not show a significant change compared to 8-19.  Follow-up CT on 11-14-19 did not show any significant difference. Since his last clinic visit he underwent further cardiac stent placements in Jan/Feb.  Weaned completely off prednisone by February.  Since that procedure he has been feeling somewhat better, less SOB.  Is able to walk to mailbox and back.  Not much cough.  Was planning to participate in pulmonary rehab but pandemic has forced closure of facilities.        Assessment and Plan:  84 year old male with a past medical history significant for mantle cell lymphoma s/p autologous PBSCT, HTN, and relapsed . Had remained off prednisone for over one year.  Also with previous history of fungal pneumonia.  Re-presented to clinic with recurrence of symptoms for past 1-1.5 years and CXR changes with decrease in PFT's.  Overall concerning for recurrence of infection or , with duration of symptoms suspected this was a recurrence of the .  Bronchoscopy with BAL performed and all cultures were negative; started on prednisone 40 mg.  CT chest improved while on 40 mg of prednisone but leveled off with decrease to 20 mg; symptoms never fully improved on higher or lower dosages of prednisone.  Recently found to ischemic cardiac changes- s/p stent but has not felt significantly better after that procedure.  Unclear if had ongoing cardiac ischemia, not fully treated  or another underlying interstitial lung disease.  Some component of deconditioning is likely present- awaiting cardiac rehab.  Underwent further cardiac stenting with  an improvement in his symptoms.  Likely component of deconditioning at present.  Unlikely  is playing a role at present.     1. Maintain off prednisone  2. Follow-up with Cardiologist  3. Plan for CardoPulmonary rehab once facility re-opens after pandemic  4. Wants to re-try Azithromycin- plan for 250 mg per day for 7 days, one refill provided     RTC in 3 months.  Asked him to contact clinic if develops increased SOB or cough while in [place].       Phone call duration: 18 minutes

## 2020-07-08 ENCOUNTER — TELEPHONE (OUTPATIENT)
Dept: FAMILY MEDICINE | Facility: CLINIC | Age: 85
End: 2020-07-08

## 2020-07-08 DIAGNOSIS — B37.0 THRUSH: ICD-10-CM

## 2020-07-08 RX ORDER — NYSTATIN 100000/ML
500000 SUSPENSION, ORAL (FINAL DOSE FORM) ORAL 4 TIMES DAILY
Qty: 400 ML | Refills: 0 | Status: SHIPPED | OUTPATIENT
Start: 2020-07-08 | End: 2020-07-20

## 2020-07-08 NOTE — TELEPHONE ENCOUNTER
Patient has prescription for nystatin but he left the bottle in [place].  He is asking for a refill.  He gets raw feeling throat and was diagnosed with thrush last fall.  Feels like the same thing.  Please advise on refill.    Ele Israel RN

## 2020-07-08 NOTE — TELEPHONE ENCOUNTER
"Reason for call:  Patient reporting a symptom    Symptom or request: Pt would like a refill on the \"Liquid medicine for my throat.\"  Bob Feliciano  Please call patient and advise.      Duration (how long have symptoms been present): ongoing    Have you been treated for this before? Yes    Additional comments:     Phone Number patient can be reached at:  Home number on file [phone] (home)    Best Time:  any    Can we leave a detailed message on this number:  YES    Call taken on 7/8/2020 at 9:24 AM by Maria L Rossi    "

## 2020-07-17 DIAGNOSIS — B37.0 THRUSH: ICD-10-CM

## 2020-07-17 NOTE — TELEPHONE ENCOUNTER
Requested Prescriptions   Pending Prescriptions Disp Refills     nystatin (MYCOSTATIN) 693098 UNIT/ML suspension [Pharmacy Med Name: NYSTATIN ORAL SUSP [id number]/ML] 400 mL 0     Sig: TAKE 5ML( 500,00) BY MOUTH FOUR TIMES DAILY       There is no refill protocol information for this order

## 2020-07-17 NOTE — TELEPHONE ENCOUNTER
Routing refill request to provider for review/approval because:  Drug not on the [facility] refill protocol   Last sent:  nystatin (MYCOSTATIN) [id number] UNIT/ML suspension  400 mL  0  7/8/2020   No    Sig - Route: Take 5 mLs (500,000 Units) by mouth 4 times daily - Oral       JOSUE MacielN, RN

## 2020-07-20 RX ORDER — NYSTATIN 100000/ML
SUSPENSION, ORAL (FINAL DOSE FORM) ORAL
Qty: 400 ML | Refills: 0 | Status: SHIPPED | OUTPATIENT
Start: 2020-07-20 | End: 2022-06-03 | Stop reason: ALTCHOICE

## 2020-07-23 ENCOUNTER — TELEPHONE (OUTPATIENT)
Dept: PULMONOLOGY | Facility: CLINIC | Age: 85
End: 2020-07-23

## 2020-08-31 DIAGNOSIS — I25.119 CORONARY ARTERY DISEASE INVOLVING NATIVE CORONARY ARTERY OF NATIVE HEART WITH ANGINA PECTORIS (H): ICD-10-CM

## 2020-08-31 DIAGNOSIS — N40.1 BENIGN PROSTATIC HYPERPLASIA WITH LOWER URINARY TRACT SYMPTOMS, SYMPTOM DETAILS UNSPECIFIED: ICD-10-CM

## 2020-08-31 RX ORDER — ATORVASTATIN CALCIUM 80 MG/1
80 TABLET, FILM COATED ORAL DAILY
Qty: 90 TABLET | Refills: 0 | Status: SHIPPED | OUTPATIENT
Start: 2020-08-31 | End: 2020-12-02

## 2020-08-31 RX ORDER — TAMSULOSIN HYDROCHLORIDE 0.4 MG/1
0.4 CAPSULE ORAL DAILY
Qty: 90 CAPSULE | Refills: 0 | Status: SHIPPED | OUTPATIENT
Start: 2020-08-31 | End: 2022-06-03

## 2020-08-31 NOTE — TELEPHONE ENCOUNTER
"Requested Prescriptions   Pending Prescriptions Disp Refills     tamsulosin (FLOMAX) 0.4 MG capsule 90 capsule 3     Sig: Take 1 capsule (0.4 mg) by mouth daily       Alpha Blockers Passed - 8/31/2020 10:34 AM        Passed - Blood pressure under 140/90 in past 12 months     BP Readings from Last 3 Encounters:   11/15/19 100/58   11/14/19 108/65   11/12/19 118/79                 Passed - Recent (12 mo) or future (30 days) visit within the authorizing provider's specialty     Patient has had an office visit with the authorizing provider or a provider within the authorizing providers department within the previous 12 mos or has a future within next 30 days. See \"Patient Info\" tab in inbasket, or \"Choose Columns\" in Meds & Orders section of the refill encounter.              Passed - Patient does not have Tadalafil, Vardenafil, or Sildenafil on their medication list        Passed - Medication is active on med list        Passed - Patient is 18 years of age or older           atorvastatin (LIPITOR) 80 MG tablet 90 tablet 3     Sig: Take 1 tablet (80 mg) by mouth daily       Statins Protocol Passed - 8/31/2020 10:34 AM        Passed - LDL on file in past 12 months     Recent Labs   Lab Test 10/16/19  1704   LDL 71             Passed - No abnormal creatine kinase in past 12 months     No lab results found.             Passed - Recent (12 mo) or future (30 days) visit within the authorizing provider's specialty     Patient has had an office visit with the authorizing provider or a provider within the authorizing providers department within the previous 12 mos or has a future within next 30 days. See \"Patient Info\" tab in inbasket, or \"Choose Columns\" in Meds & Orders section of the refill encounter.              Passed - Medication is active on med list        Passed - Patient is age 18 or older             "

## 2020-09-01 ENCOUNTER — VIRTUAL VISIT (OUTPATIENT)
Dept: PULMONOLOGY | Facility: CLINIC | Age: 85
End: 2020-09-01
Attending: INTERNAL MEDICINE
Payer: MEDICARE

## 2020-09-01 DIAGNOSIS — R05.9 COUGH: ICD-10-CM

## 2020-09-01 RX ORDER — AZITHROMYCIN 250 MG/1
250 TABLET, FILM COATED ORAL DAILY
Qty: 30 TABLET | Refills: 1 | Status: SHIPPED | OUTPATIENT
Start: 2020-09-01 | End: 2021-03-29

## 2020-09-01 RX ORDER — AZITHROMYCIN 250 MG/1
250 TABLET, FILM COATED ORAL DAILY
Qty: 7 TABLET | Refills: 1 | Status: SHIPPED | OUTPATIENT
Start: 2020-09-01 | End: 2020-09-01

## 2020-09-01 NOTE — LETTER
"    9/1/2020         RE: Dangelo Hernández  [address] [facility] [address]        Dear Colleague,    Thank you for referring your patient, Dangelo Hernández, to the [facility]. Please see a copy of my visit note below.    Dangelo Hernández is a 85 year old male who is being evaluated via a billable telephone visit.      The patient has been notified of following:     \"This telephone visit will be conducted via a call between you and your physician/provider. We have found that certain health care needs can be provided without the need for a physical exam.  This service lets us provide the care you need with a short phone conversation.  If a prescription is necessary we can send it directly to your pharmacy.  If lab work is needed we can place an order for that and you can then stop by our lab to have the test done at a later time.    Telephone visits are billed at different rates depending on your insurance coverage. During this emergency period, for some insurers they may be billed the same as an in-person visit.  Please reach out to your insurance provider with any questions.    If during the course of the call the physician/provider feels a telephone visit is not appropriate, you will not be charged for this service.\"    Patient has given verbal consent for Telephone visit?  Yes    What phone number would you like to be contacted at? [phone]     How would you like to obtain your AVS? Mail a copy     Mr. 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 At the completion of therapy, he continued to be SOB and a subsequent VATS biopsy revealed , therapy with steroids was initiated for 6 months and a few months after completion of therapy his clinical and radiographic " findings had relapsed and he was re-initiated on steroids and V-Fend for filamentous fungi on a BAL.    Last seen 3 months ago. Since his last visit he has been doing ok. Breathing is the same- able to mow lawn with riding lawn  (takes 3 hours, large lawn), was cutting down trees earlier today.  Not much cough or sputum production. Does use azithromycin if he develops increased cough.  Is planning to travel to [place] on October 14th.      Assessment and Plan:  85 year old male with a past medical history significant for mantle cell lymphoma s/p autologous PBSCT, HTN, and relapsed . Had remained off prednisone for over one year.  Also with previous history of fungal pneumonia.  Re-presented to clinic with recurrence of symptoms for past 1-1.5 years and CXR changes with decrease in PFT's.  Overall concerning for recurrence of infection or , with duration of symptoms suspected this was a recurrence of the .  Bronchoscopy with BAL performed and all cultures were negative; started on prednisone 40 mg.  CT chest improved while on 40 mg of prednisone but leveled off with decrease to 20 mg; symptoms never fully improved on higher or lower dosages of prednisone.  Recently found to ischemic cardiac changes- s/p stent but has not felt significantly better after that procedure.  Unclear if had ongoing cardiac ischemia, not fully treated  or another underlying interstitial lung disease.  Some component of deconditioning is likely present- awaiting cardiac rehab.  Underwent further cardiac stenting with an improvement in his symptoms.  Likely component of deconditioning at present.  Unlikely  is playing a role at present, has been maintained off prednisone for several months.     1. Maintain off prednisone  2. Follow-up with Cardiologist  3. Plan for CardoPulmonary rehab once facility re-opens after pandemic- sounds like no places are open  4. Can start Azithromycin if develops increased cough or sputum  production; generally resolves after 7 days       RTC after returns from [place].  Asked him to contact clinic if develops increased SOB or cough        Phone call duration: 16 minutes            Again, thank you for allowing me to participate in the care of your patient.        Sincerely,        Rajendra Flanagan MD

## 2020-09-01 NOTE — PROGRESS NOTES
"Dangelo Hernández is a 85 year old male who is being evaluated via a billable telephone visit.      The patient has been notified of following:     \"This telephone visit will be conducted via a call between you and your physician/provider. We have found that certain health care needs can be provided without the need for a physical exam.  This service lets us provide the care you need with a short phone conversation.  If a prescription is necessary we can send it directly to your pharmacy.  If lab work is needed we can place an order for that and you can then stop by our lab to have the test done at a later time.    Telephone visits are billed at different rates depending on your insurance coverage. During this emergency period, for some insurers they may be billed the same as an in-person visit.  Please reach out to your insurance provider with any questions.    If during the course of the call the physician/provider feels a telephone visit is not appropriate, you will not be charged for this service.\"    Patient has given verbal consent for Telephone visit?  Yes    What phone number would you like to be contacted at? [phone]     How would you like to obtain your AVS? Mail a copy     Mr. 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 At the completion of therapy, he continued to be SOB and a subsequent VATS biopsy revealed , therapy with steroids was initiated for 6 months and a few months after completion of therapy his clinical and radiographic findings had relapsed and he was re-initiated on steroids and V-Fend for filamentous fungi on a BAL.    Last seen 3 months ago. Since his last visit he has been doing ok. Breathing is the same- able to mow lawn with riding lawn  (takes 3 hours, large lawn), was cutting down " trees earlier today.  Not much cough or sputum production. Does use azithromycin if he develops increased cough.  Is planning to travel to [place] on October 14th.      Assessment and Plan:  85 year old male with a past medical history significant for mantle cell lymphoma s/p autologous PBSCT, HTN, and relapsed . Had remained off prednisone for over one year.  Also with previous history of fungal pneumonia.  Re-presented to clinic with recurrence of symptoms for past 1-1.5 years and CXR changes with decrease in PFT's.  Overall concerning for recurrence of infection or , with duration of symptoms suspected this was a recurrence of the .  Bronchoscopy with BAL performed and all cultures were negative; started on prednisone 40 mg.  CT chest improved while on 40 mg of prednisone but leveled off with decrease to 20 mg; symptoms never fully improved on higher or lower dosages of prednisone.  Recently found to ischemic cardiac changes- s/p stent but has not felt significantly better after that procedure.  Unclear if had ongoing cardiac ischemia, not fully treated  or another underlying interstitial lung disease.  Some component of deconditioning is likely present- awaiting cardiac rehab.  Underwent further cardiac stenting with an improvement in his symptoms.  Likely component of deconditioning at present.  Unlikely  is playing a role at present, has been maintained off prednisone for several months.     1. Maintain off prednisone  2. Follow-up with Cardiologist  3. Plan for CardoPulmonary rehab once facility re-opens after pandemic- sounds like no places are open  4. Can start Azithromycin if develops increased cough or sputum production; generally resolves after 7 days       RTC after returns from [place].  Asked him to contact clinic if develops increased SOB or cough        Phone call duration: 16 minutes

## 2020-09-08 DIAGNOSIS — L30.9 ECZEMA, UNSPECIFIED TYPE: ICD-10-CM

## 2020-09-08 NOTE — TELEPHONE ENCOUNTER
"Requested Prescriptions   Pending Prescriptions Disp Refills     azelastine (ASTELIN) 0.1 % nasal spray [Pharmacy Med Name: AZELASTINE 0.1%(137MCG) NASAL-200SP] 90 mL      Sig: SPRAY 1 SPRAY INTO BOTH NOSTRILS TWICE DAILY       Antihistamines Protocol Failed - 9/8/2020 10:21 AM        Failed - Patient is 3-64 years of age     Apply weight-based dosing for peds patients age 3 - 12 years of age.    Forward request to provider for patients under the age of 3 or over the age of 64.          Passed - Recent (12 mo) or future (30 days) visit within the authorizing provider's specialty     Patient has had an office visit with the authorizing provider or a provider within the authorizing providers department within the previous 12 mos or has a future within next 30 days. See \"Patient Info\" tab in inbasket, or \"Choose Columns\" in Meds & Orders section of the refill encounter.              Passed - Medication is active on med list       Nasal Allergy Protocol Passed - 9/8/2020 10:21 AM        Passed - Patient is age 12 or older        Passed - Recent (12 mo) or future (30 days) visit within the authorizing provider's specialty     Patient has had an office visit with the authorizing provider or a provider within the authorizing providers department within the previous 12 mos or has a future within next 30 days. See \"Patient Info\" tab in inbasket, or \"Choose Columns\" in Meds & Orders section of the refill encounter.              Passed - Medication is active on med list             "

## 2020-09-10 NOTE — TELEPHONE ENCOUNTER
Routing refill request to provider for review/approval because:  Fails protocol due to age.  Rosalia Morin RN

## 2020-09-11 RX ORDER — AZELASTINE 1 MG/ML
1 SPRAY, METERED NASAL 2 TIMES DAILY PRN
Qty: 30 ML | Refills: 1 | Status: SHIPPED | OUTPATIENT
Start: 2020-09-11 | End: 2021-09-22

## 2020-10-14 ENCOUNTER — IMMUNIZATION (OUTPATIENT)
Dept: FAMILY MEDICINE | Facility: CLINIC | Age: 85
End: 2020-10-14
Payer: MEDICARE

## 2020-10-14 PROCEDURE — G0008 ADMIN INFLUENZA VIRUS VAC: HCPCS

## 2020-10-14 PROCEDURE — 90662 IIV NO PRSV INCREASED AG IM: CPT

## 2020-11-30 DIAGNOSIS — I25.119 CORONARY ARTERY DISEASE INVOLVING NATIVE CORONARY ARTERY OF NATIVE HEART WITH ANGINA PECTORIS (H): ICD-10-CM

## 2020-11-30 NOTE — TELEPHONE ENCOUNTER
"Requested Prescriptions   Pending Prescriptions Disp Refills     atorvastatin (LIPITOR) 80 MG tablet 90 tablet 0     Sig: Take 1 tablet (80 mg) by mouth daily       Statins Protocol Failed - 11/30/2020  2:53 PM        Failed - LDL on file in past 12 months     Recent Labs   Lab Test 10/16/19  1704   LDL 71             Passed - No abnormal creatine kinase in past 12 months     No lab results found.             Passed - Recent (12 mo) or future (30 days) visit within the authorizing provider's specialty     Patient has had an office visit with the authorizing provider or a provider within the authorizing providers department within the previous 12 mos or has a future within next 30 days. See \"Patient Info\" tab in inbasket, or \"Choose Columns\" in Meds & Orders section of the refill encounter.              Passed - Medication is active on med list        Passed - Patient is age 18 or older             "

## 2020-12-02 RX ORDER — ATORVASTATIN CALCIUM 80 MG/1
80 TABLET, FILM COATED ORAL DAILY
Qty: 90 TABLET | Refills: 0 | Status: SHIPPED | OUTPATIENT
Start: 2020-12-02 | End: 2021-05-05

## 2021-03-17 ENCOUNTER — TRANSFERRED RECORDS (OUTPATIENT)
Dept: HEALTH INFORMATION MANAGEMENT | Facility: CLINIC | Age: 86
End: 2021-03-17

## 2021-03-29 DIAGNOSIS — R05.9 COUGH: ICD-10-CM

## 2021-03-29 RX ORDER — AZITHROMYCIN 250 MG/1
250 TABLET, FILM COATED ORAL DAILY
Qty: 30 TABLET | Refills: 1 | Status: SHIPPED | OUTPATIENT
Start: 2021-03-29 | End: 2021-05-17

## 2021-05-03 DIAGNOSIS — I25.119 CORONARY ARTERY DISEASE INVOLVING NATIVE CORONARY ARTERY OF NATIVE HEART WITH ANGINA PECTORIS (H): ICD-10-CM

## 2021-05-05 RX ORDER — ATORVASTATIN CALCIUM 80 MG/1
80 TABLET, FILM COATED ORAL DAILY
Qty: 30 TABLET | Refills: 0 | Status: SHIPPED | OUTPATIENT
Start: 2021-05-05 | End: 2021-05-17

## 2021-05-10 ENCOUNTER — OFFICE VISIT (OUTPATIENT)
Dept: FAMILY MEDICINE | Facility: CLINIC | Age: 86
End: 2021-05-10
Payer: MEDICARE

## 2021-05-10 VITALS
DIASTOLIC BLOOD PRESSURE: 56 MMHG | TEMPERATURE: 96.5 F | WEIGHT: 173 LBS | BODY MASS INDEX: 27.15 KG/M2 | OXYGEN SATURATION: 96 % | RESPIRATION RATE: 18 BRPM | SYSTOLIC BLOOD PRESSURE: 102 MMHG | HEIGHT: 67 IN | HEART RATE: 77 BPM

## 2021-05-10 DIAGNOSIS — M19.041 ARTHRITIS OF BOTH HANDS: ICD-10-CM

## 2021-05-10 DIAGNOSIS — M17.10 ARTHRITIS OF KNEE: ICD-10-CM

## 2021-05-10 DIAGNOSIS — G89.29 CHRONIC MIDLINE THORACIC BACK PAIN: ICD-10-CM

## 2021-05-10 DIAGNOSIS — M54.6 CHRONIC MIDLINE THORACIC BACK PAIN: ICD-10-CM

## 2021-05-10 DIAGNOSIS — M19.042 ARTHRITIS OF BOTH HANDS: ICD-10-CM

## 2021-05-10 DIAGNOSIS — M48.061 SPINAL STENOSIS OF LUMBAR REGION WITHOUT NEUROGENIC CLAUDICATION: Primary | ICD-10-CM

## 2021-05-10 DIAGNOSIS — C85.80 OTHER SPECIFIED TYPE OF NON-HODGKIN LYMPHOMA, UNSPECIFIED BODY REGION (H): ICD-10-CM

## 2021-05-10 PROCEDURE — 99214 OFFICE O/P EST MOD 30 MIN: CPT | Performed by: FAMILY MEDICINE

## 2021-05-10 RX ORDER — LIDOCAINE 50 MG/G
1 PATCH TOPICAL EVERY 24 HOURS
Qty: 30 PATCH | Refills: 1 | Status: SHIPPED | OUTPATIENT
Start: 2021-05-10 | End: 2022-06-03

## 2021-05-10 RX ORDER — OXYCODONE HYDROCHLORIDE 5 MG/1
5 TABLET ORAL EVERY 6 HOURS PRN
Qty: 12 TABLET | Refills: 0 | Status: SHIPPED | OUTPATIENT
Start: 2021-05-10 | End: 2021-05-13

## 2021-05-10 ASSESSMENT — MIFFLIN-ST. JEOR: SCORE: 1430.41
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
## 2021-05-10 NOTE — PATIENT INSTRUCTIONS
It's still reasonable to consider seeing a Rheumatologist if the various pain control and orthopedic treatments are not helping.    - I recommend physical therapy to help you with your back pain and function but you can talk to the Orthopedic doctors about that.  - Call Dr. Lott's office to reschedule.  - Come back 5/17 for a Wellness check and follow up with Dr. Acuña.

## 2021-05-17 ENCOUNTER — OFFICE VISIT (OUTPATIENT)
Dept: FAMILY MEDICINE | Facility: CLINIC | Age: 86
End: 2021-05-17
Payer: MEDICARE

## 2021-05-17 VITALS
HEART RATE: 79 BPM | BODY MASS INDEX: 28.09 KG/M2 | WEIGHT: 179 LBS | HEIGHT: 67 IN | RESPIRATION RATE: 18 BRPM | OXYGEN SATURATION: 95 % | SYSTOLIC BLOOD PRESSURE: 92 MMHG | TEMPERATURE: 97.8 F | DIASTOLIC BLOOD PRESSURE: 56 MMHG

## 2021-05-17 DIAGNOSIS — R60.9 EDEMA, UNSPECIFIED TYPE: Primary | ICD-10-CM

## 2021-05-17 DIAGNOSIS — I25.119 CORONARY ARTERY DISEASE INVOLVING NATIVE CORONARY ARTERY OF NATIVE HEART WITH ANGINA PECTORIS (H): ICD-10-CM

## 2021-05-17 DIAGNOSIS — M48.061 SPINAL STENOSIS OF LUMBAR REGION WITHOUT NEUROGENIC CLAUDICATION: ICD-10-CM

## 2021-05-17 PROCEDURE — 99214 OFFICE O/P EST MOD 30 MIN: CPT | Performed by: FAMILY MEDICINE

## 2021-05-17 RX ORDER — OXYCODONE HYDROCHLORIDE 5 MG/1
5 TABLET ORAL EVERY 6 HOURS PRN
COMMUNITY
End: 2021-05-20

## 2021-05-17 RX ORDER — ATORVASTATIN CALCIUM 80 MG/1
80 TABLET, FILM COATED ORAL DAILY
Qty: 90 TABLET | Refills: 3 | Status: SHIPPED | OUTPATIENT
Start: 2021-05-17 | End: 2021-09-22

## 2021-05-17 RX ORDER — OXYCODONE HYDROCHLORIDE 5 MG/1
5 TABLET ORAL EVERY 6 HOURS PRN
Status: CANCELLED | OUTPATIENT
Start: 2021-05-17

## 2021-05-17 ASSESSMENT — MIFFLIN-ST. JEOR: SCORE: 1457.63

## 2021-05-17 ASSESSMENT — ACTIVITIES OF DAILY LIVING (ADL): CURRENT_FUNCTION: NO ASSISTANCE NEEDED

## 2021-05-17 NOTE — PATIENT INSTRUCTIONS
Patient Education   Personalized Prevention Plan  You are due for the preventive services outlined below.  Your care team is available to assist you in scheduling these services.  If you have already completed any of these items, please share that information with your care team to update in your medical record.  Health Maintenance Due   Topic Date Due     URINE DRUG SCREEN  Never done     ANNUAL REVIEW OF HM ORDERS  Never done     Zoster (Shingles) Vaccine (2 of 3) 12/11/2014     Discuss Advance Care Planning  08/12/2020     Annual Wellness Visit  09/10/2020

## 2021-05-17 NOTE — PROGRESS NOTES
"SUBJECTIVE:   Dangelo Hernández is a 85 year old male who presents for Preventive Visit.  Chief Complaint   Patient presents with     Physical     Pain Management     Lipids     Refill Request         Patient has been advised of split billing requirements and indicates understanding: Yes   Are you in the first 12 months of your Medicare coverage?  No    Healthy Habits:     In general, how would you rate your overall health?  Very good    Frequency of exercise:  2-3 days/week    Duration of exercise:  15-30 minutes    Do you usually eat at least 4 servings of fruit and vegetables a day, include whole grains    & fiber and avoid regularly eating high fat or \"junk\" foods?  Yes    Taking medications regularly:  Yes    Barriers to taking medications:  None    Medication side effects:  None    Ability to successfully perform activities of daily living:  No assistance needed    Home Safety:  Lack of grab bars in the bathroom    Hearing Impairment:  No hearing concerns    In the past 6 months, have you been bothered by leaking of urine?  No    In general, how would you rate your overall mental or emotional health?  Good      PHQ-2 Total Score: 0    Additional concerns today:  Yes    Do you feel safe in your environment? Yes    Have you ever done Advance Care Planning? (For example, a Health Directive, POLST, or a discussion with a medical provider or your loved ones about your wishes): Yes, advance care planning is on file.       Fall risk  Fallen 2 or more times in the past year?: No  Any fall with injury in the past year?: No  click delete button to remove this line now  Cognitive Screening   1) Repeat 3 items (Leader, Season, Table)    2) Clock draw: NORMAL  3) 3 item recall: Recalls NO objects   Results: NORMAL clock,0 items recalled: COGNITIVE IMPAIRMENT LESS LIKELY    Mini-CogTM Copyright BRYANAN Aponte. Licensed by the author for use in [facility]; reprinted with permission (enid@.[facility]). All rights " reserved.      Do you have sleep apnea, excessive snoring or daytime drowsiness?: no    Reviewed and updated as needed this visit by clinical staff   Allergies  Meds              Reviewed and updated as needed this visit by Provider                Social History     Tobacco Use     Smoking status: Former Smoker     Packs/day: 0.50     Years: 19.00     Pack years: 9.50     Types: Cigarettes     Start date:      Quit date: 1975     Years since quittin.4     Smokeless tobacco: Former User     Types: Snuff     Tobacco comment: started smoking at 21 years of age   Substance Use Topics     Alcohol use: Yes     Alcohol/week: 0.0 standard drinks     Comment: occ.      If you drink alcohol do you typically have >3 drinks per day or >7 drinks per week? No    No flowsheet data found.        Medication Followup of lipitor,  oxycodone and would like to start tramadol again    Taking Medication as prescribed: yes but has not been on the oxycodone     Side Effects:  None    Medication Helping Symptoms:  yes     Hyperlipidemia Follow-Up      Are you regularly taking any medication or supplement to lower your cholesterol?   Yes- lipid     Are you having muscle aches or other side effects that you think could be caused by your cholesterol lowering medication?  No    Chronic Pain Follow-Up    Where in your body do you have pain? Patient has pain throughout his whole body   How has your pain affected your ability to work? Not applicable  Which of these pain treatments have you tried since your last clinic visit? Heat and Other: voltaren gel, and just got the  oxycodone   How well are you sleeping? Fair  How has your mood been since your last visit? About the same  Have you had a significant life event? No  Other aggravating factors: prolonged sitting, prolonged standing and for his hands any use   Taking medication as directed? Yes    No flowsheet data found.  No flowsheet data found.  No flowsheet data  "found.  Encounter-Level CSA:    There are no encounter-level csa.     Patient-Level CSA:    There are no patient-level csa.         Current providers sharing in care for this patient include:   Patient Care Team:  Dony Acuña MD as PCP - General (Family Practice)  Abida Lott MD as BMT Physician  Merly Ashraf LICSW as  (Transplant)  Rajendra Flanagan MD as MD (Internal Medicine)  Yoselin Townsend RN as BMT Nurse Coordinator  Ludin Schulz MD as MD (Family Medicine - Sports Medicine)  Dony Acuña MD as Assigned PCP  Rajendra Flanagan MD as MD (Pulmonary Disease)    The following health maintenance items are reviewed in Epic and correct as of today:  Health Maintenance Due   Topic Date Due     URINE DRUG SCREEN  Never done     ANNUAL REVIEW OF HM ORDERS  Never done     ZOSTER IMMUNIZATION (2 of 3) 12/11/2014     ADVANCE CARE PLANNING  08/12/2020     MEDICARE ANNUAL WELLNESS VISIT  09/10/2020     Labs reviewed in EPIC        Pertinent mammograms are reviewed under the imaging tab.    Review of Systems  Constitutional, HEENT, cardiovascular, pulmonary, GI, , musculoskeletal, neuro, skin, endocrine and psych systems are negative, except as otherwise noted.    OBJECTIVE:   There were no vitals taken for this visit. Estimated body mass index is 27.5 kg/m  as calculated from the following:    Height as of 5/10/21: 1.689 m (5' 6.5\").    Weight as of 5/10/21: 78.5 kg (173 lb).  Physical Exam  GENERAL: healthy, alert and no distress  EYES: Eyes grossly normal to inspection, PERRL and conjunctivae and sclerae normal  HENT: ear canals and TM's normal, nose and mouth without ulcers or lesions  NECK: no adenopathy, no asymmetry, masses, or scars and thyroid normal to palpation  RESP: lungs clear to auscultation - no rales, rhonchi or wheezes  CV: regular rate and rhythm, normal S1 S2, no S3 or S4, no murmur, click or rub, no peripheral edema and peripheral pulses strong  ABDOMEN: soft, " "nontender, no hepatosplenomegaly, no masses and bowel sounds normal  MS: no gross musculoskeletal defects noted, no edema  SKIN: no suspicious lesions or rashes  NEURO: Normal strength and tone, mentation intact and speech normal  PSYCH: mentation appears normal, affect normal/bright    Diagnostic Test Results:  Labs reviewed in Epic    ASSESSMENT / PLAN:   Dangelo was seen today for physical, pain management, lipids and refill request.    Diagnoses and all orders for this visit:    Edema, unspecified type    Coronary artery disease involving native coronary artery of native heart with angina pectoris (H)  -     atorvastatin (LIPITOR) 80 MG tablet; Take 1 tablet (80 mg) by mouth daily    Spinal stenosis of lumbar region without neurogenic claudication        Patient has been advised of split billing requirements and indicates understanding:   COUNSELING:  Reviewed preventive health counseling, as reflected in patient instructions       Regular exercise       Healthy diet/nutrition    Estimated body mass index is 27.5 kg/m  as calculated from the following:    Height as of 5/10/21: 1.689 m (5' 6.5\").    Weight as of 5/10/21: 78.5 kg (173 lb).        He reports that he quit smoking about 46 years ago. His smoking use included cigarettes. He started smoking about 65 years ago. He has a 9.50 pack-year smoking history. He has quit using smokeless tobacco.  His smokeless tobacco use included snuff.      Appropriate preventive services were discussed with this patient, including applicable screening as appropriate for cardiovascular disease, diabetes, osteopenia/osteoporosis, and glaucoma.  As appropriate for age/gender, discussed screening for colorectal cancer, prostate cancer, breast cancer, and cervical cancer. Checklist reviewing preventive services available has been given to the patient.    Reviewed patients plan of care and provided an AVS. The Basic Care Plan (routine screening as documented in Health Maintenance) " for Dangelo meets the Care Plan requirement. This Care Plan has been established and reviewed with the Patient.    He comes in today with his daughter.  He has multiple vague complaints.  Most prominent is his lower back pain for which he is struggling to determine whether the recent oxycodone prescription is working better or worse than the tramadol that he used to take.  He also complains about swelling of his hands and ankles and he thinks this is from a bug bite from over a year ago.  He says he was diagnosed in [place] with that.  After extensive questioning he reports that he has been seeing a cardiologist in [place] though he only knows that his heart history involves multiple stents.    I suggested that his swelling is most likely related to cardiovascular condition and we could work that up and he declined that stating that is being done when he lives in [place] in the winter.  His most prominent issue is the back pain for which he has been referred to the spine specialist and I have given him the phone number and told him to call since he has not made that contact.    Counseling Resources:  ATP IV Guidelines  Pooled Cohorts Equation Calculator  Breast Cancer Risk Calculator  Breast Cancer: Medication to Reduce Risk  FRAX Risk Assessment  ICSI Preventive Guidelines  Dietary Guidelines for Americans, 2010  [employer]'s MyPlate  ASA Prophylaxis  Lung CA Screening    Dony Acuña MD  [facility]    Identified Health Risks:

## 2021-05-20 ENCOUNTER — ONCOLOGY VISIT (OUTPATIENT)
Dept: TRANSPLANT | Facility: CLINIC | Age: 86
End: 2021-05-20
Attending: INTERNAL MEDICINE
Payer: MEDICARE

## 2021-05-20 VITALS
TEMPERATURE: 97.6 F | HEART RATE: 96 BPM | WEIGHT: 177.8 LBS | BODY MASS INDEX: 28.57 KG/M2 | SYSTOLIC BLOOD PRESSURE: 131 MMHG | HEIGHT: 66 IN | RESPIRATION RATE: 22 BRPM | OXYGEN SATURATION: 94 % | DIASTOLIC BLOOD PRESSURE: 74 MMHG

## 2021-05-20 DIAGNOSIS — M19.90 ARTHRITIS: Primary | ICD-10-CM

## 2021-05-20 DIAGNOSIS — Z94.81 STATUS POST AUTOLOGOUS BONE MARROW TRANSPLANT (H): ICD-10-CM

## 2021-05-20 LAB
ALBUMIN SERPL-MCNC: 3.2 G/DL (ref 3.4–5)
ALP SERPL-CCNC: 74 U/L (ref 40–150)
ALT SERPL W P-5'-P-CCNC: 16 U/L (ref 0–70)
ANION GAP SERPL CALCULATED.3IONS-SCNC: 8 MMOL/L (ref 3–14)
AST SERPL W P-5'-P-CCNC: 21 U/L (ref 0–45)
BASOPHILS # BLD AUTO: 0.1 10E9/L (ref 0–0.2)
BASOPHILS NFR BLD AUTO: 0.7 %
BILIRUB SERPL-MCNC: 0.4 MG/DL (ref 0.2–1.3)
BUN SERPL-MCNC: 20 MG/DL (ref 7–30)
CALCIUM SERPL-MCNC: 8.9 MG/DL (ref 8.5–10.1)
CHLORIDE SERPL-SCNC: 104 MMOL/L (ref 94–109)
CO2 SERPL-SCNC: 25 MMOL/L (ref 20–32)
CREAT SERPL-MCNC: 0.86 MG/DL (ref 0.66–1.25)
DIFFERENTIAL METHOD BLD: ABNORMAL
EOSINOPHIL # BLD AUTO: 0.4 10E9/L (ref 0–0.7)
EOSINOPHIL NFR BLD AUTO: 4.9 %
ERYTHROCYTE [DISTWIDTH] IN BLOOD BY AUTOMATED COUNT: 14.8 % (ref 10–15)
GFR SERPL CREATININE-BSD FRML MDRD: 80 ML/MIN/{1.73_M2}
GLUCOSE SERPL-MCNC: 117 MG/DL (ref 70–99)
HCT VFR BLD AUTO: 35.3 % (ref 40–53)
HGB BLD-MCNC: 11.6 G/DL (ref 13.3–17.7)
IGG SERPL-MCNC: 460 MG/DL (ref 610–1616)
IMM GRANULOCYTES # BLD: 0 10E9/L (ref 0–0.4)
IMM GRANULOCYTES NFR BLD: 0.3 %
LYMPHOCYTES # BLD AUTO: 1.8 10E9/L (ref 0.8–5.3)
LYMPHOCYTES NFR BLD AUTO: 20.8 %
MCH RBC QN AUTO: 30.2 PG (ref 26.5–33)
MCHC RBC AUTO-ENTMCNC: 32.9 G/DL (ref 31.5–36.5)
MCV RBC AUTO: 92 FL (ref 78–100)
MONOCYTES # BLD AUTO: 0.8 10E9/L (ref 0–1.3)
MONOCYTES NFR BLD AUTO: 8.5 %
NEUTROPHILS # BLD AUTO: 5.7 10E9/L (ref 1.6–8.3)
NEUTROPHILS NFR BLD AUTO: 64.8 %
NRBC # BLD AUTO: 0 10*3/UL
NRBC BLD AUTO-RTO: 0 /100
PLATELET # BLD AUTO: 333 10E9/L (ref 150–450)
POTASSIUM SERPL-SCNC: 3.9 MMOL/L (ref 3.4–5.3)
PROT SERPL-MCNC: 6.3 G/DL (ref 6.8–8.8)
RBC # BLD AUTO: 3.84 10E12/L (ref 4.4–5.9)
SODIUM SERPL-SCNC: 137 MMOL/L (ref 133–144)
WBC # BLD AUTO: 8.8 10E9/L (ref 4–11)

## 2021-05-20 PROCEDURE — 80053 COMPREHEN METABOLIC PANEL: CPT | Performed by: PATHOLOGY

## 2021-05-20 PROCEDURE — G0463 HOSPITAL OUTPT CLINIC VISIT: HCPCS

## 2021-05-20 PROCEDURE — 82784 ASSAY IGA/IGD/IGG/IGM EACH: CPT | Performed by: INTERNAL MEDICINE

## 2021-05-20 PROCEDURE — 36415 COLL VENOUS BLD VENIPUNCTURE: CPT | Performed by: PATHOLOGY

## 2021-05-20 PROCEDURE — 99214 OFFICE O/P EST MOD 30 MIN: CPT | Performed by: INTERNAL MEDICINE

## 2021-05-20 PROCEDURE — 85025 COMPLETE CBC W/AUTO DIFF WBC: CPT | Performed by: PATHOLOGY

## 2021-05-20 RX ORDER — OXYCODONE HYDROCHLORIDE 5 MG/1
5 TABLET ORAL EVERY 6 HOURS PRN
Qty: 30 TABLET | Refills: 0 | Status: SHIPPED | OUTPATIENT
Start: 2021-05-20 | End: 2022-06-03

## 2021-05-20 ASSESSMENT — MIFFLIN-ST. JEOR: SCORE: 1452.12

## 2021-05-20 ASSESSMENT — PAIN SCALES - GENERAL: PAINLEVEL: MODERATE PAIN (5)

## 2021-05-20 NOTE — LETTER
5/20/2021         RE: Dangelo Hernández  [address] [facility] [address]        Dear Colleague,    Thank you for referring your patient, Dangelo Hernández, to the [facility] [place]. Please see a copy of my visit note below.    BMT Clinic Visit  May 20, 2021    Disease and Treatment History:   1. Mantle cell lymphoma, status R-CHOP X 4 then status post autologous stem cell transplant in 10/2010 in ongoing CR  2.  Significant pulmonary issues:  --pulmonary infiltrates and BAL with Candida glabrata in 07/2011  - progressive infiltrates with VATS in 09/2011 that showed organizing pneumonia with negative tissue cultures, who was felt to have a cryptogenic organizing pneumonia.   - Treated with high-dose steroids from 10/2011 with tapering through 03/2012 with a good clinical and radiological response,  -- Subsequent relapse/progression 04/2012.  A repeat BAL on 04/19/2012 showed filamentous fungus that was not able to be speciated further, and he saw Pulmonary in 05/2012, and at that point was started back on Voriconazole on prednisone.   -- Long prednisone taper: CT imaging on 6/21/13 showed worsened pulmonary infiltrates and he underwent bronchoscopy which initially showed filamentous fungus but then grew out as Raquel Dubliniensis/Albicans. Treated with v-fend/fluconazole combination  -- Summer 2013: stress test that showed abnormalities and led to a cardiac catheterization with the placement of 3 stents.   -- Pulmonary evaluation August 2013 and CT scans showed some GGO and the question of pulmonary edema was raised given the recent cardiac issues. Thus, he was monitored without steroids  -- 10/2013 Pulmonary: Stable 9/2013 CT changes and observed due to no major symptoms     Current HPI: I last saw him a year ago. Did a phone visit 1 year ago. Annual follow-up today. He has had a rough year. An infected bit under his left armpit and on and off  "antibiotics for months. Then an episode where it felt like things shot down both arms and into his hands and he has had horrible swelling and arthritis since then. Notes a bad right knee too. Notes energy and appetite is not great but \"that is due to being older.\" Notes that it has been a tough year. But also notes \"my cancer is fine and I had to come back and see you one more time.\"        REVIEW OF SYSTEMS:  A 10-point review of systems is otherwise negative.         Physical Exam:  /74 (BP Location: Right arm, Patient Position: Sitting, Cuff Size: Adult Regular)   Pulse 96   Temp 97.6  F (36.4  C) (Tympanic)   Resp 22   Ht 1.689 m (5' 6.5\")   Wt 80.6 kg (177 lb 12.8 oz)   SpO2 94%   BMI 28.27 kg/m    Gen: Looks much more debilitated than in past visits. KPS 70  HEENT: no icterus or injection  Hands: swollen fingers and diminished ROM and some deformity of the joints    Labs:  Results for ISAIAH DELONG (MRN [id number]) as of 5/20/2021 09:31   Ref. Range 5/20/2021 09:02   WBC Latest Ref Range: 4.0 - 11.0 10e9/L 8.8   Hemoglobin Latest Ref Range: 13.3 - 17.7 g/dL 11.6 (L)   Hematocrit Latest Ref Range: 40.0 - 53.0 % 35.3 (L)   Platelet Count Latest Ref Range: 150 - 450 10e9/L 333   RBC Count Latest Ref Range: 4.4 - 5.9 10e12/L 3.84 (L)   MCV Latest Ref Range: 78 - 100 fl 92   MCH Latest Ref Range: 26.5 - 33.0 pg 30.2   MCHC Latest Ref Range: 31.5 - 36.5 g/dL 32.9   RDW Latest Ref Range: 10.0 - 15.0 % 14.8   Diff Method Unknown Automated Method   % Neutrophils Latest Units: % 64.8   % Lymphocytes Latest Units: % 20.8   % Monocytes Latest Units: % 8.5   % Eosinophils Latest Units: % 4.9   % Basophils Latest Units: % 0.7   % Immature Granulocytes Latest Units: % 0.3   Nucleated RBCs Latest Ref Range: 0 /100 0   Absolute Neutrophil Latest Ref Range: 1.6 - 8.3 10e9/L 5.7   Absolute Lymphocytes Latest Ref Range: 0.8 - 5.3 10e9/L 1.8   Absolute Monocytes Latest Ref Range: 0.0 - 1.3 10e9/L 0.8 "   Absolute Eosinophils Latest Ref Range: 0.0 - 0.7 10e9/L 0.4   Absolute Basophils Latest Ref Range: 0.0 - 0.2 10e9/L 0.1   Abs Immature Granulocytes Latest Ref Range: 0 - 0.4 10e9/L 0.0   Absolute Nucleated RBC Unknown 0.0        ASSESSMENT AND PLAN:  Mr. Hernández is a very pleasant 85-year-old gentleman with mantle cell lymphoma in ongoing remission, now nearing 10 years out from an autologous stem cell transplant.      1.  Mantle cell lymphoma.  He remains in an ongoing complete remission by clinical evaluation. Ok to discharge from clinic at this point     2.  Pulmonary.  Cryptogenic organizing pneumonia followed by pulmonary. Last seen a year ago     3.  Infectious disease.  No active infections. Encouraged him to get his flu shot in the fall  -- s/p 2 year vaccines 4/2013 and got MMR in 9/2013. Did get shingles shot in 2014 as well     4.  Cardiology.  current metoprolol, lipid lower drug. Recent multiple stent placement and doing better     5.  Hematology.  His counts are stable.      6. Renal: Normal function     7. Ortho: significant arthritis in hands and I worry that this could be more rheumatologic then DJD given the strange onset. Using intermittent oxycodone for this (I did refill for 30 tabs today and he is just taking a couple a week). Told him that a once in awhile ibuprofen is fine. He was worried about this with his heart meds and while it inhibits platelet function as well as the plavix and the aspirin, the slight increased inhibition is likely a minimal increase in risk if taken just intermittently and the benefit of anti-inflammation is likely greater    Final Plan:  - OK to discharge from BMT clinic    Abida Lott MD                       Again, thank you for allowing me to participate in the care of your patient.        Sincerely,        Abida Lott MD

## 2021-05-20 NOTE — NURSING NOTE
"Oncology Rooming Note    May 20, 2021 9:59 AM   Dangelo Hernández is a 85 year old male who presents for:    Chief Complaint   Patient presents with     Oncology Clinic Visit     1 YEAR FOLLOW UP     Initial Vitals: /74 (BP Location: Right arm, Patient Position: Sitting, Cuff Size: Adult Regular)   Pulse 96   Temp 97.6  F (36.4  C) (Tympanic)   Resp 22   Ht 1.689 m (5' 6.5\")   Wt 80.6 kg (177 lb 12.8 oz)   SpO2 94%   BMI 28.27 kg/m   Estimated body mass index is 28.27 kg/m  as calculated from the following:    Height as of this encounter: 1.689 m (5' 6.5\").    Weight as of this encounter: 80.6 kg (177 lb 12.8 oz). Body surface area is 1.94 meters squared.  Moderate Pain (5) Comment: Data Unavailable   No LMP for male patient.  Allergies reviewed: Yes  Medications reviewed: Yes    Medications: Medication refills not needed today.  Pharmacy name entered into [facility]:    [facility] [place] [address] [id number] - [place], [place] - [address] [facility] & [address] [id number] - [place] [address] [address] [facility]    Clinical concerns: No new concerns.        Page Torres [facility]              "

## 2021-05-20 NOTE — PROGRESS NOTES
"BMT Clinic Visit  May 20, 2021    Disease and Treatment History:   1. Mantle cell lymphoma, status R-CHOP X 4 then status post autologous stem cell transplant in 10/2010 in ongoing CR  2.  Significant pulmonary issues:  --pulmonary infiltrates and BAL with Candida glabrata in 07/2011  - progressive infiltrates with VATS in 09/2011 that showed organizing pneumonia with negative tissue cultures, who was felt to have a cryptogenic organizing pneumonia.   - Treated with high-dose steroids from 10/2011 with tapering through 03/2012 with a good clinical and radiological response,  -- Subsequent relapse/progression 04/2012.  A repeat BAL on 04/19/2012 showed filamentous fungus that was not able to be speciated further, and he saw Pulmonary in 05/2012, and at that point was started back on Voriconazole on prednisone.   -- Long prednisone taper: CT imaging on 6/21/13 showed worsened pulmonary infiltrates and he underwent bronchoscopy which initially showed filamentous fungus but then grew out as Raquel Dubliniensis/Albicans. Treated with v-fend/fluconazole combination  -- Summer 2013: stress test that showed abnormalities and led to a cardiac catheterization with the placement of 3 stents.   -- Pulmonary evaluation August 2013 and CT scans showed some GGO and the question of pulmonary edema was raised given the recent cardiac issues. Thus, he was monitored without steroids  -- 10/2013 Pulmonary: Stable 9/2013 CT changes and observed due to no major symptoms     Current HPI: I last saw him a year ago. Did a phone visit 1 year ago. Annual follow-up today. He has had a rough year. An infected bit under his left armpit and on and off antibiotics for months. Then an episode where it felt like things shot down both arms and into his hands and he has had horrible swelling and arthritis since then. Notes a bad right knee too. Notes energy and appetite is not great but \"that is due to being older.\" Notes that it has been a tough " "year. But also notes \"my cancer is fine and I had to come back and see you one more time.\"        REVIEW OF SYSTEMS:  A 10-point review of systems is otherwise negative.         Physical Exam:  /74 (BP Location: Right arm, Patient Position: Sitting, Cuff Size: Adult Regular)   Pulse 96   Temp 97.6  F (36.4  C) (Tympanic)   Resp 22   Ht 1.689 m (5' 6.5\")   Wt 80.6 kg (177 lb 12.8 oz)   SpO2 94%   BMI 28.27 kg/m    Gen: Looks much more debilitated than in past visits. KPS 70  HEENT: no icterus or injection  Hands: swollen fingers and diminished ROM and some deformity of the joints    Labs:  Results for ISAIAH DELONG (MRN [id number]) as of 5/20/2021 09:31   Ref. Range 5/20/2021 09:02   WBC Latest Ref Range: 4.0 - 11.0 10e9/L 8.8   Hemoglobin Latest Ref Range: 13.3 - 17.7 g/dL 11.6 (L)   Hematocrit Latest Ref Range: 40.0 - 53.0 % 35.3 (L)   Platelet Count Latest Ref Range: 150 - 450 10e9/L 333   RBC Count Latest Ref Range: 4.4 - 5.9 10e12/L 3.84 (L)   MCV Latest Ref Range: 78 - 100 fl 92   MCH Latest Ref Range: 26.5 - 33.0 pg 30.2   MCHC Latest Ref Range: 31.5 - 36.5 g/dL 32.9   RDW Latest Ref Range: 10.0 - 15.0 % 14.8   Diff Method Unknown Automated Method   % Neutrophils Latest Units: % 64.8   % Lymphocytes Latest Units: % 20.8   % Monocytes Latest Units: % 8.5   % Eosinophils Latest Units: % 4.9   % Basophils Latest Units: % 0.7   % Immature Granulocytes Latest Units: % 0.3   Nucleated RBCs Latest Ref Range: 0 /100 0   Absolute Neutrophil Latest Ref Range: 1.6 - 8.3 10e9/L 5.7   Absolute Lymphocytes Latest Ref Range: 0.8 - 5.3 10e9/L 1.8   Absolute Monocytes Latest Ref Range: 0.0 - 1.3 10e9/L 0.8   Absolute Eosinophils Latest Ref Range: 0.0 - 0.7 10e9/L 0.4   Absolute Basophils Latest Ref Range: 0.0 - 0.2 10e9/L 0.1   Abs Immature Granulocytes Latest Ref Range: 0 - 0.4 10e9/L 0.0   Absolute Nucleated RBC Unknown 0.0        ASSESSMENT AND PLAN:  Mr. Delong is a very pleasant 85-year-old gentleman " with mantle cell lymphoma in ongoing remission, now nearing 10 years out from an autologous stem cell transplant.      1.  Mantle cell lymphoma.  He remains in an ongoing complete remission by clinical evaluation. Ok to discharge from clinic at this point     2.  Pulmonary.  Cryptogenic organizing pneumonia followed by pulmonary. Last seen a year ago     3.  Infectious disease.  No active infections. Encouraged him to get his flu shot in the fall  -- s/p 2 year vaccines 4/2013 and got MMR in 9/2013. Did get shingles shot in 2014 as well     4.  Cardiology.  current metoprolol, lipid lower drug. Recent multiple stent placement and doing better     5.  Hematology.  His counts are stable.      6. Renal: Normal function     7. Ortho: significant arthritis in hands and I worry that this could be more rheumatologic then DJD given the strange onset. Using intermittent oxycodone for this (I did refill for 30 tabs today and he is just taking a couple a week). Told him that a once in awhile ibuprofen is fine. He was worried about this with his heart meds and while it inhibits platelet function as well as the plavix and the aspirin, the slight increased inhibition is likely a minimal increase in risk if taken just intermittently and the benefit of anti-inflammation is likely greater    Final Plan:  - OK to discharge from BMT clinic    Abida Lott MD

## 2021-06-07 DIAGNOSIS — M25.569 PAIN IN JOINT, LOWER LEG: Primary | ICD-10-CM

## 2021-06-07 LAB
CRP SERPL-MCNC: 11.4 MG/L (ref 0–8)
ERYTHROCYTE [SEDIMENTATION RATE] IN BLOOD BY WESTERGREN METHOD: 34 MM/H (ref 0–20)

## 2021-06-07 PROCEDURE — 86618 LYME DISEASE ANTIBODY: CPT | Performed by: INTERNAL MEDICINE

## 2021-06-07 PROCEDURE — 36415 COLL VENOUS BLD VENIPUNCTURE: CPT | Performed by: INTERNAL MEDICINE

## 2021-06-07 PROCEDURE — 86140 C-REACTIVE PROTEIN: CPT | Performed by: INTERNAL MEDICINE

## 2021-06-07 PROCEDURE — 85652 RBC SED RATE AUTOMATED: CPT | Performed by: INTERNAL MEDICINE

## 2021-06-08 LAB — B BURGDOR IGG+IGM SER QL: 0.03 (ref 0–0.89)

## 2021-06-16 ENCOUNTER — HOSPITAL ENCOUNTER (OUTPATIENT)
Dept: MRI IMAGING | Facility: CLINIC | Age: 86
Discharge: HOME | End: 2021-06-16
Attending: ORTHOPAEDIC SURGERY | Admitting: ORTHOPAEDIC SURGERY
Payer: MEDICARE

## 2021-06-16 DIAGNOSIS — M25.569 KNEE PAIN: ICD-10-CM

## 2021-06-16 PROCEDURE — 72148 MRI LUMBAR SPINE W/O DYE: CPT

## 2021-07-01 ENCOUNTER — TRANSCRIBE ORDERS (OUTPATIENT)
Dept: OTHER | Age: 86
End: 2021-07-01

## 2021-07-01 DIAGNOSIS — M54.10 RADICULAR PAIN OF LOWER EXTREMITY: Primary | ICD-10-CM

## 2021-07-13 ENCOUNTER — TRANSFERRED RECORDS (OUTPATIENT)
Dept: HEALTH INFORMATION MANAGEMENT | Facility: CLINIC | Age: 86
End: 2021-07-13

## 2021-07-15 ENCOUNTER — TELEPHONE (OUTPATIENT)
Dept: CARDIOLOGY | Facility: CLINIC | Age: 86
End: 2021-07-15

## 2021-07-15 NOTE — TELEPHONE ENCOUNTER
Received form from [facility] requesting approval to hold Plavix for 7 days prior to an injection. Last visit on 10/24/19 with Dr. Ashraf. Called pt, pt's daughter Quynh answered the phone. Reviewed pt has not been seen in our clinic since 2019 and it does not appear Dr. Ashraf has prescribed pt's Plavix since then. Per Quynh pt is now seeing a different cardiologist and they have requested that pt's current cardiologist complete the form and we can disregard.

## 2021-07-16 ENCOUNTER — TELEPHONE (OUTPATIENT)
Dept: PULMONOLOGY | Facility: CLINIC | Age: 86
End: 2021-07-16

## 2021-07-22 ENCOUNTER — OFFICE VISIT (OUTPATIENT)
Dept: PULMONOLOGY | Facility: CLINIC | Age: 86
End: 2021-07-22
Attending: INTERNAL MEDICINE
Payer: MEDICARE

## 2021-07-22 VITALS — OXYGEN SATURATION: 95 % | DIASTOLIC BLOOD PRESSURE: 66 MMHG | SYSTOLIC BLOOD PRESSURE: 117 MMHG | HEART RATE: 77 BPM

## 2021-07-22 DIAGNOSIS — J41.1 MUCOPURULENT CHRONIC BRONCHITIS (H): Primary | ICD-10-CM

## 2021-07-22 PROCEDURE — G0463 HOSPITAL OUTPT CLINIC VISIT: HCPCS

## 2021-07-22 PROCEDURE — 99213 OFFICE O/P EST LOW 20 MIN: CPT | Performed by: INTERNAL MEDICINE

## 2021-07-22 RX ORDER — AZITHROMYCIN 250 MG/1
250 TABLET, FILM COATED ORAL DAILY
Qty: 14 TABLET | Refills: 5 | Status: SHIPPED | OUTPATIENT
Start: 2021-07-22 | End: 2021-10-04

## 2021-07-22 NOTE — NURSING NOTE
Chief Complaint   Patient presents with     Follow Up     BMT     Vitals were taken and medications were reconciled.     ALLY Lockwood

## 2021-07-22 NOTE — PROGRESS NOTES
Reason for Visit  Dangelo Hernández is a 86 year old year old male who is being seen for Follow Up (BMT)    Pulmonary HPI  Mr. 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 At the completion of therapy, he continued to be SOB and a subsequent VATS biopsy revealed , therapy with steroids was initiated for 6 months and a few months after completion of therapy his clinical and radiographic findings had relapsed and he was re-initiated on steroids and V-Fend for filamentous fungi on a BAL.    Last seen by Virtual visit 10 months ago, last seen personally in clinic 20 months ago, last chest CT here was in November 2019. At his last visit he was doing OK, was using azithromycin intermittently for increased symptoms.  Goal was to keep off prednisone. Since his last visit he thinks his breathing is unchanged. Still with intermittent productive cough for which he takes azithromycin if his sputum changes color.  Currently is most troubled by joint pain- back, knees, hands.  Is planning to follow-up with PCP regarding joint symptoms.  Also notices LE edema.  Had follow-up with Cardiologist recently- has ECHO and carotid ultrasounds ordered.    The patient was seen and examined by Rajendra Flanagan MD           Current Outpatient Medications   Medication     acetaminophen (TYLENOL 8 HOUR ARTHRITIS PAIN) 650 MG CR tablet     aspirin (ASA) 81 MG EC tablet     atorvastatin (LIPITOR) 80 MG tablet     azelastine (ASTELIN) 0.1 % nasal spray     clopidogrel (PLAVIX) 75 MG tablet     diclofenac (VOLTAREN) 1 % topical gel     lidocaine (LIDODERM) 5 % patch     losartan (COZAAR) 25 MG tablet     metoprolol succinate ER (TOPROL-XL) 25 MG 24 hr tablet     nystatin (MYCOSTATIN) 238829 UNIT/ML suspension     oxyCODONE (ROXICODONE) 5 MG tablet      Specialty Vitamins Products (PROSTATE PO)     tamsulosin (FLOMAX) 0.4 MG capsule     MULTI VITAMIN MENS OR     No current facility-administered medications for this visit.     Allergies   Allergen Reactions     Amoxicillin Diarrhea            Lisinopril Cough     Past Medical History:   Diagnosis Date      Coronary artery disease, 4/ 2010, status post drug-eluting stent placement to the LAD and balloon angioplasty to the obtuse marginal.  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       Past Surgical History:   Procedure Laterality Date     BRONCHOSCOPY (RIGID OR FLEXIBLE), DIAGNOSTIC  7/14/2011    Procedure:COMBINED BRONCHOSCOPY (RIGID OR FLEXIBLE), LAVAGE; Surgeon:REYNA BAILEY; Location: GI     BRONCHOSCOPY (RIGID OR FLEXIBLE), DIAGNOSTIC N/A 5/29/2019    Procedure: BRONCHOSCOPY, WITH BRONCHOALVEOLAR LAVAGE;  Surgeon: Perlman, David Morris, MD;  Location: [facility]     COLONOSCOPY       CV HEART CATHETERIZATION WITH POSSIBLE INTERVENTION N/A 10/16/2019    Procedure: Heart Catheterization with Possible Intervention;  Surgeon: Robinson Robbins MD;  Location:  HEART CARDIAC CATH LAB     CV HEART CATHETERIZATION WITH POSSIBLE INTERVENTION N/A 11/13/2019    Procedure: Heart Catheterization with Possible Intervention;  Surgeon: Hannah Ashraf MD;  Location:  HEART CARDIAC CATH LAB     ENT SURGERY      tonsils     ORTHOPEDIC SURGERY      rt knee arthroscopy     SURGICAL  HISTORY OF -       tonsils     SURGICAL HISTORY OF -   86    1.Exam under anesthesia, 2.Arthroscopy, 3.Arthroscopic medial meniscectomy, 4.arthroscopic trimming of patellar articular cartilage.     SURGICAL HISTORY OF -       vasectomy     SURGICAL HISTORY OF -   89    colonoscopy     SURGICAL HISTORY OF -   10/20/92    flexible sigmoidoscopy     SURGICAL HISTORY OF -   2000    colonoscopy and polypectomy     THORACOSCOPIC BIOPSY LUNG  2011    Procedure:THORACOSCOPIC BIOPSY LUNG; Video Assisted Right Thoracoscopy with Biopsy; Surgeon:JIM EPSTEIN; Location: OR       Social History     Socioeconomic History     Marital status:      Spouse name: Not on file     Number of children: Not on file     Years of education: Not on file     Highest education level: Not on file   Occupational History     Employer: RETIRED   Tobacco Use     Smoking status: Former Smoker     Packs/day: 0.50     Years: 19.00     Pack years: 9.50     Types: Cigarettes     Start date:      Quit date: 1975     Years since quittin.5     Smokeless tobacco: Former User     Types: Snuff     Tobacco comment: started smoking at 21 years of age   Substance and Sexual Activity     Alcohol use: Yes     Alcohol/week: 0.0 standard drinks     Comment: occ.      Drug use: No     Sexual activity: Yes     Partners: Female   Other Topics Concern     Parent/sibling w/ CABG, MI or angioplasty before 65F 55M? No      Service Not Asked     Blood Transfusions Not Asked     Caffeine Concern Not Asked     Occupational Exposure Not Asked     Hobby Hazards Not Asked     Sleep Concern Not Asked     Stress Concern Not Asked     Weight Concern Not Asked     Special Diet No     Back Care Not Asked     Exercise Yes     Comment: active     Bike Helmet Not Asked     Seat Belt Not Asked     Self-Exams Not Asked   Social History Narrative     Not on file     Social Determinants of Health     Financial Resource Strain:       Difficulty of Paying Living Expenses:    Food Insecurity:      Worried About Running Out of Food in the Last Year:      Ran Out of Food in the Last Year:    Transportation Needs:      Lack of Transportation (Medical):      Lack of Transportation (Non-Medical):    Physical Activity:      Days of Exercise per Week:      Minutes of Exercise per Session:    Stress:      Feeling of Stress :    Social Connections:      Frequency of Communication with Friends and Family:      Frequency of Social Gatherings with Friends and Family:      Attends [employer] Services:      Active Member of Clubs or Organizations:      Attends Club or Organization Meetings:      Marital Status:    Intimate Partner Violence:      Fear of Current or Ex-Partner:      Emotionally Abused:      Physically Abused:      Sexually Abused:        ROS Pulmonary  A complete ROS was otherwise negative except as noted in the HPI.  /66   Pulse 77   SpO2 95%   Exam:   GENERAL APPEARANCE: Well developed, well nourished, alert, and in no apparent distress.  EYES: PERRL, EOMI  HENT: Nasal mucosa with no edema and no hyperemia. No nasal polyps.  EARS: Canals clear, TMs normal  MOUTH: Oral mucosa is moist, without any lesions, no tonsillar enlargement, no oropharyngeal exudate.  NECK: supple, no masses, no thyromegaly.  LYMPHATICS: No significant axillary, cervical, or supraclavicular nodes.  RESP:  Mildly decreased air flow throughout.  No crackles. No rhonchi. No wheezes.  CV: Normal S1, S2, regular rhythm, normal rate. No murmur.  No rub. No gallop. No LE edema.   ABDOMEN:  Bowel sounds normal, soft, nontender, no HSM or masses.   MS: extremities normal. No clubbing. No cyanosis.  SKIN: no rash on limited exam  NEURO: Mentation intact, speech normal, normal strength and tone, normal gait and stance  PSYCH: mentation appears normal. and affect normal/bright  Results:  No results found for this or any previous visit (from the past 168 hour(s)).    Assessment  and plan:   86 year old male with a past medical history significant for mantle cell lymphoma s/p autologous PBSCT, HTN, and relapsed . Had remained off prednisone for over one year.  Also with previous history of fungal pneumonia.  Re-presented to clinic with recurrence of symptoms for past 1-1.5 years and CXR changes with decrease in PFT's.  Overall concerning for recurrence of infection or , with duration of symptoms suspected this was a recurrence of the .  Bronchoscopy with BAL performed and all cultures were negative; started on prednisone 40 mg.  CT chest improved while on 40 mg of prednisone but leveled off with decrease to 20 mg; symptoms never fully improved on higher or lower dosages of prednisone.  Recently found to ischemic cardiac changes- s/p stent but has not felt significantly better after that procedure.  Unclear if had ongoing cardiac ischemia, not fully treated  or another underlying interstitial lung disease.  Some component of deconditioning is likely present- awaiting cardiac rehab.  Underwent further cardiac stenting with an improvement in his symptoms.  Likely component of deconditioning at present.  Unlikely  is playing a role at present, has been maintained off prednisone for several months.     1. Can start Azithromycin if develops increased cough or sputum production; generally resolves after 7 days, can use up to 14 days.  Refills provided.  2. Follow-up with Cardiologist and ECHO  3. Asked to call the clinic if develops increased SOB or cough prior to going to [place] in October       RTC when he returns from [place] in the Spring.  Asked him to contact clinic if develops increased SOB or cough

## 2021-07-22 NOTE — LETTER
7/22/2021         RE: Dangelo Hernández  [address] [facility] [address]        Dear Colleague,    Thank you for referring your patient, Dangelo Hernández, to the [facility] FOR LUNG SCIENCE AND [facility] [place]. Please see a copy of my visit note below.    Reason for Visit  Dangelo Hernández is a 86 year old year old male who is being seen for Follow Up (BMT)    Pulmonary HPI  Mr. 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 At the completion of therapy, he continued to be SOB and a subsequent VATS biopsy revealed , therapy with steroids was initiated for 6 months and a few months after completion of therapy his clinical and radiographic findings had relapsed and he was re-initiated on steroids and V-Fend for filamentous fungi on a BAL.    Last seen by Virtual visit 10 months ago, last seen personally in clinic 20 months ago, last chest CT here was in November 2019. At his last visit he was doing OK, was using azithromycin intermittently for increased symptoms.  Goal was to keep off prednisone. Since his last visit he thinks his breathing is unchanged. Still with intermittent productive cough for which he takes azithromycin if his sputum changes color.  Currently is most troubled by joint pain- back, knees, hands.  Is planning to follow-up with PCP regarding joint symptoms.  Also notices LE edema.  Had follow-up with Cardiologist recently- has ECHO and carotid ultrasounds ordered.    The patient was seen and examined by Rajendra Flanagan MD           Current Outpatient Medications   Medication     acetaminophen (TYLENOL 8 HOUR ARTHRITIS PAIN) 650 MG CR tablet     aspirin (ASA) 81 MG EC tablet     atorvastatin (LIPITOR) 80 MG tablet     azelastine (ASTELIN) 0.1 % nasal spray      clopidogrel (PLAVIX) 75 MG tablet     diclofenac (VOLTAREN) 1 % topical gel     lidocaine (LIDODERM) 5 % patch     losartan (COZAAR) 25 MG tablet     metoprolol succinate ER (TOPROL-XL) 25 MG 24 hr tablet     nystatin (MYCOSTATIN) 968987 UNIT/ML suspension     oxyCODONE (ROXICODONE) 5 MG tablet     Specialty Vitamins Products (PROSTATE PO)     tamsulosin (FLOMAX) 0.4 MG capsule     MULTI VITAMIN MENS OR     No current facility-administered medications for this visit.     Allergies   Allergen Reactions     Amoxicillin Diarrhea            Lisinopril Cough     Past Medical History:   Diagnosis Date      Coronary artery disease, 4/ 2010, status post drug-eluting stent placement to the LAD and balloon angioplasty to the obtuse marginal.  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       Past Surgical History:   Procedure Laterality Date     BRONCHOSCOPY (RIGID OR FLEXIBLE), DIAGNOSTIC  7/14/2011    Procedure:COMBINED BRONCHOSCOPY (RIGID OR FLEXIBLE), LAVAGE; Surgeon:REYNA BAILEY; Location: GI     BRONCHOSCOPY (RIGID OR FLEXIBLE), DIAGNOSTIC N/A 5/29/2019    Procedure: BRONCHOSCOPY, WITH BRONCHOALVEOLAR LAVAGE;  Surgeon: Perlman, David Morris, MD;  Location:  GI     COLONOSCOPY       CV HEART CATHETERIZATION WITH POSSIBLE INTERVENTION N/A 10/16/2019    Procedure: Heart Catheterization with Possible Intervention;  Surgeon: Robinson Robbins MD;  Location:  HEART CARDIAC CATH  LAB     CV HEART CATHETERIZATION WITH POSSIBLE INTERVENTION N/A 2019    Procedure: Heart Catheterization with Possible Intervention;  Surgeon: Hannah Ashraf MD;  Location:  HEART CARDIAC CATH LAB     ENT SURGERY      tonsils     ORTHOPEDIC SURGERY      rt knee arthroscopy     SURGICAL HISTORY OF -       tonsils     SURGICAL HISTORY OF -   86    1.Exam under anesthesia, 2.Arthroscopy, 3.Arthroscopic medial meniscectomy, 4.arthroscopic trimming of patellar articular cartilage.     SURGICAL HISTORY OF -       vasectomy     SURGICAL HISTORY OF -   90    colonoscopy     SURGICAL HISTORY OF -   10/20/92    flexible sigmoidoscopy     SURGICAL HISTORY OF -   2000    colonoscopy and polypectomy     THORACOSCOPIC BIOPSY LUNG  2011    Procedure:THORACOSCOPIC BIOPSY LUNG; Video Assisted Right Thoracoscopy with Biopsy; Surgeon:JIM EPSTEIN; Location: OR       Social History     Socioeconomic History     Marital status:      Spouse name: Not on file     Number of children: Not on file     Years of education: Not on file     Highest education level: Not on file   Occupational History     Employer: RETIRED   Tobacco Use     Smoking status: Former Smoker     Packs/day: 0.50     Years: 19.00     Pack years: 9.50     Types: Cigarettes     Start date:      Quit date: 1975     Years since quittin.5     Smokeless tobacco: Former User     Types: Snuff     Tobacco comment: started smoking at 21 years of age   Substance and Sexual Activity     Alcohol use: Yes     Alcohol/week: 0.0 standard drinks     Comment: occ.      Drug use: No     Sexual activity: Yes     Partners: Female   Other Topics Concern     Parent/sibling w/ CABG, MI or angioplasty before 65F 55M? No      Service Not Asked     Blood Transfusions Not Asked     Caffeine Concern Not Asked     Occupational Exposure Not Asked     Hobby Hazards Not Asked     Sleep Concern Not Asked     Stress Concern Not  Asked     Weight Concern Not Asked     Special Diet No     Back Care Not Asked     Exercise Yes     Comment: active     Bike Helmet Not Asked     Seat Belt Not Asked     Self-Exams Not Asked   Social History Narrative     Not on file     Social Determinants of Health     Financial Resource Strain:      Difficulty of Paying Living Expenses:    Food Insecurity:      Worried About Running Out of Food in the Last Year:      Ran Out of Food in the Last Year:    Transportation Needs:      Lack of Transportation (Medical):      Lack of Transportation (Non-Medical):    Physical Activity:      Days of Exercise per Week:      Minutes of Exercise per Session:    Stress:      Feeling of Stress :    Social Connections:      Frequency of Communication with Friends and Family:      Frequency of Social Gatherings with Friends and Family:      Attends Islam Services:      Active Member of Clubs or Organizations:      Attends Club or Organization Meetings:      Marital Status:    Intimate Partner Violence:      Fear of Current or Ex-Partner:      Emotionally Abused:      Physically Abused:      Sexually Abused:        ROS Pulmonary  A complete ROS was otherwise negative except as noted in the HPI.  /66   Pulse 77   SpO2 95%   Exam:   GENERAL APPEARANCE: Well developed, well nourished, alert, and in no apparent distress.  EYES: PERRL, EOMI  HENT: Nasal mucosa with no edema and no hyperemia. No nasal polyps.  EARS: Canals clear, TMs normal  MOUTH: Oral mucosa is moist, without any lesions, no tonsillar enlargement, no oropharyngeal exudate.  NECK: supple, no masses, no thyromegaly.  LYMPHATICS: No significant axillary, cervical, or supraclavicular nodes.  RESP:  Mildly decreased air flow throughout.  No crackles. No rhonchi. No wheezes.  CV: Normal S1, S2, regular rhythm, normal rate. No murmur.  No rub. No gallop. No LE edema.   ABDOMEN:  Bowel sounds normal, soft, nontender, no HSM or masses.   MS: extremities normal. No  clubbing. No cyanosis.  SKIN: no rash on limited exam  NEURO: Mentation intact, speech normal, normal strength and tone, normal gait and stance  PSYCH: mentation appears normal. and affect normal/bright  Results:  No results found for this or any previous visit (from the past 168 hour(s)).    Assessment and plan:   86 year old male with a past medical history significant for mantle cell lymphoma s/p autologous PBSCT, HTN, and relapsed . Had remained off prednisone for over one year.  Also with previous history of fungal pneumonia.  Re-presented to clinic with recurrence of symptoms for past 1-1.5 years and CXR changes with decrease in PFT's.  Overall concerning for recurrence of infection or , with duration of symptoms suspected this was a recurrence of the .  Bronchoscopy with BAL performed and all cultures were negative; started on prednisone 40 mg.  CT chest improved while on 40 mg of prednisone but leveled off with decrease to 20 mg; symptoms never fully improved on higher or lower dosages of prednisone.  Recently found to ischemic cardiac changes- s/p stent but has not felt significantly better after that procedure.  Unclear if had ongoing cardiac ischemia, not fully treated  or another underlying interstitial lung disease.  Some component of deconditioning is likely present- awaiting cardiac rehab.  Underwent further cardiac stenting with an improvement in his symptoms.  Likely component of deconditioning at present.  Unlikely  is playing a role at present, has been maintained off prednisone for several months.     1. Can start Azithromycin if develops increased cough or sputum production; generally resolves after 7 days, can use up to 14 days.  Refills provided.  2. Follow-up with Cardiologist and ECHO  3. Asked to call the clinic if develops increased SOB or cough prior to going to [place] in October       RTC when he returns from [place] in the Spring.  Asked him to contact clinic if develops  increased SOB or cough            Again, thank you for allowing me to participate in the care of your patient.        Sincerely,        Rajendra Flanagan MD

## 2021-09-22 ENCOUNTER — OFFICE VISIT (OUTPATIENT)
Dept: FAMILY MEDICINE | Facility: CLINIC | Age: 86
End: 2021-09-22
Payer: MEDICARE

## 2021-09-22 VITALS
HEART RATE: 73 BPM | OXYGEN SATURATION: 97 % | DIASTOLIC BLOOD PRESSURE: 60 MMHG | SYSTOLIC BLOOD PRESSURE: 130 MMHG | WEIGHT: 166 LBS | RESPIRATION RATE: 20 BRPM | BODY MASS INDEX: 26.39 KG/M2

## 2021-09-22 DIAGNOSIS — Z23 NEED FOR PROPHYLACTIC VACCINATION AND INOCULATION AGAINST INFLUENZA: ICD-10-CM

## 2021-09-22 DIAGNOSIS — R35.0 URINARY FREQUENCY: ICD-10-CM

## 2021-09-22 DIAGNOSIS — I73.9 PERIPHERAL VASCULAR DISEASE, UNSPECIFIED (H): ICD-10-CM

## 2021-09-22 DIAGNOSIS — R73.9 BLOOD GLUCOSE ELEVATED: ICD-10-CM

## 2021-09-22 DIAGNOSIS — I10 HYPERTENSION GOAL BP (BLOOD PRESSURE) < 140/90: Primary | ICD-10-CM

## 2021-09-22 LAB
ALBUMIN SERPL-MCNC: 3 G/DL (ref 3.4–5)
ALBUMIN UR-MCNC: ABNORMAL MG/DL
ALP SERPL-CCNC: 100 U/L (ref 40–150)
ALT SERPL W P-5'-P-CCNC: 32 U/L (ref 0–70)
AMORPH CRY #/AREA URNS HPF: ABNORMAL /HPF
ANION GAP SERPL CALCULATED.3IONS-SCNC: 6 MMOL/L (ref 3–14)
APPEARANCE UR: CLEAR
AST SERPL W P-5'-P-CCNC: 19 U/L (ref 0–45)
BACTERIA #/AREA URNS HPF: ABNORMAL /HPF
BILIRUB SERPL-MCNC: 0.2 MG/DL (ref 0.2–1.3)
BILIRUB UR QL STRIP: NEGATIVE
BUN SERPL-MCNC: 25 MG/DL (ref 7–30)
CALCIUM SERPL-MCNC: 8.4 MG/DL (ref 8.5–10.1)
CHLORIDE BLD-SCNC: 111 MMOL/L (ref 94–109)
CO2 SERPL-SCNC: 25 MMOL/L (ref 20–32)
COLOR UR AUTO: YELLOW
CREAT SERPL-MCNC: 0.85 MG/DL (ref 0.66–1.25)
ERYTHROCYTE [DISTWIDTH] IN BLOOD BY AUTOMATED COUNT: 16.4 % (ref 10–15)
GFR SERPL CREATININE-BSD FRML MDRD: 80 ML/MIN/1.73M2
GLUCOSE BLD-MCNC: 128 MG/DL (ref 70–99)
GLUCOSE UR STRIP-MCNC: NEGATIVE MG/DL
HBA1C MFR BLD: 5.8 % (ref 0–5.6)
HCT VFR BLD AUTO: 36.2 % (ref 40–53)
HGB BLD-MCNC: 12.1 G/DL (ref 13.3–17.7)
HGB UR QL STRIP: NEGATIVE
KETONES UR STRIP-MCNC: ABNORMAL MG/DL
LEUKOCYTE ESTERASE UR QL STRIP: NEGATIVE
MCH RBC QN AUTO: 31.3 PG (ref 26.5–33)
MCHC RBC AUTO-ENTMCNC: 33.4 G/DL (ref 31.5–36.5)
MCV RBC AUTO: 94 FL (ref 78–100)
NITRATE UR QL: NEGATIVE
PH UR STRIP: 5.5 [PH] (ref 5–7)
PLATELET # BLD AUTO: 279 10E3/UL (ref 150–450)
POTASSIUM BLD-SCNC: 3.7 MMOL/L (ref 3.4–5.3)
PROT SERPL-MCNC: 6.2 G/DL (ref 6.8–8.8)
RBC # BLD AUTO: 3.86 10E6/UL (ref 4.4–5.9)
RBC #/AREA URNS AUTO: ABNORMAL /HPF
SODIUM SERPL-SCNC: 142 MMOL/L (ref 133–144)
SP GR UR STRIP: 1.02 (ref 1–1.03)
SQUAMOUS #/AREA URNS AUTO: ABNORMAL /LPF
UROBILINOGEN UR STRIP-ACNC: 0.2 E.U./DL
WBC # BLD AUTO: 6.7 10E3/UL (ref 4–11)
WBC #/AREA URNS AUTO: ABNORMAL /HPF

## 2021-09-22 PROCEDURE — 80053 COMPREHEN METABOLIC PANEL: CPT | Performed by: FAMILY MEDICINE

## 2021-09-22 PROCEDURE — 36415 COLL VENOUS BLD VENIPUNCTURE: CPT | Performed by: FAMILY MEDICINE

## 2021-09-22 PROCEDURE — 90662 IIV NO PRSV INCREASED AG IM: CPT | Performed by: FAMILY MEDICINE

## 2021-09-22 PROCEDURE — 83036 HEMOGLOBIN GLYCOSYLATED A1C: CPT | Performed by: FAMILY MEDICINE

## 2021-09-22 PROCEDURE — 99214 OFFICE O/P EST MOD 30 MIN: CPT | Mod: 25 | Performed by: FAMILY MEDICINE

## 2021-09-22 PROCEDURE — G0008 ADMIN INFLUENZA VIRUS VAC: HCPCS | Performed by: FAMILY MEDICINE

## 2021-09-22 PROCEDURE — 85027 COMPLETE CBC AUTOMATED: CPT | Performed by: FAMILY MEDICINE

## 2021-09-22 PROCEDURE — 81001 URINALYSIS AUTO W/SCOPE: CPT | Performed by: FAMILY MEDICINE

## 2021-09-22 RX ORDER — METOPROLOL SUCCINATE 50 MG/1
50 TABLET, EXTENDED RELEASE ORAL DAILY
COMMUNITY
Start: 2021-08-25 | End: 2022-06-03

## 2021-09-22 RX ORDER — ROSUVASTATIN CALCIUM 40 MG/1
40 TABLET, COATED ORAL DAILY
COMMUNITY
Start: 2021-08-12 | End: 2022-06-03

## 2021-09-22 RX ORDER — ASPIRIN 325 MG
325 TABLET ORAL DAILY
COMMUNITY
End: 2023-01-01

## 2021-09-22 RX ORDER — CHOLECALCIFEROL (VITAMIN D3) 50 MCG
1 TABLET ORAL DAILY
COMMUNITY
End: 2022-06-03

## 2021-09-22 RX ORDER — DIPHENHYDRAMINE HCL 25 MG
25 CAPSULE ORAL DAILY
COMMUNITY
End: 2022-06-03

## 2021-09-22 RX ORDER — UREA 10 %
500 LOTION (ML) TOPICAL DAILY
COMMUNITY
End: 2022-06-03

## 2021-09-22 RX ORDER — AZELASTINE 1 MG/ML
1 SPRAY, METERED NASAL 2 TIMES DAILY PRN
Qty: 30 ML | Refills: 1 | Status: SHIPPED | OUTPATIENT
Start: 2021-09-22 | End: 2022-10-20

## 2021-09-22 NOTE — NURSING NOTE
"Chief Complaint   Patient presents with     Hypertension     /60   Pulse 73   Resp 20   Wt 75.3 kg (166 lb)   SpO2 97%   BMI 26.39 kg/m   Estimated body mass index is 26.39 kg/m  as calculated from the following:    Height as of 5/20/21: 1.689 m (5' 6.5\").    Weight as of this encounter: 75.3 kg (166 lb).  Patient presents to the clinic using No DME      Health Maintenance that is potentially due pending provider review:    Health Maintenance Due   Topic Date Due     URINE DRUG SCREEN  Never done     ANNUAL REVIEW OF HM ORDERS  Never done     COPD ACTION PLAN  Never done     ZOSTER IMMUNIZATION (2 of 3) 12/11/2014     INFLUENZA VACCINE (1) 09/01/2021        n/a        "

## 2021-09-22 NOTE — LETTER
September 24, 2021      Dangelo Hernández  [address] [facility] [address]        Dear ,    We are writing to inform you of your test results.    Your test results fall within the expected range(s) or remain unchanged from previous results.  Please continue with current treatment plan.    Resulted Orders   UA reflex to Microscopic   Result Value Ref Range    Color Urine Yellow Colorless, Straw, Light Yellow, Yellow    Appearance Urine Clear Clear    Glucose Urine Negative Negative mg/dL    Bilirubin Urine Negative Negative    Ketones Urine Trace (A) Negative mg/dL    Specific Gravity Urine 1.025 1.003 - 1.035    Blood Urine Negative Negative    pH Urine 5.5 5.0 - 7.0    Protein Albumin Urine Trace (A) Negative mg/dL    Urobilinogen Urine 0.2 0.2, 1.0 E.U./dL    Nitrite Urine Negative Negative    Leukocyte Esterase Urine Negative Negative   CBC with platelets   Result Value Ref Range    WBC Count 6.7 4.0 - 11.0 10e3/uL    RBC Count 3.86 (L) 4.40 - 5.90 10e6/uL    Hemoglobin 12.1 (L) 13.3 - 17.7 g/dL    Hematocrit 36.2 (L) 40.0 - 53.0 %    MCV 94 78 - 100 fL    MCH 31.3 26.5 - 33.0 pg    MCHC 33.4 31.5 - 36.5 g/dL    RDW 16.4 (H) 10.0 - 15.0 %    Platelet Count 279 150 - 450 10e3/uL   Comprehensive metabolic panel   Result Value Ref Range    Sodium 142 133 - 144 mmol/L    Potassium 3.7 3.4 - 5.3 mmol/L    Chloride 111 (H) 94 - 109 mmol/L    Carbon Dioxide (CO2) 25 20 - 32 mmol/L    Anion Gap 6 3 - 14 mmol/L    Urea Nitrogen 25 7 - 30 mg/dL    Creatinine 0.85 0.66 - 1.25 mg/dL    Calcium 8.4 (L) 8.5 - 10.1 mg/dL    Glucose 128 (H) 70 - 99 mg/dL    Alkaline Phosphatase 100 40 - 150 U/L    AST 19 0 - 45 U/L    ALT 32 0 - 70 U/L    Protein Total 6.2 (L) 6.8 - 8.8 g/dL    Albumin 3.0 (L) 3.4 - 5.0 g/dL    Bilirubin Total 0.2 0.2 - 1.3 mg/dL    GFR Estimate 80 >60 mL/min/1.73m2      Comment:      As of July 11, 2021, eGFR is calculated by the CKD-EPI creatinine equation, without race adjustment.  eGFR can be influenced by muscle mass, exercise, and diet. The reported eGFR is an estimation only and is only applicable if the renal function is stable.   Urine Microscopic Exam   Result Value Ref Range    Bacteria Urine Few (A) None Seen /HPF    RBC Urine 0-2 0-2 /HPF /HPF    WBC Urine 0-5 0-5 /HPF /HPF    Squamous Epithelials Urine Few (A) None Seen /LPF    Amorphous Crystals Urine Few (A) None Seen /HPF   Hemoglobin A1c   Result Value Ref Range    Hemoglobin A1C 5.8 (H) 0.0 - 5.6 %      Comment:      Normal <5.7%   Prediabetes 5.7-6.4%    Diabetes 6.5% or higher     Note: Adopted from ADA consensus guidelines.       If you have any questions or concerns, please call the clinic at the number listed above.       Sincerely,      Zulma Lepe MD

## 2021-09-22 NOTE — PROGRESS NOTES
Assessment & Plan     Hypertension goal BP (blood pressure) < 140/90  Stable no change in treatment plan.   - Comprehensive metabolic panel; Future  - CBC with platelets; Future  - CBC with platelets  - Comprehensive metabolic panel    Peripheral vascular disease, unspecified (H)  Stable no change in treatment plan.     Urinary frequency  Neg uti   - UA reflex to Microscopic; Future  - UA reflex to Microscopic  - Urine Microscopic Exam    Need for prophylactic vaccination and inoculation against influenza    - INFLUENZA, QUAD, HIGH DOSE, PF, 65YR + (FLUZONE HD)    Blood glucose elevated    - Hemoglobin A1c; Future  - Hemoglobin A1c                 Return in about 6 months (around 3/22/2022) for using an in person visit, with me.    Zulma Lepe MD  [facility]    Beata Pierson is a 86 year old who presents for the following health issues     HPI     Hypertension Follow-up      Do you check your blood pressure regularly outside of the clinic? Yes 120'S/60'S-70'S  Pulse - 82    Are you following a low salt diet? No    Are your blood pressures ever more than 140 on the top number (systolic) OR more   than 90 on the bottom number (diastolic), for example 140/90? No    Vascular Disease Follow-up      How often do you take nitroglycerin? Occasionally has only used 2 times     Do you take an aspirin every day? Yes    Feels that he has increased freq of urine no pain no incont no leaking no difficulty with stream   This is not new           Review of Systems   Constitutional, HEENT, cardiovascular, pulmonary, gi and gu systems are negative, except as otherwise noted.      Objective    /60   Pulse 73   Resp 20   Wt 75.3 kg (166 lb)   SpO2 97%   BMI 26.39 kg/m    Body mass index is 26.39 kg/m .  Physical Exam   GENERAL APPEARANCE: healthy, alert and no distress  RESP: lungs clear to auscultation - no rales, rhonchi or wheezes  CV: regular rates and rhythm, normal S1 S2,  no S3 or S4 and no murmur, click or rub  MS: extremities normal- no gross deformities noted  SKIN: no suspicious lesions or rashes  PSYCH: mentation appears normal and affect normal/bright

## 2021-09-29 ENCOUNTER — TRANSFERRED RECORDS (OUTPATIENT)
Dept: HEALTH INFORMATION MANAGEMENT | Facility: CLINIC | Age: 86
End: 2021-09-29

## 2021-10-04 ENCOUNTER — OFFICE VISIT (OUTPATIENT)
Dept: FAMILY MEDICINE | Facility: CLINIC | Age: 86
End: 2021-10-04
Payer: MEDICARE

## 2021-10-04 ENCOUNTER — ANCILLARY PROCEDURE (OUTPATIENT)
Dept: GENERAL RADIOLOGY | Facility: CLINIC | Age: 86
End: 2021-10-04
Attending: NURSE PRACTITIONER
Payer: MEDICARE

## 2021-10-04 VITALS
DIASTOLIC BLOOD PRESSURE: 70 MMHG | TEMPERATURE: 97.6 F | WEIGHT: 166 LBS | OXYGEN SATURATION: 97 % | SYSTOLIC BLOOD PRESSURE: 130 MMHG | BODY MASS INDEX: 26.06 KG/M2 | HEIGHT: 67 IN | HEART RATE: 86 BPM | RESPIRATION RATE: 16 BRPM

## 2021-10-04 DIAGNOSIS — Z01.818 PRE-OP EXAM: ICD-10-CM

## 2021-10-04 DIAGNOSIS — Z01.818 PRE-OP EXAM: Primary | ICD-10-CM

## 2021-10-04 DIAGNOSIS — J42 CHRONIC BRONCHITIS, UNSPECIFIED CHRONIC BRONCHITIS TYPE (H): ICD-10-CM

## 2021-10-04 DIAGNOSIS — I25.119 CORONARY ARTERY DISEASE INVOLVING NATIVE CORONARY ARTERY OF NATIVE HEART WITH ANGINA PECTORIS (H): ICD-10-CM

## 2021-10-04 DIAGNOSIS — M48.061 SPINAL STENOSIS OF LUMBAR REGION WITHOUT NEUROGENIC CLAUDICATION: ICD-10-CM

## 2021-10-04 LAB
ANION GAP SERPL CALCULATED.3IONS-SCNC: 7 MMOL/L (ref 3–14)
BUN SERPL-MCNC: 28 MG/DL (ref 7–30)
CALCIUM SERPL-MCNC: 8.1 MG/DL (ref 8.5–10.1)
CHLORIDE BLD-SCNC: 108 MMOL/L (ref 94–109)
CO2 SERPL-SCNC: 22 MMOL/L (ref 20–32)
CREAT SERPL-MCNC: 0.81 MG/DL (ref 0.66–1.25)
ERYTHROCYTE [DISTWIDTH] IN BLOOD BY AUTOMATED COUNT: 16.2 % (ref 10–15)
GFR SERPL CREATININE-BSD FRML MDRD: 82 ML/MIN/1.73M2
GLUCOSE BLD-MCNC: 119 MG/DL (ref 70–99)
HCT VFR BLD AUTO: 38.6 % (ref 40–53)
HGB BLD-MCNC: 13.1 G/DL (ref 13.3–17.7)
INR PPP: 0.92 (ref 0.85–1.15)
MCH RBC QN AUTO: 31.7 PG (ref 26.5–33)
MCHC RBC AUTO-ENTMCNC: 33.9 G/DL (ref 31.5–36.5)
MCV RBC AUTO: 94 FL (ref 78–100)
PLATELET # BLD AUTO: 268 10E3/UL (ref 150–450)
POTASSIUM BLD-SCNC: 4 MMOL/L (ref 3.4–5.3)
RBC # BLD AUTO: 4.13 10E6/UL (ref 4.4–5.9)
SODIUM SERPL-SCNC: 137 MMOL/L (ref 133–144)
WBC # BLD AUTO: 9.8 10E3/UL (ref 4–11)

## 2021-10-04 PROCEDURE — 85027 COMPLETE CBC AUTOMATED: CPT | Performed by: NURSE PRACTITIONER

## 2021-10-04 PROCEDURE — 99214 OFFICE O/P EST MOD 30 MIN: CPT | Performed by: NURSE PRACTITIONER

## 2021-10-04 PROCEDURE — 71046 X-RAY EXAM CHEST 2 VIEWS: CPT | Performed by: RADIOLOGY

## 2021-10-04 PROCEDURE — 85610 PROTHROMBIN TIME: CPT | Performed by: NURSE PRACTITIONER

## 2021-10-04 PROCEDURE — 93000 ELECTROCARDIOGRAM COMPLETE: CPT | Performed by: NURSE PRACTITIONER

## 2021-10-04 PROCEDURE — 36415 COLL VENOUS BLD VENIPUNCTURE: CPT | Performed by: NURSE PRACTITIONER

## 2021-10-04 PROCEDURE — 80048 BASIC METABOLIC PNL TOTAL CA: CPT | Performed by: NURSE PRACTITIONER

## 2021-10-04 ASSESSMENT — MIFFLIN-ST. JEOR: SCORE: 1393.66

## 2021-10-04 NOTE — PROGRESS NOTES
[facility]  [address] [place] [address]  Phone: [phone]  Fax: [phone]  Primary Provider: Dony Acuña  Pre-op Performing Provider: NYDIA DOMÍNGUEZ      PREOPERATIVE EVALUATION:  Today's date: 10/4/2021    Dangelo Hernández is a 86 year old male who presents for a preoperative evaluation.    Surgical Information:  Surgery/Procedure: back surgery  Surgery Location: [facility]  Surgeon:   Surgery Date: 10/12/21  Time of Surgery:   Where patient plans to recover: At home with family  Fax number for surgical facility: [phone]    Type of Anesthesia Anticipated: to be determined    Assessment & Plan     The proposed surgical procedure is considered INTERMEDIATE risk.    (Z01.818) Pre-op exam  (primary encounter diagnosis)  Comment:   Plan: EKG 12-lead complete w/read - Clinics, Basic         metabolic panel  (Ca, Cl, CO2, Creat, Gluc, K,         Na, BUN), CBC with platelets, XR Chest 2 Views            (M48.061) Spinal stenosis of lumbar region without neurogenic claudication  Comment:   Plan:     (J42) Chronic bronchitis, unspecified chronic bronchitis type (H)  Comment:   Plan: COPD ACTION PLAN            (I25.119) Coronary artery disease involving native coronary artery of native heart with angina pectoris (H)  Comment:   Plan: INR            Risks and Recommendations:  The patient has the following additional risks and recommendations for perioperative complications:   - No identified additional risk factors other than previously addressed    Medication Instructions:   - clopidrogel (Plavix), prasugrel (Effient), ticagrelor (Brilinta): No contraindication to stopping Plavix, HOLD 5-7 days before surgery.     RECOMMENDATION:  APPROVAL GIVEN to proceed with proposed procedure, without further diagnostic evaluation.             Subjective     HPI related to upcoming procedure: back surgery    Preop Questions 10/4/2021   1. Have you ever had  a heart attack or stroke? YES    2. Have you ever had surgery on your heart or blood vessels, such as a stent placement, a coronary artery bypass, or surgery on an artery in your head, neck, heart, or legs? YES    3. Do you have chest pain with activity? No   4. Do you have a history of  heart failure? No   5. Do you currently have a cold, bronchitis or symptoms of other infection? No   6. Do you have a cough, shortness of breath, or wheezing? YES    7. Do you or anyone in your family have previous history of blood clots? No   8. Do you or does anyone in your family have a serious bleeding problem such as prolonged bleeding following surgeries or cuts? No   9. Have you ever had problems with anemia or been told to take iron pills? No   10. Have you had any abnormal blood loss such as black, tarry or bloody stools? No   11. Have you ever had a blood transfusion? YES    11a. Have you ever had a transfusion reaction? No   12. Are you willing to have a blood transfusion if it is medically needed before, during, or after your surgery? Yes   13. Have you or any of your relatives ever had problems with anesthesia? No   14. Do you have sleep apnea, excessive snoring or daytime drowsiness? No   15. Do you have any artifical heart valves or other implanted medical devices like a pacemaker, defibrillator, or continuous glucose monitor? No   16. Do you have artificial joints? No   17. Are you allergic to latex? No       Health Care Directive:  Patient has a Health Care Directive on file      Preoperative Review of :   reviewed - controlled substances reflected in medication list.      Status of Chronic Conditions:  HYPERLIPIDEMIA - Patient has a long history of significant Hyperlipidemia requiring medication for treatment with recent good control. Patient reports no problems or side effects with the medication.     HYPERTENSION - Patient has longstanding history of HTN , currently denies any symptoms referable to elevated  "blood pressure. Specifically denies chest pain, palpitations, dyspnea, orthopnea, PND or peripheral edema. Blood pressure readings have been in normal range. Current medication regimen is as listed below. Patient denies any side effects of medication.     RENAL INSUFFICIENCY - Patient has a longstanding history of moderate-severe chronic renal insufficiency. Last Cr Estimated Creatinine Clearance: 72.3 mL/min (based on SCr of 0.81 mg/dL).  .       Review of Systems  Constitutional, neuro, ENT, endocrine, pulmonary, cardiac, gastrointestinal, genitourinary, musculoskeletal, integument and psychiatric systems are negative, except as otherwise noted.    Patient Active Problem List    Diagnosis Date Noted     Acute renal failure with other specified pathological lesion in kidney (H) 11/27/2011     Priority: High     Problem list name updated by automated process. Provider to review       Hypertension goal BP (blood pressure) < 140/90 12/09/2010     Priority: High     Peripheral vascular disease, unspecified (H) 09/22/2021     Priority: Medium     Spinal stenosis of lumbar region without neurogenic claudication 05/10/2021     Priority: Medium     Arthritis of both hands 05/10/2021     Priority: Medium     Arthritis of knee 05/10/2021     Priority: Medium     Chronic midline thoracic back pain 05/10/2021     Priority: Medium     ACS (acute coronary syndrome) (H) 10/15/2019     Priority: Medium     Advanced directives, counseling/discussion 08/12/2015     Priority: Medium     Patient does not have an Advance/Health Care Directive (HCD), given \"What is Advance Care Planning?\" flyer.    Yumiko Segura  August 12, 2015         Cryptogenic organizing pneumonia (H) 12/07/2012     Priority: Medium     GERD (gastroesophageal reflux disease) 05/02/2012     Priority: Medium     Pneumonia, candidial (H) 09/09/2011     Priority: Medium     Pneumonia in mycoses 09/09/2011     Priority: Medium     History of blood disorder 04/06/2011     " Priority: Medium     Encounter for long-term current use of medication 03/16/2011     Priority: Medium     Problem list name updated by automated process. Provider to review        Coronary artery disease, 4/ 2010, status post drug-eluting stent placement to the LAD and balloon angioplasty to the obtuse marginal.  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        Past Medical History:   Diagnosis Date      Coronary artery disease, 4/ 2010, status post drug-eluting stent placement to the LAD and balloon angioplasty to the obtuse marginal.  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     Past Surgical History:   Procedure Laterality Date     BRONCHOSCOPY (RIGID OR FLEXIBLE), DIAGNOSTIC  7/14/2011    Procedure:COMBINED BRONCHOSCOPY (RIGID OR FLEXIBLE), LAVAGE; Surgeon:REYNA BAILEY; Location:[facility]     BRONCHOSCOPY (RIGID OR FLEXIBLE), DIAGNOSTIC N/A 5/29/2019    Procedure: BRONCHOSCOPY, WITH BRONCHOALVEOLAR LAVAGE;  Surgeon: Perlman, David Morris, MD;  Location: [facility]     COLONOSCOPY       CV HEART CATHETERIZATION WITH POSSIBLE INTERVENTION N/A 10/16/2019     Procedure: Heart Catheterization with Possible Intervention;  Surgeon: Robinson Robbins MD;  Location:  [facility]ARD[facility] CATH LAB     CV HEART CATHETERIZATION WITH POSSIBLE INTERVENTION N/A 11/13/2019    Procedure: Heart Catheterization with Possible Intervention;  Surgeon: Hannah Ashraf MD;  Location:  [facility]ARD[facility]ATH [facility]     ENT SURGERY      tonsils     ORTHOPEDIC SURGERY      rt knee arthroscopy     SURGICAL HISTORY OF -       tonsils     SURGICAL HISTORY OF -   1/19/84    1.Exam under anesthesia, 2.Arthroscopy, 3.Arthroscopic medial meniscectomy, 4.arthroscopic trimming of patellar articular cartilage.     SURGICAL HISTORY OF -       vasectomy     SURGICAL HISTORY OF -   2/19/90    colonoscopy     SURGICAL HISTORY OF -   10/20/92    flexible sigmoidoscopy     SURGICAL HISTORY OF -   6/14/2000    colonoscopy and polypectomy     THORACOSCOPIC BIOPSY LUNG  9/12/2011    Procedure:THORACOSCOPIC BIOPSY LUNG; Video Assisted Right Thoracoscopy with Biopsy; Surgeon:JIM EPSTEIN; Location:[facility]     Current Outpatient Medications   Medication Sig Dispense Refill     acetaminophen (TYLENOL 8 HOUR ARTHRITIS PAIN) 650 MG CR tablet Take 1,300 mg by mouth 2 times daily       aspirin (ASA) 325 MG tablet Take 325 mg by mouth daily       azelastine (ASTELIN) 0.1 % nasal spray Spray 1 spray into both nostrils 2 times daily as needed for allergies 30 mL 1     azithromycin (ZITHROMAX) 250 MG tablet Take 1 tablet (250 mg) by mouth daily (Patient not taking: Reported on 9/22/2021) 14 tablet 5     clopidogrel (PLAVIX) 75 MG tablet Take 1 tablet (75 mg) by mouth daily First loading dose is 600 mg (8 tablets), then once daily 90 tablet 3     cyanocobalamin (VITAMIN B-12) 500 MCG tablet Take 500 mcg by mouth daily       diclofenac (VOLTAREN) 1 % topical gel Apply 2 g topically 4 times daily as needed for moderate pain 50 g 3     diphenhydrAMINE (BENADRYL ALLERGY) 25 MG capsule Take 25 mg by mouth daily        esomeprazole (NEXIUM) 20 MG DR capsule Take 20 mg by mouth every morning (before breakfast) Take 30-60 minutes before eating.       lidocaine (LIDODERM) 5 % patch Place 1 patch onto the skin every 24 hours To prevent lidocaine toxicity, patient should be patch free for 12 hrs daily. (Patient not taking: Reported on 2021) 30 patch 1     losartan (COZAAR) 25 MG tablet Take 1 tablet (25 mg) by mouth daily (Patient not taking: Reported on 2021) 90 tablet 3     metoprolol succinate ER (TOPROL-XL) 25 MG 24 hr tablet Take 1 tablet (25 mg) by mouth daily (Patient not taking: Reported on 2021) 90 tablet 3     metoprolol succinate ER (TOPROL-XL) 50 MG 24 hr tablet Take 50 mg by mouth daily 1 AND 1/2 TABLETS DAILY       MULTI VITAMIN MENS OR Take 1 tablet by mouth daily  (Patient not taking: Reported on 2021)       nystatin (MYCOSTATIN) 215555 UNIT/ML suspension TAKE 5ML( 500,00) BY MOUTH FOUR TIMES DAILY (Patient not taking: Reported on 2021) 400 mL 0     oxyCODONE (ROXICODONE) 5 MG tablet Take 1 tablet (5 mg) by mouth every 6 hours as needed for severe pain (Patient not taking: Reported on 2021) 30 tablet 0     rosuvastatin (CRESTOR) 40 MG tablet Take 40 mg by mouth daily       Specialty Vitamins Products (PROSTATE PO) Take 1 tablet by mouth 2 times daily Super Beta Prostate (Patient not taking: Reported on 2021)       tamsulosin (FLOMAX) 0.4 MG capsule Take 1 capsule (0.4 mg) by mouth daily (Patient not taking: Reported on 2021) 90 capsule 0     vitamin D3 (VITAMIN D3) 50 mcg (2000 units) tablet Take 1 tablet by mouth daily         Allergies   Allergen Reactions     Amoxicillin Diarrhea            Lisinopril Cough        Social History     Tobacco Use     Smoking status: Former Smoker     Packs/day: 0.50     Years: 19.00     Pack years: 9.50     Types: Cigarettes     Start date:      Quit date: 1975     Years since quittin.7     Smokeless tobacco: Former User     Types:  "Snuff     Tobacco comment: started smoking at 21 years of age   Substance Use Topics     Alcohol use: Yes     Alcohol/week: 0.0 standard drinks     Comment: occ.      Family History   Problem Relation Age of Onset     Cancer Mother         Lung--did not smoke     Cardiovascular Father         MI age 73     Lipids Sister      Hypertension Sister      Cancer Sister         Ovarian- at age 65     History   Drug Use No         Objective     /70 (BP Location: Right arm, Cuff Size: Adult Regular)   Pulse 86   Temp 97.6  F (36.4  C) (Tympanic)   Resp 16   Ht 1.689 m (5' 6.5\")   Wt 75.3 kg (166 lb)   SpO2 97%   BMI 26.39 kg/m      Physical Exam    GENERAL APPEARANCE: healthy, alert and no distress     EYES: EOMI,  PERRL     HENT: ear canals and TM's normal and nose and mouth without ulcers or lesions     NECK: no adenopathy, no asymmetry, masses, or scars and thyroid normal to palpation     RESP: lungs clear to auscultation - no rales, rhonchi or wheezes     CV: regular rates and rhythm, normal S1 S2, no S3 or S4 and no murmur, click or rub     ABDOMEN:  soft, nontender, no HSM or masses and bowel sounds normal     MS: extremities normal- no gross deformities noted, no evidence of inflammation in joints, FROM in all extremities.     SKIN: no suspicious lesions or rashes     NEURO: Normal strength and tone, sensory exam grossly normal, mentation intact and speech normal     PSYCH: mentation appears normal. and affect normal/bright     LYMPHATICS: No cervical adenopathy    Recent Labs   Lab Test 21  1116 21  0902   HGB 12.1* 11.6*    333    137   POTASSIUM 3.7 3.9   CR 0.85 0.86   A1C 5.8*  --         Diagnostics:  Recent Results (from the past 48 hour(s))   Basic metabolic panel  (Ca, Cl, CO2, Creat, Gluc, K, Na, BUN)    Collection Time: 10/04/21  3:43 PM   Result Value Ref Range    Sodium 137 133 - 144 mmol/L    Potassium 4.0 3.4 - 5.3 mmol/L    Chloride 108 94 - 109 mmol/L    " Carbon Dioxide (CO2) 22 20 - 32 mmol/L    Anion Gap 7 3 - 14 mmol/L    Urea Nitrogen 28 7 - 30 mg/dL    Creatinine 0.81 0.66 - 1.25 mg/dL    Calcium 8.1 (L) 8.5 - 10.1 mg/dL    Glucose 119 (H) 70 - 99 mg/dL    GFR Estimate 82 >60 mL/min/1.73m2   CBC with platelets    Collection Time: 10/04/21  3:43 PM   Result Value Ref Range    WBC Count 9.8 4.0 - 11.0 10e3/uL    RBC Count 4.13 (L) 4.40 - 5.90 10e6/uL    Hemoglobin 13.1 (L) 13.3 - 17.7 g/dL    Hematocrit 38.6 (L) 40.0 - 53.0 %    MCV 94 78 - 100 fL    MCH 31.7 26.5 - 33.0 pg    MCHC 33.9 31.5 - 36.5 g/dL    RDW 16.2 (H) 10.0 - 15.0 %    Platelet Count 268 150 - 450 10e3/uL   INR    Collection Time: 10/04/21  3:43 PM   Result Value Ref Range    INR 0.92 0.85 - 1.15      EKG: appears normal, NSR, normal axis, normal intervals, no acute ST/T changes c/w ischemia, no LVH by voltage criteria, unchanged from previous tracings    Revised Cardiac Risk Index (RCRI):  The patient has the following serious cardiovascular risks for perioperative complications:   - High risk surgery (>5% cardiac complication risk) = 1 point     RCRI Interpretation: 1 point: Class II (low risk - 0.9% complication rate)           Signed Electronically by: MURALI Vera CNP  Copy of this evaluation report is provided to requesting physician.

## 2021-10-04 NOTE — LETTER
My COPD Action Plan     Name: Dangelo Hernández    YOB: 1935   Date: 10/4/2021    My doctor: MURALI Vera CNP   My clinic: [address] [address]  [phone]  My Controller Medicine:    Dose:      My Rescue Medicine:    Dose:      My Flare Up Medicine:    Dose:      My COPD Severity:       Use of Oxygen: Oxygen Not Prescribed      Make sure you've had your pneumonia   vaccines.          GREEN ZONE       Doing well today      Usual level of activity and exercise    Usual amount of cough and mucus    No shortness of breath    Usual level of health (thinking clearly, sleeping well, feel like eating) Actions:      Take daily medicines    Use oxygen as prescribed    Follow regular exercise and diet plan    Avoid cigarette smoke and other irritants that harm the lungs           YELLOW ZONE          Having a bad day or flare up      Short of breath more than usual    A lot more sputum (mucus) than usual    Sputum looks yellow, green, tan, brown or bloody    More coughing or wheezing    Fever or chills    Less energy; trouble completing activities    Trouble thinking or focusing    Using quick relief inhaler or nebulizer more often    Poor sleep; symptoms wake me up    Do not feel like eating Actions:      Get plenty of rest    Take daily medicines    Use quick relief inhaler every 4 hours    If you use oxygen, call you doctor to see if you should adjust your oxygen    Do breathing exercises or other things to help you relax    Let a loved one, friend or neighbor know you are feeling worse    Call your care team if you have 2 or more symptoms.  Start taking steroids or antibiotics if directed by your care team           RED ZONE       Need medical care now      Severe shortness of breath (feel you can't breathe)    Fever, chills    Not enough breath to do any activity    Trouble coughing up mucus, walking or talking    Blood in  mucus    Frequent coughing   Rescue medicines are not working    Not able to sleep because of breathing    Feel confused or drowsy    Chest pain    Actions:      Call your health care team.  If you cannot reach your care team, call 911 or go to the emergency room.        Annual Reminders:  Meet with Care Team, Flu Shot every Fall  Pharmacy:    [facility], [place] - [address] [id number] - [place], [place] - [address] [facility] [address] AT [facility] [address] [facility] #[id number] - [place], [place] - [address] AT [facility] #[id number] - [place], [place] - [address] AT [address]

## 2021-10-04 NOTE — LETTER
October 5, 2021      Dangelo Hernández  [address] [facility] [address]        Dear ,    We are writing to inform you of your test results.    preop labs are within normal limits your preop has been completed       Resulted Orders   Basic metabolic panel  (Ca, Cl, CO2, Creat, Gluc, K, Na, BUN)   Result Value Ref Range    Sodium 137 133 - 144 mmol/L    Potassium 4.0 3.4 - 5.3 mmol/L    Chloride 108 94 - 109 mmol/L    Carbon Dioxide (CO2) 22 20 - 32 mmol/L    Anion Gap 7 3 - 14 mmol/L    Urea Nitrogen 28 7 - 30 mg/dL    Creatinine 0.81 0.66 - 1.25 mg/dL    Calcium 8.1 (L) 8.5 - 10.1 mg/dL    Glucose 119 (H) 70 - 99 mg/dL    GFR Estimate 82 >60 mL/min/1.73m2      Comment:      As of July 11, 2021, eGFR is calculated by the CKD-EPI creatinine equation, without race adjustment. eGFR can be influenced by muscle mass, exercise, and diet. The reported eGFR is an estimation only and is only applicable if the renal function is stable.   CBC with platelets   Result Value Ref Range    WBC Count 9.8 4.0 - 11.0 10e3/uL    RBC Count 4.13 (L) 4.40 - 5.90 10e6/uL    Hemoglobin 13.1 (L) 13.3 - 17.7 g/dL    Hematocrit 38.6 (L) 40.0 - 53.0 %    MCV 94 78 - 100 fL    MCH 31.7 26.5 - 33.0 pg    MCHC 33.9 31.5 - 36.5 g/dL    RDW 16.2 (H) 10.0 - 15.0 %    Platelet Count 268 150 - 450 10e3/uL   INR   Result Value Ref Range    INR 0.92 0.85 - 1.15      Comment:      Effective 7/11/2021, the reference range for this assay has changed.       If you have any questions or concerns, please call the clinic at the number listed above.       Sincerely,      Caridad Solorio, MURALI CNP/dw

## 2021-10-05 NOTE — RESULT ENCOUNTER NOTE
Please Notify Dangelo  of test results preop labs are within normal limits your preop has been completed    Caridad Solorio CNP

## 2021-10-19 ENCOUNTER — TRANSFERRED RECORDS (OUTPATIENT)
Dept: HEALTH INFORMATION MANAGEMENT | Facility: CLINIC | Age: 86
End: 2021-10-19
Payer: COMMERCIAL

## 2022-03-25 NOTE — Clinical Note
She already mentioned feeling a lump in her throat when she came for office visit and she was advised to mention it to her PCP. Please advise again to inform her PCP about it and her choking incident. That may be a priority for now.    Request for BMP & NT proBNP before considering diurectic therapy.   Sheath inserted in the right femoral artery.

## 2022-06-03 ENCOUNTER — OFFICE VISIT (OUTPATIENT)
Dept: FAMILY MEDICINE | Facility: CLINIC | Age: 87
End: 2022-06-03
Payer: MEDICARE

## 2022-06-03 VITALS
TEMPERATURE: 97.8 F | OXYGEN SATURATION: 100 % | RESPIRATION RATE: 16 BRPM | SYSTOLIC BLOOD PRESSURE: 136 MMHG | WEIGHT: 167 LBS | HEART RATE: 61 BPM | BODY MASS INDEX: 26.55 KG/M2 | DIASTOLIC BLOOD PRESSURE: 70 MMHG

## 2022-06-03 DIAGNOSIS — I73.9 PERIPHERAL VASCULAR DISEASE, UNSPECIFIED (H): ICD-10-CM

## 2022-06-03 DIAGNOSIS — Z00.00 ENCOUNTER FOR MEDICARE ANNUAL WELLNESS EXAM: Primary | ICD-10-CM

## 2022-06-03 DIAGNOSIS — I25.119 CORONARY ARTERY DISEASE INVOLVING NATIVE CORONARY ARTERY OF NATIVE HEART WITH ANGINA PECTORIS (H): ICD-10-CM

## 2022-06-03 DIAGNOSIS — J42 CHRONIC BRONCHITIS, UNSPECIFIED CHRONIC BRONCHITIS TYPE (H): ICD-10-CM

## 2022-06-03 DIAGNOSIS — Z94.84 STEM CELLS TRANSPLANT STATUS (H): ICD-10-CM

## 2022-06-03 DIAGNOSIS — C85.90 LYMPHOMA, UNSPECIFIED BODY REGION, UNSPECIFIED LYMPHOMA TYPE (H): ICD-10-CM

## 2022-06-03 DIAGNOSIS — I25.118 CORONARY ARTERY DISEASE OF NATIVE HEART WITH STABLE ANGINA PECTORIS, UNSPECIFIED VESSEL OR LESION TYPE (H): ICD-10-CM

## 2022-06-03 DIAGNOSIS — R41.3 MEMORY LOSS: ICD-10-CM

## 2022-06-03 LAB
ALBUMIN SERPL-MCNC: 3.4 G/DL (ref 3.4–5)
ALP SERPL-CCNC: 68 U/L (ref 40–150)
ALT SERPL W P-5'-P-CCNC: 20 U/L (ref 0–70)
ANION GAP SERPL CALCULATED.3IONS-SCNC: 7 MMOL/L (ref 3–14)
AST SERPL W P-5'-P-CCNC: 15 U/L (ref 0–45)
BILIRUB SERPL-MCNC: 0.4 MG/DL (ref 0.2–1.3)
BUN SERPL-MCNC: 27 MG/DL (ref 7–30)
CALCIUM SERPL-MCNC: 8.5 MG/DL (ref 8.5–10.1)
CHLORIDE BLD-SCNC: 106 MMOL/L (ref 94–109)
CHOLEST SERPL-MCNC: 158 MG/DL
CO2 SERPL-SCNC: 23 MMOL/L (ref 20–32)
CREAT SERPL-MCNC: 0.82 MG/DL (ref 0.66–1.25)
FASTING STATUS PATIENT QL REPORTED: NO
GFR SERPL CREATININE-BSD FRML MDRD: 87 ML/MIN/1.73M2
GLUCOSE BLD-MCNC: 93 MG/DL (ref 70–99)
HDLC SERPL-MCNC: 57 MG/DL
LDLC SERPL CALC-MCNC: 76 MG/DL
NONHDLC SERPL-MCNC: 101 MG/DL
POTASSIUM BLD-SCNC: 4.3 MMOL/L (ref 3.4–5.3)
PROT SERPL-MCNC: 6.6 G/DL (ref 6.8–8.8)
SODIUM SERPL-SCNC: 136 MMOL/L (ref 133–144)
TRIGL SERPL-MCNC: 124 MG/DL
VIT B12 SERPL-MCNC: 236 PG/ML (ref 193–986)

## 2022-06-03 PROCEDURE — 99214 OFFICE O/P EST MOD 30 MIN: CPT | Mod: 25 | Performed by: FAMILY MEDICINE

## 2022-06-03 PROCEDURE — 80061 LIPID PANEL: CPT | Performed by: FAMILY MEDICINE

## 2022-06-03 PROCEDURE — 82607 VITAMIN B-12: CPT | Performed by: FAMILY MEDICINE

## 2022-06-03 PROCEDURE — 99397 PER PM REEVAL EST PAT 65+ YR: CPT | Performed by: FAMILY MEDICINE

## 2022-06-03 PROCEDURE — 36415 COLL VENOUS BLD VENIPUNCTURE: CPT | Performed by: FAMILY MEDICINE

## 2022-06-03 PROCEDURE — 80053 COMPREHEN METABOLIC PANEL: CPT | Performed by: FAMILY MEDICINE

## 2022-06-03 RX ORDER — CLOPIDOGREL BISULFATE 75 MG/1
75 TABLET ORAL DAILY
Qty: 90 TABLET | Refills: 3 | Status: SHIPPED | OUTPATIENT
Start: 2022-06-03 | End: 2022-06-07

## 2022-06-03 RX ORDER — METOPROLOL SUCCINATE 50 MG/1
50 TABLET, EXTENDED RELEASE ORAL DAILY
Qty: 135 TABLET | Refills: 3 | Status: SHIPPED | OUTPATIENT
Start: 2022-06-03 | End: 2022-09-30

## 2022-06-03 RX ORDER — AZITHROMYCIN 250 MG/1
250 TABLET, FILM COATED ORAL
COMMUNITY
Start: 2022-03-06 | End: 2022-06-22

## 2022-06-03 RX ORDER — ROSUVASTATIN CALCIUM 40 MG/1
40 TABLET, COATED ORAL DAILY
Qty: 90 TABLET | Refills: 3 | Status: SHIPPED | OUTPATIENT
Start: 2022-06-03 | End: 2023-01-01

## 2022-06-03 ASSESSMENT — PAIN SCALES - GENERAL: PAINLEVEL: NO PAIN (0)

## 2022-06-03 ASSESSMENT — ACTIVITIES OF DAILY LIVING (ADL): CURRENT_FUNCTION: HOUSEWORK REQUIRES ASSISTANCE

## 2022-06-03 NOTE — LETTER
June 7, 2022      Dangelo Hernández  [address] [facility] [address]        Dear ,    We are writing to inform you of your test results.  All labs look good   B12 is low normal so would recommend daily b 12 supplement 100mcg otc and repeat level in 2 months         Resulted Orders   Comprehensive metabolic panel   Result Value Ref Range    Sodium 136 133 - 144 mmol/L    Potassium 4.3 3.4 - 5.3 mmol/L    Chloride 106 94 - 109 mmol/L    Carbon Dioxide (CO2) 23 20 - 32 mmol/L    Anion Gap 7 3 - 14 mmol/L    Urea Nitrogen 27 7 - 30 mg/dL    Creatinine 0.82 0.66 - 1.25 mg/dL    Calcium 8.5 8.5 - 10.1 mg/dL    Glucose 93 70 - 99 mg/dL    Alkaline Phosphatase 68 40 - 150 U/L    AST 15 0 - 45 U/L    ALT 20 0 - 70 U/L    Protein Total 6.6 (L) 6.8 - 8.8 g/dL    Albumin 3.4 3.4 - 5.0 g/dL    Bilirubin Total 0.4 0.2 - 1.3 mg/dL    GFR Estimate 87 >60 mL/min/1.73m2      Comment:      Effective December 21, 2021 eGFRcr in adults is calculated using the 2021 CKD-EPI creatinine equation which includes age and gender (Krys et al., NE, DOI: 10.1056/DJDNss4573769)   Lipid panel reflex to direct LDL Fasting   Result Value Ref Range    Cholesterol 158 <200 mg/dL    Triglycerides 124 <150 mg/dL    Direct Measure HDL 57 >=40 mg/dL    LDL Cholesterol Calculated 76 <=100 mg/dL    Non HDL Cholesterol 101 <130 mg/dL    Patient Fasting > 8hrs? No     Narrative    Cholesterol  Desirable:  <200 mg/dL    Triglycerides  Normal:  Less than 150 mg/dL  Borderline High:  150-199 mg/dL  High:  200-499 mg/dL  Very High:  Greater than or equal to 500 mg/dL    Direct Measure HDL  Female:  Greater than or equal to 50 mg/dL   Male:  Greater than or equal to 40 mg/dL    LDL Cholesterol  Desirable:  <100mg/dL  Above Desirable:  100-129 mg/dL   Borderline High:  130-159 mg/dL   High:  160-189 mg/dL   Very High:  >= 190 mg/dL    Non HDL Cholesterol  Desirable:  130 mg/dL  Above Desirable:  130-159 mg/dL  Borderline High:  160-189  mg/dL  High:  190-219 mg/dL  Very High:  Greater than or equal to 220 mg/dL   Vitamin B12   Result Value Ref Range    Vitamin B12 236 193 - 986 pg/mL       If you have any questions or concerns, please call the clinic at the number listed above.       Sincerely,      Zulma Lepe MD/manolo

## 2022-06-03 NOTE — NURSING NOTE
"Chief Complaint   Patient presents with     Wellness Visit     /70   Pulse 61   Temp 97.8  F (36.6  C) (Tympanic)   Resp 16   Wt 75.8 kg (167 lb)   SpO2 100%   BMI 26.55 kg/m   Estimated body mass index is 26.55 kg/m  as calculated from the following:    Height as of 10/4/21: 1.689 m (5' 6.5\").    Weight as of this encounter: 75.8 kg (167 lb).  Patient presents to the clinic using No DME      Health Maintenance that is potentially due pending provider review:    Health Maintenance Due   Topic Date Due     URINE DRUG SCREEN  Never done     ANNUAL REVIEW OF HM ORDERS  Never done     Pneumococcal Vaccine: 65+ Years (3 - PPSV23 or PCV20) 04/23/2014     ZOSTER IMMUNIZATION (2 of 3) 12/11/2014     COVID-19 Vaccine (2 - Booster for Elana series) 07/07/2021     MEDICARE ANNUAL WELLNESS VISIT  05/17/2022        Possibly completing today per provider review.        "

## 2022-06-03 NOTE — LETTER
June 9, 2022      Dangelo Hernández  [address] [facility] [address]        Dear ,    We are writing to inform you of your test results.    All labs look good   B12 is low normal so would recommend daily b 12 supplement    Sent Rx to the pharmacy for him   Would only do the shot if the pills do not work   Level again in 3 months please order this       Resulted Orders   Comprehensive metabolic panel   Result Value Ref Range    Sodium 136 133 - 144 mmol/L    Potassium 4.3 3.4 - 5.3 mmol/L    Chloride 106 94 - 109 mmol/L    Carbon Dioxide (CO2) 23 20 - 32 mmol/L    Anion Gap 7 3 - 14 mmol/L    Urea Nitrogen 27 7 - 30 mg/dL    Creatinine 0.82 0.66 - 1.25 mg/dL    Calcium 8.5 8.5 - 10.1 mg/dL    Glucose 93 70 - 99 mg/dL    Alkaline Phosphatase 68 40 - 150 U/L    AST 15 0 - 45 U/L    ALT 20 0 - 70 U/L    Protein Total 6.6 (L) 6.8 - 8.8 g/dL    Albumin 3.4 3.4 - 5.0 g/dL    Bilirubin Total 0.4 0.2 - 1.3 mg/dL    GFR Estimate 87 >60 mL/min/1.73m2      Comment:      Effective December 21, 2021 eGFRcr in adults is calculated using the 2021 CKD-EPI creatinine equation which includes age and gender (Krys peng al., [place], DOI: 10.1056/JEWBpz2647587)   Lipid panel reflex to direct LDL Fasting   Result Value Ref Range    Cholesterol 158 <200 mg/dL    Triglycerides 124 <150 mg/dL    Direct Measure HDL 57 >=40 mg/dL    LDL Cholesterol Calculated 76 <=100 mg/dL    Non HDL Cholesterol 101 <130 mg/dL    Patient Fasting > 8hrs? No     Narrative    Cholesterol  Desirable:  <200 mg/dL    Triglycerides  Normal:  Less than 150 mg/dL  Borderline High:  150-199 mg/dL  High:  200-499 mg/dL  Very High:  Greater than or equal to 500 mg/dL    Direct Measure HDL  Female:  Greater than or equal to 50 mg/dL   Male:  Greater than or equal to 40 mg/dL    LDL Cholesterol  Desirable:  <100mg/dL  Above Desirable:  100-129 mg/dL   Borderline High:  130-159 mg/dL   High:  160-189 mg/dL   Very High:  >= 190 mg/dL    Non HDL  Cholesterol  Desirable:  130 mg/dL  Above Desirable:  130-159 mg/dL  Borderline High:  160-189 mg/dL  High:  190-219 mg/dL  Very High:  Greater than or equal to 220 mg/dL   Vitamin B12   Result Value Ref Range    Vitamin B12 236 193 - 986 pg/mL       If you have any questions or concerns, please call the clinic at the number listed above.       Sincerely,      Zulma Lepe MD/manolo

## 2022-06-03 NOTE — PROGRESS NOTES
"SUBJECTIVE:   Dangelo Hernández is a 86 year old male who presents for Preventive Visit.      Patient has been advised of split billing requirements and indicates understanding: Yes  Are you in the first 12 months of your Medicare coverage?  No    Healthy Habits:     In general, how would you rate your overall health?  Good    Frequency of exercise:  None    Duration of exercise:  Less than 15 minutes    Do you usually eat at least 4 servings of fruit and vegetables a day, include whole grains    & fiber and avoid regularly eating high fat or \"junk\" foods?  No    Taking medications regularly:  Yes    Barriers to taking medications:  None    Medication side effects:  None    Ability to successfully perform activities of daily living:  Housework requires assistance    Home Safety:  Lack of grab bars in the bathroom and throw rugs in the hallway    Hearing Impairment:  No hearing concerns    In the past 6 months, have you been bothered by leaking of urine?  No    In general, how would you rate your overall mental or emotional health?  Good      PHQ-2 Total Score: 0    Additional concerns today:  No    Do you feel safe in your environment? Yes    Have you ever done Advance Care Planning? (For example, a Health Directive, POLST, or a discussion with a medical provider or your loved ones about your wishes): Yes, advance care planning is on file.       Fall risk  Fallen 2 or more times in the past year?: No  Any fall with injury in the past year?: No  click delete button to remove this line now  Cognitive Screening   1) Repeat 3 items (Leader, Season, Table)    2) Clock draw:   3) 3 item recall: Recalls NO objects   Results: 0 items recalled: PROBABLE COGNITIVE IMPAIRMENT, **INFORM PROVIDER**    Mini-CogTM Copyright BRYANNA Aponte. Licensed by the author for use in [employer]; reprinted with permission (enid@.[facility]). All rights reserved.      Do you have sleep apnea, excessive snoring or daytime drowsiness?: " no    Reviewed and updated as needed this visit by clinical staff   Tobacco  Allergies  Meds  Problems  Med Hx  Surg Hx  Fam Hx            Reviewed and updated as needed this visit by Provider   Tobacco  Allergies  Meds  Problems  Med Hx  Surg Hx  Fam Hx           Social History     Tobacco Use     Smoking status: Former Smoker     Packs/day: 0.50     Years: 19.00     Pack years: 9.50     Types: Cigarettes     Start date:      Quit date: 1975     Years since quittin.4     Smokeless tobacco: Former User     Types: Snuff     Tobacco comment: started smoking at 21 years of age   Substance Use Topics     Alcohol use: Yes     Alcohol/week: 0.0 standard drinks     Comment: occ.      If you drink alcohol do you typically have >3 drinks per day or >7 drinks per week? No    Alcohol Use 6/3/2022   Prescreen: >3 drinks/day or >7 drinks/week? No           Vascular Disease Follow-up      How often do you take nitroglycerin? Never    Do you take an aspirin every day? No  ASA and plavix after stents       Memory concerns  Feeling like he is having trouble with short term memory   ADLS are fine   Family is concerned  Judgement is good     Not getting lost   Forgets names and things he needs to do daily     Long term mem is good         Current providers sharing in care for this patient include:   Patient Care Team:  Caridad Solorio APRN CNP as PCP - General (Family Medicine)  Merly Ashraf LICSW as  (Transplant)  Rajendra Flanagan MD as MD (Internal Medicine)  Yoselin Townsend, GEMINI as BMT Nurse Coordinator  Ludin Schulz MD as MD (Family Medicine - Sports Medicine)  Rajendra Flanagan MD as MD (Pulmonary Disease)  Abida Lott MD as Assigned Cancer Care Provider  Caridad Solorio APRN CNP as Assigned PCP    The following health maintenance items are reviewed in Epic and correct as of today:  Health Maintenance Due   Topic Date Due     URINE DRUG SCREEN  Never done     ANNUAL  "REVIEW OF  ORDERS  Never done     Pneumococcal Vaccine: 65+ Years (3 - PPSV23 or PCV20) 04/23/2014     ZOSTER IMMUNIZATION (2 of 3) 12/11/2014     COVID-19 Vaccine (2 - Booster for Elana series) 07/07/2021     Labs reviewed in EPIC          Review of Systems  Constitutional, HEENT, cardiovascular, pulmonary, gi and gu systems are negative, except as otherwise noted.    OBJECTIVE:   /70   Pulse 61   Temp 97.8  F (36.6  C) (Tympanic)   Resp 16   Wt 75.8 kg (167 lb)   SpO2 100%   BMI 26.55 kg/m   Estimated body mass index is 26.55 kg/m  as calculated from the following:    Height as of 10/4/21: 1.689 m (5' 6.5\").    Weight as of this encounter: 75.8 kg (167 lb).  Physical Exam  GENERAL: healthy, alert and no distress  EYES: Eyes grossly normal to inspection, PERRL and conjunctivae and sclerae normal  HENT: ear canals and TM's normal, nose and mouth without ulcers or lesions  NECK: no adenopathy, no asymmetry, masses, or scars and thyroid normal to palpation  RESP: lungs clear to auscultation - no rales, rhonchi or wheezes  CV: regular rate and rhythm, normal S1 S2, no S3 or S4, no murmur, click or rub, no peripheral edema and peripheral pulses strong  ABDOMEN: soft, nontender, no hepatosplenomegaly, no masses and bowel sounds normal  MS: no gross musculoskeletal defects noted, no edema  SKIN: no suspicious lesions or rashes  NEURO: Normal strength and tone, mentation intact and speech normal  PSYCH: mentation appears normal, affect normal/bright    Diagnostic Test Results:  Labs reviewed in Epic    ASSESSMENT / PLAN:   (Z00.00) Encounter for Medicare annual wellness exam  (primary encounter diagnosis)  Comment:   Plan:     (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   Plan: Comprehensive metabolic panel, Lipid panel         reflex to direct LDL Fasting, rosuvastatin         (CRESTOR) 40 MG " "tablet, metoprolol succinate ER        (TOPROL XL) 50 MG 24 hr tablet            (I25.118) Coronary artery disease of native heart with stable angina pectoris, unspecified vessel or lesion type (H)  Comment:   Plan:  clopidogrel (PLAVIX) 75 MG tablet            (I73.9) Peripheral vascular disease, unspecified (H)  Comment:   Plan: Stable no change in treatment plan.     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    Weight as of this encounter: 75.8 kg (167 lb).        He reports that he quit smoking about 47 years ago. His smoking use included cigarettes. He started smoking about 66 years ago. He has a 9.50 pack-year smoking history. He has quit using smokeless tobacco.  His smokeless tobacco use included snuff.      Appropriate preventive services were discussed with this patient, including applicable screening as appropriate for cardiovascular disease, diabetes, osteopenia/osteoporosis, and glaucoma.  As appropriate for age/gender, discussed screening for colorectal cancer, prostate cancer, breast cancer, and cervical cancer. Checklist reviewing preventive services available has been given to the patient.    Reviewed patients plan of care and provided an AVS. The Basic Care Plan (routine screening " as documented in Health Maintenance) for Dangelo meets the Care Plan requirement. This Care Plan has been established and reviewed with the Patient.    Counseling Resources:  ATP IV Guidelines  Pooled Cohorts Equation Calculator  Breast Cancer Risk Calculator  Breast Cancer: Medication to Reduce Risk  FRAX Risk Assessment  ICSI Preventive Guidelines  Dietary Guidelines for Americans, 2010  USDA's MyPlate  ASA Prophylaxis  Lung CA Screening    Zulma Lepe MD  [facility]    Identified Health Risks:

## 2022-06-03 NOTE — PROGRESS NOTES
He is at risk for lack of exercise and has been provided with information to increase physical activity for the benefit of his well-being.  The patient was counseled and encouraged to consider modifying their diet and eating habits. He was provided with information on recommended healthy diet options.  The patient reports that he has difficulty with activities of daily living. I have asked that the patient make a follow up appointment in 53 weeks where this issue will be further evaluated and addressed.

## 2022-06-03 NOTE — PATIENT INSTRUCTIONS
Patient Education   Personalized Prevention Plan  You are due for the preventive services outlined below.  Your care team is available to assist you in scheduling these services.  If you have already completed any of these items, please share that information with your care team to update in your medical record.  Health Maintenance Due   Topic Date Due     URINE DRUG SCREEN  Never done     ANNUAL REVIEW OF HM ORDERS  Never done     Pneumococcal Vaccine (3 - PPSV23 or PCV20) 04/23/2014     Zoster (Shingles) Vaccine (2 of 3) 12/11/2014     COVID-19 Vaccine (2 - Booster for Elana series) 07/07/2021       Exercise for a Healthier Heart  You may wonder how you can improve the health of your heart. If you re thinking about exercise, you re on the right track. You don t need to become an athlete. But you do need a certain amount of brisk exercise to help strengthen your heart. If you have been diagnosed with a heart condition, your healthcare provider may advise exercise to help stabilize your condition. To help make exercise a habit, choose safe, fun activities.      Exercise with a friend. When activity is fun, you're more likely to stick with it.   Before you start  Check with your healthcare provider before starting an exercise program. This is especially important if you have not been active for a while. It's also important if you have a long-term (chronic) health problem such as heart disease, diabetes, or obesity. Or if you are at high risk for having these problems.   Why exercise?  Exercising regularly offers many healthy rewards. It can help you do all of the following:     Improve your blood cholesterol level to help prevent further heart trouble    Lower your blood pressure to help prevent a stroke or heart attack    Control diabetes, or reduce your risk of getting this disease    Improve your heart and lung function    Reach and stay at a healthy weight    Make your muscles stronger so you can stay  active    Prevent falls and fractures by slowing the loss of bone mass (osteoporosis)    Manage stress better    Reduce your blood pressure    Improve your sense of self and your body image  Exercise tips      Ease into your routine. Set small goals. Then build on them. If you are not sure what your activity level should be, talk with your healthcare provider first before starting an exercise routine.    Exercise on most days. Aim for a total of 150 minutes (2 hours and 30 minutes) or more of moderate-intensity aerobic activity each week. Or 75 minutes (1 hour and 15 minutes) or more of vigorous-intensity aerobic activity each week. Or try for a combination of both. Moderate activity means that you breathe heavier and your heart rate increases but you can still talk. Think about doing 40 minutes of moderate exercise, 3 to 4 times a week. For best results, activity should last for about 40 minutes to lower blood pressure and cholesterol. It's OK to work up to the 40-minute period over time. Examples of moderate-intensity activity are walking 1 mile in 15 minutes. Or doing 30 to 45 minutes of yard work.    Step up your daily activity level.  Along with your exercise program, try being more active the whole day. Walk instead of drive. Or park further away so that you take more steps each day. Do more household tasks or yard work. You may not be able to meet the advised mount of physical activity. But doing some moderate- or vigorous-intensity aerobic activity can help reduce your risk for heart disease. Your healthcare provider can help you figure out what is best for you.    Choose 1 or more activities you enjoy.  Walking is one of the easiest things you can do. You can also try swimming, riding a bike, dancing, or taking an exercise class.    When to call your healthcare provider  Call your healthcare provider if you have any of these:     Chest pain or feel dizzy or lightheaded    Burning, tightness, pressure, or  heaviness in your chest, neck, shoulders, back, or arms    Abnormal shortness of breath    More joint or muscle pain    A very fast or irregular heartbeat (palpitations)  [employer] last reviewed this educational content on 7/1/2019 2000-2021 [employer]. All rights reserved. This information is not intended as a substitute for professional medical care. Always follow your healthcare professional's instructions.          Understanding USDA MyPlate  The USDA has guidelines to help you make healthy food choices. These are called MyPlate. MyPlate shows the food groups that make up healthy meals using the image of a place setting. Before you eat, think about the healthiest choices for what to put on your plate or in your cup or bowl. To learn more about building a healthy plate, visit www.choosemyplate.gov.    The food groups    Fruits. Any fruit or 100% fruit juice counts as part of the Fruit Group. Fruits may be fresh, canned, frozen, or dried, and may be whole, cut-up, or pureed. Make 1/2 of your plate fruits and vegetables.    Vegetables. Any vegetable or 100% vegetable juice counts as a member of the Vegetable Group. Vegetables may be fresh, frozen, canned, or dried. They can be served raw or cooked and may be whole, cut-up, or mashed. Make 1/2 of your plate fruits and vegetables.    Grains. All foods made from grains are part of the Grains Group. These include wheat, rice, oats, cornmeal, and barley. Grains are often used to make foods such as bread, pasta, oatmeal, cereal, tortillas, and grits. Grains should be no more than 1/4 of your plate. At least half of your grains should be whole grains.    Protein. This group includes meat, poultry, seafood, beans and peas, eggs, processed soy products (such as tofu), nuts (including nut butters), and seeds. Make protein choices no more than 1/4 of your plate. Meat and poultry choices should be lean or low fat.    Dairy. The Dairy Group includes all fluid  milk products and foods made from milk that contain calcium, such as yogurt and cheese. (Foods that have little calcium, such as cream, butter, and cream cheese, are not part of this group.) Most dairy choices should be low-fat or fat-free.    Oils. Oils aren't a food group, but they do contain essential nutrients. However it's important to watch your intake of oils. These are fats that are liquid at room temperature. They include canola, corn, olive, soybean, vegetable, and sunflower oil. Foods that are mainly oil include mayonnaise, certain salad dressings, and soft margarines. You likely already get your daily oil allowance from the foods you eat.  Things to limit  Eating healthy also means limiting these things in your diet:       Salt (sodium). Many processed foods have a lot of sodium. To keep sodium intake down, eat fresh vegetables, meats, poultry, and seafood when possible. Purchase low-sodium, reduced-sodium, or no-salt-added food products at the store. And don't add salt to your meals at home. Instead, season them with herbs and spices such as dill, oregano, cumin, and paprika. Or try adding flavor with lemon or lime zest and juice.    Saturated fat. Saturated fats are most often found in animal products such as beef, pork, and chicken. They are often solid at room temperature, such as butter. To reduce your saturated fat intake, choose leaner cuts of meat and poultry. And try healthier cooking methods such as grilling, broiling, roasting, or baking. For a simple lower-fat swap, use plain nonfat yogurt instead of mayonnaise when making potato salad or macaroni salad.    Added sugars. These are sugars added to foods. They are in foods such as ice cream, candy, soda, fruit drinks, sports drinks, energy drinks, cookies, pastries, jams, and syrups. Cut down on added sugars by sharing sweet treats with a family member or friend. You can also choose fruit for dessert, and drink water or other unsweetened  urbano Simmons last reviewed this educational content on 6/1/2020 2000-2021 [employer]. All rights reserved. This information is not intended as a substitute for professional medical care. Always follow your healthcare professional's instructions.        Activities of Daily Living    Your Health Risk Assessment indicates you have difficulties with activities of daily living such as housework, bathing, preparing meals, taking medication, etc. Please make a follow up appointment for us to address this issue in more detail.

## 2022-06-07 DIAGNOSIS — E53.8 VITAMIN B 12 DEFICIENCY: Primary | ICD-10-CM

## 2022-06-07 RX ORDER — CLOPIDOGREL BISULFATE 75 MG/1
75 TABLET ORAL DAILY
Qty: 30 TABLET | Refills: 0 | Status: SHIPPED | OUTPATIENT
Start: 2022-06-07 | End: 2023-01-01

## 2022-06-07 RX ORDER — UBIDECARENONE 75 MG
100 CAPSULE ORAL DAILY
Qty: 90 TABLET | Refills: 3 | Status: SHIPPED | OUTPATIENT
Start: 2022-06-07 | End: 2023-01-01

## 2022-06-07 NOTE — TELEPHONE ENCOUNTER
"Requested Prescriptions   Pending Prescriptions Disp Refills    clopidogrel (PLAVIX) 75 MG tablet [Pharmacy Med Name: CLOPIDOGREL 75MG TABLETS] 98 tablet      Sig: TAKE 1 TABLET BY MOUTH DAILY. FIRST LOADING DOSE IS 600MG(8 TABLETS) THEN ONCE DAILY        Plavix Failed - 6/3/2022  2:24 PM        Failed - No active PPI on record unless is Protonix        Failed - Normal HGB on file in past 12 months     Recent Labs   Lab Test 10/04/21  1543   HGB 13.1*                 Passed - Normal Platelets on file in past 12 months       Recent Labs   Lab Test 10/04/21  1543                  Passed - Recent (12 mo) or future (30 days) visit within the authorizing provider's specialty     Patient has had an office visit with the authorizing provider or a provider within the authorizing providers department within the previous 12 mos or has a future within next 30 days. See \"Patient Info\" tab in inbasket, or \"Choose Columns\" in Meds & Orders section of the refill encounter.              Passed - Medication is active on med list        Passed - Patient is age 18 or older              "

## 2022-06-08 DIAGNOSIS — E53.8 VITAMIN B 12 DEFICIENCY: Primary | ICD-10-CM

## 2022-06-09 ENCOUNTER — TELEPHONE (OUTPATIENT)
Dept: FAMILY MEDICINE | Facility: CLINIC | Age: 87
End: 2022-06-09
Payer: COMMERCIAL

## 2022-06-09 NOTE — TELEPHONE ENCOUNTER
Reason for Call:  Other    Detailed comments: Pt has been taking a B12 vitamin and stated that he is on a 3 month of this per Dr. Lepe to see if it works for him. Pt is wondering why he has to be on a 3 month trial of this, he would like to just get the B12 injection if possible. Please call pt to discuss.    Phone Number Patient can be reached at: Home number on file [phone] (home)    Best Time: anytime    Can we leave a detailed message on this number? YES    Call taken on 6/9/2022 at 11:35 AM by Cesilia Jeronimo

## 2022-06-10 NOTE — TELEPHONE ENCOUNTER
Please call pt   I really think the oral is fine to try for the next three months   He is not B12 def just low normal so an injection is really not appropriate now   Check level in 3 months and we can see if this even makes any difference in how he feels

## 2022-06-10 NOTE — TELEPHONE ENCOUNTER
Called PT stated he is tired all the time, and stated that he still would like B-12. Pt stated stated that he has not started on the B-12 yet and that its a long drive and he would like it to be covered by insurance. He stated that a shot would be covered.Pt stated he may get a second opinion.     Suha Dorsey RN

## 2022-06-22 ENCOUNTER — OFFICE VISIT (OUTPATIENT)
Dept: URGENT CARE | Facility: URGENT CARE | Age: 87
End: 2022-06-22
Payer: MEDICARE

## 2022-06-22 VITALS
BODY MASS INDEX: 27.19 KG/M2 | DIASTOLIC BLOOD PRESSURE: 72 MMHG | SYSTOLIC BLOOD PRESSURE: 166 MMHG | RESPIRATION RATE: 18 BRPM | OXYGEN SATURATION: 97 % | HEART RATE: 85 BPM | WEIGHT: 171 LBS | TEMPERATURE: 97.6 F

## 2022-06-22 DIAGNOSIS — S51.812A SKIN TEAR OF LEFT FOREARM WITHOUT COMPLICATION, INITIAL ENCOUNTER: Primary | ICD-10-CM

## 2022-06-22 PROBLEM — N40.1 BENIGN PROSTATIC HYPERPLASIA WITH LOWER URINARY TRACT SYMPTOMS: Status: ACTIVE | Noted: 2022-01-27

## 2022-06-22 PROBLEM — M62.81 MUSCLE WEAKNESS (GENERALIZED): Status: ACTIVE | Noted: 2022-01-27

## 2022-06-22 PROBLEM — Z95.5 PRESENCE OF CORONARY ANGIOPLASTY IMPLANT AND GRAFT: Status: ACTIVE | Noted: 2022-01-27

## 2022-06-22 PROBLEM — R26.2 DIFFICULTY IN WALKING, NOT ELSEWHERE CLASSIFIED: Status: ACTIVE | Noted: 2022-01-27

## 2022-06-22 PROBLEM — I65.29 STENOSIS OF CAROTID ARTERY: Status: ACTIVE | Noted: 2021-07-15

## 2022-06-22 PROBLEM — J12.82 PNEUMONIA DUE TO CORONAVIRUS DISEASE 2019: Status: ACTIVE | Noted: 2022-01-27

## 2022-06-22 PROBLEM — I11.9 HYPERTENSIVE HEART DISEASE WITHOUT HEART FAILURE: Status: ACTIVE | Noted: 2022-01-27

## 2022-06-22 PROBLEM — I38 ENDOCARDITIS, VALVE UNSPECIFIED: Status: ACTIVE | Noted: 2022-01-27

## 2022-06-22 PROBLEM — U07.1 PNEUMONIA DUE TO CORONAVIRUS DISEASE 2019: Status: ACTIVE | Noted: 2022-01-27

## 2022-06-22 PROCEDURE — 99213 OFFICE O/P EST LOW 20 MIN: CPT | Performed by: NURSE PRACTITIONER

## 2022-06-22 NOTE — PROGRESS NOTES
Assessment & Plan      Diagnosis Comments   1. Skin tear of left forearm without complication, initial encounter  Procedure rep, skin scalp/extrem+5cm/<        MURALI Carter CNP  [facility]    Beata Pierson is a 87 year old male who presents to clinic today for the following health issues:  Chief Complaint   Patient presents with     Laceration     Left arm 3-4 inches long skin tear happened at about 230-300     HPI    Patient presents to clinic he had been was working on  in his shop he had was on ground tyring to look under mower and turned onto left arm and tore skin on forearm. He is on plavix.       SKIN TEAR    Mechanism of injury: see above  History provided by: Patient  Time of injury was 2 hours(s) ago.    This is a non-work related injury.    Associated symptoms: Denies numbness, weakness, or loss of function    Last tetanus booster within 10 years: Yes    Skin tear EXAM:   Size of laceration: 5 inches  Characteristics of the laceration: jagged and flap-like  Depth of laceration: superficial  Tendon function intact: Yes  Sensation to light touch intact: Yes  Pulses/capillary refill intact: Yes  Foreign body: No    Picture included in patient's chart: no    PROCEDURE NOTE:  Anesthesia: None  Prepped and draped in the usual sterile fashion  Wound irrigated with sterile water  Wound was explored for any foreign bodies and evaluated for tendon, nerve, vessel or joint involvement.    Closure was bacitracin, vaseline gauze, tegaderm, coban bandage was applied  Patient tolerated the procedure well        Review of Systems  Constitutional, HEENT, cardiovascular, pulmonary, gi and gu systems are negative, except as otherwise noted.      Objective    BP (!) 166/72   Pulse 85   Temp 97.6  F (36.4  C) (Tympanic)   Resp 18   Wt 77.6 kg (171 lb)   SpO2 97%   BMI 27.19 kg/m    Physical Exam   GENERAL: healthy, alert and no distress  NECK: no adenopathy, no  asymmetry, masses, or scars and thyroid normal to palpation  RESP: lungs clear to auscultation - no rales, rhonchi or wheezes  CV: regular rate and rhythm, normal S1 S2, no S3 or S4, no murmur, click or rub, no peripheral edema and peripheral pulses strong  MS: no gross musculoskeletal defects noted, no edema  SKIN: left forearm skin tear approximately 5 inches jagged, mild bleeding stopped with pressure, superficial. Negative for swelling or warmth.

## 2022-06-23 NOTE — PATIENT INSTRUCTIONS
Monitor area for infection if symptoms of increased pain redness, swelling or drainage occur please return to clinic.     Keep clean and dry for 3-5 days then may remove dressing. If unsure of healing please return to clinic for evaluation.     May take tylenol for discomfort.

## 2022-09-30 ENCOUNTER — OFFICE VISIT (OUTPATIENT)
Dept: FAMILY MEDICINE | Facility: CLINIC | Age: 87
End: 2022-09-30
Payer: MEDICARE

## 2022-09-30 VITALS
TEMPERATURE: 96.9 F | HEIGHT: 67 IN | HEART RATE: 94 BPM | DIASTOLIC BLOOD PRESSURE: 60 MMHG | OXYGEN SATURATION: 96 % | SYSTOLIC BLOOD PRESSURE: 110 MMHG | WEIGHT: 175 LBS | RESPIRATION RATE: 28 BRPM | BODY MASS INDEX: 27.47 KG/M2

## 2022-09-30 DIAGNOSIS — R41.3 MEMORY LOSS: ICD-10-CM

## 2022-09-30 DIAGNOSIS — I25.119 CORONARY ARTERY DISEASE INVOLVING NATIVE CORONARY ARTERY OF NATIVE HEART WITH ANGINA PECTORIS (H): ICD-10-CM

## 2022-09-30 DIAGNOSIS — L98.9 SCALP LESION: ICD-10-CM

## 2022-09-30 DIAGNOSIS — R42 DIZZINESS: Primary | ICD-10-CM

## 2022-09-30 LAB
ALBUMIN SERPL BCG-MCNC: 4.1 G/DL (ref 3.5–5.2)
ALP SERPL-CCNC: 63 U/L (ref 40–129)
ALT SERPL W P-5'-P-CCNC: 21 U/L (ref 10–50)
ANION GAP SERPL CALCULATED.3IONS-SCNC: 12 MMOL/L (ref 7–15)
AST SERPL W P-5'-P-CCNC: 21 U/L (ref 10–50)
BILIRUB SERPL-MCNC: 0.2 MG/DL
BUN SERPL-MCNC: 17.8 MG/DL (ref 8–23)
CALCIUM SERPL-MCNC: 9.6 MG/DL (ref 8.8–10.2)
CHLORIDE SERPL-SCNC: 107 MMOL/L (ref 98–107)
CREAT SERPL-MCNC: 0.98 MG/DL (ref 0.67–1.17)
DEPRECATED HCO3 PLAS-SCNC: 23 MMOL/L (ref 22–29)
ERYTHROCYTE [DISTWIDTH] IN BLOOD BY AUTOMATED COUNT: 14.8 % (ref 10–15)
GFR SERPL CREATININE-BSD FRML MDRD: 76 ML/MIN/1.73M2
GLUCOSE SERPL-MCNC: 105 MG/DL (ref 70–99)
HCT VFR BLD AUTO: 38.3 % (ref 40–53)
HGB BLD-MCNC: 12.5 G/DL (ref 13.3–17.7)
MCH RBC QN AUTO: 31.4 PG (ref 26.5–33)
MCHC RBC AUTO-ENTMCNC: 32.6 G/DL (ref 31.5–36.5)
MCV RBC AUTO: 96 FL (ref 78–100)
PLATELET # BLD AUTO: 249 10E3/UL (ref 150–450)
POTASSIUM SERPL-SCNC: 3.9 MMOL/L (ref 3.4–5.3)
PROT SERPL-MCNC: 6.8 G/DL (ref 6.4–8.3)
RBC # BLD AUTO: 3.98 10E6/UL (ref 4.4–5.9)
SODIUM SERPL-SCNC: 142 MMOL/L (ref 136–145)
VIT B12 SERPL-MCNC: 1010 PG/ML (ref 232–1245)
WBC # BLD AUTO: 6.6 10E3/UL (ref 4–11)

## 2022-09-30 PROCEDURE — 85027 COMPLETE CBC AUTOMATED: CPT | Performed by: FAMILY MEDICINE

## 2022-09-30 PROCEDURE — G0008 ADMIN INFLUENZA VIRUS VAC: HCPCS | Performed by: FAMILY MEDICINE

## 2022-09-30 PROCEDURE — 99214 OFFICE O/P EST MOD 30 MIN: CPT | Mod: 25 | Performed by: FAMILY MEDICINE

## 2022-09-30 PROCEDURE — 36415 COLL VENOUS BLD VENIPUNCTURE: CPT | Performed by: FAMILY MEDICINE

## 2022-09-30 PROCEDURE — 90662 IIV NO PRSV INCREASED AG IM: CPT | Performed by: FAMILY MEDICINE

## 2022-09-30 PROCEDURE — 80053 COMPREHEN METABOLIC PANEL: CPT | Performed by: FAMILY MEDICINE

## 2022-09-30 PROCEDURE — 82607 VITAMIN B-12: CPT | Performed by: FAMILY MEDICINE

## 2022-09-30 RX ORDER — ASCORBIC ACID 500 MG
500 TABLET ORAL DAILY
COMMUNITY
Start: 2023-01-01 | End: 2024-01-01

## 2022-09-30 RX ORDER — AZELASTINE 1 MG/ML
1 SPRAY, METERED NASAL 2 TIMES DAILY PRN
Qty: 30 ML | Refills: 1 | Status: CANCELLED | OUTPATIENT
Start: 2022-09-30

## 2022-09-30 RX ORDER — METOPROLOL SUCCINATE 50 MG/1
50 TABLET, EXTENDED RELEASE ORAL DAILY
Qty: 135 TABLET | Refills: 3
Start: 2022-09-30 | End: 2023-01-01

## 2022-09-30 RX ORDER — VITAMIN B COMPLEX
TABLET ORAL DAILY
COMMUNITY
End: 2022-10-20

## 2022-09-30 RX ORDER — KETOCONAZOLE 20 MG/ML
SHAMPOO TOPICAL DAILY PRN
Qty: 120 ML | Refills: 1 | Status: SHIPPED | OUTPATIENT
Start: 2022-09-30 | End: 2022-10-31

## 2022-09-30 ASSESSMENT — PAIN SCALES - GENERAL: PAINLEVEL: MODERATE PAIN (4)

## 2022-09-30 NOTE — PROGRESS NOTES
Assessment & Plan     Dizziness  Dizziness upon standing from a sitting position. Consistent with orthostatic hypotension. No chest pain or shortness of breath. Recent increase in the Metoprolol from 50 mg daily to 75 mg daily. Will decrease Metoprolol to 50 mg daily. Advised to continue to stay well hydrated. Check lab work as below.   - CBC with platelets  - Comprehensive metabolic panel (BMP + Alb, Alk Phos, ALT, AST, Total. Bili, TP)  - Adult Neurology  Referral  - Vitamin B12    Memory loss  Refer to Neurology. Has sensations of bugs crawling in his skin and memory loss apparent during the visit.   - Adult Neurology  Referral    Scalp lesion  Has concerns for bugs crawling under his skin especially his scalp. Will trial ketoconazole shampoo.   - ketoconazole (NIZORAL) 2 % external shampoo  Dispense: 120 mL; Refill: 1    Coronary artery disease involving native coronary artery of native heart with angina pectoris (H)  Decrease metprolol from 75 mg daily down to 50 mg daily.   - metoprolol succinate ER (TOPROL XL) 50 MG 24 hr tablet  Dispense: 135 tablet; Refill: 3    Follow up with Neurology.   Follow up with myself in 1 month or sooner as needed.     The risks, benefits and treatment options of prescribed medications or other treatments have been discussed with the patient. The patient verbalized their understanding and should call or follow up if no improvement or if they develop further problems.      Elian Shafer,   [facility]    Beata Pierson is a 87 year old accompanied by his granddaughter, presenting for the following health issues:  Dizziness      History of Present Illness       Reason for visit:  Dizziness  Symptom onset:  More than a month  Symptom intensity:  Severe  Symptom progression:  Worsening  Had these symptoms before:  No  What makes it worse:  Getting out of bed  What makes it better:  Sitting    He eats 2-3 servings of fruits and  "vegetables daily.He consumes 1 sweetened beverage(s) daily.He exercises with enough effort to increase his heart rate 9 or less minutes per day.  He exercises with enough effort to increase his heart rate 3 or less days per week.   He is taking medications regularly.       When asked what brings patient into the office he reports having a bug bite about 2.5 years ago.   Then when asked again why he is in clinic started to talk about bugs crawling under his skin.   He reports having bug crawling inside of him.   He will scratch at his head to get the bugs out.     Reports having some dizziness with going from sitting to standing position.   Episodes are worse in the morning.   No recent falls.   No chest pain. No shortness of breath.   No palpitations.   No fevers.   Recent increase in Metoprolol from 50 mg daily to 75 mg daily.     Oriented to place.   Not oriented to month.   Oriented to year.     Review of Systems   Constitutional, HEENT, cardiovascular, pulmonary, gi and gu systems are negative, except as otherwise noted.      Objective    /60 (BP Location: Right arm, Patient Position: Chair, Cuff Size: Adult Regular)   Pulse 94   Temp 96.9  F (36.1  C) (Tympanic)   Resp 28   Ht 1.689 m (5' 6.5\")   Wt 79.4 kg (175 lb)   SpO2 96%   BMI 27.82 kg/m    Body mass index is 27.82 kg/m .  Physical Exam   General: alert, cooperative, no acute distress   CV: RRR, no murmur  Resp: non-labored breathing, clear to auscultation, no wheezing or rales   Abdomen: Soft, non-tender, no guarding.   Extremities: No peripheral edema, calves non-tender.   Skin: area of skin excoriations on the scalp. No erythema or evidence of skin infection.         "

## 2022-09-30 NOTE — LETTER
October 4, 2022      Dangelo Hernández  [address] [place] [address]        Dear ,    We are writing to inform you of your test results.    Vitamin B12 satisfactory  CMP testing satisfactory  CBC shows a slightly low hemoglobin of 12.5 but stable over the years.     Continue current care as discussed at recent visit.  Follow-up as scheduled.    Resulted Orders   CBC with platelets   Result Value Ref Range    WBC Count 6.6 4.0 - 11.0 10e3/uL    RBC Count 3.98 (L) 4.40 - 5.90 10e6/uL    Hemoglobin 12.5 (L) 13.3 - 17.7 g/dL    Hematocrit 38.3 (L) 40.0 - 53.0 %    MCV 96 78 - 100 fL    MCH 31.4 26.5 - 33.0 pg    MCHC 32.6 31.5 - 36.5 g/dL    RDW 14.8 10.0 - 15.0 %    Platelet Count 249 150 - 450 10e3/uL   Comprehensive metabolic panel (BMP + Alb, Alk Phos, ALT, AST, Total. Bili, TP)   Result Value Ref Range    Sodium 142 136 - 145 mmol/L    Potassium 3.9 3.4 - 5.3 mmol/L    Chloride 107 98 - 107 mmol/L    Carbon Dioxide (CO2) 23 22 - 29 mmol/L    Anion Gap 12 7 - 15 mmol/L    Urea Nitrogen 17.8 8.0 - 23.0 mg/dL    Creatinine 0.98 0.67 - 1.17 mg/dL    Calcium 9.6 8.8 - 10.2 mg/dL    Glucose 105 (H) 70 - 99 mg/dL    Alkaline Phosphatase 63 40 - 129 U/L    AST 21 10 - 50 U/L    ALT 21 10 - 50 U/L    Protein Total 6.8 6.4 - 8.3 g/dL    Albumin 4.1 3.5 - 5.2 g/dL    Bilirubin Total 0.2 <=1.2 mg/dL    GFR Estimate 76 >60 mL/min/1.73m2      Comment:      Effective December 21, 2021 eGFRcr in adults is calculated using the 2021 CKD-EPI creatinine equation which includes age and gender (Krys peng al., NEJM, DOI: 10.1056/JOJNab4041483)   Vitamin B12   Result Value Ref Range    Vitamin B12 1,010 232 - 1,245 pg/mL       If you have any questions or concerns, please call the clinic at the number listed above.       Sincerely,      Elian Shafer, DO

## 2022-09-30 NOTE — PATIENT INSTRUCTIONS
Decrease Metoprolol to 1 tablet daily.     Start on Ketoconazole shampoo for the scalp lesion.     Lab work today.     Follow up with Neurology.     Follow up with PCP.

## 2022-10-07 ENCOUNTER — TELEPHONE (OUTPATIENT)
Dept: FAMILY MEDICINE | Facility: CLINIC | Age: 87
End: 2022-10-07

## 2022-10-07 NOTE — TELEPHONE ENCOUNTER
S-(situation): The patient called to request an appointment or stools samples.    B-(background): the patient was seen on 9/30/22.    A-(assessment): the patient requests stool testing for bugs. The patient was seen in clinic on 9/30/22.  He reports Dr. Shafer did find a spot on his head and now he believes they are in his stool. According to the patient he has had the bugs for abotu 2 years.  He got bite under the arm and since then he has had bugs. The patient reports his stool is loose and frequent. The patient has had this for about one week. It is getting more frequent. He denies any blood in the stool. He has had 2 loose stools today.      R-(recommendations): will send to provider to review. The patient would like an appointment or orders for stool testing. He has not followed up with neurology.  He said that can wait for now.    Thank you    Ashely VICTORIA RN

## 2022-10-07 NOTE — TELEPHONE ENCOUNTER
Left message on answering machine for patient to call back.    Huddled with provider ok to schedule in same day next week.    Thank you    Ashely VICTORIA RN

## 2022-10-12 ENCOUNTER — TELEPHONE (OUTPATIENT)
Dept: FAMILY MEDICINE | Facility: CLINIC | Age: 87
End: 2022-10-12

## 2022-10-12 NOTE — TELEPHONE ENCOUNTER
Symptoms    Describe your symptoms: Tiera is calling stating that the patient is still having diarrhea. Emily  Said that the patient wants this addressed before the 31st when his appointment is. There is Not A Consent to Communicate with Emily on file.     Any pain: No    How long have you been having symptoms: 2-3   weeks    Have you been seen for this:  No    Appointment offered?: No    Triage offered?: Yes: 10/7/2022    Okay to leave a detailed message?: Yes at Home number on file [phone] (home)

## 2022-10-14 NOTE — TELEPHONE ENCOUNTER
Called Emily and notified she is not listed on consent to communicate, did tell her this RN would call patient directly.    Left message for the patient to return call to discuss symptoms.      GEMINI Jaramillo

## 2022-10-18 NOTE — TELEPHONE ENCOUNTER
Attempted to contact patient, no answer. Message left to return call to clinic.    Apryl Sesay RN  [facility]

## 2022-10-19 NOTE — TELEPHONE ENCOUNTER
"Dr Vora  Placed this pt on your schedule for tomorrow and wanted to give you an FYI. He reports diarrhea x 3 weeks 2-3 x per day at most. Pt is treating with diarrhea med (unknown which one) and by taking hot sauce 3x per day with meals. Reports keeping hydrated.  Also reports being bitten by a spider 3 years ago and now states \"whatever bit me is alive inside me\". Pt feels they are in his head currently. Pt was seen by Dr Shafer 9-30 and there is mention of this in his notes as well-has referral to neuro as also has dizziness and memory loss.   Pt will be accompanied by his grandaughter Emily Foster to go to ed with worsening    Tomasz Blum RN    "

## 2022-10-19 NOTE — TELEPHONE ENCOUNTER
Left 4th message for the patient to call back, let the patient know that we have tried several times to reach out to patient.      GEMINI Jaramillo

## 2022-10-20 ENCOUNTER — OFFICE VISIT (OUTPATIENT)
Dept: FAMILY MEDICINE | Facility: CLINIC | Age: 87
End: 2022-10-20
Payer: MEDICARE

## 2022-10-20 VITALS
RESPIRATION RATE: 24 BRPM | OXYGEN SATURATION: 98 % | TEMPERATURE: 97.3 F | WEIGHT: 173 LBS | HEART RATE: 53 BPM | SYSTOLIC BLOOD PRESSURE: 90 MMHG | BODY MASS INDEX: 27.5 KG/M2 | DIASTOLIC BLOOD PRESSURE: 60 MMHG

## 2022-10-20 DIAGNOSIS — L21.0 SEBORRHEA CAPITIS IN ADULT: ICD-10-CM

## 2022-10-20 DIAGNOSIS — J42 CHRONIC BRONCHITIS, UNSPECIFIED CHRONIC BRONCHITIS TYPE (H): Primary | ICD-10-CM

## 2022-10-20 DIAGNOSIS — J31.0 CHRONIC RHINITIS: ICD-10-CM

## 2022-10-20 DIAGNOSIS — K52.9 CHRONIC DIARRHEA OF UNKNOWN ORIGIN: ICD-10-CM

## 2022-10-20 PROBLEM — J96.01 ACUTE RESPIRATORY FAILURE WITH HYPOXIA (H): Status: RESOLVED | Noted: 2022-01-27 | Resolved: 2022-10-20

## 2022-10-20 PROCEDURE — 99214 OFFICE O/P EST MOD 30 MIN: CPT | Performed by: FAMILY MEDICINE

## 2022-10-20 RX ORDER — DIPHENOXYLATE HCL/ATROPINE 2.5-.025MG
1 TABLET ORAL 4 TIMES DAILY PRN
Qty: 40 TABLET | Refills: 1 | Status: SHIPPED | OUTPATIENT
Start: 2022-10-20 | End: 2023-01-01

## 2022-10-20 RX ORDER — DIPHENOXYLATE HCL/ATROPINE 2.5-.025MG
1 TABLET ORAL 4 TIMES DAILY PRN
COMMUNITY
End: 2022-10-20

## 2022-10-20 RX ORDER — AZELASTINE 1 MG/ML
1 SPRAY, METERED NASAL 2 TIMES DAILY PRN
Qty: 30 ML | Refills: 11 | Status: SHIPPED | OUTPATIENT
Start: 2022-10-20 | End: 2023-01-01

## 2022-10-20 RX ORDER — AZITHROMYCIN 250 MG/1
250 TABLET, FILM COATED ORAL 2 TIMES DAILY
Qty: 14 TABLET | Refills: 0 | Status: SHIPPED | OUTPATIENT
Start: 2022-10-20 | End: 2023-01-01

## 2022-10-20 ASSESSMENT — PAIN SCALES - GENERAL: PAINLEVEL: NO PAIN (0)

## 2022-10-20 NOTE — PROGRESS NOTES
"  Assessment & Plan     Chronic bronchitis, unspecified chronic bronchitis type (H)  Reviewed last note from pulmonology. They prescribed this as standing order for recurrence of mucopurulent bronchitis.  Patient was reinforced on appropriate conditions to use this.  Follow up with pulm med as they instructed him.  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 Hence, will consult with dermatology.  Patient continues to cite his impression that this condition is due to a \"bug that bit me, and now is in me and is trying to get out\" months ago.  There is no sign of infection. Patient was reassured that his most recent labs did not show sign to suspect systemic inflammation or infection.  Concern that patient has some delusions of this insect that is residing inside him, and can be signs of dementia or other psychiatric condition.  Consider neuropsych evaluation when patient returns from [place] - he rory be leaving in 12 days he said.  - Adult Dermatology Referral    Chronic diarrhea of unknown origin  Recurrent for several months per patient.  Has been treated symptomatically.  Would benefit from colonoscopy but patient said he will be leaving for [place] soon.   GI consult offered for further evaluation and management. He preferred to try to see one in [place].  - diphenoxylate-atropine (LOMOTIL) 2.5-0.025 MG tablet  Dispense: 40 tablet; Refill: 1  - Adult GI  Referral - Consult Only    Chronic rhinitis  Stable per patient.  - azelastine (ASTELIN) 0.1 % nasal spray  Dispense: 30 mL; Refill: 11    56}     Patient Instructions   Referrals to dermatology and gastroenterology have been signed. Schedulers will call you in the next 3-5 business days.     You may get the Shingrix vaccine for shingles if you desire, and after you verify with insurance how they cover the vaccine.     Reconsider completing covid-19 " booster to protect you from severe illness.    Take your medications only as prescribed.          Return if symptoms worsen or fail to improve.    Tony Vora MD  [facility]    Beata Pierson is a 87 year old accompanied by his granddaughter, presenting for the following health issues:  Recheck Medication      HPI     Medication Followup of ketoconazole 2% external shampoo - given for seborrhea capitis    Taking Medication as prescribed: yes    Side Effects:  Hair loss    Medication Helping Symptoms:  NO    Patient cites that he was bit by an insect months ago on the left axillary area. He now believes that inset is in him and he has more of them inside, and they are trying to get out of his scalp.    Patient has used nizoral shampoo for the last 2 weeks or so.    Medication Followup of azithromycin 250 mg    Taking Medication as prescribed: takes as needed for breathing    Side Effects:  None    Medication Helping Symptoms:  Yes    Patient said he takes this when his cough flares.    Has seen pulmonary medicine provider who diagnosed him with chronic mucopurulent bronchitis, and last note indicated for patient to use azithromycin prn.    Patient denies new cough or acute respiratory illness at this time.    Medication Followup of diphenoxylate/atrpine 2.5 mg    Taking Medication as prescribed: takes as needed for diarrhea    Side Effects:  None    Medication Helping Symptoms:  Yes    Patient reports he has had this for several months. Vague on actual duration.    Denies acute abdominal pain.    No recent endoscopies.    Medication Followup of azelastine 0.1% nasal spray    Taking Medication as prescribed: NO-ran out of medication, needs refill    Side Effects:  None    Medication Helping Symptoms:  yes        Review of Systems   Constitutional, HEENT, cardiovascular, pulmonary, GI, , musculoskeletal, neuro, skin, endocrine and psych systems are negative, except as otherwise  noted.      Objective    BP 90/60 (BP Location: Left arm, Patient Position: Sitting, Cuff Size: Adult Regular)   Pulse 53   Temp 97.3  F (36.3  C) (Tympanic)   Resp 24   Wt 78.5 kg (173 lb)   SpO2 98%   BMI 27.50 kg/m    Body mass index is 27.5 kg/m .  Physical Exam   GEN: alert, oriented x 3, NAD, ambulatory without assist but slow to get up from chair.  SKIN: good turgor; patchy scaly skin on scalp with few areas of scabbed over excoriations but no active bleeding or draining lesions; no other seborrheic rash on other exposed skin on visit.  ABD: protuberant    Office Visit on 09/30/2022   Component Date Value Ref Range Status     WBC Count 09/30/2022 6.6  4.0 - 11.0 10e3/uL Final     RBC Count 09/30/2022 3.98 (L)  4.40 - 5.90 10e6/uL Final     Hemoglobin 09/30/2022 12.5 (L)  13.3 - 17.7 g/dL Final     Hematocrit 09/30/2022 38.3 (L)  40.0 - 53.0 % Final     MCV 09/30/2022 96  78 - 100 fL Final     MCH 09/30/2022 31.4  26.5 - 33.0 pg Final     MCHC 09/30/2022 32.6  31.5 - 36.5 g/dL Final     RDW 09/30/2022 14.8  10.0 - 15.0 % Final     Platelet Count 09/30/2022 249  150 - 450 10e3/uL Final     Sodium 09/30/2022 142  136 - 145 mmol/L Final     Potassium 09/30/2022 3.9  3.4 - 5.3 mmol/L Final     Chloride 09/30/2022 107  98 - 107 mmol/L Final     Carbon Dioxide (CO2) 09/30/2022 23  22 - 29 mmol/L Final     Anion Gap 09/30/2022 12  7 - 15 mmol/L Final     Urea Nitrogen 09/30/2022 17.8  8.0 - 23.0 mg/dL Final     Creatinine 09/30/2022 0.98  0.67 - 1.17 mg/dL Final     Calcium 09/30/2022 9.6  8.8 - 10.2 mg/dL Final     Glucose 09/30/2022 105 (H)  70 - 99 mg/dL Final     Alkaline Phosphatase 09/30/2022 63  40 - 129 U/L Final     AST 09/30/2022 21  10 - 50 U/L Final     ALT 09/30/2022 21  10 - 50 U/L Final     Protein Total 09/30/2022 6.8  6.4 - 8.3 g/dL Final     Albumin 09/30/2022 4.1  3.5 - 5.2 g/dL Final     Bilirubin Total 09/30/2022 0.2  <=1.2 mg/dL Final     GFR Estimate 09/30/2022 76  >60 mL/min/1.73m2 Final     Effective December 21, 2021 eGFRcr in adults is calculated using the 2021 CKD-EPI creatinine equation which includes age and gender (Krys et al., NEJM, DOI: 10.1056/OPQWvh0147368)     Vitamin B12 09/30/2022 1,010  232 - 1,245 pg/mL Final

## 2022-10-20 NOTE — PATIENT INSTRUCTIONS
Referrals to dermatology and gastroenterology have been signed. Schedulers will call you in the next 3-5 business days.     You may get the Shingrix vaccine for shingles if you desire, and after you verify with insurance how they cover the vaccine.     Reconsider completing covid-19 booster to protect you from severe illness.    Take your medications only as prescribed.

## 2022-10-26 ENCOUNTER — OFFICE VISIT (OUTPATIENT)
Dept: URGENT CARE | Facility: URGENT CARE | Age: 87
End: 2022-10-26
Payer: MEDICARE

## 2022-10-26 VITALS
TEMPERATURE: 97.5 F | DIASTOLIC BLOOD PRESSURE: 69 MMHG | BODY MASS INDEX: 27.5 KG/M2 | SYSTOLIC BLOOD PRESSURE: 138 MMHG | WEIGHT: 173 LBS | HEART RATE: 85 BPM | OXYGEN SATURATION: 98 %

## 2022-10-26 DIAGNOSIS — S51.811A SKIN TEAR OF FOREARM WITHOUT COMPLICATION, RIGHT, INITIAL ENCOUNTER: Primary | ICD-10-CM

## 2022-10-26 PROCEDURE — 99213 OFFICE O/P EST LOW 20 MIN: CPT | Performed by: FAMILY MEDICINE

## 2022-10-26 NOTE — PROGRESS NOTES
(N27.384J) Skin tear of forearm without complication, right, initial encounter  (primary encounter diagnosis)  Comment:     Multiple skin tears as noted.  Treated.    Plan:   Basically leave the Tegaderm in place.  He has a follow-up visit with his physician in 5 days and should keep this and can review the situation.  I suspect he will need a change of bandages every few days and ultimately use these for a couple of weeks.          CHIEF COMPLAINT    Skin tears right forearm.      HISTORY    This man fell this morning against some carpeting.  He has several skin tears on the right forearm.    He had a previous fall and skinned his left knee also.  He has a bandage in place there.    His Tdap is current.    He is here with his granddaughter.    He does have a history of some unsteadiness.  He has a cane and walker but does not always use them.      REVIEW OF SYSTEMS    No fever.  No breathing problems.  No chest pain or palpitations.  No focal weakness.      EXAM  /69 (BP Location: Right arm, Patient Position: Sitting, Cuff Size: Adult Regular)   Pulse 85   Temp 97.5  F (36.4  C) (Tympanic)   Wt 78.5 kg (173 lb)   SpO2 98%   BMI 27.50 kg/m      Elderly man, alert.  NAD.  HEENT atraumatic.  Neck nontender.  Nonlabored breathing.  Right forearm:  4 skin tears are present scattered along from his hand up to just above the elbow.  The length of these was each about 4 to 6 cm.    Skin tears were cleaned up with Hibiclens and saline and dried.  Tegaderm was placed on these for skin tears.    Band-Aid for scrape on his left knee.

## 2022-10-31 ENCOUNTER — OFFICE VISIT (OUTPATIENT)
Dept: FAMILY MEDICINE | Facility: CLINIC | Age: 87
End: 2022-10-31
Payer: MEDICARE

## 2022-10-31 VITALS
WEIGHT: 173 LBS | OXYGEN SATURATION: 95 % | DIASTOLIC BLOOD PRESSURE: 60 MMHG | RESPIRATION RATE: 24 BRPM | SYSTOLIC BLOOD PRESSURE: 120 MMHG | HEIGHT: 67 IN | HEART RATE: 73 BPM | BODY MASS INDEX: 27.15 KG/M2 | TEMPERATURE: 97.4 F

## 2022-10-31 DIAGNOSIS — S51.811S SKIN TEAR OF FOREARM WITHOUT COMPLICATION, RIGHT, SEQUELA: Primary | ICD-10-CM

## 2022-10-31 PROCEDURE — 99214 OFFICE O/P EST MOD 30 MIN: CPT | Performed by: FAMILY MEDICINE

## 2022-10-31 ASSESSMENT — PAIN SCALES - GENERAL: PAINLEVEL: MODERATE PAIN (5)

## 2022-10-31 NOTE — PROGRESS NOTES
"  Assessment & Plan     Skin tear of forearm without complication, right, sequela  -- skin tear tegaderm patches removed.   Area cleansed and patted dry with normal saline.   -- ABD pads applied with bacitracin ointment and wrapped with stretchy gauze and taped.   -- Discussed changing dressings dailies.   -- Warning signs of infections discussed.   -- All questions answered.     The risks, benefits and treatment options of prescribed medications or other treatments have been discussed with the patient. The patient verbalized their understanding and should call or follow up if no improvement or if they develop further problems.    >30 minutes spent on the date of the encounter doing chart review, history and exam, documentation and further activities as noted above      Elian Shafer DO  [facility]    Beata Pierson is a 87 year old accompanied by his grand daughter, presenting for the following health issues:  ER F/U and Hypertension      HPI   87 year old male who presents to clinic for follow-up regarding dizziness.  Recently seen on 9/30/2022 at that time metoprolol was decreased from 75 mg daily down to 50 mg daily.  Lab testing included CBC, CMP, vitamin B12 without any specific cause for his dizziness.      Recent Skin tears on right forearm.   Seen in urgent care on 10/26.   Tegarderm placed over wounds.   Reports they are not painful. No purulent drainage.   No fevers.         Review of Systems   Constitutional, HEENT, cardiovascular, pulmonary, gi and gu systems are negative, except as otherwise noted.      Objective    /60 (BP Location: Left arm, Patient Position: Chair, Cuff Size: Adult Regular)   Pulse 73   Temp 97.4  F (36.3  C) (Tympanic)   Resp 24   Ht 1.689 m (5' 6.5\")   Wt 78.5 kg (173 lb)   SpO2 95%   BMI 27.50 kg/m    Body mass index is 27.5 kg/m .  Physical Exam   General: alert, cooperative, no acute distress   Skin: skin tear laceration right forearm " x4. Roughly 5 cm in size. No surrounding erythema. No purulent drainage.   Left knee: skin tear below the patella. 3 cm in length. No purulence.

## 2022-10-31 NOTE — PATIENT INSTRUCTIONS
For the skin tears, replace bandage daily and use bacitracin cream.     Watch for signs of infection.     Follow up with Dermatology.

## 2022-11-16 NOTE — NURSING NOTE
"Oncology Rooming Note    June 29, 2017 11:04 AM   Dangelo Hernández is a 82 year old male who presents for:    Chief Complaint   Patient presents with     Blood Draw     venipuncture     RECHECK     Pt is here with [employer] here for labs.      Initial Vitals: /77  Pulse 60  Temp 97.6  F (36.4  C) (Oral)  Resp 16  Wt 87.3 kg (192 lb 6.4 oz)  SpO2 96%  BMI 29.25 kg/m2 Estimated body mass index is 29.25 kg/(m^2) as calculated from the following:    Height as of 4/20/17: 1.727 m (5' 8\").    Weight as of this encounter: 87.3 kg (192 lb 6.4 oz). Body surface area is 2.05 meters squared.  Data Unavailable Comment: Data Unavailable   No LMP for male patient.  Allergies reviewed: Yes  Medications reviewed: Yes    Medications: Medication refills not needed today.  Pharmacy name entered into [employer]:    [facility] [place] [address] [place] [address] [id number] - [facility] [address] [facility] [facility] [address]    Clinical concerns: none MD was NOT notified.    6 minutes for nursing intake (face to face time)     Herminiajose l Hinojosa MA              "
Chief Complaint   Patient presents with     Blood Draw     venipuncture     Vitals done and labs drawn, see flow sheets.  KVNG PARKER, CMA    
Cane

## 2023-01-01 ENCOUNTER — APPOINTMENT (OUTPATIENT)
Dept: CT IMAGING | Facility: CLINIC | Age: 88
DRG: 871 | End: 2023-01-01
Attending: EMERGENCY MEDICINE
Payer: COMMERCIAL

## 2023-01-01 ENCOUNTER — APPOINTMENT (OUTPATIENT)
Dept: OCCUPATIONAL THERAPY | Facility: CLINIC | Age: 88
DRG: 871 | End: 2023-01-01
Payer: COMMERCIAL

## 2023-01-01 ENCOUNTER — TELEPHONE (OUTPATIENT)
Dept: GERIATRICS | Facility: CLINIC | Age: 88
End: 2023-01-01
Payer: COMMERCIAL

## 2023-01-01 ENCOUNTER — TRANSITIONAL CARE UNIT VISIT (OUTPATIENT)
Dept: GERIATRICS | Facility: CLINIC | Age: 88
End: 2023-01-01
Payer: COMMERCIAL

## 2023-01-01 ENCOUNTER — OFFICE VISIT (OUTPATIENT)
Dept: FAMILY MEDICINE | Facility: CLINIC | Age: 88
End: 2023-01-01
Payer: COMMERCIAL

## 2023-01-01 ENCOUNTER — PATIENT OUTREACH (OUTPATIENT)
Dept: CARE COORDINATION | Facility: CLINIC | Age: 88
End: 2023-01-01
Payer: COMMERCIAL

## 2023-01-01 ENCOUNTER — APPOINTMENT (OUTPATIENT)
Dept: PHYSICAL THERAPY | Facility: CLINIC | Age: 88
End: 2023-01-01
Payer: COMMERCIAL

## 2023-01-01 ENCOUNTER — TELEPHONE (OUTPATIENT)
Dept: FAMILY MEDICINE | Facility: CLINIC | Age: 88
End: 2023-01-01
Payer: COMMERCIAL

## 2023-01-01 ENCOUNTER — DOCUMENTATION ONLY (OUTPATIENT)
Dept: OTHER | Facility: CLINIC | Age: 88
End: 2023-01-01

## 2023-01-01 ENCOUNTER — TRANSFERRED RECORDS (OUTPATIENT)
Dept: HEALTH INFORMATION MANAGEMENT | Facility: CLINIC | Age: 88
End: 2023-01-01
Payer: COMMERCIAL

## 2023-01-01 ENCOUNTER — HOSPITAL ENCOUNTER (OUTPATIENT)
Dept: CARDIOLOGY | Facility: CLINIC | Age: 88
Discharge: HOME | End: 2023-09-27
Attending: FAMILY MEDICINE | Admitting: FAMILY MEDICINE
Payer: COMMERCIAL

## 2023-01-01 ENCOUNTER — APPOINTMENT (OUTPATIENT)
Dept: OCCUPATIONAL THERAPY | Facility: CLINIC | Age: 88
End: 2023-01-01
Payer: COMMERCIAL

## 2023-01-01 ENCOUNTER — APPOINTMENT (OUTPATIENT)
Dept: CT IMAGING | Facility: CLINIC | Age: 88
End: 2023-01-01
Attending: FAMILY MEDICINE
Payer: COMMERCIAL

## 2023-01-01 ENCOUNTER — MEDICAL CORRESPONDENCE (OUTPATIENT)
Dept: HEALTH INFORMATION MANAGEMENT | Facility: CLINIC | Age: 88
End: 2023-01-01
Payer: COMMERCIAL

## 2023-01-01 ENCOUNTER — LAB REQUISITION (OUTPATIENT)
Dept: LAB | Facility: CLINIC | Age: 88
End: 2023-01-01

## 2023-01-01 ENCOUNTER — APPOINTMENT (OUTPATIENT)
Dept: PHYSICAL THERAPY | Facility: CLINIC | Age: 88
DRG: 871 | End: 2023-01-01
Payer: COMMERCIAL

## 2023-01-01 ENCOUNTER — DISCHARGE SUMMARY NURSING HOME (OUTPATIENT)
Dept: GERIATRICS | Facility: CLINIC | Age: 88
End: 2023-01-01
Payer: COMMERCIAL

## 2023-01-01 ENCOUNTER — DOCUMENTATION ONLY (OUTPATIENT)
Dept: GERIATRICS | Facility: CLINIC | Age: 88
End: 2023-01-01
Payer: COMMERCIAL

## 2023-01-01 ENCOUNTER — HOSPITAL ENCOUNTER (OUTPATIENT)
Facility: CLINIC | Age: 88
Setting detail: OBSERVATION
Discharge: NURSING, REHAB OR LONG-TERM CARE | End: 2023-09-07
Attending: FAMILY MEDICINE | Admitting: INTERNAL MEDICINE
Payer: COMMERCIAL

## 2023-01-01 ENCOUNTER — APPOINTMENT (OUTPATIENT)
Dept: SPEECH THERAPY | Facility: CLINIC | Age: 88
DRG: 871 | End: 2023-01-01
Payer: COMMERCIAL

## 2023-01-01 ENCOUNTER — HOSPITAL ENCOUNTER (INPATIENT)
Facility: CLINIC | Age: 88
LOS: 4 days | Discharge: NURSING, REHAB OR LONG-TERM CARE | DRG: 871 | End: 2024-01-02
Attending: EMERGENCY MEDICINE | Admitting: STUDENT IN AN ORGANIZED HEALTH CARE EDUCATION/TRAINING PROGRAM
Payer: COMMERCIAL

## 2023-01-01 ENCOUNTER — APPOINTMENT (OUTPATIENT)
Dept: CT IMAGING | Facility: CLINIC | Age: 88
End: 2023-01-01
Attending: EMERGENCY MEDICINE
Payer: COMMERCIAL

## 2023-01-01 ENCOUNTER — HOSPITAL ENCOUNTER (EMERGENCY)
Facility: CLINIC | Age: 88
Discharge: HOME | End: 2023-06-28
Attending: EMERGENCY MEDICINE | Admitting: EMERGENCY MEDICINE
Payer: COMMERCIAL

## 2023-01-01 VITALS
WEIGHT: 150 LBS | OXYGEN SATURATION: 98 % | HEART RATE: 73 BPM | TEMPERATURE: 98.1 F | DIASTOLIC BLOOD PRESSURE: 59 MMHG | BODY MASS INDEX: 24.11 KG/M2 | SYSTOLIC BLOOD PRESSURE: 121 MMHG | RESPIRATION RATE: 18 BRPM | HEIGHT: 66 IN

## 2023-01-01 VITALS
TEMPERATURE: 97.9 F | BODY MASS INDEX: 25.23 KG/M2 | OXYGEN SATURATION: 97 % | HEART RATE: 80 BPM | SYSTOLIC BLOOD PRESSURE: 197 MMHG | WEIGHT: 157 LBS | DIASTOLIC BLOOD PRESSURE: 56 MMHG | RESPIRATION RATE: 17 BRPM | HEIGHT: 66 IN

## 2023-01-01 VITALS
HEART RATE: 83 BPM | TEMPERATURE: 98 F | RESPIRATION RATE: 24 BRPM | SYSTOLIC BLOOD PRESSURE: 98 MMHG | DIASTOLIC BLOOD PRESSURE: 56 MMHG | OXYGEN SATURATION: 96 %

## 2023-01-01 VITALS
DIASTOLIC BLOOD PRESSURE: 55 MMHG | SYSTOLIC BLOOD PRESSURE: 106 MMHG | WEIGHT: 151.2 LBS | HEART RATE: 78 BPM | RESPIRATION RATE: 18 BRPM | TEMPERATURE: 98.3 F | HEIGHT: 66 IN | BODY MASS INDEX: 24.3 KG/M2 | OXYGEN SATURATION: 93 %

## 2023-01-01 VITALS
OXYGEN SATURATION: 97 % | WEIGHT: 164 LBS | HEIGHT: 66 IN | RESPIRATION RATE: 20 BRPM | HEART RATE: 88 BPM | DIASTOLIC BLOOD PRESSURE: 50 MMHG | BODY MASS INDEX: 26.36 KG/M2 | TEMPERATURE: 97.9 F | SYSTOLIC BLOOD PRESSURE: 118 MMHG

## 2023-01-01 VITALS
DIASTOLIC BLOOD PRESSURE: 64 MMHG | BODY MASS INDEX: 25.43 KG/M2 | TEMPERATURE: 98.4 F | RESPIRATION RATE: 18 BRPM | WEIGHT: 162 LBS | HEIGHT: 67 IN | SYSTOLIC BLOOD PRESSURE: 105 MMHG | OXYGEN SATURATION: 90 % | HEART RATE: 85 BPM

## 2023-01-01 VITALS
SYSTOLIC BLOOD PRESSURE: 142 MMHG | WEIGHT: 155 LBS | HEART RATE: 91 BPM | OXYGEN SATURATION: 96 % | RESPIRATION RATE: 18 BRPM | HEIGHT: 66 IN | TEMPERATURE: 97.9 F | BODY MASS INDEX: 24.91 KG/M2 | DIASTOLIC BLOOD PRESSURE: 64 MMHG

## 2023-01-01 VITALS
OXYGEN SATURATION: 96 % | RESPIRATION RATE: 16 BRPM | HEIGHT: 66 IN | BODY MASS INDEX: 24.27 KG/M2 | WEIGHT: 151 LBS | SYSTOLIC BLOOD PRESSURE: 104 MMHG | HEART RATE: 84 BPM | DIASTOLIC BLOOD PRESSURE: 52 MMHG | TEMPERATURE: 98.3 F

## 2023-01-01 VITALS
BODY MASS INDEX: 25.22 KG/M2 | HEART RATE: 80 BPM | RESPIRATION RATE: 16 BRPM | DIASTOLIC BLOOD PRESSURE: 55 MMHG | TEMPERATURE: 98.3 F | WEIGHT: 156.9 LBS | HEIGHT: 66 IN | OXYGEN SATURATION: 93 % | SYSTOLIC BLOOD PRESSURE: 118 MMHG

## 2023-01-01 VITALS
RESPIRATION RATE: 16 BRPM | DIASTOLIC BLOOD PRESSURE: 45 MMHG | HEART RATE: 83 BPM | OXYGEN SATURATION: 93 % | SYSTOLIC BLOOD PRESSURE: 132 MMHG | TEMPERATURE: 98.2 F

## 2023-01-01 VITALS
SYSTOLIC BLOOD PRESSURE: 94 MMHG | WEIGHT: 164 LBS | TEMPERATURE: 97.4 F | DIASTOLIC BLOOD PRESSURE: 40 MMHG | HEART RATE: 74 BPM | OXYGEN SATURATION: 98 % | BODY MASS INDEX: 26.07 KG/M2

## 2023-01-01 VITALS
RESPIRATION RATE: 18 BRPM | WEIGHT: 155.2 LBS | HEART RATE: 90 BPM | SYSTOLIC BLOOD PRESSURE: 91 MMHG | DIASTOLIC BLOOD PRESSURE: 49 MMHG | BODY MASS INDEX: 24.36 KG/M2 | TEMPERATURE: 98.3 F | HEIGHT: 67 IN | OXYGEN SATURATION: 93 %

## 2023-01-01 DIAGNOSIS — Z79.01 LONG TERM CURRENT USE OF ANTICOAGULANT THERAPY: Primary | Chronic | ICD-10-CM

## 2023-01-01 DIAGNOSIS — C83.10 MANTLE CELL LYMPHOMA, UNSPECIFIED BODY REGION (H): ICD-10-CM

## 2023-01-01 DIAGNOSIS — J42 CHRONIC BRONCHITIS, UNSPECIFIED CHRONIC BRONCHITIS TYPE (H): ICD-10-CM

## 2023-01-01 DIAGNOSIS — R53.81 PHYSICAL DECONDITIONING: ICD-10-CM

## 2023-01-01 DIAGNOSIS — R63.0 POOR APPETITE: ICD-10-CM

## 2023-01-01 DIAGNOSIS — I25.119 CORONARY ARTERY DISEASE INVOLVING NATIVE CORONARY ARTERY OF NATIVE HEART WITH ANGINA PECTORIS (H): ICD-10-CM

## 2023-01-01 DIAGNOSIS — Z95.2 S/P TAVR (TRANSCATHETER AORTIC VALVE REPLACEMENT): ICD-10-CM

## 2023-01-01 DIAGNOSIS — M54.50 LOW BACK PAIN WITHOUT SCIATICA, UNSPECIFIED BACK PAIN LATERALITY, UNSPECIFIED CHRONICITY: ICD-10-CM

## 2023-01-01 DIAGNOSIS — I11.9 HYPERTENSIVE HEART DISEASE WITHOUT HEART FAILURE: ICD-10-CM

## 2023-01-01 DIAGNOSIS — R53.1 GENERALIZED WEAKNESS: ICD-10-CM

## 2023-01-01 DIAGNOSIS — J96.01 ACUTE RESPIRATORY FAILURE WITH HYPOXIA (H): ICD-10-CM

## 2023-01-01 DIAGNOSIS — I95.9 HYPOTENSION, UNSPECIFIED HYPOTENSION TYPE: ICD-10-CM

## 2023-01-01 DIAGNOSIS — E03.9 ACQUIRED HYPOTHYROIDISM: Chronic | ICD-10-CM

## 2023-01-01 DIAGNOSIS — I10 HYPERTENSION GOAL BP (BLOOD PRESSURE) < 140/90: ICD-10-CM

## 2023-01-01 DIAGNOSIS — M62.81 MUSCLE WEAKNESS (GENERALIZED): ICD-10-CM

## 2023-01-01 DIAGNOSIS — G89.29 CHRONIC MIDLINE THORACIC BACK PAIN: ICD-10-CM

## 2023-01-01 DIAGNOSIS — J18.9 PNEUMONIA, UNSPECIFIED ORGANISM: ICD-10-CM

## 2023-01-01 DIAGNOSIS — Z95.5 PRESENCE OF CORONARY ANGIOPLASTY IMPLANT AND GRAFT: Chronic | ICD-10-CM

## 2023-01-01 DIAGNOSIS — F32.A DEPRESSION, UNSPECIFIED DEPRESSION TYPE: ICD-10-CM

## 2023-01-01 DIAGNOSIS — L98.9 SKIN LESION: ICD-10-CM

## 2023-01-01 DIAGNOSIS — M54.6 CHRONIC MIDLINE THORACIC BACK PAIN: ICD-10-CM

## 2023-01-01 DIAGNOSIS — I10 HYPERTENSION GOAL BP (BLOOD PRESSURE) < 140/90: Chronic | ICD-10-CM

## 2023-01-01 DIAGNOSIS — Z79.01 LONG TERM CURRENT USE OF ANTICOAGULANT THERAPY: Chronic | ICD-10-CM

## 2023-01-01 DIAGNOSIS — Z94.84 STEM CELLS TRANSPLANT STATUS (H): ICD-10-CM

## 2023-01-01 DIAGNOSIS — L08.9 INFECTED ABRASION OF LEFT LOWER EXTREMITY, SUBSEQUENT ENCOUNTER: Primary | ICD-10-CM

## 2023-01-01 DIAGNOSIS — M48.061 SPINAL STENOSIS OF LUMBAR REGION WITHOUT NEUROGENIC CLAUDICATION: ICD-10-CM

## 2023-01-01 DIAGNOSIS — M54.6 CHRONIC MIDLINE THORACIC BACK PAIN: Primary | ICD-10-CM

## 2023-01-01 DIAGNOSIS — I73.9 PERIPHERAL VASCULAR DISEASE, UNSPECIFIED (H): ICD-10-CM

## 2023-01-01 DIAGNOSIS — Z95.2 S/P TAVR (TRANSCATHETER AORTIC VALVE REPLACEMENT): Chronic | ICD-10-CM

## 2023-01-01 DIAGNOSIS — S80.812D INFECTED ABRASION OF LEFT LOWER EXTREMITY, SUBSEQUENT ENCOUNTER: Primary | ICD-10-CM

## 2023-01-01 DIAGNOSIS — M48.061 SPINAL STENOSIS OF LUMBAR REGION WITHOUT NEUROGENIC CLAUDICATION: Primary | ICD-10-CM

## 2023-01-01 DIAGNOSIS — I10 HYPERTENSION GOAL BP (BLOOD PRESSURE) < 140/90: Primary | ICD-10-CM

## 2023-01-01 DIAGNOSIS — I25.10 CORONARY ARTERY DISEASE INVOLVING NATIVE CORONARY ARTERY OF NATIVE HEART WITHOUT ANGINA PECTORIS: ICD-10-CM

## 2023-01-01 DIAGNOSIS — I25.118 CORONARY ARTERY DISEASE OF NATIVE HEART WITH STABLE ANGINA PECTORIS, UNSPECIFIED VESSEL OR LESION TYPE (H): ICD-10-CM

## 2023-01-01 DIAGNOSIS — R65.21 SEPTIC SHOCK (H): ICD-10-CM

## 2023-01-01 DIAGNOSIS — Z11.52 ENCOUNTER FOR SCREENING LABORATORY TESTING FOR SEVERE ACUTE RESPIRATORY SYNDROME CORONAVIRUS 2 (SARS-COV-2): Primary | ICD-10-CM

## 2023-01-01 DIAGNOSIS — E78.2 COMBINED HYPERLIPIDEMIA: Chronic | ICD-10-CM

## 2023-01-01 DIAGNOSIS — J18.9 PNEUMONIA OF BOTH LOWER LOBES DUE TO INFECTIOUS ORGANISM: Primary | ICD-10-CM

## 2023-01-01 DIAGNOSIS — Z20.822 LAB TEST NEGATIVE FOR COVID-19 VIRUS: Primary | ICD-10-CM

## 2023-01-01 DIAGNOSIS — L03.116 CELLULITIS OF LEFT LOWER EXTREMITY: ICD-10-CM

## 2023-01-01 DIAGNOSIS — N17.9 AKI (ACUTE KIDNEY INJURY) (H): ICD-10-CM

## 2023-01-01 DIAGNOSIS — G89.29 BILATERAL CHRONIC KNEE PAIN: ICD-10-CM

## 2023-01-01 DIAGNOSIS — J18.9 COMMUNITY ACQUIRED PNEUMONIA, UNSPECIFIED LATERALITY: ICD-10-CM

## 2023-01-01 DIAGNOSIS — G93.40 ENCEPHALOPATHY: ICD-10-CM

## 2023-01-01 DIAGNOSIS — J18.9 PNEUMONIA DUE TO INFECTIOUS ORGANISM, UNSPECIFIED LATERALITY, UNSPECIFIED PART OF LUNG: Primary | ICD-10-CM

## 2023-01-01 DIAGNOSIS — G89.29 CHRONIC MIDLINE THORACIC BACK PAIN: Primary | ICD-10-CM

## 2023-01-01 DIAGNOSIS — M25.562 BILATERAL CHRONIC KNEE PAIN: ICD-10-CM

## 2023-01-01 DIAGNOSIS — K21.9 GASTROESOPHAGEAL REFLUX DISEASE WITHOUT ESOPHAGITIS: ICD-10-CM

## 2023-01-01 DIAGNOSIS — R35.0 URINARY FREQUENCY: ICD-10-CM
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
## 2023-01-01 DIAGNOSIS — D50.9 IRON DEFICIENCY ANEMIA, UNSPECIFIED IRON DEFICIENCY ANEMIA TYPE: ICD-10-CM

## 2023-01-01 DIAGNOSIS — C85.90 LYMPHOMA, UNSPECIFIED BODY REGION, UNSPECIFIED LYMPHOMA TYPE (H): ICD-10-CM

## 2023-01-01 DIAGNOSIS — M25.561 BILATERAL CHRONIC KNEE PAIN: ICD-10-CM

## 2023-01-01 DIAGNOSIS — B37.0 THRUSH: ICD-10-CM

## 2023-01-01 DIAGNOSIS — K21.9 GASTROESOPHAGEAL REFLUX DISEASE WITHOUT ESOPHAGITIS: Chronic | ICD-10-CM

## 2023-01-01 DIAGNOSIS — L03.116 LEFT LEG CELLULITIS: ICD-10-CM

## 2023-01-01 DIAGNOSIS — N40.1 BENIGN PROSTATIC HYPERPLASIA WITH LOWER URINARY TRACT SYMPTOMS, SYMPTOM DETAILS UNSPECIFIED: Primary | ICD-10-CM

## 2023-01-01 DIAGNOSIS — A41.9 SEPTIC SHOCK (H): ICD-10-CM

## 2023-01-01 DIAGNOSIS — S01.01XA SCALP LACERATION, INITIAL ENCOUNTER: ICD-10-CM

## 2023-01-01 DIAGNOSIS — I10 HYPERTENSION GOAL BP (BLOOD PRESSURE) < 140/90: Primary | Chronic | ICD-10-CM

## 2023-01-01 DIAGNOSIS — Z12.11 SCREEN FOR COLON CANCER: Primary | ICD-10-CM

## 2023-01-01 DIAGNOSIS — S80.812A ABRASION OF LEFT LOWER EXTREMITY, INITIAL ENCOUNTER: ICD-10-CM

## 2023-01-01 LAB
ALBUMIN SERPL BCG-MCNC: 2.9 G/DL (ref 3.5–5.2)
ALBUMIN SERPL BCG-MCNC: 3.7 G/DL (ref 3.5–5.2)
ALBUMIN SERPL BCG-MCNC: 3.8 G/DL (ref 3.5–5.2)
ALBUMIN UR-MCNC: 100 MG/DL
ALBUMIN UR-MCNC: 30 MG/DL
ALBUMIN UR-MCNC: NEGATIVE MG/DL
ALP SERPL-CCNC: 45 U/L (ref 40–150)
ALP SERPL-CCNC: 46 U/L (ref 40–129)
ALP SERPL-CCNC: 48 U/L (ref 40–150)
ALT SERPL W P-5'-P-CCNC: 15 U/L (ref 0–70)
ALT SERPL W P-5'-P-CCNC: 9 U/L (ref 0–70)
ALT SERPL W P-5'-P-CCNC: 9 U/L (ref 0–70)
ANION GAP SERPL CALCULATED.3IONS-SCNC: 12 MMOL/L (ref 7–15)
ANION GAP SERPL CALCULATED.3IONS-SCNC: 12 MMOL/L (ref 7–15)
ANION GAP SERPL CALCULATED.3IONS-SCNC: 13 MMOL/L (ref 7–15)
ANION GAP SERPL CALCULATED.3IONS-SCNC: 14 MMOL/L (ref 7–15)
ANION GAP SERPL CALCULATED.3IONS-SCNC: 15 MMOL/L (ref 7–15)
ANION GAP SERPL CALCULATED.3IONS-SCNC: 8 MMOL/L (ref 7–15)
ANION GAP SERPL CALCULATED.3IONS-SCNC: 9 MMOL/L (ref 7–15)
APPEARANCE UR: ABNORMAL
APPEARANCE UR: ABNORMAL
APPEARANCE UR: CLEAR
AST SERPL W P-5'-P-CCNC: 17 U/L (ref 0–45)
AST SERPL W P-5'-P-CCNC: 17 U/L (ref 0–45)
AST SERPL W P-5'-P-CCNC: 24 U/L (ref 0–45)
BACTERIA #/AREA URNS HPF: ABNORMAL /HPF
BACTERIA BLD CULT: NO GROWTH
BACTERIA BLD CULT: NO GROWTH
BASE EXCESS BLDV CALC-SCNC: -3.8 MMOL/L (ref -7.7–1.9)
BASE EXCESS BLDV CALC-SCNC: 0.7 MMOL/L (ref -7.7–1.9)
BASOPHILS # BLD AUTO: 0 10E3/UL (ref 0–0.2)
BASOPHILS # BLD AUTO: 0.1 10E3/UL (ref 0–0.2)
BASOPHILS NFR BLD AUTO: 0 %
BASOPHILS NFR BLD AUTO: 1 %
BILIRUB SERPL-MCNC: 0.3 MG/DL
BILIRUB SERPL-MCNC: 0.3 MG/DL
BILIRUB SERPL-MCNC: <0.2 MG/DL
BILIRUB UR QL STRIP: NEGATIVE
BUN SERPL-MCNC: 10.5 MG/DL (ref 8–23)
BUN SERPL-MCNC: 15.3 MG/DL (ref 8–23)
BUN SERPL-MCNC: 15.5 MG/DL (ref 8–23)
BUN SERPL-MCNC: 16.4 MG/DL (ref 8–23)
BUN SERPL-MCNC: 17.8 MG/DL (ref 8–23)
BUN SERPL-MCNC: 20.6 MG/DL (ref 8–23)
BUN SERPL-MCNC: 23.9 MG/DL (ref 8–23)
CALCIUM SERPL-MCNC: 8.5 MG/DL (ref 8.8–10.2)
CALCIUM SERPL-MCNC: 8.6 MG/DL (ref 8.8–10.2)
CALCIUM SERPL-MCNC: 8.8 MG/DL (ref 8.8–10.2)
CALCIUM SERPL-MCNC: 8.9 MG/DL (ref 8.8–10.2)
CALCIUM SERPL-MCNC: 9.1 MG/DL (ref 8.8–10.2)
CALCIUM SERPL-MCNC: 9.3 MG/DL (ref 8.8–10.2)
CALCIUM SERPL-MCNC: 9.4 MG/DL (ref 8.8–10.2)
CHLORIDE SERPL-SCNC: 103 MMOL/L (ref 98–107)
CHLORIDE SERPL-SCNC: 105 MMOL/L (ref 98–107)
CHLORIDE SERPL-SCNC: 106 MMOL/L (ref 98–107)
CHLORIDE SERPL-SCNC: 107 MMOL/L (ref 98–107)
CHLORIDE SERPL-SCNC: 108 MMOL/L (ref 98–107)
CHLORIDE SERPL-SCNC: 110 MMOL/L (ref 98–107)
CHLORIDE SERPL-SCNC: 111 MMOL/L (ref 98–107)
CHOLEST SERPL-MCNC: 149 MG/DL
COLOR UR AUTO: ABNORMAL
COLOR UR AUTO: YELLOW
COLOR UR AUTO: YELLOW
CORTIS SERPL-MCNC: 11.2 UG/DL
CREAT SERPL-MCNC: 0.89 MG/DL (ref 0.67–1.17)
CREAT SERPL-MCNC: 0.91 MG/DL (ref 0.67–1.17)
CREAT SERPL-MCNC: 0.99 MG/DL (ref 0.67–1.17)
CREAT SERPL-MCNC: 1.13 MG/DL (ref 0.67–1.17)
CREAT SERPL-MCNC: 1.17 MG/DL (ref 0.67–1.17)
CREAT SERPL-MCNC: 1.19 MG/DL (ref 0.67–1.17)
CREAT SERPL-MCNC: 1.52 MG/DL (ref 0.67–1.17)
DEPRECATED HCO3 PLAS-SCNC: 19 MMOL/L (ref 22–29)
DEPRECATED HCO3 PLAS-SCNC: 19 MMOL/L (ref 22–29)
DEPRECATED HCO3 PLAS-SCNC: 21 MMOL/L (ref 22–29)
DEPRECATED HCO3 PLAS-SCNC: 22 MMOL/L (ref 22–29)
DEPRECATED HCO3 PLAS-SCNC: 23 MMOL/L (ref 22–29)
DEPRECATED HCO3 PLAS-SCNC: 24 MMOL/L (ref 22–29)
DEPRECATED HCO3 PLAS-SCNC: 24 MMOL/L (ref 22–29)
EGFRCR SERPLBLD CKD-EPI 2021: 61 ML/MIN/1.73M2
EGFRCR SERPLBLD CKD-EPI 2021: 82 ML/MIN/1.73M2
EGFRCR SERPLBLD CKD-EPI 2021: 83 ML/MIN/1.73M2
EOSINOPHIL # BLD AUTO: 0.1 10E3/UL (ref 0–0.7)
EOSINOPHIL # BLD AUTO: 0.1 10E3/UL (ref 0–0.7)
EOSINOPHIL NFR BLD AUTO: 0 %
EOSINOPHIL NFR BLD AUTO: 2 %
ERYTHROCYTE [DISTWIDTH] IN BLOOD BY AUTOMATED COUNT: 15 % (ref 10–15)
ERYTHROCYTE [DISTWIDTH] IN BLOOD BY AUTOMATED COUNT: 15.2 % (ref 10–15)
ERYTHROCYTE [DISTWIDTH] IN BLOOD BY AUTOMATED COUNT: 15.3 % (ref 10–15)
ERYTHROCYTE [DISTWIDTH] IN BLOOD BY AUTOMATED COUNT: 15.4 % (ref 10–15)
ERYTHROCYTE [DISTWIDTH] IN BLOOD BY AUTOMATED COUNT: 15.7 % (ref 10–15)
ERYTHROCYTE [DISTWIDTH] IN BLOOD BY AUTOMATED COUNT: 18.2 % (ref 10–15)
FERRITIN SERPL-MCNC: 57 NG/ML (ref 31–409)
FLUAV RNA SPEC QL NAA+PROBE: NEGATIVE
FLUAV RNA SPEC QL NAA+PROBE: NEGATIVE
FLUBV RNA RESP QL NAA+PROBE: NEGATIVE
FLUBV RNA RESP QL NAA+PROBE: NEGATIVE
GFR SERPL CREATININE-BSD FRML MDRD: 44 ML/MIN/1.73M2
GFR SERPL CREATININE-BSD FRML MDRD: 59 ML/MIN/1.73M2
GFR SERPL CREATININE-BSD FRML MDRD: 63 ML/MIN/1.73M2
GFR SERPL CREATININE-BSD FRML MDRD: 74 ML/MIN/1.73M2
GLUCOSE SERPL-MCNC: 115 MG/DL (ref 70–99)
GLUCOSE SERPL-MCNC: 116 MG/DL (ref 70–99)
GLUCOSE SERPL-MCNC: 118 MG/DL (ref 70–99)
GLUCOSE SERPL-MCNC: 88 MG/DL (ref 70–99)
GLUCOSE SERPL-MCNC: 92 MG/DL (ref 70–99)
GLUCOSE SERPL-MCNC: 96 MG/DL (ref 70–99)
GLUCOSE SERPL-MCNC: 98 MG/DL (ref 70–99)
GLUCOSE UR STRIP-MCNC: NEGATIVE MG/DL
HCO3 BLDV-SCNC: 23 MMOL/L (ref 21–28)
HCO3 BLDV-SCNC: 26 MMOL/L (ref 21–28)
HCT VFR BLD AUTO: 28.3 % (ref 40–53)
HCT VFR BLD AUTO: 28.6 % (ref 40–53)
HCT VFR BLD AUTO: 30.7 % (ref 40–53)
HCT VFR BLD AUTO: 32.8 % (ref 40–53)
HCT VFR BLD AUTO: 33.7 % (ref 40–53)
HCT VFR BLD AUTO: 33.9 % (ref 40–53)
HDLC SERPL-MCNC: 54 MG/DL
HGB BLD-MCNC: 10.9 G/DL (ref 13.3–17.7)
HGB BLD-MCNC: 11.1 G/DL (ref 13.3–17.7)
HGB BLD-MCNC: 11.2 G/DL (ref 13.3–17.7)
HGB BLD-MCNC: 9 G/DL (ref 13.3–17.7)
HGB BLD-MCNC: 9.4 G/DL (ref 13.3–17.7)
HGB BLD-MCNC: 9.8 G/DL (ref 13.3–17.7)
HGB UR QL STRIP: ABNORMAL
HGB UR QL STRIP: NEGATIVE
HGB UR QL STRIP: NEGATIVE
HOLD SPECIMEN: NORMAL
HYALINE CASTS #/AREA URNS LPF: ABNORMAL /LPF
HYALINE CASTS: 10 /LPF
IMM GRANULOCYTES # BLD: 0 10E3/UL
IMM GRANULOCYTES # BLD: 0 10E3/UL
IMM GRANULOCYTES NFR BLD: 0 %
IMM GRANULOCYTES NFR BLD: 1 %
IRON BINDING CAPACITY (ROCHE): 279 UG/DL (ref 240–430)
IRON SATN MFR SERPL: 18 % (ref 15–46)
IRON SERPL-MCNC: 49 UG/DL (ref 61–157)
KETONES UR STRIP-MCNC: NEGATIVE MG/DL
LACTATE SERPL-SCNC: 1.9 MMOL/L (ref 0.7–2)
LACTATE SERPL-SCNC: 1.9 MMOL/L (ref 0.7–2)
LDLC SERPL CALC-MCNC: 75 MG/DL
LEUKOCYTE ESTERASE UR QL STRIP: NEGATIVE
LVEF ECHO: NORMAL
LYMPHOCYTES # BLD AUTO: 0.7 10E3/UL (ref 0.8–5.3)
LYMPHOCYTES # BLD AUTO: 1.2 10E3/UL (ref 0.8–5.3)
LYMPHOCYTES NFR BLD AUTO: 20 %
LYMPHOCYTES NFR BLD AUTO: 6 %
MAGNESIUM SERPL-MCNC: 1.5 MG/DL (ref 1.7–2.3)
MAGNESIUM SERPL-MCNC: 1.6 MG/DL (ref 1.7–2.3)
MAGNESIUM SERPL-MCNC: 2.7 MG/DL (ref 1.7–2.3)
MCH RBC QN AUTO: 28.7 PG (ref 26.5–33)
MCH RBC QN AUTO: 29.6 PG (ref 26.5–33)
MCH RBC QN AUTO: 29.7 PG (ref 26.5–33)
MCH RBC QN AUTO: 29.9 PG (ref 26.5–33)
MCH RBC QN AUTO: 29.9 PG (ref 26.5–33)
MCH RBC QN AUTO: 30.2 PG (ref 26.5–33)
MCHC RBC AUTO-ENTMCNC: 31.8 G/DL (ref 31.5–36.5)
MCHC RBC AUTO-ENTMCNC: 31.9 G/DL (ref 31.5–36.5)
MCHC RBC AUTO-ENTMCNC: 32.7 G/DL (ref 31.5–36.5)
MCHC RBC AUTO-ENTMCNC: 32.9 G/DL (ref 31.5–36.5)
MCHC RBC AUTO-ENTMCNC: 33.2 G/DL (ref 31.5–36.5)
MCHC RBC AUTO-ENTMCNC: 33.2 G/DL (ref 31.5–36.5)
MCV RBC AUTO: 90 FL (ref 78–100)
MCV RBC AUTO: 91 FL (ref 78–100)
MCV RBC AUTO: 91 FL (ref 78–100)
MCV RBC AUTO: 93 FL (ref 78–100)
MONOCYTES # BLD AUTO: 0.7 10E3/UL (ref 0–1.3)
MONOCYTES # BLD AUTO: 0.8 10E3/UL (ref 0–1.3)
MONOCYTES NFR BLD AUTO: 13 %
MONOCYTES NFR BLD AUTO: 7 %
MUCOUS THREADS #/AREA URNS LPF: PRESENT /LPF
NEUTROPHILS # BLD AUTO: 4.1 10E3/UL (ref 1.6–8.3)
NEUTROPHILS # BLD AUTO: 9.6 10E3/UL (ref 1.6–8.3)
NEUTROPHILS NFR BLD AUTO: 63 %
NEUTROPHILS NFR BLD AUTO: 87 %
NITRATE UR QL: NEGATIVE
NONHDLC SERPL-MCNC: 95 MG/DL
NRBC # BLD AUTO: 0 10E3/UL
NRBC # BLD AUTO: 0 10E3/UL
NRBC BLD AUTO-RTO: 0 /100
NRBC BLD AUTO-RTO: 0 /100
NT-PROBNP SERPL-MCNC: 739 PG/ML (ref 0–1800)
O2/TOTAL GAS SETTING VFR VENT: 0 %
O2/TOTAL GAS SETTING VFR VENT: 28 %
PCO2 BLDV: 44 MM HG (ref 40–50)
PCO2 BLDV: 44 MM HG (ref 40–50)
PH BLDV: 7.31 [PH] (ref 7.32–7.43)
PH BLDV: 7.38 [PH] (ref 7.32–7.43)
PH UR STRIP: 5 [PH] (ref 5–7)
PH UR STRIP: 5.5 [PH] (ref 5–7)
PH UR STRIP: 6 [PH] (ref 5–7)
PLATELET # BLD AUTO: 202 10E3/UL (ref 150–450)
PLATELET # BLD AUTO: 204 10E3/UL (ref 150–450)
PLATELET # BLD AUTO: 209 10E3/UL (ref 150–450)
PLATELET # BLD AUTO: 215 10E3/UL (ref 150–450)
PLATELET # BLD AUTO: 253 10E3/UL (ref 150–450)
PLATELET # BLD AUTO: 327 10E3/UL (ref 150–450)
PO2 BLDV: 31 MM HG (ref 25–47)
PO2 BLDV: 40 MM HG (ref 25–47)
POTASSIUM SERPL-SCNC: 3.1 MMOL/L (ref 3.4–5.3)
POTASSIUM SERPL-SCNC: 3.4 MMOL/L (ref 3.4–5.3)
POTASSIUM SERPL-SCNC: 3.4 MMOL/L (ref 3.4–5.3)
POTASSIUM SERPL-SCNC: 3.6 MMOL/L (ref 3.4–5.3)
POTASSIUM SERPL-SCNC: 3.8 MMOL/L (ref 3.4–5.3)
POTASSIUM SERPL-SCNC: 3.9 MMOL/L (ref 3.4–5.3)
POTASSIUM SERPL-SCNC: 3.9 MMOL/L (ref 3.4–5.3)
POTASSIUM SERPL-SCNC: 4 MMOL/L (ref 3.4–5.3)
PROCALCITONIN SERPL IA-MCNC: 0.19 NG/ML
PROT SERPL-MCNC: 5.2 G/DL (ref 6.4–8.3)
PROT SERPL-MCNC: 5.8 G/DL (ref 6.4–8.3)
PROT SERPL-MCNC: 6.3 G/DL (ref 6.4–8.3)
RBC # BLD AUTO: 3.04 10E6/UL (ref 4.4–5.9)
RBC # BLD AUTO: 3.17 10E6/UL (ref 4.4–5.9)
RBC # BLD AUTO: 3.42 10E6/UL (ref 4.4–5.9)
RBC # BLD AUTO: 3.61 10E6/UL (ref 4.4–5.9)
RBC # BLD AUTO: 3.71 10E6/UL (ref 4.4–5.9)
RBC # BLD AUTO: 3.75 10E6/UL (ref 4.4–5.9)
RBC #/AREA URNS AUTO: ABNORMAL /HPF
RBC URINE: 1 /HPF
RBC URINE: 3 /HPF
RSV RNA SPEC NAA+PROBE: NEGATIVE
RSV RNA SPEC NAA+PROBE: NEGATIVE
SARS-COV-2 RNA RESP QL NAA+PROBE: NEGATIVE
SARS-COV-2 RNA RESP QL NAA+PROBE: NEGATIVE
SODIUM SERPL-SCNC: 140 MMOL/L (ref 135–145)
SODIUM SERPL-SCNC: 140 MMOL/L (ref 135–145)
SODIUM SERPL-SCNC: 140 MMOL/L (ref 136–145)
SODIUM SERPL-SCNC: 140 MMOL/L (ref 136–145)
SODIUM SERPL-SCNC: 141 MMOL/L (ref 135–145)
SODIUM SERPL-SCNC: 141 MMOL/L (ref 136–145)
SODIUM SERPL-SCNC: 143 MMOL/L (ref 136–145)
SP GR UR STRIP: 1.01 (ref 1–1.03)
SP GR UR STRIP: 1.02 (ref 1–1.03)
SP GR UR STRIP: 1.02 (ref 1–1.03)
SQUAMOUS #/AREA URNS AUTO: ABNORMAL /LPF
SQUAMOUS EPITHELIAL: 1 /HPF
SQUAMOUS EPITHELIAL: <1 /HPF
T4 FREE SERPL-MCNC: 1.37 NG/DL (ref 0.9–1.7)
TRIGL SERPL-MCNC: 98 MG/DL
TROPONIN T SERPL HS-MCNC: 47 NG/L
TROPONIN T SERPL HS-MCNC: 47 NG/L
TSH SERPL DL<=0.005 MIU/L-ACNC: 2.47 UIU/ML (ref 0.3–4.2)
TSH SERPL DL<=0.005 MIU/L-ACNC: 3 UIU/ML (ref 0.3–4.2)
TSH SERPL DL<=0.005 MIU/L-ACNC: 6.07 UIU/ML (ref 0.3–4.2)
UROBILINOGEN UR STRIP-ACNC: 0.2 E.U./DL
UROBILINOGEN UR STRIP-MCNC: NORMAL MG/DL
UROBILINOGEN UR STRIP-MCNC: NORMAL MG/DL
VIT B12 SERPL-MCNC: 572 PG/ML (ref 232–1245)
WBC # BLD AUTO: 11.1 10E3/UL (ref 4–11)
WBC # BLD AUTO: 5.9 10E3/UL (ref 4–11)
WBC # BLD AUTO: 5.9 10E3/UL (ref 4–11)
WBC # BLD AUTO: 6.3 10E3/UL (ref 4–11)
WBC # BLD AUTO: 6.7 10E3/UL (ref 4–11)
WBC # BLD AUTO: 8.6 10E3/UL (ref 4–11)
WBC #/AREA URNS AUTO: ABNORMAL /HPF
WBC URINE: 3 /HPF
WBC URINE: 5 /HPF

## 2023-01-01 PROCEDURE — 83735 ASSAY OF MAGNESIUM: CPT | Performed by: INTERNAL MEDICINE

## 2023-01-01 PROCEDURE — G0378 HOSPITAL OBSERVATION PER HR: HCPCS

## 2023-01-01 PROCEDURE — 99232 SBSQ HOSP IP/OBS MODERATE 35: CPT | Performed by: INTERNAL MEDICINE

## 2023-01-01 PROCEDURE — 93306 TTE W/DOPPLER COMPLETE: CPT | Mod: 26 | Performed by: INTERNAL MEDICINE

## 2023-01-01 PROCEDURE — 82803 BLOOD GASES ANY COMBINATION: CPT | Performed by: INTERNAL MEDICINE

## 2023-01-01 PROCEDURE — 93010 ELECTROCARDIOGRAM REPORT: CPT | Performed by: EMERGENCY MEDICINE

## 2023-01-01 PROCEDURE — 70450 CT HEAD/BRAIN W/O DYE: CPT

## 2023-01-01 PROCEDURE — 84443 ASSAY THYROID STIM HORMONE: CPT | Performed by: FAMILY MEDICINE

## 2023-01-01 PROCEDURE — 250N000013 HC RX MED GY IP 250 OP 250 PS 637: Performed by: INTERNAL MEDICINE

## 2023-01-01 PROCEDURE — 80053 COMPREHEN METABOLIC PANEL: CPT | Performed by: INTERNAL MEDICINE

## 2023-01-01 PROCEDURE — 250N000013 HC RX MED GY IP 250 OP 250 PS 637: Performed by: STUDENT IN AN ORGANIZED HEALTH CARE EDUCATION/TRAINING PROGRAM

## 2023-01-01 PROCEDURE — 74176 CT ABD & PELVIS W/O CONTRAST: CPT

## 2023-01-01 PROCEDURE — 83880 ASSAY OF NATRIURETIC PEPTIDE: CPT | Performed by: FAMILY MEDICINE

## 2023-01-01 PROCEDURE — 97161 PT EVAL LOW COMPLEX 20 MIN: CPT | Mod: GP

## 2023-01-01 PROCEDURE — 85027 COMPLETE CBC AUTOMATED: CPT | Performed by: NURSE PRACTITIONER

## 2023-01-01 PROCEDURE — 83605 ASSAY OF LACTIC ACID: CPT | Performed by: FAMILY MEDICINE

## 2023-01-01 PROCEDURE — 36415 COLL VENOUS BLD VENIPUNCTURE: CPT | Performed by: FAMILY MEDICINE

## 2023-01-01 PROCEDURE — 97165 OT EVAL LOW COMPLEX 30 MIN: CPT | Mod: GO

## 2023-01-01 PROCEDURE — 72125 CT NECK SPINE W/O DYE: CPT

## 2023-01-01 PROCEDURE — 90662 IIV NO PRSV INCREASED AG IM: CPT | Performed by: FAMILY MEDICINE

## 2023-01-01 PROCEDURE — 85027 COMPLETE CBC AUTOMATED: CPT | Performed by: INTERNAL MEDICINE

## 2023-01-01 PROCEDURE — 97110 THERAPEUTIC EXERCISES: CPT | Mod: GO

## 2023-01-01 PROCEDURE — 99285 EMERGENCY DEPT VISIT HI MDM: CPT | Mod: 25 | Performed by: EMERGENCY MEDICINE

## 2023-01-01 PROCEDURE — 96360 HYDRATION IV INFUSION INIT: CPT | Mod: 59 | Performed by: FAMILY MEDICINE

## 2023-01-01 PROCEDURE — 80053 COMPREHEN METABOLIC PANEL: CPT | Performed by: EMERGENCY MEDICINE

## 2023-01-01 PROCEDURE — 96375 TX/PRO/DX INJ NEW DRUG ADDON: CPT

## 2023-01-01 PROCEDURE — 80061 LIPID PANEL: CPT | Performed by: FAMILY MEDICINE

## 2023-01-01 PROCEDURE — 250N000011 HC RX IP 250 OP 636: Performed by: STUDENT IN AN ORGANIZED HEALTH CARE EDUCATION/TRAINING PROGRAM

## 2023-01-01 PROCEDURE — 258N000003 HC RX IP 258 OP 636: Performed by: INTERNAL MEDICINE

## 2023-01-01 PROCEDURE — 250N000011 HC RX IP 250 OP 636: Mod: JZ | Performed by: INTERNAL MEDICINE

## 2023-01-01 PROCEDURE — 99285 EMERGENCY DEPT VISIT HI MDM: CPT | Mod: 25 | Performed by: FAMILY MEDICINE

## 2023-01-01 PROCEDURE — 82310 ASSAY OF CALCIUM: CPT | Performed by: INTERNAL MEDICINE

## 2023-01-01 PROCEDURE — 96361 HYDRATE IV INFUSION ADD-ON: CPT

## 2023-01-01 PROCEDURE — 36415 COLL VENOUS BLD VENIPUNCTURE: CPT | Performed by: NURSE PRACTITIONER

## 2023-01-01 PROCEDURE — 96361 HYDRATE IV INFUSION ADD-ON: CPT | Performed by: FAMILY MEDICINE

## 2023-01-01 PROCEDURE — 93005 ELECTROCARDIOGRAM TRACING: CPT | Performed by: EMERGENCY MEDICINE

## 2023-01-01 PROCEDURE — 120N000001 HC R&B MED SURG/OB

## 2023-01-01 PROCEDURE — 97110 THERAPEUTIC EXERCISES: CPT | Mod: GP

## 2023-01-01 PROCEDURE — 250N000011 HC RX IP 250 OP 636: Performed by: FAMILY MEDICINE

## 2023-01-01 PROCEDURE — 96376 TX/PRO/DX INJ SAME DRUG ADON: CPT

## 2023-01-01 PROCEDURE — 80048 BASIC METABOLIC PNL TOTAL CA: CPT | Performed by: NURSE PRACTITIONER

## 2023-01-01 PROCEDURE — 82728 ASSAY OF FERRITIN: CPT | Performed by: INTERNAL MEDICINE

## 2023-01-01 PROCEDURE — P9604 ONE-WAY ALLOW PRORATED TRIP: HCPCS | Performed by: NURSE PRACTITIONER

## 2023-01-01 PROCEDURE — 255N000002 HC RX 255 OP 636: Performed by: FAMILY MEDICINE

## 2023-01-01 PROCEDURE — 99283 EMERGENCY DEPT VISIT LOW MDM: CPT | Performed by: EMERGENCY MEDICINE

## 2023-01-01 PROCEDURE — 82607 VITAMIN B-12: CPT | Performed by: INTERNAL MEDICINE

## 2023-01-01 PROCEDURE — 84484 ASSAY OF TROPONIN QUANT: CPT | Performed by: EMERGENCY MEDICINE

## 2023-01-01 PROCEDURE — 36415 COLL VENOUS BLD VENIPUNCTURE: CPT | Performed by: INTERNAL MEDICINE

## 2023-01-01 PROCEDURE — 97535 SELF CARE MNGMENT TRAINING: CPT | Mod: GO

## 2023-01-01 PROCEDURE — 99233 SBSQ HOSP IP/OBS HIGH 50: CPT | Performed by: INTERNAL MEDICINE

## 2023-01-01 PROCEDURE — 85025 COMPLETE CBC W/AUTO DIFF WBC: CPT | Performed by: EMERGENCY MEDICINE

## 2023-01-01 PROCEDURE — 258N000003 HC RX IP 258 OP 636: Performed by: FAMILY MEDICINE

## 2023-01-01 PROCEDURE — 250N000011 HC RX IP 250 OP 636: Performed by: HOSPITALIST

## 2023-01-01 PROCEDURE — 99284 EMERGENCY DEPT VISIT MOD MDM: CPT | Mod: 25 | Performed by: EMERGENCY MEDICINE

## 2023-01-01 PROCEDURE — 99305 1ST NF CARE MODERATE MDM 35: CPT | Performed by: FAMILY MEDICINE

## 2023-01-01 PROCEDURE — 93005 ELECTROCARDIOGRAM TRACING: CPT | Performed by: FAMILY MEDICINE

## 2023-01-01 PROCEDURE — 999N000208 ECHOCARDIOGRAM COMPLETE

## 2023-01-01 PROCEDURE — 71250 CT THORAX DX C-: CPT

## 2023-01-01 PROCEDURE — 87040 BLOOD CULTURE FOR BACTERIA: CPT | Performed by: FAMILY MEDICINE

## 2023-01-01 PROCEDURE — 250N000011 HC RX IP 250 OP 636: Performed by: INTERNAL MEDICINE

## 2023-01-01 PROCEDURE — 84132 ASSAY OF SERUM POTASSIUM: CPT | Performed by: INTERNAL MEDICINE

## 2023-01-01 PROCEDURE — G0008 ADMIN INFLUENZA VIRUS VAC: HCPCS | Performed by: FAMILY MEDICINE

## 2023-01-01 PROCEDURE — 81001 URINALYSIS AUTO W/SCOPE: CPT | Performed by: FAMILY MEDICINE

## 2023-01-01 PROCEDURE — 250N000009 HC RX 250: Performed by: FAMILY MEDICINE

## 2023-01-01 PROCEDURE — 85027 COMPLETE CBC AUTOMATED: CPT | Performed by: STUDENT IN AN ORGANIZED HEALTH CARE EDUCATION/TRAINING PROGRAM

## 2023-01-01 PROCEDURE — 97116 GAIT TRAINING THERAPY: CPT | Mod: GP

## 2023-01-01 PROCEDURE — 93010 ELECTROCARDIOGRAM REPORT: CPT | Performed by: FAMILY MEDICINE

## 2023-01-01 PROCEDURE — 99222 1ST HOSP IP/OBS MODERATE 55: CPT | Performed by: STUDENT IN AN ORGANIZED HEALTH CARE EDUCATION/TRAINING PROGRAM

## 2023-01-01 PROCEDURE — 250N000013 HC RX MED GY IP 250 OP 250 PS 637: Performed by: FAMILY MEDICINE

## 2023-01-01 PROCEDURE — 99309 SBSQ NF CARE MODERATE MDM 30: CPT | Performed by: NURSE PRACTITIONER

## 2023-01-01 PROCEDURE — 99214 OFFICE O/P EST MOD 30 MIN: CPT | Performed by: FAMILY MEDICINE

## 2023-01-01 PROCEDURE — 99231 SBSQ HOSP IP/OBS SF/LOW 25: CPT | Performed by: INTERNAL MEDICINE

## 2023-01-01 PROCEDURE — 99213 OFFICE O/P EST LOW 20 MIN: CPT | Performed by: FAMILY MEDICINE

## 2023-01-01 PROCEDURE — 87637 SARSCOV2&INF A&B&RSV AMP PRB: CPT | Performed by: EMERGENCY MEDICINE

## 2023-01-01 PROCEDURE — 87637 SARSCOV2&INF A&B&RSV AMP PRB: CPT | Performed by: FAMILY MEDICINE

## 2023-01-01 PROCEDURE — 92610 EVALUATE SWALLOWING FUNCTION: CPT | Mod: GN | Performed by: SPEECH-LANGUAGE PATHOLOGIST

## 2023-01-01 PROCEDURE — 74177 CT ABD & PELVIS W/CONTRAST: CPT

## 2023-01-01 PROCEDURE — 99316 NF DSCHRG MGMT 30 MIN+: CPT | Performed by: FAMILY MEDICINE

## 2023-01-01 PROCEDURE — 82803 BLOOD GASES ANY COMBINATION: CPT | Performed by: FAMILY MEDICINE

## 2023-01-01 PROCEDURE — 96374 THER/PROPH/DIAG INJ IV PUSH: CPT

## 2023-01-01 PROCEDURE — 36415 COLL VENOUS BLD VENIPUNCTURE: CPT | Performed by: STUDENT IN AN ORGANIZED HEALTH CARE EDUCATION/TRAINING PROGRAM

## 2023-01-01 PROCEDURE — 82533 TOTAL CORTISOL: CPT | Performed by: FAMILY MEDICINE

## 2023-01-01 PROCEDURE — 84439 ASSAY OF FREE THYROXINE: CPT | Performed by: FAMILY MEDICINE

## 2023-01-01 PROCEDURE — 99310 SBSQ NF CARE HIGH MDM 45: CPT | Performed by: NURSE PRACTITIONER

## 2023-01-01 PROCEDURE — 84145 PROCALCITONIN (PCT): CPT | Performed by: STUDENT IN AN ORGANIZED HEALTH CARE EDUCATION/TRAINING PROGRAM

## 2023-01-01 PROCEDURE — 36415 COLL VENOUS BLD VENIPUNCTURE: CPT | Performed by: EMERGENCY MEDICINE

## 2023-01-01 PROCEDURE — 250N000013 HC RX MED GY IP 250 OP 250 PS 637: Performed by: EMERGENCY MEDICINE

## 2023-01-01 PROCEDURE — 81001 URINALYSIS AUTO W/SCOPE: CPT | Performed by: EMERGENCY MEDICINE

## 2023-01-01 PROCEDURE — 99223 1ST HOSP IP/OBS HIGH 75: CPT | Mod: AI | Performed by: INTERNAL MEDICINE

## 2023-01-01 PROCEDURE — 258N000003 HC RX IP 258 OP 636: Performed by: EMERGENCY MEDICINE

## 2023-01-01 PROCEDURE — 99214 OFFICE O/P EST MOD 30 MIN: CPT | Mod: 25 | Performed by: FAMILY MEDICINE

## 2023-01-01 PROCEDURE — 85025 COMPLETE CBC W/AUTO DIFF WBC: CPT | Performed by: FAMILY MEDICINE

## 2023-01-01 PROCEDURE — 80048 BASIC METABOLIC PNL TOTAL CA: CPT | Performed by: STUDENT IN AN ORGANIZED HEALTH CARE EDUCATION/TRAINING PROGRAM

## 2023-01-01 PROCEDURE — 83550 IRON BINDING TEST: CPT | Performed by: INTERNAL MEDICINE

## 2023-01-01 PROCEDURE — 258N000003 HC RX IP 258 OP 636: Performed by: STUDENT IN AN ORGANIZED HEALTH CARE EDUCATION/TRAINING PROGRAM

## 2023-01-01 PROCEDURE — 80053 COMPREHEN METABOLIC PANEL: CPT | Performed by: FAMILY MEDICINE

## 2023-01-01 PROCEDURE — 99239 HOSP IP/OBS DSCHRG MGMT >30: CPT | Performed by: INTERNAL MEDICINE

## 2023-01-01 RX ORDER — LEVOTHYROXINE SODIUM 50 UG/1
50 TABLET ORAL
Qty: 30 TABLET | Refills: 0 | Status: SHIPPED | OUTPATIENT
Start: 2023-01-01 | End: 2023-01-01

## 2023-01-01 RX ORDER — OXYCODONE HYDROCHLORIDE 5 MG/1
10 TABLET ORAL
Status: DISCONTINUED | OUTPATIENT
Start: 2023-01-01 | End: 2024-01-01 | Stop reason: HOSPADM

## 2023-01-01 RX ORDER — NALOXONE HYDROCHLORIDE 0.4 MG/ML
0.4 INJECTION, SOLUTION INTRAMUSCULAR; INTRAVENOUS; SUBCUTANEOUS
Status: DISCONTINUED | OUTPATIENT
Start: 2023-01-01 | End: 2024-01-01 | Stop reason: HOSPADM

## 2023-01-01 RX ORDER — LEVOTHYROXINE SODIUM 25 UG/1
25 TABLET ORAL DAILY
Status: ON HOLD | COMMUNITY
End: 2023-01-01

## 2023-01-01 RX ORDER — ACETAMINOPHEN 325 MG/1
650 TABLET ORAL EVERY 4 HOURS PRN
Status: DISCONTINUED | OUTPATIENT
Start: 2023-01-01 | End: 2024-01-01 | Stop reason: HOSPADM

## 2023-01-01 RX ORDER — NALOXONE HYDROCHLORIDE 1 MG/ML
1 INJECTION INTRAMUSCULAR; INTRAVENOUS; SUBCUTANEOUS ONCE
Status: ON HOLD | COMMUNITY
End: 2024-01-01

## 2023-01-01 RX ORDER — SODIUM CHLORIDE, SODIUM LACTATE, POTASSIUM CHLORIDE, CALCIUM CHLORIDE 600; 310; 30; 20 MG/100ML; MG/100ML; MG/100ML; MG/100ML
INJECTION, SOLUTION INTRAVENOUS CONTINUOUS
Status: DISCONTINUED | OUTPATIENT
Start: 2023-01-01 | End: 2023-01-01

## 2023-01-01 RX ORDER — FAMOTIDINE 20 MG/1
20 TABLET, FILM COATED ORAL 2 TIMES DAILY PRN
COMMUNITY
Start: 2023-01-01 | End: 2023-01-01

## 2023-01-01 RX ORDER — PIPERACILLIN SODIUM, TAZOBACTAM SODIUM 4; .5 G/20ML; G/20ML
4.5 INJECTION, POWDER, LYOPHILIZED, FOR SOLUTION INTRAVENOUS EVERY 6 HOURS
Status: DISCONTINUED | OUTPATIENT
Start: 2023-01-01 | End: 2023-01-01

## 2023-01-01 RX ORDER — NYSTATIN 100000/ML
500000 SUSPENSION, ORAL (FINAL DOSE FORM) ORAL 4 TIMES DAILY
Qty: 200 ML | Refills: 0
Start: 2023-01-01 | End: 2023-01-01

## 2023-01-01 RX ORDER — MIRTAZAPINE 7.5 MG/1
7.5 TABLET, FILM COATED ORAL AT BEDTIME
Start: 2023-01-01 | End: 2023-01-01

## 2023-01-01 RX ORDER — LOPERAMIDE HYDROCHLORIDE 2 MG/1
1-2 TABLET ORAL 4 TIMES DAILY PRN
COMMUNITY
Start: 2023-01-01 | End: 2023-01-01

## 2023-01-01 RX ORDER — NITROGLYCERIN 0.4 MG/1
0.4 TABLET SUBLINGUAL EVERY 5 MIN PRN
Status: ON HOLD | COMMUNITY
Start: 2023-01-01 | End: 2024-01-01

## 2023-01-01 RX ORDER — FUROSEMIDE 20 MG
20 TABLET ORAL DAILY PRN
COMMUNITY
Start: 2023-01-01 | End: 2023-01-01

## 2023-01-01 RX ORDER — CALCIUM CARBONATE 500 MG/1
1000 TABLET, CHEWABLE ORAL 4 TIMES DAILY PRN
Status: DISCONTINUED | OUTPATIENT
Start: 2023-01-01 | End: 2024-01-01 | Stop reason: HOSPADM

## 2023-01-01 RX ORDER — RESPIRATORY SYNCYTIAL VIRUS VACCINE 120MCG/0.5
0.5 KIT INTRAMUSCULAR ONCE
Qty: 1 EACH | Refills: 0 | Status: CANCELLED | OUTPATIENT
Start: 2023-01-01 | End: 2023-01-01

## 2023-01-01 RX ORDER — CLOPIDOGREL BISULFATE 75 MG/1
75 TABLET ORAL DAILY
COMMUNITY
Start: 2023-01-01 | End: 2023-01-01

## 2023-01-01 RX ORDER — AZELASTINE HYDROCHLORIDE, FLUTICASONE PROPIONATE 137; 50 UG/1; UG/1
1 SPRAY, METERED NASAL DAILY
COMMUNITY
Start: 2023-01-01 | End: 2024-01-01

## 2023-01-01 RX ORDER — CLOPIDOGREL BISULFATE 75 MG/1
75 TABLET ORAL DAILY
Status: DISCONTINUED | OUTPATIENT
Start: 2023-01-01 | End: 2024-01-01 | Stop reason: HOSPADM

## 2023-01-01 RX ORDER — POLYETHYLENE GLYCOL 3350 17 G/17G
17 POWDER, FOR SOLUTION ORAL DAILY PRN
Status: DISCONTINUED | OUTPATIENT
Start: 2023-01-01 | End: 2023-01-01 | Stop reason: HOSPADM

## 2023-01-01 RX ORDER — OXYCODONE HYDROCHLORIDE 5 MG/1
10 TABLET ORAL ONCE
Status: COMPLETED | OUTPATIENT
Start: 2023-01-01 | End: 2023-01-01

## 2023-01-01 RX ORDER — ACETAMINOPHEN 500 MG
1000 TABLET ORAL 3 TIMES DAILY
Status: ON HOLD | COMMUNITY
End: 2024-01-01

## 2023-01-01 RX ORDER — CLOPIDOGREL BISULFATE 75 MG/1
75 TABLET ORAL DAILY
Qty: 30 TABLET | Refills: 0 | Status: SHIPPED | OUTPATIENT
Start: 2023-01-01 | End: 2023-01-01

## 2023-01-01 RX ORDER — DOXYCYCLINE 100 MG/1
100 CAPSULE ORAL EVERY 12 HOURS SCHEDULED
Status: DISCONTINUED | OUTPATIENT
Start: 2023-01-01 | End: 2024-01-01 | Stop reason: HOSPADM

## 2023-01-01 RX ORDER — POTASSIUM CHLORIDE 1500 MG/1
40 TABLET, EXTENDED RELEASE ORAL ONCE
Status: COMPLETED | OUTPATIENT
Start: 2023-01-01 | End: 2023-01-01

## 2023-01-01 RX ORDER — PANTOPRAZOLE SODIUM 40 MG/1
40 TABLET, DELAYED RELEASE ORAL
Status: DISCONTINUED | OUTPATIENT
Start: 2023-01-01 | End: 2023-01-01 | Stop reason: HOSPADM

## 2023-01-01 RX ORDER — METOPROLOL TARTRATE 25 MG/1
12.5 TABLET, FILM COATED ORAL 2 TIMES DAILY
Start: 2023-01-01 | End: 2023-01-01

## 2023-01-01 RX ORDER — AMOXICILLIN 250 MG
1 CAPSULE ORAL 2 TIMES DAILY PRN
Status: DISCONTINUED | OUTPATIENT
Start: 2023-01-01 | End: 2024-01-01 | Stop reason: HOSPADM

## 2023-01-01 RX ORDER — CLOPIDOGREL BISULFATE 75 MG/1
75 TABLET ORAL DAILY
Qty: 90 TABLET | OUTPATIENT
Start: 2023-01-01

## 2023-01-01 RX ORDER — OXYCODONE HYDROCHLORIDE 5 MG/1
5 TABLET ORAL 2 TIMES DAILY PRN
COMMUNITY
Start: 2023-01-01 | End: 2023-01-01

## 2023-01-01 RX ORDER — CLOPIDOGREL BISULFATE 75 MG/1
TABLET ORAL
Qty: 90 TABLET | Refills: 0 | Status: ON HOLD | OUTPATIENT
Start: 2023-01-01 | End: 2023-01-01

## 2023-01-01 RX ORDER — METOPROLOL TARTRATE 25 MG/1
12.5 TABLET, FILM COATED ORAL 2 TIMES DAILY
Qty: 90 TABLET | Refills: 1 | Status: ON HOLD | OUTPATIENT
Start: 2023-01-01 | End: 2024-01-01

## 2023-01-01 RX ORDER — ACETAMINOPHEN 500 MG
1000 TABLET ORAL ONCE
Status: COMPLETED | OUTPATIENT
Start: 2023-01-01 | End: 2023-01-01

## 2023-01-01 RX ORDER — NALOXONE HYDROCHLORIDE 0.4 MG/ML
0.2 INJECTION, SOLUTION INTRAMUSCULAR; INTRAVENOUS; SUBCUTANEOUS
Status: DISCONTINUED | OUTPATIENT
Start: 2023-01-01 | End: 2024-01-01 | Stop reason: HOSPADM

## 2023-01-01 RX ORDER — METOPROLOL TARTRATE 25 MG/1
12.5 TABLET, FILM COATED ORAL 2 TIMES DAILY
Qty: 30 TABLET | Refills: 0 | Status: SHIPPED | OUTPATIENT
Start: 2023-01-01 | End: 2023-01-01

## 2023-01-01 RX ORDER — ONDANSETRON 4 MG/1
4 TABLET, ORALLY DISINTEGRATING ORAL EVERY 6 HOURS PRN
Status: DISCONTINUED | OUTPATIENT
Start: 2023-01-01 | End: 2023-01-01 | Stop reason: HOSPADM

## 2023-01-01 RX ORDER — GUAIFENESIN 600 MG/1
1200 TABLET, EXTENDED RELEASE ORAL 2 TIMES DAILY
Status: DISCONTINUED | OUTPATIENT
Start: 2023-01-01 | End: 2024-01-01 | Stop reason: HOSPADM

## 2023-01-01 RX ORDER — FLUTICASONE PROPIONATE 50 MCG
1 SPRAY, SUSPENSION (ML) NASAL DAILY
Status: DISCONTINUED | OUTPATIENT
Start: 2023-01-01 | End: 2023-01-01 | Stop reason: HOSPADM

## 2023-01-01 RX ORDER — AMPICILLIN AND SULBACTAM 2; 1 G/1; G/1
3 INJECTION, POWDER, FOR SOLUTION INTRAMUSCULAR; INTRAVENOUS EVERY 6 HOURS
Status: DISCONTINUED | OUTPATIENT
Start: 2023-01-01 | End: 2023-01-01

## 2023-01-01 RX ORDER — LEVOTHYROXINE SODIUM 50 UG/1
50 TABLET ORAL
Status: DISCONTINUED | OUTPATIENT
Start: 2023-01-01 | End: 2023-01-01 | Stop reason: HOSPADM

## 2023-01-01 RX ORDER — CEFADROXIL 500 MG/1
500 CAPSULE ORAL 2 TIMES DAILY
Qty: 14 CAPSULE | Refills: 0 | Status: SHIPPED | OUTPATIENT
Start: 2023-01-01 | End: 2023-01-01

## 2023-01-01 RX ORDER — AZELASTINE 1 MG/ML
1 SPRAY, METERED NASAL 2 TIMES DAILY
Status: DISCONTINUED | OUTPATIENT
Start: 2023-01-01 | End: 2023-01-01 | Stop reason: HOSPADM

## 2023-01-01 RX ORDER — TAMSULOSIN HYDROCHLORIDE 0.4 MG/1
0.4 CAPSULE ORAL DAILY
Qty: 90 CAPSULE | Refills: 3 | Status: SHIPPED | OUTPATIENT
Start: 2023-01-01 | End: 2023-01-01

## 2023-01-01 RX ORDER — MAGNESIUM SULFATE HEPTAHYDRATE 40 MG/ML
4 INJECTION, SOLUTION INTRAVENOUS ONCE
Status: COMPLETED | OUTPATIENT
Start: 2023-01-01 | End: 2023-01-01

## 2023-01-01 RX ORDER — VITAMIN B COMPLEX
1 TABLET ORAL DAILY
Status: ON HOLD | COMMUNITY
Start: 2023-01-01 | End: 2024-01-01

## 2023-01-01 RX ORDER — OXYCODONE HYDROCHLORIDE 5 MG/1
TABLET ORAL
Qty: 30 TABLET | Refills: 0 | Status: ON HOLD | OUTPATIENT
Start: 2023-01-01 | End: 2024-01-01

## 2023-01-01 RX ORDER — ALBUTEROL SULFATE 0.83 MG/ML
2.5 SOLUTION RESPIRATORY (INHALATION) EVERY 6 HOURS PRN
COMMUNITY
Start: 2023-01-01 | End: 2023-01-01

## 2023-01-01 RX ORDER — CLOPIDOGREL BISULFATE 75 MG/1
TABLET ORAL
Qty: 97 TABLET | OUTPATIENT
Start: 2023-01-01

## 2023-01-01 RX ORDER — METOPROLOL TARTRATE 25 MG/1
12.5 TABLET, FILM COATED ORAL 2 TIMES DAILY
Qty: 90 TABLET | OUTPATIENT
Start: 2023-01-01

## 2023-01-01 RX ORDER — ONDANSETRON 4 MG/1
4 TABLET, ORALLY DISINTEGRATING ORAL EVERY 6 HOURS PRN
Status: DISCONTINUED | OUTPATIENT
Start: 2023-01-01 | End: 2024-01-01 | Stop reason: HOSPADM

## 2023-01-01 RX ORDER — LEVOTHYROXINE SODIUM 50 UG/1
50 TABLET ORAL
Status: DISCONTINUED | OUTPATIENT
Start: 2023-01-01 | End: 2024-01-01 | Stop reason: HOSPADM

## 2023-01-01 RX ORDER — DIPHENHYDRAMINE HCL 50 MG
50 CAPSULE ORAL DAILY
COMMUNITY
End: 2024-01-01

## 2023-01-01 RX ORDER — OXYCODONE HYDROCHLORIDE 5 MG/1
2.5-5 TABLET ORAL EVERY 4 HOURS PRN
Qty: 60 TABLET | Refills: 0 | Status: SHIPPED | OUTPATIENT
Start: 2023-01-01 | End: 2023-01-01

## 2023-01-01 RX ORDER — CLOPIDOGREL BISULFATE 75 MG/1
75 TABLET ORAL DAILY
Qty: 90 TABLET | Refills: 1 | Status: ON HOLD | OUTPATIENT
Start: 2023-01-01 | End: 2024-01-01

## 2023-01-01 RX ORDER — CALCIUM CARBONATE 500 MG/1
1000 TABLET, CHEWABLE ORAL 4 TIMES DAILY PRN
Status: DISCONTINUED | OUTPATIENT
Start: 2023-01-01 | End: 2023-01-01

## 2023-01-01 RX ORDER — GUAIFENESIN/DEXTROMETHORPHAN 100-10MG/5
10 SYRUP ORAL EVERY 4 HOURS PRN
Status: DISCONTINUED | OUTPATIENT
Start: 2023-01-01 | End: 2024-01-01 | Stop reason: HOSPADM

## 2023-01-01 RX ORDER — ONDANSETRON 4 MG/1
4 TABLET, ORALLY DISINTEGRATING ORAL EVERY 8 HOURS PRN
Start: 2023-01-01 | End: 2023-01-01

## 2023-01-01 RX ORDER — AMOXICILLIN 250 MG
1 CAPSULE ORAL 2 TIMES DAILY PRN
Status: DISCONTINUED | OUTPATIENT
Start: 2023-01-01 | End: 2023-01-01 | Stop reason: HOSPADM

## 2023-01-01 RX ORDER — FERROUS SULFATE 325(65) MG
325 TABLET ORAL EVERY OTHER DAY
Status: DISCONTINUED | OUTPATIENT
Start: 2023-01-01 | End: 2024-01-01 | Stop reason: HOSPADM

## 2023-01-01 RX ORDER — ACETAMINOPHEN 325 MG/1
650 TABLET ORAL EVERY 4 HOURS PRN
Status: DISCONTINUED | OUTPATIENT
Start: 2023-01-01 | End: 2023-01-01 | Stop reason: HOSPADM

## 2023-01-01 RX ORDER — LEVOTHYROXINE SODIUM 50 UG/1
TABLET ORAL
Qty: 90 TABLET | OUTPATIENT
Start: 2023-01-01

## 2023-01-01 RX ORDER — OXYCODONE HYDROCHLORIDE 10 MG/1
10 TABLET ORAL EVERY 12 HOURS PRN
COMMUNITY
Start: 2023-01-01 | End: 2023-01-01

## 2023-01-01 RX ORDER — LEVOTHYROXINE SODIUM 50 UG/1
50 TABLET ORAL
Qty: 90 TABLET | Refills: 1 | Status: ON HOLD | OUTPATIENT
Start: 2023-01-01 | End: 2024-01-01

## 2023-01-01 RX ORDER — CEFTRIAXONE 1 G/1
1 INJECTION, POWDER, FOR SOLUTION INTRAMUSCULAR; INTRAVENOUS EVERY 24 HOURS
Status: DISCONTINUED | OUTPATIENT
Start: 2023-01-01 | End: 2024-01-01

## 2023-01-01 RX ORDER — LORAZEPAM 2 MG/ML
.5-1 INJECTION INTRAMUSCULAR EVERY 6 HOURS PRN
Status: DISCONTINUED | OUTPATIENT
Start: 2023-01-01 | End: 2023-01-01 | Stop reason: HOSPADM

## 2023-01-01 RX ORDER — ONDANSETRON 2 MG/ML
4 INJECTION INTRAMUSCULAR; INTRAVENOUS EVERY 6 HOURS PRN
Status: DISCONTINUED | OUTPATIENT
Start: 2023-01-01 | End: 2023-01-01 | Stop reason: HOSPADM

## 2023-01-01 RX ORDER — AMOXICILLIN 250 MG
2 CAPSULE ORAL 2 TIMES DAILY PRN
Status: DISCONTINUED | OUTPATIENT
Start: 2023-01-01 | End: 2023-01-01 | Stop reason: HOSPADM

## 2023-01-01 RX ORDER — ROSUVASTATIN CALCIUM 20 MG/1
40 TABLET, COATED ORAL DAILY
Status: DISCONTINUED | OUTPATIENT
Start: 2023-01-01 | End: 2023-01-01 | Stop reason: HOSPADM

## 2023-01-01 RX ORDER — AMOXICILLIN 250 MG
2 CAPSULE ORAL 2 TIMES DAILY PRN
Status: DISCONTINUED | OUTPATIENT
Start: 2023-01-01 | End: 2024-01-01 | Stop reason: HOSPADM

## 2023-01-01 RX ORDER — AZELASTINE HYDROCHLORIDE, FLUTICASONE PROPIONATE 137; 50 UG/1; UG/1
1 SPRAY, METERED NASAL DAILY
Status: DISCONTINUED | OUTPATIENT
Start: 2023-01-01 | End: 2023-01-01

## 2023-01-01 RX ORDER — ROSUVASTATIN CALCIUM 40 MG/1
40 TABLET, COATED ORAL DAILY
Qty: 90 TABLET | Refills: 1 | Status: ON HOLD | OUTPATIENT
Start: 2023-01-01 | End: 2024-01-01

## 2023-01-01 RX ORDER — SENNOSIDES 8.6 MG
650 CAPSULE ORAL DAILY PRN
Status: ON HOLD | COMMUNITY
End: 2024-01-01

## 2023-01-01 RX ORDER — LIDOCAINE 40 MG/G
CREAM TOPICAL
Status: DISCONTINUED | OUTPATIENT
Start: 2023-01-01 | End: 2024-01-01 | Stop reason: HOSPADM

## 2023-01-01 RX ORDER — ROSUVASTATIN CALCIUM 20 MG/1
40 TABLET, COATED ORAL DAILY
Status: DISCONTINUED | OUTPATIENT
Start: 2023-01-01 | End: 2024-01-01 | Stop reason: HOSPADM

## 2023-01-01 RX ORDER — METOPROLOL SUCCINATE 25 MG/1
25 TABLET, EXTENDED RELEASE ORAL DAILY
COMMUNITY
Start: 2023-01-01 | End: 2023-01-01 | Stop reason: DRUGHIGH

## 2023-01-01 RX ORDER — PANTOPRAZOLE SODIUM 40 MG/1
40 TABLET, DELAYED RELEASE ORAL
Status: DISCONTINUED | OUTPATIENT
Start: 2023-01-01 | End: 2024-01-01 | Stop reason: HOSPADM

## 2023-01-01 RX ORDER — CLOPIDOGREL BISULFATE 75 MG/1
75 TABLET ORAL DAILY
Status: DISCONTINUED | OUTPATIENT
Start: 2023-01-01 | End: 2023-01-01 | Stop reason: HOSPADM

## 2023-01-01 RX ORDER — IOPAMIDOL 755 MG/ML
74 INJECTION, SOLUTION INTRAVASCULAR ONCE
Status: COMPLETED | OUTPATIENT
Start: 2023-01-01 | End: 2023-01-01

## 2023-01-01 RX ORDER — SODIUM CHLORIDE 9 MG/ML
INJECTION, SOLUTION INTRAVENOUS CONTINUOUS
Status: DISCONTINUED | OUTPATIENT
Start: 2023-01-01 | End: 2023-01-01

## 2023-01-01 RX ORDER — ONDANSETRON 2 MG/ML
4 INJECTION INTRAMUSCULAR; INTRAVENOUS EVERY 6 HOURS PRN
Status: DISCONTINUED | OUTPATIENT
Start: 2023-01-01 | End: 2024-01-01 | Stop reason: HOSPADM

## 2023-01-01 RX ORDER — OXYCODONE HYDROCHLORIDE 5 MG/1
2.5-5 TABLET ORAL EVERY 4 HOURS PRN
COMMUNITY
Start: 2023-01-01 | End: 2023-01-01

## 2023-01-01 RX ORDER — ROSUVASTATIN CALCIUM 40 MG/1
40 TABLET, COATED ORAL DAILY
Qty: 90 TABLET | Refills: 0 | Status: SHIPPED | OUTPATIENT
Start: 2023-01-01 | End: 2023-01-01

## 2023-01-01 RX ADMIN — GUAIFENESIN 1200 MG: 600 TABLET ORAL at 19:15

## 2023-01-01 RX ADMIN — APIXABAN 5 MG: 5 TABLET, FILM COATED ORAL at 20:15

## 2023-01-01 RX ADMIN — DOXYCYCLINE HYCLATE 100 MG: 100 CAPSULE ORAL at 19:42

## 2023-01-01 RX ADMIN — ROSUVASTATIN CALCIUM 40 MG: 20 TABLET, FILM COATED ORAL at 08:26

## 2023-01-01 RX ADMIN — SODIUM CHLORIDE 1500 ML: 9 INJECTION, SOLUTION INTRAVENOUS at 16:00

## 2023-01-01 RX ADMIN — APIXABAN 5 MG: 5 TABLET, FILM COATED ORAL at 08:54

## 2023-01-01 RX ADMIN — SODIUM CHLORIDE, POTASSIUM CHLORIDE, SODIUM LACTATE AND CALCIUM CHLORIDE: 600; 310; 30; 20 INJECTION, SOLUTION INTRAVENOUS at 23:44

## 2023-01-01 RX ADMIN — OXYCODONE HYDROCHLORIDE 10 MG: 5 TABLET ORAL at 15:34

## 2023-01-01 RX ADMIN — DOXYCYCLINE HYCLATE 100 MG: 100 CAPSULE ORAL at 20:12

## 2023-01-01 RX ADMIN — OXYCODONE HYDROCHLORIDE 10 MG: 5 TABLET ORAL at 05:14

## 2023-01-01 RX ADMIN — CLOPIDOGREL BISULFATE 75 MG: 75 TABLET ORAL at 09:05

## 2023-01-01 RX ADMIN — APIXABAN 5 MG: 5 TABLET, FILM COATED ORAL at 08:26

## 2023-01-01 RX ADMIN — ONDANSETRON 4 MG: 2 INJECTION INTRAMUSCULAR; INTRAVENOUS at 10:31

## 2023-01-01 RX ADMIN — CEFTRIAXONE SODIUM 1 G: 1 INJECTION, POWDER, FOR SOLUTION INTRAMUSCULAR; INTRAVENOUS at 23:40

## 2023-01-01 RX ADMIN — PANTOPRAZOLE SODIUM 40 MG: 40 TABLET, DELAYED RELEASE ORAL at 06:18

## 2023-01-01 RX ADMIN — SACUBITRIL AND VALSARTAN 1 TABLET: 24; 26 TABLET, FILM COATED ORAL at 08:28

## 2023-01-01 RX ADMIN — OXYCODONE HYDROCHLORIDE 10 MG: 5 TABLET ORAL at 20:11

## 2023-01-01 RX ADMIN — METOPROLOL TARTRATE 12.5 MG: 25 TABLET, FILM COATED ORAL at 19:15

## 2023-01-01 RX ADMIN — FLUTICASONE PROPIONATE 1 SPRAY: 50 SPRAY, METERED NASAL at 08:39

## 2023-01-01 RX ADMIN — LORAZEPAM 1 MG: 2 INJECTION INTRAMUSCULAR; INTRAVENOUS at 08:29

## 2023-01-01 RX ADMIN — ROSUVASTATIN CALCIUM 40 MG: 20 TABLET, FILM COATED ORAL at 08:13

## 2023-01-01 RX ADMIN — AZELASTINE HYDROCHLORIDE 1 SPRAY: 137 SPRAY, METERED NASAL at 07:57

## 2023-01-01 RX ADMIN — DOXYCYCLINE HYCLATE 100 MG: 100 CAPSULE ORAL at 08:13

## 2023-01-01 RX ADMIN — PANTOPRAZOLE SODIUM 40 MG: 40 TABLET, DELAYED RELEASE ORAL at 05:35

## 2023-01-01 RX ADMIN — LEVOTHYROXINE SODIUM 50 MCG: 0.05 TABLET ORAL at 05:35

## 2023-01-01 RX ADMIN — APIXABAN 5 MG: 5 TABLET, FILM COATED ORAL at 11:56

## 2023-01-01 RX ADMIN — ONDANSETRON 4 MG: 2 INJECTION INTRAMUSCULAR; INTRAVENOUS at 09:26

## 2023-01-01 RX ADMIN — FERROUS SULFATE TAB 325 MG (65 MG ELEMENTAL FE) 325 MG: 325 (65 FE) TAB at 14:54

## 2023-01-01 RX ADMIN — ACETAMINOPHEN 650 MG: 325 TABLET, FILM COATED ORAL at 20:29

## 2023-01-01 RX ADMIN — LEVOTHYROXINE SODIUM 50 MCG: 0.05 TABLET ORAL at 06:53

## 2023-01-01 RX ADMIN — SODIUM CHLORIDE 58 ML: 9 INJECTION, SOLUTION INTRAVENOUS at 17:21

## 2023-01-01 RX ADMIN — POTASSIUM CHLORIDE 40 MEQ: 1500 TABLET, EXTENDED RELEASE ORAL at 08:26

## 2023-01-01 RX ADMIN — GUAIFENESIN 1200 MG: 600 TABLET ORAL at 08:13

## 2023-01-01 RX ADMIN — CLOPIDOGREL BISULFATE 75 MG: 75 TABLET ORAL at 20:15

## 2023-01-01 RX ADMIN — DOXYCYCLINE HYCLATE 100 MG: 100 CAPSULE ORAL at 08:54

## 2023-01-01 RX ADMIN — LEVOTHYROXINE SODIUM 50 MCG: 0.05 TABLET ORAL at 05:44

## 2023-01-01 RX ADMIN — MAGNESIUM SULFATE HEPTAHYDRATE 4 G: 4 INJECTION, SOLUTION INTRAVENOUS at 08:55

## 2023-01-01 RX ADMIN — ACETAMINOPHEN 650 MG: 325 TABLET, FILM COATED ORAL at 12:30

## 2023-01-01 RX ADMIN — POTASSIUM CHLORIDE 40 MEQ: 1500 TABLET, EXTENDED RELEASE ORAL at 08:18

## 2023-01-01 RX ADMIN — ACETAMINOPHEN 1000 MG: 500 TABLET, FILM COATED ORAL at 15:02

## 2023-01-01 RX ADMIN — ACETAMINOPHEN 650 MG: 325 TABLET, FILM COATED ORAL at 14:08

## 2023-01-01 RX ADMIN — GUAIFENESIN 1200 MG: 600 TABLET ORAL at 19:42

## 2023-01-01 RX ADMIN — ACETAMINOPHEN 650 MG: 325 TABLET, FILM COATED ORAL at 19:42

## 2023-01-01 RX ADMIN — METOPROLOL TARTRATE 12.5 MG: 25 TABLET, FILM COATED ORAL at 08:26

## 2023-01-01 RX ADMIN — CLOPIDOGREL BISULFATE 75 MG: 75 TABLET ORAL at 07:54

## 2023-01-01 RX ADMIN — HUMAN ALBUMIN MICROSPHERES AND PERFLUTREN 2 ML: 10; .22 INJECTION, SOLUTION INTRAVENOUS at 13:54

## 2023-01-01 RX ADMIN — PANTOPRAZOLE SODIUM 40 MG: 40 TABLET, DELAYED RELEASE ORAL at 06:53

## 2023-01-01 RX ADMIN — FLUTICASONE PROPIONATE 1 SPRAY: 50 SPRAY, METERED NASAL at 07:58

## 2023-01-01 RX ADMIN — METOPROLOL TARTRATE 12.5 MG: 25 TABLET, FILM COATED ORAL at 08:54

## 2023-01-01 RX ADMIN — PANTOPRAZOLE SODIUM 40 MG: 40 TABLET, DELAYED RELEASE ORAL at 06:04

## 2023-01-01 RX ADMIN — LEVOTHYROXINE SODIUM 50 MCG: 0.05 TABLET ORAL at 06:04

## 2023-01-01 RX ADMIN — SODIUM CHLORIDE, POTASSIUM CHLORIDE, SODIUM LACTATE AND CALCIUM CHLORIDE 1000 ML: 600; 310; 30; 20 INJECTION, SOLUTION INTRAVENOUS at 19:59

## 2023-01-01 RX ADMIN — ROSUVASTATIN CALCIUM 40 MG: 20 TABLET, FILM COATED ORAL at 20:14

## 2023-01-01 RX ADMIN — AZELASTINE HYDROCHLORIDE 1 SPRAY: 137 SPRAY, METERED NASAL at 20:04

## 2023-01-01 RX ADMIN — LORAZEPAM 1 MG: 2 INJECTION INTRAMUSCULAR; INTRAVENOUS at 00:22

## 2023-01-01 RX ADMIN — ACETAMINOPHEN 650 MG: 325 TABLET, FILM COATED ORAL at 14:53

## 2023-01-01 RX ADMIN — SODIUM CHLORIDE: 9 INJECTION, SOLUTION INTRAVENOUS at 00:59

## 2023-01-01 RX ADMIN — GUAIFENESIN 1200 MG: 600 TABLET ORAL at 08:54

## 2023-01-01 RX ADMIN — LEVOTHYROXINE SODIUM 50 MCG: 0.05 TABLET ORAL at 06:18

## 2023-01-01 RX ADMIN — CLOPIDOGREL BISULFATE 75 MG: 75 TABLET ORAL at 08:13

## 2023-01-01 RX ADMIN — AZELASTINE HYDROCHLORIDE 1 SPRAY: 137 SPRAY, METERED NASAL at 08:26

## 2023-01-01 RX ADMIN — ACETAMINOPHEN 650 MG: 325 TABLET, FILM COATED ORAL at 19:15

## 2023-01-01 RX ADMIN — CLOPIDOGREL BISULFATE 75 MG: 75 TABLET ORAL at 08:26

## 2023-01-01 RX ADMIN — ROSUVASTATIN CALCIUM 40 MG: 20 TABLET, FILM COATED ORAL at 07:53

## 2023-01-01 RX ADMIN — SODIUM CHLORIDE: 9 INJECTION, SOLUTION INTRAVENOUS at 08:16

## 2023-01-01 RX ADMIN — AZELASTINE HYDROCHLORIDE 1 SPRAY: 137 SPRAY, METERED NASAL at 08:38

## 2023-01-01 RX ADMIN — ACETAMINOPHEN 650 MG: 325 TABLET, FILM COATED ORAL at 09:48

## 2023-01-01 RX ADMIN — APIXABAN 5 MG: 5 TABLET, FILM COATED ORAL at 19:15

## 2023-01-01 RX ADMIN — METOPROLOL TARTRATE 12.5 MG: 25 TABLET, FILM COATED ORAL at 21:33

## 2023-01-01 RX ADMIN — ACETAMINOPHEN 650 MG: 325 TABLET, FILM COATED ORAL at 06:49

## 2023-01-01 RX ADMIN — ROSUVASTATIN CALCIUM 40 MG: 20 TABLET, FILM COATED ORAL at 09:05

## 2023-01-01 RX ADMIN — SODIUM CHLORIDE: 9 INJECTION, SOLUTION INTRAVENOUS at 23:26

## 2023-01-01 RX ADMIN — DOXYCYCLINE HYCLATE 100 MG: 100 CAPSULE ORAL at 19:15

## 2023-01-01 RX ADMIN — FLUTICASONE PROPIONATE 1 SPRAY: 50 SPRAY, METERED NASAL at 08:26

## 2023-01-01 RX ADMIN — METOPROLOL TARTRATE 12.5 MG: 25 TABLET, FILM COATED ORAL at 19:42

## 2023-01-01 RX ADMIN — SODIUM CHLORIDE, POTASSIUM CHLORIDE, SODIUM LACTATE AND CALCIUM CHLORIDE 1000 ML: 600; 310; 30; 20 INJECTION, SOLUTION INTRAVENOUS at 15:32

## 2023-01-01 RX ADMIN — AZELASTINE HYDROCHLORIDE 1 SPRAY: 137 SPRAY, METERED NASAL at 21:33

## 2023-01-01 RX ADMIN — SACUBITRIL AND VALSARTAN 1 TABLET: 24; 26 TABLET, FILM COATED ORAL at 14:54

## 2023-01-01 RX ADMIN — CEFTRIAXONE SODIUM 1 G: 1 INJECTION, POWDER, FOR SOLUTION INTRAMUSCULAR; INTRAVENOUS at 23:30

## 2023-01-01 RX ADMIN — GUAIFENESIN 1200 MG: 600 TABLET ORAL at 20:15

## 2023-01-01 RX ADMIN — APIXABAN 5 MG: 5 TABLET, FILM COATED ORAL at 07:54

## 2023-01-01 RX ADMIN — PIPERACILLIN AND TAZOBACTAM 4.5 G: 4; .5 INJECTION, POWDER, FOR SOLUTION INTRAVENOUS at 18:23

## 2023-01-01 RX ADMIN — SODIUM CHLORIDE: 9 INJECTION, SOLUTION INTRAVENOUS at 16:23

## 2023-01-01 RX ADMIN — IOPAMIDOL 74 ML: 755 INJECTION, SOLUTION INTRAVENOUS at 17:21

## 2023-01-01 RX ADMIN — SACUBITRIL AND VALSARTAN 1 TABLET: 24; 26 TABLET, FILM COATED ORAL at 09:48

## 2023-01-01 RX ADMIN — PANTOPRAZOLE SODIUM 40 MG: 40 TABLET, DELAYED RELEASE ORAL at 05:44

## 2023-01-01 RX ADMIN — APIXABAN 5 MG: 5 TABLET, FILM COATED ORAL at 19:42

## 2023-01-01 RX ADMIN — METOPROLOL TARTRATE 12.5 MG: 25 TABLET, FILM COATED ORAL at 11:56

## 2023-01-01 ASSESSMENT — ACTIVITIES OF DAILY LIVING (ADL)
ADLS_ACUITY_SCORE: 35
ADLS_ACUITY_SCORE: 35
ADLS_ACUITY_SCORE: 26
ADLS_ACUITY_SCORE: 38
DEPENDENT_IADLS:: CLEANING;COOKING;LAUNDRY;SHOPPING;MEAL PREPARATION;MEDICATION MANAGEMENT;TRANSPORTATION
ADLS_ACUITY_SCORE: 30
ADLS_ACUITY_SCORE: 33
ADLS_ACUITY_SCORE: 29
ADLS_ACUITY_SCORE: 26
ADLS_ACUITY_SCORE: 29
ADLS_ACUITY_SCORE: 38
ADLS_ACUITY_SCORE: 26
ADLS_ACUITY_SCORE: 29
ADLS_ACUITY_SCORE: 38
ADLS_ACUITY_SCORE: 26
ADLS_ACUITY_SCORE: 26
ADLS_ACUITY_SCORE: 38
ADLS_ACUITY_SCORE: 29
ADLS_ACUITY_SCORE: 30
PREVIOUS_RESPONSIBILITIES: MEAL PREP;SHOPPING;DRIVING
ADLS_ACUITY_SCORE: 38
ADLS_ACUITY_SCORE: 29
ADLS_ACUITY_SCORE: 38
ADLS_ACUITY_SCORE: 38
ADLS_ACUITY_SCORE: 26
ADLS_ACUITY_SCORE: 23
ADLS_ACUITY_SCORE: 42
ADLS_ACUITY_SCORE: 35
ADLS_ACUITY_SCORE: 30
ADLS_ACUITY_SCORE: 26
ADLS_ACUITY_SCORE: 29
ADLS_ACUITY_SCORE: 26
ADLS_ACUITY_SCORE: 26
ADLS_ACUITY_SCORE: 23
ADLS_ACUITY_SCORE: 23
ADLS_ACUITY_SCORE: 38
ADLS_ACUITY_SCORE: 35
ADLS_ACUITY_SCORE: 23
ADLS_ACUITY_SCORE: 26
ADLS_ACUITY_SCORE: 35
ADLS_ACUITY_SCORE: 38
ADLS_ACUITY_SCORE: 29
PREVIOUS_RESPONSIBILITIES: MEAL PREP
ADLS_ACUITY_SCORE: 35
ADLS_ACUITY_SCORE: 26
ADLS_ACUITY_SCORE: 29
ADLS_ACUITY_SCORE: 29
ADLS_ACUITY_SCORE: 38
DEPENDENT_IADLS:: CLEANING;COOKING;LAUNDRY;SHOPPING;MEAL PREPARATION;MEDICATION MANAGEMENT;TRANSPORTATION
ADLS_ACUITY_SCORE: 35
ADLS_ACUITY_SCORE: 26
ADLS_ACUITY_SCORE: 35
DEPENDENT_IADLS:: CLEANING;COOKING;LAUNDRY;SHOPPING;MEAL PREPARATION;MEDICATION MANAGEMENT;TRANSPORTATION
ADLS_ACUITY_SCORE: 35
ADLS_ACUITY_SCORE: 26
ADLS_ACUITY_SCORE: 26
ADLS_ACUITY_SCORE: 24
ADLS_ACUITY_SCORE: 26
ADLS_ACUITY_SCORE: 26
ADLS_ACUITY_SCORE: 29
DEPENDENT_IADLS:: CLEANING;COOKING;LAUNDRY;SHOPPING;MEAL PREPARATION
ADLS_ACUITY_SCORE: 42
ADLS_ACUITY_SCORE: 29
ADLS_ACUITY_SCORE: 29
ADLS_ACUITY_SCORE: 35
ADLS_ACUITY_SCORE: 23

## 2023-01-01 ASSESSMENT — PAIN SCALES - GENERAL
PAINLEVEL: NO PAIN (0)

## 2023-01-01 ASSESSMENT — PATIENT HEALTH QUESTIONNAIRE - PHQ9
10. IF YOU CHECKED OFF ANY PROBLEMS, HOW DIFFICULT HAVE THESE PROBLEMS MADE IT FOR YOU TO DO YOUR WORK, TAKE CARE OF THINGS AT HOME, OR GET ALONG WITH OTHER PEOPLE: SOMEWHAT DIFFICULT
SUM OF ALL RESPONSES TO PHQ QUESTIONS 1-9: 13
SUM OF ALL RESPONSES TO PHQ QUESTIONS 1-9: 13

## 2023-04-05 NOTE — TELEPHONE ENCOUNTER
"Hemoglobin   Date Value Ref Range Status   09/30/2022 12.5 (L) 13.3 - 17.7 g/dL Final   05/20/2021 11.6 (L) 13.3 - 17.7 g/dL Final   ]    Nexium noted as historical    Prescription approved per [facility] Refill Protocol.    Pending Prescriptions:                       Disp   Refills    clopidogrel (PLAVIX) 75 MG tablet [Pharma*98 tab*             Sig: TAKE 1 TABLET BY MOUTH DAILY. FIRST LOADING DOSE           IS 600MG(8 TABLETS) THEN ONCE DAILY    Requested Prescriptions   Pending Prescriptions Disp Refills     clopidogrel (PLAVIX) 75 MG tablet [Pharmacy Med Name: CLOPIDOGREL 75MG TABLETS] 98 tablet      Sig: TAKE 1 TABLET BY MOUTH DAILY. FIRST LOADING DOSE IS 600MG(8 TABLETS) THEN ONCE DAILY       Plavix Failed - 4/4/2023  2:20 PM        Failed - No active PPI on record unless is Protonix        Failed - Normal HGB on file in past 12 months     Recent Labs   Lab Test 09/30/22  1550   HGB 12.5*               Passed - Normal Platelets on file in past 12 months     Recent Labs   Lab Test 09/30/22  1550                  Passed - Recent (12 mo) or future (30 days) visit within the authorizing provider's specialty     Patient has had an office visit with the authorizing provider or a provider within the authorizing providers department within the previous 12 mos or has a future within next 30 days. See \"Patient Info\" tab in inbasket, or \"Choose Columns\" in Meds & Orders section of the refill encounter.              Passed - Medication is active on med list        Passed - Patient is age 18 or older           Bekah Coelho RN on 4/5/2023 at 11:45 AM    "

## 2023-04-26 NOTE — PROGRESS NOTES
Assessment & Plan     Benign prostatic hyperplasia with lower urinary tract symptoms, symptom details unspecified  Urinary frequency  Check urine studies today. Reports foul smell and increased urinary frequency.   -- start back on flomax 0.4 mg daily.   - UA macro with reflex to Microscopic and Culture - Clinc Collect  - tamsulosin (FLOMAX) 0.4 MG capsule  Dispense: 90 capsule; Refill: 3    Skin lesion  Advised to follow up with Dermatology as discussed prior. Number provided.     Stem cells transplant status (H)  Known issue that I take into account for their medical decisions; no current exacerbations or new concerns.       Lymphoma, unspecified body region, unspecified lymphoma type (H)  Known issue that I take into account for their medical decisions; no current exacerbations or new concerns.       Chronic bronchitis, unspecified chronic bronchitis type (H)  Known issue that I take into account for their medical decisions; no current exacerbations or new concerns.       Peripheral vascular disease, unspecified (H)  Known issue that I take into account for their medical decisions; no current exacerbations or new concerns.     Coronary artery disease involving native coronary artery of native heart with angina pectoris (H)  Known issue that I take into account for their medical decisions; no current exacerbations or new concerns.       >30 minutes spent on the date of the encounter doing chart review, history and exam, documentation and further activities as noted above        Elian Shafer DO  [facility]    Beata Pierson is a 87 year old, presenting for the following health issues:  No chief complaint on file.         View : No data to display.                Skin lesion on left forearm   Has increased in size.   He states has bugs under his skin which is causing skin lesions.   He picked off the skin lesion while in clinic today.   Discussed this could represent an AK.   Has  multiple other skin complaints. Has been referred to Dermatology in the past. Advised follow up with Dermatology.       Genitourinary - Male  Onset/Duration: Patient thinks his urine has a very strong odor and is going more often  Description:    Dysuria (painful urination): No  Hematuria (blood in urine): No  Frequency: YES  Waking at night to urinate: YES  Hesitancy (delay in urine): No  Retention (unable to empty): No  Decrease in urinary flow: No  Incontinence: No   Progression of Symptoms:  worsening  Accompanying Signs & Symptoms:  Fever: No  Back/Flank pain: YES-normally has back pain  Urethral discharge: No  Testicle lumps/masses/pain: No  Nausea and/or vomiting: No  Abdominal pain: No  History:   History of frequent UTI s: No  History of kidney stones: No  History of hernias: No  Personal or Family history of Prostate problems: No  Sexually active: No  Precipitating or alleviating factors: None  Therapies tried and outcome: none      No blood in the urine.   In the past on flomax. Wishes to get back on this.       Review of Systems   Constitutional, HEENT, cardiovascular, pulmonary, gi and gu systems are negative, except as otherwise noted.      Objective    BP 94/40   Pulse 74   Temp 97.4  F (36.3  C) (Tympanic)   Wt 74.4 kg (164 lb)   SpO2 98%   BMI 26.07 kg/m    Body mass index is 26.07 kg/m .  Physical Exam   General: alert, cooperative, no acute distress   Skin: multiple area of dry skin on the forearms bilaterally.   : testes descended bilaterally. No open sores or lesions. No penile discharge.   Abdomen: non-tender.

## 2023-04-26 NOTE — PATIENT INSTRUCTIONS
Follow up with Dermatology   Call this number   [phone]    Urine studies today.     Start back on flomax 0.4 mg daily.       Bladder irritants  Caffeine (eg, coffee, tea, chocolate, certain pain relievers, cold medications, and supplements)  Citrus fruits and juices  Carbonated beverages with or without caffeine (including sparkling mineral water)  Vitamin C supplements and large doses of vitamin B  Alcohol  Chili peppers and onions  Products containing aspartame or saccharin  Products containing vinegar  Spicy foods  Tomatoes and tomato-based foods

## 2023-04-26 NOTE — LETTER
May 1, 2023      Dangelo Hernández  [address] [facility] [address]        Dear ,    We are writing to inform you of your test results.    UA without evidence of infection.     Dr. Shafer     Resulted Orders   UA macro with reflex to Microscopic and Culture - Clinc Collect   Result Value Ref Range    Color Urine Yellow Colorless, Straw, Light Yellow, Yellow    Appearance Urine Clear Clear    Glucose Urine Negative Negative mg/dL    Bilirubin Urine Negative Negative    Ketones Urine Negative Negative mg/dL    Specific Gravity Urine 1.025 1.003 - 1.035    Blood Urine Trace (A) Negative    pH Urine 5.5 5.0 - 7.0    Protein Albumin Urine Negative Negative mg/dL    Urobilinogen Urine 0.2 0.2, 1.0 E.U./dL    Nitrite Urine Negative Negative    Leukocyte Esterase Urine Negative Negative   UA Microscopic with Reflex to Culture   Result Value Ref Range    Bacteria Urine Few (A) None Seen /HPF    RBC Urine 2-5 (A) 0-2 /HPF /HPF    WBC Urine None Seen 0-5 /HPF /HPF    Squamous Epithelials Urine Few (A) None Seen /LPF    Mucus Urine Present (A) None Seen /LPF    Hyaline Casts Urine 0-2 (A) None Seen /LPF    Narrative    Urine Culture not indicated   If you have any questions or concerns, please call the clinic at the number listed above.   Sincerely,  Elian Shafer, DO

## 2023-06-21 NOTE — TELEPHONE ENCOUNTER
Reason for Call:  Other appointment    Detailed comments: [facility] called and would like an appointment for patient to have an E/R f/u at [facility] with Caridad Solorio CNP in the next 5 days.    Reason:  Pneumonia    Please contact [phone]    Phone Number Patient can be reached at: Other phone number:  [phone]*    Best Time: any    Can we leave a detailed message on this number? YES    Call taken on 6/21/2023 at 8:36 AM by Jeni Lucero

## 2023-06-21 NOTE — TELEPHONE ENCOUNTER
Called [facility] spoke with Rosetta informed her patient already has an ED follow up on 6/26/23 arrival time of 0840 with Dr. Eric, she verbalized good understanding.    Julie Behrendt RN

## 2023-06-22 PROBLEM — R65.21 SEPTIC SHOCK (H): Status: ACTIVE | Noted: 2023-01-01

## 2023-06-22 PROBLEM — C83.10 MANTLE CELL LYMPHOMA (H): Status: ACTIVE | Noted: 2023-01-01

## 2023-06-22 PROBLEM — J18.9 BILATERAL PNEUMONIA: Status: ACTIVE | Noted: 2023-01-01

## 2023-06-22 PROBLEM — A41.9 SEPTIC SHOCK (H): Status: ACTIVE | Noted: 2023-01-01

## 2023-06-22 PROBLEM — Z95.2 S/P TAVR (TRANSCATHETER AORTIC VALVE REPLACEMENT): Status: ACTIVE | Noted: 2023-01-01

## 2023-06-22 NOTE — PROGRESS NOTES
[facility] GERIATRICS  INITIAL VISIT NOTE  June 23, 2023    PRIMARY CARE PROVIDER AND CLINIC: Caridad Solorio [address] [facility] [address]    [facility] Medical Record Number: [id number]  Place of Service where encounter took place[facility] [[id number]]    Chief Complaint   Patient presents with     Hospital F/U     [facility] 6/14/2023 - 6/21/2023     HPI:    Dangelo Hernández is a 88 year old (1935) male was admitted to the above facility from [facility]. Hospital stay 6/14/23 through 6/21/23 where they were admitted for PNA. Now admitted to this facility for rehab, medical management and nursing care.      History obtained from: facility chart records, facility staff, patient report, [facility] chart review and Care Everywhere [facility] chart review.      Brief Hospital Course: PMH of CAD, HTN, AS s/p TAVR, mantle cell lymphoma s/p autologous transplant 2010, GERD, and PVD who presented with confusion and SOB. Found to have PNA, was in septic shock. Did require pressors. He was treated with multiple IV abx, IVF. BP improved. Flomax discontinued as felt was contributing to low BP with improvement. He remained weak and continued to require oxygen to TCU recommended,. When medically stable was discharged to TCU for further rehab and medical management.     TCU Course: Seen today during OT. Staff report that he has been refusing medications. Has also not been eating or drinking well. He reports he does not like the thickened liquids. He say the medications make his stomach upset, does not get heartburn but does feel queasy. Staff attempted to give him a zofran, but he declined. Says he just wants to go home. He continues to need oxygen, but was decreased to 1L during therapy today. He reports he always has pain everywhere. Nursing reports he declined pain medications earlier. He reports his stools and loose but not running. Has not  been coughing much. Some SOB, but it continues to get better.       CODE STATUS/ADVANCE DIRECTIVES: DNR only    ALLERGIES:  Allergies   Allergen Reactions     Amoxicillin Diarrhea            Lisinopril Cough       PAST MEDICAL HISTORY:   Past Medical History:   Diagnosis Date      Coronary artery disease, 4/ 2010, status post drug-eluting stent placement to the LAD and balloon angioplasty to the obtuse marginal.  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     PAST SURGICAL HISTORY:   Past Surgical History:   Procedure Laterality Date     BRONCHOSCOPY (RIGID OR FLEXIBLE), DIAGNOSTIC  7/14/2011    Procedure:COMBINED BRONCHOSCOPY (RIGID OR FLEXIBLE), LAVAGE; Surgeon:REYNA BAILEY; Location:[facility]     BRONCHOSCOPY (RIGID OR FLEXIBLE), DIAGNOSTIC N/A 5/29/2019    Procedure: BRONCHOSCOPY, WITH BRONCHOALVEOLAR LAVAGE;  Surgeon: Perlman, David Morris, MD;  Location: [facility]     COLONOSCOPY       CV HEART CATHETERIZATION WITH POSSIBLE INTERVENTION N/A 10/16/2019    Procedure: Heart Catheterization with Possible Intervention;  Surgeon: Robinson Robbins MD;  Location:  HEART CARDIAC CATH LAB     CV HEART CATHETERIZATION WITH POSSIBLE INTERVENTION N/A 11/13/2019    Procedure: Heart Catheterization with Possible Intervention;  Surgeon: Hannah Ashraf MD;  Location:  HEART CARDIAC CATH LAB     ENT SURGERY      tonsils     ORTHOPEDIC SURGERY      rt knee arthroscopy     SURGICAL  HISTORY OF -       tonsils     SURGICAL HISTORY OF -   86    1.Exam under anesthesia, 2.Arthroscopy, 3.Arthroscopic medial meniscectomy, 4.arthroscopic trimming of patellar articular cartilage.     SURGICAL HISTORY OF -       vasectomy     SURGICAL HISTORY OF -   89    colonoscopy     SURGICAL HISTORY OF -   10/20/92    flexible sigmoidoscopy     SURGICAL HISTORY OF -   2000    colonoscopy and polypectomy     THORACOSCOPIC BIOPSY LUNG  2011    Procedure:THORACOSCOPIC BIOPSY LUNG; Video Assisted Right Thoracoscopy with Biopsy; Surgeon:JIM EPSTEIN; Location: OR     FAMILY HISTORY:   Family History   Problem Relation Age of Onset     Cancer Mother         Lung--did not smoke     Cardiovascular Father         MI age 73     Lipids Sister      Hypertension Sister      Cancer Sister         Ovarian- at age 65       SOCIAL HISTORY:   Patient's living condition: lives alone    MEDICATIONS  Post Discharge Medication Reconciliation Status: discharge medications reconciled and changed, per note/orders.  Current Outpatient Medications   Medication Sig Dispense Refill     acetaminophen (TYLENOL) 650 MG CR tablet Take 650 mg by mouth every 4 hours as needed for pain       albuterol (PROVENTIL) (2.5 MG/3ML) 0.083% neb solution Take 2.5 mg by nebulization every 6 hours as needed for shortness of breath or wheezing       amoxicillin-clavulanate (AUGMENTIN) 875-125 MG tablet Take 1 tablet by mouth 2 times daily For three days then discontinue       apixaban ANTICOAGULANT (ELIQUIS) 5 MG tablet Take 5 mg by mouth 2 times daily       azelastine-fluticasone (DYMISTA) 137-50 MCG/ACT nasal spray Spray 1 spray into both nostrils daily       clopidogrel (PLAVIX) 75 MG tablet TAKE 1 TABLET BY MOUTH DAILY. FIRST LOADING DOSE IS 600MG(8 TABLETS) THEN ONCE DAILY 90 tablet 0     esomeprazole (NEXIUM) 20 MG DR capsule Take 20 mg by mouth every morning (before breakfast) Take 30-60 minutes before eating.        "famotidine (PEPCID) 20 MG tablet Take 20 mg by mouth 2 times daily as needed (heartburn)       furosemide (LASIX) 20 MG tablet Take 20 mg by mouth daily       loperamide (IMODIUM A-D) 2 MG tablet Take 1-2 mg by mouth 4 times daily as needed for diarrhea Give 2 tablet (4 mg) PO PRN for first loose stool AND Give 1 tablet PO PRN after each subsequent loose stool. Max 8 tablets (16mg) in 24 hours.       metoprolol succinate ER (TOPROL XL) 25 MG 24 hr tablet Take 25 mg by mouth daily       nitroGLYcerin (NITROSTAT) 0.4 MG sublingual tablet Place 0.4 mg under the tongue every 5 minutes as needed for chest pain For chest pain place 1 tablet under the tongue every 5 minutes for 3 doses. If symptoms persist 5 minutes after 1st dose call 911.       nystatin (MYCOSTATIN) 202167 UNIT/ML suspension Take 5 mLs (500,000 Units) by mouth 4 times daily for 10 days 200 mL 0     ondansetron (ZOFRAN ODT) 4 MG ODT tab Take 1 tablet (4 mg) by mouth every 8 hours as needed for nausea       oxyCODONE (ROXICODONE) 5 MG tablet Take 5 mg by mouth 2 times daily as needed for pain       rosuvastatin (CRESTOR) 40 MG tablet Take 1 tablet (40 mg) by mouth daily 90 tablet 3     sacubitril-valsartan (ENTRESTO) 24-26 MG per tablet Take 0.5 tablets by mouth 2 times daily       vitamin C (ASCORBIC ACID) 500 MG tablet Take 500 mg by mouth daily       Vitamin D3 (CHOLECALCIFEROL) 25 mcg (1000 units) tablet Take 1 tablet by mouth daily       ROS:  10 point ROS neg other than the symptoms noted above in the HPI.    PHYSICAL EXAM:  /64   Pulse 85   Temp 98.4  F (36.9  C)   Resp 18   Ht 1.689 m (5' 6.5\")   Wt 73.5 kg (162 lb)   SpO2 90%   BMI 25.76 kg/m    Physical Exam  Cardiovascular:      Rate and Rhythm: Normal rate and regular rhythm.      Heart sounds: Normal heart sounds.   Pulmonary:      Breath sounds: Normal breath sounds.      Comments: 1L NC, diminished bilat bases  Abdominal:      General: Bowel sounds are normal.      Palpations: " Abdomen is soft.   Neurological:      Mental Status: He is alert.   Psychiatric:         Mood and Affect: Mood normal.      Comments: Impaired judgement          LABORATORY/IMAGING DATA:  Reviewed as per [facility] and/or [facility]    ASSESSMENT/PLAN:  Pneumonia of both lower lobes due to infectious organism  Acute respiratory failure with hypoxia (H)  Presented with sepsis. Continues to require oxygen but has been weaned down to 1L He reports medications making him queasy, suspect this from Augmentin, but only has 2 doses left. He denies diarrhea.   Discussed with patient, nursing staff.   - ondansetron (ZOFRAN ODT) 4 MG ODT tab; Take 1 tablet (4 mg) by mouth every 8 hours as needed for nausea; will give dose now and give dose tomorrow AM before breakfast/administartion of last dose of abx  - complete Augment BID - 2 doses left  - wean to RA as able  - monitor and adjust   - nebs PRN    Dysphagia  On thickened liquids.  Discussed with patient, nursing staff.   - SLP to follow, ADAT    Hypertension goal BP (blood pressure) < 140/90  Hypertensive heart disease without heart failure  Coronary artery disease involving native coronary artery of native heart with angina pectoris (H)  S/P TAVR (transcatheter aortic valve replacement)  Soft BP while IP, low BP this AM in TCU in the 70s, improved on recheck, has been running in the 100s. Poor intake as does not like thickened liquids. Denies any chest pain. Does have stents.   - continue metoprolol with hold parameters  - due to poor intake will hold lasix x 3 days  - continue Plavix, Cresto, Entresto, Apixaban  - daily weights  - monitor and adjust   - BMP, CBC to monitor     Chronic bronchitis, unspecified chronic bronchitis type (H)  Appears compensated, no wheezing on exam  - continue nebs PRN    Mantle cell lymphoma, unspecified body region (H)  Seen by oncology, previous bone marrow transplant  - follow-up with oncology per their recommendation     Spinal stenosis of  lumbar region without neurogenic claudication  Reports chronic back and hip pain. Declines pain medication this AM.  Discussed with patient, nursing staff.   - continue APAP PRN, oxycodone PRN  - monitor and adjust     Gastroesophageal reflux disease without esophagitis  Denies symptoms currently  - continue omeprazole, famotidine  - monitor and adjust     Thrush  Staff report brown;white lesiosn on tongue  - nystatin (MYCOSTATIN) 568354 UNIT/ML suspension; Take 5 mLs (500,000 Units) by mouth 4 times daily for 10 days    Peripheral vascular disease, unspecified (H)  No concerns currently  - continue Crestor, Plavix   - monitor     Physical deconditioning  In the setting of acute illness, hospitalization. He does live alone, daughter is in involved.   - PT/OT  -  to assist with discharge planning.         Orders:   1. Hold lasix x 3 days  2. Hold metoprolol for SBP <100  3. zofran ODT 4mg q8h PRN, give dose at 0600 on 6/24  4. Ok to wean to RA, keep sat >/=90%  5. CBC, BMP on 6/26    Total time spent with patient visit at the skilled nursing facility was 50 including patient visit and review of past records. Total time spent reviewing records from hospitalization outside my organization including review of labs and imaging reports, review of TCU facility records, medication reconciliation discussion of plan of care with nursing staff and therapy, time spent on documentation as well as discussion with patient including review of medications, discussion of plan of care and patient education as stated above.     Electronically signed by:  MURALI Braun CNP

## 2023-06-23 NOTE — LETTER
6/23/2023        RE: Dangelo Hernández  [address]  [facility] [address]        [facility] GERIATRICS  INITIAL VISIT NOTE  June 23, 2023    PRIMARY CARE PROVIDER AND CLINIC: Caridad Solorio [address] / [facility] [id number]    M [facility] Medical Record Number: [id number]  Place of Service where encounter took place: [facility] ([facility]) [[id number]]    Chief Complaint   Patient presents with     Hospital F/U     [facility] 6/14/2023 - 6/21/2023     HPI:    Dangelo Hernández is a 88 year old (1935) male was admitted to the above facility from [facility]. Hospital stay 6/14/23 through 6/21/23 where they were admitted for PNA. Now admitted to this facility for rehab, medical management and nursing care.      History obtained from: facility chart records, facility staff, patient report, [facility] chart review and Care Everywhere [facility] chart review.      Brief Hospital Course: PMH of CAD, HTN, AS s/p TAVR, mantle cell lymphoma s/p autologous transplant 2010, GERD, and PVD who presented with confusion and SOB. Found to have PNA, was in septic shock. Did require pressors. He was treated with multiple IV abx, IVF. BP improved. Flomax discontinued as felt was contributing to low BP with improvement. He remained weak and continued to require oxygen to TCU recommended,. When medically stable was discharged to TCU for further rehab and medical management.     TCU Course: Seen today during OT. Staff report that he has been refusing medications. Has also not been eating or drinking well. He reports he does not like the thickened liquids. He say the medications make his stomach upset, does not get heartburn but does feel queasy. Staff attempted to give him a zofran, but he declined. Says he just wants to go home. He continues to need oxygen, but was decreased to 1L during therapy today. He reports he always has pain everywhere. Nursing  reports he declined pain medications earlier. He reports his stools and loose but not running. Has not been coughing much. Some SOB, but it continues to get better.       CODE STATUS/ADVANCE DIRECTIVES: DNR only    ALLERGIES:  Allergies   Allergen Reactions     Amoxicillin Diarrhea            Lisinopril Cough       PAST MEDICAL HISTORY:   Past Medical History:   Diagnosis Date      Coronary artery disease, 4/ 2010, status post drug-eluting stent placement to the LAD and balloon angioplasty to the obtuse marginal.  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     PAST SURGICAL HISTORY:   Past Surgical History:   Procedure Laterality Date     BRONCHOSCOPY (RIGID OR FLEXIBLE), DIAGNOSTIC  7/14/2011    Procedure:COMBINED BRONCHOSCOPY (RIGID OR FLEXIBLE), LAVAGE; Surgeon:REYNA BAILEY; Location: GI     BRONCHOSCOPY (RIGID OR FLEXIBLE), DIAGNOSTIC N/A 5/29/2019    Procedure: BRONCHOSCOPY, WITH BRONCHOALVEOLAR LAVAGE;  Surgeon: Perlman, David Morris, MD;  Location: [facility]     COLONOSCOPY       CV HEART CATHETERIZATION WITH POSSIBLE INTERVENTION N/A 10/16/2019    Procedure: Heart Catheterization with Possible Intervention;  Surgeon: Robinson Robbins MD;  Location: [facility] CARDIAC CATH LAB     CV HEART CATHETERIZATION WITH POSSIBLE INTERVENTION N/A 11/13/2019    Procedure: Heart Catheterization with Possible Intervention;  Surgeon: Hannah Ashraf MD;  Location: [facility]  CARDIAC CATH LAB     ENT SURGERY      tonsils     ORTHOPEDIC SURGERY      rt knee arthroscopy     SURGICAL HISTORY OF -       tonsils     SURGICAL HISTORY OF -   86    1.Exam under anesthesia, 2.Arthroscopy, 3.Arthroscopic medial meniscectomy, 4.arthroscopic trimming of patellar articular cartilage.     SURGICAL HISTORY OF -       vasectomy     SURGICAL HISTORY OF -   90    colonoscopy     SURGICAL HISTORY OF -   10/20/92    flexible sigmoidoscopy     SURGICAL HISTORY OF -   2000    colonoscopy and polypectomy     THORACOSCOPIC BIOPSY LUNG  2011    Procedure:THORACOSCOPIC BIOPSY LUNG; Video Assisted Right Thoracoscopy with Biopsy; Surgeon:JIM EPSTEIN; Location: OR     FAMILY HISTORY:   Family History   Problem Relation Age of Onset     Cancer Mother         Lung--did not smoke     Cardiovascular Father         MI age 73     Lipids Sister      Hypertension Sister      Cancer Sister         Ovarian- at age 65       SOCIAL HISTORY:   Patient's living condition: lives alone    MEDICATIONS  Post Discharge Medication Reconciliation Status: discharge medications reconciled and changed, per note/orders.  Current Outpatient Medications   Medication Sig Dispense Refill     acetaminophen (TYLENOL) 650 MG CR tablet Take 650 mg by mouth every 4 hours as needed for pain       albuterol (PROVENTIL) (2.5 MG/3ML) 0.083% neb solution Take 2.5 mg by nebulization every 6 hours as needed for shortness of breath or wheezing       amoxicillin-clavulanate (AUGMENTIN) 875-125 MG tablet Take 1 tablet by mouth 2 times daily For three days then discontinue       apixaban ANTICOAGULANT (ELIQUIS) 5 MG tablet Take 5 mg by mouth 2 times daily       azelastine-fluticasone (DYMISTA) 137-50 MCG/ACT nasal spray Spray 1 spray into both nostrils daily       clopidogrel (PLAVIX) 75 MG tablet TAKE 1 TABLET BY MOUTH DAILY. FIRST LOADING DOSE IS 600MG(8 TABLETS) THEN ONCE DAILY 90 tablet 0     esomeprazole (NEXIUM) 20  "MG DR capsule Take 20 mg by mouth every morning (before breakfast) Take 30-60 minutes before eating.       famotidine (PEPCID) 20 MG tablet Take 20 mg by mouth 2 times daily as needed (heartburn)       furosemide (LASIX) 20 MG tablet Take 20 mg by mouth daily       loperamide (IMODIUM A-D) 2 MG tablet Take 1-2 mg by mouth 4 times daily as needed for diarrhea Give 2 tablet (4 mg) PO PRN for first loose stool AND Give 1 tablet PO PRN after each subsequent loose stool. Max 8 tablets (16mg) in 24 hours.       metoprolol succinate ER (TOPROL XL) 25 MG 24 hr tablet Take 25 mg by mouth daily       nitroGLYcerin (NITROSTAT) 0.4 MG sublingual tablet Place 0.4 mg under the tongue every 5 minutes as needed for chest pain For chest pain place 1 tablet under the tongue every 5 minutes for 3 doses. If symptoms persist 5 minutes after 1st dose call 911.       nystatin (MYCOSTATIN) 406284 UNIT/ML suspension Take 5 mLs (500,000 Units) by mouth 4 times daily for 10 days 200 mL 0     ondansetron (ZOFRAN ODT) 4 MG ODT tab Take 1 tablet (4 mg) by mouth every 8 hours as needed for nausea       oxyCODONE (ROXICODONE) 5 MG tablet Take 5 mg by mouth 2 times daily as needed for pain       rosuvastatin (CRESTOR) 40 MG tablet Take 1 tablet (40 mg) by mouth daily 90 tablet 3     sacubitril-valsartan (ENTRESTO) 24-26 MG per tablet Take 0.5 tablets by mouth 2 times daily       vitamin C (ASCORBIC ACID) 500 MG tablet Take 500 mg by mouth daily       Vitamin D3 (CHOLECALCIFEROL) 25 mcg (1000 units) tablet Take 1 tablet by mouth daily       ROS:  10 point ROS neg other than the symptoms noted above in the HPI.    PHYSICAL EXAM:  /64   Pulse 85   Temp 98.4  F (36.9  C)   Resp 18   Ht 1.689 m (5' 6.5\")   Wt 73.5 kg (162 lb)   SpO2 90%   BMI 25.76 kg/m    Physical Exam  Cardiovascular:      Rate and Rhythm: Normal rate and regular rhythm.      Heart sounds: Normal heart sounds.   Pulmonary:      Breath sounds: Normal breath sounds.      " Comments: 1L NC, diminished bilat bases  Abdominal:      General: Bowel sounds are normal.      Palpations: Abdomen is soft.   Neurological:      Mental Status: He is alert.   Psychiatric:         Mood and Affect: Mood normal.      Comments: Impaired judgement          LABORATORY/IMAGING DATA:  Reviewed as per [facility] and/or [facility]    ASSESSMENT/PLAN:  Pneumonia of both lower lobes due to infectious organism  Acute respiratory failure with hypoxia (H)  Presented with sepsis. Continues to require oxygen but has been weaned down to 1L He reports medications making him queasy, suspect this from Augmentin, but only has 2 doses left. He denies diarrhea.   Discussed with patient, nursing staff.   - ondansetron (ZOFRAN ODT) 4 MG ODT tab; Take 1 tablet (4 mg) by mouth every 8 hours as needed for nausea; will give dose now and give dose tomorrow AM before breakfast/administartion of last dose of abx  - complete Augment BID - 2 doses left  - wean to RA as able  - monitor and adjust   - nebs PRN    Dysphagia  On thickened liquids.  Discussed with patient, nursing staff.   - SLP to follow, ADAT    Hypertension goal BP (blood pressure) < 140/90  Hypertensive heart disease without heart failure  Coronary artery disease involving native coronary artery of native heart with angina pectoris (H)  S/P TAVR (transcatheter aortic valve replacement)  Soft BP while IP, low BP this AM in TCU in the 70s, improved on recheck, has been running in the 100s. Poor intake as does not like thickened liquids. Denies any chest pain. Does have stents.   - continue metoprolol with hold parameters  - due to poor intake will hold lasix x 3 days  - continue Plavix, Cresto, Entresto, Apixaban  - daily weights  - monitor and adjust   - BMP, CBC to monitor     Chronic bronchitis, unspecified chronic bronchitis type (H)  Appears compensated, no wheezing on exam  - continue nebs PRN    Mantle cell lymphoma, unspecified body region (H)  Seen by oncology,  previous bone marrow transplant  - follow-up with oncology per their recommendation     Spinal stenosis of lumbar region without neurogenic claudication  Reports chronic back and hip pain. Declines pain medication this AM.  Discussed with patient, nursing staff.   - continue APAP PRN, oxycodone PRN  - monitor and adjust     Gastroesophageal reflux disease without esophagitis  Denies symptoms currently  - continue omeprazole, famotidine  - monitor and adjust     Thrush  Staff report brown;white lesiosn on tongue  - nystatin (MYCOSTATIN) 515406 UNIT/ML suspension; Take 5 mLs (500,000 Units) by mouth 4 times daily for 10 days    Peripheral vascular disease, unspecified (H)  No concerns currently  - continue Crestor, Plavix   - monitor     Physical deconditioning  In the setting of acute illness, hospitalization. He does live alone, daughter is in involved.   - PT/OT  -  to assist with discharge planning.         Orders:   1. Hold lasix x 3 days  2. Hold metoprolol for SBP <100  3. zofran ODT 4mg q8h PRN, give dose at 0600 on 6/24  4. Ok to wean to RA, keep sat >/=90%  5. CBC, BMP on 6/26    Total time spent with patient visit at the skilled nursing facility was 50 including patient visit and review of past records. Total time spent reviewing records from hospitalization outside my organization including review of labs and imaging reports, review of TCU facility records, medication reconciliation discussion of plan of care with nursing staff and therapy, time spent on documentation as well as discussion with patient including review of medications, discussion of plan of care and patient education as stated above.     Electronically signed by:  MURALI Braun CNP         Sincerely,        MURALI Braun CNP

## 2023-06-25 NOTE — PROGRESS NOTES
"[facility] GERIATRICS  ACUTE/EPISODIC VISIT    [facility] Medical Record Number: [id number]  [facility] where [facility] took place[facility] [[id number]]    Chief Complaint   Patient presents with     RECHECK     HPI:    Dangelo Hernández is a 88 year old (1935), who is being seen today for an episodic care visit. HPI information obtained from: facility chart records, facility staff, patient report and [facility] chart review.    Today's concern is: Recently hospitalized with sepsis due to PNA. He refused medications last week, but did agree to take them over the weekend, and did take them morning. Reports he is feeling \"not great.\" Still feels a little queasy, appetite is not great. He had been on thickened liquids, did not like them so signed risk benefit waiver over the weekend. He does feel his breathing is better, not coughing as much. Is on 1L of oxygen. Says he \"hurts all over.\" Has been resistant to working with therapy due to this. Does have an appointment this afternoon to get knee injections. He has told several staff he just wants to die, but did not say this to provider on visit. Says he thinks he will need extra help at home, is wondering how he would go about arranging this.     ALLERGIES:   Allergies   Allergen Reactions     Amoxicillin Diarrhea            Lisinopril Cough      MEDICATIONS:  Post Discharge Medication Reconciliation Status: medication reconcilation previously completed during another office visit.     Current Outpatient Medications   Medication Sig Dispense Refill     mirtazapine (REMERON) 7.5 MG tablet Take 1 tablet (7.5 mg) by mouth At Bedtime       acetaminophen (TYLENOL) 650 MG CR tablet Take 650 mg by mouth every 4 hours as needed for pain       albuterol (PROVENTIL) (2.5 MG/3ML) 0.083% neb solution Take 2.5 mg by nebulization every 6 hours as needed for shortness of breath or wheezing       apixaban ANTICOAGULANT " (ELIQUIS) 5 MG tablet Take 5 mg by mouth 2 times daily       azelastine-fluticasone (DYMISTA) 137-50 MCG/ACT nasal spray Spray 1 spray into both nostrils daily       clopidogrel (PLAVIX) 75 MG tablet TAKE 1 TABLET BY MOUTH DAILY. FIRST LOADING DOSE IS 600MG(8 TABLETS) THEN ONCE DAILY 90 tablet 0     esomeprazole (NEXIUM) 20 MG DR capsule Take 20 mg by mouth every morning (before breakfast) Take 30-60 minutes before eating.       famotidine (PEPCID) 20 MG tablet Take 20 mg by mouth 2 times daily as needed (heartburn)       furosemide (LASIX) 20 MG tablet Take 20 mg by mouth daily       loperamide (IMODIUM A-D) 2 MG tablet Take 1-2 mg by mouth 4 times daily as needed for diarrhea Give 2 tablet (4 mg) PO PRN for first loose stool AND Give 1 tablet PO PRN after each subsequent loose stool. Max 8 tablets (16mg) in 24 hours.       metoprolol succinate ER (TOPROL XL) 25 MG 24 hr tablet Take 25 mg by mouth daily       nitroGLYcerin (NITROSTAT) 0.4 MG sublingual tablet Place 0.4 mg under the tongue every 5 minutes as needed for chest pain For chest pain place 1 tablet under the tongue every 5 minutes for 3 doses. If symptoms persist 5 minutes after 1st dose call 911.       nystatin (MYCOSTATIN) 697694 UNIT/ML suspension Take 5 mLs (500,000 Units) by mouth 4 times daily for 10 days 200 mL 0     ondansetron (ZOFRAN ODT) 4 MG ODT tab Take 1 tablet (4 mg) by mouth every 8 hours as needed for nausea       oxyCODONE (ROXICODONE) 5 MG tablet Take 5 mg by mouth 2 times daily as needed for pain       rosuvastatin (CRESTOR) 40 MG tablet Take 1 tablet (40 mg) by mouth daily 90 tablet 3     sacubitril-valsartan (ENTRESTO) 24-26 MG per tablet Take 0.5 tablets by mouth 2 times daily       vitamin C (ASCORBIC ACID) 500 MG tablet Take 500 mg by mouth daily       Vitamin D3 (CHOLECALCIFEROL) 25 mcg (1000 units) tablet Take 1 tablet by mouth daily       Medications reviewed:  Medications reconciled to facility chart and changes were made to  "reflect current medications as identified as above med list. Below are the changes that were made:   Medications stopped since last EPIC medication reconciliation:   There are no discontinued medications.    Medications started since last [facility] medication reconciliation:  No orders of the defined types were placed in this encounter.        REVIEW OF SYSTEMS:  4 point ROS neg other than the symptoms noted above in the HPI    PHYSICAL EXAM:  BP 91/49   Pulse 90   Temp 98.3  F (36.8  C)   Resp 18   Ht 1.689 m (5' 6.5\")   Wt 70.4 kg (155 lb 3.2 oz)   SpO2 93%   BMI 24.67 kg/m    Physical Exam  Cardiovascular:      Rate and Rhythm: Regular rhythm.      Heart sounds: Normal heart sounds.   Pulmonary:      Effort: Pulmonary effort is normal.      Breath sounds: Normal breath sounds.   Abdominal:      General: Bowel sounds are normal.   Neurological:      Mental Status: He is alert.   Psychiatric:         Mood and Affect: Mood normal.      Comments: Memory and judgement fair         ASSESSMENT / PLAN:  Pneumonia of both lower lobes due to infectious organism  Acute respiratory failure with hypoxia (H)  Presented with sepsis. Continues to require oxygen but has been weaned down to 1L. Coughing less, feels breathing is improved. Completed course of Augmentin on 6/24. Able to get -750  - wean to RA  - encourage use of IS  - nebs PRN  - monitor and adjust     Hypertensive heart disease without heart failure  Coronary artery disease involving native coronary artery of native heart with angina pectoris (H)  S/P TAVR (transcatheter aortic valve replacement)  Continues to have some softer BP, suspect due to poor intake. Lasix held over the weekend. SBP running . Creatinine WNL on labs today. Weight has been trending down 621->155.2lb   - continue to hold lasix  - metoprolol with hold parameters  - continue Plavix, Cresto, Entresto, Apixaban  - daily weights  - monitor and adjust     Physical " deconditioning  Limited participation due to weakness, poor motivation, pain. Discussed with patient, IDT, therapy  - steroid injections to bilat knees later today  - encouraged patient to participate  - Made need to consider [facility] vs private home care at discharge vs ? hospice    Spinal stenosis of lumbar region without neurogenic claudication  Bilateral chronic knee pain  Reports chronic pain  - follow-up with ortho later today for knee injections,   - APAP PRN, oxycodone PRN    Depression, unspecified depression type  Family told nurses they feel patient is depressed. Dangelo agreeable to try antidepressant  - mirtazapine (REMERON) 7.5 MG tablet; Take 1 tablet (7.5 mg) by mouth At Bedtime    Poor appetite  Poor appetite, says food is just not appetizing. He was agreeable to try Remeron in hopes it will help him eat better.   - mirtazapine (REMERON) 7.5 MG tablet; Take 1 tablet (7.5 mg) by mouth At Bedtime  - continue PRN Zofran       Orders:  1. Remeron 7.5mg at bedtime   2. Weight q M/W/F  3. Hold lasix on 6/27 and 6/28    Electronically signed by:  MURALI Braun CNP

## 2023-06-26 NOTE — LETTER
"    6/26/2023        RE: Dangelo Hernández  [address] [facility] [address]        [facility] GERIATRICS  ACUTE/EPISODIC VISIT    [facility] Medical Record Number: [id number]  Place of Service where encounter took place: [facility] ([facility]) [[id number]]    Chief Complaint   Patient presents with     RECHECK     HPI:    Dangelo Hernández is a 88 year old (1935), who is being seen today for an episodic care visit. HPI information obtained from: facility chart records, facility staff, patient report and [facility] chart review.    Today's concern is: Recently hospitalized with sepsis due to PNA. He refused medications last week, but did agree to take them over the weekend, and did take them morning. Reports he is feeling \"not great.\" Still feels a little queasy, appetite is not great. He had been on thickened liquids, did not like them so signed risk benefit waiver over the weekend. He does feel his breathing is better, not coughing as much. Is on 1L of oxygen. Says he \"hurts all over.\" Has been resistant to working with therapy due to this. Does have an appointment this afternoon to get knee injections. He has told several staff he just wants to die, but did not say this to provider on visit. Says he thinks he will need extra help at home, is wondering how he would go about arranging this.     ALLERGIES:   Allergies   Allergen Reactions     Amoxicillin Diarrhea            Lisinopril Cough      MEDICATIONS:  Post Discharge Medication Reconciliation Status: medication reconcilation previously completed during another office visit.     Current Outpatient Medications   Medication Sig Dispense Refill     mirtazapine (REMERON) 7.5 MG tablet Take 1 tablet (7.5 mg) by mouth At Bedtime       acetaminophen (TYLENOL) 650 MG CR tablet Take 650 mg by mouth every 4 hours as needed for pain       albuterol (PROVENTIL) (2.5 MG/3ML) 0.083% neb solution Take 2.5 mg by " nebulization every 6 hours as needed for shortness of breath or wheezing       apixaban ANTICOAGULANT (ELIQUIS) 5 MG tablet Take 5 mg by mouth 2 times daily       azelastine-fluticasone (DYMISTA) 137-50 MCG/ACT nasal spray Spray 1 spray into both nostrils daily       clopidogrel (PLAVIX) 75 MG tablet TAKE 1 TABLET BY MOUTH DAILY. FIRST LOADING DOSE IS 600MG(8 TABLETS) THEN ONCE DAILY 90 tablet 0     esomeprazole (NEXIUM) 20 MG DR capsule Take 20 mg by mouth every morning (before breakfast) Take 30-60 minutes before eating.       famotidine (PEPCID) 20 MG tablet Take 20 mg by mouth 2 times daily as needed (heartburn)       furosemide (LASIX) 20 MG tablet Take 20 mg by mouth daily       loperamide (IMODIUM A-D) 2 MG tablet Take 1-2 mg by mouth 4 times daily as needed for diarrhea Give 2 tablet (4 mg) PO PRN for first loose stool AND Give 1 tablet PO PRN after each subsequent loose stool. Max 8 tablets (16mg) in 24 hours.       metoprolol succinate ER (TOPROL XL) 25 MG 24 hr tablet Take 25 mg by mouth daily       nitroGLYcerin (NITROSTAT) 0.4 MG sublingual tablet Place 0.4 mg under the tongue every 5 minutes as needed for chest pain For chest pain place 1 tablet under the tongue every 5 minutes for 3 doses. If symptoms persist 5 minutes after 1st dose call 911.       nystatin (MYCOSTATIN) 633700 UNIT/ML suspension Take 5 mLs (500,000 Units) by mouth 4 times daily for 10 days 200 mL 0     ondansetron (ZOFRAN ODT) 4 MG ODT tab Take 1 tablet (4 mg) by mouth every 8 hours as needed for nausea       oxyCODONE (ROXICODONE) 5 MG tablet Take 5 mg by mouth 2 times daily as needed for pain       rosuvastatin (CRESTOR) 40 MG tablet Take 1 tablet (40 mg) by mouth daily 90 tablet 3     sacubitril-valsartan (ENTRESTO) 24-26 MG per tablet Take 0.5 tablets by mouth 2 times daily       vitamin C (ASCORBIC ACID) 500 MG tablet Take 500 mg by mouth daily       Vitamin D3 (CHOLECALCIFEROL) 25 mcg (1000 units) tablet Take 1 tablet by mouth  "daily       Medications reviewed:  Medications reconciled to facility chart and changes were made to reflect current medications as identified as above med list. Below are the changes that were made:   Medications stopped since last EPIC medication reconciliation:   There are no discontinued medications.    Medications started since last [facility] medication reconciliation:  No orders of the defined types were placed in this encounter.        REVIEW OF SYSTEMS:  4 point ROS neg other than the symptoms noted above in the HPI    PHYSICAL EXAM:  BP 91/49   Pulse 90   Temp 98.3  F (36.8  C)   Resp 18   Ht 1.689 m (5' 6.5\")   Wt 70.4 kg (155 lb 3.2 oz)   SpO2 93%   BMI 24.67 kg/m    Physical Exam  Cardiovascular:      Rate and Rhythm: Regular rhythm.      Heart sounds: Normal heart sounds.   Pulmonary:      Effort: Pulmonary effort is normal.      Breath sounds: Normal breath sounds.   Abdominal:      General: Bowel sounds are normal.   Neurological:      Mental Status: He is alert.   Psychiatric:         Mood and Affect: Mood normal.      Comments: Memory and judgement fair         ASSESSMENT / PLAN:  Pneumonia of both lower lobes due to infectious organism  Acute respiratory failure with hypoxia (H)  Presented with sepsis. Continues to require oxygen but has been weaned down to 1L. Coughing less, feels breathing is improved. Completed course of Augmentin on 6/24. Able to get -750  - wean to RA  - encourage use of IS  - nebs PRN  - monitor and adjust     Hypertensive heart disease without heart failure  Coronary artery disease involving native coronary artery of native heart with angina pectoris (H)  S/P TAVR (transcatheter aortic valve replacement)  Continues to have some softer BP, suspect due to poor intake. Lasix held over the weekend. SBP running . Creatinine WNL on labs today. Weight has been trending down 621->155.2lb   - continue to hold lasix  - metoprolol with hold parameters  - continue Plavix, " Cresto, Entresto, Apixaban  - daily weights  - monitor and adjust     Physical deconditioning  Limited participation due to weakness, poor motivation, pain. Discussed with patient, IDT, therapy  - steroid injections to bilat knees later today  - encouraged patient to participate  - Made need to consider [facility] vs private home care at discharge vs ? hospice    Spinal stenosis of lumbar region without neurogenic claudication  Bilateral chronic knee pain  Reports chronic pain  - follow-up with ortho later today for knee injections,   - APAP PRN, oxycodone PRN    Depression, unspecified depression type  Family told nurses they feel patient is depressed. Dangelo agreeable to try antidepressant  - mirtazapine (REMERON) 7.5 MG tablet; Take 1 tablet (7.5 mg) by mouth At Bedtime    Poor appetite  Poor appetite, says food is just not appetizing. He was agreeable to try Remeron in hopes it will help him eat better.   - mirtazapine (REMERON) 7.5 MG tablet; Take 1 tablet (7.5 mg) by mouth At Bedtime  - continue PRN Zofran       Orders:  1. Remeron 7.5mg at bedtime   2. Weight q M/W/F  3. Hold lasix on 6/27 and 6/28    Electronically signed by:  MURALI Braun CNP      Sincerely,        MURALI Braun CNP

## 2023-06-28 NOTE — ED NOTES
Bed: ED08  Expected date: 6/28/23  Expected time: 8:17 AM  Means of arrival: Ambulance  Comments:  EMS

## 2023-06-28 NOTE — DISCHARGE INSTRUCTIONS
Be seen if any signs of infection develop such as redness, pus, drainage, spreading redness.    Head CT was normal, with no evidence of bleeding.    Continue home medications as previously planned.

## 2023-06-28 NOTE — ED TRIAGE NOTES
Pt arrives via [facility] from [facility]. Pt fell in the bathroom yesterday. Hit head on door, laceration/skin tear to top of head. Pt is on thinners. No LOC. Pt was reportedly confused yesterday, is A&O today at his baseline.      Triage Assessment       Row Name 06/28/23 0823       Triage Assessment (Adult)    Airway WDL WDL       Respiratory WDL    Respiratory WDL WDL       Skin Circulation/Temperature WDL    Skin Circulation/Temperature WDL WDL       Cardiac WDL    Cardiac WDL WDL       Peripheral/Neurovascular WDL    Peripheral Neurovascular WDL WDL       Cognitive/Neuro/Behavioral WDL    Cognitive/Neuro/Behavioral WDL WDL

## 2023-06-28 NOTE — TELEPHONE ENCOUNTER
Staff report patient had fall with head strike at about 5:45 AM of 6/27. Fall was unwitnessed, and he was found on bathroom floor, but blood was found on floor outside bathroom- suspect he struck head on counter in kitchenette area and crawled into bathroom. Does have gash on back of head. Staff report increased intermittent confusion over the past 24 hours. Gave nurse the wrong name this morning when she asked, having difficulty finishing his sentences. He is on Eliquis.      Send to ED re: fall with head strike and confusion, on Eliquis    MURALI Braun CNP

## 2023-06-28 NOTE — ED PROVIDER NOTES
History     Chief Complaint   Patient presents with     Altered Mental Status     Fall     HPI  Dangelo Hernández is a 88 year old male who presents to the emergency department via EMS for concerns regarding altered mental status.  Patient arrives from his care facility.  States that yesterday morning he was getting up to use the restroom, and his walker was sitting on the other side of the room.  Therefore, patient had gotten up, use the restroom, and as he was reaching fell, hitting his head against the ground.  There was some bleeding, which was controlled.  Patient suffered laceration to the superior scalp.  Did not seek evaluation until this morning.  Was noted to have some altered mental status.  Patient is anticoagulated on Plavix in addition to Eliquis.  Denies any neck pain.  No extremity weakness.    Allergies:  Allergies   Allergen Reactions     Amoxicillin Diarrhea            Lisinopril Cough       Problem List:    Patient Active Problem List    Diagnosis Date Noted     Acute renal failure with other specified pathological lesion in kidney (H) 11/27/2011     Priority: High     Problem list name updated by automated process. Provider to review       Hypertension goal BP (blood pressure) < 140/90 12/09/2010     Priority: High     Mantle cell lymphoma (H) 06/15/2023     Priority: Medium     Bilateral pneumonia 06/15/2023     Priority: Medium     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     Priority: Medium     Septic shock (H) 06/15/2023     Priority: Medium     Stem cells transplant status (H) 06/03/2022     Priority: Medium     Chronic bronchitis, unspecified chronic bronchitis type (H) 06/03/2022     Priority: Medium     Benign prostatic hyperplasia with lower urinary tract symptoms 01/27/2022     Priority: Medium     Difficulty in walking, not elsewhere classified 01/27/2022     Priority: Medium     Endocarditis, valve unspecified 01/27/2022     Priority: Medium     Hypertensive  "heart disease without heart failure 01/27/2022     Priority: Medium     Muscle weakness (generalized) 01/27/2022     Priority: Medium     Pneumonia due to coronavirus disease 2019 01/27/2022     Priority: Medium     Presence of coronary angioplasty implant and graft 01/27/2022     Priority: Medium     Peripheral vascular disease, unspecified (H) 09/22/2021     Priority: Medium     Stenosis of carotid artery 07/15/2021     Priority: Medium     Spinal stenosis of lumbar region without neurogenic claudication 05/10/2021     Priority: Medium     Arthritis of both hands 05/10/2021     Priority: Medium     Arthritis of knee 05/10/2021     Priority: Medium     Chronic midline thoracic back pain 05/10/2021     Priority: Medium     ACS (acute coronary syndrome) (H) 10/15/2019     Priority: Medium     Advanced directives, counseling/discussion 08/12/2015     Priority: Medium     Patient does not have an Advance/Health Care Directive (HCD), given \"What is Advance Care Planning?\" rosendo Segura  August 12, 2015         Cryptogenic organizing pneumonia (H) 12/07/2012     Priority: Medium     GERD (gastroesophageal reflux disease) 05/02/2012     Priority: Medium     Pneumonia, candidial (H) 09/09/2011     Priority: Medium     Pneumonia in mycoses 09/09/2011     Priority: Medium     History of blood disorder 04/06/2011     Priority: Medium     Encounter for long-term current use of medication 03/16/2011     Priority: Medium     Problem list name updated by automated process. 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          Past Medical History:    Past Medical History:   Diagnosis Date      Coronary artery disease, 4/ 2010, status post drug-eluting stent placement to the LAD and balloon angioplasty to the obtuse marginal.  12/8/2010     Acute kidney failure NEC 11/27/2011     Arthritis      Basal cell carcinoma      Blood transfusion      Colon polyps      Combined hyperlipidemia 10/31/2010     Coronary artery disease      Cryptogenic organizing pneumonia (H) 12/7/2012     GERD (gastroesophageal reflux disease)      History of blood disorder 4/6/2011     Hypertension      Hypertension goal BP (blood pressure) < 140/90 12/9/2010     Malignant neoplasm (H)      Mantle Cell Lymphoma S/P Autologous Transplant 5/12/2010       Past Surgical History:    Past Surgical History:   Procedure Laterality Date     BRONCHOSCOPY (RIGID OR FLEXIBLE), DIAGNOSTIC  7/14/2011    Procedure:COMBINED BRONCHOSCOPY (RIGID OR FLEXIBLE), LAVAGE; Surgeon:REYNA BAILEY; Location:[facility]     BRONCHOSCOPY (RIGID OR FLEXIBLE), DIAGNOSTIC N/A 5/29/2019    Procedure: BRONCHOSCOPY, WITH BRONCHOALVEOLAR LAVAGE;  Surgeon: Perlman, David Morris, MD;  Location: [facility]     COLONOSCOPY       CV HEART CATHETERIZATION WITH POSSIBLE INTERVENTION N/A 10/16/2019    Procedure: Heart Catheterization with Possible Intervention;  Surgeon: Robinson Robbins MD;  Location:  HEART CARDIAC CATH LAB     CV HEART CATHETERIZATION WITH POSSIBLE INTERVENTION N/A 11/13/2019    Procedure: Heart Catheterization with Possible Intervention;  Surgeon: Hannah Ashraf MD;  Location:  HEART CARDIAC CATH LAB     ENT SURGERY      tonsils     ORTHOPEDIC SURGERY      rt knee arthroscopy     SURGICAL HISTORY OF -       tonsils     SURGICAL HISTORY OF -   1/19/84    1.Exam under anesthesia, 2.Arthroscopy, 3.Arthroscopic medial meniscectomy, 4.arthroscopic trimming of patellar articular cartilage.     SURGICAL HISTORY OF -       vasectomy     SURGICAL HISTORY OF  -   90    colonoscopy     SURGICAL HISTORY OF -   10/20/92    flexible sigmoidoscopy     SURGICAL HISTORY OF -   2000    colonoscopy and polypectomy     THORACOSCOPIC BIOPSY LUNG  2011    Procedure:THORACOSCOPIC BIOPSY LUNG; Video Assisted Right Thoracoscopy with Biopsy; Surgeon:JIM EPSTEIN; Location: OR       Family History:    Family History   Problem Relation Age of Onset     Cancer Mother         Lung--did not smoke     Cardiovascular Father         MI age 73     Lipids Sister      Hypertension Sister      Cancer Sister         Ovarian- at age 65       Social History:  Marital Status:   [5]  Social History     Tobacco Use     Smoking status: Former     Packs/day: 0.50     Years: 19.00     Pack years: 9.50     Types: Cigarettes     Start date:      Quit date: 1975     Years since quittin.5     Smokeless tobacco: Former     Types: Snuff     Tobacco comments:     started smoking at 21 years of age   Vaping Use     Vaping Use: Never used   Substance Use Topics     Alcohol use: Yes     Alcohol/week: 0.0 standard drinks of alcohol     Comment: occ.      Drug use: No        Medications:    acetaminophen (TYLENOL) 650 MG CR tablet  albuterol (PROVENTIL) (2.5 MG/3ML) 0.083% neb solution  apixaban ANTICOAGULANT (ELIQUIS) 5 MG tablet  azelastine-fluticasone (DYMISTA) 137-50 MCG/ACT nasal spray  clopidogrel (PLAVIX) 75 MG tablet  esomeprazole (NEXIUM) 20 MG DR capsule  famotidine (PEPCID) 20 MG tablet  furosemide (LASIX) 20 MG tablet  loperamide (IMODIUM A-D) 2 MG tablet  metoprolol succinate ER (TOPROL XL) 25 MG 24 hr tablet  mirtazapine (REMERON) 7.5 MG tablet  nitroGLYcerin (NITROSTAT) 0.4 MG sublingual tablet  nystatin (MYCOSTATIN) 854604 UNIT/ML suspension  ondansetron (ZOFRAN ODT) 4 MG ODT tab  oxyCODONE (ROXICODONE) 5 MG tablet  rosuvastatin (CRESTOR) 40 MG tablet  sacubitril-valsartan (ENTRESTO) 24-26 MG per tablet  vitamin C (ASCORBIC ACID) 500 MG  "tablet  Vitamin D3 (CHOLECALCIFEROL) 25 mcg (1000 units) tablet          Review of Systems  See HPI  Physical Exam   BP: (!) 142/64  Pulse: 91  Temp: 97.9  F (36.6  C)  Resp: 18  Height: 167.6 cm (5' 6\")  Weight: 70.3 kg (155 lb)  SpO2: 96 %      Physical Exam  BP (!) 142/64   Pulse 91   Temp 97.9  F (36.6  C) (Oral)   Resp 18   Ht 1.676 m (5' 6\")   Wt 70.3 kg (155 lb)   SpO2 96%   BMI 25.02 kg/m    General: alert and in no acute distress  Head: Healing laceration without any active bleeding of the superior scalp  Abd: nondistended  Musculoskel/Extremities: normal extremities, no apparent edema, and full AROM of major joints  Skin: no rashes, no diaphoresis and skin color normal  Neuro: Patient awake, alert, oriented, speech is fluent, gait is normal  Psychiatric: affect/mood normal, cooperative, normal judgement/insight and memory intact      ED Course                 Procedures              Critical Care time:  none               Results for orders placed or performed during the hospital encounter of 06/28/23 (from the past 24 hour(s))   Head CT w/o contrast    Narrative    CT OF THE HEAD WITHOUT CONTRAST 6/28/2023 9:34 AM     COMPARISON: Head CT 8/10/2018.    HISTORY: Fall yesterday. Hit head. On Plavix and eliquis.    TECHNIQUE: 5 mm thick axial CT images of the head were acquired  without IV contrast material.    FINDINGS: There is moderate diffuse cerebral volume loss. There are  extensive confluent areas of decreased density in the cerebral white  matter bilaterally that are consistent with sequela of chronic small  vessel ischemic disease.    The ventricles and basal cisterns are within normal limits in  configuration given the degree of cerebral volume loss.  There is no  midline shift. There are no extra-axial fluid collections.    No intracranial hemorrhage, mass or recent infarct.    The visualized paranasal sinuses are well-aerated. There is no  mastoiditis. There are no fractures of the " visualized bones.      Impression    IMPRESSION: Diffuse cerebral volume loss and cerebral white matter  changes consistent with chronic small vessel ischemic disease. No  evidence for acute intracranial pathology.      Radiation dose for this scan was reduced using automated exposure  control, adjustment of the mA and/or kV according to patient size, or  iterative reconstruction technique    CHANDLER ALAN MD         SYSTEM ID:  [id number]       Medications - No data to display    Assessments & Plan (with Medical Decision Making)  88 year old male presenting with fall, with altered mental status.  Patient with nonfocal neurologic exam.  Blood pressure minimally elevated, and otherwise normal vitals.    CT scan of the head is performed to rule out acute intracranial abnormality especially in the context of anticoagulant medications.    I personally reviewed CT imaging.  I do not visualize any evidence of head bleed.    CT radiology report shows no evidence of acute intracranial abnormality.  Chronic changes as discussed above.    Patient does have healing wound of the superior scalp.  No indication for laceration repair as patient has no active bleeding, and it has now been greater than 24 hours.  Instructed on wound care, and follow-up as needed in clinic.     I have reviewed the nursing notes.    I have reviewed the findings, diagnosis, plan and need for follow up with the patient.             New Prescriptions    No medications on file       Final diagnoses:   Falls, initial encounter   Scalp laceration, initial encounter       6/28/2023   [facility] EMERGENCY DEPT     Ludin Law MD  06/28/23 1025

## 2023-06-29 PROBLEM — U07.1 COVID-19: Status: ACTIVE | Noted: 2022-12-07

## 2023-06-29 PROBLEM — Z79.82 LONG TERM (CURRENT) USE OF ASPIRIN: Status: ACTIVE | Noted: 2022-12-07
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
## 2023-06-29 PROBLEM — R26.9 UNSPECIFIED ABNORMALITIES OF GAIT AND MOBILITY: Status: ACTIVE | Noted: 2022-12-07

## 2023-06-29 PROBLEM — I25.2 OLD MYOCARDIAL INFARCTION: Status: ACTIVE | Noted: 2022-12-07

## 2023-06-29 PROBLEM — R27.8 OTHER LACK OF COORDINATION: Status: ACTIVE | Noted: 2022-12-07

## 2023-06-29 PROBLEM — Z96.659 PRESENCE OF UNSPECIFIED ARTIFICIAL KNEE JOINT: Status: ACTIVE | Noted: 2022-12-07

## 2023-06-29 NOTE — PROGRESS NOTES
[facility] GERIATRICS  ACUTE/EPISODIC VISIT    [facility] Medical Record Number: [id number]  Place of Service where encounter took place: [facility] ([facility]) [[id number]]    Chief Complaint   Patient presents with     RECHECK     HPI:    Dangelo Hernández is a 88 year old (1935), who is being seen today for an episodic care visit. HPI information obtained from: facility chart records, facility staff, patient report and [facility] chart review.    Today's concern is: Recently hospitalized with sepsis due to PNA. Had fall in TCU on 6/27 with head strike. Was seen in ED on 6/28 with increased confusion, head CT negative and was discharged back to TCU. He reports he is feeling better today, is less tired. Has been spending outside on the patio getting sun. Says he is eating better, has been drinking more fluids. Had injection to bilat knees on Monday and feels that it has helped. Is still coughing some but feels his breathing is improving. Is on RA. He was tracy to was 70ft with CGA during therapy today.     ALLERGIES:   Allergies   Allergen Reactions     Amoxicillin Diarrhea            Lisinopril Cough      MEDICATIONS:  Post Discharge Medication Reconciliation Status: medication reconcilation previously completed during another office visit.     Current Outpatient Medications   Medication Sig Dispense Refill     metoprolol tartrate (LOPRESSOR) 25 MG tablet Take 0.5 tablets (12.5 mg) by mouth 2 times daily       acetaminophen (TYLENOL) 650 MG CR tablet Take 650 mg by mouth every 4 hours as needed for pain       albuterol (PROVENTIL) (2.5 MG/3ML) 0.083% neb solution Take 2.5 mg by nebulization every 6 hours as needed for shortness of breath or wheezing       apixaban ANTICOAGULANT (ELIQUIS) 5 MG tablet Take 5 mg by mouth 2 times daily       azelastine-fluticasone (DYMISTA) 137-50 MCG/ACT nasal spray Spray 1 spray into both nostrils daily       clopidogrel (PLAVIX) 75 MG tablet  TAKE 1 TABLET BY MOUTH DAILY. FIRST LOADING DOSE IS 600MG(8 TABLETS) THEN ONCE DAILY 90 tablet 0     esomeprazole (NEXIUM) 20 MG DR capsule Take 20 mg by mouth every morning (before breakfast) Take 30-60 minutes before eating.       famotidine (PEPCID) 20 MG tablet Take 20 mg by mouth 2 times daily as needed (heartburn)       furosemide (LASIX) 20 MG tablet Take 20 mg by mouth daily as needed       loperamide (IMODIUM A-D) 2 MG tablet Take 1-2 mg by mouth 4 times daily as needed for diarrhea Give 2 tablet (4 mg) PO PRN for first loose stool AND Give 1 tablet PO PRN after each subsequent loose stool. Max 8 tablets (16mg) in 24 hours.       mirtazapine (REMERON) 7.5 MG tablet Take 1 tablet (7.5 mg) by mouth At Bedtime       nitroGLYcerin (NITROSTAT) 0.4 MG sublingual tablet Place 0.4 mg under the tongue every 5 minutes as needed for chest pain For chest pain place 1 tablet under the tongue every 5 minutes for 3 doses. If symptoms persist 5 minutes after 1st dose call 911.       nystatin (MYCOSTATIN) 781270 UNIT/ML suspension Take 5 mLs (500,000 Units) by mouth 4 times daily for 10 days 200 mL 0     ondansetron (ZOFRAN ODT) 4 MG ODT tab Take 1 tablet (4 mg) by mouth every 8 hours as needed for nausea       oxyCODONE (ROXICODONE) 5 MG tablet Take 5 mg by mouth 2 times daily as needed for pain       rosuvastatin (CRESTOR) 40 MG tablet Take 1 tablet (40 mg) by mouth daily 90 tablet 3     sacubitril-valsartan (ENTRESTO) 24-26 MG per tablet Take 0.5 tablets by mouth 2 times daily       vitamin C (ASCORBIC ACID) 500 MG tablet Take 500 mg by mouth daily       Vitamin D3 (CHOLECALCIFEROL) 25 mcg (1000 units) tablet Take 1 tablet by mouth daily       Medications reviewed:  Medications reconciled to facility chart and changes were made to reflect current medications as identified as above med list. Below are the changes that were made:   Medications stopped since last EPIC medication reconciliation:   There are no discontinued  "medications.    Medications started since last [facility] medication reconciliation:  No orders of the defined types were placed in this encounter.        REVIEW OF SYSTEMS:  4 point ROS neg other than the symptoms noted above in the HPI.    PHYSICAL EXAM:  /55   Pulse 80   Temp 98.3  F (36.8  C)   Resp 16   Ht 1.676 m (5' 6\")   Wt 71.2 kg (156 lb 14.4 oz)   SpO2 93%   BMI 25.32 kg/m    Physical Exam  Cardiovascular:      Rate and Rhythm: Normal rate and regular rhythm.      Heart sounds: Normal heart sounds.   Pulmonary:      Effort: Pulmonary effort is normal.      Breath sounds: Normal breath sounds.   Musculoskeletal:      Right lower leg: No edema.      Left lower leg: No edema.   Skin:     Comments: Dry flakey skin   Neurological:      Mental Status: He is alert.   Psychiatric:         Mood and Affect: Mood normal.      Comments: forgetful         ASSESSMENT / PLAN:  Pneumonia of both lower lobes due to infectious organism  Acute respiratory failure with hypoxia (H)  Chronic bronchitis, unspecified chronic bronchitis type (H)  PNA appears resolved, completed Augmentin on 6/24, has been weaned to RA. Cough improved.   - nebs PRN    Hypertensive heart disease without heart failure  Coronary artery disease involving native coronary artery of native heart with angina pectoris (H)  S/P TAVR (transcatheter aortic valve replacement)  Continues to have some softer BP, suspect due to poor intake. Lasix held over the weekend, changed to PRN. Creatinine WNL on labs today. Weight has been trending down 621->155.2lb. SBP running the low 90s, so, metoprolol changed form metoprolol xl 25mg daily to metoprolol tartrate 12.5mg BID, with hold parameters. BP now 100s-110s.   - lasix 20mg daily PRN for >2lb weight gain in one day  - metoprolol 12.5mg BID   - continue Plavix, Cresto, Entresto, Apixaban  - daily weights  - monitor and adjust     Physical deconditioning  Making progress with therapy. Recent fall on 6/27  - " continue PT/OT    Poor appetite  Improving, denies any further nausea  - continue Remeron    Depression, unspecified depression type  Family reported concerns for depression, started on Remeron  - Remeron 7.5at bedtime       Orders:  NNO    Electronically signed by:  MURALI Braun CNP

## 2023-06-30 NOTE — LETTER
6/30/2023        RE: Dangelo Hernández  [address] [place] [address]        [facility] GERIATRICS  ACUTE/EPISODIC VISIT    [facility] Medical Record Number: [id number]  Place of Service where encounter took place: [facility] ([facility]) [[id number]]    Chief Complaint   Patient presents with     RECHECK     HPI:    Dangelo Hernández is a 88 year old (1935), who is being seen today for an episodic care visit. HPI information obtained from: facility chart records, facility staff, patient report and [facility] chart review.    Today's concern is: Recently hospitalized with sepsis due to PNA. Had fall in TCU on 6/27 with head strike. Was seen in ED on 6/28 with increased confusion, head CT negative and was discharged back to TCU. He reports he is feeling better today, is less tired. Has been spending outside on the patio getting sun. Says he is eating better, has been drinking more fluids. Had injection to bilat knees on Monday and feels that it has helped. Is still coughing some but feels his breathing is improving. Is on RA. He was tracy to was 70ft with CGA during therapy today.     ALLERGIES:   Allergies   Allergen Reactions     Amoxicillin Diarrhea            Lisinopril Cough      MEDICATIONS:  Post Discharge Medication Reconciliation Status: medication reconcilation previously completed during another office visit.     Current Outpatient Medications   Medication Sig Dispense Refill     metoprolol tartrate (LOPRESSOR) 25 MG tablet Take 0.5 tablets (12.5 mg) by mouth 2 times daily       acetaminophen (TYLENOL) 650 MG CR tablet Take 650 mg by mouth every 4 hours as needed for pain       albuterol (PROVENTIL) (2.5 MG/3ML) 0.083% neb solution Take 2.5 mg by nebulization every 6 hours as needed for shortness of breath or wheezing       apixaban ANTICOAGULANT (ELIQUIS) 5 MG tablet Take 5 mg by mouth 2 times daily       azelastine-fluticasone (DYMISTA) 137-50  MCG/ACT nasal spray Spray 1 spray into both nostrils daily       clopidogrel (PLAVIX) 75 MG tablet TAKE 1 TABLET BY MOUTH DAILY. FIRST LOADING DOSE IS 600MG(8 TABLETS) THEN ONCE DAILY 90 tablet 0     esomeprazole (NEXIUM) 20 MG DR capsule Take 20 mg by mouth every morning (before breakfast) Take 30-60 minutes before eating.       famotidine (PEPCID) 20 MG tablet Take 20 mg by mouth 2 times daily as needed (heartburn)       furosemide (LASIX) 20 MG tablet Take 20 mg by mouth daily as needed       loperamide (IMODIUM A-D) 2 MG tablet Take 1-2 mg by mouth 4 times daily as needed for diarrhea Give 2 tablet (4 mg) PO PRN for first loose stool AND Give 1 tablet PO PRN after each subsequent loose stool. Max 8 tablets (16mg) in 24 hours.       mirtazapine (REMERON) 7.5 MG tablet Take 1 tablet (7.5 mg) by mouth At Bedtime       nitroGLYcerin (NITROSTAT) 0.4 MG sublingual tablet Place 0.4 mg under the tongue every 5 minutes as needed for chest pain For chest pain place 1 tablet under the tongue every 5 minutes for 3 doses. If symptoms persist 5 minutes after 1st dose call 911.       nystatin (MYCOSTATIN) 861414 UNIT/ML suspension Take 5 mLs (500,000 Units) by mouth 4 times daily for 10 days 200 mL 0     ondansetron (ZOFRAN ODT) 4 MG ODT tab Take 1 tablet (4 mg) by mouth every 8 hours as needed for nausea       oxyCODONE (ROXICODONE) 5 MG tablet Take 5 mg by mouth 2 times daily as needed for pain       rosuvastatin (CRESTOR) 40 MG tablet Take 1 tablet (40 mg) by mouth daily 90 tablet 3     sacubitril-valsartan (ENTRESTO) 24-26 MG per tablet Take 0.5 tablets by mouth 2 times daily       vitamin C (ASCORBIC ACID) 500 MG tablet Take 500 mg by mouth daily       Vitamin D3 (CHOLECALCIFEROL) 25 mcg (1000 units) tablet Take 1 tablet by mouth daily       Medications reviewed:  Medications reconciled to facility chart and changes were made to reflect current medications as identified as above med list. Below are the changes that were  "made:   Medications stopped since last EPIC medication reconciliation:   There are no discontinued medications.    Medications started since last [facility] medication reconciliation:  No orders of the defined types were placed in this encounter.        REVIEW OF SYSTEMS:  4 point ROS neg other than the symptoms noted above in the HPI.    PHYSICAL EXAM:  /55   Pulse 80   Temp 98.3  F (36.8  C)   Resp 16   Ht 1.676 m (5' 6\")   Wt 71.2 kg (156 lb 14.4 oz)   SpO2 93%   BMI 25.32 kg/m    Physical Exam  Cardiovascular:      Rate and Rhythm: Normal rate and regular rhythm.      Heart sounds: Normal heart sounds.   Pulmonary:      Effort: Pulmonary effort is normal.      Breath sounds: Normal breath sounds.   Musculoskeletal:      Right lower leg: No edema.      Left lower leg: No edema.   Skin:     Comments: Dry flakey skin   Neurological:      Mental Status: He is alert.   Psychiatric:         Mood and Affect: Mood normal.      Comments: forgetful         ASSESSMENT / PLAN:  Pneumonia of both lower lobes due to infectious organism  Acute respiratory failure with hypoxia (H)  Chronic bronchitis, unspecified chronic bronchitis type (H)  PNA appears resolved, completed Augmentin on 6/24, has been weaned to RA. Cough improved.   - nebs PRN    Hypertensive heart disease without heart failure  Coronary artery disease involving native coronary artery of native heart with angina pectoris (H)  S/P TAVR (transcatheter aortic valve replacement)  Continues to have some softer BP, suspect due to poor intake. Lasix held over the weekend, changed to PRN. Creatinine WNL on labs today. Weight has been trending down 621->155.2lb. SBP running the low 90s, so, metoprolol changed form metoprolol xl 25mg daily to metoprolol tartrate 12.5mg BID, with hold parameters. BP now 100s-110s.   - lasix 20mg daily PRN for >2lb weight gain in one day  - metoprolol 12.5mg BID   - continue Plavix, Cresto, Entresto, Apixaban  - daily weights  - " monitor and adjust     Physical deconditioning  Making progress with therapy. Recent fall on 6/27  - continue PT/OT    Poor appetite  Improving, denies any further nausea  - continue Remeron    Depression, unspecified depression type  Family reported concerns for depression, started on Remeron  - Remeron 7.5at bedtime       Orders:  NNO    Electronically signed by:  MURALI Braun CNP      Sincerely,        MURALI Braun CNP

## 2023-07-03 NOTE — PROGRESS NOTES
[facility] GERIATRICS  ACUTE/EPISODIC VISIT    [facility] Medical Record Number: [id number]  [facility] [[id number]]    Chief Complaint   Patient presents with     RECHECK     HPI:    Dangelo Hernández is a 88 year old (1935), who is being seen today for an episodic care visit. HPI information obtained from: facility chart records, facility staff, patient report and [facility] chart review.    Today's concern is: Recently hospitalized with sepsis due to PNA. Had fall in TCU on 6/27 with head strike. Was seen in ED on 6/28 with increased confusion, head CT negative and was discharged back to TCU. Seen today shortly after lunch, daughter present during visit. He reports he is feeling better, legs are still weak, have been since he got COVID. He wants to go home as she feels he will exercise more at home than he does in TCU. Pain is much better since he got injections in his knees last week. He is eating and drinking better. Remains on RA, continues to have some cough, but reports it is much better. Denies any SOB. He had care conference this morning, is wants to go home next week. Family is looking into hiring private home care.     ALLERGIES:   Allergies   Allergen Reactions     Amoxicillin Diarrhea            Lisinopril Cough      MEDICATIONS:  Post Discharge Medication Reconciliation Status: medication reconcilation previously completed during another office visit.     Current Outpatient Medications   Medication Sig Dispense Refill     acetaminophen (TYLENOL) 650 MG CR tablet Take 650 mg by mouth every 4 hours as needed for pain       albuterol (PROVENTIL) (2.5 MG/3ML) 0.083% neb solution Take 2.5 mg by nebulization every 6 hours as needed for shortness of breath or wheezing       apixaban ANTICOAGULANT (ELIQUIS) 5 MG tablet Take 5 mg by mouth 2 times daily       azelastine-fluticasone (DYMISTA) 137-50 MCG/ACT nasal  spray Spray 1 spray into both nostrils daily       clopidogrel (PLAVIX) 75 MG tablet TAKE 1 TABLET BY MOUTH DAILY. FIRST LOADING DOSE IS 600MG(8 TABLETS) THEN ONCE DAILY 90 tablet 0     esomeprazole (NEXIUM) 20 MG DR capsule Take 20 mg by mouth every morning (before breakfast) Take 30-60 minutes before eating.       famotidine (PEPCID) 20 MG tablet Take 20 mg by mouth 2 times daily as needed (heartburn)       furosemide (LASIX) 20 MG tablet Take 20 mg by mouth daily as needed       loperamide (IMODIUM A-D) 2 MG tablet Take 1-2 mg by mouth 4 times daily as needed for diarrhea Give 2 tablet (4 mg) PO PRN for first loose stool AND Give 1 tablet PO PRN after each subsequent loose stool. Max 8 tablets (16mg) in 24 hours.       metoprolol tartrate (LOPRESSOR) 25 MG tablet Take 0.5 tablets (12.5 mg) by mouth 2 times daily       mirtazapine (REMERON) 7.5 MG tablet Take 1 tablet (7.5 mg) by mouth At Bedtime       nitroGLYcerin (NITROSTAT) 0.4 MG sublingual tablet Place 0.4 mg under the tongue every 5 minutes as needed for chest pain For chest pain place 1 tablet under the tongue every 5 minutes for 3 doses. If symptoms persist 5 minutes after 1st dose call 911.       nystatin (MYCOSTATIN) 643534 UNIT/ML suspension Take 5 mLs (500,000 Units) by mouth 4 times daily for 10 days 200 mL 0     ondansetron (ZOFRAN ODT) 4 MG ODT tab Take 1 tablet (4 mg) by mouth every 8 hours as needed for nausea       oxyCODONE (ROXICODONE) 5 MG tablet Take 5 mg by mouth 2 times daily as needed for pain       rosuvastatin (CRESTOR) 40 MG tablet Take 1 tablet (40 mg) by mouth daily 90 tablet 3     sacubitril-valsartan (ENTRESTO) 24-26 MG per tablet Take 0.5 tablets by mouth 2 times daily       vitamin C (ASCORBIC ACID) 500 MG tablet Take 500 mg by mouth daily       Vitamin D3 (CHOLECALCIFEROL) 25 mcg (1000 units) tablet Take 1 tablet by mouth daily       Medications reviewed:  Medications reconciled to facility chart and changes were made to reflect  "current medications as identified as above med list. Below are the changes that were made:   Medications stopped since last EPIC medication reconciliation:   There are no discontinued medications.    Medications started since last [facility] medication reconciliation:  No orders of the defined types were placed in this encounter.        REVIEW OF SYSTEMS:  4 point ROS neg other than the symptoms noted above in the HPI.    PHYSICAL EXAM:  /55   Pulse 78   Temp 98.3  F (36.8  C)   Resp 18   Ht 1.676 m (5' 6\")   Wt 68.6 kg (151 lb 3.2 oz)   SpO2 93%   BMI 24.40 kg/m    Physical Exam  Cardiovascular:      Rate and Rhythm: Regular rhythm.      Pulses: Normal pulses.      Heart sounds: Normal heart sounds.   Pulmonary:      Breath sounds: Normal breath sounds.      Comments: occasional moist cough  Abdominal:      General: Bowel sounds are normal.      Palpations: Abdomen is soft.   Musculoskeletal:      Right lower leg: Edema present.      Left lower leg: Edema present.   Neurological:      Mental Status: He is alert.      Motor: Weakness (generalized) present.   Psychiatric:         Mood and Affect: Mood normal.      Comments: Memory and judgement fair          ASSESSMENT / PLAN:  Pneumonia of both lower lobes due to infectious organism  Acute respiratory failure with hypoxia (H)  Chronic bronchitis, unspecified chronic bronchitis type (H)  ? Aspiration. Declined thickened liquids/altered diet at TCU. PNA appears resolved, completed Augmentin on 6/24, has been weaned to RA. Has some cough, but feels this has improved to baseline.   - nebs PRN  - monitor     Hypertensive heart disease without heart failure  Hypertension goal BP (blood pressure) < 140/90  Coronary artery disease involving native coronary artery of native heart with angina pectoris (H)  S/P TAVR (transcatheter aortic valve replacement)  Had some softer BP in TCU, so metoprolol XL 25mg daily changed to metoprolol tartrate 12.5mg BID. Weight decreased " initially, now stable ~150lbs. -110, HR 60-80.   - lasix 20mg daily PRN for >2lb weight gain in one day  - metoprolol 12.5mg BID   - continue Plavix, Cresto, Entresto, Apixaban  - daily weights  - monitor and adjust     Physical deconditioning  In the setting of PNA, hospitalization. Has been making progress with therapy, likely ready for discharge next. Has been able to walk up to 100ft FWW, SBA  - continue PT/OT    Poor appetite  New on Remeron, improved; now eating 50-75% of most meals  - continue Remeron 7.5mg at bedtime       Orders:  NNO    Electronically signed by:  MURALI Braun CNP

## 2023-07-03 NOTE — LETTER
7/3/2023        RE: Dangelo Hernández  [address] [facility] [address]        [facility] GERIATRICS  ACUTE/EPISODIC VISIT    [facility] Medical Record Number: [id number]  Place of Service where encounter took place: [facility] ([facility]) [[id number]]    Chief Complaint   Patient presents with     RECHECK     HPI:    Dangelo Hernández is a 88 year old (1935), who is being seen today for an episodic care visit. HPI information obtained from: facility chart records, facility staff, patient report and [facility] chart review.    Today's concern is: Recently hospitalized with sepsis due to PNA. Had fall in TCU on 6/27 with head strike. Was seen in ED on 6/28 with increased confusion, head CT negative and was discharged back to TCU. Seen today shortly after lunch, daughter present during visit. He reports he is feeling better, legs are still weak, have been since he got COVID. He wants to go home as she feels he will exercise more at home than he does in TCU. Pain is much better since he got injections in his knees last week. He is eating and drinking better. Remains on RA, continues to have some cough, but reports it is much better. Denies any SOB. He had care conference this morning, is wants to go home next week. Family is looking into hiring private home care.     ALLERGIES:   Allergies   Allergen Reactions     Amoxicillin Diarrhea            Lisinopril Cough      MEDICATIONS:  Post Discharge Medication Reconciliation Status: medication reconcilation previously completed during another office visit.     Current Outpatient Medications   Medication Sig Dispense Refill     acetaminophen (TYLENOL) 650 MG CR tablet Take 650 mg by mouth every 4 hours as needed for pain       albuterol (PROVENTIL) (2.5 MG/3ML) 0.083% neb solution Take 2.5 mg by nebulization every 6 hours as needed for shortness of breath or wheezing       apixaban ANTICOAGULANT (ELIQUIS) 5 MG  tablet Take 5 mg by mouth 2 times daily       azelastine-fluticasone (DYMISTA) 137-50 MCG/ACT nasal spray Spray 1 spray into both nostrils daily       clopidogrel (PLAVIX) 75 MG tablet TAKE 1 TABLET BY MOUTH DAILY. FIRST LOADING DOSE IS 600MG(8 TABLETS) THEN ONCE DAILY 90 tablet 0     esomeprazole (NEXIUM) 20 MG DR capsule Take 20 mg by mouth every morning (before breakfast) Take 30-60 minutes before eating.       famotidine (PEPCID) 20 MG tablet Take 20 mg by mouth 2 times daily as needed (heartburn)       furosemide (LASIX) 20 MG tablet Take 20 mg by mouth daily as needed       loperamide (IMODIUM A-D) 2 MG tablet Take 1-2 mg by mouth 4 times daily as needed for diarrhea Give 2 tablet (4 mg) PO PRN for first loose stool AND Give 1 tablet PO PRN after each subsequent loose stool. Max 8 tablets (16mg) in 24 hours.       metoprolol tartrate (LOPRESSOR) 25 MG tablet Take 0.5 tablets (12.5 mg) by mouth 2 times daily       mirtazapine (REMERON) 7.5 MG tablet Take 1 tablet (7.5 mg) by mouth At Bedtime       nitroGLYcerin (NITROSTAT) 0.4 MG sublingual tablet Place 0.4 mg under the tongue every 5 minutes as needed for chest pain For chest pain place 1 tablet under the tongue every 5 minutes for 3 doses. If symptoms persist 5 minutes after 1st dose call 911.       nystatin (MYCOSTATIN) 999017 UNIT/ML suspension Take 5 mLs (500,000 Units) by mouth 4 times daily for 10 days 200 mL 0     ondansetron (ZOFRAN ODT) 4 MG ODT tab Take 1 tablet (4 mg) by mouth every 8 hours as needed for nausea       oxyCODONE (ROXICODONE) 5 MG tablet Take 5 mg by mouth 2 times daily as needed for pain       rosuvastatin (CRESTOR) 40 MG tablet Take 1 tablet (40 mg) by mouth daily 90 tablet 3     sacubitril-valsartan (ENTRESTO) 24-26 MG per tablet Take 0.5 tablets by mouth 2 times daily       vitamin C (ASCORBIC ACID) 500 MG tablet Take 500 mg by mouth daily       Vitamin D3 (CHOLECALCIFEROL) 25 mcg (1000 units) tablet Take 1 tablet by mouth daily    "    Medications reviewed:  Medications reconciled to facility chart and changes were made to reflect current medications as identified as above med list. Below are the changes that were made:   Medications stopped since last EPIC medication reconciliation:   There are no discontinued medications.    Medications started since last [facility] medication reconciliation:  No orders of the defined types were placed in this encounter.        REVIEW OF SYSTEMS:  4 point ROS neg other than the symptoms noted above in the HPI.    PHYSICAL EXAM:  /55   Pulse 78   Temp 98.3  F (36.8  C)   Resp 18   Ht 1.676 m (5' 6\")   Wt 68.6 kg (151 lb 3.2 oz)   SpO2 93%   BMI 24.40 kg/m    Physical Exam  Cardiovascular:      Rate and Rhythm: Regular rhythm.      Pulses: Normal pulses.      Heart sounds: Normal heart sounds.   Pulmonary:      Breath sounds: Normal breath sounds.      Comments: occasional moist cough  Abdominal:      General: Bowel sounds are normal.      Palpations: Abdomen is soft.   Musculoskeletal:      Right lower leg: Edema present.      Left lower leg: Edema present.   Neurological:      Mental Status: He is alert.      Motor: Weakness (generalized) present.   Psychiatric:         Mood and Affect: Mood normal.      Comments: Memory and judgement fair          ASSESSMENT / PLAN:  Pneumonia of both lower lobes due to infectious organism  Acute respiratory failure with hypoxia (H)  Chronic bronchitis, unspecified chronic bronchitis type (H)  ? Aspiration. Declined thickened liquids/altered diet at TCU. PNA appears resolved, completed Augmentin on 6/24, has been weaned to RA. Has some cough, but feels this has improved to baseline.   - nebs PRN  - monitor     Hypertensive heart disease without heart failure  Hypertension goal BP (blood pressure) < 140/90  Coronary artery disease involving native coronary artery of native heart with angina pectoris (H)  S/P TAVR (transcatheter aortic valve replacement)  Had some " softer BP in TCU, so metoprolol XL 25mg daily changed to metoprolol tartrate 12.5mg BID. Weight decreased initially, now stable ~150lbs. -110, HR 60-80.   - lasix 20mg daily PRN for >2lb weight gain in one day  - metoprolol 12.5mg BID   - continue Plavix, Cresto, Entresto, Apixaban  - daily weights  - monitor and adjust     Physical deconditioning  In the setting of PNA, hospitalization. Has been making progress with therapy, likely ready for discharge next. Has been able to walk up to 100ft FWW, SBA  - continue PT/OT    Poor appetite  New on Remeron, improved; now eating 50-75% of most meals  - continue Remeron 7.5mg at bedtime       Orders:  NNO    Electronically signed by:  MURALI Braun CNP      Sincerely,        MURALI Braun CNP

## 2023-07-07 NOTE — PROGRESS NOTES
Informed by  at TCU that patient went out with family on [place] yesterday and did not return to facility, considered an AMA discharge. Family was provided with information on private pay home services. Unable to arrange home care as left facility AMA.     MURALI Braun CNP

## 2023-07-26 NOTE — PROGRESS NOTES
Assessment & Plan     Pneumonia due to infectious organism, unspecified laterality, unspecified part of lung  Septic shock (H)  S/P TAVR (transcatheter aortic valve replacement)  Peripheral vascular disease, unspecified (H)  -- Overall improving from hospitalization. BP is slightly low. No recent ECHO study. Further evaluate with ECHO for cardiac function.   - Echocardiogram Complete    Abrasion of left lower extremity, initial encounter  Left leg cellulitis  Abrasion dressed today in clinic. Appears to be developing a Cellulitis around it. Start on Duricef twice daily for 7 days. Advised to keep area bandaged and clean.   Follow up in 7 days or sooner if needed. Reasons to present to ED discussed.   - cefadroxil (DURICEF) 500 MG capsule  Dispense: 14 capsule; Refill: 0       MED REC REQUIRED  Post Medication Reconciliation Status: discharge medications reconciled and changed, per note/orders    The risks, benefits and treatment options of prescribed medications or other treatments have been discussed with the patient. The patient verbalized their understanding and should call or follow up if no improvement or if they develop further problems.      Elian Shafer [facility]    Beata Pierson is a 88 year old, presenting for the following health issues:  Hospital F/U        7/26/2023     3:15 PM   Additional Questions   Roomed by Laine VILLARREAL CMA       HPI       Hospital Follow-up Visit:    Hospital/Nursing Home/IP Rehab Facility:  [facility]  Date of Admission: 06/14/2023  Date of Discharge: 06/21/2023  Reason(s) for Admission: Pneumonia of both lungs due to infectious organism, unspecified part of lung, septic shock, hypoxia, S/P TAVR, mantle cell lymphoma    Was your hospitalization related to COVID-19? No   Problems taking medications regularly:  None  Medication changes since discharge: None  Problems adhering to non-medication therapy:  None    Summary of  hospitalization:  CareEverywhere information obtained and reviewed  Diagnostic Tests/Treatments reviewed.  Follow up needed: none  Other Healthcare Providers Involved in Patient s Care:         None  Update since discharge: stable.         Plan of care communicated with patient           Reports breathing is going pretty good.     ED/UC Followup:    Facility:  [facility] ED  Date of visit: 06/28/2023  Reason for visit: Altered mental status, fall, scalp laceration  Current Status: Doing ok, states he has not fallen since then.  Scalp is healing fine.       Left leg abrasion   Happened 4 days ago.   Bumped into something.   No fevers.   Mild erythema on the leg.       Gets help in the house with PCA. Whom comes in every other day.     Pixie daughter and also daughter Quynh who help out in the home.     Reports feeling safe at home.   Breathing is at baseline.       Review of Systems   Constitutional, HEENT, cardiovascular, pulmonary, gi and gu systems are negative, except as otherwise noted.      Objective    BP 98/56 (BP Location: Right arm, Patient Position: Sitting, Cuff Size: Adult Large)   Pulse 83   Temp 98  F (36.7  C) (Tympanic)   Resp 24   SpO2 96%   There is no height or weight on file to calculate BMI.  Physical Exam   General: alertno acute distress   CV: RRR  Resp: non-labored breathing  Abdomen: Soft, non-tender, no guarding.   Left leg: anterior shin 4 cm skin abrasion present. Surrounding erythema is present. Mildly tender to palpation. No evidence of abscess.

## 2023-07-26 NOTE — PATIENT INSTRUCTIONS
Start on duricef twice daily for 7 days.     Follow up in 1 week.     Schedule ECHO heart study.

## 2023-08-02 NOTE — PATIENT INSTRUCTIONS
Keep area clean and dry     Complete course of antibiotics.     Keep covered with bandage.     Can use cleaning solution provided.     Follow up with wound care.

## 2023-08-02 NOTE — PROGRESS NOTES
"  Assessment & Plan     Infected abrasion of left lower extremity, subsequent encounter  Cellulitis of left lower extremity- improving  -- cellulitis signs improving. Erythema has decreased. Continues to be on duricef with last dose tonight.   -- Discussed wound care with keeping it clean, dry.   -- Applied bandage over wound today. Advised to change daily.   -- Provided with chlorhexidine wound solution for cleaning.  -- discussed he should monitor for signs of infection. He will contact us if this is the case. Would extend duricef prescription if this is the case.   - Wound Care Referral    The risks, benefits and treatment options of prescribed medications or other treatments have been discussed with the patient. The patient verbalized their understanding and should call or follow up if no improvement or if they develop further problems.      Elian Shafer DO  [facility]    Beata Pierson is a 88 year old, presenting for the following health issues:  Cellulitis and Abrasion        8/2/2023     2:17 PM   Additional Questions   Roomed by Aura UMANZOR MA   Accompanied by Hilda-daughter       HPI     88 year old male who presents to clinic for follow up on left leg abrasion and cellulitis.   Started on duricef on 7/26    Today in clinic:    Continues to have a wound on the anterior knee.   Erythema has improved.   Tolerating duricef at this time.   No fevers.   No purulent drainage.   No other complaints or concerns.        Review of Systems   Constitutional, HEENT, cardiovascular, pulmonary, gi and gu systems are negative, except as otherwise noted.      Objective    /52   Pulse 84   Temp 98.3  F (36.8  C) (Tympanic)   Resp 16   Ht 1.676 m (5' 6\")   Wt 68.5 kg (151 lb)   SpO2 96%   BMI 24.37 kg/m    Body mass index is 24.37 kg/m .  Physical Exam   General: no acute distress  Leg: erythema improved from prior. No evidence of abscess.      Media Information    Document " Information    Other: Photograph      08/02/2023 2:43 PM   Attached To:   Office Visit on 8/2/23 with Elian Shafer DO   Source Information    Elian Shafer,   [facility]

## 2023-09-04 PROBLEM — G93.40 ENCEPHALOPATHY: Status: ACTIVE | Noted: 2023-01-01

## 2023-09-04 PROBLEM — I95.9 HYPOTENSION, UNSPECIFIED HYPOTENSION TYPE: Status: ACTIVE | Noted: 2023-01-01

## 2023-09-04 NOTE — ED TRIAGE NOTES
Pt here with caregiver with c/o confusion x 3 days, fatigue, low BP.      Triage Assessment       Row Name 09/04/23 9350       Triage Assessment (Adult)    Airway WDL WDL       Respiratory WDL    Respiratory WDL WDL       Skin Circulation/Temperature WDL    Skin Circulation/Temperature WDL WDL       Cardiac WDL    Cardiac WDL X       Peripheral/Neurovascular WDL    Peripheral Neurovascular WDL WDL       Cognitive/Neuro/Behavioral WDL    Cognitive/Neuro/Behavioral WDL WDL

## 2023-09-04 NOTE — MEDICATION SCRIBE - ADMISSION MEDICATION HISTORY
Medication Scribe Admission Medication History    Admission medication history is complete. The information provided in this note is only as accurate as the sources available at the time of the update.    Medication reconciliation/reorder completed by provider prior to medication history? No    Information Source(s): Family member via in-person    Pertinent Information: Patient's daughter's present and provide med list for patient. Stated patient only takes medications once daily in the am. Meds that were prescribed twice daily, he just takes once.    Changes made to PTA medication list:  Added: Acetaminophen 650 mg PRN  Deleted: Mirtazapine 7.5 mg  Changed: Eliquis 5 mg BID to Eliquis 5 mg every day, Metoprolol 12.5 mg BID to Metoprolol 25 mg every day, Entresto 24-26 mg 0.5 tablet BID to 1 tablet QD    Medication Affordability:  Not including over the counter (OTC) medications, was there a time in the past 3 months when you did not take your medications as prescribed because of cost?: No    Allergies reviewed with patient and updates made in EHR: yes    Medication History Completed By: Raina Stevens 9/4/2023 5:20 PM    Prior to Admission medications    Medication Sig Last Dose Taking? Auth Provider Long Term End Date   acetaminophen (TYLENOL) 650 MG CR tablet Take 650 mg by mouth every 4 hours as needed for mild pain or fever Unknown at prn Yes Reported, Patient     apixaban ANTICOAGULANT (ELIQUIS) 5 MG tablet Take 5 mg by mouth daily 9/4/2023 at am Yes Reported, Patient     azelastine-fluticasone (DYMISTA) 137-50 MCG/ACT nasal spray Spray 1 spray into both nostrils daily Unknown at prn Yes Reported, Patient     clopidogrel (PLAVIX) 75 MG tablet TAKE 1 TABLET BY MOUTH DAILY. FIRST LOADING DOSE IS 600MG(8 TABLETS) THEN ONCE DAILY 9/4/2023 at am Yes Zulma Lepe MD Yes    esomeprazole (NEXIUM) 20 MG DR capsule Take 20 mg by mouth every morning (before breakfast) Take 30-60 minutes before eating.  9/4/2023 at am Yes Reported, Patient     metoprolol tartrate (LOPRESSOR) 25 MG tablet Take 0.5 tablets (12.5 mg) by mouth 2 times daily  Patient taking differently: Take 25 mg by mouth daily 9/4/2023 at am Yes Debbie Ritchie APRN CNP Yes    nitroGLYcerin (NITROSTAT) 0.4 MG sublingual tablet Place 0.4 mg under the tongue every 5 minutes as needed for chest pain For chest pain place 1 tablet under the tongue every 5 minutes for 3 doses. If symptoms persist 5 minutes after 1st dose call 911. Unknown at on hand Yes Reported, Patient     oxyCODONE IR (ROXICODONE) 10 MG tablet Take 10 mg by mouth every 12 hours as needed for pain 9/4/2023 at am Yes Reported, Patient     rosuvastatin (CRESTOR) 40 MG tablet TAKE 1 TABLET(40 MG) BY MOUTH DAILY 9/4/2023 at am Yes Elian Shafer P, DO Yes    sacubitril-valsartan (ENTRESTO) 24-26 MG per tablet Take 1 tablet by mouth daily 9/4/2023 at am Yes Reported, Patient     vitamin C (ASCORBIC ACID) 500 MG tablet Take 500 mg by mouth daily 9/4/2023 at am Yes Reported, Patient     Vitamin D3 (CHOLECALCIFEROL) 25 mcg (1000 units) tablet Take 1 tablet by mouth daily 9/4/2023 at am Yes Reported, Patient

## 2023-09-04 NOTE — ED PROVIDER NOTES
"  History     Chief Complaint   Patient presents with    Altered Mental Status    Fatigue     HPI  Dangelo Hernández is a 88 year old male who comes in from home with his daughter is with confusion.  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  His daughters have been noticing that he has been weaker and more confused for 3 or 4 days and so they have been staying with him.  This persisted and so they brought him in today for evaluation.  He has not noticed and they have not noticed any fever, vomiting, complaints of chest or abdominal pain, voiding difficulty, or bowel symptoms.  He denies any headache.  He has complained of them today on the way down here of pain in the neck which he feels the base of the occiput.  He recalls no specific injury.  He does have chronic pain especially in his right knee and also has shoulder pains for which she is on chronic narcotic therapy and takes 10 mg of oxycodone at a time for pain management.  He denies that he has had recent gait weight gain or weight loss.  He has not had any change in his chronic peripheral edema.  He has not had any recent medication changes.  After his hospitalization he was in transitional care for a period of time and then returned home where he lives alone in his own home.    Echo from June 16, 2023 showed:  \"Final Impressions:    1. Normal LV size, mildly increased wall thickness, low normal global systolic function with an estimated EF of 50 - 55%.    2. Increased left atrial pressure.    3. Mildly enlarged left atrium.    4. Right ventricular cavity size is normal, global systolic RV function is normal.    5. The aortic valve is unknown size TAVR (appearance c/w Lenka valve), no stenosis and " "no valvular regurgitation. There is mild paravalvular regurgitation. The aortic valve peak velocity is 1.8 m/s, the peak gradient is 13 mmHg, and the mean gradient is 8 mmHg. The aortic valve area is 1.91 cmÂ  with a dimensionless index of 0.55. The stroke volume index is 35.3 ml/mÂ .    6. The mitral valve is sclerotic, trace mitral regurgitation. Mean gradient 3.6 mmHg at HR 86 bpm.    7. Severe MAC present.    8. The inferior vena cava is normal sized, respiratory size variation greater than 50%.    9. Echo contrast was administrered to enhance visualization of all left ventricular segments.\"    Discharge summary from his hospitalization in [place]:  \"HOSPITALIST DISCHARGE SUMMARY  [facility]     Admission Date: 6/14/2023  Discharge Date: 6/21/2023    Discharge Plan: Dangelo Hernádnez was discharged to transitional care unit.    Principal Diagnosis     Pneumonia    Hospital Problem List   Principal Problem:  Bilateral pneumonia  Active Problems:  CAD (coronary artery disease)  Combined hyperlipidemia  Hypertension goal BP (blood pressure) < 140/90  S/P TAVR (transcatheter aortic valve replacement)  BPH (benign prostatic hyperplasia)  Chronic back pain  Mantle cell lymphoma (HC)  Septic shock (HC)    ADDITIONAL COMMENTS REGARDING DIAGNOSIS SPECIFICITY  Additional Diagnosis Information           Hospital Course     Mr. Dangelo Hernández is a 88 y.o. male with a history of CAD, HTN, AS s/p TAVR, mantle cell lymphoma s/p autologous transplant 2010, GERD, and PVD who presented with confusion and shortness of breath. He was found sitting on the ground by paramedics in his very warm home where AC was not working properly. Found with confusion, slurred speech, palpably hot to the touch, with wet sounding cough and recent emesis on his shirt. In the ED, found to be in septic shock with pneumonia (possibly due to aspiration) and requiring pressors. Tmax 102.8. Also found to have an JESSICA and anemia. Treated with 3.8L " "IVF, vancomycin, cefepime, flagyl, and norepi. Weaned off norepi 6/15 AM. Slowly restarted HFrEF medications, limited by hypotension. He developed increased dyspnea and volume overload and was diuresed.    He was given metoprolol succinate 12.5mg in the morning 6/19 and was more hypotensive, BP 90s/50s. He had a 500 mL NICOM fluid bolus and was not fluid responsive. CXR was similar to prior. Continued to have morning hypotension on 6/20 however he had no symptoms with this and reported low blood pressures at home as well. With pressures in the 70s and 80s systolic the decision was made to discontinue his Flomax prior to discharge.    By the day of discharge the patient was feeling much better. He had been afebrile several days. All cultures were negative. He was still requiring oxygen intermittently at up to 2 L/min. He was quite weak and felt that he would benefit from discharge to a transitional care unit for ongoing physical and Occupational Therapy. He had a history of \"allergy\" to amoxicillin causing diarrhea but he tolerated Augmentin here and was discharged on 3 additional days of Augmentin to complete treatment of his presumed aspiration pneumonia.      Recommendations for Outpatient Provider  PCP: Elian Shafer DO, DO     Admission: 6/14/2023 @ [facility]     Specific recommendations to be addressed at the follow up visit:   1) is patient recovering from his pneumonia? Is he still requiring supplemental oxygen?  2) is patient still having hypotension in the morning? Can he get by okay without the Flomax?     Medication changes: Added Augmentin at discharge and discontinued Flomax.\"    Allergies:  Allergies   Allergen Reactions    Amoxicillin Diarrhea           Lisinopril Cough       Problem List:    Patient Active Problem List    Diagnosis Date Noted    Acute renal failure with other specified pathological lesion in kidney (H) 11/27/2011     Priority: High     Problem list name updated by " automated process. Provider to review      Hypertension goal BP (blood pressure) < 140/90 12/09/2010     Priority: High    Encephalopathy 09/04/2023     Priority: Medium    Hypotension, unspecified hypotension type 09/04/2023     Priority: Medium    Mantle cell lymphoma (H) 06/15/2023     Priority: Medium    Bilateral pneumonia 06/15/2023     Priority: Medium    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     Priority: Medium    Septic shock (H) 06/15/2023     Priority: Medium    Unspecified abnormalities of gait and mobility 12/07/2022     Priority: Medium    Presence of unspecified artificial knee joint 12/07/2022     Priority: Medium    Other lack of coordination 12/07/2022     Priority: Medium    Long term (current) use of aspirin 12/07/2022     Priority: Medium    Old myocardial infarction 12/07/2022     Priority: Medium    Fall on same level, unspecified, subsequent encounter 12/07/2022     Priority: Medium    COVID-19 12/07/2022     Priority: Medium    Stem cells transplant status (H) 06/03/2022     Priority: Medium    Chronic bronchitis, unspecified chronic bronchitis type (H) 06/03/2022     Priority: Medium    Benign prostatic hyperplasia with lower urinary tract symptoms 01/27/2022     Priority: Medium    Difficulty in walking, not elsewhere classified 01/27/2022     Priority: Medium    Endocarditis, valve unspecified 01/27/2022     Priority: Medium    Hypertensive heart disease without heart failure 01/27/2022     Priority: Medium    Muscle weakness (generalized) 01/27/2022     Priority: Medium    Pneumonia due to coronavirus disease 2019 01/27/2022     Priority: Medium    Presence of coronary angioplasty implant and graft 01/27/2022     Priority: Medium    Peripheral vascular disease, unspecified (H) 09/22/2021     Priority: Medium    Stenosis of carotid artery 07/15/2021     Priority: Medium    Spinal stenosis of lumbar region without neurogenic claudication 05/10/2021     Priority:  "Medium    Arthritis of both hands 05/10/2021     Priority: Medium    Arthritis of knee 05/10/2021     Priority: Medium    Chronic midline thoracic back pain 05/10/2021     Priority: Medium    ACS (acute coronary syndrome) (H) 10/15/2019     Priority: Medium    Advanced directives, counseling/discussion 08/12/2015     Priority: Medium     Patient does not have an Advance/Health Care Directive (HCD), given \"What is Advance Care Planning?\" flyer.    Yumiko Segura  August 12, 2015        Cryptogenic organizing pneumonia (H) 12/07/2012     Priority: Medium    GERD (gastroesophageal reflux disease) 05/02/2012     Priority: Medium    Pneumonia, candidial (H) 09/09/2011     Priority: Medium    Pneumonia in mycoses 09/09/2011     Priority: Medium    History of blood disorder 04/06/2011     Priority: Medium    Encounter for long-term current use of medication 03/16/2011     Priority: Medium     Problem list name updated by automated process. 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          Past Medical History:    Past Medical History:   Diagnosis Date     Coronary artery disease, 4/ 2010, status post drug-eluting stent placement to the LAD and balloon angioplasty to the obtuse marginal.  12/8/2010    Acute kidney failure NEC 11/27/2011    Arthritis     Basal cell carcinoma     Blood transfusion     Colon polyps     Combined hyperlipidemia 10/31/2010    Coronary artery disease     Cryptogenic organizing pneumonia (H) 12/7/2012    GERD (gastroesophageal reflux disease)     History of blood " disorder 2011    Hypertension     Hypertension goal BP (blood pressure) < 140/90 2010    Malignant neoplasm (H)     Mantle Cell Lymphoma S/P Autologous Transplant 2010       Past Surgical History:    Past Surgical History:   Procedure Laterality Date    BRONCHOSCOPY (RIGID OR FLEXIBLE), DIAGNOSTIC  2011    Procedure:COMBINED BRONCHOSCOPY (RIGID OR FLEXIBLE), LAVAGE; Surgeon:REYNA BAILEY; Location: GI    BRONCHOSCOPY (RIGID OR FLEXIBLE), DIAGNOSTIC N/A 2019    Procedure: BRONCHOSCOPY, WITH BRONCHOALVEOLAR LAVAGE;  Surgeon: Perlman, David Morris, MD;  Location:  GI    COLONOSCOPY      CV HEART CATHETERIZATION WITH POSSIBLE INTERVENTION N/A 10/16/2019    Procedure: Heart Catheterization with Possible Intervention;  Surgeon: Robinson Robbins MD;  Location:  HEART CARDIAC CATH LAB    CV HEART CATHETERIZATION WITH POSSIBLE INTERVENTION N/A 2019    Procedure: Heart Catheterization with Possible Intervention;  Surgeon: Hannah Ashraf MD;  Location:  HEART CARDIAC CATH LAB    ENT SURGERY      tonsils    ORTHOPEDIC SURGERY      rt knee arthroscopy    SURGICAL HISTORY OF -       tonsils    SURGICAL HISTORY OF -   86    1.Exam under anesthesia, 2.Arthroscopy, 3.Arthroscopic medial meniscectomy, 4.arthroscopic trimming of patellar articular cartilage.    SURGICAL HISTORY OF -       vasectomy    SURGICAL HISTORY OF -   90    colonoscopy    SURGICAL HISTORY OF -   10/20/92    flexible sigmoidoscopy    SURGICAL HISTORY OF -   2000    colonoscopy and polypectomy    THORACOSCOPIC BIOPSY LUNG  2011    Procedure:THORACOSCOPIC BIOPSY LUNG; Video Assisted Right Thoracoscopy with Biopsy; Surgeon:JIM EPSTEIN; Location: OR       Family History:    Family History   Problem Relation Age of Onset    Cancer Mother         Lung--did not smoke    Cardiovascular Father         MI age 73    Lipids Sister     Hypertension Sister     Cancer Sister         Ovarian-  at age 65       Social History:  Marital Status:   [5]  Social History     Tobacco Use    Smoking status: Former     Packs/day: 0.50     Years: 19.00     Pack years: 9.50     Types: Cigarettes     Start date:      Quit date: 1975     Years since quittin.7    Smokeless tobacco: Former     Types: Snuff    Tobacco comments:     started smoking at 21 years of age   Vaping Use    Vaping Use: Never used   Substance Use Topics    Alcohol use: Yes     Alcohol/week: 0.0 standard drinks of alcohol     Comment: occ.     Drug use: No        Medications:    acetaminophen (TYLENOL) 650 MG CR tablet  apixaban ANTICOAGULANT (ELIQUIS) 5 MG tablet  azelastine-fluticasone (DYMISTA) 137-50 MCG/ACT nasal spray  clopidogrel (PLAVIX) 75 MG tablet  esomeprazole (NEXIUM) 20 MG DR capsule  levothyroxine (SYNTHROID/LEVOTHROID) 25 MCG tablet  metoprolol tartrate (LOPRESSOR) 25 MG tablet  nitroGLYcerin (NITROSTAT) 0.4 MG sublingual tablet  rosuvastatin (CRESTOR) 40 MG tablet  sacubitril-valsartan (ENTRESTO) 24-26 MG per tablet  vitamin C (ASCORBIC ACID) 500 MG tablet  Vitamin D3 (CHOLECALCIFEROL) 25 mcg (1000 units) tablet      Review of Systems    All other systems are reviewed and are negative    Physical Exam   BP: (!) 81/47  Pulse: 78  Temp: 97.8  F (36.6  C)  SpO2: 94 %      Physical Exam    Nursing note and vitals were reviewed.  Constitutional: Awake and alert, chronically ill, overweight appearing 88-year-old in moderate discomfort, who does not appear acutely ill, and who answers questions appropriately and cooperates with examination.  HEENT: Voice quality is normal.  Atraumatic and normocephalic.  PERRL.  EOMI. his membranes are moist.  Oropharynx shows no erythema.  Neck: Freely mobile.  No pain with range of motion of the neck.  There is no meningismus.  There is no adenopathy or masses.  Cardiovascular: Cardiac examination reveals normal heart rate and regular rhythm without murmur.  Pulmonary/Chest: Breathing  is unlabored.  Breath sounds are clear and equal bilaterally.  There no retractions, tachypnea, rales, wheezes, or rhonchi.  Abdomen: Soft, nontender, no HSM or masses rebound or guarding.  Musculoskeletal: Extremities are warm and well-perfused and with plus pretibial pitting edema  Neurological: Alert, oriented, but he exhibits psychomotor slowing.  He says he feels confused.  His speech is fluent.  He does not have any cranial nerve deficits and his motor strength is intact in the upper and lower extremities on manual muscle testing.  Skin: Warm, dry, no rashes.  Psychiatric: Affect today and restricted..    ED Course                 Procedures              EKG Interpretation:      Interpreted by Reyes Krishnan MD  Time reviewed: 15:15  Symptoms at time of EKG: none   Rhythm: normal sinus   Rate: normal  Axis: Left axis deviation  Ectopy: none  Conduction: normal  ST Segments/ T Waves: ST segment elevation less than 1 mm in leads II, III and aVF   Q Waves: none  Comparison to prior: Unchanged from 10/4/2021    Clinical Impression: Sinus rhythm with left axis deviation unchanged from previous EKG and without signs of acute ischemia       Critical Care time:  none               Results for orders placed or performed during the hospital encounter of 09/04/23 (from the past 24 hour(s))   CBC with platelets differential    Narrative    The following orders were created for panel order CBC with platelets differential.  Procedure                               Abnormality         Status                     ---------                               -----------         ------                     CBC with platelets and d...[[id number]]  Abnormal            Final result                 Please view results for these tests on the individual orders.   Comprehensive metabolic panel   Result Value Ref Range    Sodium 140 136 - 145 mmol/L    Potassium 4.0 3.4 - 5.3 mmol/L    Chloride 103 98 - 107 mmol/L    Carbon Dioxide (CO2) 24 22  - 29 mmol/L    Anion Gap 13 7 - 15 mmol/L    Urea Nitrogen 20.6 8.0 - 23.0 mg/dL    Creatinine 1.52 (H) 0.67 - 1.17 mg/dL    Calcium 9.1 8.8 - 10.2 mg/dL    Glucose 118 (H) 70 - 99 mg/dL    Alkaline Phosphatase 46 40 - 129 U/L    AST 24 0 - 45 U/L    ALT 15 0 - 70 U/L    Protein Total 5.8 (L) 6.4 - 8.3 g/dL    Albumin 3.7 3.5 - 5.2 g/dL    Bilirubin Total 0.3 <=1.2 mg/dL    GFR Estimate 44 (L) >60 mL/min/1.73m2   Blood gas venous   Result Value Ref Range    pH Venous 7.38 7.32 - 7.43    pCO2 Venous 44 40 - 50 mm Hg    pO2 Venous 31 25 - 47 mm Hg    Bicarbonate Venous 26 21 - 28 mmol/L    Base Excess/Deficit 0.7 -7.7 - 1.9 mmol/L    FIO2 0    Lactic acid whole blood   Result Value Ref Range    Lactic Acid 1.9 0.7 - 2.0 mmol/L   Nt probnp inpatient (BNP)   Result Value Ref Range    N terminal Pro BNP Inpatient 739 0 - 1,800 pg/mL   CBC with platelets and differential   Result Value Ref Range    WBC Count 6.3 4.0 - 11.0 10e3/uL    RBC Count 3.61 (L) 4.40 - 5.90 10e6/uL    Hemoglobin 10.9 (L) 13.3 - 17.7 g/dL    Hematocrit 32.8 (L) 40.0 - 53.0 %    MCV 91 78 - 100 fL    MCH 30.2 26.5 - 33.0 pg    MCHC 33.2 31.5 - 36.5 g/dL    RDW 15.2 (H) 10.0 - 15.0 %    Platelet Count 215 150 - 450 10e3/uL    % Neutrophils 63 %    % Lymphocytes 20 %    % Monocytes 13 %    % Eosinophils 2 %    % Basophils 1 %    % Immature Granulocytes 1 %    NRBCs per 100 WBC 0 <1 /100    Absolute Neutrophils 4.1 1.6 - 8.3 10e3/uL    Absolute Lymphocytes 1.2 0.8 - 5.3 10e3/uL    Absolute Monocytes 0.8 0.0 - 1.3 10e3/uL    Absolute Eosinophils 0.1 0.0 - 0.7 10e3/uL    Absolute Basophils 0.0 0.0 - 0.2 10e3/uL    Absolute Immature Granulocytes 0.0 <=0.4 10e3/uL    Absolute NRBCs 0.0 10e3/uL   TSH with free T4 reflex   Result Value Ref Range    TSH 6.07 (H) 0.30 - 4.20 uIU/mL   T4 free   Result Value Ref Range    Free T4 1.37 0.90 - 1.70 ng/dL   Symptomatic Influenza A/B, RSV, & SARS-CoV2 PCR (COVID-19) Nose    Specimen: Nose; Swab   Result Value Ref  Range    Influenza A PCR Negative Negative    Influenza B PCR Negative Negative    RSV PCR Negative Negative    SARS CoV2 PCR Negative Negative    Narrative    Testing was performed using the Xpert Xpress CoV2/Flu/RSV Assay on the [employer] GeneXpert Instrument. This test should be ordered for the detection of SARS-CoV-2, influenza, and RSV viruses in individuals who meet clinical and/or epidemiological criteria. Test performance is unknown in asymptomatic patients. This test is for in vitro diagnostic use under the FDA EUA for laboratories certified under CLIA to perform high or moderate complexity testing. This test has not been FDA cleared or approved. A negative result does not rule out the presence of PCR inhibitors in the specimen or target RNA in concentration below the limit of detection for the assay. If only one viral target is positive but coinfection with multiple targets is suspected, the sample should be re-tested with another FDA cleared, approved, or authorized test, if coinfection would change clinical management. This test was validated by the [facility]. These laboratories are certified under the Clinical Laboratory Improvement Amendments of 1988 (CLIA-88) as qualified to perform high complexity laboratory testing.   Urine Macroscopic with reflex to Microscopic   Result Value Ref Range    Color Urine Yellow Colorless, Straw, Light Yellow, Yellow    Appearance Urine Slightly Cloudy (A) Clear    Glucose Urine Negative Negative mg/dL    Bilirubin Urine Negative Negative    Ketones Urine Negative Negative mg/dL    Specific Gravity Urine 1.011 1.003 - 1.035    Blood Urine Negative Negative    pH Urine 6.0 5.0 - 7.0    Protein Albumin Urine 30 (A) Negative mg/dL    Urobilinogen Urine Normal Normal, 2.0 mg/dL    Nitrite Urine Negative Negative    Leukocyte Esterase Urine Negative Negative    RBC Urine 1 <=2 /HPF    WBC Urine 5 <=5 /HPF    Squamous Epithelials Urine 1 <=1 /HPF    Mucus  Urine Present (A) None Seen /LPF    Hyaline Casts Urine 10 (H) <=2 /LPF   CT Chest/Abdomen/Pelvis w Contrast    Narrative    EXAM: CT CHEST/ABDOMEN/PELVIS W CONTRAST  LOCATION: [facility]  DATE: 9/4/2023    INDICATION: sepsis  COMPARISON: CT of the chest without contrast 11/14/2019  TECHNIQUE: CT scan of the chest, abdomen, and pelvis was performed following injection of IV contrast. Multiplanar reformats were obtained. Dose reduction techniques were used.   CONTRAST: 74mL Isovue 370    FINDINGS:   LUNGS AND PLEURA: Upper lung predominant emphysema, cylindrical and varicoid bronchiectasis of subsegmental airways on the right and heterogeneous attenuation of the upper lobe lung parenchyma. Peripheral emphysema, bronchiolectasis and groundglass   opacities in the bases, also unchanged. No new lung consolidation or volume loss. Status post wedge resection of the basilar right lower lobe with wedge resection staple line present. No pleural effusion or pleural thickening.    MEDIASTINUM: In size. Mitral annular calcifications. No pericardial effusion. Catheter deployed valvular prosthesis in aortic position. Main pulmonary artery is normal caliber. No pulmonary artery filling defects. Moderate atheromatous calcification   throughout the proximal great vessels, aortic arch and descending thoracic aorta. No aortic aneurysm. No enlarged hilar or mediastinal lymph nodes. Esophagus is decompressed.    CORONARY ARTERY CALCIFICATION: Previous intervention (stents or CABG).    HEPATOBILIARY: Normal.    PANCREAS: Normal.    SPLEEN: Normal.    ADRENAL GLANDS: Normal.    KIDNEYS/BLADDER: Kidneys are normal in size with symmetric enhancement and normal cortex thickness. No nephrolithiasis or renal mass. No hydronephrosis or hydroureter. Nondistended urinary bladder. There is a small right posterior bladder diverticulum   measuring 18 mm.    BOWEL: Normal.    LYMPH NODES: Normal.    VASCULATURE: Diffuse  calcified atheroma throughout the abdominal aorta and iliac arteries. No abdominal aortic aneurysm. Mesenteric arteries are patent.    PELVIC ORGANS: Normal.    MUSCULOSKELETAL: There is an old upper sternal fracture deformity which has solid osseous union. Diffuse bone demineralization. Multilevel disc space narrowing and osteophytosis. A metallic device resides between the L4 and L5 spinous processes.   Symmetric osteophytes at the anterior SI joints.      Impression    IMPRESSION:    1.  Unchanged heterogeneous opacities in both lungs greatest in the upper lobes associated with peripheral traction bronchiectasis/bronchiolectasis and peripheral fibrosis in the bases. No superimposed acute lung, airway, or pleural abnormalities.  2.  No acute inflammatory process in the abdomen or pelvis.       Medications   lactated ringers BOLUS 1,000 mL (0 mLs Intravenous Stopped 9/4/23 1908)   oxyCODONE (ROXICODONE) tablet 10 mg (10 mg Oral $Given 9/4/23 1534)   iopamidol (ISOVUE-370) solution 74 mL (74 mLs Intravenous $Given 9/4/23 1721)   sodium chloride 0.9 % bag 500 mL for CT scan flush use (58 mLs Intravenous $Given 9/4/23 1721)     18:25: Patient reassessed.  He still complains of being confused.  His blood pressure has normalized.  He remains afebrile.  His vital signs are normal.  Laboratory studies were reviewed and are all nondiagnostic.  CT results are reviewed and are nondiagnostic.  He continues to complain of chronic neck pain but its not a new symptom and he has no headache, no fever, no white blood cell count elevation and thus I have a low suspicion that he could have meningitis as a cause for his neck pain.  We will do a head CT but I expect the yield in this to be quite low.  Daughters report he has not been taking furosemide at home which is on his med list.  Assessments & Plan (with Medical Decision Making)     88-year-old male comes in from home with his daughters because of confusion and low blood  pressure.  He did complain of his head not working properly but not have actual ache or pain.  He had some chronic neck pain and chronic right knee pain but otherwise no new pain complaints.  His blood pressure was in the 80s over 40s on arrival but he responded to 1 L of Ringer's lactate with normalization of his blood pressure and remained normotensive for the remainder of his time in the emergency department.  He underwent an extensive work-up for cause of hypotension including looking for evidence of sepsis.  He had no fever.  He had a CT scan of the chest abdomen pelvis with nose signs of an infectious process.  His urine analysis was normal.  His laboratory studies were all reassuring or normal including venous blood gas, blood chemistries, CBC, T4, lactate, and BNP.  The cause of his hypotension and delirium is uncertain.  The delirium certainly could be due to the hypotension but he does not really show evidence of an infectious cause for low blood pressure nor of cardiac dysfunction.  It is unclear how compliant he is with taking his medication and with his oral intake and this could be dehydration just from poor oral intake.  Be admitted for observation continued monitoring for a cause for his confusion and to see if his hypotension recurs.  We will not initiate antibiotic therapy without good evidence for an infection with resolution of his hypotension with fluids.  I discussed his case with My Siddiqi MD, of the hospital service and they will assume care on admission.    I have reviewed the nursing notes.    I have reviewed the findings, diagnosis, plan and need for follow up with the patient.         New Prescriptions    No medications on file       Final diagnoses:   Encephalopathy   Hypotension, unspecified hypotension type       9/4/2023   [facility] EMERGENCY DEPT       Reyes Krishnan MD  09/04/23 3999

## 2023-09-05 PROBLEM — G93.41 ACUTE METABOLIC ENCEPHALOPATHY: Status: ACTIVE | Noted: 2023-01-01

## 2023-09-05 PROBLEM — Z11.52 ENCOUNTER FOR SCREENING LABORATORY TESTING FOR SEVERE ACUTE RESPIRATORY SYNDROME CORONAVIRUS 2 (SARS-COV-2): Status: ACTIVE | Noted: 2023-01-01

## 2023-09-05 PROBLEM — Z95.5 PRESENCE OF CORONARY ANGIOPLASTY IMPLANT AND GRAFT: Chronic | Status: ACTIVE | Noted: 2022-01-27

## 2023-09-05 PROBLEM — N17.9 AKI (ACUTE KIDNEY INJURY) (H): Status: ACTIVE | Noted: 2023-01-01

## 2023-09-05 PROBLEM — G93.40 ENCEPHALOPATHY: Status: ACTIVE | Noted: 2023-01-01

## 2023-09-05 NOTE — PROGRESS NOTES
09/05/23 1230   Appointment Info   Signing Clinician's Name / Credentials (PT) Abida Hermosillo, PT   Living Environment   People in Home alone   Current Living Arrangements house   Home Accessibility stairs to enter home   Number of Stairs, Main Entrance 4   Stair Railings, Main Entrance railings safe and in good condition   Living Environment Comments has basement but does not need to access. has 2 daughters in the area to assist with IADL's.   Self-Care   Equipment Currently Used at Home walker, standard   Fall history within last six months no   Activity/Exercise/Self-Care Comment indep with mobility, typically without device but has walker. has 2 hours of PCA assist 3x/week for cleaning, bathing, and grocery shopping.   General Information   Onset of Illness/Injury or Date of Surgery 09/04/23   Referring Physician My Siddiqi MD   Patient/Family Therapy Goals Statement (PT) none stated   Pertinent History of Current Problem (include personal factors and/or comorbidities that impact the POC) Mr. Dangelo Hernández is a 88 y.o. male with a history of CAD, HTN, AS s/p TAVR, mantle cell lymphoma s/p autologous transplant 2010, GERD, and PVD who presented with confusion and weakness.   Pain Assessment   Patient Currently in Pain No   Range of Motion (ROM)   Range of Motion ROM is WFL   Strength (Manual Muscle Testing)   Strength (Manual Muscle Testing) Deficits observed during functional mobility   Strength Comments general LE weakness from reduced mobility   Bed Mobility   Comment, (Bed Mobility) supine>sit with Edmond   Transfers   Comment, (Transfers) sit>stand from EOB with CGA and 2WW, needs increased time to complete   Gait/Stairs (Locomotion)   Comment, (Gait/Stairs) amb x5 feet bed>chair with CGA and 2WW, slower pace   Balance   Balance Comments needs walker for mobility   Clinical Impression   Criteria for Skilled Therapeutic Intervention Yes, treatment indicated   PT Diagnosis (PT) impaired functional mobility    Influenced by the following impairments LE weakness, impaired balance, reduced activity tolerance   Functional limitations due to impairments impaired bed mobility, transfers, gait, stairs   Clinical Presentation (PT Evaluation Complexity) Evolving/Changing   Clinical Presentation Rationale clinical reasoning   Clinical Decision Making (Complexity) low complexity   Planned Therapy Interventions (PT) bed mobility training;home exercise program;gait training;patient/family education;stair training;strengthening;transfer training   Anticipated Equipment Needs at Discharge (PT)   (TBD pending progress, has walker)   Risk & Benefits of therapy have been explained evaluation/treatment results reviewed;care plan/treatment goals reviewed;risks/benefits reviewed;current/potential barriers reviewed;participants voiced agreement with care plan;participants included;patient   PT Total Evaluation Time   PT Eval, Low Complexity Minutes (39043) 15   Physical Therapy Goals   PT Frequency 5x/week   PT Predicted Duration/Target Date for Goal Attainment 09/12/23   PT Goals Bed Mobility;Transfers;Gait;Stairs   PT: Bed Mobility Supervision/stand-by assist;Supine to/from sit   PT: Transfers Supervision/stand-by assist;Bed to/from chair;Assistive device   PT: Gait Supervision/stand-by assist;Standard walker;50 feet   PT: Stairs Supervision/stand-by assist;4 stairs;Rail on both sides   PT Discharge Planning   PT Plan Tues 1/5; amb with 2WW, LE [place], stairs if returning home   PT Discharge Recommendation (DC Rec) Transitional Care Facility   PT Rationale for DC Rec rec TCU today based on assist levels and reduced mobility, will update mobility rec if more mobility achieved   PT Brief overview of current status pivot to chair with CGA/Edmond and 2WW   Total Session Time   Total Session Time (sum of timed and untimed services) 15

## 2023-09-05 NOTE — UTILIZATION REVIEW
" Admission Status; Secondary Review Determination         Under the authority of the Utilization Management Committee, the utilization review process indicated a secondary review on the above patient.  The review outcome is based on review of the medical records, discussions with staff, and applying clinical experience noted on the date of the review.          (x) Observation Status Appropriate - This patient does not meet hospital inpatient criteria and is placed in observation status. If this patient's primary payer is Medicare and was admitted as an inpatient, Condition Code 44 should be used and patient status changed to \"observation\".     RATIONALE FOR DETERMINATION     Dangelo Hernández is an 88 y.o. male with history of CAD, HTN, AS s/p TAVR, mantle cell lymphoma s/p autologous transplant 2010, GERD, and PVD. He presented to the ED on 9/4/23 with confusion and weakness.  His daughters noted that he has been weaker and more confused for 3 or 4 days so they had been staying with him. They brought him in for evaluation. He denied fever, vomiting, chest pain, abdominal pain, voiding difficulty, bowel symptoms, and headache. He reported some neck pain at occiput. He had no specific injury. He does have chronic pain especially in his right knee and shoulder pains for which he takes 10 mg of oxycodone prn. He denied any recent medication changes.  He had recent hospitalization, after which he was admitted to transitional care for a period of time before returning home where he lives alone. His daughters suspect he had missed some medication recently.  Emergency department evaluation showed low blood pressure (80s over 40s), creatinine 1.52, TSH 6.07 with normal T4 of 1.37, unremarkable venous blood gas, hemoglobin 10.9, unremarkable UA, and negative testing for COVID/influenza/RSV. CTs of head and chest/abdomen/pelvis were unremarkable. Blood pressure improved with IV fluid. He was admitted with dehydration, " hypotension, and acute kidney injury. JESSICA improved after IV fluid hydration. Vital signs have been stable. Patient has been seen by PT and OT and TCU has been recommended. Observation care is appropriate for treatment of hypotension and JESSICA due to dehydration.     The severity of illness, intensity of service provided, expected LOS and risk for adverse outcome make the care appropriate for further observation; however, doesn't meet criteria for hospital inpatient admission. Dr Barrington Mora was notified of this determination.    This document was produced using voice recognition software.      The information on this document is developed by the utilization review team in order for the business office to ensure compliance.  This only denotes the appropriateness of proper admission status and does not reflect the quality of care rendered.         The definitions of Inpatient Status and Observation Status used in making the determination above are those provided in the CMS Coverage Manual, Chapter 1 and Chapter 6, section 70.4.      Sincerely,    Jeffrey Jerez MD    Utilization Review  Physician Advisor  [facility].

## 2023-09-05 NOTE — PROGRESS NOTES
Pt is alert, with intermittent confusion. Assist 1 with transfers. On RA. Denies nausea, SOB. Continent, using bedside urinal. Tolerating regular diet. LS clear. IV infusing NS at 75 ml/hr.     BP (!) 143/49 (BP Location: Left arm, Patient Position: Supine, Cuff Size: Adult Regular)   Pulse 91   Temp 98.8  F (37.1  C) (Oral)   Resp 16   SpO2 93%

## 2023-09-05 NOTE — PROGRESS NOTES
Pt is A/O, able to make needs known.   Blood pressure 94/42, pulse 84, temperature 98.2  F (36.8  C), resp. rate 18, SpO2 93 %.  Blood pressures remain low.   Continue to administer IV fluids.   Assist of one with walker, gait belt for transfers.   Up in chair for meals.   Continue to assess per plan of care and update care team as needed.
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
## 2023-09-05 NOTE — PROGRESS NOTES
[facility]  OUTPATIENT OCCUPATIONAL THERAPY  EVALUATION  PLAN OF TREATMENT FOR OUTPATIENT REHABILITATION  (COMPLETE FOR INITIAL CLAIMS ONLY)  Patient's Last Name, First Name, M.I.  YOB: 1935  HernándezDangelo                          Provider's Name  [facility] Medical Record No.  [id number]                             Onset Date:  09/04/23   Start of Care Date:  09/05/23   Type:     ___PT   _X_OT   ___SLP Medical Diagnosis:  weakness                    OT Diagnosis:  weakness Visits from SOC:  1     See note for plan of treatment, functional goals and certification details    I CERTIFY THE NEED FOR THESE SERVICES FURNISHED UNDER        THIS PLAN OF TREATMENT AND WHILE UNDER MY CARE     (Physician co-signature of this document indicates review and certification of the therapy plan).                               09/05/23 0800   Appointment Info   Signing Clinician's Name / Credentials (OT) Cyndi Young OTR/L   Quick Adds   Quick Adds Certification   Living Environment   People in Home alone   Current Living Arrangements house   Home Accessibility stairs to enter home   Number of Stairs, Main Entrance 4   Stair Railings, Main Entrance railings safe and in good condition   Transportation Anticipated family or friend will provide   Living Environment Comments Pt states has 2 daughters in area who assist with meds, ect. Has not been in basement for years.   Self-Care   Fall history within last six months no  (Pt did not recall any falls recently)   Activity/Exercise/Self-Care Comment Pt has 2 hours of PCA assist 3 x week for 2 hours for cleaning, bathing , grocery shopping   Instrumental Activities of Daily Living (IADL)   Previous Responsibilities meal prep   IADL Comments daughter helps with med set up and PCA reminds pt to take, PCA helps with cleaning   General Information   Onset of Illness/Injury or Date of Surgery 09/04/23    Referring Physician Dr Siddiqi   Patient/Family Therapy Goal Statement (OT) to get stronger   Additional Occupational Profile Info/Pertinent History of Current Problem Mr. Dangelo eHrnández is a 88 y.o. male with a history of CAD, HTN, AS s/p TAVR, mantle cell lymphoma s/p autologous transplant 2010, GERD, and PVD who presented with confusion and weakness.   General Observations and Info Pt c/o weakness t/o session   Cognitive Status Examination   Orientation Status person;place   Affect/Mental Status (Cognitive) confused   Follows Commands follows one-step commands;75-90% accuracy   Cognitive Status Comments some confusion at times recalling PLOF   Pain Assessment   Patient Currently in Pain Yes, see Vital Sign flowsheet   Posture   Posture forward head position;kyphosis   Range of Motion Comprehensive   Comment, General Range of Motion limited L sh to mid range from old injury of rotator tear. R UE WFL t/o   Strength Comprehensive (MMT)   Comment, General Manual Muscle Testing (MMT) Assessment generalized 4-/5 R UE( dominant)   Coordination   Coordination Comments slowed movements t/o   Transfers   Transfers sit-stand transfer;toilet transfer   Sit-Stand Transfer   Sit-Stand [place] (Transfers) minimum assist (75% patient effort)   Toilet Transfer   Type (Toilet Transfer) stand pivot/stand step   [place] Level (Toilet Transfer) minimum assist (75% patient effort)   Activities of Daily Living   BADL Assessment/Intervention grooming;toileting   Grooming Assessment/Training   [place] Level (Grooming) supervision;verbal cues;contact guard assist   Comment, (Grooming) slightly unsteady at sink for 2 min stand to wash hands with assist to find towels   Toileting   Comment, (Toileting) slightly unsteady on feet leaning back with cues   [place] Level (Toileting) verbal cues;contact guard assist   Clinical Impression   Criteria for Skilled Therapeutic Interventions Met (OT) Yes, treatment indicated   OT  Diagnosis weakness   Influenced by the following impairments some confusion   OT Problem List-Impairments impacting ADL problems related to;activity tolerance impaired;balance;cognition;mobility;strength;pain   Assessment of Occupational Performance 1-3 Performance Deficits   Planned Therapy Interventions (OT) ADL retraining;cognition;strengthening;transfer training;progressive activity/exercise   Clinical Decision Making Complexity (OT) low complexity   Risk & Benefits of therapy have been explained evaluation/treatment results reviewed;care plan/treatment goals reviewed;current/potential barriers reviewed;risks/benefits reviewed;participants voiced agreement with care plan;participants included;patient   OT Total Evaluation Time   OT Eval, Low Complexity Minutes (42371) 10   Therapy Certification   Medical Diagnosis weakness   Start of Care Date 09/05/23   Certification date from 09/05/23   Certification date to 09/12/23   OT Goals   Therapy Frequency (OT) 5 times/wk   OT Predicted Duration/Target Date for Goal Attainment 09/12/23   OT Goals Hygiene/Grooming;Lower Body Dressing;Toilet Transfer/Toileting;Cognition;OT Goal 1   OT: Hygiene/Grooming supervision/stand-by assist;while standing;using adaptive equipment   OT: Lower Body Dressing Supervision/stand-by assist;using adaptive equipment   OT: Toilet Transfer/Toileting Supervision/stand-by assist;cleaning and garment management;using adaptive equipment;toilet transfer   OT: Cognitive Patient/caregiver will verbalize understanding of cognitive assessment results/recommendations as needed for safe discharge planning   OT: Goal 1 Pt will complete UE HEP prior to d/c   Interventions   Interventions Quick Adds Self-Care/Home Management;Therapeutic Activity;Therapeutic Procedures/Exercise   Self-Care/Home Management   Self-Care/Home Mgmt/ADL, Compensatory, Meal Prep Minutes (78270) 13   Symptoms Noted During/After Treatment (Meal Preparation/Planning Training) fatigue    Treatment Detail/Skilled Intervention Pt seen for toilet transfer with min A to stand with v/cs from recliner amb at very slow pace to bathroom with CGA with FWW. CGA toilet transfer with v/cs for safety. Pt jordan 2 min stand to wash hands with slight unsteadiness at sink with c/o fatigue. O2 SATs 95% on room air HR 99 when returning to chair   OT Discharge Planning   OT Plan POC mon 1/5 LB dressing, UE HEP, continue to monitor cog. toileting, stand jordan   OT Discharge Recommendation (DC Rec) Transitional Care Facility;Long term care facility   OT Rationale for DC Rec Recommend TCU with possible LTC eventually with pt slightly unsteady on feet, some confusion with difficulty managing own care and c/o weakness t/o session   OT Brief overview of current status min A x1 toileting, stand jordan approx 2 min   Total Session Time   Timed Code Treatment Minutes 13   Total Session Time (sum of timed and untimed services) 23

## 2023-09-05 NOTE — PROGRESS NOTES
[facility] [facility] [place]    Patient admitted to room 2302 at approximately 2230 via cart from emergency room. Patient was accompanied by transport tech.     Verbal SBAR report received from GEMINI Enamorado prior to patient arrival.     Patient ambulated to bed with one assist. Patient alert and oriented X 3. The patient is not having any pain.  . Admission vital signs: Blood pressure (!) 143/49, pulse 91, temperature 98.8  F (37.1  C), temperature source Oral, resp. rate 16, SpO2 93 %. Patient was oriented to plan of care, call light, bed controls, tv, telephone, bathroom, and visiting hours.     Risk Assessment    The following safety risks were identified during admission: fall. Yellow risk band applied: YES.     Skin Initial Assessment    This writer admitted this patient and completed a full skin assessment and Raul score in the Adult PCS flowsheet. Appropriate interventions initiated as needed.     Secondary skin check completed by GEMINI Hanna.    Raul Risk Assessment  Sensory Perception: 4-->no impairment  Moisture: 4-->rarely moist  Activity: 3-->walks occasionally  Mobility: 3-->slightly limited  Nutrition: 3-->adequate  Friction and Shear: 2-->potential problem  Raul Score: 19  Friction/Shear Interventions: HOB 30 degrees or less  Mattress: Standard gel/foam mattress (IsoFlex, Atmos Air, etc.)  Bed Frame: Standard width and length    Education    Patient has a [place] to Observation order: Yes  Observation education completed and documented: Yes      Rosalia Almonte RN

## 2023-09-05 NOTE — ED NOTES
[facility]   Admission Handoff    The patient is Dangelo Hernández, 88 year old who arrived in the ED by WHEELCHAIR from home with a complaint of Altered Mental Status and Fatigue  . The patient's current symptoms are new and during this time the symptoms have remained the same. In the ED, patient was diagnosed with   Final diagnoses:   Encephalopathy   Hypotension, unspecified hypotension type         Needed?: No    Allergies:    Allergies   Allergen Reactions    Amoxicillin Diarrhea           Lisinopril Cough       Past Medical Hx:   Past Medical History:   Diagnosis Date     Coronary artery disease, 4/ 2010, status post drug-eluting stent placement to the LAD and balloon angioplasty to the obtuse marginal.  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    LDL goal <70     Coronary artery disease     stents placed 6/10/2013    Cryptogenic organizing pneumonia (H) 12/7/2012    GERD (gastroesophageal reflux disease)     History of blood disorder 4/6/2011    Hypertension     Hypertension goal BP (blood pressure) < 140/90 12/9/2010    Malignant neoplasm (H)     BMT; Mantle cell lymphoma    Mantle Cell Lymphoma S/P Autologous Transplant 5/12/2010       Initial vitals were: BP: (!) 81/47  Pulse: 78  Temp: 97.8  F (36.6  C)  SpO2: 94 %   Recent vital Signs: /56   Pulse 83   Temp 97.8  F (36.6  C) (Tympanic)   SpO2 96%     Elimination Status: Continent: Yes     Activity Level: SBA w/ walker    Fall Status: Reason for falls risk:  Mobility, Reason for falls risk: Cognition, and Reason for falls risk: High Risk Medications  nonskid shoes/slippers when out of bed, patient and family education, and assistive device/personal items within reach    Baseline Mental status: WDL  Current Mental Status changes: acute confusion    Infection present  or suspected this encounter: no  Sepsis suspected: No    Isolation type: none    Bariatric equipment needed?: No    In the ED these meds were given:   Medications   lactated ringers BOLUS 1,000 mL (0 mLs Intravenous Stopped 9/4/23 1908)   oxyCODONE (ROXICODONE) tablet 10 mg (10 mg Oral $Given 9/4/23 1534)   iopamidol (ISOVUE-370) solution 74 mL (74 mLs Intravenous $Given 9/4/23 1721)   sodium chloride 0.9 % bag 500 mL for CT scan flush use (58 mLs Intravenous $Given 9/4/23 1721)       Drips running?  No    Home pump  No    Current LDAs: Peripheral IV: Site right AC; Gauge 20  lactated Ringer's     Results:   Labs/Imaging  Ordered and Resulted from Time of ED Arrival Up to the Time of Departure from the ED  Results for orders placed or performed during the hospital encounter of 09/04/23 (from the past 24 hour(s))   CBC with platelets differential    Narrative    The following orders were created for panel order CBC with platelets differential.  Procedure                               Abnormality         Status                     ---------                               -----------         ------                     CBC with platelets and d...[[id number]]  Abnormal            Final result                 Please view results for these tests on the individual orders.   Comprehensive metabolic panel   Result Value Ref Range    Sodium 140 136 - 145 mmol/L    Potassium 4.0 3.4 - 5.3 mmol/L    Chloride 103 98 - 107 mmol/L    Carbon Dioxide (CO2) 24 22 - 29 mmol/L    Anion Gap 13 7 - 15 mmol/L    Urea Nitrogen 20.6 8.0 - 23.0 mg/dL    Creatinine 1.52 (H) 0.67 - 1.17 mg/dL    Calcium 9.1 8.8 - 10.2 mg/dL    Glucose 118 (H) 70 - 99 mg/dL    Alkaline Phosphatase 46 40 - 129 U/L    AST 24 0 - 45 U/L    ALT 15 0 - 70 U/L    Protein Total 5.8 (L) 6.4 - 8.3 g/dL    Albumin 3.7 3.5 - 5.2 g/dL    Bilirubin Total 0.3 <=1.2 mg/dL    GFR Estimate 44 (L) >60 mL/min/1.73m2   Blood gas venous   Result Value Ref Range    pH Venous 7.38 7.32  - 7.43    pCO2 Venous 44 40 - 50 mm Hg    pO2 Venous 31 25 - 47 mm Hg    Bicarbonate Venous 26 21 - 28 mmol/L    Base Excess/Deficit 0.7 -7.7 - 1.9 mmol/L    FIO2 0    Lactic acid whole blood   Result Value Ref Range    Lactic Acid 1.9 0.7 - 2.0 mmol/L   Nt probnp inpatient (BNP)   Result Value Ref Range    N terminal Pro BNP Inpatient 739 0 - 1,800 pg/mL   CBC with platelets and differential   Result Value Ref Range    WBC Count 6.3 4.0 - 11.0 10e3/uL    RBC Count 3.61 (L) 4.40 - 5.90 10e6/uL    Hemoglobin 10.9 (L) 13.3 - 17.7 g/dL    Hematocrit 32.8 (L) 40.0 - 53.0 %    MCV 91 78 - 100 fL    MCH 30.2 26.5 - 33.0 pg    MCHC 33.2 31.5 - 36.5 g/dL    RDW 15.2 (H) 10.0 - 15.0 %    Platelet Count 215 150 - 450 10e3/uL    % Neutrophils 63 %    % Lymphocytes 20 %    % Monocytes 13 %    % Eosinophils 2 %    % Basophils 1 %    % Immature Granulocytes 1 %    NRBCs per 100 WBC 0 <1 /100    Absolute Neutrophils 4.1 1.6 - 8.3 10e3/uL    Absolute Lymphocytes 1.2 0.8 - 5.3 10e3/uL    Absolute Monocytes 0.8 0.0 - 1.3 10e3/uL    Absolute Eosinophils 0.1 0.0 - 0.7 10e3/uL    Absolute Basophils 0.0 0.0 - 0.2 10e3/uL    Absolute Immature Granulocytes 0.0 <=0.4 10e3/uL    Absolute NRBCs 0.0 10e3/uL   TSH with free T4 reflex   Result Value Ref Range    TSH 6.07 (H) 0.30 - 4.20 uIU/mL   T4 free   Result Value Ref Range    Free T4 1.37 0.90 - 1.70 ng/dL   Symptomatic Influenza A/B, RSV, & SARS-CoV2 PCR (COVID-19) Nose    Specimen: Nose; Swab   Result Value Ref Range    Influenza A PCR Negative Negative    Influenza B PCR Negative Negative    RSV PCR Negative Negative    SARS CoV2 PCR Negative Negative    Narrative    Testing was performed using the Xpert Xpress CoV2/Flu/RSV Assay on the [employer] Instrument. This test should be ordered for the detection of SARS-CoV-2, influenza, and RSV viruses in individuals who meet clinical and/or epidemiological criteria. Test performance is unknown in asymptomatic patients. This test is for  in vitro diagnostic use under the FDA EUA for laboratories certified under CLIA to perform high or moderate complexity testing. This test has not been FDA cleared or approved. A negative result does not rule out the presence of PCR inhibitors in the specimen or target RNA in concentration below the limit of detection for the assay. If only one viral target is positive but coinfection with multiple targets is suspected, the sample should be re-tested with another FDA cleared, approved, or authorized test, if coinfection would change clinical management. This test was validated by the [facility]. These laboratories are certified under the Clinical Laboratory Improvement Amendments of 1988 (CLIA-88) as qualified to perform high complexity laboratory testing.   Urine Macroscopic with reflex to Microscopic   Result Value Ref Range    Color Urine Yellow Colorless, Straw, Light Yellow, Yellow    Appearance Urine Slightly Cloudy (A) Clear    Glucose Urine Negative Negative mg/dL    Bilirubin Urine Negative Negative    Ketones Urine Negative Negative mg/dL    Specific Gravity Urine 1.011 1.003 - 1.035    Blood Urine Negative Negative    pH Urine 6.0 5.0 - 7.0    Protein Albumin Urine 30 (A) Negative mg/dL    Urobilinogen Urine Normal Normal, 2.0 mg/dL    Nitrite Urine Negative Negative    Leukocyte Esterase Urine Negative Negative    RBC Urine 1 <=2 /HPF    WBC Urine 5 <=5 /HPF    Squamous Epithelials Urine 1 <=1 /HPF    Mucus Urine Present (A) None Seen /LPF    Hyaline Casts Urine 10 (H) <=2 /LPF   CT Chest/Abdomen/Pelvis w Contrast    Narrative    EXAM: CT CHEST/ABDOMEN/PELVIS W CONTRAST  LOCATION: [facility]  DATE: 9/4/2023    INDICATION: sepsis  COMPARISON: CT of the chest without contrast 11/14/2019  TECHNIQUE: CT scan of the chest, abdomen, and pelvis was performed following injection of IV contrast. Multiplanar reformats were obtained. Dose reduction techniques were  used.   CONTRAST: 74mL Isovue 370    FINDINGS:   LUNGS AND PLEURA: Upper lung predominant emphysema, cylindrical and varicoid bronchiectasis of subsegmental airways on the right and heterogeneous attenuation of the upper lobe lung parenchyma. Peripheral emphysema, bronchiolectasis and groundglass   opacities in the bases, also unchanged. No new lung consolidation or volume loss. Status post wedge resection of the basilar right lower lobe with wedge resection staple line present. No pleural effusion or pleural thickening.    MEDIASTINUM: In size. Mitral annular calcifications. No pericardial effusion. Catheter deployed valvular prosthesis in aortic position. Main pulmonary artery is normal caliber. No pulmonary artery filling defects. Moderate atheromatous calcification   throughout the proximal great vessels, aortic arch and descending thoracic aorta. No aortic aneurysm. No enlarged hilar or mediastinal lymph nodes. Esophagus is decompressed.    CORONARY ARTERY CALCIFICATION: Previous intervention (stents or CABG).    HEPATOBILIARY: Normal.    PANCREAS: Normal.    SPLEEN: Normal.    ADRENAL GLANDS: Normal.    KIDNEYS/BLADDER: Kidneys are normal in size with symmetric enhancement and normal cortex thickness. No nephrolithiasis or renal mass. No hydronephrosis or hydroureter. Nondistended urinary bladder. There is a small right posterior bladder diverticulum   measuring 18 mm.    BOWEL: Normal.    LYMPH NODES: Normal.    VASCULATURE: Diffuse calcified atheroma throughout the abdominal aorta and iliac arteries. No abdominal aortic aneurysm. Mesenteric arteries are patent.    PELVIC ORGANS: Normal.    MUSCULOSKELETAL: There is an old upper sternal fracture deformity which has solid osseous union. Diffuse bone demineralization. Multilevel disc space narrowing and osteophytosis. A metallic device resides between the L4 and L5 spinous processes.   Symmetric osteophytes at the anterior SI joints.      Impression     IMPRESSION:    1.  Unchanged heterogeneous opacities in both lungs greatest in the upper lobes associated with peripheral traction bronchiectasis/bronchiolectasis and peripheral fibrosis in the bases. No superimposed acute lung, airway, or pleural abnormalities.  2.  No acute inflammatory process in the abdomen or pelvis.       For the majority of the shift this patient's behavior was Green     Cardiac Rhythm: Normal Sinus  Pt needs tele? No  Skin/wound Issues: None    Code Status: DNR / DNI    Pain control: good    Nausea control: pt had none    Abnormal labs/tests/findings requiring intervention: n/a    Patient tested for COVID 19 prior to admission: YES     OBS brochure/video discussed/provided to patient/family: Yes     Family present during ED course? Yes     Family Comments/Social Situation comments: Patient has mild confusion that began three days ago. Daughters have been at bedside. Patient has been living at home with support from his daughters. Family has questions about discharge planning and increased home health needs.     Tasks needing completion: None    Kelsea Lu RN

## 2023-09-05 NOTE — H&P
[facility]    History and Physical  Hospital Medicine    Dangelo Hernández MRN# [id number]   Age: 88 year old YOB: 1935     Date of Admission:  9/4/2023    Home clinic: [facility]  Primary care provider: Caridad Solorio         Chief Complaint:   Weakness/ confusion    History is obtained from the patient, electronic health record, emergency department physician, and patient's family          History of Present Illness:   Mr. Dangelo Hernández is a 88 y.o. male with a history of CAD, HTN, AS s/p TAVR, mantle cell lymphoma s/p autologous transplant 2010, GERD, and PVD who presented with confusion and weakness. Dayna similar presentation n 6/20. Pt does NOT have AC and likes to be in hot room. His daughters have been noticing that he has been weaker and more confused for 3 or 4 days and so they have been staying with him. This persisted and so they brought him in today for evaluation. He has not noticed and they have not noticed any fever, vomiting, complaints of chest or abdominal pain, voiding difficulty, or bowel symptoms. He denies any headache. He has complained of them today on the way down here of pain in the neck which he feels the base of the occiput. He recalls no specific injury. He does have chronic pain especially in his right knee and also has shoulder pains for which she is on chronic narcotic therapy and takes 10 mg of oxycodone at a time for pain management. He denies that he has had recent gait weight gain or weight loss. He has not had any change in his chronic peripheral edema. He has not had any recent medication changes. After his hospitalization he was in transitional care for a period of time and then returned home where he lives alone in his own home.     He probably missed taking his meds at home per daughter. Not taking lasix as it makes his urinate a lot.            Past Medical History:     Patient Active Problem List     Diagnosis Date Noted    Acute renal failure with other specified pathological lesion in kidney (H) 11/27/2011     Priority: High     Problem list name updated by automated process. Provider to review      Hypertension goal BP (blood pressure) < 140/90 12/09/2010     Priority: High    Encephalopathy 09/04/2023     Priority: Medium    Hypotension, unspecified hypotension type 09/04/2023     Priority: Medium    Mantle cell lymphoma (H) 06/15/2023     Priority: Medium    Bilateral pneumonia 06/15/2023     Priority: Medium    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     Priority: Medium    Septic shock (H) 06/15/2023     Priority: Medium    Unspecified abnormalities of gait and mobility 12/07/2022     Priority: Medium    Presence of unspecified artificial knee joint 12/07/2022     Priority: Medium    Other lack of coordination 12/07/2022     Priority: Medium    Long term (current) use of aspirin 12/07/2022     Priority: Medium    Old myocardial infarction 12/07/2022     Priority: Medium    Fall on same level, unspecified, subsequent encounter 12/07/2022     Priority: Medium    COVID-19 12/07/2022     Priority: Medium    Stem cells transplant status (H) 06/03/2022     Priority: Medium    Chronic bronchitis, unspecified chronic bronchitis type (H) 06/03/2022     Priority: Medium    Benign prostatic hyperplasia with lower urinary tract symptoms 01/27/2022     Priority: Medium    Difficulty in walking, not elsewhere classified 01/27/2022     Priority: Medium    Endocarditis, valve unspecified 01/27/2022     Priority: Medium    Hypertensive heart disease without heart failure 01/27/2022     Priority: Medium    Muscle weakness (generalized) 01/27/2022     Priority: Medium    Pneumonia due to coronavirus disease 2019 01/27/2022     Priority: Medium    Presence of coronary angioplasty implant and graft 01/27/2022     Priority: Medium    Peripheral vascular disease, unspecified (H) 09/22/2021     Priority:  "Medium    Stenosis of carotid artery 07/15/2021     Priority: Medium    Spinal stenosis of lumbar region without neurogenic claudication 05/10/2021     Priority: Medium    Arthritis of both hands 05/10/2021     Priority: Medium    Arthritis of knee 05/10/2021     Priority: Medium    Chronic midline thoracic back pain 05/10/2021     Priority: Medium    ACS (acute coronary syndrome) (H) 10/15/2019     Priority: Medium    Advanced directives, counseling/discussion 08/12/2015     Priority: Medium     Patient does not have an Advance/Health Care Directive (HCD), given \"What is Advance Care Planning?\" flyer.    Yumiko Segura  August 12, 2015        Cryptogenic organizing pneumonia (H) 12/07/2012     Priority: Medium    GERD (gastroesophageal reflux disease) 05/02/2012     Priority: Medium    Pneumonia, candidial (H) 09/09/2011     Priority: Medium    Pneumonia in mycoses 09/09/2011     Priority: Medium    History of blood disorder 04/06/2011     Priority: Medium    Encounter for long-term current use of medication 03/16/2011     Priority: Medium     Problem list name updated by automated process. 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                 Past Surgical History:      Past Surgical History:   Procedure Laterality Date    BRONCHOSCOPY (RIGID OR FLEXIBLE), DIAGNOSTIC  7/14/2011    Procedure:COMBINED BRONCHOSCOPY (RIGID OR FLEXIBLE), LAVAGE; Surgeon:REYNA BAILEY; Location: GI    BRONCHOSCOPY (RIGID OR FLEXIBLE), DIAGNOSTIC N/A 5/29/2019    Procedure: BRONCHOSCOPY, WITH " BRONCHOALVEOLAR LAVAGE;  Surgeon: Perlman, David Morris, MD;  Location:  GI    COLONOSCOPY      CV HEART CATHETERIZATION WITH POSSIBLE INTERVENTION N/A 10/16/2019    Procedure: Heart Catheterization with Possible Intervention;  Surgeon: Robinson Robbins MD;  Location:  HEART CARDIAC CATH LAB    CV HEART CATHETERIZATION WITH POSSIBLE INTERVENTION N/A 2019    Procedure: Heart Catheterization with Possible Intervention;  Surgeon: Hannah Ashraf MD;  Location:  HEART CARDIAC CATH LAB    ENT SURGERY      tonsils    ORTHOPEDIC SURGERY      rt knee arthroscopy    SURGICAL HISTORY OF -       tonsils    SURGICAL HISTORY OF -   86    1.Exam under anesthesia, 2.Arthroscopy, 3.Arthroscopic medial meniscectomy, 4.arthroscopic trimming of patellar articular cartilage.    SURGICAL HISTORY OF -       vasectomy    SURGICAL HISTORY OF -   90    colonoscopy    SURGICAL HISTORY OF -   10/20/92    flexible sigmoidoscopy    SURGICAL HISTORY OF -   2000    colonoscopy and polypectomy    THORACOSCOPIC BIOPSY LUNG  2011    Procedure:THORACOSCOPIC BIOPSY LUNG; Video Assisted Right Thoracoscopy with Biopsy; Surgeon:JIM EPSTEIN; Location: OR             Social History:     Social History     Socioeconomic History    Marital status:      Spouse name: Not on file    Number of children: Not on file    Years of education: Not on file    Highest education level: Not on file   Occupational History     Employer: RETIRED   Tobacco Use    Smoking status: Former     Packs/day: 0.50     Years: 19.00     Pack years: 9.50     Types: Cigarettes     Start date:      Quit date: 1975     Years since quittin.7    Smokeless tobacco: Former     Types: Snuff    Tobacco comments:     started smoking at 21 years of age   Vaping Use    Vaping Use: Never used   Substance and Sexual Activity    Alcohol use: Yes     Alcohol/week: 0.0 standard drinks of alcohol     Comment: occ.     Drug use: No     Sexual activity: Not Currently     Partners: Female   Other Topics Concern    Parent/sibling w/ CABG, MI or angioplasty before 65F 55M? No     Service Not Asked    Blood Transfusions Not Asked    Caffeine Concern Not Asked    Occupational Exposure Not Asked    Hobby Hazards Not Asked    Sleep Concern Not Asked    Stress Concern Not Asked    Weight Concern Not Asked    Special Diet No    Back Care Not Asked    Exercise Yes     Comment: active    Bike Helmet Not Asked    Seat Belt Not Asked    Self-Exams Not Asked   Social History Narrative    Not on file     Social Determinants of Health     Financial Resource Strain: Not on file   Food Insecurity: Not on file   Transportation Needs: Not on file   Physical Activity: Not on file   Stress: Not on file   Social Connections: Not on file   Intimate Partner Violence: Not on file   Housing Stability: Not on file             Family History:     Family History   Problem Relation Age of Onset    Cancer Mother         Lung--did not smoke    Cardiovascular Father         MI age 73    Lipids Sister     Hypertension Sister     Cancer Sister         Ovarian- at age 65             Allergies:     Allergies   Allergen Reactions    Amoxicillin Diarrhea           Lisinopril Cough             Medications:     Prior to Admission medications    Medication Sig Last Dose Taking? Auth Provider Long Term End Date   acetaminophen (TYLENOL) 650 MG CR tablet Take 650 mg by mouth every 4 hours as needed for mild pain or fever Unknown at prn Yes Reported, Patient     apixaban ANTICOAGULANT (ELIQUIS) 5 MG tablet Take 5 mg by mouth daily 2023 at am Yes Reported, Patient     azelastine-fluticasone (DYMISTA) 137-50 MCG/ACT nasal spray Spray 1 spray into both nostrils daily Unknown at prn Yes Reported, Patient     clopidogrel (PLAVIX) 75 MG tablet TAKE 1 TABLET BY MOUTH DAILY. FIRST LOADING DOSE IS 600MG(8 TABLETS) THEN ONCE DAILY 2023 at am Yes Zulma Lepe MD Yes     esomeprazole (NEXIUM) 20 MG DR capsule Take 20 mg by mouth every morning (before breakfast) Take 30-60 minutes before eating. 9/4/2023 at am Yes Reported, Patient     levothyroxine (SYNTHROID/LEVOTHROID) 25 MCG tablet Take 25 mcg by mouth daily 9/4/2023 at am Yes Reported, Patient Yes    metoprolol tartrate (LOPRESSOR) 25 MG tablet Take 0.5 tablets (12.5 mg) by mouth 2 times daily  Patient taking differently: Take 25 mg by mouth daily 9/4/2023 at am Yes Debbie Ritchie, MURALI CNP Yes    nitroGLYcerin (NITROSTAT) 0.4 MG sublingual tablet Place 0.4 mg under the tongue every 5 minutes as needed for chest pain For chest pain place 1 tablet under the tongue every 5 minutes for 3 doses. If symptoms persist 5 minutes after 1st dose call 911. Unknown at on hand Yes Reported, Patient     rosuvastatin (CRESTOR) 40 MG tablet TAKE 1 TABLET(40 MG) BY MOUTH DAILY 9/4/2023 at am Yes Elian Shafer P, DO Yes    sacubitril-valsartan (ENTRESTO) 24-26 MG per tablet Take 1 tablet by mouth daily 9/4/2023 at am Yes Reported, Patient     vitamin C (ASCORBIC ACID) 500 MG tablet Take 500 mg by mouth daily 9/4/2023 at am Yes Reported, Patient     Vitamin D3 (CHOLECALCIFEROL) 25 mcg (1000 units) tablet Take 1 tablet by mouth daily 9/4/2023 at am Yes Reported, Patient              Review of Systems:   The Review of Systems is negative in ALL other than noted in the HPI          Physical Exam:   Blood pressure 125/56, pulse 83, temperature 97.8  F (36.6  C), temperature source Tympanic, SpO2 96 %.  GENERAL APPEARANCE: healthy, alert and no distress  EYES: conjunctiva clear, eyes grossly normal  HENT: external ears and nose normal   NECK: supple, no masses or adenopathy  RESP: lungs clear to auscultation - no rales, rhonchi or wheezes  CV: regular rate and rhythm, normal S1 S2, no S3 or S4 and no murmur, click or rub   ABDOMEN: soft, nontender, no HSM or masses and bowel sounds normal  MS: no clubbing, cyanosis; 1+ edema  SKIN: clear  "without significant rashes or lesions  NEURO: Normal strength and tone, sensory exam grossly normal, mentation intact and speech normal         Data:     Lab Results   Component Value Date    WBC 6.3 09/04/2023    HGB 10.9 (L) 09/04/2023    HCT 32.8 (L) 09/04/2023    MCV 91 09/04/2023     09/04/2023     Lab Results   Component Value Date     09/04/2023    CO2 24 09/04/2023     Lab Results   Component Value Date    BUN 20.6 09/04/2023     No components found for: SEDRATE  No results found for: DDIMER  No results found for: BNP  Lab Results   Component Value Date    TSH 6.07 (H) 09/04/2023     No results found for: TROPONIN  UA RESULTS:  Recent Labs   Lab Test 09/04/23  1650 04/26/23  1457   COLOR Yellow Yellow   APPEARANCE Slightly Cloudy* Clear   URINEGLC Negative Negative   URINEBILI Negative Negative   URINEKETONE Negative Negative   SG 1.011 1.025   UBLD Negative Trace*   URINEPH 6.0 5.5   PROTEIN 30* Negative   UROBILINOGEN  --  0.2   NITRITE Negative Negative   LEUKEST Negative Negative   RBCU 1 2-5*   WBCU 5 None Seen     Liver Function Studies -   Recent Labs   Lab Test 09/04/23  1524   PROTTOTAL 5.8*   ALBUMIN 3.7   BILITOTAL 0.3   ALKPHOS 46   AST 24   ALT 15       EKG results:   Sinus rhythm with left axis deviation unchanged from previous EKG and without signs of acute ischemia      RADIOLOGY:  CT CAP-IMPRESSION:     1.  Unchanged heterogeneous opacities in both lungs greatest in the upper lobes associated with peripheral traction bronchiectasis/bronchiolectasis and peripheral fibrosis in the bases. No superimposed acute lung, airway, or pleural abnormalities.  2.  No acute inflammatory process in the abdomen or pelvis    Echo from June 16, 2023 showed:  \"Final Impressions:    1. Normal LV size, mildly increased wall thickness, low normal global systolic function with an estimated EF of 50 - 55%.    2. Increased left atrial pressure.    3. Mildly enlarged left atrium.    4. Right ventricular " "cavity size is normal, global systolic RV function is normal.    5. The aortic valve is unknown size TAVR (appearance c/w Lenka valve), no stenosis and no valvular regurgitation. There is mild paravalvular regurgitation. The aortic valve peak velocity is 1.8 m/s, the peak gradient is 13 mmHg, and the mean gradient is 8 mmHg. The aortic valve area is 1.91 cmÂ  with a dimensionless index of 0.55. The stroke volume index is 35.3 ml/mÂ .    6. The mitral valve is sclerotic, trace mitral regurgitation. Mean gradient 3.6 mmHg at HR 86 bpm.    7. Severe MAC present.    8. The inferior vena cava is normal sized, respiratory size variation greater than 50%.    9. Echo contrast was administrered to enhance visualization of all left ventricular segments.\"     Assessment & Plan   Dangelo Hernández is a 88 year old male who presents on 9/4/2023 with weakness/ confusion. See details below.     Hypotension/ dehydration.  BP in 80s systolic in ED. Has JESSICA. Pt has no AC at home and stays in hot room. BP responded to fluid fluid bolus and has remained stable since.   -continue gentle IV fluids. Hold BP meds. Re-assess in AM    JESSICA  Presents with cr 1.5. baseline is 0.9. likely pre-renal from dehydration. Responded to fluid bolus.  -continue iv fluids. Follow labs in AM    Acute metabolic encephalopathy  Likely due to dehydration. Likely has some dementia too. CT head-p  -rx as above and follow    CAD  S/p TAVR  On apixaban/ plavix.   -continue meds    Htn  On metoprolol/ entresto . Hypotensive in ED-resolved  -hold meds tonight. Re-assess BP in AM and resume meds if stable.     Hypo t4  Mildly elevated TSH  -will increase dose to 50mcg/day from 25.     Hld  Continue meds    Gerd  Continue meds    FTT  Unable to manage at home. Was just in TCU for a while.  -PT/OT /SW to eval.     DVT prof  On DOAC    Dispo  Likely needs TCU/ LTCF. Will need PT/OT/SW eval.       Clinically Significant Risk Factors Present on Admission             "   # Drug Induced Coagulation Defect: home medication list includes an anticoagulant medication  # Drug Induced Platelet Defect: home medication list includes an antiplatelet medication   # Hypertension: Noted on problem list                                            My Siddiqi MD  [phone]

## 2023-09-05 NOTE — CONSULTS
Care Management Initial Consult    General Information  Assessment completed with: Children, VM-chart review, Quynh  Type of CM/SW Visit: Initial Assessment    Primary Care Provider verified and updated as needed: Yes   Readmission within the last 30 days: no previous admission in last 30 days      Reason for Consult: discharge planning, facility placement  Advance Care Planning:            Communication Assessment  Patient's communication style: spoken language (English or Bilingual)    Hearing Difficulty or Deaf: no   Wear Glasses or Blind: yes    Cognitive  Cognitive/Neuro/Behavioral: .WDL except  Level of Consciousness: intermittent confusion  Arousal Level: opens eyes spontaneously  Orientation: person, place  Mood/Behavior: calm     Speech: spontaneous    Living Environment:   People in home: alone     Current living Arrangements: house      Able to return to prior arrangements: no       Family/Social Support:  Care provided by: child(ernesto), other (see comments) (Has PCA services for 2 hours a day 3x a week)  Provides care for: no one, unable/limited ability to care for self  Marital Status:   Children, PCA          Description of Support System: Supportive, Involved    Support Assessment: Lacks adequate physical care, Lacks necessary supervision and assistance    Current Resources:   Patient receiving home care services: No     Community Resources: PCA (3x a week 2 hours each day)  Equipment currently used at home: walker, standard  Supplies currently used at home: None    Employment/Financial:  Employment Status: retired        Financial Concerns: No concerns identified   Referral to Financial Worker: No       Does the patient's insurance plan have a 3 day qualifying hospital stay waiver?  No    Lifestyle & Psychosocial Needs:  Social Determinants of Health     Tobacco Use: Medium Risk (9/5/2023)    Patient History     Smoking Tobacco Use: Former     Smokeless Tobacco Use: Former     Passive Exposure:  Not on file   Alcohol Use: Not on file   Financial Resource Strain: Not on file   Food Insecurity: Not on file   Transportation Needs: Not on file   Physical Activity: Not on file   Stress: Not on file   Social Connections: Not on file   Intimate Partner Violence: Not on file   Depression: Not at risk (7/26/2023)    PHQ-2     PHQ-2 Score: 0   Housing Stability: Not on file       Functional Status:  Prior to admission patient needed assistance:   Dependent ADLs:: Ambulation-walker, Eating, Grooming, Bathing  Dependent IADLs:: Cleaning, Cooking, Laundry, Shopping, Meal Preparation, Medication Management, Transportation  Assesssment of Functional Status: Not at baseline with mobility, Needs placement in a SNF/TCF for rehabilitation    Mental Health Status:          Chemical Dependency Status:                Values/Beliefs:  Spiritual, Cultural Beliefs, Jewish Practices, Values that affect care: no               Additional Information:  Writer spoke with patient's daughter Quynh on the phone introduced self and role. Patient currently receives PCA services 3X a week for 2 hours a day for meal prep, medication reminders, cleaning and laundry. Patient lives in his home uses the walker to get around in the house a golf cart to get around the yard, which he hasn't been out much in the llast weeks.  Patient recently had an auction on 8/12/23 and has had a physical decline since, daughter Hilda has been staying with him the last 4 days and he has needed assistance with most things with increase in confusion and weakness.    Writer talked with daughter about the need to have patient go to a TCU for strengthening was recommended by OT/PT with the possible need for LTC if no improvement. Daughter Quynh is in agreement and would like information on AL's in the area. Quynh and patient have spoken about this and he is good with going to an AL when able.  Referrals will be sent to [facility] contracted facilities and CTS would put some  information in patient's room about AL's in the area and local SNF's.    Family will transport if able and CTS will continue to follow.  Destination    Service Provider Request Status Selected Services Address Phone Fax Patient Preferred   [facility] (SNF)  Pending - No Request Sent N/A [address] [facility] [address] [phone] --   [facility] (SNF)  Pending - No Request Sent N/A [address] [facility] [id number] [phone] --   [facility] ()  Pending - No Request Sent N/A [address] [facility] [id number] [phone] --   [facility]  Pending - No Request Sent N/A [address] [facility] [address] [phone] --       Fern Puri RN   Inpatient Care Coordinator  [facility] [phone]  [facility] [phone]   Fern Puri RN

## 2023-09-06 NOTE — PROGRESS NOTES
[facility] GERIATRICS    PRIMARY CARE PROVIDER AND CLINIC:  MURALI Vera CNP, [address] [place] [id number]  Chief Complaint   Patient presents with    Hospital F/U      [facility] Record Number:  [id number]  Place of Service where encounter took place:  [facility] (TCU) [[id number]]    Dangelo Hernández  is a 88 year old  (1935), admitted to the above facility from  [facility]. Hospital stay 9/4/23 through 9/7/23.  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  Blood pressure, mentation improved.  Creatinine trended down to 1.13  -TSH 6.07, LT4  and FT4 1.37 (wnl), yet LT4 was increased from 25 mcg to 50 mcg/day!!  -*Evaluated by PMR and felt to benefit from TCU placement.       Today  - Cardiac:  reports coughing sometimes. Daughter reports has been going on for sometimes.   - Renal:  - Rehab: was able to walk before he went to the hospital. , using a walker, now very weak and cannot walk.   - chronic neck and back pain, at home takes tylenol. Sees Pain medicine clinic, was taking oxycodone 10 mg bid. Pharmacy is in [place]. Pain clinic in [facility]    ============================================================================  CODE STATUS/ADVANCE DIRECTIVES DISCUSSION:  Full Code    ALLERGIES:   Allergies   Allergen Reactions    Amoxicillin Diarrhea           Lisinopril Cough      PAST MEDICAL HISTORY:   Past Medical History:   Diagnosis Date     Coronary artery disease, 4/ 2010, status post drug-eluting stent placement to the LAD and balloon angioplasty to the obtuse marginal.  12/8/2010    Acquired hypothyroidism 9/5/2023    Acute kidney failure NEC  11/27/2011    Arthritis     osteoarthritis    Basal cell carcinoma     Blood transfusion     Colon polyps     Combined hyperlipidemia 10/31/2010    LDL goal <70     Coronary artery disease     stents placed 6/10/2013    Cryptogenic organizing pneumonia (H) 12/7/2012    GERD (gastroesophageal reflux disease)     History of blood disorder 4/6/2011    Hypertension     Hypertension goal BP (blood pressure) < 140/90 12/9/2010    Malignant neoplasm (H)     BMT; Mantle cell lymphoma    Mantle Cell Lymphoma S/P Autologous Transplant 5/12/2010      PAST SURGICAL HISTORY:   has a past surgical history that includes surgical history of - ; surgical history of -  (1/19/84); surgical history of - ; surgical history of -  (2/19/90); surgical history of -  (10/20/92); surgical history of -  (6/14/2000); Bronchoscopy (rigid or flexible), diagnostic (7/14/2011); Thoracoscopic biopsy lung (9/12/2011); colonoscopy; orthopedic surgery; ENT surgery; Bronchoscopy (rigid or flexible), diagnostic (N/A, 5/29/2019); Heart Catheterization with Possible Intervention (N/A, 10/16/2019); and Heart Catheterization with Possible Intervention (N/A, 11/13/2019).  FAMILY HISTORY: family history includes Cancer in his mother and sister; Cardiovascular in his father; Hypertension in his sister; Lipids in his sister.  SOCIAL HISTORY:   reports that he quit smoking about 48 years ago. His smoking use included cigarettes. He started smoking about 67 years ago. He has a 9.50 pack-year smoking history. He has quit using smokeless tobacco.  His smokeless tobacco use included snuff. He reports current alcohol use. He reports that he does not use drugs.  Patient's living condition:     Post Discharge Medication Reconciliation Status:   MED REC REQUIRED  Post Medication Reconciliation Status: discharge medications reconciled and changed, per note/orders       Current Outpatient Medications   Medication Sig    clopidogrel (PLAVIX) 75 MG tablet Take 1 tablet (75  "mg) by mouth daily    acetaminophen (TYLENOL) 650 MG CR tablet Take 650 mg by mouth every 4 hours as needed for mild pain or fever    apixaban ANTICOAGULANT (ELIQUIS) 5 MG tablet Take 5 mg by mouth daily    azelastine-fluticasone (DYMISTA) 137-50 MCG/ACT nasal spray Spray 1 spray into both nostrils daily    esomeprazole (NEXIUM) 20 MG DR capsule Take 20 mg by mouth every morning (before breakfast) Take 30-60 minutes before eating.    levothyroxine (SYNTHROID/LEVOTHROID) 50 MCG tablet Take 1 tablet (50 mcg) by mouth every morning (before breakfast)    metoprolol tartrate (LOPRESSOR) 25 MG tablet Take 0.5 tablets (12.5 mg) by mouth 2 times daily    nitroGLYcerin (NITROSTAT) 0.4 MG sublingual tablet Place 0.4 mg under the tongue every 5 minutes as needed for chest pain For chest pain place 1 tablet under the tongue every 5 minutes for 3 doses. If symptoms persist 5 minutes after 1st dose call 911.    rosuvastatin (CRESTOR) 40 MG tablet TAKE 1 TABLET(40 MG) BY MOUTH DAILY    sacubitril-valsartan (ENTRESTO) 24-26 MG per tablet Take 1 tablet by mouth daily    vitamin C (ASCORBIC ACID) 500 MG tablet Take 500 mg by mouth daily    Vitamin D3 (CHOLECALCIFEROL) 25 mcg (1000 units) tablet Take 1 tablet by mouth daily     No current facility-administered medications for this visit.       ROS:  10 point ROS of systems including Constitutional, Eyes, Respiratory, Cardiovascular, Gastroenterology, Genitourinary, Integumentary, Musculoskeletal, Psychiatric were all negative except for pertinent positives noted in my HPI.    Vitals:  /59   Pulse 73   Temp 98.1  F (36.7  C)   Resp 18   Ht 1.676 m (5' 6\")   Wt 68 kg (150 lb)   SpO2 98%   BMI 24.21 kg/m    Exam:  GENERAL APPEARANCE:  in no distress,   RESP:  Unlabored breathing. CTA b/l.   CV:  S1S2 audible, regular HR, no murmur appreciated.   ABDOMEN:  soft, NT/ND, BS audible.   M/S:   no joint deformity noted on observation.   SKIN:  No rash noted on observation  NEURO:  "  No NFD appreciated on observation.   PSYCH:  affect and mood normal      Lab/Diagnostic data: Reviewed in the chart and EHR.        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  Blood pressure, mentation improved.  Creatinine trended down to 1.13  -TSH 6.07, LT4  and FT4 1.37 (wnl), yet LT4 was increased from 25 mcg to 50 mcg/day!!  -*Evaluated by PMR and felt to benefit from TCU placement.       (I10) Hypertension goal BP (blood pressure) < 140/90  (primary encounter diagnosis)  (I25.10) Coronary artery disease involving native coronary artery of native heart without angina pectoris  (I73.9) Peripheral vascular disease, unspecified (H)  (Z95.2) S/P TAVR (transcatheter aortic valve replacement)  (Z95.5) Presence of coronary angioplasty implant and graft  (Z79.01) Long term current use of anticoagulant therapy  (E78.2) Combined hyperlipidemia  - cardiac wise compensated.   - on multiple agents. Continue. Cardiology routine follow up      Chronic cough:   - Lung CTA b/l, patria allergy related/sinusitis. Able to expectorate phlegm.     Chronic pain syndrome  Spinal stenosis of lumbar region w/o neurogenic claudication  Hx of left shoulder pain   Chronic neck pain  - on tylenol. Was on oxycodone 10 mg bid prn. Family reported follows pain medicine clinic.   - discussed that we are opened to start oxycodone for palliative purpose if pain becomes suboptimal. However, would start at a lower dose and titrate as needed.   - would benefit from following up with pain medicine clinic nearby.       (E03.9) Acquired hypothyroidism  - will reduce LT4 back to 25 mcg given FT4 is wnl. Elevated TSH is  most likely subclinical hypothyroidism.  In frail elderly it is recommended to keep TSH between 5-7.       (C83.10) Mantle cell lymphoma, unspecified body region (H)  - adds to multiple comorbidities    (K21.9) Gastroesophageal reflux disease without esophagitis  - on esomeprazole. PCP to [facility] when feasible.       Electronically signed by:  Stefanie Arguello MD

## 2023-09-06 NOTE — PROGRESS NOTES
Time: 1900-0730      Reason for Admission: Hypotension     Activity: AX1     Neuro: Confused, oriented to self      GI/:  WNL    Diet: Regular     Lines/Drains: PIV-saline locked     Vitals: /49 (BP Location: Right arm)   Pulse 83   Temp 97.7  F (36.5  C) (Oral)   Resp 20   SpO2 93%       Pain:Denies     Plan:Monitor per plan of care      PRN Meds: Ativan

## 2023-09-06 NOTE — DISCHARGE SUMMARY
[facility]  Hospitalist Discharge Summary      Date of Admission:  9/4/2023  Date of Discharge:  9/6/2023  Discharging Provider: Barrington Mora MD  Discharge Service: Hospitalist Service    Discharge Diagnoses   Hypotension, unspecified hypotension type  JESSICA (acute kidney injury)  Acute metabolic encephalopathy  Hypertension goal BP (blood pressure) < 140/90  Presence of coronary angioplasty implant and graft  S/P TAVR (transcatheter aortic valve replacement)  Long term current use of anticoagulant therapy  Combined hyperlipidemia  GERD (gastroesophageal reflux disease)  Acquired hypothyroidism      Clinically Significant Risk Factors          Follow-ups Needed After Discharge   Follow-up Appointments     Follow-up and recommended labs and tests       Follow up with primary care provider, Caridad Solorio, within 7 days to   evaluate medication change and for hospital follow- up.  The following   labs/tests are recommended: BMP in 1 week, TSH in 6-8  weeks.            Unresulted Labs Ordered in the Past 30 Days of this Admission       Date and Time Order Name Status Description    9/4/2023  2:59 PM Blood Culture Peripheral Blood Preliminary     9/4/2023  2:59 PM Blood Culture Peripheral Blood Preliminary         These results will be followed up by primary care provider.    Discharge Disposition   Discharged to TCU  Condition at discharge: Stable    Hospital Course   Mr Hernández is an 88 years old patient admitted with confusion, weakness and hypotension. Metoprolol and Entresto were held. The patient was treated with IV fluids. Patient's confusion resolved. His blood pressure improved. Creatinine was increased at 1.52 and improved to 1.13 with IV hydration. Metoprolol and Entresto were resumed on the day of the discharge. TSH was elevated on admission at 6.07. Levothyroxine was increased from 25 mcg to 50 mcg a day. The patient was discharged to TCU for PT/OT.    Consultations This  Hospital Stay   PHYSICAL THERAPY ADULT IP CONSULT  OCCUPATIONAL THERAPY ADULT IP CONSULT  CARE MANAGEMENT / SOCIAL WORK IP CONSULT  PHYSICAL THERAPY ADULT IP CONSULT  OCCUPATIONAL THERAPY ADULT IP CONSULT    Code Status   Full Code    Time Spent on this Encounter   I, Barrington Mora MD, personally saw the patient today and spent greater than 30 minutes discharging this patient.       Barrington Mora MD  [facility] SURGICAL  [address] [facility] [address]  Phone: [phone]  Fax: [phone]  ______________________________________________________________________    Physical Exam   Vital Signs: Temp: 97.7  F (36.5  C) Temp src: Oral BP: 130/54 Pulse: 105   Resp: 19 SpO2: 92 % O2 Device: None (Room air)    Weight: 0 lbs 0 oz    Constitutional: awake, alert, cooperative, no apparent distress, and appears stated age  Eyes: Lids and lashes normal, pupils equal, round and reactive to light, extra ocular muscles intact, sclera clear, conjunctiva normal  ENT: normocephalic, without obvious abnormality, atraumatic  Respiratory: No increased work of breathing, good air exchange, clear to auscultation bilaterally, no crackles or wheezing  Cardiovascular: Normal apical impulse, regular rate and rhythm, normal S1 and S2, no S3 or S4, and no murmur noted  GI: normal bowel sounds, soft, non-distended, and non-tender  Skin: normal skin color, texture, turgor and no rashes  Musculoskeletal: there is no redness, warmth, or swelling of the joints  tone is normal  Neurologic: Awake, alert, oriented to name, place and time.  Cranial nerves II-XII are grossly intact.  Motor is 5 out of 5 bilaterally.  Sensory is intact.        Primary Care Physician   Caridad Solorio    Discharge Orders      Primary Care - Care Coordination Referral      General info for SNF    Length of Stay Estimate: Short Term Care: Estimated # of Days <30  Condition at Discharge: Improving  Level of care:skilled    Rehabilitation Potential: Good  Admission H&P remains valid and up-to-date: Yes  Recent Chemotherapy: N/A  Use Nursing Home Standing Orders: Yes     Mantoux instructions    Give two-step Mantoux (PPD) Per Facility Policy Yes     Reason for your hospital stay    Hypotension, dehydration     Activity - Up with nursing assistance     Follow-up and recommended labs and tests     Follow up with primary care provider, Caridad Solorio, within 7 days to evaluate medication change and for hospital follow- up.  The following labs/tests are recommended: BMP in 1 week, TSH in 6-8  weeks.     Full Code     Physical Therapy Adult Consult    Evaluate and treat as clinically indicated.    Reason:  generalized weakness, deconditioning     Occupational Therapy Adult Consult    Evaluate and treat as clinically indicated.    Reason:  generalized weakness, deconditioning     Fall precautions     Diet    Follow this diet upon discharge: Orders Placed This Encounter      Regular Diet Adult       Significant Results and Procedures   Most Recent 3 CBC's:  Recent Labs   Lab Test 09/05/23  0455 09/04/23  1524 06/26/23  1150   WBC 6.7 6.3 8.6   HGB 11.1* 10.9* 9.8*   MCV 91 91 90    215 327     Most Recent 3 BMP's:  Recent Labs   Lab Test 09/06/23  0520 09/05/23  0455 09/04/23  1524    143 140   POTASSIUM 3.4 3.6 4.0   CHLORIDE 108* 107 103   CO2 24 22 24   BUN 10.5 15.3 20.6   CR 1.13 1.19* 1.52*   ANIONGAP 9 14 13   CHAVEZ 8.9 8.8 9.1   GLC 96 115* 118*     Most Recent 2 LFT's:  Recent Labs   Lab Test 09/04/23  1524 09/30/22  1550   AST 24 21   ALT 15 21   ALKPHOS 46 63   BILITOTAL 0.3 0.2     Most Recent TSH and T4:  Recent Labs   Lab Test 09/04/23  1524   TSH 6.07*   T4 1.37   ,   Results for orders placed or performed during the hospital encounter of 09/04/23   CT Chest/Abdomen/Pelvis w Contrast    Narrative    EXAM: CT CHEST/ABDOMEN/PELVIS W CONTRAST  LOCATION: [facility]  DATE:  9/4/2023    INDICATION: sepsis  COMPARISON: CT of the chest without contrast 11/14/2019  TECHNIQUE: CT scan of the chest, abdomen, and pelvis was performed following injection of IV contrast. Multiplanar reformats were obtained. Dose reduction techniques were used.   CONTRAST: 74mL Isovue 370    FINDINGS:   LUNGS AND PLEURA: Upper lung predominant emphysema, cylindrical and varicoid bronchiectasis of subsegmental airways on the right and heterogeneous attenuation of the upper lobe lung parenchyma. Peripheral emphysema, bronchiolectasis and groundglass   opacities in the bases, also unchanged. No new lung consolidation or volume loss. Status post wedge resection of the basilar right lower lobe with wedge resection staple line present. No pleural effusion or pleural thickening.    MEDIASTINUM: In size. Mitral annular calcifications. No pericardial effusion. Catheter deployed valvular prosthesis in aortic position. Main pulmonary artery is normal caliber. No pulmonary artery filling defects. Moderate atheromatous calcification   throughout the proximal great vessels, aortic arch and descending thoracic aorta. No aortic aneurysm. No enlarged hilar or mediastinal lymph nodes. Esophagus is decompressed.    CORONARY ARTERY CALCIFICATION: Previous intervention (stents or CABG).    HEPATOBILIARY: Normal.    PANCREAS: Normal.    SPLEEN: Normal.    ADRENAL GLANDS: Normal.    KIDNEYS/BLADDER: Kidneys are normal in size with symmetric enhancement and normal cortex thickness. No nephrolithiasis or renal mass. No hydronephrosis or hydroureter. Nondistended urinary bladder. There is a small right posterior bladder diverticulum   measuring 18 mm.    BOWEL: Normal.    LYMPH NODES: Normal.    VASCULATURE: Diffuse calcified atheroma throughout the abdominal aorta and iliac arteries. No abdominal aortic aneurysm. Mesenteric arteries are patent.    PELVIC ORGANS: Normal.    MUSCULOSKELETAL: There is an old upper sternal fracture  deformity which has solid osseous union. Diffuse bone demineralization. Multilevel disc space narrowing and osteophytosis. A metallic device resides between the L4 and L5 spinous processes.   Symmetric osteophytes at the anterior SI joints.      Impression    IMPRESSION:    1.  Unchanged heterogeneous opacities in both lungs greatest in the upper lobes associated with peripheral traction bronchiectasis/bronchiolectasis and peripheral fibrosis in the bases. No superimposed acute lung, airway, or pleural abnormalities.  2.  No acute inflammatory process in the abdomen or pelvis.   CT Head w/o Contrast    Narrative    EXAM: CT HEAD W/O CONTRAST  LOCATION: [facility]  DATE: 9/4/2023    INDICATION: confusion  COMPARISON: 06/28/2023  TECHNIQUE: Routine CT Head without IV contrast. Multiplanar reformats. Dose reduction techniques were used.    FINDINGS:  INTRACRANIAL CONTENTS: No intracranial hemorrhage, extraaxial collection, or mass effect.  No CT evidence of acute infarct. Heart to severe presumed chronic small vessel ischemic changes. Moderate generalized volume loss. No hydrocephalus.     VISUALIZED ORBITS/SINUSES/MASTOIDS: Prior bilateral cataract surgery. Visualized portions of the orbits are otherwise unremarkable. No paranasal sinus mucosal disease. No middle ear or mastoid effusion.    BONES/SOFT TISSUES: No acute abnormality.      Impression    IMPRESSION:  1.  No CT evidence for acute intracranial process.  2.  Brain atrophy and presumed chronic microvascular ischemic changes as above.       Discharge Medications   Current Discharge Medication List        CONTINUE these medications which have CHANGED    Details   levothyroxine (SYNTHROID/LEVOTHROID) 50 MCG tablet Take 1 tablet (50 mcg) by mouth every morning (before breakfast)  Qty: 30 tablet, Refills: 0    Associated Diagnoses: Acquired hypothyroidism           CONTINUE these medications which have NOT CHANGED    Details    acetaminophen (TYLENOL) 650 MG CR tablet Take 650 mg by mouth every 4 hours as needed for mild pain or fever      apixaban ANTICOAGULANT (ELIQUIS) 5 MG tablet Take 5 mg by mouth daily      azelastine-fluticasone (DYMISTA) 137-50 MCG/ACT nasal spray Spray 1 spray into both nostrils daily      clopidogrel (PLAVIX) 75 MG tablet TAKE 1 TABLET BY MOUTH DAILY. FIRST LOADING DOSE IS 600MG(8 TABLETS) THEN ONCE DAILY  Qty: 90 tablet, Refills: 0    Comments: **Patient requests 90 days supply**  Associated Diagnoses: Coronary artery disease of native heart with stable angina pectoris, unspecified vessel or lesion type (H)      esomeprazole (NEXIUM) 20 MG DR capsule Take 20 mg by mouth every morning (before breakfast) Take 30-60 minutes before eating.      metoprolol tartrate (LOPRESSOR) 25 MG tablet Take 0.5 tablets (12.5 mg) by mouth 2 times daily    Associated Diagnoses: Hypertensive heart disease without heart failure      nitroGLYcerin (NITROSTAT) 0.4 MG sublingual tablet Place 0.4 mg under the tongue every 5 minutes as needed for chest pain For chest pain place 1 tablet under the tongue every 5 minutes for 3 doses. If symptoms persist 5 minutes after 1st dose call 911.      rosuvastatin (CRESTOR) 40 MG tablet TAKE 1 TABLET(40 MG) BY MOUTH DAILY  Qty: 90 tablet, Refills: 0    Comments: Due for fasting cholesterol labs  Associated Diagnoses: Coronary artery disease involving native coronary artery of native heart with angina pectoris (H)      sacubitril-valsartan (ENTRESTO) 24-26 MG per tablet Take 1 tablet by mouth daily      vitamin C (ASCORBIC ACID) 500 MG tablet Take 500 mg by mouth daily      Vitamin D3 (CHOLECALCIFEROL) 25 mcg (1000 units) tablet Take 1 tablet by mouth daily           Allergies   Allergies   Allergen Reactions    Amoxicillin Diarrhea           Lisinopril Cough

## 2023-09-06 NOTE — PROGRESS NOTES
[facility] RN Carlene called waiting for patient to arrive. She was informed that patient would be picked up from hospital at 1600. At this time 1730, they are unable to take patient back at this time. They did state that patient could be admitted tomorrow morning as early as 9:30 AM. Dr Peña updated, message left on care transitions. Patient updated, primary nurse updated.

## 2023-09-06 NOTE — PLAN OF CARE
Physical Therapy Discharge Summary    Reason for therapy discharge:    Discharged to transitional care facility.    Progress towards therapy goal(s). See goals on Care Plan in [facility] electronic health record for goal details.  Goals partially met.  Barriers to achieving goals:   discharge from facility.    Therapy recommendation(s):    Continued therapy is recommended.  Rationale/Recommendations:  Recommend continued PT at TCU to maximize safe and indep mobility prior to return home.

## 2023-09-06 NOTE — PLAN OF CARE
Occupational Therapy Discharge Summary    Reason for therapy discharge:    Discharged to transitional care facility.    Progress towards therapy goal(s). See goals on Care Plan in [facility] electronic health record for goal details.  Goals not met.  Barriers to achieving goals:   discharge from facility.    Therapy recommendation(s):    Continued therapy is recommended.  Rationale/Recommendations:  increased overall strength and act jordan for increased safety with transfers and decrease fall risk.

## 2023-09-06 NOTE — PROGRESS NOTES
Care Management Discharge Note    Discharge Date: 09/06/2023     Discharge Disposition: Transitional Care    Discharge Services:  Transportation    Discharge DME: None    Discharge Transportation: family or friend will provide    Private pay costs discussed: Not applicable    Does the patient's insurance plan have a 3 day qualifying hospital stay waiver?  No    PAS Confirmation Code: [id number]  Patient/family educated on Medicare website which has current facility and service quality ratings: yes    Education Provided on the Discharge Plan:  yes  Persons Notified of Discharge Plans: Daughter Quynh  Patient/Family in Agreement with the Plan: yes    Handoff Referral Completed: Yes    Additional Information:    Plan:  [facility].  Transportation provided by [facility] wheelchair.      Suze Fleming RN

## 2023-09-06 NOTE — PROGRESS NOTES
Pt alert to self only, confused, difficult to redirect at times, heavy assist of 2 with walker and gait belt, pain in L shoulder, unable to rate pain verbally with a number, tylenol given per MAR, uses urinal at bed side, daughter Peg was at bedside most of the late morning 11-2, expiratory wheezing in BLL, no BM this shift, called and spoke to GEMINI Concepcion at [facility] with report, Pt was to discharge 1230 with daughter as ride but do to difficulty with transfer, called CT to set up transport, bed alarm active. BP 95/40 (BP Location: Right arm)   Pulse 80   Temp 98.2  F (36.8  C) (Oral)   Resp 17   SpO2 92%

## 2023-09-07 NOTE — PROGRESS NOTES
[facility]    Medicine Progress Note - Hospitalist Service    Date of Admission:  9/4/2023    Assessment & Plan   Mr Hernández is an 88 years old patient admitted with confusion, weakness and hypotension.     Principal Problem:    Hypotension, unspecified hypotension type    Assessment: Secondary to dehydration. Resolved with IV fluids. Metoprolol and Entresto were initially held and were resumed yesterday morning.    Plan:  1. Continue current medications.   2. Discharge to TCU this morning.    Active Problems:    Hypertension goal BP (blood pressure) < 140/90    Assessment: BP controlled.    Plan: Continue metoprolol and Entresto.      JESSICA (acute kidney injury) (H)    Assessment: Resolved with IV fluids.    Plan: Monitor renal function as outpatient.      Presence of coronary angioplasty implant and graft    S/P TAVR (transcatheter aortic valve replacement)    Assessment: Asymptomatic.    Plan: Continue metoprolol, Entresto and clopidogrel.      Acute metabolic encephalopathy    Assessment: Resolved with IV hydration and improved BP.    Plan: Monitor.      Long term current use of anticoagulant therapy    Assessment: On apixaban for atrial fibrillation.    Plan: Continue.      Combined hyperlipidemia    Assessment: On statin.    Plan: Continue.      GERD (gastroesophageal reflux disease)    Assessment: Asymptomatic.    Plan: Continue PPI.      Acquired hypothyroidism    Assessment: On replacement.    Plan: Continue.       Diet: Regular Diet Adult  Diet    DVT Prophylaxis: DOAC  Ascencio Catheter: Not present  Lines: None     Cardiac Monitoring: None  Code Status: Full Code      Clinically Significant Risk Factors Present on Admission                 # Drug Induced Coagulation Defect: home medication list includes an anticoagulant medication    # Drug Induced Platelet Defect: home medication list includes an antiplatelet medication     # Hypertension: Noted on problem list                  Disposition Plan      Expected Discharge Date: 09/07/2023,  9:00 AM    Destination: inpatient rehabilitation facility            Barrington Mora MD  Hospitalist Service  [facility]  Securely message with [employer] (more info)  Text page via [employer] Paging/Directory   ______________________________________________________________________    Interval History   Discharged was planned for yesterday but got delayed due to transportation issues. No new problems reported. The patient voices no complaints. He just wants to get out of the hospital.    Physical Exam   Vital Signs: Temp: 98.6  F (37  C) Temp src: Oral BP: 135/52 Pulse: 80   Resp: 16 SpO2: 92 % O2 Device: None (Room air)    Weight: 0 lbs 0 oz    Constitutional: awake, alert, cooperative, no apparent distress, and appears stated age  Eyes: Lids and lashes normal, pupils equal, round and reactive to light, extra ocular muscles intact, sclera clear, conjunctiva normal  ENT: normocephalic, without obvious abnormality, atraumatic  Respiratory: No increased work of breathing, good air exchange, clear to auscultation bilaterally, no crackles or wheezing  Cardiovascular: Normal apical impulse, regular rate and rhythm, normal S1 and S2, no S3 or S4, and no murmur noted  GI: normal bowel sounds, soft, non-distended, and non-tender  Skin: normal skin color, texture, turgor and no rashes  Musculoskeletal: there is no redness, warmth, or swelling of the joints  tone is normal  Neurologic: Awake, alert, oriented to name, place and time.  Cranial nerves II-XII are grossly intact.  Motor is 5 out of 5 bilaterally.  Sensory is intact.     Medical Decision Making       15 MINUTES SPENT BY ME on the date of service doing chart review, history, exam, documentation & further activities per the note.  MANAGEMENT DISCUSSED with the following over the past 24 hours: the patient, nurses, care coordination team   NOTE(S)/MEDICAL RECORDS REVIEWED over the  past 24 hours: nursing notes       Data   Most Recent 3 CBC's:  Recent Labs   Lab Test 09/05/23  0455 09/04/23  1524 06/26/23  1150   WBC 6.7 6.3 8.6   HGB 11.1* 10.9* 9.8*   MCV 91 91 90    215 327     Most Recent 3 BMP's:  Recent Labs   Lab Test 09/06/23  0520 09/05/23  0455 09/04/23  1524    143 140   POTASSIUM 3.4 3.6 4.0   CHLORIDE 108* 107 103   CO2 24 22 24   BUN 10.5 15.3 20.6   CR 1.13 1.19* 1.52*   ANIONGAP 9 14 13   CHAVEZ 8.9 8.8 9.1   GLC 96 115* 118*

## 2023-09-07 NOTE — PLAN OF CARE
"Alert, oriented to self. Assist of 1-2 to stand at bedside. Using urinal. No IV access. Plan to discharge in AM to TCU.     Update at 0615-- Patient is awake and fixated on leaving. \"I have to get out of here before I die.\" \"This doesn't even look like a hospital.\" \"Why was I brought here in the first place.\" Patient was calmed with conversation and reassurance that he will be leaving after breakfast. Bed alarm on. Teeth glued and put in patient's mouth.                         "

## 2023-09-07 NOTE — LETTER
9/7/2023        RE: Dangelo Hernández  [address]  [place] [address]        [facility] GERIATRICS    PRIMARY CARE PROVIDER AND CLINIC:  MURALI Vera CNP, [address]  / [facility] [address]  Chief Complaint   Patient presents with     Hospital F/U      [place] Medical Record Number:  [id number]  Place of Service where encounter took place:  [facility] (TCU) [[id number]]    Dangelo Hernández  is a 88 year old  (1935), admitted to the above facility from  [facility]. Hospital stay 9/4/23 through 9/7/23.  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  Blood pressure, mentation improved.  Creatinine trended down to 1.13  -TSH 6.07, LT4  and FT4 1.37 (wnl), yet LT4 was increased from 25 mcg to 50 mcg/day!!  -*Evaluated by PMR and felt to benefit from TCU placement.       Today  - Cardiac:  reports coughing sometimes. Daughter reports has been going on for sometimes.   - Renal:  - Rehab: was able to walk before he went to the hospital. , using a walker, now very weak and cannot walk.   - chronic neck and back pain, at home takes tylenol. Sees Pain medicine clinic, was taking oxycodone 10 mg bid. Pharmacy is in [place]. Pain clinic in [facility]    ============================================================================  CODE STATUS/ADVANCE DIRECTIVES DISCUSSION:  Full Code    ALLERGIES:   Allergies   Allergen Reactions     Amoxicillin Diarrhea            Lisinopril Cough      PAST MEDICAL HISTORY:   Past Medical History:   Diagnosis Date      Coronary artery disease, 4/ 2010, status post drug-eluting stent placement to the LAD and balloon  angioplasty to the obtuse marginal.  12/8/2010     Acquired hypothyroidism 9/5/2023     Acute kidney failure NEC 11/27/2011     Arthritis     osteoarthritis     Basal cell carcinoma      Blood transfusion      Colon polyps      Combined hyperlipidemia 10/31/2010    LDL goal <70      Coronary artery disease     stents placed 6/10/2013     Cryptogenic organizing pneumonia (H) 12/7/2012     GERD (gastroesophageal reflux disease)      History of blood disorder 4/6/2011     Hypertension      Hypertension goal BP (blood pressure) < 140/90 12/9/2010     Malignant neoplasm (H)     BMT; Mantle cell lymphoma     Mantle Cell Lymphoma S/P Autologous Transplant 5/12/2010      PAST SURGICAL HISTORY:   has a past surgical history that includes surgical history of - ; surgical history of -  (1/19/84); surgical history of - ; surgical history of -  (2/19/90); surgical history of -  (10/20/92); surgical history of -  (6/14/2000); Bronchoscopy (rigid or flexible), diagnostic (7/14/2011); Thoracoscopic biopsy lung (9/12/2011); colonoscopy; orthopedic surgery; ENT surgery; Bronchoscopy (rigid or flexible), diagnostic (N/A, 5/29/2019); Heart Catheterization with Possible Intervention (N/A, 10/16/2019); and Heart Catheterization with Possible Intervention (N/A, 11/13/2019).  FAMILY HISTORY: family history includes Cancer in his mother and sister; Cardiovascular in his father; Hypertension in his sister; Lipids in his sister.  SOCIAL HISTORY:   reports that he quit smoking about 48 years ago. His smoking use included cigarettes. He started smoking about 67 years ago. He has a 9.50 pack-year smoking history. He has quit using smokeless tobacco.  His smokeless tobacco use included snuff. He reports current alcohol use. He reports that he does not use drugs.  Patient's living condition:     Post Discharge Medication Reconciliation Status:   MED REC REQUIRED  Post Medication Reconciliation Status: discharge medications reconciled and changed,  "per note/orders       Current Outpatient Medications   Medication Sig     clopidogrel (PLAVIX) 75 MG tablet Take 1 tablet (75 mg) by mouth daily     acetaminophen (TYLENOL) 650 MG CR tablet Take 650 mg by mouth every 4 hours as needed for mild pain or fever     apixaban ANTICOAGULANT (ELIQUIS) 5 MG tablet Take 5 mg by mouth daily     azelastine-fluticasone (DYMISTA) 137-50 MCG/ACT nasal spray Spray 1 spray into both nostrils daily     esomeprazole (NEXIUM) 20 MG DR capsule Take 20 mg by mouth every morning (before breakfast) Take 30-60 minutes before eating.     levothyroxine (SYNTHROID/LEVOTHROID) 50 MCG tablet Take 1 tablet (50 mcg) by mouth every morning (before breakfast)     metoprolol tartrate (LOPRESSOR) 25 MG tablet Take 0.5 tablets (12.5 mg) by mouth 2 times daily     nitroGLYcerin (NITROSTAT) 0.4 MG sublingual tablet Place 0.4 mg under the tongue every 5 minutes as needed for chest pain For chest pain place 1 tablet under the tongue every 5 minutes for 3 doses. If symptoms persist 5 minutes after 1st dose call 911.     rosuvastatin (CRESTOR) 40 MG tablet TAKE 1 TABLET(40 MG) BY MOUTH DAILY     sacubitril-valsartan (ENTRESTO) 24-26 MG per tablet Take 1 tablet by mouth daily     vitamin C (ASCORBIC ACID) 500 MG tablet Take 500 mg by mouth daily     Vitamin D3 (CHOLECALCIFEROL) 25 mcg (1000 units) tablet Take 1 tablet by mouth daily     No current facility-administered medications for this visit.       ROS:  10 point ROS of systems including Constitutional, Eyes, Respiratory, Cardiovascular, Gastroenterology, Genitourinary, Integumentary, Musculoskeletal, Psychiatric were all negative except for pertinent positives noted in my HPI.    Vitals:  /59   Pulse 73   Temp 98.1  F (36.7  C)   Resp 18   Ht 1.676 m (5' 6\")   Wt 68 kg (150 lb)   SpO2 98%   BMI 24.21 kg/m    Exam:  GENERAL APPEARANCE:  in no distress,   RESP:  Unlabored breathing. CTA b/l.   CV:  S1S2 audible, regular HR, no murmur " appreciated.   ABDOMEN:  soft, NT/ND, BS audible.   M/S:   no joint deformity noted on observation.   SKIN:  No rash noted on observation  NEURO:   No NFD appreciated on observation.   PSYCH:  affect and mood normal      Lab/Diagnostic data: Reviewed in the chart and EHR.        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  Blood pressure, mentation improved.  Creatinine trended down to 1.13  -TSH 6.07, LT4  and FT4 1.37 (wnl), yet LT4 was increased from 25 mcg to 50 mcg/day!!  -*Evaluated by PMR and felt to benefit from TCU placement.       (I10) Hypertension goal BP (blood pressure) < 140/90  (primary encounter diagnosis)  (I25.10) Coronary artery disease involving native coronary artery of native heart without angina pectoris  (I73.9) Peripheral vascular disease, unspecified (H)  (Z95.2) S/P TAVR (transcatheter aortic valve replacement)  (Z95.5) Presence of coronary angioplasty implant and graft  (Z79.01) Long term current use of anticoagulant therapy  (E78.2) Combined hyperlipidemia  - cardiac wise compensated.   - on multiple agents. Continue. Cardiology routine follow up      Chronic cough:   - Lung CTA b/l, patria allergy related/sinusitis. Able to expectorate phlegm.     Chronic pain syndrome  Spinal stenosis of lumbar region w/o neurogenic claudication  Hx of left shoulder pain   Chronic neck pain  - on tylenol. Was on oxycodone 10 mg bid prn. Family reported follows pain medicine clinic.   - discussed that we are opened to start oxycodone for palliative purpose if pain becomes suboptimal. However, would start at a lower dose and titrate as needed.   - would benefit from following  up with pain medicine clinic nearby.       (E03.9) Acquired hypothyroidism  - will reduce LT4 back to 25 mcg given FT4 is wnl. Elevated TSH is most likely subclinical hypothyroidism.  In frail elderly it is recommended to keep TSH between 5-7.       (C83.10) Mantle cell lymphoma, unspecified body region (H)  - adds to multiple comorbidities    (K21.9) Gastroesophageal reflux disease without esophagitis  - on esomeprazole. PCP to [facility] when feasible.       Electronically signed by:  Stefanie Arguello MD                   Sincerely,        Stefanie Arguello MD

## 2023-09-07 NOTE — PROGRESS NOTES
Patient's discharge was delayed by a day due to timing conflict between  [facility] and [facility]. Will plan to discharge patient to [facility] TCU tomorrow with  [facility].    Patient somnolent but rousable throughout this shift. Oriented to self only. Does not call appropriately; bed alarm in use. Vitals stable; on room air. Denies pain but tenderness noted in L shoulder. Patient declined pain medication. He was assisted with dinner due to somnolence. Appetite fair. Stood at the bedside x 3 with Ax2 to use urinal. No BM this shift. No PIV access.    Temp: (P) 98.4  F (36.9  C) Temp src: (P) Oral BP: (!) 142/47 Pulse: (P) 81   Resp: (P) 16 SpO2: (P) 91 % O2 Device: None (Room air)

## 2023-09-07 NOTE — PROGRESS NOTES
[facility] DISCHARGE NOTE    Patient discharged to transitional care unit at 9:34 AM via wheel chair. Accompanied by other: and staff. Discharge instructions reviewed with patient, opportunity offered to ask questions. Prescriptions sent to patients preferred pharmacy. All belongings sent with patient.    Devika Rodgers RN

## 2023-09-08 NOTE — LETTER
To:             Please give to facility    From:   Amita Patino RN, Care Coordinator   [facility]   Amita Patino RN, Care Coordinator   [facility]'s   E-mail [email or web address]   [phone]   Patient Name:  Dangelo Hernández  YOB: 1935   Admit date: 9/7/2023      *Information Needed:  Please contact me when the patient will discharge (or if they will move to long term care)- include the discharge date, disposition, and main diagnosis   If the patient is discharged with home care services, please provide the name of the agency    Also- Please inform me if a care conference is being held.   [facility]   Amita Patino RN, Care Coordinator   [facility]'s   E-mail [email or web address]   [phone]                             Thank you

## 2023-09-08 NOTE — TELEPHONE ENCOUNTER
[facility] Geriatrics Triage Nurse Telephone Encounter    Provider: Stefanie Arguello MD  Facility: [facility] Facility Type:  TCU    Caller: Raina  Call Back Number: [phone]    Allergies:    Allergies   Allergen Reactions    Amoxicillin Diarrhea           Lisinopril Cough        Reason for call: Nurse is calling to report that patient is c/o pain 8/10 to his neck, which is chronic.  He normally takes Oxycodone 10mg BID at home, however he did not come from the hospital with Oxycodone orders, but was taking it there.      Verbal Order/Direction given by Provider: Oxycodone 2.5mg Q 4 hours PRN for pain 4-6/10 and 5mg Q 4 hours PRN for pain 7-10/10.      Provider giving Order:  Stefanie Arguello MD    Verbal Order given to: Raina Bautista RN

## 2023-09-08 NOTE — PROGRESS NOTES
Clinic Care Coordination Contact  Care Coordination Transition Communication    Referral Source: IP Handoff    Clinical Data:   9/4/2023 - 9/7/2023 (3 days)  [facility]    Discharge Diagnoses  Hypotension, unspecified hypotension type  JESSICA (acute kidney injury)  Acute metabolic encephalopathy  Hypertension goal BP (blood pressure) < 140/90  Presence of coronary angioplasty implant and graft  S/P TAVR (transcatheter aortic valve replacement)  Long term current use of anticoagulant therapy  Combined hyperlipidemia  GERD (gastroesophageal reflux disease)  Acquired hypothyroidism    Transition to Facility:              Facility Name: [facility]              Contact name and phone number/fax: CC RN contact information faxed to TCU and instructed to call when the patient is discharged X    Plan: RN/SW Care Coordinator will await notification from facility staff informing RN/SW Care Coordinator of patient's discharge plans/needs. RN/SW Care Coordinator will review chart and outreach to facility staff every 4 weeks and as needed.     [facility]   Amita Patino RN, Care Coordinator   [facility]'s   E-mail [email or web address]   [phone]

## 2023-09-11 PROBLEM — S01.81XD: Status: ACTIVE | Noted: 2023-01-01

## 2023-09-11 PROBLEM — A41.9 SEPSIS, UNSPECIFIED ORGANISM (H): Status: ACTIVE | Noted: 2023-01-01

## 2023-09-11 PROBLEM — K21.00 GASTRO-ESOPHAGEAL REFLUX DISEASE WITH ESOPHAGITIS, WITHOUT BLEEDING: Status: ACTIVE | Noted: 2023-01-01

## 2023-09-11 PROBLEM — R41.82 ALTERED MENTAL STATUS, UNSPECIFIED: Status: ACTIVE | Noted: 2023-01-01

## 2023-09-11 PROBLEM — M54.50 LOW BACK PAIN, UNSPECIFIED: Status: ACTIVE | Noted: 2023-01-01

## 2023-09-11 PROBLEM — R13.12 DYSPHAGIA, OROPHARYNGEAL PHASE: Status: ACTIVE | Noted: 2023-01-01

## 2023-09-11 PROBLEM — Z73.89 OTHER PROBLEMS RELATED TO LIFE MANAGEMENT DIFFICULTY: Status: ACTIVE | Noted: 2023-01-01

## 2023-09-11 PROBLEM — R29.6 REPEATED FALLS: Status: ACTIVE | Noted: 2023-01-01

## 2023-09-11 PROBLEM — F32.A DEPRESSION, UNSPECIFIED: Status: ACTIVE | Noted: 2023-01-01

## 2023-09-11 NOTE — LETTER
"    9/11/2023        RE: Dangelo Hernández  [address] [facility] [address]        [facility]    Chief Complaint   Patient presents with     Nursing Home Acute     HPI:  Dangelo Hernández is a 88 year old  (1935), who is being seen today for an episodic care visit at: [facility] [[id number]].     Brief history:   Dangelo was admitted to the hospital with confusion, weakness, and hypotension x 3-4 days prior to hospital admission.  He lives alone in his own home.  He was found to have acute kidney injury with creat to 1.52, hypothyroidism with TSH slightly elevated at 6.07 (his levothyroxine was increased to 50 mcg during hospital stay). His metoprolol and entresto were held, he was treated with IV fluids and confusion improved, BP improved, creat improved to 1.13 with IV hydration.  His metoprolol and entresto were resumed on the day of discharge.     Follow up needs:  -recheck TSH as outpatient in about 4 weeks due to change in levothyroxine dosing     Today's concern is:   Dangelo reports he continues to have back pain and is reluctant to do therapy as a result.  He reports this is chronic.  Family notes that he used to take oxycodone 10 mg in the morning and usually at least 1 more time during the day for chronic pain control.  However, this was discontinued during hospital stay.  They would like this to resume.  He also reports chronic pain in both knees, R>L and reports he usually gets \"shots\" in his knees to help with the pain.      BP Readings from Last 3 Encounters:   09/11/23 (!) 197/56   09/07/23 132/45   09/07/23 121/59        Wt Readings from Last 4 Encounters:   09/11/23 71.2 kg (157 lb)   09/07/23 68 kg (150 lb)   08/02/23 68.5 kg (151 lb)   07/03/23 68.6 kg (151 lb 3.2 oz)         Allergies, and PMH/PSH reviewed in EPIC today.  REVIEW OF SYSTEMS:  4 point ROS including Respiratory, CV, GI and , other than that noted in the HPI,  is " "negative    Objective:   BP (!) 197/56   Pulse 80   Temp 97.9  F (36.6  C)   Resp 17   Ht 1.676 m (5' 6\")   Wt 71.2 kg (157 lb)   SpO2 97%   BMI 25.34 kg/m    GENERAL APPEARANCE:  Alert, in moderate  distress at rest due to pain in his back  ENT:  Mouth and posterior oropharynx normal, moist mucous membranes, hearing acuity - hard of hearing   CHEST/RESP:  respiratory effort normal, no respiratory distress, lung sounds CTA    CV:  Rate and rhythm reg, no murmur, 1+ peripheral edema  M/S:   extremities normal,   NEUROLOGIC EXAM: no focal deficit,   PSYCH:  Alert and oriented to self and surroundings , affect pleasant      Assessment/Plan:  Spinal stenosis of lumbar region without neurogenic claudication  Chronic midline thoracic back pain  Patient with known history of chronic back pain, limiting his mobility and limiting his desire to participate in therapy.  He has had chronic oxycodone use for years at home.      -schedule extra strength tylenol for pain control   - oxyCODONE (ROXICODONE) 5 MG tablet; Take 1 tablet (5 mg) by mouth every morning. May also take 0.5-1 tablets (2.5-5 mg) every 4 hours as needed for pain.    Hypertension goal BP (blood pressure) < 140/90  On metoprolol and Entresto, SBP trending 95 - 120 on current regimen. Marcia et al (2018) found that sBPs greater than 170 had improved mortality and improved cognitive retention over sBPs under 140 in patients 85 years and older. Will continue to have nursing monitor this. Based on patient's age of 88 year old and goals of care, BP is within acceptable range.   -monitor vital signs      Acquired hypothyroidism  Last TSH 6.07 on 9/4/23, with FT4 within normal limits, on levothyroxine 25 mcg daily.  TSH slightly high-normal. His levothyroxine was increased during this hospital stay to 50 mcg.     -Noted goal in fela pts 4-5 and  New studies have shown there is no  difference in hypothyroid symptoms or tiredness scores after one year of treatment " if TSH is between the upper reference limit and 9.9 mU/L.  (Odell DJ, Brenda N, Yecenia PM, et al. Thyroid Hormone Therapy for Older Adults with Subclinical Hypothyroidism. N Engl J Med 2017. Treatment with levothyroxine provides no symptomatic benefit in older adults with subclinical hypothyroidism (April 2017 Up to Date)  -The current medical regimen is effective;  continue present plan and medications.    -recommend recheck of TSH in about 4 weeks and may need further titration of levothyroxine     Presence of coronary angioplasty implant and graft  S/P TAVR (transcatheter aortic valve replacement)  Long term current use of anticoagulant therapy  Patient with history of angioplasty, TAVR.  On apixaban but noting that dosing is currently only once daily.  Usual dosing of apixaban is 5 mg BID for this indication.  He has previously been on BID dosing   -increase apixaban to normal dosing schedule       MED REC REQUIRED  Post Medication Reconciliation Status: discharge medications reconciled and changed, per note/orders    Orders:  Extra strength tylenol 1000 mg po TID for chronic pain  Change prn tylenol to 650 mg po daily prn  Oxycodone 5 mg qam and continue 2.5-5 q4h prn pain  Increase apixaban to 5 mg BID po for anticoagulation - this is usual dose  Follow up within 1-2 weeks or prn     Electronically signed by: MURALI Cardenas CNP       Sincerely,        MURALI Cardenas CNP

## 2023-09-11 NOTE — PROGRESS NOTES
"[facility]ERIATRICS    Chief Complaint   Patient presents with    Nursing Home Acute     HPI:  Dangelo Hernández is a 88 year old  (1935), who is being seen today for an episodic care visit at: [facility] ([facility]) [[id number]].     Brief history:   Dangelo was admitted to the hospital with confusion, weakness, and hypotension x 3-4 days prior to hospital admission.  He lives alone in his own home.  He was found to have acute kidney injury with creat to 1.52, hypothyroidism with TSH slightly elevated at 6.07 (his levothyroxine was increased to 50 mcg during hospital stay). His metoprolol and entresto were held, he was treated with IV fluids and confusion improved, BP improved, creat improved to 1.13 with IV hydration.  His metoprolol and entresto were resumed on the day of discharge.     Follow up needs:  -recheck TSH as outpatient in about 4 weeks due to change in levothyroxine dosing     Today's concern is:   Dangelo reports he continues to have back pain and is reluctant to do therapy as a result.  He reports this is chronic.  Family notes that he used to take oxycodone 10 mg in the morning and usually at least 1 more time during the day for chronic pain control.  However, this was discontinued during hospital stay.  They would like this to resume.  He also reports chronic pain in both knees, R>L and reports he usually gets \"shots\" in his knees to help with the pain.      BP Readings from Last 3 Encounters:   09/11/23 (!) 197/56   09/07/23 132/45   09/07/23 121/59        Wt Readings from Last 4 Encounters:   09/11/23 71.2 kg (157 lb)   09/07/23 68 kg (150 lb)   08/02/23 68.5 kg (151 lb)   07/03/23 68.6 kg (151 lb 3.2 oz)         Allergies, and PMH/PSH reviewed in [facility] today.  REVIEW OF SYSTEMS:  4 point ROS including Respiratory, CV, GI and , other than that noted in the HPI,  is negative    Objective:   BP (!) 197/56   Pulse 80   Temp 97.9  F (36.6  C)   Resp 17   Ht 1.676 m (5' 6\")   Wt 71.2 " kg (157 lb)   SpO2 97%   BMI 25.34 kg/m    GENERAL APPEARANCE:  Alert, in moderate  distress at rest due to pain in his back  ENT:  Mouth and posterior oropharynx normal, moist mucous membranes, hearing acuity - hard of hearing   CHEST/RESP:  respiratory effort normal, no respiratory distress, lung sounds CTA    CV:  Rate and rhythm reg, no murmur, 1+ peripheral edema  M/S:   extremities normal,   NEUROLOGIC EXAM: no focal deficit,   PSYCH:  Alert and oriented to self and surroundings , affect pleasant      Assessment/Plan:  Spinal stenosis of lumbar region without neurogenic claudication  Chronic midline thoracic back pain  Patient with known history of chronic back pain, limiting his mobility and limiting his desire to participate in therapy.  He has had chronic oxycodone use for years at home.      -schedule extra strength tylenol for pain control   - oxyCODONE (ROXICODONE) 5 MG tablet; Take 1 tablet (5 mg) by mouth every morning. May also take 0.5-1 tablets (2.5-5 mg) every 4 hours as needed for pain.    Hypertension goal BP (blood pressure) < 140/90  On metoprolol and Entresto, SBP trending 95 - 120 on current regimen. Marcia et al (2018) found that sBPs greater than 170 had improved mortality and improved cognitive retention over sBPs under 140 in patients 85 years and older. Will continue to have nursing monitor this. Based on patient's age of 88 year old and goals of care, BP is within acceptable range.   -monitor vital signs      Acquired hypothyroidism  Last TSH 6.07 on 9/4/23, with FT4 within normal limits, on levothyroxine 25 mcg daily.  TSH slightly high-normal. His levothyroxine was increased during this hospital stay to 50 mcg.     -Noted goal in fela pts 4-5 and  New studies have shown there is no  difference in hypothyroid symptoms or tiredness scores after one year of treatment if TSH is between the upper reference limit and 9.9 mU/L.  (Odell DJ, Brenda N, Yecenia PM, et al. Thyroid Hormone  Therapy for Older Adults with Subclinical Hypothyroidism. N Engl J Med 2017. Treatment with levothyroxine provides no symptomatic benefit in older adults with subclinical hypothyroidism (April 2017 Up to Date)  -The current medical regimen is effective;  continue present plan and medications.    -recommend recheck of TSH in about 4 weeks and may need further titration of levothyroxine     Presence of coronary angioplasty implant and graft  S/P TAVR (transcatheter aortic valve replacement)  Long term current use of anticoagulant therapy  Patient with history of angioplasty, TAVR.  On apixaban but noting that dosing is currently only once daily.  Usual dosing of apixaban is 5 mg BID for this indication.  He has previously been on BID dosing   -increase apixaban to normal dosing schedule       MED REC REQUIRED  Post Medication Reconciliation Status: discharge medications reconciled and changed, per note/orders    Orders:  Extra strength tylenol 1000 mg po TID for chronic pain  Change prn tylenol to 650 mg po daily prn  Oxycodone 5 mg qam and continue 2.5-5 q4h prn pain  Increase apixaban to 5 mg BID po for anticoagulation - this is usual dose  Follow up within 1-2 weeks or prn     Electronically signed by: MURALI Cardenas CNP

## 2023-09-18 NOTE — PROGRESS NOTES
[facility] GERIATRIC SERVICES    Facility:   [facility] ([facility]) [[id number]]   Code Status: DNR      CHIEF COMPLAINT/REASON FOR VISIT:  Chief Complaint   Patient presents with    Discharge Summary Nursing Home       HISTORY:      HPI: Dangelo is a 88 year old male was hospitalized September 4, 2023 through September 6, 2023 secondary to confusion along with weakness and hypotension.  Initially the metoprolol and Entresto were held.  He was treated with IV fluids and the confusion did resolve and the blood pressures did improve.  Initially the creatinine was increased 1.52 and did improve down to 1.13 and the metoprolol and Entresto were resumed on the day of discharge.  His Synthroid was increased to 50 mcg a day rather than 25 mcg a day for TSH level of 6.07.  His work-up initially did show white cell count 6.3 and hemoglobin 10.9 with sodium 140 with potassium 4.0 BUN of 20 and a creatinine 1.52 and then for recheck on September 6 sodium level 141, potassium 3.4, BUN of 10.5 and creatinine 1.13.  Unremarkable liver function tests.  CT of the chest abdomen did show unchanged heterogeneous opacities in both lungs and no acute inflammatory process in the abdomen or pelvis.  CT of the head did not show any evidence of any intracranial process he does have brain atrophy and presumed chronic microvascular ischemic changes.  Briefly he has been in the transitional care unit and this is our first visit today he currently is in a wheelchair.  He states he is ready to go home soon.  He does live out the [place] area and he will not require any services.  For pain he is on scheduled Tylenol 3 times daily can have oxycodone as needed to which his last dose was on September 16 he took 2 doses but prior to that 1 dose on September 13 through 16.  His appetite is good.  Denies any bowel or bladder problems.    Past Medical History:   Diagnosis Date     Coronary artery disease, 4/ 2010, status post drug-eluting stent placement  to the LAD and balloon angioplasty to the obtuse marginal.  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    LDL goal <70     Coronary artery disease     stents placed 6/10/2013    Cryptogenic organizing pneumonia (H) 2012    GERD (gastroesophageal reflux disease)     History of blood disorder 2011    Hypertension     Hypertension goal BP (blood pressure) < 140/90 2010    Malignant neoplasm (H)     BMT; Mantle cell lymphoma    Mantle Cell Lymphoma S/P Autologous Transplant 2010            Family History   Problem Relation Age of Onset    Cancer Mother         Lung--did not smoke    Cardiovascular Father         MI age 73    Lipids Sister     Hypertension Sister     Cancer Sister         Ovarian- at age 65      Social History     Socioeconomic History    Marital status:    Occupational History     Employer: RETIRED   Tobacco Use    Smoking status: Former     Packs/day: 0.50     Years: 19.00     Pack years: 9.50     Types: Cigarettes     Start date:      Quit date: 1975     Years since quittin.7    Smokeless tobacco: Former     Types: Snuff    Tobacco comments:     started smoking at 21 years of age   Vaping Use    Vaping Use: Never used   Substance and Sexual Activity    Alcohol use: Yes     Alcohol/week: 0.0 standard drinks of alcohol     Comment: occ.     Drug use: No    Sexual activity: Not Currently     Partners: Female   Other Topics Concern    Parent/sibling w/ CABG, MI or angioplasty before 65F 55M? No    Special Diet No    Exercise Yes     Comment: active        REVIEW OF SYSTEM:  He currently denies any new symptoms of fever cough or cold sore throat postnasal drip shortness of breath dyspnea wheezing chest pain dizziness or vertigo nausea vomiting diarrhea dysuria frequency or  urgency.    PHYSICAL EXAM:   Pleasant gentleman in no acute distress.  Head is normocephalic.  Conjunctiva is pink and sclera is clear.  Neck is supple without adenopathy.  Lung sounds are clear throughout.  Cardiovascular S1-S2 regular rate and rhythm and no lower extremity edema.  Aortic valve.  Gastrointestinal soft and nontender.  Musculoskeletal working with therapy his pain is managed.  Psychiatric: Pleasant affect  There were no vitals taken for this visit.  Apical pulse 76 with respirations of 18  LABS:   Last Comprehensive Metabolic Panel:  Lab Results   Component Value Date     09/06/2023    POTASSIUM 3.4 09/06/2023    CHLORIDE 108 (H) 09/06/2023    CO2 24 09/06/2023    ANIONGAP 9 09/06/2023    GLC 96 09/06/2023    BUN 10.5 09/06/2023    CR 1.13 09/06/2023    GFRESTIMATED 63 09/06/2023    CHAVEZ 8.9 09/06/2023       CBC RESULTS:   Recent Labs   Lab Test 09/05/23  0455   WBC 6.7   RBC 3.71*   HGB 11.1*   HCT 33.9*   MCV 91   MCH 29.9   MCHC 32.7   RDW 15.0        TSH   Date Value Ref Range Status   09/04/2023 6.07 (H) 0.30 - 4.20 uIU/mL Final   11/12/2019 4.22 (H) 0.40 - 4.00 mU/L Final     T4 Free   Date Value Ref Range Status   11/12/2019 1.16 0.76 - 1.46 ng/dL Final     Free T4   Date Value Ref Range Status   09/04/2023 1.37 0.90 - 1.70 ng/dL Final         ASSESSMENT:    Encounter Diagnoses   Name Primary?    Hypertension goal BP (blood pressure) < 140/90 Yes    S/P TAVR (transcatheter aortic valve replacement)     JESSICA (acute kidney injury) (H)     Muscle weakness (generalized)        MEDICAL EQUIPMENT NEEDS:  None    DISCHARGE PLAN/FACE TO FACE:  I certify that services are/were furnished while this patient was under the care of a physician and that a physician or an allowed non-physician practitioner (NPP), had a face-to-face encounter that meets the physician face-to-face encounter requirements. The encounter was in whole, or in part, related to the primary reason for home health. The  patient is confined to his/her home and needs intermittent skilled nursing, physical therapy, speech-language pathology, or the continued need for occupational therapy. A plan of care has been established by a physician and is periodically reviewed by a physician.  Date of Face-to-Face Encounter: September 18, 2023    I certify that, based on my findings, the following services are medically necessary home health services: He will now be discharging to home with current medications and narcotics with an approximated discharge date of September 19, 2023 and according to the discharge sheet there were no home services checked off.      My clinical findings support the need for the above skilled services because: (Please write a brief narrative summary that describes what the RN, PT, SLP, or other services will be doing in the home. A list of diagnoses in this section does not meet the CMS requirements.)  He will not require any extra support or skilled services    This patient is homebound because: (Please write a brief narrative summary describing the functional limitations as to why this patient is homebound and specifically what makes this patient homebound.)  He will not be homebound    The patient is, or has been, under my care and I have initiated the establishment of the plan of care. This patient will be followed by a physician who will periodically review the plan of care.    Schedule follow up visit with primary care provider within 7 days to reestablish care.  He will follow-up with his primary care doctor regarding medication management and any future laboratory studies.  He does have a follow-up visit with the heart clinic on September 27.  He does feel comfortable with his quick stay on the transitional care unit as well as his current medications.  He did not have any other questions.    Discharge coordination care greater than 30 minutes    Electronically signed by: Jordan Coelho NP

## 2023-09-25 NOTE — PROGRESS NOTES
Clinic Care Coordination Contact  Clinic Care Coordination Contact  OUTREACH    Referral Information:  Referral Source: IP Handoff    Primary Diagnosis: Other (include Comment box) (Hypotension JESSICA)    Chief Complaint   Patient presents with    Clinic Care Coordination - Post Hospital     Clinic Care Coordination RN         Universal Utilization:   9/4/2023 - 9/7/2023 (3 days)  [facility]    Discharge Diagnoses  Hypotension, unspecified hypotension type  JESSICA (acute kidney injury)  Acute metabolic encephalopathy  Hypertension goal BP (blood pressure) < 140/90  Presence of coronary angioplasty implant and graft  S/P TAVR (transcatheter aortic valve replacement)  Long term current use of anticoagulant therapy  Combined hyperlipidemia  GERD (gastroesophageal reflux disease)  Acquired hypothyroidism     Transition to Facility:              Facility [facility]: [facility] Discharged 9/19/2023  Clinic Utilization  Difficulty keeping appointments:: No  Compliance Concerns: No  No-Show Concerns: No  No PCP office visit in Past Year: No  Utilization      Hospital Admissions  1             ED Visits  2             No Show Count (past year)  0                    Current as of: 9/21/2023  1:22 PM                Clinical Concerns:  Current Medical Concerns:  Talked to patient and he referred writer to call daughter Quynh .    CC RN spoke to Quynh and she states the patient is getting around well with a walker .     Patient lives alone and is receiving Home care OT/PT   Daughters live close by and check on the patient frequently  Going to reactivate life alert for emergencies     Current Behavioral Concerns: No    Education Provided to patient: CC RN avaialble in the future if needed    Pain  Pain (GOAL):: No  Health Maintenance Reviewed: Not assessed  Clinical Pathway: None    Medication Management:  Medication review status:   Reviewed by home care staff      Functional Status:  Dependent ADLs::  Ambulation-walker, Eating, Grooming, Bathing  Dependent IADLs:: Cleaning, Cooking, Laundry, Shopping, Meal Preparation, Medication Management, Transportation  Mobility Status: Independent w/Device    Living Situation:  Current living arrangement:: I live in a private home    Lifestyle & Psychosocial Needs:    Social Determinants of Health     Food Insecurity: Not on file   Depression: Not at risk (7/26/2023)    PHQ-2     PHQ-2 Score: 0   Housing Stability: Not on file   Tobacco Use: Medium Risk (9/13/2023)    Patient History     Smoking Tobacco Use: Former     Smokeless Tobacco Use: Former     Passive Exposure: Not on file   Financial Resource Strain: Not on file   Alcohol Use: Not on file   Transportation Needs: Not on file   Physical Activity: Not on file   Interpersonal Safety: Not on file   Stress: Not on file   Social Connections: Not on file     Inadequate nutrition (GOAL):: No  Tube Feeding: No  Inadequate activity/exercise (GOAL):: No  Significant changes in sleep pattern (GOAL): No  Transportation means:: Family     Mental health DX:: No  Mental health management concern (GOAL):: No  Chemical Dependency Status: No Current Concerns  Informal Support system:: Children           Resources and Interventions:  Current Resources:   Skilled Home Care Services: Skilled Nursing, Physical Therapy, Occupational Therapy  Community Resources: PCA, Home Care (3x a week 2 hours each day)  Supplies Currently Used at Home: None  Equipment Currently Used at Home: walker, standard  Employment Status: retired         Advance Care Plan/Directive  Advanced Care Plans/Directives on file:: Yes  Type Advanced Care Plans/Directives: Advanced Directive - On File, POLST    Referrals Placed: None    Patient/Caregiver understanding: Peg/daughter expresses good understanding of discharge instructions        Future Appointments                In 2 days [address] [place]            Plan:    Daughter will make a follow up visit with PCP   Tomorrow family is touring an AL facility  No unmet needs, no further care coordination needed at this time   Daughter will call CC RN in the future if needed     [facility]   Amita Patino RN, Care Coordinator   [facility]   E-mail [email or web address]   [phone]

## 2023-11-08 NOTE — PROGRESS NOTES
"  Assessment & Plan     S/P TAVR (transcatheter aortic valve replacement)  Needs to establish with Cardiology. Referral placed.   - apixaban ANTICOAGULANT (ELIQUIS) 5 MG tablet  Dispense: 60 tablet; Refill: 3  - clopidogrel (PLAVIX) 75 MG tablet  Dispense: 90 tablet; Refill: 1  - Adult Cardiology Eval  Referral    Gastroesophageal reflux disease without esophagitis  Stable, refill provided   - esomeprazole (NEXIUM) 20 MG DR capsule  Dispense: 90 capsule; Refill: 1    Acquired hypothyroidism  Refill provided. Check labs today  - levothyroxine (SYNTHROID/LEVOTHROID) 50 MCG tablet  Dispense: 90 tablet; Refill: 1  - TSH with free T4 reflex    Hypertensive heart disease without heart failure  - metoprolol tartrate (LOPRESSOR) 25 MG tablet  Dispense: 90 tablet; Refill: 1    Coronary artery disease involving native coronary artery of native heart with angina pectoris (H24)  - rosuvastatin (CRESTOR) 40 MG tablet  Dispense: 90 tablet; Refill: 1  - sacubitril-valsartan (ENTRESTO) 24-26 MG per tablet  Dispense: 90 tablet; Refill: 1  - Adult Cardiology Eval  Referral  - Lipid panel reflex to direct LDL Non-fasting    Presence of coronary angioplasty implant and graft         MED REC REQUIRED  Post Medication Reconciliation Status: discharge medications reconciled, continue medications without change  BMI:   Estimated body mass index is 26.47 kg/m  as calculated from the following:    Height as of this encounter: 1.676 m (5' 6\").    Weight as of this encounter: 74.4 kg (164 lb).   Weight management plan: Discussed healthy diet and exercise guidelines    The risks, benefits and treatment options of prescribed medications or other treatments have been discussed with the patient. The patient verbalized their understanding and should call or follow up if no improvement or if they develop further problems.      Elian Shafer DO  [facility]    Beata Pierson is a 88 year old, presenting " "for the following health issues:  Hospital F/U        11/8/2023    11:27 AM   Additional Questions   Roomed by Rosalia CHANCE   Accompanied by daughter, Quynh       HPI     88 year old male who presents to clinic for follow up after discharge from [facility] the [facility].     Chief Complaint   Patient presents with    Hospital F/U    Eye Problem     Left eye    Trauma     Sore from a fall 2 weeks ago on the left leg not healing.     After being discharged had around the clock care but patient did not want this any longer.   Quynh daughter goes over daily to help with breakfast and dinner.     Granddaughter comes in to help with the house cleaning.     Leg lesion   Ongoing, no issues. Healing well.     Left eye   Red when waking up. No eye pain. No vision changes. No trauma. No fevers.         Hospital Follow-up Visit:    Hospital/Nursing Home/IP Rehab Facility: [facility]  Date of Admission: 9/6/2023  Date of Discharge: 9/18/2023  Reason(s) for Admission: Hypertension/hypotension and weakness.    Was your hospitalization related to COVID-19? No   Problems taking medications regularly:  misses some of the evenings.  Medication changes since discharge: None  Problems adhering to non-medication therapy:  None, has a hard time getting ready for his appointments    Summary of hospitalization:  [facility] discharge summary reviewed  Diagnostic Tests/Treatments reviewed.  Follow up needed: tsh  Other Healthcare Providers Involved in Patient s Care:         None  Update since discharge: improved.         Plan of care communicated with patient and daughter             Review of Systems   Constitutional, HEENT, cardiovascular, pulmonary, gi and gu systems are negative, except as otherwise noted.      Objective    /50 (BP Location: Right arm, Patient Position: Chair, Cuff Size: Adult Regular)   Pulse 88   Temp 97.9  F (36.6  C) (Oral)   Resp 20   Ht 1.676 m (5' 6\")   Wt 74.4 kg (164 lb)   SpO2 97%   " BMI 26.47 kg/m    Body mass index is 26.47 kg/m .  Physical Exam   General:  no acute distress   CV: RRR  Resp: non-labored breathing  Abdomen: Soft, non-tender, no guarding.   Extremities: No peripheral edema, calves non-tender.   Leg: healed wound on anterior left knee.

## 2023-11-08 NOTE — LETTER
November 10, 2023      Dangelo Hernández  [address] [place] [id number]        Dear ,    We are writing to inform you of your test results.    TSH testing satisfactory.     Cholesterol testing satisfactory.     Resulted Orders   TSH with free T4 reflex   Result Value Ref Range    TSH 3.00 0.30 - 4.20 uIU/mL   Lipid panel reflex to direct LDL Non-fasting   Result Value Ref Range    Cholesterol 149 <200 mg/dL    Triglycerides 98 <150 mg/dL    Direct Measure HDL 54 >=40 mg/dL    LDL Cholesterol Calculated 75 <=100 mg/dL    Non HDL Cholesterol 95 <130 mg/dL    Narrative    Cholesterol  Desirable:  <200 mg/dL    Triglycerides  Normal:  Less than 150 mg/dL  Borderline High:  150-199 mg/dL  High:  200-499 mg/dL  Very High:  Greater than or equal to 500 mg/dL    Direct Measure HDL  Female:  Greater than or equal to 50 mg/dL   Male:  Greater than or equal to 40 mg/dL    LDL Cholesterol  Desirable:  <100mg/dL  Above Desirable:  100-129 mg/dL   Borderline High:  130-159 mg/dL   High:  160-189 mg/dL   Very High:  >= 190 mg/dL    Non HDL Cholesterol  Desirable:  130 mg/dL  Above Desirable:  130-159 mg/dL  Borderline High:  160-189 mg/dL  High:  190-219 mg/dL  Very High:  Greater than or equal to 220 mg/dL       If you have any questions or concerns, please call the clinic at the number listed above.       Sincerely,      Elian Shafer, DO

## 2023-11-08 NOTE — PATIENT INSTRUCTIONS
Medication refills provided today.   Lab work today.     Let me know if wish to continue physical therapy.       Monitor the leg and the eye.

## 2023-12-29 NOTE — ED NOTES
[facility]   Admission Handoff    The patient is Dangelo Hernández, 88 year old who arrived in the ED by AMBULANCE from home with a complaint of Fall  . The patient's current symptoms are new and during this time the symptoms have remained the same. In the ED, patient was diagnosed with   Final diagnoses:   Generalized weakness         Needed?: No    Allergies:    Allergies   Allergen Reactions    Amoxicillin Diarrhea           Lisinopril Cough       Past Medical Hx:   Past Medical History:   Diagnosis Date     Coronary artery disease, 4/ 2010, status post drug-eluting stent placement to the LAD and balloon angioplasty to the obtuse marginal.  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    LDL goal <70     Coronary artery disease     stents placed 6/10/2013    Cryptogenic organizing pneumonia (H) 12/07/2012    GERD (gastroesophageal reflux disease)     History of blood disorder 04/06/2011    Hypertension     Hypertension goal BP (blood pressure) < 140/90 12/09/2010    Malignant neoplasm (H)     BMT; Mantle cell lymphoma    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    Pneumonia in mycoses 09/09/2011    Sepsis, unspecified organism (H) 06/20/2023       Initial vitals were: BP: 126/81  Pulse: (!) 121  Temp: 98.3  F (36.8  C)  Resp: 22  SpO2: 93 %   Recent vital Signs: /81   Pulse (!) 121   Temp 98.3  F (36.8  C) (Oral)   Resp 22   SpO2 93%     Elimination Status: Continent: yes-up to BS with assist of 1-2    Activity Level: 1-2 assist     Fall Status: Reason for falls risk:  Mobility and Reason for falls risk: Cognition  bed/chair alarm on,  nonskid shoes/slippers when out of bed, arm band in place, patient and family education, assistive device/personal items within reach, and activity supervised    Baseline Mental status: WDL  Current Mental Status changes: at basesline    Infection present or suspected this encounter: no  Sepsis suspected: No    Isolation type: N/A    Bariatric equipment needed?: No    In the ED these meds were given:   Medications   acetaminophen (TYLENOL) tablet 1,000 mg (1,000 mg Oral $Given 12/29/23 1502)       Drips running?  No    Home pump  No    Current LDAs: Peripheral IV: Site Right AC; Gauge 20  none     Results:   Labs/Imaging  Ordered and Resulted from Time of ED Arrival Up to the Time of Departure from the ED  Results for orders placed or performed during the hospital encounter of 12/29/23 (from the past 24 hour(s))   Symptomatic Influenza A/B, RSV, & SARS-CoV2 PCR (COVID-19) Nose    Specimen: Nose; Swab   Result Value Ref Range    Influenza A PCR Negative Negative    Influenza B PCR Negative Negative    RSV PCR Negative Negative    SARS CoV2 PCR Negative Negative    Narrative    Testing was performed using the Xpert Xpress CoV2/Flu/RSV Assay on the [employer] GeneXpert Instrument. This test should be ordered for the detection of SARS-CoV-2, influenza, and RSV viruses in individuals who meet clinical and/or epidemiological criteria. Test performance is unknown in asymptomatic patients. This test is for in vitro diagnostic use under the FDA EUA for laboratories certified under CLIA to perform high or moderate complexity testing. This test has not been FDA cleared or approved. A negative result does not rule out the presence of PCR inhibitors in the specimen or target RNA in concentration below the limit of detection for the assay. If only one viral target is positive but coinfection with multiple targets is suspected, the sample should be re-tested with another FDA cleared, approved, or authorized test, if coinfection would  change clinical management. This test was validated by the [facility]. These laboratories are certified under the Clinical Laboratory Improvement Amendments of 1988 (CLIA-88) as qualified to perform high complexity laboratory testing.   CBC with platelets differential    Narrative    The following orders were created for panel order CBC with platelets differential.  Procedure                               Abnormality         Status                     ---------                               -----------         ------                     CBC with platelets and d...[[id number]]  Abnormal            Final result                 Please view results for these tests on the individual orders.   Comprehensive metabolic panel   Result Value Ref Range    Sodium 140 135 - 145 mmol/L    Potassium 3.4 3.4 - 5.3 mmol/L    Carbon Dioxide (CO2) 19 (L) 22 - 29 mmol/L    Anion Gap 15 7 - 15 mmol/L    Urea Nitrogen 23.9 (H) 8.0 - 23.0 mg/dL    Creatinine 1.17 0.67 - 1.17 mg/dL    GFR Estimate 61 >60 mL/min/1.73m2    Calcium 9.3 8.8 - 10.2 mg/dL    Chloride 106 98 - 107 mmol/L    Glucose 116 (H) 70 - 99 mg/dL    Alkaline Phosphatase 48 40 - 150 U/L    AST 17 0 - 45 U/L    ALT 9 0 - 70 U/L    Protein Total 6.3 (L) 6.4 - 8.3 g/dL    Albumin 3.8 3.5 - 5.2 g/dL    Bilirubin Total 0.3 <=1.2 mg/dL   Troponin T, High Sensitivity   Result Value Ref Range    Troponin T, High Sensitivity 47 (H) <=22 ng/L   CBC with platelets and differential   Result Value Ref Range    WBC Count 11.1 (H) 4.0 - 11.0 10e3/uL    RBC Count 3.75 (L) 4.40 - 5.90 10e6/uL    Hemoglobin 11.2 (L) 13.3 - 17.7 g/dL    Hematocrit 33.7 (L) 40.0 - 53.0 %    MCV 90 78 - 100 fL    MCH 29.9 26.5 - 33.0 pg    MCHC 33.2 31.5 - 36.5 g/dL    RDW 15.3 (H) 10.0 - 15.0 %    Platelet Count 253 150 - 450 10e3/uL    % Neutrophils 87 %    % Lymphocytes 6 %    % Monocytes 7 %    % Eosinophils 0 %    % Basophils 0 %    % Immature Granulocytes 0 %    NRBCs per 100 WBC 0 <1  /100    Absolute Neutrophils 9.6 (H) 1.6 - 8.3 10e3/uL    Absolute Lymphocytes 0.7 (L) 0.8 - 5.3 10e3/uL    Absolute Monocytes 0.7 0.0 - 1.3 10e3/uL    Absolute Eosinophils 0.1 0.0 - 0.7 10e3/uL    Absolute Basophils 0.1 0.0 - 0.2 10e3/uL    Absolute Immature Granulocytes 0.0 <=0.4 10e3/uL    Absolute NRBCs 0.0 10e3/uL   UA with Microscopic reflex to Culture    Specimen: Urine, Midstream   Result Value Ref Range    Color Urine Apryl (A) Colorless, Straw, Light Yellow, Yellow    Appearance Urine Slightly Cloudy (A) Clear    Glucose Urine Negative Negative mg/dL    Bilirubin Urine Negative Negative    Ketones Urine Negative Negative mg/dL    Specific Gravity Urine 1.023 1.003 - 1.035    Blood Urine Negative Negative    pH Urine 5.0 5.0 - 7.0    Protein Albumin Urine 100 (A) Negative mg/dL    Urobilinogen Urine Normal Normal, 2.0 mg/dL    Nitrite Urine Negative Negative    Leukocyte Esterase Urine Negative Negative    Mucus Urine Present (A) None Seen /LPF    RBC Urine 3 (H) <=2 /HPF    WBC Urine 3 <=5 /HPF    Squamous Epithelials Urine <1 <=1 /HPF    Narrative    Urine Culture not indicated   CT Head w/o Contrast    Narrative    EXAM: CT HEAD W/O CONTRAST  12/29/2023 12:44 PM     HISTORY: Fall chronic anticoagulation       COMPARISON: Head CT: 9/4/2023.    TECHNIQUE: Using multidetector thin collimation helical acquisition  technique, axial, coronal and sagittal CT images from the skull base  to the vertex were obtained without intravenous contrast.   (topogram) image(s) also obtained and reviewed.    Radiation dose for this scan was reduced using automated exposure  control, adjustment of the mA and/or kV according to patient size, or  iterative reconstruction technique.    FINDINGS:    Calvarium/skull base: No acute calvarial fracture. No destructive  osseous lesions.  Mastoids and middle ears are clear.    Orbits: Bilateral pseudophakia.    Paranasal sinuses: No substantial paranasal sinus disease.    Brain:  No acute intracranial hemorrhage.  No evidence of acute large  vascular territory ischemic infarct.  No mass effect.  No  hydrocephalus.  Basal cisterns are patent. Calcified intracranial  atherosclerosis. Changes suggestive of moderate to advanced chronic  microvascular ischemic disease. Moderate generalized parenchymal  atrophy.      Impression    IMPRESSION:    1.  No evidence of acute traumatic intracranial abnormality.  2.  Chronic changes as detailed above.    OLENA PALACIOS MD         SYSTEM ID:  [id number]   CT Cervical Spine w/o Contrast    Narrative    EXAM: CT CERVICAL SPINE W/O CONTRAST  12/29/2023 12:44 PM     HISTORY: Fall neck pain       COMPARISON: Thoracic spine MRI: 5/18/2018. Neck CT: 9/2/2010.    TECHNIQUE: Using multidetector thin collimation helical acquisition  technique, axial, coronal and sagittal CT images through the cervical  spine were obtained without intravenous contrast.    Radiation dose for this scan was reduced using automated exposure  control, adjustment of the mA and/or kV according to patient size, or  iterative reconstruction technique.    FINDINGS:    VERTEBRAE:  No acute fracture or traumatic malalignment of the  cervical spine. Similar trace anterolisthesis of C3 on C4. Pronounced  kyphosis centered at T2. Vertebral body heights are maintained.  Osteopenia.    SPINAL CANAL/NEURAL FORAMINA:  Multilevel degenerative disc disease  through the cervical spine with disc height loss greatest and moderate  at C4-C5 and C5-C6. Spinal canal stenosis is greatest and mild to  moderate at C3-C4. Multilevel facet arthropathy throughout the  cervical spine, greatest and advanced on the right at C3-C4. In  combination with uncovertebral spurring/hypertrophy, there are varying  degrees of neural foraminal stenosis throughout the cervical spine.  This is most pronounced and moderate to advanced in the left at C3-C4.    OTHER: No prevertebral soft tissue edema.  Calcified atherosclerosis  of  the cervical carotid arteries. Scarring in the lung apices.      Impression    IMPRESSION:    1. No acute fracture or traumatic malalignment of the cervical spine.  2. Multilevel cervical spondylosis as detailed above.    OLENA PALACIOS MD         SYSTEM ID:  [id number]   Chest CT w/o contrast    Narrative    CT CHEST WITHOUT CONTRAST December 29, 2023 12:45 PM    CLINICAL HISTORY: Fall, cough.    TECHNIQUE: CT chest without IV contrast. Multiplanar reformats were  obtained. Dose reduction techniques were used.  CONTRAST: None.    COMPARISON: CT 9/4/2023.    FINDINGS:   LUNGS AND PLEURA: No effusions. No pneumothorax. Stable appearance of  bilateral regions of bronchiectasis, and interstitial thickening. This  is associated with ground-glass opacities extending to the lower lungs  that appears stable as well. Upper lobe predominant emphysema. No new  airspace disease identified.    MEDIASTINUM/AXILLAE: Stable aortic valve prosthesis. Stable  calcifications of the thoracic aorta and mitral annular region. No new  adenopathy or fluid collection.    CORONARY ARTERY CALCIFICATION: Previous intervention (stents or CABG).    UPPER ABDOMEN: No acute upper abdominal abnormalities.    MUSCULOSKELETAL: No acute displaced fracture identified. Mild height  loss at T6 appears stable.      Impression    IMPRESSION:   1.  No acute traumatic abnormality identified.  2.  Stable bilateral interstitial thickening with bronchiectasis and  nonspecific pulmonary ground-glass opacities. Consider pulmonary  consultation for further workup of potential interstitial lung  disease.         For the majority of the shift this patient's behavior was Green     Cardiac Rhythm: Tachycardia  Pt needs tele? No  Skin/wound Issues: None    Code Status: Full Code    Pain control: fair-Chronic left shoulder pain    Nausea control: pt had none    Abnormal labs/tests/findings requiring intervention:     Patient tested for COVID 19 prior to admission: NO      OBS brochure/video discussed/provided to patient/family: Yes     Family present during ED course? Yes     Family Comments/Social Situation comments: Lives at home by self-daughter and granddaughter visit daily and assist patient as needed.     Tasks needing completion: None    Yoselin Mcdonald RN

## 2023-12-29 NOTE — ED TRIAGE NOTES
Triage Assessment (Adult)       Row Name 12/29/23 1100          Triage Assessment    Airway WDL WDL        Respiratory WDL    Respiratory WDL WDL        Cardiac WDL    Cardiac WDL X     Cardiac Rhythm ST        Peripheral/Neurovascular WDL    Peripheral Neurovascular WDL WDL        Cognitive/Neuro/Behavioral WDL    Cognitive/Neuro/Behavioral WDL WDL

## 2023-12-29 NOTE — ED PROVIDER NOTES
Emergency Department Patient Sign-out       Brief HPI:  This is a 88 year old male signed out to me by Dr. Coelho .  See initial ED Provider note for details of the presentation. CAD, too weak, hypotension, ?falls, anticoagulation.   bronchiectasis on chest xray ?COPD related chronically.  unclear cause of hypotension.      Significant Events prior to my assuming care:       Exam:   Patient Vitals for the past 24 hrs:   BP Temp Temp src Pulse Resp SpO2   12/29/23 1055 126/81 98.3  F (36.8  C) Oral (!) 121 22 93 %           ED RESULTS:   Results for orders placed or performed during the hospital encounter of 12/29/23 (from the past 24 hour(s))   Symptomatic Influenza A/B, RSV, & SARS-CoV2 PCR (COVID-19) Nose     Status: Normal    Collection Time: 12/29/23 11:09 AM    Specimen: Nose; Swab   Result Value Ref Range    Influenza A PCR Negative Negative    Influenza B PCR Negative Negative    RSV PCR Negative Negative    SARS CoV2 PCR Negative Negative    Narrative    Testing was performed using the Xpert Xpress CoV2/Flu/RSV Assay on the [employer] GeneXpert Instrument. This test should be ordered for the detection of SARS-CoV-2, influenza, and RSV viruses in individuals who meet clinical and/or epidemiological criteria. Test performance is unknown in asymptomatic patients. This test is for in vitro diagnostic use under the FDA EUA for laboratories certified under CLIA to perform high or moderate complexity testing. This test has not been FDA cleared or approved. A negative result does not rule out the presence of PCR inhibitors in the specimen or target RNA in concentration below the limit of detection for the assay. If only one viral target is positive but coinfection with multiple targets is suspected, the sample should be re-tested with another FDA cleared, approved, or authorized test, if coinfection would change clinical management. This test was validated by the [facility]. These laboratories  are certified under the Clinical Laboratory Improvement Amendments of 1988 (CLIA-88) as qualified to perform high complexity laboratory testing.   CBC with platelets differential     Status: Abnormal    Collection Time: 12/29/23 11:21 AM    Narrative    The following orders were created for panel order CBC with platelets differential.  Procedure                               Abnormality         Status                     ---------                               -----------         ------                     CBC with platelets and d...[[id number]]  Abnormal            Final result                 Please view results for these tests on the individual orders.   Comprehensive metabolic panel     Status: Abnormal    Collection Time: 12/29/23 11:21 AM   Result Value Ref Range    Sodium 140 135 - 145 mmol/L    Potassium 3.4 3.4 - 5.3 mmol/L    Carbon Dioxide (CO2) 19 (L) 22 - 29 mmol/L    Anion Gap 15 7 - 15 mmol/L    Urea Nitrogen 23.9 (H) 8.0 - 23.0 mg/dL    Creatinine 1.17 0.67 - 1.17 mg/dL    GFR Estimate 61 >60 mL/min/1.73m2    Calcium 9.3 8.8 - 10.2 mg/dL    Chloride 106 98 - 107 mmol/L    Glucose 116 (H) 70 - 99 mg/dL    Alkaline Phosphatase 48 40 - 150 U/L    AST 17 0 - 45 U/L    ALT 9 0 - 70 U/L    Protein Total 6.3 (L) 6.4 - 8.3 g/dL    Albumin 3.8 3.5 - 5.2 g/dL    Bilirubin Total 0.3 <=1.2 mg/dL   Troponin T, High Sensitivity     Status: Abnormal    Collection Time: 12/29/23 11:21 AM   Result Value Ref Range    Troponin T, High Sensitivity 47 (H) <=22 ng/L   CBC with platelets and differential     Status: Abnormal    Collection Time: 12/29/23 11:21 AM   Result Value Ref Range    WBC Count 11.1 (H) 4.0 - 11.0 10e3/uL    RBC Count 3.75 (L) 4.40 - 5.90 10e6/uL    Hemoglobin 11.2 (L) 13.3 - 17.7 g/dL    Hematocrit 33.7 (L) 40.0 - 53.0 %    MCV 90 78 - 100 fL    MCH 29.9 26.5 - 33.0 pg    MCHC 33.2 31.5 - 36.5 g/dL    RDW 15.3 (H) 10.0 - 15.0 %    Platelet Count 253 150 - 450 10e3/uL    % Neutrophils 87 %    %  Lymphocytes 6 %    % Monocytes 7 %    % Eosinophils 0 %    % Basophils 0 %    % Immature Granulocytes 0 %    NRBCs per 100 WBC 0 <1 /100    Absolute Neutrophils 9.6 (H) 1.6 - 8.3 10e3/uL    Absolute Lymphocytes 0.7 (L) 0.8 - 5.3 10e3/uL    Absolute Monocytes 0.7 0.0 - 1.3 10e3/uL    Absolute Eosinophils 0.1 0.0 - 0.7 10e3/uL    Absolute Basophils 0.1 0.0 - 0.2 10e3/uL    Absolute Immature Granulocytes 0.0 <=0.4 10e3/uL    Absolute NRBCs 0.0 10e3/uL   UA with Microscopic reflex to Culture     Status: Abnormal    Collection Time: 12/29/23 11:47 AM    Specimen: Urine, Midstream   Result Value Ref Range    Color Urine Apryl (A) Colorless, Straw, Light Yellow, Yellow    Appearance Urine Slightly Cloudy (A) Clear    Glucose Urine Negative Negative mg/dL    Bilirubin Urine Negative Negative    Ketones Urine Negative Negative mg/dL    Specific Gravity Urine 1.023 1.003 - 1.035    Blood Urine Negative Negative    pH Urine 5.0 5.0 - 7.0    Protein Albumin Urine 100 (A) Negative mg/dL    Urobilinogen Urine Normal Normal, 2.0 mg/dL    Nitrite Urine Negative Negative    Leukocyte Esterase Urine Negative Negative    Mucus Urine Present (A) None Seen /LPF    RBC Urine 3 (H) <=2 /HPF    WBC Urine 3 <=5 /HPF    Squamous Epithelials Urine <1 <=1 /HPF    Narrative    Urine Culture not indicated   CT Head w/o Contrast     Status: None    Collection Time: 12/29/23 12:44 PM    Narrative    EXAM: CT HEAD W/O CONTRAST  12/29/2023 12:44 PM     HISTORY: Fall chronic anticoagulation       COMPARISON: Head CT: 9/4/2023.    TECHNIQUE: Using multidetector thin collimation helical acquisition  technique, axial, coronal and sagittal CT images from the skull base  to the vertex were obtained without intravenous contrast.   (topogram) image(s) also obtained and reviewed.    Radiation dose for this scan was reduced using automated exposure  control, adjustment of the mA and/or kV according to patient size, or  iterative reconstruction  technique.    FINDINGS:    Calvarium/skull base: No acute calvarial fracture. No destructive  osseous lesions.  Mastoids and middle ears are clear.    Orbits: Bilateral pseudophakia.    Paranasal sinuses: No substantial paranasal sinus disease.    Brain: No acute intracranial hemorrhage.  No evidence of acute large  vascular territory ischemic infarct.  No mass effect.  No  hydrocephalus.  Basal cisterns are patent. Calcified intracranial  atherosclerosis. Changes suggestive of moderate to advanced chronic  microvascular ischemic disease. Moderate generalized parenchymal  atrophy.      Impression    IMPRESSION:    1.  No evidence of acute traumatic intracranial abnormality.  2.  Chronic changes as detailed above.    OLENA PALACIOS MD         SYSTEM ID:  [id number]   CT Cervical Spine w/o Contrast     Status: None    Collection Time: 12/29/23 12:44 PM    Narrative    EXAM: CT CERVICAL SPINE W/O CONTRAST  12/29/2023 12:44 PM     HISTORY: Fall neck pain       COMPARISON: Thoracic spine MRI: 5/18/2018. Neck CT: 9/2/2010.    TECHNIQUE: Using multidetector thin collimation helical acquisition  technique, axial, coronal and sagittal CT images through the cervical  spine were obtained without intravenous contrast.    Radiation dose for this scan was reduced using automated exposure  control, adjustment of the mA and/or kV according to patient size, or  iterative reconstruction technique.    FINDINGS:    VERTEBRAE:  No acute fracture or traumatic malalignment of the  cervical spine. Similar trace anterolisthesis of C3 on C4. Pronounced  kyphosis centered at T2. Vertebral body heights are maintained.  Osteopenia.    SPINAL CANAL/NEURAL FORAMINA:  Multilevel degenerative disc disease  through the cervical spine with disc height loss greatest and moderate  at C4-C5 and C5-C6. Spinal canal stenosis is greatest and mild to  moderate at C3-C4. Multilevel facet arthropathy throughout the  cervical spine, greatest and advanced on  the right at C3-C4. In  combination with uncovertebral spurring/hypertrophy, there are varying  degrees of neural foraminal stenosis throughout the cervical spine.  This is most pronounced and moderate to advanced in the left at C3-C4.    OTHER: No prevertebral soft tissue edema.  Calcified atherosclerosis  of the cervical carotid arteries. Scarring in the lung apices.      Impression    IMPRESSION:    1. No acute fracture or traumatic malalignment of the cervical spine.  2. Multilevel cervical spondylosis as detailed above.    OLENA PALACIOS MD         SYSTEM ID:  [id number]   Chest CT w/o contrast     Status: None    Collection Time: 12/29/23 12:45 PM    Narrative    CT CHEST WITHOUT CONTRAST December 29, 2023 12:45 PM    CLINICAL HISTORY: Fall, cough.    TECHNIQUE: CT chest without IV contrast. Multiplanar reformats were  obtained. Dose reduction techniques were used.  CONTRAST: None.    COMPARISON: CT 9/4/2023.    FINDINGS:   LUNGS AND PLEURA: No effusions. No pneumothorax. Stable appearance of  bilateral regions of bronchiectasis, and interstitial thickening. This  is associated with ground-glass opacities extending to the lower lungs  that appears stable as well. Upper lobe predominant emphysema. No new  airspace disease identified.    MEDIASTINUM/AXILLAE: Stable aortic valve prosthesis. Stable  calcifications of the thoracic aorta and mitral annular region. No new  adenopathy or fluid collection.    CORONARY ARTERY CALCIFICATION: Previous intervention (stents or CABG).    UPPER ABDOMEN: No acute upper abdominal abnormalities.    MUSCULOSKELETAL: No acute displaced fracture identified. Mild height  loss at T6 appears stable.      Impression    IMPRESSION:   1.  No acute traumatic abnormality identified.  2.  Stable bilateral interstitial thickening with bronchiectasis and  nonspecific pulmonary ground-glass opacities. Consider pulmonary  consultation for further workup of potential interstitial  lung  disease.    POLO WOOD MD         SYSTEM ID:  [id number]   Troponin T, High Sensitivity     Status: Abnormal    Collection Time: 12/29/23  2:54 PM   Result Value Ref Range    Troponin T, High Sensitivity 47 (H) <=22 ng/L       ED MEDICATIONS:   Medications   acetaminophen (TYLENOL) tablet 1,000 mg (1,000 mg Oral $Given 12/29/23 1504)         Impression:    ICD-10-CM    1. Generalized weakness  R53.1           Plan:    Pending studies include pulse elevated. .  s/p IV fluid bolus.  30 cc/kg fluids  ?full code.    able to obtain bed at [facility].  admitted.      MD Clemente Summers Scott J, MD  12/30/23 [id number]

## 2023-12-29 NOTE — MEDICATION SCRIBE - ADMISSION MEDICATION HISTORY
Medication Scribe Admission Medication History    Admission medication history is complete. The information provided in this note is only as accurate as the sources available at the time of the update.    Information Source(s): Family member via in-person    Pertinent Information: PTA med list reviewed with patient's daughter. States that patient refuses PM doses of medications so has only been taking Eliquis and Metoprolol once daily    Changes made to PTA medication list:  Added: Benadryl 50 mg PRN  Deleted: None  Changed: None    Medication Affordability:       Allergies reviewed with patient and updates made in EHR: yes    Medication History Completed By: Raina Stevens 12/29/2023 2:13 PM    PTA Med List   Medication Sig Last Dose    acetaminophen (TYLENOL) 500 MG tablet Take 1,000 mg by mouth 3 times daily 12/28/2023 at pm    acetaminophen (TYLENOL) 650 MG CR tablet Take 650 mg by mouth daily as needed for mild pain or fever Unknown at prn    apixaban ANTICOAGULANT (ELIQUIS) 5 MG tablet Take 1 tablet (5 mg) by mouth 2 times daily (Patient taking differently: Take 5 mg by mouth 2 times daily Patient has been taking only once a day-refusing PM dose) 12/28/2023 at am    azelastine-fluticasone (DYMISTA) 137-50 MCG/ACT nasal spray Spray 1 spray into both nostrils daily Unknown at prn    clopidogrel (PLAVIX) 75 MG tablet Take 1 tablet (75 mg) by mouth daily 12/28/2023 at am    diphenhydrAMINE (BENADRYL) 50 MG capsule Take 50 mg by mouth daily 12/28/2023    esomeprazole (NEXIUM) 20 MG DR capsule Take 1 capsule (20 mg) by mouth every morning (before breakfast) Take 30-60 minutes before eating. 12/28/2023 at am    levothyroxine (SYNTHROID/LEVOTHROID) 50 MCG tablet Take 1 tablet (50 mcg) by mouth every morning (before breakfast) 12/28/2023 at am    metoprolol tartrate (LOPRESSOR) 25 MG tablet Take 0.5 tablets (12.5 mg) by mouth 2 times daily (Patient taking differently: Take 12.5 mg by mouth 2 times daily Pt.has been  refusing PM dose and only taking once daily) 12/28/2023 at am    naloxone (NARCAN) 2 MG/2ML injection Inject 1 mg into the muscle once Unknown at on hand    nitroGLYcerin (NITROSTAT) 0.4 MG sublingual tablet Place 0.4 mg under the tongue every 5 minutes as needed for chest pain For chest pain place 1 tablet under the tongue every 5 minutes for 3 doses. If symptoms persist 5 minutes after 1st dose call 911. Unknown at on hand    oxyCODONE (ROXICODONE) 5 MG tablet Take 1 tablet (5 mg) by mouth every morning. May also take 0.5-1 tablets (2.5-5 mg) every 4 hours as needed for pain. 12/28/2023 at am    rosuvastatin (CRESTOR) 40 MG tablet Take 1 tablet (40 mg) by mouth daily 12/28/2023 at am    sacubitril-valsartan (ENTRESTO) 24-26 MG per tablet Take 1 tablet by mouth daily 12/28/2023 at am    vitamin C (ASCORBIC ACID) 500 MG tablet Take 500 mg by mouth daily 12/28/2023 at am    Vitamin D3 (CHOLECALCIFEROL) 25 mcg (1000 units) tablet Take 1 tablet by mouth daily 12/28/2023 at am

## 2023-12-29 NOTE — H&P
"[facility]    History and Physical - Hospitalist Service       Date of Admission:  12/29/2023    Assessment & Plan      Dangelo Hernández is a 88 year old male admitted on 12/29/2023. He presents with 1 day history of weakness and increased sputum production concerning for bacterial infection.  Patient initially was hypotensive 70s/40s but was quickly responsive to IVF.    Chronic bronchiectasis  History of cryptogenic organizing pneumonia  Community-acquired pneumonia vs aspiration pneumonia  Sepsis  Hx candidal pneumonia      Patient with increased sputum production, weakness, hypotension, tachycardia and new leukocytosis (11.1) concerning for sepsis secondary to community-acquired pneumonia versus aspiration pneumonia.  Patient reported that he had a episode of vomiting overnight and since then has felt increased lower extremity weakness.  Patient reports that he has had COVID \"7 times\" and each time he has increased weakness of his legs, likely secondary to progressive deconditioning and functional decline.  RSV, flu, and COVID negative.  Patient on room air in the ED.  Patient's initial presentation with hypotension 70s/40s quickly responded to 2 L IVF bolus.  Initially plan to go to ICU for potential vasopressors, but given fluid responsiveness was admitted to [facility].  Lactic acid 1.9.      He had stem cell transplant in 10/2010, issues with pulmonary infiltrates and BAL with Candida glabrata in 07/2011, and unfortunate progression on VATS in 09/2011 that showed organizing pneumonia with negative tissue cultures, felt to have a , treated with high-dose steroids with a good clinical and radiological response, but unfortunate subsequent progression in 04/2012. A repeat BAL on 04/19/2012 showed filamentous fungus that was not able to be speciated further, and was treated with Voriconazole and prednisone.   - Will discontinue Zosyn and start Rocephin and doxycycline given lack of " concern for pseudomonas infection.  Possible component of atypical pneumonia given recurrence and pattern on CT imaging.  - Blood cultures pending  - Supplemental oxygen as needed and wean as tolerated  - Procalcitonin pending  - Incentive spirometry  - Mucinex 1200 mg twice daily  - Holding home Entresto and metoprolol until hypotension resolves    Generalized weakness  Patient has multiple admissions for generalized weakness over the last year and reports a progressive decline in independence with occasional falls at home.  On exam has no lower extremity deficits or sensation loss that are appreciable.  Likely secondary to recurrent hospitalizations and failure to thrive at home.  Possibly related to decreased intake with acute illness.  - Fall precautions  - PT/OT consulted appreciate their assistance  - See above for infectious treatment plan    Mantle Cell Lymphoma S/P Autologous Transplant  Patient had stem cell transplant in 10/2010.  Currently in remission    CAD s/p PCI 2013 and 10/2019 (ODIN to proximal LAD)   Aortic stenosis S/P TAVR   Patient had a TAVR procedure 03/2023.  Patient has been stable on Plavix and apixaban for CAD and aortic stenosis.  Troponin on admission was 47 and rechecked at 47 likely demand mismatch given hypotension.  Patient denies any chest pain or ACS symptoms.  EKG had ST depressions in lead I and aVL and left anterior fascicular block, which were unchanged from prior EKGs.  - Continue PTA Plavix and apixaban  - No longer trending troponin's    GERD   Stable on PTA esomeprazole 20 mg.   - Switch to pantoprazole 40 mg daily    Acquired hypothyroidism  Stable.  TSH 2.47 on admission.  - Continue PTA levothyroxine    Hyperlipidemia  - Continue PTA Crestor 40 mg           Diet:  Regular diet  DVT Prophylaxis: DOAC  Ascencio Catheter: Not present  Lines: None     Cardiac Monitoring: None  Code Status:  DNR/DNI    Clinically Significant Risk Factors Present on Admission               #  "Drug Induced Coagulation Defect: home medication list includes an anticoagulant medication  # Drug Induced Platelet Defect: home medication list includes an antiplatelet medication   # Hypertension: Noted on problem list  # Heart failure with preserved ejection fraction: EF >50% and home medication list includes sacubitril/valsartan (Entresto)                Disposition Plan      Expected Discharge Date: 12/30/2023                  Brayan Potts DO  Hospitalist Service  [facility]  Securely message with [employer] (more info)  Text page via [facility] Paging/Directory     ______________________________________________________________________    Chief Complaint   Weakness, cough    History is obtained from the patient and patient's daughter    History of Present Illness   Dangelo Hernández is a 88 year old male with a past medical history significant for HTN, hyperlipidemia, GERD, CAD s/p PCI in 2013 & 2019, aortic stenosis status post TAVR, mantle cell lymphoma s/p autologous stem cell transplant, cryptogenic organizing pneumonia and candidal pneumonia who presents with 1 day history of weakness and increased sputum production.  Patient called his daughter this morning and said that he is weak and had vomited overnight and wanted to be evaluated in the emergency department.  Patient reports progressive weakness since he has had multiple hospitalizations for \"pneumonia/COVID\".  He denies any perineal numbness, incontinence, or loss of sensation in his lower extremities.  He denies any fevers, chills, shortness of breath, or chest pain.  He reports that he had an episode of vomiting overnight, however his daughter reported that she did not find any evidence of this.  He has a interesting history of cryptogenic organizing pneumonia following his bone marrow transplant from his mantle cell lymphoma with subsequent complications of fungal infections requiring voriconazole and prednisone for " treatment.  He currently lives alone in his home without any additional support other than his daughter.  He also notes that he has occasional falls, and it helps himself up by going backwards down stairs or having his daughter come over and help him up.  He denies any rashes or skin changes.      Past Medical History    Past Medical History:   Diagnosis Date     Coronary artery disease, 4/ 2010, status post drug-eluting stent placement to the LAD and balloon angioplasty to the obtuse marginal.  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    LDL goal <70     Coronary artery disease     stents placed 6/10/2013    Cryptogenic organizing pneumonia (H) 12/07/2012    GERD (gastroesophageal reflux disease)     History of blood disorder 04/06/2011    Hypertension     Hypertension goal BP (blood pressure) < 140/90 12/09/2010    Malignant neoplasm (H)     BMT; Mantle cell lymphoma    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    Pneumonia in mycoses 09/09/2011    Sepsis, unspecified organism (H) 06/20/2023       Past Surgical History   Past Surgical History:   Procedure Laterality Date    BRONCHOSCOPY (RIGID OR FLEXIBLE), DIAGNOSTIC  7/14/2011    Procedure:COMBINED BRONCHOSCOPY (RIGID OR FLEXIBLE), LAVAGE; Surgeon:REYNA BAILEY; Location:[facility]    BRONCHOSCOPY (RIGID OR FLEXIBLE), DIAGNOSTIC N/A 5/29/2019    Procedure: BRONCHOSCOPY, WITH BRONCHOALVEOLAR LAVAGE;  Surgeon: Perlman, David Morris, MD;  Location: [facility]    COLONOSCOPY      CV HEART CATHETERIZATION WITH POSSIBLE INTERVENTION N/A 10/16/2019    Procedure: Heart Catheterization with Possible Intervention;  Surgeon: Robinson Robbins MD;   Location:  HEART CARDIAC CATH LAB    CV HEART CATHETERIZATION WITH POSSIBLE INTERVENTION N/A 11/13/2019    Procedure: Heart Catheterization with Possible Intervention;  Surgeon: Hannah Ashraf MD;  Location:  HEART CARDIAC CATH LAB    ENT SURGERY      tonsils    ORTHOPEDIC SURGERY      rt knee arthroscopy    SURGICAL HISTORY OF -       tonsils    SURGICAL HISTORY OF -   1/19/84    1.Exam under anesthesia, 2.Arthroscopy, 3.Arthroscopic medial meniscectomy, 4.arthroscopic trimming of patellar articular cartilage.    SURGICAL HISTORY OF -       vasectomy    SURGICAL HISTORY OF -   2/19/90    colonoscopy    SURGICAL HISTORY OF -   10/20/92    flexible sigmoidoscopy    SURGICAL HISTORY OF -   6/14/2000    colonoscopy and polypectomy    THORACOSCOPIC BIOPSY LUNG  9/12/2011    Procedure:THORACOSCOPIC BIOPSY LUNG; Video Assisted Right Thoracoscopy with Biopsy; Surgeon:JIM EPSTEIN; Location:[facility]       Prior to Admission Medications   Prior to Admission Medications   Prescriptions Last Dose Informant Patient Reported? Taking?   Vitamin D3 (CHOLECALCIFEROL) 25 mcg (1000 units) tablet 12/28/2023 at am Daughter Yes Yes   Sig: Take 1 tablet by mouth daily   acetaminophen (TYLENOL) 500 MG tablet 12/28/2023 at pm Daughter Yes Yes   Sig: Take 1,000 mg by mouth 3 times daily   acetaminophen (TYLENOL) 650 MG CR tablet Unknown at prn Daughter Yes Yes   Sig: Take 650 mg by mouth daily as needed for mild pain or fever   apixaban ANTICOAGULANT (ELIQUIS) 5 MG tablet 12/28/2023 at am Daughter No Yes   Sig: Take 1 tablet (5 mg) by mouth 2 times daily   Patient taking differently: Take 5 mg by mouth 2 times daily Patient has been taking only once a day-refusing PM dose   azelastine-fluticasone (DYMISTA) 137-50 MCG/ACT nasal spray Unknown at prn Daughter Yes Yes   Sig: Spray 1 spray into both nostrils daily   clopidogrel (PLAVIX) 75 MG tablet 12/28/2023 at am Daughter No Yes   Sig: Take 1 tablet (75 mg) by mouth daily    diphenhydrAMINE (BENADRYL) 50 MG capsule 12/28/2023 Daughter Yes Yes   Sig: Take 50 mg by mouth daily   esomeprazole (NEXIUM) 20 MG DR capsule 12/28/2023 at am Daughter No Yes   Sig: Take 1 capsule (20 mg) by mouth every morning (before breakfast) Take 30-60 minutes before eating.   levothyroxine (SYNTHROID/LEVOTHROID) 50 MCG tablet 12/28/2023 at am Daughter No Yes   Sig: Take 1 tablet (50 mcg) by mouth every morning (before breakfast)   metoprolol tartrate (LOPRESSOR) 25 MG tablet 12/28/2023 at am Daughter No Yes   Sig: Take 0.5 tablets (12.5 mg) by mouth 2 times daily   Patient taking differently: Take 12.5 mg by mouth 2 times daily Pt.has been refusing PM dose and only taking once daily   naloxone (NARCAN) 2 MG/2ML injection Unknown at on hand Daughter Yes Yes   Sig: Inject 1 mg into the muscle once   nitroGLYcerin (NITROSTAT) 0.4 MG sublingual tablet Unknown at on hand Daughter Yes Yes   Sig: Place 0.4 mg under the tongue every 5 minutes as needed for chest pain For chest pain place 1 tablet under the tongue every 5 minutes for 3 doses. If symptoms persist 5 minutes after 1st dose call 911.   oxyCODONE (ROXICODONE) 5 MG tablet 12/28/2023 at am Daughter No Yes   Sig: Take 1 tablet (5 mg) by mouth every morning. May also take 0.5-1 tablets (2.5-5 mg) every 4 hours as needed for pain.   rosuvastatin (CRESTOR) 40 MG tablet 12/28/2023 at am Daughter No Yes   Sig: Take 1 tablet (40 mg) by mouth daily   sacubitril-valsartan (ENTRESTO) 24-26 MG per tablet 12/28/2023 at am Daughter No Yes   Sig: Take 1 tablet by mouth daily   vitamin C (ASCORBIC ACID) 500 MG tablet 12/28/2023 at am Daughter Yes Yes   Sig: Take 500 mg by mouth daily      Facility-Administered Medications: None        Review of Systems    The 10 point Review of Systems is negative other than noted in the HPI or here.      Physical Exam   Vital Signs: Temp: 98.3  F (36.8  C) Temp src: Oral BP: 126/81 Pulse: (!) 121   Resp: 22 SpO2: 93 % O2 Device: None  (Room air)    Weight: 0 lbs 0 oz    Constitutional: awake, alert, cooperative, no apparent distress, and appears stated age  Respiratory: Diminished breath sounds bilaterally  Cardiovascular: Normal apical impulse, regular rate and rhythm, normal S1 and S2, no S3 or S4, and no murmur noted  GI: No scars, normal bowel sounds, soft, non-distended, non-tender, no masses palpated, no hepatosplenomegally  Skin: normal skin color, texture, turgor  Musculoskeletal: There is no redness, warmth, or swelling of the joints.  Full range of motion noted.  Motor strength is 5 out of 5 all extremities bilaterally.  Tone is normal.    Medical Decision Making       65 MINUTES SPENT BY ME on the date of service doing chart review, history, exam, documentation & further activities per the note.      Data     I have personally reviewed the following data over the past 24 hrs:    11.1 (H)  \   11.2 (L)   / 253     140 106 23.9 (H) /  116 (H)   3.4 19 (L) 1.17 \     ALT: 9 AST: 17 AP: 48 TBILI: 0.3   ALB: 3.8 TOT PROTEIN: 6.3 (L) LIPASE: N/A     Trop: 47 (H) BNP: N/A     TSH: 2.47 T4: N/A A1C: N/A     Procal: N/A CRP: N/A Lactic Acid: 1.9         Imaging results reviewed over the past 24 hrs:   Recent Results (from the past 24 hour(s))   CT Head w/o Contrast    Narrative    EXAM: CT HEAD W/O CONTRAST  12/29/2023 12:44 PM     HISTORY: Fall chronic anticoagulation       COMPARISON: Head CT: 9/4/2023.    TECHNIQUE: Using multidetector thin collimation helical acquisition  technique, axial, coronal and sagittal CT images from the skull base  to the vertex were obtained without intravenous contrast.   (topogram) image(s) also obtained and reviewed.    Radiation dose for this scan was reduced using automated exposure  control, adjustment of the mA and/or kV according to patient size, or  iterative reconstruction technique.    FINDINGS:    Calvarium/skull base: No acute calvarial fracture. No destructive  osseous lesions.  Mastoids and  middle ears are clear.    Orbits: Bilateral pseudophakia.    Paranasal sinuses: No substantial paranasal sinus disease.    Brain: No acute intracranial hemorrhage.  No evidence of acute large  vascular territory ischemic infarct.  No mass effect.  No  hydrocephalus.  Basal cisterns are patent. Calcified intracranial  atherosclerosis. Changes suggestive of moderate to advanced chronic  microvascular ischemic disease. Moderate generalized parenchymal  atrophy.      Impression    IMPRESSION:    1.  No evidence of acute traumatic intracranial abnormality.  2.  Chronic changes as detailed above.    OLENA PALACIOS MD         SYSTEM ID:  [id number]   CT Cervical Spine w/o Contrast    Narrative    EXAM: CT CERVICAL SPINE W/O CONTRAST  12/29/2023 12:44 PM     HISTORY: Fall neck pain       COMPARISON: Thoracic spine MRI: 5/18/2018. Neck CT: 9/2/2010.    TECHNIQUE: Using multidetector thin collimation helical acquisition  technique, axial, coronal and sagittal CT images through the cervical  spine were obtained without intravenous contrast.    Radiation dose for this scan was reduced using automated exposure  control, adjustment of the mA and/or kV according to patient size, or  iterative reconstruction technique.    FINDINGS:    VERTEBRAE:  No acute fracture or traumatic malalignment of the  cervical spine. Similar trace anterolisthesis of C3 on C4. Pronounced  kyphosis centered at T2. Vertebral body heights are maintained.  Osteopenia.    SPINAL CANAL/NEURAL FORAMINA:  Multilevel degenerative disc disease  through the cervical spine with disc height loss greatest and moderate  at C4-C5 and C5-C6. Spinal canal stenosis is greatest and mild to  moderate at C3-C4. Multilevel facet arthropathy throughout the  cervical spine, greatest and advanced on the right at C3-C4. In  combination with uncovertebral spurring/hypertrophy, there are varying  degrees of neural foraminal stenosis throughout the cervical spine.  This is most  pronounced and moderate to advanced in the left at C3-C4.    OTHER: No prevertebral soft tissue edema.  Calcified atherosclerosis  of the cervical carotid arteries. Scarring in the lung apices.      Impression    IMPRESSION:    1. No acute fracture or traumatic malalignment of the cervical spine.  2. Multilevel cervical spondylosis as detailed above.    OLENA PALACIOS MD         SYSTEM ID:  [id number]   Chest CT w/o contrast    Narrative    CT CHEST WITHOUT CONTRAST December 29, 2023 12:45 PM    CLINICAL HISTORY: Fall, cough.    TECHNIQUE: CT chest without IV contrast. Multiplanar reformats were  obtained. Dose reduction techniques were used.  CONTRAST: None.    COMPARISON: CT 9/4/2023.    FINDINGS:   LUNGS AND PLEURA: No effusions. No pneumothorax. Stable appearance of  bilateral regions of bronchiectasis, and interstitial thickening. This  is associated with ground-glass opacities extending to the lower lungs  that appears stable as well. Upper lobe predominant emphysema. No new  airspace disease identified.    MEDIASTINUM/AXILLAE: Stable aortic valve prosthesis. Stable  calcifications of the thoracic aorta and mitral annular region. No new  adenopathy or fluid collection.    CORONARY ARTERY CALCIFICATION: Previous intervention (stents or CABG).    UPPER ABDOMEN: No acute upper abdominal abnormalities.    MUSCULOSKELETAL: No acute displaced fracture identified. Mild height  loss at T6 appears stable.      Impression    IMPRESSION:   1.  No acute traumatic abnormality identified.  2.  Stable bilateral interstitial thickening with bronchiectasis and  nonspecific pulmonary ground-glass opacities. Consider pulmonary  consultation for further workup of potential interstitial lung  disease.    POLO WOOD MD         SYSTEM ID:  [id number]   CT Abdomen Pelvis w/o Contrast    Narrative    EXAM: CT ABDOMEN PELVIS W/O CONTRAST  LOCATION: [facility]  DATE: 12/29/2023    INDICATION:  Hypotension.  COMPARISON: CT chest, abdomen, and pelvis 09/04/2023.  TECHNIQUE: CT scan of the abdomen and pelvis was performed without IV contrast. Multiplanar reformats were obtained. Dose reduction techniques were used.  CONTRAST: None.    FINDINGS:     LOWER CHEST: Status post wedge resection of the basilar right lower lobe. Appearance of the lung bases is unchanged, including bronchiolectasis, scattered groundglass opacities, and fibrosis. No new opacities. No pleural effusions. Aortic valve   prosthesis. Mitral annular calcifications. Coronary arterial stents.    HEPATOBILIARY: Normal.    PANCREAS: Normal.    SPLEEN: Normal.    ADRENAL GLANDS: Normal.    KIDNEYS/BLADDER: Nonobstructing 1 mm right lower pole renal calculus. No left renal calculi. No ureteral calculi or hydronephrosis. Small posterior bladder diverticulum.    BOWEL: No bowel obstruction or inflammation. Normal appendix.    LYMPH NODES: No enlarged lymph nodes.    VASCULATURE: Severe aortobiiliac atherosclerosis. No abdominal aortic aneurysm.    PELVIC ORGANS: Normal.    MUSCULOSKELETAL: Demineralized bones. Metallic device is present between the L4 and L5 spinous processes. Unchanged mild compression deformity of the T12 vertebral body. Multilevel degenerative changes of the spine. 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 No acute bony abnormality. Small fat-containing periumbilical hernia.      Impression    IMPRESSION:     1.  No acute abnormality, within the limitations of the noncontrast technique.    2.  Nonobstructing 1 mm right lower pole renal calculus.    3.  Small posterior bladder diverticulum, likely sequela of chronic bladder outlet obstruction.

## 2023-12-29 NOTE — ED TRIAGE NOTES
Ambulance called by pt daughter after pt had a fall around 0224. Pt is alert and oriented, wants water. Denies hitting head, denies loc. Pt states he doesn't think he fell. Pt states he hurts everywhere and states that isn't new. Pt is speaking in full sentences, a bit hard to understand.

## 2023-12-29 NOTE — ED NOTES
Observation Brochure     Patient informed of observation status based on provider's order.  Observation brochure was given. Patient/Family stated understanding. Questions answered.  Kami Rowan RN

## 2023-12-29 NOTE — ED PROVIDER NOTES
History     Chief Complaint   Patient presents with    Fall     HPI  Dangelo Hernández is a 88 year old male who presents by EMS from home.  He lives independently, his daughter and granddaughter visit most days.  He called his daughter earlier this morning said he was vomiting and needed help.  He is unable to get up from his chair independently which is atypical for him today and asked he is daughter to call 911 when she arrived.  He describes some constipation over the last couple days.  Denies abdominal pain.  Has a rhonchorous cough that waxes and wanes.  He denies any recent fall.  He is not anticoagulated.  Denies headache, visual change, chest pain, or urinary symptoms, fever chills or sweats.    Allergies:  Allergies   Allergen Reactions    Amoxicillin Diarrhea           Lisinopril Cough       Problem List:    Patient Active Problem List    Diagnosis Date Noted    Hypertension goal BP (blood pressure) < 140/90 12/09/2010     Priority: High    Generalized weakness 12/29/2023     Priority: Medium    Acute metabolic encephalopathy 09/05/2023     Priority: Medium    JESSICA (acute kidney injury) (H24) 09/05/2023     Priority: Medium    Acquired hypothyroidism 09/05/2023     Priority: Medium    Long term current use of anticoagulant therapy 09/05/2023     Priority: Medium    Encounter for screening laboratory testing for severe acute respiratory syndrome coronavirus 2 (SARS-CoV-2) 09/05/2023     Priority: Medium    Hypotension, unspecified hypotension type 09/04/2023     Priority: Medium    Altered mental status, unspecified 06/28/2023     Priority: Medium    Laceration without foreign body of other part of head, subsequent encounter 06/28/2023     Priority: Medium    Depression, unspecified 06/21/2023     Priority: Medium    Dysphagia, oropharyngeal phase 06/21/2023     Priority: Medium    Other problems related to life management difficulty 06/21/2023     Priority: Medium    Repeated falls 06/21/2023      Priority: Medium    Gastro-esophageal reflux disease with esophagitis, without bleeding 06/20/2023     Priority: Medium    Low back pain, unspecified 06/20/2023     Priority: Medium    Anemia, unspecified 06/20/2023     Priority: Medium    Mantle cell lymphoma (H) 06/15/2023     Priority: Medium    Bilateral pneumonia 06/15/2023     Priority: Medium    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     Priority: Medium    Septic shock (H) 06/15/2023     Priority: Medium    Unspecified abnormalities of gait and mobility 12/07/2022     Priority: Medium    Presence of unspecified artificial knee joint 12/07/2022     Priority: Medium    Other lack of coordination 12/07/2022     Priority: Medium    Long term (current) use of aspirin 12/07/2022     Priority: Medium    Old myocardial infarction 12/07/2022     Priority: Medium    Fall on same level, unspecified, subsequent encounter 12/07/2022     Priority: Medium    COVID-19 12/07/2022     Priority: Medium    Stem cells transplant status (H) 06/03/2022     Priority: Medium    Chronic bronchitis, unspecified chronic bronchitis type (H) 06/03/2022     Priority: Medium    Benign prostatic hyperplasia with lower urinary tract symptoms 01/27/2022     Priority: Medium    Difficulty in walking, not elsewhere classified 01/27/2022     Priority: Medium    Endocarditis, valve unspecified 01/27/2022     Priority: Medium    Hypertensive heart disease without heart failure 01/27/2022     Priority: Medium    Muscle weakness (generalized) 01/27/2022     Priority: Medium    Pneumonia due to coronavirus disease 2019 01/27/2022     Priority: Medium    Presence of coronary angioplasty implant and graft 01/27/2022     Priority: Medium    Peripheral vascular disease, unspecified (H24) 09/22/2021     Priority: Medium    Stenosis of carotid artery 07/15/2021     Priority: Medium    Spinal stenosis of lumbar region without neurogenic claudication 05/10/2021     Priority: Medium     "Arthritis of both hands 05/10/2021     Priority: Medium    Arthritis of knee 05/10/2021     Priority: Medium    Chronic midline thoracic back pain 05/10/2021     Priority: Medium    Advanced directives, counseling/discussion 08/12/2015     Priority: Medium     Patient does not have an Advance/Health Care Directive (HCD), given \"What is Advance Care Planning?\" flyer.    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          Past Medical History:    Past Medical History:   Diagnosis Date     Coronary artery disease, 4/ 2010, status post drug-eluting stent placement to the LAD and balloon angioplasty to the obtuse marginal.  12/08/2010    Acquired hypothyroidism 09/05/2023    ACS (acute coronary syndrome) (H) 10/15/2019    Acute kidney failure NEC 11/27/2011    Acute renal failure with other specified pathological lesion in kidney (H24) 11/27/2011    Arthritis     Basal cell carcinoma     Blood transfusion     Colon polyps     Combined hyperlipidemia 10/31/2010    Coronary artery disease     Cryptogenic organizing pneumonia (H) 12/07/2012    GERD (gastroesophageal reflux disease)     History of blood disorder 04/06/2011    Hypertension     Hypertension goal BP " (blood pressure) < 140/90 12/09/2010    Malignant neoplasm (H)     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    Pneumonia in mycoses 09/09/2011    Sepsis, unspecified organism (H) 06/20/2023       Past Surgical History:    Past Surgical History:   Procedure Laterality Date    BRONCHOSCOPY (RIGID OR FLEXIBLE), DIAGNOSTIC  7/14/2011    Procedure:COMBINED BRONCHOSCOPY (RIGID OR FLEXIBLE), LAVAGE; Surgeon:REYNA BAILEY; Location: GI    BRONCHOSCOPY (RIGID OR FLEXIBLE), DIAGNOSTIC N/A 5/29/2019    Procedure: BRONCHOSCOPY, WITH BRONCHOALVEOLAR LAVAGE;  Surgeon: Perlman, David Morris, MD;  Location:  GI    COLONOSCOPY      CV HEART CATHETERIZATION WITH POSSIBLE INTERVENTION N/A 10/16/2019    Procedure: Heart Catheterization with Possible Intervention;  Surgeon: Robinson Robbins MD;  Location:  HEART CARDIAC CATH LAB    CV HEART CATHETERIZATION WITH POSSIBLE INTERVENTION N/A 11/13/2019    Procedure: Heart Catheterization with Possible Intervention;  Surgeon: Hannah Ashraf MD;  Location:  HEART CARDIAC CATH LAB    ENT SURGERY      tonsils    ORTHOPEDIC SURGERY      rt knee arthroscopy    SURGICAL HISTORY OF -       tonsils    SURGICAL HISTORY OF -   1/19/84    1.Exam under anesthesia, 2.Arthroscopy, 3.Arthroscopic medial meniscectomy, 4.arthroscopic trimming of patellar articular cartilage.    SURGICAL HISTORY OF -       vasectomy    SURGICAL HISTORY OF -   2/19/90    colonoscopy    SURGICAL HISTORY OF -   10/20/92    flexible sigmoidoscopy    SURGICAL HISTORY OF -   6/14/2000    colonoscopy and polypectomy    THORACOSCOPIC BIOPSY LUNG  9/12/2011    Procedure:THORACOSCOPIC BIOPSY LUNG; Video Assisted Right Thoracoscopy with Biopsy; Surgeon:JIM EPSTEIN; Location: OR       Family History:    Family History   Problem Relation Age of Onset    Cancer Mother         Lung--did not smoke    Cardiovascular Father         MI age 73    Lipids Sister     Hypertension Sister     Cancer  Sister         Ovarian- at age 65       Social History:  Marital Status:   [5]  Social History     Tobacco Use    Smoking status: Former     Packs/day: 0.50     Years: 19.00     Additional pack years: 0.00     Total pack years: 9.50     Types: Cigarettes     Start date:      Quit date: 1975     Years since quittin.0    Smokeless tobacco: Former     Types: Snuff    Tobacco comments:     started smoking at 21 years of age   Vaping Use    Vaping Use: Never used   Substance Use Topics    Alcohol use: Yes     Alcohol/week: 0.0 standard drinks of alcohol     Comment: occ.     Drug use: No        Medications:    acetaminophen (TYLENOL) 500 MG tablet  acetaminophen (TYLENOL) 650 MG CR tablet  apixaban ANTICOAGULANT (ELIQUIS) 5 MG tablet  azelastine-fluticasone (DYMISTA) 137-50 MCG/ACT nasal spray  clopidogrel (PLAVIX) 75 MG tablet  diphenhydrAMINE (BENADRYL) 50 MG capsule  esomeprazole (NEXIUM) 20 MG DR capsule  levothyroxine (SYNTHROID/LEVOTHROID) 50 MCG tablet  metoprolol tartrate (LOPRESSOR) 25 MG tablet  naloxone (NARCAN) 2 MG/2ML injection  nitroGLYcerin (NITROSTAT) 0.4 MG sublingual tablet  oxyCODONE (ROXICODONE) 5 MG tablet  rosuvastatin (CRESTOR) 40 MG tablet  sacubitril-valsartan (ENTRESTO) 24-26 MG per tablet  vitamin C (ASCORBIC ACID) 500 MG tablet  Vitamin D3 (CHOLECALCIFEROL) 25 mcg (1000 units) tablet          Review of Systems    Physical Exam   BP: 126/81  Pulse: (!) 121  Temp: 98.3  F (36.8  C)  Resp: 22  SpO2: 93 %      Physical Exam  Nontoxic appearing mild respiratory distress in the form of tachypnea at rest, alert and oriented, skin pale warm and dry  Head atraumatic normocephalic  Requires 1 person assist to go from semirecumbent to sitting  Neck supple full active painless range of motion  Lungs diminished and rhonchorous with scant wheezing  Heart regular no murmur  Abdomen soft nontender bowel sounds positive   Strength and sensation grossly intact throughout the extremities  although feels generally weak when trying to lift his legs off the bed  Speech is fluent, good eye contact, thought processes are rational  Lower extremities without swelling, redness or tenderness      ED Course                 Procedures              Results for orders placed or performed during the hospital encounter of 12/29/23 (from the past 24 hour(s))   Symptomatic Influenza A/B, RSV, & SARS-CoV2 PCR (COVID-19) Nose    Specimen: Nose; Swab   Result Value Ref Range    Influenza A PCR Negative Negative    Influenza B PCR Negative Negative    RSV PCR Negative Negative    SARS CoV2 PCR Negative Negative    Narrative    Testing was performed using the Xpert Xpress CoV2/Flu/RSV Assay on the Cepheid GeneXpert Instrument. This test should be ordered for the detection of SARS-CoV-2, influenza, and RSV viruses in individuals who meet clinical and/or epidemiological criteria. Test performance is unknown in asymptomatic patients. This test is for in vitro diagnostic use under the FDA EUA for laboratories certified under CLIA to perform high or moderate complexity testing. This test has not been FDA cleared or approved. A negative result does not rule out the presence of PCR inhibitors in the specimen or target RNA in concentration below the limit of detection for the assay. If only one viral target is positive but coinfection with multiple targets is suspected, the sample should be re-tested with another FDA cleared, approved, or authorized test, if coinfection would change clinical management. This test was validated by the [facility]. These laboratories are certified under the Clinical Laboratory Improvement Amendments of 1988 (CLIA-88) as qualified to perform high complexity laboratory testing.   CBC with platelets differential    Narrative    The following orders were created for panel order CBC with platelets differential.  Procedure                               Abnormality         Status                      ---------                               -----------         ------                     CBC with platelets and d...[[id number]]  Abnormal            Final result                 Please view results for these tests on the individual orders.   Comprehensive metabolic panel   Result Value Ref Range    Sodium 140 135 - 145 mmol/L    Potassium 3.4 3.4 - 5.3 mmol/L    Carbon Dioxide (CO2) 19 (L) 22 - 29 mmol/L    Anion Gap 15 7 - 15 mmol/L    Urea Nitrogen 23.9 (H) 8.0 - 23.0 mg/dL    Creatinine 1.17 0.67 - 1.17 mg/dL    GFR Estimate 61 >60 mL/min/1.73m2    Calcium 9.3 8.8 - 10.2 mg/dL    Chloride 106 98 - 107 mmol/L    Glucose 116 (H) 70 - 99 mg/dL    Alkaline Phosphatase 48 40 - 150 U/L    AST 17 0 - 45 U/L    ALT 9 0 - 70 U/L    Protein Total 6.3 (L) 6.4 - 8.3 g/dL    Albumin 3.8 3.5 - 5.2 g/dL    Bilirubin Total 0.3 <=1.2 mg/dL   Troponin T, High Sensitivity   Result Value Ref Range    Troponin T, High Sensitivity 47 (H) <=22 ng/L   CBC with platelets and differential   Result Value Ref Range    WBC Count 11.1 (H) 4.0 - 11.0 10e3/uL    RBC Count 3.75 (L) 4.40 - 5.90 10e6/uL    Hemoglobin 11.2 (L) 13.3 - 17.7 g/dL    Hematocrit 33.7 (L) 40.0 - 53.0 %    MCV 90 78 - 100 fL    MCH 29.9 26.5 - 33.0 pg    MCHC 33.2 31.5 - 36.5 g/dL    RDW 15.3 (H) 10.0 - 15.0 %    Platelet Count 253 150 - 450 10e3/uL    % Neutrophils 87 %    % Lymphocytes 6 %    % Monocytes 7 %    % Eosinophils 0 %    % Basophils 0 %    % Immature Granulocytes 0 %    NRBCs per 100 WBC 0 <1 /100    Absolute Neutrophils 9.6 (H) 1.6 - 8.3 10e3/uL    Absolute Lymphocytes 0.7 (L) 0.8 - 5.3 10e3/uL    Absolute Monocytes 0.7 0.0 - 1.3 10e3/uL    Absolute Eosinophils 0.1 0.0 - 0.7 10e3/uL    Absolute Basophils 0.1 0.0 - 0.2 10e3/uL    Absolute Immature Granulocytes 0.0 <=0.4 10e3/uL    Absolute NRBCs 0.0 10e3/uL   UA with Microscopic reflex to Culture    Specimen: Urine, Midstream   Result Value Ref Range    Color Urine Apryl (A) Colorless, Straw, Light  Yellow, Yellow    Appearance Urine Slightly Cloudy (A) Clear    Glucose Urine Negative Negative mg/dL    Bilirubin Urine Negative Negative    Ketones Urine Negative Negative mg/dL    Specific Gravity Urine 1.023 1.003 - 1.035    Blood Urine Negative Negative    pH Urine 5.0 5.0 - 7.0    Protein Albumin Urine 100 (A) Negative mg/dL    Urobilinogen Urine Normal Normal, 2.0 mg/dL    Nitrite Urine Negative Negative    Leukocyte Esterase Urine Negative Negative    Mucus Urine Present (A) None Seen /LPF    RBC Urine 3 (H) <=2 /HPF    WBC Urine 3 <=5 /HPF    Squamous Epithelials Urine <1 <=1 /HPF    Narrative    Urine Culture not indicated   CT Head w/o Contrast    Narrative    EXAM: CT HEAD W/O CONTRAST  12/29/2023 12:44 PM     HISTORY: Fall chronic anticoagulation       COMPARISON: Head CT: 9/4/2023.    TECHNIQUE: Using multidetector thin collimation helical acquisition  technique, axial, coronal and sagittal CT images from the skull base  to the vertex were obtained without intravenous contrast.   (topogram) image(s) also obtained and reviewed.    Radiation dose for this scan was reduced using automated exposure  control, adjustment of the mA and/or kV according to patient size, or  iterative reconstruction technique.    FINDINGS:    Calvarium/skull base: No acute calvarial fracture. No destructive  osseous lesions.  Mastoids and middle ears are clear.    Orbits: Bilateral pseudophakia.    Paranasal sinuses: No substantial paranasal sinus disease.    Brain: No acute intracranial hemorrhage.  No evidence of acute large  vascular territory ischemic infarct.  No mass effect.  No  hydrocephalus.  Basal cisterns are patent. Calcified intracranial  atherosclerosis. Changes suggestive of moderate to advanced chronic  microvascular ischemic disease. Moderate generalized parenchymal  atrophy.      Impression    IMPRESSION:    1.  No evidence of acute traumatic intracranial abnormality.  2.  Chronic changes as detailed  above.    OLENA PALACIOS MD         SYSTEM ID:  [id number]   CT Cervical Spine w/o Contrast    Narrative    EXAM: CT CERVICAL SPINE W/O CONTRAST  12/29/2023 12:44 PM     HISTORY: Fall neck pain       COMPARISON: Thoracic spine MRI: 5/18/2018. Neck CT: 9/2/2010.    TECHNIQUE: Using multidetector thin collimation helical acquisition  technique, axial, coronal and sagittal CT images through the cervical  spine were obtained without intravenous contrast.    Radiation dose for this scan was reduced using automated exposure  control, adjustment of the mA and/or kV according to patient size, or  iterative reconstruction technique.    FINDINGS:    VERTEBRAE:  No acute fracture or traumatic malalignment of the  cervical spine. Similar trace anterolisthesis of C3 on C4. Pronounced  kyphosis centered at T2. Vertebral body heights are maintained.  Osteopenia.    SPINAL CANAL/NEURAL FORAMINA:  Multilevel degenerative disc disease  through the cervical spine with disc height loss greatest and moderate  at C4-C5 and C5-C6. Spinal canal stenosis is greatest and mild to  moderate at C3-C4. Multilevel facet arthropathy throughout the  cervical spine, greatest and advanced on the right at C3-C4. In  combination with uncovertebral spurring/hypertrophy, there are varying  degrees of neural foraminal stenosis throughout the cervical spine.  This is most pronounced and moderate to advanced in the left at C3-C4.    OTHER: No prevertebral soft tissue edema.  Calcified atherosclerosis  of the cervical carotid arteries. Scarring in the lung apices.      Impression    IMPRESSION:    1. No acute fracture or traumatic malalignment of the cervical spine.  2. Multilevel cervical spondylosis as detailed above.    OLENA PALACIOS MD         SYSTEM ID:  [id number]   Chest CT w/o contrast    Narrative    CT CHEST WITHOUT CONTRAST December 29, 2023 12:45 PM    CLINICAL HISTORY: Fall, cough.    TECHNIQUE: CT chest without IV contrast. Multiplanar  reformats were  obtained. Dose reduction techniques were used.  CONTRAST: None.    COMPARISON: CT 9/4/2023.    FINDINGS:   LUNGS AND PLEURA: No effusions. No pneumothorax. Stable appearance of  bilateral regions of bronchiectasis, and interstitial thickening. This  is associated with ground-glass opacities extending to the lower lungs  that appears stable as well. Upper lobe predominant emphysema. No new  airspace disease identified.    MEDIASTINUM/AXILLAE: Stable aortic valve prosthesis. Stable  calcifications of the thoracic aorta and mitral annular region. No new  adenopathy or fluid collection.    CORONARY ARTERY CALCIFICATION: Previous intervention (stents or CABG).    UPPER ABDOMEN: No acute upper abdominal abnormalities.    MUSCULOSKELETAL: No acute displaced fracture identified. Mild height  loss at T6 appears stable.      Impression    IMPRESSION:   1.  No acute traumatic abnormality identified.  2.  Stable bilateral interstitial thickening with bronchiectasis and  nonspecific pulmonary ground-glass opacities. Consider pulmonary  consultation for further workup of potential interstitial lung  disease.         Medications   acetaminophen (TYLENOL) tablet 1,000 mg (1,000 mg Oral $Given 12/29/23 1502)       Assessments & Plan (with Medical Decision Making)  Generally weak today, vomiting, abdominal exam benign, rhonchorous cough, CT scan bronchiectasis and interstitial thickening, associated with groundglass opacities appear stable.  White count upper normal 11.1.  Afebrile.  ECG sinus tachycardia without acute ischemic change, troponin mildly elevated at 47, repeat pending at this time.  Urinalysis unremarkable.  Given generalized weakness and vomiting, lives independently, multiple baseline comorbidities, patient is admitted to hospital service, reviewed with  for further evaluation, treatment, and disposition planning.     I have reviewed the nursing notes.    I have reviewed the findings, diagnosis,  plan and need for follow up with the patient.        New Prescriptions    No medications on file       Final diagnoses:   Generalized weakness       12/29/2023   [facility] EMERGENCY DEPT       Basil Coelho MD  12/29/23 1628
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
## 2023-12-30 PROBLEM — I25.10 CAD (CORONARY ARTERY DISEASE): Status: ACTIVE | Noted: 2022-01-27

## 2023-12-30 PROBLEM — I25.10 CAD (CORONARY ARTERY DISEASE): Chronic | Status: ACTIVE | Noted: 2022-01-27

## 2023-12-30 PROBLEM — J18.9 PNEUMONIA: Status: ACTIVE | Noted: 2023-01-01

## 2023-12-30 PROBLEM — D64.9 ANEMIA: Status: ACTIVE | Noted: 2023-01-01

## 2023-12-30 PROBLEM — I65.29 STENOSIS OF CAROTID ARTERY: Chronic | Status: ACTIVE | Noted: 2021-07-15

## 2023-12-30 NOTE — PROGRESS NOTES
12/30/23 1000   Appointment Info   Signing Clinician's Name / Credentials (OT) Cyndi Young OTR/L   Living Environment   People in Home alone   Current Living Arrangements house   Home Accessibility stairs to enter home   Number of Stairs, Main Entrance 3   Stair Railings, Main Entrance railings safe and in good condition   Transportation Anticipated family or friend will provide   Self-Care   Usual Activity Tolerance moderate   Current Activity Tolerance fair   Equipment Currently Used at Home grab bar, toilet;grab bar, tub/shower;raised toilet seat;shower chair;walker, rolling;walker, standard   Fall history within last six months yes   Number of times patient has fallen within last six months   (around 3 per pt)   Activity/Exercise/Self-Care Comment Pt lives alone in a home, has assist   Instrumental Activities of Daily Living (IADL)   Previous Responsibilities meal prep;shopping;driving   IADL Comments has assist with cleaning and occas cooking   General Information   Onset of Illness/Injury or Date of Surgery 12/29/23   Referring Physician Dr Brayan Mcclure   Patient/Family Therapy Goal Statement (OT) Pt and daughter are open for TCU for increased strength being pt lives alone   Additional Occupational Profile Info/Pertinent History of Current Problem cristy Hernández is a 88 year old male admitted on 12/29/2023. He presents with vomiting, and 1 day history of weakness and increased sputum production concerning for bacterial infection.  Patient initially was hypotensive 70s/40s but was quickly responsive to IVF.   Existing Precautions/Restrictions fall   General Observations and Info Pt moves very slowly with c/o fatigue   Cognitive Status Examination   Orientation Status orientation to person, place and time   Cognitive Status Comments no major deficits noted during initial assessment   Posture   Posture forward head position;kyphosis   Range of Motion Comprehensive   Comment, General Range of Motion L UE  sh limited to approx 1/4 AROM sh flex and approx 1/2 PROM from old injury pt states. WFL distal on L and WFL on R   Strength Comprehensive (MMT)   Comment, General Manual Muscle Testing (MMT) Assessment Pt states feels weaker than normal   Transfers   Transfers sit-stand transfer   Sit-Stand Transfer   Sit-Stand [place] (Transfers) minimum assist (75% patient effort)   Assistive Device (Sit-Stand Transfers) walker, front-wheeled   Sit/Stand Transfer Comments given extra time from recliner   Balance   Balance Comments Pt amb to sink at slow pace with SBA approx 10 ft and back   Activities of Daily Living   BADL Assessment/Intervention bathing;lower body dressing;grooming   Bathing Assessment/Intervention   [place] Level (Bathing) upper extremities;bathing skills;supervision   Comment, (Bathing) pt jordan 8 min static stand at sink leaning onto sink at times and to wash   Lower Body Dressing Assessment/Training   [place] Level (Lower Body Dressing) lower body dressing skills;minimum assist (75% patient effort)   Grooming Assessment/Training   Comment, (Grooming) Pt able to brush dentures with CGA leaning onto sink with verbal cues to straighten LEs bending slowing at knees during stand   Clinical Impression   Criteria for Skilled Therapeutic Interventions Met (OT) Yes, treatment indicated   OT Diagnosis weakness   Planned Therapy Interventions (OT) ADL retraining;strengthening;progressive activity/exercise   Clinical Decision Making Complexity (OT) problem focused assessment/low complexity   Risk & Benefits of therapy have been explained evaluation/treatment results reviewed;care plan/treatment goals reviewed;risks/benefits reviewed;current/potential barriers reviewed;participants voiced agreement with care plan;participants included;patient;daughter   Clinical Impression Comments Pt would benefit from TCU placement for increased overall strength for ind with BADLs and decrease fall risk   OT Total  Evaluation Time   OT Eval, Low Complexity Minutes (03868) 10   OT Goals   Therapy Frequency (OT) 5 times/week   OT Predicted Duration/Target Date for Goal Attainment 01/06/24   OT Goals Lower Body Dressing;Toilet Transfer/Toileting;OT Goal 1   OT: Lower Body Dressing Supervision/stand-by assist   OT: Toilet Transfer/Toileting Supervision/stand-by assist;cleaning and garment management;using adaptive equipment   OT: Goal 1 Pt will demo table slides in standing for increased PROM to L sh for hygiene access   Interventions   Interventions Quick Adds Self-Care/Home Management;Therapeutic Activity;Therapeutic Procedures/Exercise   Self-Care/Home Management   Self-Care/Home Mgmt/ADL, Compensatory, Meal Prep Minutes (45485) 14   Symptoms Noted During/After Treatment (Meal Preparation/Planning Training) fatigue   Treatment Detail/Skilled Intervention Pt seen for increased ind and strength with amb and g/h at sink . Pt requires min A to stand from chair after several attempts by pt. Pt amb to sink with SBA and CGA for 8 min stand at sink to brush dentures and UE wash leaning onto sink due to fatigue and with knees buckling at times for reminders to stand straight. Daughter present for tx. Pt returned to chair declining using bathroom when asked x2. Pt c/o fatigue.   Therapeutic Procedures/Exercise   Therapeutic Procedure: strength, endurance, ROM, flexibillity minutes (58025) 3   Symptoms Noted During/After Treatment increased pain   Treatment Detail/Skilled Intervention TH attempted PROM to L sh with pt c/o pain at approx 1/4 sh flex. TH educated on A/AROM with tabletop ex to maintain ROM for hygiene perposes and avoidance of frozen sh with pt in agreement   OT Discharge Planning   OT Plan POC 1/5 Sat LB dressing, toileting HEP for L sh with table slides   OT Discharge Recommendation (DC Rec) Transitional Care Facility   OT Rationale for DC Rec Recommend TCU at this time due to pt fatigue and falls at home for increased ind  with BADLs and IADLs   OT Brief overview of current status Min A sit to stand, leans onto sink for g/h   Total Session Time   Timed Code Treatment Minutes 17   Total Session Time (sum of timed and untimed services) 27

## 2023-12-30 NOTE — PLAN OF CARE
Skin affirmation note    Admitting nurse completed full skin assessment, Raul score and Raul interventions. This writer agrees with the initial skin assessment findings.

## 2023-12-30 NOTE — PROGRESS NOTES
[place] [facility] ADMISSION NOTE    Patient admitted to room 2403 at approximately 1845 via cart from emergency room. Patient was accompanied by other:ERT.     Verbal SBAR report received from Yoselin OLIVER / Kami CABRERA prior to patient arrival.     Patient trasferred to bed via air meghan. Patient alert and oriented X 3. The patient is not having any pain.  . Admission vital signs: Blood pressure 115/65, pulse 108, temperature 98.3  F (36.8  C), temperature source Oral, resp. rate 12, SpO2 93%. Patient was oriented to plan of care, call light, bed controls, tv, telephone, bathroom, and visiting hours.     Risk Assessment    The following safety risks were identified during admission: fall. Yellow risk band applied: YES.     Skin Initial Assessment    This writer admitted this patient and completed a full skin assessment and Raul score in the Adult PCS flowsheet. Appropriate interventions initiated as needed.     Secondary skin check completed by Patel SINGLETARY.         Education    Patient has a [place] to Observation order: Yes  Observation education completed and documented: Yes      Elma Parra RN

## 2023-12-30 NOTE — PLAN OF CARE
Problem: Pneumonia  Goal: Fluid Balance  Outcome: Progressing  Goal: Resolution of Infection Signs and Symptoms  Outcome: Progressing  Goal: Effective Oxygenation and Ventilation  Outcome: Progressing  Intervention: Promote Airway Secretion Clearance  Recent Flowsheet Documentation  Taken 12/30/2023 1000 by Elma Parra RN  Cough And Deep Breathing: done with encouragement   Goal Outcome Evaluation:    Patient has been up in chair most of the day. Occasional productive cough expectorating clear sputum. Appetite fair. Had an emesis this am and zofran given. No further complaints of nausea. Alert and oriented. Afebrile, vital signs stable. Daughter was here and updated on his status. Wants oxycodone, but reminded he has an order for that for bedtime.Has chronic back pain. Tylenol given and appears comfortable and at times dozing in chair.

## 2023-12-30 NOTE — CONSULTS
Care Management Initial Consult    General Information  Assessment completed with:  Hilda Lopez  Type of CM/SW Visit: Offer D/C Planning    Primary Care Provider verified and updated as needed: Yes   Readmission within the last 30 days: no previous admission in last 30 days         Advance Care Planning: Advance Care Planning Reviewed: present on chart          Communication Assessment  Patient's communication style: spoken language (English or Bilingual)    Hearing Difficulty or Deaf: no   Wear Glasses or Blind: no    Cognitive  Cognitive/Neuro/Behavioral: WDL                      Living Environment:   People in home: alone     Current living Arrangements: house      Able to return to prior arrangements:  Undetermined at this time       Family/Social Support:  Care provided by: self, child(ernesto)  Provides care for: no one     Children          Description of Support System: Supportive, Involved    Support Assessment: Adequate family and caregiver support    Current Resources:   Patient receiving home care services: No  Community Resources: PCA  Equipment currently used at home: grab bar, toilet, grab bar, tub/shower, raised toilet seat, shower chair, walker, standard  Supplies currently used at home: None    Employment/Financial:  Employment Status: retired        Financial Concerns: none           Does the patient's insurance plan have a 3 day qualifying hospital stay waiver?  No    Lifestyle & Psychosocial Needs:  Social Determinants of Health     Food Insecurity: Low Risk  (11/8/2023)    Food Insecurity     Within the past 12 months, did you worry that your food would run out before you got money to buy more?: No     Within the past 12 months, did the food you bought just not last and you didn t have money to get more?: No   Depression: Not at risk (7/26/2023)    PHQ-2     PHQ-2 Score: 0   Housing Stability: Low Risk  (11/8/2023)    Housing Stability     Do you have housing? : Yes     Are you worried about  losing your housing?: No   Tobacco Use: Medium Risk (11/8/2023)    Patient History     Smoking Tobacco Use: Former     Smokeless Tobacco Use: Former     Passive Exposure: Not on file   Financial Resource Strain: Low Risk  (11/8/2023)    Financial Resource Strain     Within the past 12 months, have you or your family members you live with been unable to get utilities (heat, electricity) when it was really needed?: No   Alcohol Use: Not on file   Transportation Needs: Low Risk  (11/8/2023)    Transportation Needs     Within the past 12 months, has lack of transportation kept you from medical appointments, getting your medicines, non-medical meetings or appointments, work, or from getting things that you need?: No   Physical Activity: Not on file   Interpersonal Safety: Not on file   Stress: Not on file   Social Connections: Not on file       Functional Status:  Prior to admission patient needed assistance:   Dependent ADLs:: Independent, Ambulation-walker  Dependent IADLs:: Cleaning, Cooking, Laundry, Shopping, Meal Preparation  Assesssment of Functional Status: Not at  functional baseline    Mental Health Status:  Mental Health Status: No Current Concerns       Chemical Dependency Status:  Chemical Dependency Status: No Current Concerns             Values/Beliefs:  Spiritual, Cultural Beliefs, Sikhism Practices, Values that affect care: no               Additional Information:    Met with Patient's daughterHilda as the Patient was busy with nursing staff for discharge planning due to generalized weakness.    The Patient lives alone in the community in a house.  He is independent with ADLs.  His grandson-in-law assists 3 days a week with IADLs.  Patient private pays for assistance from grandson.  He uses a walker for ambulation.  He drives.  He has not had any recent falls.  Family is very involved and assists as needed.  Daughter Quynh visits daily.    The Patient has had abdominal pain over the past couple days.   He was unable to get up from his chair independently which is atypical for him and asked his daughter to call 911 when she arrived.  He is currently requiring assistance of two people.  Discussed TCU.    TCU referrals contracted with [facility]:    Service Provider Request Status Selected Services Address Phone Fax Patient Preferred   [facility] [place] - REFERRAL ONLY ([facility])  Pending - Request Sent N/A [id number] [facility] [id number] [phone] --   Current Capacity last updated by Allie Youngblood RN on 11/8/2023 10:51 AM    Admissions [phone]            [facility] [place] ()  Pending - Request Sent N/A [id number] [facility] [id number] [phone] --   [facility] [place] ([facility])  P[facility] - [facility] N[facility] [address] [facility] [id number] [phone] --   [facility] ([facility])  Pending - Request Sent N/A [address] [facility] [id number] [phone] --     Plan:  TCU       Suze Fleming RN

## 2023-12-30 NOTE — PROGRESS NOTES
12/30/23 1300   Appointment Info   Signing Clinician's Name / Credentials (PT) Mary Coelho, PT   Living Environment   People in Home alone   Current Living Arrangements house   Home Accessibility stairs to enter home   Number of Stairs, Main Entrance 3   Stair Railings, Main Entrance railings safe and in good condition   Transportation Anticipated family or friend will provide   Self-Care   Usual Activity Tolerance moderate   Current Activity Tolerance fair   Equipment Currently Used at Home grab bar, toilet;grab bar, tub/shower;raised toilet seat;shower chair;walker, rolling;walker, standard   Fall history within last six months yes   Number of times patient has fallen within last six months   (around 3 per pt)   General Information   Onset of Illness/Injury or Date of Surgery 12/29/23   Referring Physician Dr Mcclure   Patient/Family Therapy Goals Statement (PT) wants to go home tomorrow   Pertinent History of Current Problem (include personal factors and/or comorbidities that impact the POC) Dangelo Hernández is a 88 year old male admitted on 12/29/2023. He presents with 1 day history of weakness and increased sputum production concerning for bacterial infection.  Patient initially was hypotensive 70s/40s but was quickly responsive to IVF.   Cognition   Affect/Mental Status (Cognition) WFL   Orientation Status (Cognition) oriented x 3   Follows Commands (Cognition) WFL   Posture    Posture Forward head position;Protracted shoulders   Range of Motion (ROM)   Range of Motion ROM is WFL   Strength (Manual Muscle Testing)   Strength (Manual Muscle Testing) Deficits observed during functional mobility   Strength Comments B LE generally 4/5   Gait/Stairs (Locomotion)   Comment, (Gait/Stairs) Pt transfers sit <> stand with mod A of 1. Amb with RW 80' with CGA, fwd flexed posture, slow peggy, short step length. Limited by fatigue.   Balance   Balance Comments good with RW   Clinical Impression   Criteria for  Skilled Therapeutic Intervention Yes, treatment indicated   PT Diagnosis (PT) deconditioned   Influenced by the following impairments weakness   Functional limitations due to impairments transfers, gait endurance   Clinical Presentation (PT Evaluation Complexity) stable   Clinical Presentation Rationale clinical judgement   Clinical Decision Making (Complexity) low complexity   Planned Therapy Interventions (PT) bed mobility training;gait training;transfer training;strengthening   Risk & Benefits of therapy have been explained evaluation/treatment results reviewed;care plan/treatment goals reviewed;risks/benefits reviewed;participants included;patient   PT Total Evaluation Time   PT Eval, Low Complexity Minutes (13081) 15   Physical Therapy Goals   PT Frequency 5x/week   PT Predicted Duration/Target Date for Goal Attainment 01/02/24   PT Goals Bed Mobility;Transfers;Gait   PT: Bed Mobility Supervision/stand-by assist;Supine to/from sit   PT: Transfers Minimal assist;Sit to/from stand   PT: Gait Supervision/stand-by assist;Rolling walker;150 feet   Interventions   Interventions Quick Adds Gait Training;Therapeutic Procedure   Therapeutic Procedure/Exercise   Ther. Procedure: strength, endurance, ROM, flexibillity Minutes (82145) 10   Treatment Detail/Skilled Intervention sitting hip flex, hip abd/add, hip IR/ER, LAQ, toe/heel raises x 10   Gait Training   Gait Training Minutes (22601) 15   Symptoms Noted During/After Treatment (Gait Training) fatigue   Treatment Detail/Skilled Intervention Pt transfers sit <> stand with mod A of 1. Amb with RW 80' with CGA, fwd flexed posture, slow peggy, short step length. Limited by fatigue.   Distance in Feet 80   PT Discharge Planning   PT Plan Sat 1/5: gait endurance, transfers, strengthening   PT Discharge Recommendation (DC Rec) Transitional Care Facility;home with home care physical therapy   PT Rationale for DC Rec rec TCU due to incr need for assistance; it pt regains  independence with mobility, he would be appropriate for d/c home with home PT   PT Brief overview of current status Pt transfers sit <> stand with mod A of 1. Amb with RW 80' with CGA, fwd flexed posture, slow peggy, short step length. Limited by fatigue.   Total Session Time   Timed Code Treatment Minutes 25   Total Session Time (sum of timed and untimed services) 40

## 2023-12-30 NOTE — PLAN OF CARE
Problem: Adult Inpatient Plan of Care  Goal: Absence of Hospital-Acquired Illness or Injury  Intervention: Identify and Manage Fall Risk  Recent Flowsheet Documentation  Taken 12/30/2023 0000 by Devick, Karen M, RN  Safety Promotion/Fall Prevention:   activity supervised   mobility aid in reach   nonskid shoes/slippers when out of bed   clutter free environment maintained   room near nurse's station   room organization consistent   safety round/check completed  Intervention: Prevent Skin Injury  Recent Flowsheet Documentation  Taken 12/30/2023 0000 by Devick, Karen M, RN  Body Position:   turned   right  Intervention: Prevent and Manage VTE (Venous Thromboembolism) Risk  Recent Flowsheet Documentation  Taken 12/30/2023 0000 by Devick, Karen M, RN  VTE Prevention/Management: SCDs (sequential compression devices) off  Intervention: Prevent Infection  Recent Flowsheet Documentation  Taken 12/30/2023 0000 by Devick, Karen M, RN  Infection Prevention: hand hygiene promoted     Goal Outcome Evaluation:  Patient is alert and oriented. Up with assist of two, walker and gait belt; generalized weakness. Mechanical dental soft diet. On IV antibiotics continuous pulse oximetry. Productive cough. Low back pain radiates to both hips. Tylenol given at bedtime and Oxycodone 10 mg given at 0515. Bladder scan post void residual at 0500 was 236 ml (voided 100). LR at 100 ml/hr. Low BP's earlier received LR 1,000 ml bolus. /47 this morning.

## 2023-12-30 NOTE — PROGRESS NOTES
"[facility]    Medicine Progress Note - Hospitalist Service    Date of Admission:  12/29/2023    Assessment & Plan      Dangelo Hernández is a 88 year old male admitted on 12/29/2023. He presents with vomiting, and 1 day history of weakness and increased sputum production concerning for bacterial infection.  Patient initially was hypotensive 70s/40s but was quickly responsive to IVF.        Community-acquired pneumonia vs aspiration pneumonia vs bronchiectasis exacerbation   Sepsis  Chronic bronchiectasis  History of cryptogenic organizing pneumonia  Hx candidal pneumonia     He had stem cell transplant in 10/2010, issues with pulmonary infiltrates and BAL with Candida glabrata in 07/2011, and unfortunate progression on VATS in 09/2011 that showed organizing pneumonia with negative tissue cultures, felt to have a , treated with high-dose steroids with a good clinical and radiological response, but unfortunate subsequent progression in 04/2012. A repeat BAL on 04/19/2012 showed filamentous fungus that was not able to be speciated further, and was treated with Voriconazole and prednisone.        Patient presented with increased sputum production, weakness, hypotension, tachycardia and new leukocytosis (11.1) concerning for sepsis secondary to community-acquired pneumonia versus aspiration pneumonia.  Patient reported that he had a episode of vomiting overnight and since then has felt increased lower extremity weakness.  Patient reports that he has had COVID \"7 times\" and each time he has increased weakness of his legs, likely secondary to progressive deconditioning and functional decline.  RSV, flu, and COVID negative.     CT 12/30/2023- comparison CT 9/4/2023. Stable appearance of bilateral regions of bronchiectasis, and interstitial thickening. This is associated with ground-glass opacities extending to the lower lungs that appears stable as well. Upper lobe predominant emphysema. No " new airspace disease identified.     Patient on room air in the ED.  Patient's initial presentation with hypotension 70s/40s quickly responded to 2 L IVF bolus.  Initially plan to go to ICU for potential vasopressors, but given fluid responsiveness was admitted to [facility].  Lactic acid 1.9. In ED given Zosyn. On admission changed to Rocephin and doxycycline given lack of concern for pseudomonas infection.  Possible component of atypical pneumonia given recurrence and pattern on CT imaging.    PCT 0.19  Cortisol 11    Afebrile, WBC 5, on RA  Lungs clear   Today 12/30/2023 patient does not recall why he was brought to ED. He denies shortness of breath, cough. He feels weak   Clinical course not consistent with pneumonia  - Continue Rocephin and doxycycline for now,   - Blood cultures pending  - Supplemental oxygen as needed and wean as tolerated  - Check swallow when able given recurrent pneumonia (hospitalized 6/2023 at [facility], May have has a video swallow at that time)    Generalized weakness  Patient has multiple admissions for generalized weakness over the last year and reports a progressive decline in independence with occasional falls at home.  On exam has no lower extremity deficits or sensation loss that are appreciable.  Likely secondary to recurrent hospitalizations and failure to thrive at home.  Possibly related to decreased intake with acute illness.  - Fall precautions  - PT/OT consulted appreciate their assistance  - See above for infectious treatment plan      Anemia, normocytic  Recent baseline 10-11  Admit HGB 11.2, recheck 9.4  No evidence acute blood loss  - Follow  - Check B12, Ferritin, %Fe sat    Hypokalemia  Hypomagenesemia  - replace per protocol     Mantle Cell Lymphoma S/P Autologous Transplant  Patient had stem cell transplant in 10/2010.  Currently in remission    CAD s/p PCI 2013 and 10/2019 (ODIN to proximal LAD)   Aortic stenosis S/P TAVR    Hypertension  Patient had a TAVR procedure  03/2023 in [place]. No records in EPIC  Echo 9/2023- Left ventricular systolic function is low normal. The visual ejection fraction is 50-55%. There is a bioprosthetic aortic valve. The prosthetic aortic valve is not well visualized. The gradient is normal for this prosthetic aortic valve. There is mild (1+) aortic regurgitation.    PTA on plavix, apixaban 5 BID (taking once a day), metoprolol 12.5 BID (taking once a day), sacubitril-valsartan 24-26    Troponin on admission was 47 and rechecked at 47 likely demand mismatch given hypotension.  Patient denies any chest pain or ACS symptoms.  EKG had ST depressions in lead I and aVL and left anterior fascicular block, which were unchanged from prior EKGs.    On admission BPs were low, now better, still soft   - Continue PTA Plavix   - Continue apixaban at BID dosing   - Restart PTA metorolol at BID dosing  - Hold sacubitril-valsartan 24-26 due to soft BPs    GERD   Stable on PTA esomeprazole 20 mg.   - Switch to pantoprazole 40 mg daily    Acquired hypothyroidism  Stable.  TSH 2.47 on admission.  - Continue PTA levothyroxine    Hyperlipidemia  - Continue PTA Crestor 40 mg             Diet: Mechanical/Dental Soft Diet    DVT Prophylaxis: DOAC  Ascencio Catheter: Not present  Lines: None     Cardiac Monitoring: None  Code Status: No CPR- Do NOT Intubate      Clinically Significant Risk Factors Present on Admission        # Hypokalemia: Lowest K = 3.1 mmol/L in last 2 days, will replace as needed        # Drug Induced Coagulation Defect: home medication list includes an anticoagulant medication  # Drug Induced Platelet Defect: home medication list includes an antiplatelet medication   # Hypertension: Noted on problem list  # Heart failure with preserved ejection fraction: EF >50% and home medication list includes sacubitril/valsartan (Entresto)     # Overweight: Estimated body mass index is 25.66 kg/m  as calculated from the following:    Height as of this encounter: 1.702 m  "(5' 7\").    Weight as of this encounter: 74.3 kg (163 lb 12.8 oz).              Disposition Plan      Expected Discharge Date: 12/31/2023      Destination: home with help/services              Lucas Gilbert MD  Hospitalist Service  [facility]  Securely message with [employer] (more info)  Text page via [employer] Paging/Directory   ______________________________________________________________________    Interval History   No events    Physical Exam   Vital Signs: Temp: 97.5  F (36.4  C) Temp src: Oral BP: 107/56 Pulse: 94   Resp: 18 SpO2: 95 % O2 Device: None (Room air)    Weight: 163 lbs 12.83 oz    Constitutional: awake, alert, cooperative, no apparent distress, and appears stated age  Respiratory: No increased work of breathing, good air exchange, clear to auscultation bilaterally, no crackles or wheezing    Medical Decision Making       >60 MINUTES SPENT BY ME on the date of service doing chart review, history, exam, documentation & further activities per the note.      Data     Heart Failure Labs  Outpatient: No results found for: \"NTBNP\"    Inpatient:   Lab Results   Component Value Date    NTBNPI 739 09/04/2023    NTBNPI 528 11/12/2019    NTBNPI 184 09/29/2015    NTBNPI 177 09/29/2015       CMP  Recent Labs   Lab 12/30/23  0533 12/29/23  1121    140   POTASSIUM 3.1* 3.4   CHLORIDE 110* 106   CO2 19* 19*   ANIONGAP 12 15   GLC 92 116*   BUN 17.8 23.9*   CR 0.89 1.17   GFRESTIMATED 83 61   CHAVEZ 8.6* 9.3   PROTTOTAL  --  6.3*   ALBUMIN  --  3.8   BILITOTAL  --  0.3   ALKPHOS  --  48   AST  --  17   ALT  --  9     CBC  Recent Labs   Lab 12/30/23  0533 12/29/23  1121   WBC 5.9 11.1*   RBC 3.17* 3.75*   HGB 9.4* 11.2*   HCT 28.6* 33.7*   MCV 90 90   MCH 29.7 29.9   MCHC 32.9 33.2   RDW 15.4* 15.3*    253       UA RESULTS:  Recent Labs   Lab Test 12/29/23  1147 09/04/23  1650 04/26/23  1457   COLOR Apryl*   < > Yellow   APPEARANCE Slightly Cloudy*   < > Clear   URINEGLC Negative   < > " Negative   URINEBILI Negative   < > Negative   URINEKETONE Negative   < > Negative   SG 1.023   < > 1.025   UBLD Negative   < > Trace*   URINEPH 5.0   < > 5.5   PROTEIN 100*   < > Negative   UROBILINOGEN  --   --  0.2   NITRITE Negative   < > Negative   LEUKEST Negative   < > Negative   RBCU 3*   < > 2-5*   WBCU 3   < > None Seen    < > = values in this interval not displayed.          Results for orders placed or performed during the hospital encounter of 12/29/23   CT Head w/o Contrast    Narrative    EXAM: CT HEAD W/O CONTRAST  12/29/2023 12:44 PM     HISTORY: Fall chronic anticoagulation       COMPARISON: Head CT: 9/4/2023.    TECHNIQUE: Using multidetector thin collimation helical acquisition  technique, axial, coronal and sagittal CT images from the skull base  to the vertex were obtained without intravenous contrast.   (topogram) image(s) also obtained and reviewed.    Radiation dose for this scan was reduced using automated exposure  control, adjustment of the mA and/or kV according to patient size, or  iterative reconstruction technique.    FINDINGS:    Calvarium/skull base: No acute calvarial fracture. No destructive  osseous lesions.  Mastoids and middle ears are clear.    Orbits: Bilateral pseudophakia.    Paranasal sinuses: No substantial paranasal sinus disease.    Brain: No acute intracranial hemorrhage.  No evidence of acute large  vascular territory ischemic infarct.  No mass effect.  No  hydrocephalus.  Basal cisterns are patent. Calcified intracranial  atherosclerosis. Changes suggestive of moderate to advanced chronic  microvascular ischemic disease. Moderate generalized parenchymal  atrophy.      Impression    IMPRESSION:    1.  No evidence of acute traumatic intracranial abnormality.  2.  Chronic changes as detailed above.    OLENA PALACIOS MD          CT Cervical Spine w/o Contrast    Narrative    EXAM: CT CERVICAL SPINE W/O CONTRAST  12/29/2023 12:44 PM     HISTORY: Fall neck pain        COMPARISON: Thoracic spine MRI: 5/18/2018. Neck CT: 9/2/2010.    TECHNIQUE: Using multidetector thin collimation helical acquisition  technique, axial, coronal and sagittal CT images through the cervical  spine were obtained without intravenous contrast.    Radiation dose for this scan was reduced using automated exposure  control, adjustment of the mA and/or kV according to patient size, or  iterative reconstruction technique.    FINDINGS:    VERTEBRAE:  No acute fracture or traumatic malalignment of the  cervical spine. Similar trace anterolisthesis of C3 on C4. Pronounced  kyphosis centered at T2. Vertebral body heights are maintained.  Osteopenia.    SPINAL CANAL/NEURAL FORAMINA:  Multilevel degenerative disc disease  through the cervical spine with disc height loss greatest and moderate  at C4-C5 and C5-C6. Spinal canal stenosis is greatest and mild to  moderate at C3-C4. Multilevel facet arthropathy throughout the  cervical spine, greatest and advanced on the right at C3-C4. In  combination with uncovertebral spurring/hypertrophy, there are varying  degrees of neural foraminal stenosis throughout the cervical spine.  This is most pronounced and moderate to advanced in the left at C3-C4.    OTHER: No prevertebral soft tissue edema.  Calcified atherosclerosis  of the cervical carotid arteries. Scarring in the lung apices.      Impression    IMPRESSION:    1. No acute fracture or traumatic malalignment of the cervical spine.  2. Multilevel cervical spondylosis as detailed above.    OLENA PALACIOS MD          Chest CT w/o contrast    Narrative    CT CHEST WITHOUT CONTRAST December 29, 2023 12:45 PM    CLINICAL HISTORY: Fall, cough.    TECHNIQUE: CT chest without IV contrast. Multiplanar reformats were  obtained. Dose reduction techniques were used.  CONTRAST: None.    COMPARISON: CT 9/4/2023.    FINDINGS:   LUNGS AND PLEURA: No effusions. No pneumothorax. Stable appearance of  bilateral regions of  bronchiectasis, and interstitial thickening. This  is associated with ground-glass opacities extending to the lower lungs  that appears stable as well. Upper lobe predominant emphysema. No new  airspace disease identified.    MEDIASTINUM/AXILLAE: Stable aortic valve prosthesis. Stable  calcifications of the thoracic aorta and mitral annular region. No new  adenopathy or fluid collection.    CORONARY ARTERY CALCIFICATION: Previous intervention (stents or CABG).    UPPER ABDOMEN: No acute upper abdominal abnormalities.    MUSCULOSKELETAL: No acute displaced fracture identified. Mild height  loss at T6 appears stable.      Impression    IMPRESSION:   1.  No acute traumatic abnormality identified.  2.  Stable bilateral interstitial thickening with bronchiectasis and  nonspecific pulmonary ground-glass opacities. Consider pulmonary  consultation for further workup of potential interstitial lung  disease.    POLO WOOD MD            CT Abdomen Pelvis w/o Contrast    Narrative    EXAM: CT ABDOMEN PELVIS W/O CONTRAST  LOCATION: [facility]  DATE: 12/29/2023    INDICATION: Hypotension.  COMPARISON: CT chest, abdomen, and pelvis 09/04/2023.  TECHNIQUE: CT scan of the abdomen and pelvis was performed without IV contrast. Multiplanar reformats were obtained. Dose reduction techniques were used.  CONTRAST: None.    FINDINGS:     LOWER CHEST: Status post wedge resection of the basilar right lower lobe. Appearance of the lung bases is unchanged, including bronchiolectasis, scattered groundglass opacities, and fibrosis. No new opacities. No pleural effusions. Aortic valve   prosthesis. Mitral annular calcifications. Coronary arterial stents.    HEPATOBILIARY: Normal.    PANCREAS: Normal.    SPLEEN: Normal.    ADRENAL GLANDS: Normal.    KIDNEYS/BLADDER: Nonobstructing 1 mm right lower pole renal calculus. No left renal calculi. No ureteral calculi or hydronephrosis. Small posterior bladder  diverticulum.    BOWEL: No bowel obstruction or inflammation. Normal appendix.    LYMPH NODES: No enlarged lymph nodes.    VASCULATURE: Severe aortobiiliac atherosclerosis. No abdominal aortic aneurysm.    PELVIC ORGANS: Normal.    MUSCULOSKELETAL: Demineralized bones. Metallic device is present between the L4 and L5 spinous processes. Unchanged mild compression deformity of the T12 vertebral body. Multilevel degenerative changes of the spine. 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 No acute bony abnormality. Small fat-containing periumbilical hernia.      Impression    IMPRESSION:     1.  No acute abnormality, within the limitations of the noncontrast technique.    2.  Nonobstructing 1 mm right lower pole renal calculus.    3.  Small posterior bladder diverticulum, likely sequela of chronic bladder outlet obstruction.

## 2023-12-31 NOTE — PLAN OF CARE
Goal Outcome Evaluation:    Plan of Care Reviewed With: patient    Overall Patient Progress: improving  Overall Patient Progress: improving    Outcome Evaluation: Patient alert and oriented. Vitals stable. O2 saturation was in the mid-90s all evening, but he is currently requiring 1 LPM O2 via nasal cannula to keep O2 > 92% during sleep. Diffuse chronic pain, particularly in bilateral hips partially relieved with PRN tylenol and bedtime oxycodone. Frequent congested cough producing thick, clear mucus. Spent most of the shift up in the recliner, currently resting in bed. Ambulated to the bathroom with Ax1-2, walker, and gait belt without dyspnea.  following output of 275.      Problem: Adult Inpatient Plan of Care  Goal: Absence of Hospital-Acquired Illness or Injury  Outcome: Progressing  Intervention: Identify and Manage Fall Risk  Recent Flowsheet Documentation  Taken 12/30/2023 1725 by Amie Rodrigues RN  Safety Promotion/Fall Prevention:   activity supervised   assistive device/personal items within reach   clutter free environment maintained   lighting adjusted   nonskid shoes/slippers when out of bed   room door open   room near nurse's station   safety round/check completed  Intervention: Prevent Skin Injury  Recent Flowsheet Documentation  Taken 12/30/2023 1725 by Amie Rodrigues RN  Body Position: position changed independently  Intervention: Prevent Infection  Recent Flowsheet Documentation  Taken 12/30/2023 1725 by Amie Rodrigues RN  Infection Prevention:   hand hygiene promoted   equipment surfaces disinfected   single patient room provided   rest/sleep promoted     Problem: Pneumonia  Goal: Fluid Balance  Outcome: Progressing  Goal: Resolution of Infection Signs and Symptoms  Outcome: Progressing  Goal: Effective Oxygenation and Ventilation  Outcome: Progressing

## 2023-12-31 NOTE — PLAN OF CARE
Problem: Pneumonia  Goal: Fluid Balance  Outcome: Progressing  Goal: Effective Oxygenation and Ventilation  Intervention: Promote Airway Secretion Clearance  Recent Flowsheet Documentation  Taken 12/31/2023 0000 by Devick, Karen M, RN  Cough And Deep Breathing: done with encouragement  Intervention: Optimize Oxygenation and Ventilation  Recent Flowsheet Documentation  Taken 12/31/2023 0000 by Devick, Karen M, RN  Head of Bed (HOB) Positioning: HOB lowered     Problem: Infection  Goal: Absence of Infection Signs and Symptoms  Outcome: Progressing     Goal Outcome Evaluation: Patient is alert and oriented, can be forgetful. Up with assist of two walker and gait belt. NS at 75 ml/hr. Infrequent productive cough, on antibiotics. Continuous Pulse Oximerty. Patient drops O2 sats when sleeping to 87-88% placed on 1 L O2 to keep above 90% at night. Bladder scan post void residuals every 8 hours. Voiding in small amounts.

## 2023-12-31 NOTE — PROGRESS NOTES
"[facility]    Medicine Progress Note - Hospitalist Service    Date of Admission:  12/29/2023    Assessment & Plan   Dangelo Hernández is a 88 year old male admitted on 12/29/2023. He presents with vomiting, and 1 day history of weakness and increased sputum production concerning for bacterial infection.  Patient initially was hypotensive 70s/40s but was quickly responsive to IVF.        Community-acquired pneumonia vs aspiration pneumonia vs bronchiectasis exacerbation   Hypotension, possible sepsis  Chronic bronchiectasis  History of cryptogenic organizing pneumonia  Hx candidal pneumonia     He had stem cell transplant in 10/2010, issues with pulmonary infiltrates and BAL with Candida glabrata in 07/2011, and unfortunate progression on VATS in 09/2011 that showed organizing pneumonia with negative tissue cultures, felt to have a , treated with high-dose steroids with a good clinical and radiological response, but unfortunate subsequent progression in 04/2012. A repeat BAL on 04/19/2012 showed filamentous fungus that was not able to be speciated further, and was treated with Voriconazole and prednisone.        Patient presented with increased sputum production, weakness, hypotension, tachycardia and new leukocytosis (11.1) concerning for sepsis secondary to community-acquired pneumonia versus aspiration pneumonia.  Patient reported that he had a episode of vomiting overnight and since then has felt increased lower extremity weakness.  Patient reports that he has had COVID \"7 times\" and each time he has increased weakness of his legs, likely secondary to progressive deconditioning and functional decline.  RSV, flu, and COVID negative.     CT 12/30/2023- comparison CT 9/4/2023. Stable appearance of bilateral regions of bronchiectasis, and interstitial thickening. This is associated with ground-glass opacities extending to the lower lungs that appears stable as well. Upper lobe " predominant emphysema. No new airspace disease identified.     Patient on room air in the ED.  Patient's initial presentation with hypotension 70s/40s quickly responded to 2 L IVF bolus.  Initially plan to go to ICU for potential vasopressors, but given fluid responsiveness was admitted to [facility].  Lactic acid 1.9. In ED given Zosyn. On admission changed to Rocephin and doxycycline given lack of concern for pseudomonas infection.  Possible component of atypical pneumonia given recurrence and pattern on CT imaging.    PCT 0.19  Cortisol 11    On 12/30/2023 patient does not recall why he was brought to ED. He denies shortness of breath, cough. He feels weak.. Clinical course not consistent with pneumonia    Swallow evaluation 12/31/2023 negative     Afebrile, WBC 5   Requires O2 when sleeping   - Continue abtx for 5 days, currently Rocephin and doxycycline   - Blood cultures pending  - Supplemental oxygen as needed and wean as tolerated       Generalized weakness  Patient has multiple admissions for generalized weakness over the last year and reports a progressive decline in independence with occasional falls at home.  On exam has no lower extremity deficits or sensation loss that are appreciable.  Likely secondary to recurrent hospitalizations and failure to thrive at home.  Possibly related to decreased intake with acute illness.  - Fall precautions  - PT/OT consulted appreciate their assistance  - See above for infectious treatment plan      Anemia, normocytic, mild iron deficiency suggested   Recent baseline 10-11  Admit HGB 11.2, recheck 9.4  No evidence acute blood loss  B12 572, Ferritin 57, %Fe sat 18    HGB stable 9.0  - Follow  - Start po iron   - Consider outpatient colonoscopy         Hypokalemia  Hypomagenesemia  - replace per protocol     Mantle Cell Lymphoma S/P Autologous Transplant  Patient had stem cell transplant in 10/2010.  Currently in remission    CAD s/p PCI 2013 and 10/2019 (ODIN to proximal  LAD)   Aortic stenosis S/P TAVR    Hypertension  Patient had a TAVR procedure 03/2023 in [place]. No records in EPIC  Echo 9/2023- Left ventricular systolic function is low normal. The visual ejection fraction is 50-55%. There is a bioprosthetic aortic valve. The prosthetic aortic valve is not well visualized. The gradient is normal for this prosthetic aortic valve. There is mild (1+) aortic regurgitation.    PTA on plavix, apixaban 5 BID (taking once a day), metoprolol 12.5 BID (taking once a day), sacubitril-valsartan 24-26    Troponin on admission was 47 and rechecked at 47 likely demand mismatch given hypotension.  Patient denies any chest pain or ACS symptoms.  EKG had ST depressions in lead I and aVL and left anterior fascicular block, which were unchanged from prior EKGs.    On admission BPs were low, now better, still soft   - Continue PTA Plavix   - Continue apixaban at BID dosing   - Restart PTA metorolol at BID dosing  - Stop IVF   - Restart sacubitril-valsartan 24-26     GERD   Stable on PTA esomeprazole 20 mg.   - Switch to pantoprazole 40 mg daily    Acquired hypothyroidism  Stable.  TSH 2.47 on admission.  - Continue PTA levothyroxine    Hyperlipidemia  - Continue PTA Crestor 40 mg             Diet: Mechanical/Dental Soft Diet    DVT Prophylaxis: DOAC  Ascencio Catheter: Not present  Lines: None     Cardiac Monitoring: None  Code Status: No CPR- Do NOT Intubate      Clinically Significant Risk Factors        # Hypokalemia: Lowest K = 3.1 mmol/L in last 2 days, will replace as needed     # Hypomagnesemia: Lowest Mg = 1.5 mg/dL in last 2 days, will replace as needed   # Hypoalbuminemia: Lowest albumin = 2.9 g/dL at 12/31/2023  6:16 AM, will monitor as appropriate     # Hypertension: Noted on problem list  # Heart failure with preserved ejection fraction: EF >50% and home medication list includes sacubitril/valsartan (Entresto)       # Overweight: Estimated body mass index is 25.66 kg/m  as calculated from  "the following:    Height as of this encounter: 1.702 m (5' 7\").    Weight as of this encounter: 74.3 kg (163 lb 12.8 oz)., PRESENT ON ADMISSION     # Financial/Environmental Concerns: none         Disposition Plan     Expected Discharge Date: 12/31/2023      Destination: inpatient rehabilitation facility              Lucas Gilbert MD  Hospitalist Service  [facility]  Securely message with [employer] (more info)  Text page via [employer] Paging/Directory   ______________________________________________________________________    Interval History         Physical Exam   Vital Signs: Temp: 98.1  F (36.7  C) Temp src: Oral BP: 115/56 (Denies dizziness and light-headedness) Pulse: 82   Resp: 20 SpO2: 100 % O2 Device: Nasal cannula Oxygen Delivery: 1.5 LPM  Weight: 163 lbs 12.83 oz    Constitutional: awake, alert, cooperative, no apparent distress, and appears stated age  Respiratory: pan-inspiratory reles at bases    Medical Decision Making       40 MINUTES SPENT BY ME on the date of service doing chart review, history, exam, documentation & further activities per the note.      Data     CMP  Recent Labs   Lab 12/31/23  1314 12/31/23  0616 12/30/23  1316 12/30/23  0533 12/29/23  1121   NA  --  140  --  141 140   POTASSIUM  --  3.9 3.9 3.1* 3.4   CHLORIDE  --  111*  --  110* 106   CO2  --  21*  --  19* 19*   ANIONGAP  --  8  --  12 15   GLC  --  88  --  92 116*   BUN  --  16.4  --  17.8 23.9*   CR  --  0.91  --  0.89 1.17   GFRESTIMATED  --  82  --  83 61   CHAVEZ  --  8.5*  --  8.6* 9.3   MAG 2.7* 1.5*  --  1.6*  --    PROTTOTAL  --  5.2*  --   --  6.3*   ALBUMIN  --  2.9*  --   --  3.8   BILITOTAL  --  <0.2  --   --  0.3   ALKPHOS  --  45  --   --  48   AST  --  17  --   --  17   ALT  --  9  --   --  9     CBC  Recent Labs   Lab 12/31/23  0616 12/30/23  0533 12/29/23  1121   WBC 5.9 5.9 11.1*   RBC 3.04* 3.17* 3.75*   HGB 9.0* 9.4* 11.2*   HCT 28.3* 28.6* 33.7*   MCV 93 90 90   MCH 29.6 29.7 29.9   MCHC " 31.8 32.9 33.2   RDW 15.7* 15.4* 15.3*    202 253

## 2023-12-31 NOTE — PROGRESS NOTES
Impression:  Pt assessed during breakfast. Pt has safe self feeding skills and no overt s/sx aspiration with regular diet consistencies. No treatment indicated. Continue regular consistency diet. Pt assessed during breakfast. Pt has safe self feeding skills and no overt s/sx aspiration with regular diet consistencies. No treatment indicated. Continue regular consistency diet.          Bedside Swallow Evaluation  12/31/23    Appointment Info   Signing Clinician's Name / Credentials (SLP) Zulma Veras MA, CCC/SLP   Rehab Comments (SLP) RN reports pt had no dysphagia symptoms when taking pills with thin liquid.   General Information   Onset of Illness/Injury or Date of Surgery 12/30/23   Referring Physician Lucas Gilbert MD   Patient/Family Therapy Goal Statement (SLP) Pt reports no dysphagia symptoms.  Asks if there is a discharge plan for today.   Pertinent History of Current Problem Dangelo Hernández is a 88 year old male admitted on 12/29/2023. He presents with 1 day history of weakness and increased sputum production concerning for bacterial infection.  Patient initially was hypotensive 70s/40s but was quickly responsive to IVF   General Observations Pt seen while feeding self breakfast in recliner   Pain Assessment   Patient Currently in Pain No   Type of Evaluation   Type of Evaluation Swallow Evaluation   Oral Motor   Oral Musculature generally intact   Structural Abnormalities none present   Mucosal Quality adequate   Dentition (Oral Motor)   Dentition (Oral Motor) dental appliance/dentures   Dental Appliance/Denture (Oral Motor)   (pt states dentures fit well)   Facial Symmetry (Oral Motor)   Facial Symmetry (Oral Motor) WNL   Lip Function (Oral Motor)   Lip Range of Motion (Oral Motor) WNL   Comment, Lip Function (Oral Motor) WNL   Tongue Function (Oral Motor)   Tongue Coordination/Speed (Oral Motor) WNL   Tongue ROM (Oral Motor) WNL   Jaw Function (Oral Motor)   Jaw Function (Oral Motor) WNL   Vocal  Quality/Secretion Management (Oral Motor)   Vocal Quality (Oral Motor) WNL   General Swallowing Observations   Past History of Dysphagia No   Current Diet/Method of Nutritional Intake (General Swallowing Observations, NIS) other (see comments)  (mechanical soft)   Swallowing Evaluation Clinical swallow evaluation   Clinical Swallow Evaluation   Feeding Assistance no assistance needed   Clinical Swallow Evaluation Textures Trialed thin liquids;soft & bite-sized;solid foods;pureed   Clinical Swallow Eval: Thin Liquid Texture Trial   Mode of Presentation, Thin Liquids cup;straw;self-fed   Volume of Liquid or Food Presented single and consecutive sips   Oral Phase of Swallow WFL   Pharyngeal Phase of Swallow intact   Diagnostic Statement WNL   Clinical Swallow Evaluation: Puree Solid Texture Trial   Mode of Presentation, Puree spoon;self-fed   Volume of Puree Presented appropriate bite size   Oral Phase, Puree WFL   Pharyngeal Phase, Puree intact   Diagnostic Statement WL   Clinical Swallow Eval: Soft & Bite Sized   Mode of Presentation self-fed   Volume Presented appropriate bite size   Oral Phase WFL   Pharyngeal Phase intact   Diagnostic Statement WNL   Clinical Swallow Evaluation: Solid Food Texture Trial   Mode of Presentation self-fed   Volume Presented appropriate bite size   Oral Phase WFL   Pharyngeal Phase intact   Diagnostic Statement WNL   Esophageal Phase of Swallow   Patient reports or presents with symptoms of esophageal dysphagia No   Swallowing Recommendations   Diet Consistency Recommendations other (see comments)  (continue mechanical soft with thin liquids.)   Supervision Level for Intake patient independent   Comment, Swallowing Recommendations Pt assessed during breakfast.  Pt has safe self feeding skills and no overt s/sx aspiration with regular diet consistencies.  No treatment indicated.  Continue regular consistency diet.   Clinical Impression   Criteria for Skilled Therapeutic Interventions  Met (SLP Eval) Evaluation only   SLP Total Evaluation Time   Eval: oral/pharyngeal swallow function, clinical swallow Minutes (52006) 25   Total Session Time   Total Session Time (sum of timed and untimed services) 25

## 2024-01-01 ENCOUNTER — TRANSITIONAL CARE UNIT VISIT (OUTPATIENT)
Dept: GERIATRICS | Facility: CLINIC | Age: 89
End: 2024-01-01
Payer: COMMERCIAL

## 2024-01-01 ENCOUNTER — PATIENT OUTREACH (OUTPATIENT)
Dept: CARE COORDINATION | Facility: CLINIC | Age: 89
End: 2024-01-01

## 2024-01-01 ENCOUNTER — HOSPITAL ENCOUNTER (INPATIENT)
Facility: CLINIC | Age: 89
LOS: 4 days | Discharge: HOSPICE | DRG: 372 | End: 2024-01-23
Attending: EMERGENCY MEDICINE | Admitting: HOSPITALIST
Payer: COMMERCIAL

## 2024-01-01 ENCOUNTER — APPOINTMENT (OUTPATIENT)
Dept: SPEECH THERAPY | Facility: CLINIC | Age: 89
DRG: 372 | End: 2024-01-01
Payer: COMMERCIAL

## 2024-01-01 ENCOUNTER — APPOINTMENT (OUTPATIENT)
Dept: PHYSICAL THERAPY | Facility: CLINIC | Age: 89
DRG: 871 | End: 2024-01-01
Payer: COMMERCIAL

## 2024-01-01 ENCOUNTER — PATIENT OUTREACH (OUTPATIENT)
Dept: CARE COORDINATION | Facility: CLINIC | Age: 89
End: 2024-01-01
Payer: COMMERCIAL

## 2024-01-01 ENCOUNTER — DOCUMENTATION ONLY (OUTPATIENT)
Dept: GERIATRICS | Facility: CLINIC | Age: 89
End: 2024-01-01

## 2024-01-01 ENCOUNTER — LAB REQUISITION (OUTPATIENT)
Dept: LAB | Facility: CLINIC | Age: 89
End: 2024-01-01
Payer: COMMERCIAL

## 2024-01-01 ENCOUNTER — TRANSITIONAL CARE UNIT VISIT (OUTPATIENT)
Dept: GERIATRICS | Facility: CLINIC | Age: 89
End: 2024-01-01
Payer: MEDICARE

## 2024-01-01 ENCOUNTER — APPOINTMENT (OUTPATIENT)
Dept: CT IMAGING | Facility: CLINIC | Age: 89
DRG: 372 | End: 2024-01-01
Attending: EMERGENCY MEDICINE
Payer: COMMERCIAL

## 2024-01-01 ENCOUNTER — TELEPHONE (OUTPATIENT)
Dept: PULMONOLOGY | Facility: CLINIC | Age: 89
End: 2024-01-01
Payer: COMMERCIAL

## 2024-01-01 ENCOUNTER — APPOINTMENT (OUTPATIENT)
Dept: GENERAL RADIOLOGY | Facility: CLINIC | Age: 89
DRG: 372 | End: 2024-01-01
Attending: EMERGENCY MEDICINE
Payer: COMMERCIAL

## 2024-01-01 VITALS
HEART RATE: 100 BPM | TEMPERATURE: 98.4 F | OXYGEN SATURATION: 96 % | WEIGHT: 163.8 LBS | HEIGHT: 67 IN | SYSTOLIC BLOOD PRESSURE: 140 MMHG | RESPIRATION RATE: 18 BRPM | BODY MASS INDEX: 25.71 KG/M2 | DIASTOLIC BLOOD PRESSURE: 58 MMHG

## 2024-01-01 VITALS
OXYGEN SATURATION: 94 % | RESPIRATION RATE: 18 BRPM | HEART RATE: 108 BPM | SYSTOLIC BLOOD PRESSURE: 122 MMHG | TEMPERATURE: 98 F | DIASTOLIC BLOOD PRESSURE: 57 MMHG | BODY MASS INDEX: 25.04 KG/M2 | WEIGHT: 159.9 LBS

## 2024-01-01 VITALS
TEMPERATURE: 97.3 F | DIASTOLIC BLOOD PRESSURE: 63 MMHG | RESPIRATION RATE: 18 BRPM | HEIGHT: 66 IN | HEART RATE: 82 BPM | WEIGHT: 160 LBS | SYSTOLIC BLOOD PRESSURE: 121 MMHG | OXYGEN SATURATION: 90 % | BODY MASS INDEX: 25.71 KG/M2

## 2024-01-01 VITALS
OXYGEN SATURATION: 90 % | HEART RATE: 106 BPM | HEIGHT: 66 IN | BODY MASS INDEX: 26.29 KG/M2 | DIASTOLIC BLOOD PRESSURE: 69 MMHG | RESPIRATION RATE: 18 BRPM | SYSTOLIC BLOOD PRESSURE: 149 MMHG | TEMPERATURE: 97.1 F

## 2024-01-01 VITALS
HEART RATE: 96 BPM | HEIGHT: 67 IN | WEIGHT: 158.29 LBS | SYSTOLIC BLOOD PRESSURE: 121 MMHG | TEMPERATURE: 99.8 F | DIASTOLIC BLOOD PRESSURE: 44 MMHG | OXYGEN SATURATION: 96 % | RESPIRATION RATE: 20 BRPM | BODY MASS INDEX: 24.84 KG/M2

## 2024-01-01 VITALS
RESPIRATION RATE: 20 BRPM | OXYGEN SATURATION: 92 % | HEIGHT: 66 IN | DIASTOLIC BLOOD PRESSURE: 79 MMHG | HEART RATE: 97 BPM | SYSTOLIC BLOOD PRESSURE: 143 MMHG | WEIGHT: 162.9 LBS | BODY MASS INDEX: 26.18 KG/M2 | TEMPERATURE: 97.7 F

## 2024-01-01 VITALS
WEIGHT: 166.9 LBS | HEART RATE: 113 BPM | OXYGEN SATURATION: 93 % | HEIGHT: 66 IN | SYSTOLIC BLOOD PRESSURE: 172 MMHG | TEMPERATURE: 98 F | RESPIRATION RATE: 22 BRPM | DIASTOLIC BLOOD PRESSURE: 84 MMHG | BODY MASS INDEX: 26.82 KG/M2

## 2024-01-01 VITALS
BODY MASS INDEX: 26.29 KG/M2 | RESPIRATION RATE: 20 BRPM | OXYGEN SATURATION: 92 % | HEART RATE: 97 BPM | SYSTOLIC BLOOD PRESSURE: 143 MMHG | DIASTOLIC BLOOD PRESSURE: 79 MMHG | WEIGHT: 162.9 LBS | TEMPERATURE: 97.7 F

## 2024-01-01 DIAGNOSIS — Z71.89 ACP (ADVANCE CARE PLANNING): ICD-10-CM

## 2024-01-01 DIAGNOSIS — G89.4 CHRONIC PAIN SYNDROME: ICD-10-CM

## 2024-01-01 DIAGNOSIS — J18.9 PNEUMONIA DUE TO INFECTIOUS ORGANISM, UNSPECIFIED LATERALITY, UNSPECIFIED PART OF LUNG: Primary | ICD-10-CM

## 2024-01-01 DIAGNOSIS — J47.9 BRONCHIECTASIS WITHOUT COMPLICATION (H): Primary | ICD-10-CM

## 2024-01-01 DIAGNOSIS — R53.1 WEAKNESS: ICD-10-CM

## 2024-01-01 DIAGNOSIS — J44.9 CHRONIC OBSTRUCTIVE PULMONARY DISEASE, UNSPECIFIED COPD TYPE (H): ICD-10-CM

## 2024-01-01 DIAGNOSIS — C83.10 MANTLE CELL LYMPHOMA, UNSPECIFIED BODY REGION (H): ICD-10-CM

## 2024-01-01 DIAGNOSIS — R50.9 FEVER, UNSPECIFIED FEVER CAUSE: ICD-10-CM

## 2024-01-01 DIAGNOSIS — Z86.16 HISTORY OF COVID-19: ICD-10-CM

## 2024-01-01 DIAGNOSIS — J47.9 BRONCHIECTASIS WITHOUT COMPLICATION (H): ICD-10-CM

## 2024-01-01 DIAGNOSIS — M53.9 MULTILEVEL DEGENERATIVE DISC DISEASE: ICD-10-CM

## 2024-01-01 DIAGNOSIS — A04.72 C. DIFFICILE COLITIS: ICD-10-CM

## 2024-01-01 DIAGNOSIS — R19.7 DIARRHEA, UNSPECIFIED TYPE: ICD-10-CM

## 2024-01-01 DIAGNOSIS — I11.9 HYPERTENSIVE HEART DISEASE WITHOUT HEART FAILURE: ICD-10-CM

## 2024-01-01 DIAGNOSIS — R65.20 SEPSIS WITH ACUTE RENAL FAILURE WITHOUT SEPTIC SHOCK, DUE TO UNSPECIFIED ORGANISM, UNSPECIFIED ACUTE RENAL FAILURE TYPE (H): ICD-10-CM

## 2024-01-01 DIAGNOSIS — Z87.81 H/O COMPRESSION FRACTURE OF SPINE: Primary | ICD-10-CM

## 2024-01-01 DIAGNOSIS — M48.061 SPINAL STENOSIS OF LUMBAR REGION WITHOUT NEUROGENIC CLAUDICATION: ICD-10-CM

## 2024-01-01 DIAGNOSIS — J18.9 PNEUMONIA, UNSPECIFIED ORGANISM: ICD-10-CM

## 2024-01-01 DIAGNOSIS — Z20.822 LAB TEST NEGATIVE FOR COVID-19 VIRUS: ICD-10-CM

## 2024-01-01 DIAGNOSIS — Z87.81 H/O COMPRESSION FRACTURE OF SPINE: ICD-10-CM

## 2024-01-01 DIAGNOSIS — Z51.5 HOSPICE CARE PATIENT: ICD-10-CM

## 2024-01-01 DIAGNOSIS — M54.50 LOW BACK PAIN WITHOUT SCIATICA, UNSPECIFIED BACK PAIN LATERALITY, UNSPECIFIED CHRONICITY: ICD-10-CM

## 2024-01-01 DIAGNOSIS — Z79.01 LONG TERM CURRENT USE OF ANTICOAGULANT THERAPY: ICD-10-CM

## 2024-01-01 DIAGNOSIS — C85.80 OTHER SPECIFIED TYPE OF NON-HODGKIN LYMPHOMA, UNSPECIFIED BODY REGION (H): Chronic | ICD-10-CM

## 2024-01-01 DIAGNOSIS — N17.9 SEPSIS WITH ACUTE RENAL FAILURE WITHOUT SEPTIC SHOCK, DUE TO UNSPECIFIED ORGANISM, UNSPECIFIED ACUTE RENAL FAILURE TYPE (H): ICD-10-CM

## 2024-01-01 DIAGNOSIS — K21.9 GASTROESOPHAGEAL REFLUX DISEASE WITHOUT ESOPHAGITIS: Chronic | ICD-10-CM

## 2024-01-01 DIAGNOSIS — Z95.2 S/P TAVR (TRANSCATHETER AORTIC VALVE REPLACEMENT): ICD-10-CM

## 2024-01-01 DIAGNOSIS — R53.1 GENERALIZED WEAKNESS: ICD-10-CM

## 2024-01-01 DIAGNOSIS — Z87.01 HISTORY OF PNEUMONIA: ICD-10-CM

## 2024-01-01 DIAGNOSIS — E78.2 COMBINED HYPERLIPIDEMIA: ICD-10-CM

## 2024-01-01 DIAGNOSIS — A04.72 COLITIS, CLOSTRIDIUM DIFFICILE: Primary | ICD-10-CM

## 2024-01-01 DIAGNOSIS — J47.9 BRONCHIECTASIS WITHOUT COMPLICATION (H): Primary | Chronic | ICD-10-CM

## 2024-01-01 DIAGNOSIS — M19.012 PRIMARY OSTEOARTHRITIS OF LEFT SHOULDER: Primary | ICD-10-CM

## 2024-01-01 DIAGNOSIS — M62.81 MUSCLE WEAKNESS (GENERALIZED): ICD-10-CM

## 2024-01-01 DIAGNOSIS — M87.00 AVASCULAR NECROSIS (H): ICD-10-CM

## 2024-01-01 DIAGNOSIS — A41.9 SEPSIS WITH ACUTE RENAL FAILURE WITHOUT SEPTIC SHOCK, DUE TO UNSPECIFIED ORGANISM, UNSPECIFIED ACUTE RENAL FAILURE TYPE (H): ICD-10-CM

## 2024-01-01 DIAGNOSIS — I10 HYPERTENSION GOAL BP (BLOOD PRESSURE) < 140/90: ICD-10-CM

## 2024-01-01 DIAGNOSIS — C85.90 LYMPHOMA, UNSPECIFIED BODY REGION, UNSPECIFIED LYMPHOMA TYPE (H): ICD-10-CM

## 2024-01-01 DIAGNOSIS — J18.9 PNEUMONIA DUE TO INFECTIOUS ORGANISM, UNSPECIFIED LATERALITY, UNSPECIFIED PART OF LUNG: ICD-10-CM

## 2024-01-01 DIAGNOSIS — I48.91 ATRIAL FIBRILLATION WITH RAPID VENTRICULAR RESPONSE (H): ICD-10-CM

## 2024-01-01 DIAGNOSIS — M15.0 PRIMARY OSTEOARTHRITIS INVOLVING MULTIPLE JOINTS: ICD-10-CM

## 2024-01-01 DIAGNOSIS — N18.9 CHRONIC KIDNEY DISEASE, UNSPECIFIED: ICD-10-CM

## 2024-01-01 DIAGNOSIS — N17.9 AKI (ACUTE KIDNEY INJURY) (H): ICD-10-CM

## 2024-01-01 DIAGNOSIS — Z95.5 PRESENCE OF CORONARY ANGIOPLASTY IMPLANT AND GRAFT: ICD-10-CM

## 2024-01-01 DIAGNOSIS — M19.012 PRIMARY OSTEOARTHRITIS OF LEFT SHOULDER: ICD-10-CM

## 2024-01-01 LAB
ADV 40+41 DNA STL QL NAA+NON-PROBE: NEGATIVE
ALBUMIN SERPL BCG-MCNC: 2.8 G/DL (ref 3.5–5.2)
ALBUMIN SERPL BCG-MCNC: 3.7 G/DL (ref 3.5–5.2)
ALBUMIN SERPL BCG-MCNC: 4 G/DL (ref 3.5–5.2)
ALBUMIN UR-MCNC: 30 MG/DL
ALP SERPL-CCNC: 47 U/L (ref 40–150)
ALP SERPL-CCNC: 55 U/L (ref 40–150)
ALP SERPL-CCNC: 58 U/L (ref 40–150)
ALT SERPL W P-5'-P-CCNC: 12 U/L (ref 0–70)
ALT SERPL W P-5'-P-CCNC: 13 U/L (ref 0–70)
ALT SERPL W P-5'-P-CCNC: 9 U/L (ref 0–70)
ANION GAP SERPL CALCULATED.3IONS-SCNC: 10 MMOL/L (ref 7–15)
ANION GAP SERPL CALCULATED.3IONS-SCNC: 14 MMOL/L (ref 7–15)
ANION GAP SERPL CALCULATED.3IONS-SCNC: 15 MMOL/L (ref 7–15)
ANION GAP SERPL CALCULATED.3IONS-SCNC: 18 MMOL/L (ref 7–15)
ANION GAP SERPL CALCULATED.3IONS-SCNC: 5 MMOL/L (ref 7–15)
APPEARANCE UR: CLEAR
AST SERPL W P-5'-P-CCNC: 18 U/L (ref 0–45)
AST SERPL W P-5'-P-CCNC: 28 U/L (ref 0–45)
AST SERPL W P-5'-P-CCNC: 30 U/L (ref 0–45)
ASTRO TYP 1-8 RNA STL QL NAA+NON-PROBE: NEGATIVE
BACTERIA BLD CULT: NO GROWTH
BASE EXCESS BLDV CALC-SCNC: -1.9 MMOL/L (ref -3–3)
BASOPHILS # BLD AUTO: 0.1 10E3/UL (ref 0–0.2)
BASOPHILS NFR BLD AUTO: 1 %
BILIRUB SERPL-MCNC: 0.2 MG/DL
BILIRUB SERPL-MCNC: 0.3 MG/DL
BILIRUB SERPL-MCNC: 0.5 MG/DL
BILIRUB UR QL STRIP: NEGATIVE
BUN SERPL-MCNC: 12.8 MG/DL (ref 8–23)
BUN SERPL-MCNC: 14.7 MG/DL (ref 8–23)
BUN SERPL-MCNC: 24.9 MG/DL (ref 8–23)
BUN SERPL-MCNC: 24.9 MG/DL (ref 8–23)
BUN SERPL-MCNC: 39.1 MG/DL (ref 8–23)
C CAYETANENSIS DNA STL QL NAA+NON-PROBE: NEGATIVE
C DIFF GDH STL QL IA: POSITIVE
C DIFF TOX A+B STL QL IA: POSITIVE
C DIFF TOX B STL QL: POSITIVE
C PNEUM DNA SPEC QL NAA+PROBE: NOT DETECTED
CALCIUM SERPL-MCNC: 8.5 MG/DL (ref 8.8–10.2)
CALCIUM SERPL-MCNC: 9.4 MG/DL (ref 8.8–10.2)
CALCIUM SERPL-MCNC: 9.4 MG/DL (ref 8.8–10.2)
CALCIUM SERPL-MCNC: 9.5 MG/DL (ref 8.8–10.2)
CALCIUM SERPL-MCNC: 9.7 MG/DL (ref 8.8–10.2)
CAMPYLOBACTER DNA SPEC NAA+PROBE: NEGATIVE
CHLORIDE SERPL-SCNC: 101 MMOL/L (ref 98–107)
CHLORIDE SERPL-SCNC: 102 MMOL/L (ref 98–107)
CHLORIDE SERPL-SCNC: 104 MMOL/L (ref 98–107)
CHLORIDE SERPL-SCNC: 110 MMOL/L (ref 98–107)
CHLORIDE SERPL-SCNC: 97 MMOL/L (ref 98–107)
COLOR UR AUTO: YELLOW
CREAT SERPL-MCNC: 0.83 MG/DL (ref 0.67–1.17)
CREAT SERPL-MCNC: 0.86 MG/DL (ref 0.67–1.17)
CREAT SERPL-MCNC: 1.08 MG/DL (ref 0.67–1.17)
CREAT SERPL-MCNC: 1.14 MG/DL (ref 0.67–1.17)
CREAT SERPL-MCNC: 1.36 MG/DL (ref 0.67–1.17)
CRP SERPL-MCNC: 126.13 MG/L
CRP SERPL-MCNC: 64.95 MG/L
CRYPTOSP DNA STL QL NAA+NON-PROBE: NEGATIVE
DEPRECATED HCO3 PLAS-SCNC: 17 MMOL/L (ref 22–29)
DEPRECATED HCO3 PLAS-SCNC: 20 MMOL/L (ref 22–29)
DEPRECATED HCO3 PLAS-SCNC: 21 MMOL/L (ref 22–29)
DEPRECATED HCO3 PLAS-SCNC: 24 MMOL/L (ref 22–29)
DEPRECATED HCO3 PLAS-SCNC: 30 MMOL/L (ref 22–29)
E COLI O157 DNA STL QL NAA+NON-PROBE: NORMAL
E HISTOLYT DNA STL QL NAA+NON-PROBE: NEGATIVE
EAEC ASTA GENE ISLT QL NAA+PROBE: NEGATIVE
EC STX1+STX2 GENES STL QL NAA+NON-PROBE: NEGATIVE
EGFRCR SERPLBLD CKD-EPI 2021: 51 ML/MIN/1.73M2
EGFRCR SERPLBLD CKD-EPI 2021: 63 ML/MIN/1.73M2
EGFRCR SERPLBLD CKD-EPI 2021: 67 ML/MIN/1.73M2
EGFRCR SERPLBLD CKD-EPI 2021: 84 ML/MIN/1.73M2
EGFRCR SERPLBLD CKD-EPI 2021: 85 ML/MIN/1.73M2
EOSINOPHIL # BLD AUTO: 0.1 10E3/UL (ref 0–0.7)
EOSINOPHIL NFR BLD AUTO: 1 %
EPEC EAE GENE STL QL NAA+NON-PROBE: NEGATIVE
ERYTHROCYTE [DISTWIDTH] IN BLOOD BY AUTOMATED COUNT: 15.3 % (ref 10–15)
ERYTHROCYTE [DISTWIDTH] IN BLOOD BY AUTOMATED COUNT: 15.9 % (ref 10–15)
ERYTHROCYTE [DISTWIDTH] IN BLOOD BY AUTOMATED COUNT: 15.9 % (ref 10–15)
ERYTHROCYTE [DISTWIDTH] IN BLOOD BY AUTOMATED COUNT: 16.4 % (ref 10–15)
ERYTHROCYTE [DISTWIDTH] IN BLOOD BY AUTOMATED COUNT: 17.4 % (ref 10–15)
ETEC LTA+ST1A+ST1B TOX ST NAA+NON-PROBE: NEGATIVE
FLUAV H1 2009 PAND RNA SPEC QL NAA+PROBE: NOT DETECTED
FLUAV H1 RNA SPEC QL NAA+PROBE: NOT DETECTED
FLUAV H3 RNA SPEC QL NAA+PROBE: NOT DETECTED
FLUAV RNA SPEC QL NAA+PROBE: NEGATIVE
FLUAV RNA SPEC QL NAA+PROBE: NOT DETECTED
FLUBV RNA RESP QL NAA+PROBE: NEGATIVE
FLUBV RNA SPEC QL NAA+PROBE: NOT DETECTED
G LAMBLIA DNA STL QL NAA+NON-PROBE: NEGATIVE
GLUCOSE SERPL-MCNC: 121 MG/DL (ref 70–99)
GLUCOSE SERPL-MCNC: 123 MG/DL (ref 70–99)
GLUCOSE SERPL-MCNC: 400 MG/DL (ref 70–99)
GLUCOSE SERPL-MCNC: 93 MG/DL (ref 70–99)
GLUCOSE SERPL-MCNC: 97 MG/DL (ref 70–99)
GLUCOSE UR STRIP-MCNC: NEGATIVE MG/DL
HADV DNA SPEC QL NAA+PROBE: NOT DETECTED
HCO3 BLDV-SCNC: 23 MMOL/L (ref 21–28)
HCOV PNL SPEC NAA+PROBE: NOT DETECTED
HCT VFR BLD AUTO: 28 % (ref 40–53)
HCT VFR BLD AUTO: 33.3 % (ref 40–53)
HCT VFR BLD AUTO: 33.5 % (ref 40–53)
HCT VFR BLD AUTO: 33.9 % (ref 40–53)
HCT VFR BLD AUTO: 34.9 % (ref 40–53)
HGB BLD-MCNC: 11 G/DL (ref 13.3–17.7)
HGB BLD-MCNC: 11 G/DL (ref 13.3–17.7)
HGB BLD-MCNC: 11.1 G/DL (ref 13.3–17.7)
HGB BLD-MCNC: 9.1 G/DL (ref 13.3–17.7)
HGB BLD-MCNC: 9.3 G/DL (ref 13.3–17.7)
HGB UR QL STRIP: ABNORMAL
HMPV RNA SPEC QL NAA+PROBE: NOT DETECTED
HPIV1 RNA SPEC QL NAA+PROBE: NOT DETECTED
HPIV2 RNA SPEC QL NAA+PROBE: NOT DETECTED
HPIV3 RNA SPEC QL NAA+PROBE: NOT DETECTED
HPIV4 RNA SPEC QL NAA+PROBE: NOT DETECTED
IMM GRANULOCYTES # BLD: 0.1 10E3/UL
IMM GRANULOCYTES NFR BLD: 0 %
KETONES UR STRIP-MCNC: NEGATIVE MG/DL
LACTATE SERPL-SCNC: 1.4 MMOL/L (ref 0.7–2)
LACTATE SERPL-SCNC: 1.5 MMOL/L (ref 0.7–2)
LACTATE SERPL-SCNC: 2.1 MMOL/L (ref 0.7–2)
LEUKOCYTE ESTERASE UR QL STRIP: NEGATIVE
LYMPHOCYTES # BLD AUTO: 0.7 10E3/UL (ref 0.8–5.3)
LYMPHOCYTES NFR BLD AUTO: 6 %
M PNEUMO DNA SPEC QL NAA+PROBE: NOT DETECTED
MAGNESIUM SERPL-MCNC: 1.8 MG/DL (ref 1.7–2.3)
MAGNESIUM SERPL-MCNC: 1.9 MG/DL (ref 1.7–2.3)
MCH RBC QN AUTO: 26 PG (ref 26.5–33)
MCH RBC QN AUTO: 29.6 PG (ref 26.5–33)
MCH RBC QN AUTO: 29.8 PG (ref 26.5–33)
MCH RBC QN AUTO: 30.1 PG (ref 26.5–33)
MCH RBC QN AUTO: 30.1 PG (ref 26.5–33)
MCHC RBC AUTO-ENTMCNC: 26.6 G/DL (ref 31.5–36.5)
MCHC RBC AUTO-ENTMCNC: 32.5 G/DL (ref 31.5–36.5)
MCHC RBC AUTO-ENTMCNC: 32.7 G/DL (ref 31.5–36.5)
MCHC RBC AUTO-ENTMCNC: 32.8 G/DL (ref 31.5–36.5)
MCHC RBC AUTO-ENTMCNC: 33 G/DL (ref 31.5–36.5)
MCV RBC AUTO: 91 FL (ref 78–100)
MCV RBC AUTO: 92 FL (ref 78–100)
MCV RBC AUTO: 98 FL (ref 78–100)
MONOCYTES # BLD AUTO: 1.1 10E3/UL (ref 0–1.3)
MONOCYTES NFR BLD AUTO: 9 %
MUCOUS THREADS #/AREA URNS LPF: PRESENT /LPF
NEUTROPHILS # BLD AUTO: 10.1 10E3/UL (ref 1.6–8.3)
NEUTROPHILS NFR BLD AUTO: 83 %
NITRATE UR QL: NEGATIVE
NOROVIRUS GI+II RNA STL QL NAA+NON-PROBE: NEGATIVE
NRBC # BLD AUTO: 0 10E3/UL
NRBC BLD AUTO-RTO: 0 /100
NT-PROBNP SERPL-MCNC: 1282 PG/ML (ref 0–1800)
NT-PROBNP SERPL-MCNC: 1886 PG/ML (ref 0–1800)
O2/TOTAL GAS SETTING VFR VENT: 21 %
OXYHGB MFR BLDV: 54 % (ref 70–75)
P SHIGELLOIDES DNA STL QL NAA+NON-PROBE: NEGATIVE
PCO2 BLDV: 37 MM HG (ref 40–50)
PH BLDV: 7.4 [PH] (ref 7.32–7.43)
PH UR STRIP: 5 [PH] (ref 5–7)
PLATELET # BLD AUTO: 216 10E3/UL (ref 150–450)
PLATELET # BLD AUTO: 269 10E3/UL (ref 150–450)
PLATELET # BLD AUTO: 296 10E3/UL (ref 150–450)
PLATELET # BLD AUTO: 316 10E3/UL (ref 150–450)
PLATELET # BLD AUTO: 324 10E3/UL (ref 150–450)
PO2 BLDV: 30 MM HG (ref 25–47)
POTASSIUM SERPL-SCNC: 3.6 MMOL/L (ref 3.4–5.3)
POTASSIUM SERPL-SCNC: 3.9 MMOL/L (ref 3.4–5.3)
POTASSIUM SERPL-SCNC: 4.1 MMOL/L (ref 3.4–5.3)
POTASSIUM SERPL-SCNC: 4.3 MMOL/L (ref 3.4–5.3)
POTASSIUM SERPL-SCNC: 5 MMOL/L (ref 3.4–5.3)
PROCALCITONIN SERPL IA-MCNC: 0.58 NG/ML
PROCALCITONIN SERPL IA-MCNC: 0.67 NG/ML
PROT SERPL-MCNC: 5.2 G/DL (ref 6.4–8.3)
PROT SERPL-MCNC: 6.6 G/DL (ref 6.4–8.3)
PROT SERPL-MCNC: 7 G/DL (ref 6.4–8.3)
RBC # BLD AUTO: 3.07 10E6/UL (ref 4.4–5.9)
RBC # BLD AUTO: 3.58 10E6/UL (ref 4.4–5.9)
RBC # BLD AUTO: 3.65 10E6/UL (ref 4.4–5.9)
RBC # BLD AUTO: 3.69 10E6/UL (ref 4.4–5.9)
RBC # BLD AUTO: 3.69 10E6/UL (ref 4.4–5.9)
RBC URINE: 3 /HPF
RSV RNA SPEC NAA+PROBE: NEGATIVE
RSV RNA SPEC QL NAA+PROBE: NOT DETECTED
RSV RNA SPEC QL NAA+PROBE: NOT DETECTED
RV+EV RNA SPEC QL NAA+PROBE: NOT DETECTED
RVA RNA STL QL NAA+NON-PROBE: NEGATIVE
SALMONELLA SP RPOD STL QL NAA+PROBE: NEGATIVE
SAO2 % BLDV: 54.8 % (ref 70–75)
SAPO I+II+IV+V RNA STL QL NAA+NON-PROBE: NEGATIVE
SARS-COV-2 RNA RESP QL NAA+PROBE: NEGATIVE
SHIGELLA SP+EIEC IPAH ST NAA+NON-PROBE: NEGATIVE
SODIUM SERPL-SCNC: 136 MMOL/L (ref 135–145)
SODIUM SERPL-SCNC: 137 MMOL/L (ref 135–145)
SODIUM SERPL-SCNC: 138 MMOL/L (ref 135–145)
SODIUM SERPL-SCNC: 138 MMOL/L (ref 135–145)
SODIUM SERPL-SCNC: 139 MMOL/L (ref 135–145)
SP GR UR STRIP: 1.02 (ref 1–1.03)
SQUAMOUS EPITHELIAL: 1 /HPF
TROPONIN T SERPL HS-MCNC: 45 NG/L
UROBILINOGEN UR STRIP-MCNC: NORMAL MG/DL
V CHOLERAE DNA SPEC QL NAA+PROBE: NEGATIVE
VIBRIO DNA SPEC NAA+PROBE: NEGATIVE
WBC # BLD AUTO: 12.1 10E3/UL (ref 4–11)
WBC # BLD AUTO: 13 10E3/UL (ref 4–11)
WBC # BLD AUTO: 13.1 10E3/UL (ref 4–11)
WBC # BLD AUTO: 5.7 10E3/UL (ref 4–11)
WBC # BLD AUTO: 7.8 10E3/UL (ref 4–11)
WBC URINE: 2 /HPF
Y ENTEROCOL DNA STL QL NAA+PROBE: NEGATIVE

## 2024-01-01 PROCEDURE — 83735 ASSAY OF MAGNESIUM: CPT | Performed by: INTERNAL MEDICINE

## 2024-01-01 PROCEDURE — 250N000009 HC RX 250: Performed by: INTERNAL MEDICINE

## 2024-01-01 PROCEDURE — 120N000001 HC R&B MED SURG/OB

## 2024-01-01 PROCEDURE — 36415 COLL VENOUS BLD VENIPUNCTURE: CPT | Performed by: HOSPITALIST

## 2024-01-01 PROCEDURE — 94640 AIRWAY INHALATION TREATMENT: CPT | Mod: 76

## 2024-01-01 PROCEDURE — 85027 COMPLETE CBC AUTOMATED: CPT | Performed by: HOSPITALIST

## 2024-01-01 PROCEDURE — 250N000013 HC RX MED GY IP 250 OP 250 PS 637: Performed by: INTERNAL MEDICINE

## 2024-01-01 PROCEDURE — 80053 COMPREHEN METABOLIC PANEL: CPT | Performed by: EMERGENCY MEDICINE

## 2024-01-01 PROCEDURE — 87040 BLOOD CULTURE FOR BACTERIA: CPT | Performed by: EMERGENCY MEDICINE

## 2024-01-01 PROCEDURE — 250N000013 HC RX MED GY IP 250 OP 250 PS 637: Performed by: STUDENT IN AN ORGANIZED HEALTH CARE EDUCATION/TRAINING PROGRAM

## 2024-01-01 PROCEDURE — 80053 COMPREHEN METABOLIC PANEL: CPT | Performed by: INTERNAL MEDICINE

## 2024-01-01 PROCEDURE — 36415 COLL VENOUS BLD VENIPUNCTURE: CPT | Performed by: NURSE PRACTITIONER

## 2024-01-01 PROCEDURE — 84145 PROCALCITONIN (PCT): CPT | Performed by: EMERGENCY MEDICINE

## 2024-01-01 PROCEDURE — 96360 HYDRATION IV INFUSION INIT: CPT | Mod: 59 | Performed by: EMERGENCY MEDICINE

## 2024-01-01 PROCEDURE — 250N000009 HC RX 250: Performed by: EMERGENCY MEDICINE

## 2024-01-01 PROCEDURE — 80053 COMPREHEN METABOLIC PANEL: CPT | Performed by: HOSPITALIST

## 2024-01-01 PROCEDURE — 250N000013 HC RX MED GY IP 250 OP 250 PS 637: Performed by: EMERGENCY MEDICINE

## 2024-01-01 PROCEDURE — 97110 THERAPEUTIC EXERCISES: CPT | Mod: GP | Performed by: PHYSICAL THERAPIST

## 2024-01-01 PROCEDURE — 84145 PROCALCITONIN (PCT): CPT | Performed by: INTERNAL MEDICINE

## 2024-01-01 PROCEDURE — 85027 COMPLETE CBC AUTOMATED: CPT | Performed by: INTERNAL MEDICINE

## 2024-01-01 PROCEDURE — 250N000011 HC RX IP 250 OP 636: Performed by: HOSPITALIST

## 2024-01-01 PROCEDURE — 85027 COMPLETE CBC AUTOMATED: CPT | Performed by: NURSE PRACTITIONER

## 2024-01-01 PROCEDURE — 86140 C-REACTIVE PROTEIN: CPT | Performed by: EMERGENCY MEDICINE

## 2024-01-01 PROCEDURE — 99309 SBSQ NF CARE MODERATE MDM 30: CPT | Mod: GV | Performed by: NURSE PRACTITIONER

## 2024-01-01 PROCEDURE — 87040 BLOOD CULTURE FOR BACTERIA: CPT | Performed by: INTERNAL MEDICINE

## 2024-01-01 PROCEDURE — 999N000226 HC STATISTIC SLP IP EVAL DEFER: Performed by: SPEECH-LANGUAGE PATHOLOGIST

## 2024-01-01 PROCEDURE — P9603 ONE-WAY ALLOW PRORATED MILES: HCPCS | Performed by: NURSE PRACTITIONER

## 2024-01-01 PROCEDURE — 36415 COLL VENOUS BLD VENIPUNCTURE: CPT | Performed by: EMERGENCY MEDICINE

## 2024-01-01 PROCEDURE — 250N000011 HC RX IP 250 OP 636: Mod: JZ | Performed by: INTERNAL MEDICINE

## 2024-01-01 PROCEDURE — 99239 HOSP IP/OBS DSCHRG MGMT >30: CPT | Performed by: STUDENT IN AN ORGANIZED HEALTH CARE EDUCATION/TRAINING PROGRAM

## 2024-01-01 PROCEDURE — 99305 1ST NF CARE MODERATE MDM 35: CPT | Performed by: FAMILY MEDICINE

## 2024-01-01 PROCEDURE — 99232 SBSQ HOSP IP/OBS MODERATE 35: CPT | Performed by: INTERNAL MEDICINE

## 2024-01-01 PROCEDURE — 86140 C-REACTIVE PROTEIN: CPT | Performed by: INTERNAL MEDICINE

## 2024-01-01 PROCEDURE — 97116 GAIT TRAINING THERAPY: CPT | Mod: GP | Performed by: PHYSICAL THERAPIST

## 2024-01-01 PROCEDURE — 99233 SBSQ HOSP IP/OBS HIGH 50: CPT | Performed by: INTERNAL MEDICINE

## 2024-01-01 PROCEDURE — 94640 AIRWAY INHALATION TREATMENT: CPT

## 2024-01-01 PROCEDURE — 84484 ASSAY OF TROPONIN QUANT: CPT | Performed by: EMERGENCY MEDICINE

## 2024-01-01 PROCEDURE — 93005 ELECTROCARDIOGRAM TRACING: CPT | Performed by: EMERGENCY MEDICINE

## 2024-01-01 PROCEDURE — 99309 SBSQ NF CARE MODERATE MDM 30: CPT | Performed by: NURSE PRACTITIONER

## 2024-01-01 PROCEDURE — 82805 BLOOD GASES W/O2 SATURATION: CPT | Performed by: EMERGENCY MEDICINE

## 2024-01-01 PROCEDURE — 80048 BASIC METABOLIC PNL TOTAL CA: CPT | Performed by: NURSE PRACTITIONER

## 2024-01-01 PROCEDURE — 81001 URINALYSIS AUTO W/SCOPE: CPT | Performed by: EMERGENCY MEDICINE

## 2024-01-01 PROCEDURE — 83735 ASSAY OF MAGNESIUM: CPT | Performed by: HOSPITALIST

## 2024-01-01 PROCEDURE — P9604 ONE-WAY ALLOW PRORATED TRIP: HCPCS | Performed by: NURSE PRACTITIONER

## 2024-01-01 PROCEDURE — 83605 ASSAY OF LACTIC ACID: CPT | Performed by: EMERGENCY MEDICINE

## 2024-01-01 PROCEDURE — 71046 X-RAY EXAM CHEST 2 VIEWS: CPT

## 2024-01-01 PROCEDURE — 83880 ASSAY OF NATRIURETIC PEPTIDE: CPT

## 2024-01-01 PROCEDURE — 250N000013 HC RX MED GY IP 250 OP 250 PS 637: Performed by: HOSPITALIST

## 2024-01-01 PROCEDURE — 96361 HYDRATE IV INFUSION ADD-ON: CPT | Performed by: EMERGENCY MEDICINE

## 2024-01-01 PROCEDURE — 250N000011 HC RX IP 250 OP 636

## 2024-01-01 PROCEDURE — G0378 HOSPITAL OBSERVATION PER HR: HCPCS

## 2024-01-01 PROCEDURE — 87633 RESP VIRUS 12-25 TARGETS: CPT | Performed by: EMERGENCY MEDICINE

## 2024-01-01 PROCEDURE — 250N000011 HC RX IP 250 OP 636: Performed by: EMERGENCY MEDICINE

## 2024-01-01 PROCEDURE — 258N000003 HC RX IP 258 OP 636: Performed by: EMERGENCY MEDICINE

## 2024-01-01 PROCEDURE — 99207 PR APP CREDIT; MD BILLING SHARED VISIT: CPT

## 2024-01-01 PROCEDURE — 87493 C DIFF AMPLIFIED PROBE: CPT | Performed by: EMERGENCY MEDICINE

## 2024-01-01 PROCEDURE — 99285 EMERGENCY DEPT VISIT HI MDM: CPT | Mod: 25 | Performed by: EMERGENCY MEDICINE

## 2024-01-01 PROCEDURE — 250N000011 HC RX IP 250 OP 636: Performed by: INTERNAL MEDICINE

## 2024-01-01 PROCEDURE — 85025 COMPLETE CBC W/AUTO DIFF WBC: CPT | Performed by: EMERGENCY MEDICINE

## 2024-01-01 PROCEDURE — 70450 CT HEAD/BRAIN W/O DYE: CPT

## 2024-01-01 PROCEDURE — 250N000011 HC RX IP 250 OP 636: Performed by: STUDENT IN AN ORGANIZED HEALTH CARE EDUCATION/TRAINING PROGRAM

## 2024-01-01 PROCEDURE — 36415 COLL VENOUS BLD VENIPUNCTURE: CPT | Performed by: INTERNAL MEDICINE

## 2024-01-01 PROCEDURE — 71260 CT THORAX DX C+: CPT

## 2024-01-01 PROCEDURE — 99309 SBSQ NF CARE MODERATE MDM 30: CPT | Mod: 25 | Performed by: NURSE PRACTITIONER

## 2024-01-01 PROCEDURE — 87637 SARSCOV2&INF A&B&RSV AMP PRB: CPT | Performed by: EMERGENCY MEDICINE

## 2024-01-01 PROCEDURE — 83605 ASSAY OF LACTIC ACID: CPT

## 2024-01-01 PROCEDURE — 83880 ASSAY OF NATRIURETIC PEPTIDE: CPT | Performed by: EMERGENCY MEDICINE

## 2024-01-01 PROCEDURE — 99239 HOSP IP/OBS DSCHRG MGMT >30: CPT | Performed by: INTERNAL MEDICINE

## 2024-01-01 PROCEDURE — 258N000003 HC RX IP 258 OP 636: Performed by: INTERNAL MEDICINE

## 2024-01-01 PROCEDURE — 99310 SBSQ NF CARE HIGH MDM 45: CPT | Performed by: NURSE PRACTITIONER

## 2024-01-01 PROCEDURE — 92610 EVALUATE SWALLOWING FUNCTION: CPT | Mod: GN | Performed by: SPEECH-LANGUAGE PATHOLOGIST

## 2024-01-01 PROCEDURE — 83605 ASSAY OF LACTIC ACID: CPT | Performed by: INTERNAL MEDICINE

## 2024-01-01 PROCEDURE — 87507 IADNA-DNA/RNA PROBE TQ 12-25: CPT | Performed by: EMERGENCY MEDICINE

## 2024-01-01 PROCEDURE — 87324 CLOSTRIDIUM AG IA: CPT | Performed by: EMERGENCY MEDICINE

## 2024-01-01 PROCEDURE — 93010 ELECTROCARDIOGRAM REPORT: CPT | Performed by: EMERGENCY MEDICINE

## 2024-01-01 PROCEDURE — 20610 DRAIN/INJ JOINT/BURSA W/O US: CPT | Mod: LT | Performed by: NURSE PRACTITIONER

## 2024-01-01 PROCEDURE — 96374 THER/PROPH/DIAG INJ IV PUSH: CPT

## 2024-01-01 RX ORDER — VANCOMYCIN HYDROCHLORIDE 125 MG/1
125 CAPSULE ORAL 4 TIMES DAILY
Status: DISCONTINUED | OUTPATIENT
Start: 2024-01-01 | End: 2024-01-01 | Stop reason: HOSPADM

## 2024-01-01 RX ORDER — IOPAMIDOL 755 MG/ML
78 INJECTION, SOLUTION INTRAVASCULAR ONCE
Status: COMPLETED | OUTPATIENT
Start: 2024-01-01 | End: 2024-01-01

## 2024-01-01 RX ORDER — IBUPROFEN 200 MG
200 TABLET ORAL EVERY 6 HOURS PRN
Status: DISCONTINUED | OUTPATIENT
Start: 2024-01-01 | End: 2024-01-01 | Stop reason: HOSPADM

## 2024-01-01 RX ORDER — OXYCODONE HYDROCHLORIDE 5 MG/1
5 TABLET ORAL EVERY 6 HOURS PRN
Status: DISCONTINUED | OUTPATIENT
Start: 2024-01-01 | End: 2024-01-01 | Stop reason: HOSPADM

## 2024-01-01 RX ORDER — NITROGLYCERIN 0.4 MG/1
0.4 TABLET SUBLINGUAL EVERY 5 MIN PRN
Status: DISCONTINUED | OUTPATIENT
Start: 2024-01-01 | End: 2024-01-01 | Stop reason: HOSPADM

## 2024-01-01 RX ORDER — GABAPENTIN 100 MG/1
200 CAPSULE ORAL 2 TIMES DAILY
Status: ON HOLD | COMMUNITY
End: 2024-01-01

## 2024-01-01 RX ORDER — GLYCOPYRROLATE 1 MG/1
2 TABLET ORAL EVERY 4 HOURS PRN
Status: DISCONTINUED | OUTPATIENT
Start: 2024-01-01 | End: 2024-01-01 | Stop reason: HOSPADM

## 2024-01-01 RX ORDER — OXYCODONE HYDROCHLORIDE 5 MG/1
5 TABLET ORAL EVERY 6 HOURS PRN
Qty: 30 TABLET | Refills: 0 | Status: ON HOLD | OUTPATIENT
Start: 2024-01-01 | End: 2024-01-01

## 2024-01-01 RX ORDER — OLANZAPINE 5 MG/1
5 TABLET, ORALLY DISINTEGRATING ORAL EVERY 6 HOURS PRN
Status: DISCONTINUED | OUTPATIENT
Start: 2024-01-01 | End: 2024-01-01 | Stop reason: HOSPADM

## 2024-01-01 RX ORDER — CALCIUM CARBONATE 500 MG/1
1000 TABLET, CHEWABLE ORAL 4 TIMES DAILY PRN
Status: DISCONTINUED | OUTPATIENT
Start: 2024-01-01 | End: 2024-01-01 | Stop reason: HOSPADM

## 2024-01-01 RX ORDER — CEFAZOLIN SODIUM 1 G/50ML
1250 SOLUTION INTRAVENOUS EVERY 24 HOURS
Status: DISCONTINUED | OUTPATIENT
Start: 2024-01-01 | End: 2024-01-01

## 2024-01-01 RX ORDER — OLANZAPINE 5 MG/1
5 TABLET, ORALLY DISINTEGRATING ORAL EVERY 6 HOURS PRN
Qty: 12 TABLET | Refills: 0 | Status: SHIPPED | OUTPATIENT
Start: 2024-01-01

## 2024-01-01 RX ORDER — GABAPENTIN 100 MG/1
200 CAPSULE ORAL 2 TIMES DAILY
Qty: 20 CAPSULE | Refills: 0 | Status: SHIPPED | OUTPATIENT
Start: 2024-01-01 | End: 2024-01-01

## 2024-01-01 RX ORDER — ACETAMINOPHEN 325 MG/1
650 TABLET ORAL DAILY PRN
DISCHARGE
Start: 2024-01-01

## 2024-01-01 RX ORDER — NALOXONE HYDROCHLORIDE 0.4 MG/ML
0.2 INJECTION, SOLUTION INTRAMUSCULAR; INTRAVENOUS; SUBCUTANEOUS
Status: DISCONTINUED | OUTPATIENT
Start: 2024-01-01 | End: 2024-01-01 | Stop reason: HOSPADM

## 2024-01-01 RX ORDER — CEFAZOLIN SODIUM 1 G/50ML
1750 SOLUTION INTRAVENOUS ONCE
Status: DISCONTINUED | OUTPATIENT
Start: 2024-01-01 | End: 2024-01-01

## 2024-01-01 RX ORDER — CLOPIDOGREL BISULFATE 75 MG/1
75 TABLET ORAL DAILY
Status: DISCONTINUED | OUTPATIENT
Start: 2024-01-01 | End: 2024-01-01

## 2024-01-01 RX ORDER — LORAZEPAM 1 MG/1
1 TABLET ORAL
Status: DISCONTINUED | OUTPATIENT
Start: 2024-01-01 | End: 2024-01-01 | Stop reason: HOSPADM

## 2024-01-01 RX ORDER — LIDOCAINE 40 MG/G
CREAM TOPICAL
Status: DISCONTINUED | OUTPATIENT
Start: 2024-01-01 | End: 2024-01-01 | Stop reason: HOSPADM

## 2024-01-01 RX ORDER — AMOXICILLIN 250 MG
2 CAPSULE ORAL 2 TIMES DAILY PRN
Status: DISCONTINUED | OUTPATIENT
Start: 2024-01-01 | End: 2024-01-01 | Stop reason: HOSPADM

## 2024-01-01 RX ORDER — SODIUM CHLORIDE, SODIUM LACTATE, POTASSIUM CHLORIDE, CALCIUM CHLORIDE 600; 310; 30; 20 MG/100ML; MG/100ML; MG/100ML; MG/100ML
INJECTION, SOLUTION INTRAVENOUS CONTINUOUS
Status: DISCONTINUED | OUTPATIENT
Start: 2024-01-01 | End: 2024-01-01

## 2024-01-01 RX ORDER — VITAMIN B COMPLEX
25 TABLET ORAL DAILY
Status: DISCONTINUED | OUTPATIENT
Start: 2024-01-01 | End: 2024-01-01

## 2024-01-01 RX ORDER — PANTOPRAZOLE SODIUM 40 MG/1
40 TABLET, DELAYED RELEASE ORAL DAILY
Status: DISCONTINUED | OUTPATIENT
Start: 2024-01-01 | End: 2024-01-01

## 2024-01-01 RX ORDER — LORAZEPAM 2 MG/ML
1 INJECTION INTRAMUSCULAR
Status: DISCONTINUED | OUTPATIENT
Start: 2024-01-01 | End: 2024-01-01 | Stop reason: HOSPADM

## 2024-01-01 RX ORDER — GABAPENTIN 100 MG/1
200 CAPSULE ORAL 2 TIMES DAILY
Status: DISCONTINUED | OUTPATIENT
Start: 2024-01-01 | End: 2024-01-01 | Stop reason: HOSPADM

## 2024-01-01 RX ORDER — HYDROMORPHONE HYDROCHLORIDE 1 MG/ML
0.5 INJECTION, SOLUTION INTRAMUSCULAR; INTRAVENOUS; SUBCUTANEOUS
Status: DISCONTINUED | OUTPATIENT
Start: 2024-01-01 | End: 2024-01-01 | Stop reason: HOSPADM

## 2024-01-01 RX ORDER — CETIRIZINE HYDROCHLORIDE 5 MG/1
5 TABLET ORAL DAILY
Status: DISCONTINUED | OUTPATIENT
Start: 2024-01-01 | End: 2024-01-01

## 2024-01-01 RX ORDER — HYDROMORPHONE HYDROCHLORIDE 1 MG/ML
0.3 INJECTION, SOLUTION INTRAMUSCULAR; INTRAVENOUS; SUBCUTANEOUS
Status: DISCONTINUED | OUTPATIENT
Start: 2024-01-01 | End: 2024-01-01 | Stop reason: HOSPADM

## 2024-01-01 RX ORDER — VANCOMYCIN HYDROCHLORIDE 125 MG/1
125 CAPSULE ORAL 4 TIMES DAILY
DISCHARGE
Start: 2024-01-01 | End: 2024-01-01

## 2024-01-01 RX ORDER — LORAZEPAM 1 MG/1
1 TABLET ORAL
Qty: 3 TABLET | Refills: 0 | Status: SHIPPED | OUTPATIENT
Start: 2024-01-01

## 2024-01-01 RX ORDER — CEFTRIAXONE 2 G/1
2 INJECTION, POWDER, FOR SOLUTION INTRAMUSCULAR; INTRAVENOUS EVERY 24 HOURS
Status: DISCONTINUED | OUTPATIENT
Start: 2024-01-01 | End: 2024-01-01 | Stop reason: HOSPADM

## 2024-01-01 RX ORDER — HYDROMORPHONE HYDROCHLORIDE 1 MG/ML
0.5 INJECTION, SOLUTION INTRAMUSCULAR; INTRAVENOUS; SUBCUTANEOUS
Status: DISCONTINUED | OUTPATIENT
Start: 2024-01-01 | End: 2024-01-01

## 2024-01-01 RX ORDER — GLYCOPYRROLATE 2 MG/1
2 TABLET ORAL EVERY 4 HOURS PRN
Qty: 4 TABLET | Refills: 0 | Status: SHIPPED | OUTPATIENT
Start: 2024-01-01

## 2024-01-01 RX ORDER — LIDOCAINE 50 MG/G
PATCH TOPICAL EVERY 24 HOURS
COMMUNITY

## 2024-01-01 RX ORDER — CETIRIZINE HYDROCHLORIDE 5 MG/1
5 TABLET ORAL DAILY
Status: ON HOLD | COMMUNITY
End: 2024-01-01

## 2024-01-01 RX ORDER — OXYCODONE HYDROCHLORIDE 5 MG/1
5 TABLET ORAL EVERY 6 HOURS PRN
Qty: 12 TABLET | Refills: 0 | Status: SHIPPED | OUTPATIENT
Start: 2024-01-01 | End: 2024-01-01

## 2024-01-01 RX ORDER — OXYCODONE HYDROCHLORIDE 5 MG/1
5 TABLET ORAL EVERY 6 HOURS PRN
Qty: 12 TABLET | Refills: 0 | Status: SHIPPED | OUTPATIENT
Start: 2024-01-01

## 2024-01-01 RX ORDER — LEVOTHYROXINE SODIUM 50 UG/1
50 TABLET ORAL
Status: DISCONTINUED | OUTPATIENT
Start: 2024-01-01 | End: 2024-01-01

## 2024-01-01 RX ORDER — AMOXICILLIN 250 MG
1 CAPSULE ORAL 2 TIMES DAILY PRN
DISCHARGE
Start: 2024-01-01

## 2024-01-01 RX ORDER — NALOXONE HYDROCHLORIDE 0.4 MG/ML
0.4 INJECTION, SOLUTION INTRAMUSCULAR; INTRAVENOUS; SUBCUTANEOUS
Status: DISCONTINUED | OUTPATIENT
Start: 2024-01-01 | End: 2024-01-01 | Stop reason: HOSPADM

## 2024-01-01 RX ORDER — AMOXICILLIN 250 MG
1 CAPSULE ORAL 2 TIMES DAILY PRN
Status: DISCONTINUED | OUTPATIENT
Start: 2024-01-01 | End: 2024-01-01 | Stop reason: HOSPADM

## 2024-01-01 RX ORDER — IPRATROPIUM BROMIDE AND ALBUTEROL SULFATE 2.5; .5 MG/3ML; MG/3ML
3 SOLUTION RESPIRATORY (INHALATION)
Status: DISCONTINUED | OUTPATIENT
Start: 2024-01-01 | End: 2024-01-01 | Stop reason: HOSPADM

## 2024-01-01 RX ORDER — ACETAMINOPHEN 325 MG/1
650 TABLET ORAL EVERY 4 HOURS PRN
Status: DISCONTINUED | OUTPATIENT
Start: 2024-01-01 | End: 2024-01-01 | Stop reason: HOSPADM

## 2024-01-01 RX ORDER — FERROUS SULFATE 325(65) MG
325 TABLET ORAL EVERY OTHER DAY
Status: DISCONTINUED | OUTPATIENT
Start: 2024-01-01 | End: 2024-01-01

## 2024-01-01 RX ORDER — CALCIUM CARBONATE 500 MG/1
1000 TABLET, CHEWABLE ORAL 4 TIMES DAILY PRN
Status: DISCONTINUED | OUTPATIENT
Start: 2024-01-01 | End: 2024-01-01

## 2024-01-01 RX ORDER — ONDANSETRON 4 MG/1
4 TABLET, ORALLY DISINTEGRATING ORAL EVERY 6 HOURS PRN
Status: DISCONTINUED | OUTPATIENT
Start: 2024-01-01 | End: 2024-01-01 | Stop reason: HOSPADM

## 2024-01-01 RX ORDER — IBUPROFEN 200 MG
200 TABLET ORAL EVERY 6 HOURS PRN
DISCHARGE
Start: 2024-01-01 | End: 2024-01-01

## 2024-01-01 RX ORDER — OXYCODONE HYDROCHLORIDE 10 MG/1
10 TABLET ORAL
Qty: 14 TABLET | Refills: 0 | Status: SHIPPED | OUTPATIENT
Start: 2024-01-01 | End: 2024-01-01

## 2024-01-01 RX ORDER — ONDANSETRON 2 MG/ML
4 INJECTION INTRAMUSCULAR; INTRAVENOUS EVERY 6 HOURS PRN
Status: DISCONTINUED | OUTPATIENT
Start: 2024-01-01 | End: 2024-01-01 | Stop reason: HOSPADM

## 2024-01-01 RX ORDER — ROSUVASTATIN CALCIUM 20 MG/1
40 TABLET, COATED ORAL DAILY
Status: DISCONTINUED | OUTPATIENT
Start: 2024-01-01 | End: 2024-01-01

## 2024-01-01 RX ORDER — CEFEPIME HYDROCHLORIDE 2 G/1
2 INJECTION, POWDER, FOR SOLUTION INTRAVENOUS EVERY 12 HOURS
Status: DISCONTINUED | OUTPATIENT
Start: 2024-01-01 | End: 2024-01-01

## 2024-01-01 RX ORDER — DOXYCYCLINE 100 MG/1
100 CAPSULE ORAL EVERY 12 HOURS
DISCHARGE
Start: 2024-01-01 | End: 2024-01-01

## 2024-01-01 RX ORDER — FERROUS SULFATE 325(65) MG
325 TABLET ORAL EVERY OTHER DAY
Status: ON HOLD | DISCHARGE
Start: 2024-01-01 | End: 2024-01-01

## 2024-01-01 RX ORDER — CEFDINIR 300 MG/1
300 CAPSULE ORAL 2 TIMES DAILY
DISCHARGE
Start: 2024-01-01 | End: 2024-01-01

## 2024-01-01 RX ORDER — LORAZEPAM 1 MG/1
1 TABLET ORAL
Qty: 12 TABLET | Refills: 0 | Status: SHIPPED | OUTPATIENT
Start: 2024-01-01 | End: 2024-01-01

## 2024-01-01 RX ADMIN — GABAPENTIN 200 MG: 100 CAPSULE ORAL at 21:09

## 2024-01-01 RX ADMIN — APIXABAN 5 MG: 5 TABLET, FILM COATED ORAL at 08:26

## 2024-01-01 RX ADMIN — SACUBITRIL AND VALSARTAN 1 TABLET: 24; 26 TABLET, FILM COATED ORAL at 09:00

## 2024-01-01 RX ADMIN — GUAIFENESIN 1200 MG: 600 TABLET ORAL at 09:00

## 2024-01-01 RX ADMIN — GUAIFENESIN 1200 MG: 600 TABLET ORAL at 08:30

## 2024-01-01 RX ADMIN — ACETAMINOPHEN 650 MG: 325 TABLET, FILM COATED ORAL at 09:06

## 2024-01-01 RX ADMIN — IPRATROPIUM BROMIDE AND ALBUTEROL SULFATE 3 ML: 2.5; .5 SOLUTION RESPIRATORY (INHALATION) at 12:52

## 2024-01-01 RX ADMIN — OXYCODONE HYDROCHLORIDE 10 MG: 5 TABLET ORAL at 20:26

## 2024-01-01 RX ADMIN — GUAIFENESIN SYRUP AND DEXTROMETHORPHAN 10 ML: 100; 10 SYRUP ORAL at 05:46

## 2024-01-01 RX ADMIN — ROSUVASTATIN CALCIUM 40 MG: 20 TABLET, FILM COATED ORAL at 09:00

## 2024-01-01 RX ADMIN — HYDROMORPHONE HYDROCHLORIDE 0.3 MG: 1 INJECTION, SOLUTION INTRAMUSCULAR; INTRAVENOUS; SUBCUTANEOUS at 18:51

## 2024-01-01 RX ADMIN — GABAPENTIN 200 MG: 100 CAPSULE ORAL at 08:08

## 2024-01-01 RX ADMIN — VANCOMYCIN HYDROCHLORIDE 125 MG: 125 CAPSULE ORAL at 22:57

## 2024-01-01 RX ADMIN — SODIUM CHLORIDE, POTASSIUM CHLORIDE, SODIUM LACTATE AND CALCIUM CHLORIDE 500 ML: 600; 310; 30; 20 INJECTION, SOLUTION INTRAVENOUS at 08:54

## 2024-01-01 RX ADMIN — PANTOPRAZOLE SODIUM 40 MG: 40 TABLET, DELAYED RELEASE ORAL at 06:19

## 2024-01-01 RX ADMIN — VANCOMYCIN HYDROCHLORIDE 125 MG: 125 CAPSULE ORAL at 21:01

## 2024-01-01 RX ADMIN — DOXYCYCLINE HYCLATE 100 MG: 100 CAPSULE ORAL at 09:00

## 2024-01-01 RX ADMIN — APIXABAN 5 MG: 5 TABLET, FILM COATED ORAL at 08:30

## 2024-01-01 RX ADMIN — ACETAMINOPHEN 650 MG: 325 TABLET, FILM COATED ORAL at 03:43

## 2024-01-01 RX ADMIN — VANCOMYCIN HYDROCHLORIDE 125 MG: 125 CAPSULE ORAL at 14:01

## 2024-01-01 RX ADMIN — DOXYCYCLINE HYCLATE 100 MG: 100 CAPSULE ORAL at 08:30

## 2024-01-01 RX ADMIN — CEFEPIME 2 G: 2 INJECTION, POWDER, FOR SOLUTION INTRAVENOUS at 09:09

## 2024-01-01 RX ADMIN — PANTOPRAZOLE SODIUM 40 MG: 40 TABLET, DELAYED RELEASE ORAL at 08:29

## 2024-01-01 RX ADMIN — ACETAMINOPHEN 650 MG: 325 SUPPOSITORY RECTAL at 00:07

## 2024-01-01 RX ADMIN — METOPROLOL TARTRATE 12.5 MG: 25 TABLET, FILM COATED ORAL at 19:47

## 2024-01-01 RX ADMIN — APIXABAN 5 MG: 5 TABLET, FILM COATED ORAL at 09:00

## 2024-01-01 RX ADMIN — ACETAMINOPHEN 650 MG: 325 TABLET, FILM COATED ORAL at 21:01

## 2024-01-01 RX ADMIN — FERROUS SULFATE TAB 325 MG (65 MG ELEMENTAL FE) 325 MG: 325 (65 FE) TAB at 08:30

## 2024-01-01 RX ADMIN — METOPROLOL TARTRATE 12.5 MG: 25 TABLET, FILM COATED ORAL at 08:19

## 2024-01-01 RX ADMIN — METOPROLOL TARTRATE 12.5 MG: 25 TABLET, FILM COATED ORAL at 08:29

## 2024-01-01 RX ADMIN — Medication 25 MCG: at 08:28

## 2024-01-01 RX ADMIN — IPRATROPIUM BROMIDE AND ALBUTEROL SULFATE 3 ML: 2.5; .5 SOLUTION RESPIRATORY (INHALATION) at 18:35

## 2024-01-01 RX ADMIN — DOXYCYCLINE HYCLATE 100 MG: 100 CAPSULE ORAL at 19:45

## 2024-01-01 RX ADMIN — ACETAMINOPHEN 650 MG: 325 TABLET, FILM COATED ORAL at 03:41

## 2024-01-01 RX ADMIN — HYDROMORPHONE HYDROCHLORIDE 0.5 MG: 1 INJECTION, SOLUTION INTRAMUSCULAR; INTRAVENOUS; SUBCUTANEOUS at 03:54

## 2024-01-01 RX ADMIN — LEVOTHYROXINE SODIUM 50 MCG: 0.05 TABLET ORAL at 06:55

## 2024-01-01 RX ADMIN — ACETAMINOPHEN 650 MG: 325 SUPPOSITORY RECTAL at 07:56

## 2024-01-01 RX ADMIN — GABAPENTIN 200 MG: 100 CAPSULE ORAL at 08:42

## 2024-01-01 RX ADMIN — GABAPENTIN 200 MG: 100 CAPSULE ORAL at 08:45

## 2024-01-01 RX ADMIN — VANCOMYCIN HYDROCHLORIDE 125 MG: 125 CAPSULE ORAL at 11:24

## 2024-01-01 RX ADMIN — SODIUM CHLORIDE, POTASSIUM CHLORIDE, SODIUM LACTATE AND CALCIUM CHLORIDE 1000 ML: 600; 310; 30; 20 INJECTION, SOLUTION INTRAVENOUS at 00:06

## 2024-01-01 RX ADMIN — IBUPROFEN 200 MG: 200 TABLET, FILM COATED ORAL at 08:01

## 2024-01-01 RX ADMIN — VANCOMYCIN HYDROCHLORIDE 125 MG: 125 CAPSULE ORAL at 07:45

## 2024-01-01 RX ADMIN — ACETAMINOPHEN 650 MG: 325 TABLET, FILM COATED ORAL at 13:32

## 2024-01-01 RX ADMIN — GABAPENTIN 200 MG: 100 CAPSULE ORAL at 07:45

## 2024-01-01 RX ADMIN — APIXABAN 5 MG: 5 TABLET, FILM COATED ORAL at 19:45

## 2024-01-01 RX ADMIN — CLOPIDOGREL BISULFATE 75 MG: 75 TABLET ORAL at 08:30

## 2024-01-01 RX ADMIN — CEFTRIAXONE SODIUM 2 G: 2 INJECTION, POWDER, FOR SOLUTION INTRAMUSCULAR; INTRAVENOUS at 12:30

## 2024-01-01 RX ADMIN — METOPROLOL TARTRATE 12.5 MG: 25 TABLET, FILM COATED ORAL at 09:00

## 2024-01-01 RX ADMIN — VANCOMYCIN HYDROCHLORIDE 125 MG: 125 CAPSULE ORAL at 08:43

## 2024-01-01 RX ADMIN — VANCOMYCIN HYDROCHLORIDE 125 MG: 125 CAPSULE ORAL at 18:25

## 2024-01-01 RX ADMIN — HYDROMORPHONE HYDROCHLORIDE 0.5 MG: 1 INJECTION, SOLUTION INTRAMUSCULAR; INTRAVENOUS; SUBCUTANEOUS at 08:47

## 2024-01-01 RX ADMIN — HYDROMORPHONE HYDROCHLORIDE 0.5 MG: 1 INJECTION, SOLUTION INTRAMUSCULAR; INTRAVENOUS; SUBCUTANEOUS at 11:08

## 2024-01-01 RX ADMIN — LEVOTHYROXINE SODIUM 50 MCG: 0.05 TABLET ORAL at 06:19

## 2024-01-01 RX ADMIN — HYDROMORPHONE HYDROCHLORIDE 0.5 MG: 1 INJECTION, SOLUTION INTRAMUSCULAR; INTRAVENOUS; SUBCUTANEOUS at 00:40

## 2024-01-01 RX ADMIN — ROSUVASTATIN CALCIUM 40 MG: 20 TABLET, FILM COATED ORAL at 07:58

## 2024-01-01 RX ADMIN — PANTOPRAZOLE SODIUM 40 MG: 40 TABLET, DELAYED RELEASE ORAL at 05:43

## 2024-01-01 RX ADMIN — ONDANSETRON 4 MG: 2 INJECTION INTRAMUSCULAR; INTRAVENOUS at 14:52

## 2024-01-01 RX ADMIN — ACETAMINOPHEN 650 MG: 325 TABLET, FILM COATED ORAL at 06:19

## 2024-01-01 RX ADMIN — LEVOTHYROXINE SODIUM 50 MCG: 0.05 TABLET ORAL at 05:42

## 2024-01-01 RX ADMIN — OXYCODONE HYDROCHLORIDE 5 MG: 5 TABLET ORAL at 10:53

## 2024-01-01 RX ADMIN — IPRATROPIUM BROMIDE AND ALBUTEROL SULFATE 3 ML: 2.5; .5 SOLUTION RESPIRATORY (INHALATION) at 08:31

## 2024-01-01 RX ADMIN — VANCOMYCIN HYDROCHLORIDE 125 MG: 125 CAPSULE ORAL at 13:39

## 2024-01-01 RX ADMIN — VANCOMYCIN HYDROCHLORIDE 125 MG: 125 CAPSULE ORAL at 17:20

## 2024-01-01 RX ADMIN — GABAPENTIN 200 MG: 100 CAPSULE ORAL at 20:54

## 2024-01-01 RX ADMIN — ACETAMINOPHEN 650 MG: 325 TABLET, FILM COATED ORAL at 15:22

## 2024-01-01 RX ADMIN — CLOPIDOGREL BISULFATE 75 MG: 75 TABLET ORAL at 08:25

## 2024-01-01 RX ADMIN — IOPAMIDOL 78 ML: 755 INJECTION, SOLUTION INTRAVENOUS at 23:35

## 2024-01-01 RX ADMIN — SACUBITRIL AND VALSARTAN 1 TABLET: 24; 26 TABLET, FILM COATED ORAL at 08:30

## 2024-01-01 RX ADMIN — ACETAMINOPHEN 650 MG: 325 SUPPOSITORY RECTAL at 00:40

## 2024-01-01 RX ADMIN — GUAIFENESIN 1200 MG: 600 TABLET ORAL at 19:45

## 2024-01-01 RX ADMIN — SODIUM CHLORIDE 59 ML: 9 INJECTION, SOLUTION INTRAVENOUS at 23:36

## 2024-01-01 RX ADMIN — GABAPENTIN 200 MG: 100 CAPSULE ORAL at 21:01

## 2024-01-01 RX ADMIN — CEFTRIAXONE SODIUM 1 G: 1 INJECTION, POWDER, FOR SOLUTION INTRAMUSCULAR; INTRAVENOUS at 01:15

## 2024-01-01 RX ADMIN — ACETAMINOPHEN 650 MG: 325 TABLET, FILM COATED ORAL at 21:08

## 2024-01-01 RX ADMIN — CETIRIZINE HYDROCHLORIDE 5 MG: 5 TABLET ORAL at 08:29

## 2024-01-01 RX ADMIN — HYDROMORPHONE HYDROCHLORIDE 0.5 MG: 1 INJECTION, SOLUTION INTRAMUSCULAR; INTRAVENOUS; SUBCUTANEOUS at 06:23

## 2024-01-01 RX ADMIN — HYDROMORPHONE HYDROCHLORIDE 0.5 MG: 1 INJECTION, SOLUTION INTRAMUSCULAR; INTRAVENOUS; SUBCUTANEOUS at 21:01

## 2024-01-01 RX ADMIN — HYDROMORPHONE HYDROCHLORIDE 0.5 MG: 1 INJECTION, SOLUTION INTRAMUSCULAR; INTRAVENOUS; SUBCUTANEOUS at 14:37

## 2024-01-01 RX ADMIN — CLOPIDOGREL BISULFATE 75 MG: 75 TABLET ORAL at 09:00

## 2024-01-01 RX ADMIN — HYDROMORPHONE HYDROCHLORIDE 0.5 MG: 1 INJECTION, SOLUTION INTRAMUSCULAR; INTRAVENOUS; SUBCUTANEOUS at 12:46

## 2024-01-01 RX ADMIN — IPRATROPIUM BROMIDE AND ALBUTEROL SULFATE 3 ML: 2.5; .5 SOLUTION RESPIRATORY (INHALATION) at 14:08

## 2024-01-01 RX ADMIN — ROSUVASTATIN CALCIUM 40 MG: 20 TABLET, FILM COATED ORAL at 08:30

## 2024-01-01 ASSESSMENT — ACTIVITIES OF DAILY LIVING (ADL)
ADLS_ACUITY_SCORE: 51
ADLS_ACUITY_SCORE: 54
DEPENDENT_IADLS:: CLEANING;COOKING;LAUNDRY;SHOPPING;MEAL PREPARATION;MEDICATION MANAGEMENT;TRANSPORTATION
ADLS_ACUITY_SCORE: 51
ADLS_ACUITY_SCORE: 29
ADLS_ACUITY_SCORE: 55
ADLS_ACUITY_SCORE: 55
ADLS_ACUITY_SCORE: 49
DEPENDENT_IADLS:: CLEANING;COOKING;LAUNDRY;SHOPPING;MEAL PREPARATION;MEDICATION MANAGEMENT;TRANSPORTATION
ADLS_ACUITY_SCORE: 29
ADLS_ACUITY_SCORE: 57
ADLS_ACUITY_SCORE: 51
ADLS_ACUITY_SCORE: 41
ADLS_ACUITY_SCORE: 57
ADLS_ACUITY_SCORE: 53
WEAR_GLASSES_OR_BLIND: NO
HEARING_DIFFICULTY_OR_DEAF: NO
DEPENDENT_IADLS:: CLEANING;COOKING;LAUNDRY;SHOPPING;MEAL PREPARATION;MEDICATION MANAGEMENT;TRANSPORTATION
ADLS_ACUITY_SCORE: 37
ADLS_ACUITY_SCORE: 51
ADLS_ACUITY_SCORE: 41
ADLS_ACUITY_SCORE: 29
DOING_ERRANDS_INDEPENDENTLY_DIFFICULTY: YES
ADLS_ACUITY_SCORE: 29
ADLS_ACUITY_SCORE: 31
ADLS_ACUITY_SCORE: 54
ADLS_ACUITY_SCORE: 51
WALKING_OR_CLIMBING_STAIRS: AMBULATION DIFFICULTY, REQUIRES EQUIPMENT
WALKING_OR_CLIMBING_STAIRS_DIFFICULTY: NO
NUMBER_OF_TIMES_PATIENT_HAS_FALLEN_WITHIN_LAST_SIX_MONTHS: 1
DIFFICULTY_COMMUNICATING: YES
ADLS_ACUITY_SCORE: 31
ADLS_ACUITY_SCORE: 29
TOILETING_ASSISTANCE: TOILETING DIFFICULTY, ASSISTANCE 1 PERSON
EQUIPMENT_CURRENTLY_USED_AT_HOME: WALKER, STANDARD
FALL_HISTORY_WITHIN_LAST_SIX_MONTHS: YES
DEPENDENT_IADLS:: CLEANING;COOKING;LAUNDRY;SHOPPING;MEAL PREPARATION;MEDICATION MANAGEMENT;TRANSPORTATION
ADLS_ACUITY_SCORE: 57
ADLS_ACUITY_SCORE: 53
ADLS_ACUITY_SCORE: 29
DRESSING/BATHING: BATHING DIFFICULTY, ASSISTANCE 1 PERSON
ADLS_ACUITY_SCORE: 29
ADLS_ACUITY_SCORE: 31
COMMUNICATION: DIFFICULTY SPEAKING
TOILETING: 1-->ASSISTANCE (EQUIPMENT/PERSON) NEEDED
ADLS_ACUITY_SCORE: 53
ADLS_ACUITY_SCORE: 57
ADLS_ACUITY_SCORE: 57
ADLS_ACUITY_SCORE: 37
ADLS_ACUITY_SCORE: 54
ADLS_ACUITY_SCORE: 29
ADLS_ACUITY_SCORE: 41
ADLS_ACUITY_SCORE: 57
ADLS_ACUITY_SCORE: 29
ADLS_ACUITY_SCORE: 57
ADLS_ACUITY_SCORE: 37
TOILETING_ISSUES: YES
ADLS_ACUITY_SCORE: 51
ADLS_ACUITY_SCORE: 53
ADLS_ACUITY_SCORE: 35
ADLS_ACUITY_SCORE: 57
ADLS_ACUITY_SCORE: 49
ADLS_ACUITY_SCORE: 37
ADLS_ACUITY_SCORE: 57
ADLS_ACUITY_SCORE: 57
ADLS_ACUITY_SCORE: 29
ADLS_ACUITY_SCORE: 53
DIFFICULTY_EATING/SWALLOWING: YES
ADLS_ACUITY_SCORE: 37
ADLS_ACUITY_SCORE: 29
DRESSING/BATHING_DIFFICULTY: YES
DEPENDENT_IADLS:: CLEANING;COOKING;LAUNDRY;SHOPPING;MEAL PREPARATION;MEDICATION MANAGEMENT;TRANSPORTATION
ADLS_ACUITY_SCORE: 49
ADLS_ACUITY_SCORE: 29
ADLS_ACUITY_SCORE: 29
ADLS_ACUITY_SCORE: 31
ADLS_ACUITY_SCORE: 53
EATING/SWALLOWING: SWALLOWING LIQUIDS;SWALLOWING SOLID FOOD
ADLS_ACUITY_SCORE: 57
ADLS_ACUITY_SCORE: 32
CONCENTRATING,_REMEMBERING_OR_MAKING_DECISIONS_DIFFICULTY: YES
ADLS_ACUITY_SCORE: 57
ADLS_ACUITY_SCORE: 29
ADLS_ACUITY_SCORE: 53
EATING/SWALLOWING_MANAGEMENT: MODIFIED DIET
ADLS_ACUITY_SCORE: 57
DRESSING/BATHING_MANAGEMENT: ASSIST
TOILETING: 1-->ASSISTANCE (EQUIPMENT/PERSON) NEEDED (NOT DEVELOPMENTALLY APPROPRIATE)
ADLS_ACUITY_SCORE: 35
CHANGE_IN_FUNCTIONAL_STATUS_SINCE_ONSET_OF_CURRENT_ILLNESS/INJURY: YES
ADLS_ACUITY_SCORE: 32
ADLS_ACUITY_SCORE: 57
ADLS_ACUITY_SCORE: 49
ADLS_ACUITY_SCORE: 35
ADLS_ACUITY_SCORE: 35
ADLS_ACUITY_SCORE: 29
ADLS_ACUITY_SCORE: 53
ADLS_ACUITY_SCORE: 51
ADLS_ACUITY_SCORE: 51

## 2024-01-01 ASSESSMENT — ENCOUNTER SYMPTOMS: COUGH: 1

## 2024-01-01 NOTE — PROGRESS NOTES
"[facility]    Medicine Progress Note - Hospitalist Service    Date of Admission:  12/29/2023    88-year-old man with past medical history of stem cell transplant in 2010 followed by Candida glabrata pneumonia, [place], osteoarthritis, DNR/DNI status, hypertension, status post TAVR in 2023, acid reflux disease, hypothyroid state, dyslipidemia.  Patient was admitted to the hospital after he presented with hypoxia and there was concerns of community-acquired pneumonia versus bronchiectasis exacerbation.      Assessment & Plan   1/.  Acute hypoxemic respiratory failure seems to have resolved he is completely off oxygen and is on room air.  Likely related to pneumonia  2/.  Community-acquired pneumonia: Increase ceftriaxone to 2 g daily continue with doxycycline.  At discharge he could be prescribed Omnicef to complete total of 7 days of antibiotics.  3/.  Osteoarthritis longstanding problem with the patient I think having him on scheduled Tylenol and as needed oxycodone is fairly reasonable.  4/.  History of multiple lung diseases in the past, scheduled and as needed nebs would be helpful overall.    Patient should be discharging home versus TCU likely tomorrow            Diet: Mechanical/Dental Soft Diet    DVT Prophylaxis: DOAC  Ascencio Catheter: Not present  Lines: None     Cardiac Monitoring: None  Code Status: No CPR- Do NOT Intubate      Clinically Significant Risk Factors            # Hypomagnesemia: Lowest Mg = 1.5 mg/dL in last 2 days, will replace as needed   # Hypoalbuminemia: Lowest albumin = 2.8 g/dL at 1/1/2024  6:04 AM, will monitor as appropriate     # Hypertension: Noted on problem list  # Heart failure with preserved ejection fraction: EF >50% and home medication list includes sacubitril/valsartan (Entresto)       # Overweight: Estimated body mass index is 25.66 kg/m  as calculated from the following:    Height as of this encounter: 1.702 m (5' 7\").    Weight as of this " encounter: 74.3 kg (163 lb 12.8 oz)., PRESENT ON ADMISSION     # Financial/Environmental Concerns: none         Disposition Plan     Expected Discharge Date: 01/02/2024      Destination: inpatient rehabilitation facility              Elisabeth Reese MD  Hospitalist Service  [facility]  Securely message with [employer] (more info)  Text page via [employer] Paging/Directory   ______________________________________________________________________    Interval History   No acute issues overnight was weaned off oxygen.  This morning seen at breakfast and he is taking his pills slowly.  He tells me he used to winter in [place] but his children made him sell his house and now he lives on a farm house alone  He wants some oxycodone to manage his arthritis which she takes usually at home as well.  He tells me Tylenol does not quite cut it    Physical Exam   Vital Signs: Temp: 98.1  F (36.7  C) Temp src: Oral BP: 136/64 Pulse: 100   Resp: 18 SpO2: 92 % O2 Device: None (Room air) Oxygen Delivery: 1.5 LPM  Weight: 163 lbs 12.83 oz    General Appearance: Alert awake oriented x 3 elderly man in no acute distress  Respiratory: Clear to auscultation with diminished basal breath sounds  Cardiovascular: S1-S2  GI: Soft nontender bowel sounds are present  Skin: Occasional peripheral bruising noted  Other: Osteoarthritic changes noted in the hands    Medical Decision Making       50 MINUTES SPENT BY ME on the date of service doing chart review, history, exam, documentation & further activities per the note.      Data    apixaban ANTICOAGULANT  5 mg Oral BID    [START ON 1/2/2024] cefTRIAXone  2 g Intravenous Q24H    clopidogrel  75 mg Oral Daily    doxycycline hyclate  100 mg Oral Q12H [facility] (08/20)    ferrous sulfate  325 mg Oral Every Other Day    guaiFENesin  1,200 mg Oral BID    ipratropium - albuterol 0.5 mg/2.5 mg/3 mL  3 mL Nebulization 4x daily    levothyroxine  50 mcg Oral QAM AC    metoprolol tartrate  12.5 mg  Oral BID    pantoprazole  40 mg Oral QAM AC    rosuvastatin  40 mg Oral Daily    sacubitril-valsartan  1 tablet Oral Daily    sodium chloride (PF)  3 mL Intracatheter Q8H       ------------------------- PAST 24 HR DATA REVIEWED -----------------------------------------------    I have personally reviewed the following data over the past 24 hrs:    5.7  \   9.1 (L)   / 216     139 110 (H) 12.8 /  93   3.6 24 0.83 \     ALT: 9 AST: 18 AP: 47 TBILI: 0.2   ALB: 2.8 (L) TOT PROTEIN: 5.2 (L) LIPASE: N/A       Imaging results reviewed over the past 24 hrs:   No results found for this or any previous visit (from the past 24 hour(s)).

## 2024-01-01 NOTE — PLAN OF CARE
Goal Outcome Evaluation:      Plan of Care Reviewed With: patient    Overall Patient Progress: no changeOverall Patient Progress: no change    Outcome Evaluation: Patient continues on IV Rocephin for pneumonia. Congested cough. Chronic pain medication. Up with one assist to void in urinal. Bladder scan every 8 hours.    Continue to assess per plan of care and update care team as needed.

## 2024-01-01 NOTE — PLAN OF CARE
"Time: Assumed care of patient at 0700 on 01/01/24.     Reason, date of admission: Admitted on 12.29.23 for pneumonia    Inpatient status    Admitted from: ED via EMS from home   Admission background information: Patient developed URI signs and symptoms with vomiting.     Code: DNR/DNI    Isolation? None    History Chronic back pain, HTN, GERD, CAD, BPH    Activity: Assist x 1 to 2 with gait belt and walker    Neuro: Bouts of intermittent confusion, however was alert and oriented throughout the day.    Cardiac: WNL      Telemetry: None    Resp: Diminished lung sounds       O2: RA    GI/:  Mostly continent of bowel and bladder       Last BM: Today    Skin: Scattered bruising, dry, fragile skin. Left forearm tear - Mepilex removed today and replaced with a Vaseline gauze-like dressing, covered with Telfa, and Coban. Avoid adhesives as able and remove adhesives with saline as able.    Lines/Drains: PIV in right AC      Fluids: SL     Vitals: Soft blood pressure readings     Pain: Chronic back and hip pain, effectively treated with acetaminophen and oxycodone.     Plan: Plan to discharge to a TCU tomorrow likely.     Problem: Adult Inpatient Plan of Care  Goal: Plan of Care Review  Description: The Plan of Care Review/Shift note should be completed every shift.  The Outcome Evaluation is a brief statement about your assessment that the patient is improving, declining, or no change.  This information will be displayed automatically on your shift  note.  Outcome: Progressing  Flowsheets (Taken 1/1/2024 1201)  Outcome Evaluation: Patient reported increased confusion overnight, has been alert and oriented for the morning. Patient using call light appropriately.  Plan of Care Reviewed With: patient  Goal: Patient-Specific Goal (Individualized)  Description: You can add care plan individualizations to a care plan. Examples of Individualization might be:  \"Parent requests to be called daily at 9am for status\", \"I have a hard " "time hearing out of my right ear\", or \"Do not touch me to wake me up as it startles  me\".  Outcome: Progressing  Goal: Absence of Hospital-Acquired Illness or Injury  Outcome: Progressing  Intervention: Identify and Manage Fall Risk  Recent Flowsheet Documentation  Taken 1/1/2024 1101 by Nae Zacarias RN  Safety Promotion/Fall Prevention:   activity supervised   clutter free environment maintained   mobility aid in reach   nonskid shoes/slippers when out of bed   supervised activity   safety round/check completed   room organization consistent   room near nurse's station  Intervention: Prevent Skin Injury  Recent Flowsheet Documentation  Taken 1/1/2024 1101 by Nae Zacarias RN  Body Position: position changed independently  Intervention: Prevent and Manage VTE (Venous Thromboembolism) Risk  Recent Flowsheet Documentation  Taken 1/1/2024 1101 by Nae Zacarias RN  VTE Prevention/Management: (On Eliquis) other (see comments)  Intervention: Prevent Infection  Recent Flowsheet Documentation  Taken 1/1/2024 1101 by Nae Zacarias RN  Infection Prevention: hand hygiene promoted  Goal: Optimal Comfort and Wellbeing  Outcome: Progressing  Intervention: Monitor Pain and Promote Comfort  Recent Flowsheet Documentation  Taken 1/1/2024 1042 by Nae Zacarias RN  Pain Management Interventions: medication (see MAR)  Taken 1/1/2024 0848 by Nae Zacarias RN  Pain Management Interventions:   repositioned   heat applied   MD notified (comment)  Goal: Readiness for Transition of Care  Outcome: Progressing     Problem: Fall Injury Risk  Goal: Absence of Fall and Fall-Related Injury  Outcome: Progressing  Intervention: Identify and Manage Contributors  Recent Flowsheet Documentation  Taken 1/1/2024 1101 by Nae Zacarias RN  Medication Review/Management: medications reviewed  Intervention: Promote Injury-Free Environment  Recent Flowsheet Documentation  Taken 1/1/2024 1101 by Nae Zacarias RN  Safety " Promotion/Fall Prevention:   activity supervised   clutter free environment maintained   mobility aid in reach   nonskid shoes/slippers when out of bed   supervised activity   safety round/check completed   room organization consistent   room near nurse's station     Problem: Infection  Goal: Absence of Infection Signs and Symptoms  Outcome: Progressing     Problem: Risk for Delirium  Goal: Optimal Coping  Outcome: Progressing  Goal: Improved Behavioral Control  Outcome: Progressing  Intervention: Minimize Safety Risk  Recent Flowsheet Documentation  Taken 1/1/2024 1101 by Nae Zacarias RN  Communication Enhancement Strategies: call light answered in person  Enhanced Safety Measures: room near unit station  Goal: Improved Attention and Thought Clarity  Outcome: Progressing  Intervention: Maximize Cognitive Function  Recent Flowsheet Documentation  Taken 1/1/2024 1101 by Nae Zacarias RN  Sensory Stimulation Regulation: care clustered  Reorientation Measures: clock in view  Goal: Improved Sleep  Outcome: Progressing     Problem: Pneumonia  Goal: Fluid Balance  Outcome: Progressing  Goal: Resolution of Infection Signs and Symptoms  Outcome: Progressing  Goal: Effective Oxygenation and Ventilation  Outcome: Progressing  Intervention: Promote Airway Secretion Clearance  Recent Flowsheet Documentation  Taken 1/1/2024 1101 by Nae Zacarias RN  Cough And Deep Breathing: done independently per patient  Intervention: Optimize Oxygenation and Ventilation  Recent Flowsheet Documentation  Taken 1/1/2024 1101 by Nae Zacarias RN  Head of Bed (HOB) Positioning: HOB at 20-30 degrees   Goal Outcome Evaluation:      Plan of Care Reviewed With: patient    Overall Patient Progress: no changeOverall Patient Progress: no change    Outcome Evaluation: Patient reported increased confusion overnight, has been alert and oriented for the morning. Patient using call light appropriately.

## 2024-01-01 NOTE — PLAN OF CARE
Time: Assumed care of patient at 0700    Reason, date of admission: Admitted on 12.29.23 for pneumonia   Inpatient    Admitted from: ED from home via EMS  Code: DNR/DNI    Isolation? None    History HTN, Hx of mantle cell lymphoma, GERD, CAD, chronic back pain, repeated falls    Activity: Assist x 2 with gait belt and walker    Neuro: Alert and oriented, can be forgetful at times       O2: Intermittent oxygen supplementation. Patient used overnight, was on RA until 1318 after patient used the bathroom and oxygen saturation dropped to 84% on RA. Oxygen applied to until patient able to maintain saturation. Currently on RA.     GI/:  Urinary retention, see bladder scanning orders for details. Last voided at around 1800, bladder scanned at 1520 with 119.           Skin: Scattered bruising, skin tear on right forearm - Mepilex on - use caution when removing adhesives    Lines/Drains: PIVs: left wrist and AC      Fluids: SL    Vitals: Soft(er) blood pressure readings    K+ protocol? Yes, was 3.9 today, no replacement necessary, AM lab ordered        Mag protocol? Yes, was 1.5 this morning, replaced, recheck was 2.7, no additional replacement today, AM lab ordered.        Pain: Chronic pain    Plan: Plan to discharge to a TCU     Goal Outcome Evaluation:      Plan of Care Reviewed With: patient    Overall Patient Progress: no changeOverall Patient Progress: no change    Outcome Evaluation: Will plan to discharge to a TCU

## 2024-01-02 NOTE — PLAN OF CARE
Occupational Therapy Discharge Summary    Reason for therapy discharge:    Discharged to transitional care facility.    Progress towards therapy goal(s). See goals on Care Plan in [facility] electronic health record for goal details.  Goals partially met.  Barriers to achieving goals:   discharge from facility.    Therapy recommendation(s):    Continued therapy is recommended.  Rationale/Recommendations:  TCU to increase independence with ADLs and functional mobility.

## 2024-01-02 NOTE — DISCHARGE SUMMARY
[facility]  Hospitalist Discharge Summary       Date of Admission:  12/29/2023  Date of Discharge:  1/2/2024  1:47 PM  Discharging Provider: Cayetano Peña MD      Discharge Diagnoses     Community-acquired pneumonia vs aspiration pneumonia vs bronchiectasis exacerbation   Hypotension, possible sepsis  Chronic bronchiectasis  History of cryptogenic organizing pneumonia  Hx candidal pneumonia   Generalized weakness  Anemia, normocytic, mild iron deficiency suggested   Hypokalemia  Hypomagenesemia  Mantle Cell Lymphoma S/P Autologous Transplant  CAD s/p PCI 2013 and 10/2019 (ODIN to proximal LAD)   Aortic stenosis S/P TAVR    Hypertension  GERD   Acquired hypothyroidism  Hyperlipidemia    Follow-ups Needed After Discharge   Follow-up Appointments     Follow Up and recommended labs and tests      Follow up with senior living physician.  The following labs/tests are   recommended: CMP and CBC.          Unresulted Labs Ordered in the Past 30 Days of this Admission       Date and Time Order Name Status Description    12/29/2023  5:09 PM Blood Culture Peripheral Blood Preliminary     12/29/2023  5:09 PM Blood Culture Peripheral Blood Preliminary         These results will be followed up by Cayetano Peña or PCP    Discharge Disposition   Discharged to short-term care facility  Condition at discharge: Stable    Hospital Course   Dangelo Hernández is a 88 year old male admitted on 12/29/2023. He presents with vomiting, and 1 day history of weakness and increased sputum production concerning for bacterial infection.  Patient initially was hypotensive 70s/40s but BP quickly improved after IVF.    Community-acquired pneumonia vs aspiration pneumonia vs bronchiectasis exacerbation   Hypotension, possible sepsis  Chronic bronchiectasis  History of cryptogenic organizing pneumonia  Hx candidal pneumonia     He had stem cell transplant in 10/2010, issues with pulmonary infiltrates and BAL with  "Candida glabrata in 07/2011, and unfortunate progression on VATS in 09/2011 that showed organizing pneumonia with negative tissue cultures, felt to have a , treated with high-dose steroids with a good clinical and radiological response, but unfortunate subsequent progression in 04/2012. A repeat BAL on 04/19/2012 showed filamentous fungus that was not able to be speciated further, and was treated with Voriconazole and prednisone.      Patient presented with increased sputum production, weakness, hypotension, tachycardia and new leukocytosis (11.1) concerning for sepsis secondary to community-acquired pneumonia versus aspiration pneumonia.  Patient reported that he had a episode of vomiting overnight and since then has felt increased lower extremity weakness.  Patient reports that he has had COVID \"7 times\" and each time he has increased weakness of his legs, likely secondary to progressive deconditioning and functional decline.  RSV, flu, and COVID negative.    CT 12/30/2023- comparison CT 9/4/2023. Stable appearance of bilateral regions of bronchiectasis, and interstitial thickening. This is associated with ground-glass opacities extending to the lower lungs that appears stable as well. Upper lobe predominant emphysema. No new airspace disease identified.    Patient on room air in the ED.  Patient's initial presentation with hypotension 70s/40s quickly responded to 2 L IVF bolus.  Initially plan to go to ICU for potential vasopressors, but given fluid responsiveness was admitted to [facility].  Lactic acid 1.9. In ED given Zosyn. On admission changed to Rocephin and doxycycline given lack of concern for pseudomonas infection.  Possible component of atypical pneumonia given recurrence and pattern on CT imaging.  PCT 0.19  Cortisol 11  Swallow evaluation 12/31/2023 negative   - Treated with Rocephin and doxycycline   - Blood cultures NGTD  - Weaned off supplemental oxygen  - Discharge on cefdinir and doxycycline   "   Generalized weakness  Patient has multiple admissions for generalized weakness over the last year and reports a progressive decline in independence with occasional falls at home.  On exam has no lower extremity deficits or sensation loss that are appreciable.  Likely secondary to recurrent hospitalizations and failure to thrive at home.   - PT/OT consulted and recommended TCU at discharge     Anemia, normocytic, mild iron deficiency suggested   Recent baseline 10-11  Admit HGB 11.2, recheck 9.4  No evidence acute blood loss  B12 572, Ferritin 57, %Fe sat 18  HGB stable 9.0  - Start po iron   - Consider outpatient colonoscopy       Hypokalemia  Hypomagenesemia  - replaced per protocol      Mantle Cell Lymphoma S/P Autologous Transplant  Patient had stem cell transplant in 10/2010.  Currently in remission     CAD s/p PCI 2013 and 10/2019 (ODIN to proximal LAD)   Aortic stenosis S/P TAVR    Hypertension  Patient had a TAVR procedure 03/2023 in [place]. No records in EPIC  Echo 9/2023- Left ventricular systolic function is low normal. The visual ejection fraction is 50-55%. There is a bioprosthetic aortic valve. The prosthetic aortic valve is not well visualized. The gradient is normal for this prosthetic aortic valve. There is mild (1+) aortic regurgitation.  PTA on plavix, apixaban 5 BID (taking once a day), metoprolol 12.5 BID (taking once a day), sacubitril-valsartan 24-26  - Continue apixaban at BID dosing   - Restart PTA metorolol at BID dosing  - Restart sacubitril-valsartan 24-26      GERD   Stable on PTA esomeprazole 20 mg.   - Switch to pantoprazole 40 mg daily during hospitalization     Acquired hypothyroidism  Stable.  TSH 2.47 on admission.  - Continue PTA levothyroxine     Hyperlipidemia  - Continue PTA Crestor 40 mg    Consultations This Hospital Stay   OCCUPATIONAL THERAPY ADULT IP CONSULT  PHYSICAL THERAPY ADULT IP CONSULT  SPEECH LANGUAGE PATH ADULT IP CONSULT  CARE MANAGEMENT / SOCIAL WORK IP  CONSULT  PHYSICAL THERAPY ADULT IP CONSULT  OCCUPATIONAL THERAPY ADULT IP CONSULT    Code Status   No CPR- Do NOT Intubate    35 MINUTES SPENT BY ME on the date of service doing chart review, history, exam, documentation & further activities per the note.         Cayetano Peña MD  [facility]  ______________________________________________________________________    Physical Exam   Vital Signs: Temp: 98.4  F (36.9  C) Temp src: Oral BP: (!) 140/58 Pulse: 100   Resp: 18 SpO2: 96 % O2 Device: None (Room air)    Weight: 163 lbs 12.83 oz       Gen: Well nourished, well developed, alert and oriented x 3, no acute distressed  HEENT: Atraumatic, normocephalic; sclera non-injected, anicterric; oral mucosa moist, no lesion, no exudate  Lungs: Bibasilar rales, no wheezes, no rhonchi, no rales  Heart: Regular rate, regular rhythm, no gallops, no rubs, no murmurs  GI: Bowel sound normal, no hepatosplenomegaly, no masses, non-tender, non-distended, no guarding, no rebound tenderness  Lymph: No lymphadenopathy, no edema  Skin: No rashes, no chronic venous stasis     Primary Care Physician   Elian Shafer    Discharge Orders      General info for SNF    Length of Stay Estimate: Short Term Care: Estimated # of Days <30  Condition at Discharge: Improving  Level of care:skilled   Rehabilitation Potential: Good  Admission H&P remains valid and up-to-date: Yes  Recent Chemotherapy: N/A  Use Nursing Home Standing Orders: Yes     Mantoux instructions    Give two-step Mantoux (PPD) Per Facility Policy Yes     Reason for your hospital stay    This is an 88-year-old male admitted with community-acquired pneumonia.     Follow Up and recommended labs and tests    Follow up with long term physician.  The following labs/tests are recommended: CMP and CBC.     Activity - Up with nursing assistance     Daily weights    Call Provider for weight gain of more than 2 pounds per day or 5 pounds per week.      Physical Therapy Adult Consult    Evaluate and treat as clinically indicated.    Reason:  Physical deconditioning     Occupational Therapy Adult Consult    Evaluate and treat as clinically indicated.    Reason:  Physical deconditioning     Fall precautions     Diet    Follow this diet upon discharge: Orders Placed This Encounter      Low-salt low-fat mechanical/Dental Soft Diet       Significant Results and Procedures   Most Recent 3 CBC's:  Recent Labs   Lab Test 01/01/24  0604 12/31/23  0616 12/30/23  0533   WBC 5.7 5.9 5.9   HGB 9.1* 9.0* 9.4*   MCV 91 93 90    209 202     Most Recent 3 BMP's:  Recent Labs   Lab Test 01/01/24  0604 12/31/23  0616 12/30/23  1316 12/30/23  0533    140  --  141   POTASSIUM 3.6 3.9 3.9 3.1*   CHLORIDE 110* 111*  --  110*   CO2 24 21*  --  19*   BUN 12.8 16.4  --  17.8   CR 0.83 0.91  --  0.89   ANIONGAP 5* 8  --  12   CHAVEZ 8.5* 8.5*  --  8.6*   GLC 93 88  --  92     7-Day Micro Results       Collected Updated Procedure Result Status      12/29/2023 1739 01/01/2024 2117 Blood Culture Peripheral Blood [[id number]]   Peripheral Blood    Preliminary result Component Value   Culture No growth after 3 days  [P]                12/29/2023 1739 01/01/2024 2117 Blood Culture Peripheral Blood [[id number]]   Peripheral Blood    Preliminary result Component Value   Culture No growth after 3 days  [P]                12/29/2023 1109 12/29/2023 1207 Symptomatic Influenza A/B, RSV, & SARS-CoV2 PCR (COVID-19) Nose [[id number]]    Swab from Nose    Final result Component Value   Influenza A PCR Negative   Influenza B PCR Negative   RSV PCR Negative   SARS CoV2 PCR Negative   NEGATIVE: SARS-CoV-2 (COVID-19) RNA not detected, presumed negative.                ,   Results for orders placed or performed during the hospital encounter of 12/29/23   CT Head w/o Contrast    Narrative    EXAM: CT HEAD W/O CONTRAST  12/29/2023 12:44 PM     HISTORY: Fall chronic anticoagulation        COMPARISON: Head CT: 9/4/2023.    TECHNIQUE: Using multidetector thin collimation helical acquisition  technique, axial, coronal and sagittal CT images from the skull base  to the vertex were obtained without intravenous contrast.   (topogram) image(s) also obtained and reviewed.    Radiation dose for this scan was reduced using automated exposure  control, adjustment of the mA and/or kV according to patient size, or  iterative reconstruction technique.    FINDINGS:    Calvarium/skull base: No acute calvarial fracture. No destructive  osseous lesions.  Mastoids and middle ears are clear.    Orbits: Bilateral pseudophakia.    Paranasal sinuses: No substantial paranasal sinus disease.    Brain: No acute intracranial hemorrhage.  No evidence of acute large  vascular territory ischemic infarct.  No mass effect.  No  hydrocephalus.  Basal cisterns are patent. Calcified intracranial  atherosclerosis. Changes suggestive of moderate to advanced chronic  microvascular ischemic disease. Moderate generalized parenchymal  atrophy.      Impression    IMPRESSION:    1.  No evidence of acute traumatic intracranial abnormality.  2.  Chronic changes as detailed above.    OLENA PALACIOS MD         SYSTEM ID:  [id number]   CT Cervical Spine w/o Contrast    Narrative    EXAM: CT CERVICAL SPINE W/O CONTRAST  12/29/2023 12:44 PM     HISTORY: Fall neck pain       COMPARISON: Thoracic spine MRI: 5/18/2018. Neck CT: 9/2/2010.    TECHNIQUE: Using multidetector thin collimation helical acquisition  technique, axial, coronal and sagittal CT images through the cervical  spine were obtained without intravenous contrast.    Radiation dose for this scan was reduced using automated exposure  control, adjustment of the mA and/or kV according to patient size, or  iterative reconstruction technique.    FINDINGS:    VERTEBRAE:  No acute fracture or traumatic malalignment of the  cervical spine. Similar trace anterolisthesis of C3 on C4.  Pronounced  kyphosis centered at T2. Vertebral body heights are maintained.  Osteopenia.    SPINAL CANAL/NEURAL FORAMINA:  Multilevel degenerative disc disease  through the cervical spine with disc height loss greatest and moderate  at C4-C5 and C5-C6. Spinal canal stenosis is greatest and mild to  moderate at C3-C4. Multilevel facet arthropathy throughout the  cervical spine, greatest and advanced on the right at C3-C4. In  combination with uncovertebral spurring/hypertrophy, there are varying  degrees of neural foraminal stenosis throughout the cervical spine.  This is most pronounced and moderate to advanced in the left at C3-C4.    OTHER: No prevertebral soft tissue edema.  Calcified atherosclerosis  of the cervical carotid arteries. Scarring in the lung apices.      Impression    IMPRESSION:    1. No acute fracture or traumatic malalignment of the cervical spine.  2. Multilevel cervical spondylosis as detailed above.    OLENA PALACIOS MD         SYSTEM ID:  [id number]   Chest CT w/o contrast    Narrative    CT CHEST WITHOUT CONTRAST December 29, 2023 12:45 PM    CLINICAL HISTORY: Fall, cough.    TECHNIQUE: CT chest without IV contrast. Multiplanar reformats were  obtained. Dose reduction techniques were used.  CONTRAST: None.    COMPARISON: CT 9/4/2023.    FINDINGS:   LUNGS AND PLEURA: No effusions. No pneumothorax. Stable appearance of  bilateral regions of bronchiectasis, and interstitial thickening. This  is associated with ground-glass opacities extending to the lower lungs  that appears stable as well. Upper lobe predominant emphysema. No new  airspace disease identified.    MEDIASTINUM/AXILLAE: Stable aortic valve prosthesis. Stable  calcifications of the thoracic aorta and mitral annular region. No new  adenopathy or fluid collection.    CORONARY ARTERY CALCIFICATION: Previous intervention (stents or CABG).    UPPER ABDOMEN: No acute upper abdominal abnormalities.    MUSCULOSKELETAL: No acute displaced  fracture identified. Mild height  loss at T6 appears stable.      Impression    IMPRESSION:   1.  No acute traumatic abnormality identified.  2.  Stable bilateral interstitial thickening with bronchiectasis and  nonspecific pulmonary ground-glass opacities. Consider pulmonary  consultation for further workup of potential interstitial lung  disease.    POLO WOOD MD         SYSTEM ID:  [id number]   CT Abdomen Pelvis w/o Contrast    Narrative    EXAM: CT ABDOMEN PELVIS W/O CONTRAST  LOCATION: [facility]  DATE: 12/29/2023    INDICATION: Hypotension.  COMPARISON: CT chest, abdomen, and pelvis 09/04/2023.  TECHNIQUE: CT scan of the abdomen and pelvis was performed without IV contrast. Multiplanar reformats were obtained. Dose reduction techniques were used.  CONTRAST: None.    FINDINGS:     LOWER CHEST: Status post wedge resection of the basilar right lower lobe. Appearance of the lung bases is unchanged, including bronchiolectasis, scattered groundglass opacities, and fibrosis. No new opacities. No pleural effusions. Aortic valve   prosthesis. Mitral annular calcifications. Coronary arterial stents.    HEPATOBILIARY: Normal.    PANCREAS: Normal.    SPLEEN: Normal.    ADRENAL GLANDS: Normal.    KIDNEYS/BLADDER: Nonobstructing 1 mm right lower pole renal calculus. No left renal calculi. No ureteral calculi or hydronephrosis. Small posterior bladder diverticulum.    BOWEL: No bowel obstruction or inflammation. Normal appendix.    LYMPH NODES: No enlarged lymph nodes.    VASCULATURE: Severe aortobiiliac atherosclerosis. No abdominal aortic aneurysm.    PELVIC ORGANS: Normal.    MUSCULOSKELETAL: Demineralized bones. Metallic device is present between the L4 and L5 spinous processes. Unchanged mild compression deformity of the T12 vertebral body. Multilevel degenerative changes of the spine. 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 No acute bony abnormality.  Small fat-containing periumbilical hernia.      Impression    IMPRESSION:     1.  No acute abnormality, within the limitations of the noncontrast technique.    2.  Nonobstructing 1 mm right lower pole renal calculus.    3.  Small posterior bladder diverticulum, likely sequela of chronic bladder outlet obstruction.     *Note: Due to a large number of results and/or encounters for the requested time period, some results have not been displayed. A complete set of results can be found in Results Review.       Discharge Medications   Current Discharge Medication List        START taking these medications    Details   !! acetaminophen (TYLENOL) 325 MG tablet Take 2 tablets (650 mg) by mouth daily as needed for mild pain or other (and adjunct with moderate or severe pain or per patient request) Separate from bedtime dose by 6 hours before or afterwards    Associated Diagnoses: Spinal stenosis of lumbar region without neurogenic claudication      cefdinir (OMNICEF) 300 MG capsule Take 1 capsule (300 mg) by mouth 2 times daily for 2 doses    Associated Diagnoses: Community acquired pneumonia, unspecified laterality      doxycycline hyclate (VIBRAMYCIN) 100 MG capsule Take 1 capsule (100 mg) by mouth every 12 hours for 9 doses    Associated Diagnoses: Community acquired pneumonia, unspecified laterality      ferrous sulfate (FEROSUL) 325 (65 Fe) MG tablet Take 1 tablet (325 mg) by mouth every other day    Associated Diagnoses: Iron deficiency anemia, unspecified iron deficiency anemia type       !! - Potential duplicate medications found. Please discuss with provider.        CONTINUE these medications which have CHANGED    Details   !! oxyCODONE (ROXICODONE) 10 MG tablet Take 1 tablet (10 mg) by mouth nightly as needed for moderate pain  Qty: 14 tablet, Refills: 0    Associated Diagnoses: Low back pain without sciatica, unspecified back pain laterality, unspecified chronicity; Spinal stenosis of lumbar region without  neurogenic claudication      !! oxyCODONE (ROXICODONE) 5 MG tablet Take 1 tablet (5 mg) by mouth every 6 hours as needed for moderate pain  Qty: 30 tablet, Refills: 0    Associated Diagnoses: Low back pain without sciatica, unspecified back pain laterality, unspecified chronicity; Spinal stenosis of lumbar region without neurogenic claudication       !! - Potential duplicate medications found. Please discuss with provider.        CONTINUE these medications which have NOT CHANGED    Details   !! acetaminophen (TYLENOL) 500 MG tablet Take 1,000 mg by mouth 3 times daily      apixaban ANTICOAGULANT (ELIQUIS) 5 MG tablet Take 1 tablet (5 mg) by mouth 2 times daily  Qty: 60 tablet, Refills: 3    Associated Diagnoses: S/P TAVR (transcatheter aortic valve replacement)      azelastine-fluticasone (DYMISTA) 137-50 MCG/ACT nasal spray Spray 1 spray into both nostrils daily      clopidogrel (PLAVIX) 75 MG tablet Take 1 tablet (75 mg) by mouth daily  Qty: 90 tablet, Refills: 1    Associated Diagnoses: S/P TAVR (transcatheter aortic valve replacement)      diphenhydrAMINE (BENADRYL) 50 MG capsule Take 50 mg by mouth daily      esomeprazole (NEXIUM) 20 MG DR capsule Take 1 capsule (20 mg) by mouth every morning (before breakfast) Take 30-60 minutes before eating.  Qty: 90 capsule, Refills: 1    Associated Diagnoses: Gastroesophageal reflux disease without esophagitis      levothyroxine (SYNTHROID/LEVOTHROID) 50 MCG tablet Take 1 tablet (50 mcg) by mouth every morning (before breakfast)  Qty: 90 tablet, Refills: 1    Associated Diagnoses: Acquired hypothyroidism      metoprolol tartrate (LOPRESSOR) 25 MG tablet Take 0.5 tablets (12.5 mg) by mouth 2 times daily  Qty: 90 tablet, Refills: 1    Associated Diagnoses: Hypertensive heart disease without heart failure      nitroGLYcerin (NITROSTAT) 0.4 MG sublingual tablet Place 0.4 mg under the tongue every 5 minutes as needed for chest pain For chest pain place 1 tablet under the tongue  every 5 minutes for 3 doses. If symptoms persist 5 minutes after 1st dose call 911.      rosuvastatin (CRESTOR) 40 MG tablet Take 1 tablet (40 mg) by mouth daily  Qty: 90 tablet, Refills: 1    Associated Diagnoses: Coronary artery disease involving native coronary artery of native heart with angina pectoris (H24)      sacubitril-valsartan (ENTRESTO) 24-26 MG per tablet Take 1 tablet by mouth daily  Qty: 90 tablet, Refills: 1    Associated Diagnoses: Coronary artery disease involving native coronary artery of native heart with angina pectoris (H24)      vitamin C (ASCORBIC ACID) 500 MG tablet Take 500 mg by mouth daily      Vitamin D3 (CHOLECALCIFEROL) 25 mcg (1000 units) tablet Take 1 tablet by mouth daily       !! - Potential duplicate medications found. Please discuss with provider.        STOP taking these medications       acetaminophen (TYLENOL) 650 MG CR tablet Comments:   Reason for Stopping:         naloxone (NARCAN) 2 MG/2ML injection Comments:   Reason for Stopping:             Allergies   Allergies   Allergen Reactions    Amoxicillin Diarrhea           Lisinopril Cough

## 2024-01-02 NOTE — PROGRESS NOTES
Care Management Discharge Note    Discharge Date: 01/02/2024       Discharge Disposition: Transitional Care    Discharge Services: None    Discharge DME: Wheelchair    Discharge Transportation: family or friend will provide    Private pay costs discussed: Not applicable    Does the patient's insurance plan have a 3 day qualifying hospital stay waiver?  No    PAS Confirmation Code: [id number]  Patient/family educated on Medicare website which has current facility and service quality ratings: yes    Education Provided on the Discharge Plan: Yes  Persons Notified of Discharge Plans: patient, daughter Bell Beauchamp @ [facility]  Patient/Family in Agreement with the Plan: yes    Handoff Referral Completed: Yes    Additional Information:    Per IDT rounds, MD team states that patient is medically ready for discharge today.     Patient has been accepted at [facility] (Main: [phone] Admissions: [phone] Fax: [phone]) for TCU cares today.     Met with patient and daughter, Quynh, at bedside for discharge planning. Patient is in agreement with discharge to [facility]. Daughter Quynh will transport.     PLAN: [facility] TCU today. Daughter to transport around 2PM    SUSY GARCIA RN

## 2024-01-02 NOTE — PROGRESS NOTES
[facility] DISCHARGE NOTE    Patient discharged to transitional care unit at 1:55 PM via wheel chair. Accompanied by daughter and staff. Discharge instructions reviewed with patient and daughter, opportunity offered to ask questions. Prescriptions sent to patients preferred pharmacy. All belongings sent with patient.    Raina Bustos RN

## 2024-01-02 NOTE — PLAN OF CARE
Physical Therapy Discharge Summary    Reason for therapy discharge:    Discharged to transitional care facility.    Progress towards therapy goal(s). See goals on Care Plan in [facility] electronic health record for goal details.  Goals not met.  Barriers to achieving goals:   discharge from facility.    Therapy recommendation(s):    Continued therapy is recommended.  Rationale/Recommendations:  ambulation and strengthening to return to indep baseline so can be safe at home. Walked 175 with walker and CGA

## 2024-01-02 NOTE — PROGRESS NOTES
Patient is up with assist of 1-2 with a walker and gait belt In his room. He is currently on room air but has been congested and coughing this evening, robitussin was given per the MAR. He has been calling appropriately tonight and hasn't been impulsive. He has had  tylenol and Oxy PRN for his back and hip pain.  He has a Right PIV that is saline locked. His daughter visited as well this evening and patient was in good spirits.

## 2024-01-02 NOTE — PROGRESS NOTES
"1/2/23     Wound care completed prior to discharge. RN report given.     BP (!) 140/58 (BP Location: Left arm)   Pulse 100   Temp 98.4  F (36.9  C) (Oral)   Resp 18   Ht 1.702 m (5' 7\")   Wt 74.3 kg (163 lb 12.8 oz)   SpO2 96%   BMI 25.66 kg/m     "

## 2024-01-03 PROBLEM — J96.01 ACUTE RESPIRATORY FAILURE WITH HYPOXIA (H): Status: ACTIVE | Noted: 2022-01-27

## 2024-01-03 PROBLEM — J96.01 ACUTE RESPIRATORY FAILURE WITH HYPOXIA (H): Status: RESOLVED | Noted: 2022-01-27 | Resolved: 2024-01-01

## 2024-01-03 NOTE — LETTER
"    1/3/2024        RE: Dangelo Hernández  [address] [facility] [id number]        [facility] Admission  PCP & CLINIC: Elian Shafer DO, [address] [facility] / [place] [address]  Chief Complaint   Patient presents with     Hospital F/U   Yvonne MRN: [id number]. Place of Service where encounter took place:  [facility] (TCU) [[id number]] Dangelo Hernández  is a 88 year old  (1935), admitted to the above facility from  [facility]. Hospital stay 12/29 through 1/2/24. Admitted to this facility for  rehab, medical management, and nursing care. HPI information obtained from: facility chart records, facility staff, patient report, [facility] chart review, and Care Everywhere [facility] chart review.     Brief Summary of Hospital Course: Dangelo presented to [facility] on 12/29/23 w/ weakness, and productive cough. He was found to have CAP, potential aspiration pneumonia w/ underlying bronchiectasis and serious hx of candidial and cryptogenic pneumonia previously requiring VATS and has apparently had COVID \"7 times\". 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 Overall, he improved w/ IVF, rocephin, doxy, O2, and discharged from hospital doxy and Cefdinir.     Updates since admission to transitional care unit: Dangelo presented to TCU on 1/2 s/p the above hospitalization. Today, Dangelo is having a hard time.  He did not sleep well last night, his back is a little flared up from pain, and he continues to have a cough.  He does have shortness of breath with activity, but denies fever or chills.  He remains on antibiotics for couple more days.  Dangelo says that his appetite has been poor for a while, he denies nausea or heartburn, and though he was constipated, he is happy that he finally had a bowel movement and things are moving better.  He does not think he needs any bowel medications to help with this.  He denies any bladder problems.  He reports " some new swelling in his lower extremities, and wonders what this is from.  He denies chest pain or palpitations.  He does get lightheaded sometimes, but denies headaches.  He thinks his mental health is okay, but questions what other people would think around him.  He is open to medication changes, and discussion around CODE STATUS.    CODE STATUS/ADVANCE DIRECTIVES DISCUSSION: DNR/DNI. Patient's living condition: lives alone. ALLERGIES: Amoxicillin and Lisinopril PAST MEDICAL HISTORY:  has a past medical history of ACS (acute coronary syndrome) (H) (10/15/2019), Acute kidney failure NEC (11/27/2011), Acute metabolic encephalopathy (09/05/2023), JESSICA (acute kidney injury) (H24) (09/05/2023), Altered mental status, unspecified (06/28/2023), Arthritis, Basal cell carcinoma, Bilateral pneumonia (06/15/2023), Blood transfusion, Bronchiectasis (H), CAD (coronary artery disease), Colon polyps, COVID-19 (12/07/2022), Cryptogenic organizing pneumonia (H) (12/07/2012), Depression, unspecified (06/21/2023), Endocarditis, valve unspecified (01/27/2022), GERD (gastroesophageal reflux disease), Hyperlipidemia, Hypertension, Laceration without foreign body of other part of head, subsequent encounter (06/28/2023), Mantle Cell Lymphoma S/P Autologous Transplant (05/12/2010), Pneumonia due to coronavirus disease 2019 (01/27/2022), Pneumonia in mycoses (09/09/2011), Pneumonia, candidial (H) (09/09/2011), S/P TAVR (transcatheter aortic valve replacement), and Septic shock (H) (06/15/2023).    He has no past medical history of Diabetes mellitus (H), Squamous cell carcinoma, or Unspecified cerebral artery occlusion with cerebral infarction.. PAST SURGICAL HISTORY:   has a past surgical history that includes surgical history of - ; surgical history of -  (1/19/84); surgical history of - ; surgical history of -  (2/19/90); surgical history of -  (10/20/92); surgical history of -  (6/14/2000); Bronchoscopy (rigid or flexible), diagnostic  (7/14/2011); Thoracoscopic biopsy lung (9/12/2011); colonoscopy; orthopedic surgery; ENT surgery; Bronchoscopy (rigid or flexible), diagnostic (N/A, 5/29/2019); Heart Catheterization with Possible Intervention (N/A, 10/16/2019); and Heart Catheterization with Possible Intervention (N/A, 11/13/2019).. FAMILY HISTORY: family history includes Cancer in his mother and sister; Cardiovascular in his father; Hypertension in his sister; Lipids in his sister.. SOCIAL HISTORY:   reports that he quit smoking about 49 years ago. His smoking use included cigarettes. He started smoking about 68 years ago. He has a 9.5 pack-year smoking history. He has quit using smokeless tobacco.  His smokeless tobacco use included snuff. He reports current alcohol use. He reports that he does not use drugs.  Post Discharge Medication Reconciliation Status: discharge medications reconciled and changed, per note/orders.  Current Outpatient Medications   Medication Sig Dispense Refill     acetaminophen (TYLENOL) 325 MG tablet Take 2 tablets (650 mg) by mouth daily as needed for mild pain or other (and adjunct with moderate or severe pain or per patient request) Separate from bedtime dose by 6 hours before or afterwards       acetaminophen (TYLENOL) 500 MG tablet Take 1,000 mg by mouth 3 times daily       apixaban ANTICOAGULANT (ELIQUIS) 5 MG tablet Take 1 tablet (5 mg) by mouth 2 times daily 60 tablet 3     azelastine-fluticasone (DYMISTA) 137-50 MCG/ACT nasal spray Spray 1 spray into both nostrils daily       cefdinir (OMNICEF) 300 MG capsule Take 1 capsule (300 mg) by mouth 2 times daily for 2 doses       clopidogrel (PLAVIX) 75 MG tablet Take 1 tablet (75 mg) by mouth daily 90 tablet 1     diphenhydrAMINE (BENADRYL) 50 MG capsule Take 50 mg by mouth daily       doxycycline hyclate (VIBRAMYCIN) 100 MG capsule Take 1 capsule (100 mg) by mouth every 12 hours for 9 doses       esomeprazole (NEXIUM) 20 MG DR capsule Take 1 capsule (20 mg) by mouth  "every morning (before breakfast) Take 30-60 minutes before eating. 90 capsule 1     [START ON 1/4/2024] ferrous sulfate (FEROSUL) 325 (65 Fe) MG tablet Take 1 tablet (325 mg) by mouth every other day       levothyroxine (SYNTHROID/LEVOTHROID) 50 MCG tablet Take 1 tablet (50 mcg) by mouth every morning (before breakfast) 90 tablet 1     metoprolol tartrate (LOPRESSOR) 25 MG tablet Take 0.5 tablets (12.5 mg) by mouth 2 times daily 90 tablet 1     nitroGLYcerin (NITROSTAT) 0.4 MG sublingual tablet Place 0.4 mg under the tongue every 5 minutes as needed for chest pain For chest pain place 1 tablet under the tongue every 5 minutes for 3 doses. If symptoms persist 5 minutes after 1st dose call 911.       oxyCODONE (ROXICODONE) 10 MG tablet Take 1 tablet (10 mg) by mouth nightly as needed for moderate pain 14 tablet 0     oxyCODONE (ROXICODONE) 5 MG tablet Take 1 tablet (5 mg) by mouth every 6 hours as needed for moderate pain 30 tablet 0     rosuvastatin (CRESTOR) 40 MG tablet Take 1 tablet (40 mg) by mouth daily 90 tablet 1     sacubitril-valsartan (ENTRESTO) 24-26 MG per tablet Take 1 tablet by mouth daily 90 tablet 1     vitamin C (ASCORBIC ACID) 500 MG tablet Take 500 mg by mouth daily       Vitamin D3 (CHOLECALCIFEROL) 25 mcg (1000 units) tablet Take 1 tablet by mouth daily       ROS: Limited secondary to cognitive impairment but today pt reports the above and 10 point ROS of systems including Constitutional, Eyes, Respiratory, Cardiovascular, Gastroenterology, Genitourinary, Integumentary, Musculoskeletal, Psychiatric were all negative except for pertinent positives noted in my HPI.    Vitals: BP (!) 172/84   Pulse 113   Temp 98  F (36.7  C)   Resp 22   Ht 1.676 m (5' 6\")   Wt 75.7 kg (166 lb 14.4 oz)   SpO2 93%   BMI 26.94 kg/m    BPs:    Exam:  GENERAL APPEARANCE: Alert, in no distress, cooperative.   ENT: Mouth/posterior oropharynx intact w/ moist mucous membranes, hearing acuity [place].  EYES: EOM, " conjunctivae, lids, pupils and irises normal, PERRL2.   RESP: Respiratory effort fair, no respiratory distress, Lung sounds clear/diminished, w/ inspiratory wheezing in the right posterior. On RA.   CV: Auscultation of heart reveals S1, S2, rate controlled and rhythm irregular, no murmur, no rub or gallop, Edema 1-2+ BLE. Peripheral pulses are 2+.  ABDOMEN: Normal bowel sounds, soft, non-tender abdomen, and no masses palpated.  SKIN: Inspection/Palpation of skin and subcutaneous tissue baseline w/ fragility. No wounds/rashes noted.   NEURO: CN II-XII at patient's baseline, sensation baseline PPS.  PSYCH: Insight, judgement, and memory are forgetful at baseline, affect and mood are flatpleasant.    Lab/Diagnostic data: Recent labs in [facility] reviewed by me today.     ASSESSMENT/PLAN:  Pneumonia due to infectious organism, unspecified laterality, unspecified part of lung  Bronchiectasis without complication (H)  Mantle cell lymphoma, unspecified body region (H)  Lymphoma, unspecified body region, unspecified lymphoma type (H)  History of COVID-19  Long term current use of anticoagulant therapy  Chronic obstructive pulmonary disease, unspecified COPD type (H)  Chronic pain syndrome  Primary osteoarthritis involving multiple joints  Hypertensive heart disease without heart failure  Muscle weakness (generalized)  Acute on chronic. Tenuous/Complex.  Provider reviewed records from hospitalization, facility, and interpreted most recent imaging/lab work, and vital signs.  Provider initiated advanced care planning discussion with patient and daughter Quynh who is at bedside.  They both confirm that Dangelo is to remain DNR/DNI, and this order should be changed.  Dangelo is still quite ill, he completes cefdinir today, and Doxy on 1/6.  Noting wheezing especially on the right posterior lung fields, productive cough, and historical smoking/COPD.  Would like to support Dangelo further with Zhang for short course as noted  below.  Believe that nasal spray may be causing rebound congestion, patient apparently always has postnasal drip.  He has not taken Benadryl for a while, but may benefit from Zyrtec both to help with his cough and postnasal drip, and potentially underlying COPD.  Pharmacy requesting switch from Nexium to omeprazole.  This is appropriate.  Provider consulted orthopedic specialists regarding significant history of back pain, shoulder issues, knee and hip issues.  Provider discussed pain options with Dangelo.  He is agreeable to trialing an injection if this would be helpful.  Will obtain x-rays, and consult orthopedic specialists for both left shoulder and bilateral hip consideration for injection.  For back, will add a lidocaine patch and hopeful that this improves with pneumonia improvement.  Will clarify oxycodone dosing as noted below.  Given patient is quite ill, will closely trend blood work with unique testing as noted below.  Provider coordinated care with nursing and rehabilitation services around the above items.  History of lymphoma, hypertensive heart disease, these appear to be at baseline, with exception of some bilateral lower extremity edema.  Will ask rehab to evaluate for lymphedema/compression assistance.  Follow up w/in 1 week or as needed.    Orders:  Discontinue Nasal spray.  Discontinue Benadryl.  Discontinue Nexium when omeprazole arrives.  Omeprazole 20mg PO Qday. Dx: GERD.  Decrease Oxycodone to 5mg PO Q6h PRN. Dx: severe pain.   Lidocaine patch 4% to midline upper back, on QAM, off at bedtime. Dx: back pain.   Discontinue Vitamin C.  Zyrtec 5mg PO Qday. Dx: chronic rhinitis/allergies, COPD.   Duonebs QID inhaled x 3 days. Dx: COPD/pneumonia/wheezing.   CBC, BMP x1 on 1/5/24. Dx: pneumonia.   DNR/DNI.   Left shoulder XR, x1 routine. Dx: pain.  XR bilateral hips (AP pelvis/lat hips) x1, routine. Dx: pain.   PT/OT eval and treat x 1, routine.  Dx: Bilateral lower extremity  edema.    Electronically signed by:  Dr. Kelsea Parsons, APRN CNP DNP                        Sincerely,        MURALI Neal CNP

## 2024-01-03 NOTE — PROGRESS NOTES
"[facility] Admission  PCP & CLINIC: Elian Shafer, DO, [address] [facility] / [facility] [place] [address]  Chief Complaint   Patient presents with    Hospital F/U   [place] MRN: [id number]. Place of Service where encounter took place:  [facility] (TCU) [[id number]] Dangelo Hernández  is a 88 year old  (1935), admitted to the above facility from  [facility]. Hospital stay 12/29 through 1/2/24. Admitted to this facility for  rehab, medical management, and nursing care. HPI information obtained from: facility chart records, facility staff, patient report, [facility] chart review, and Care Everywhere [facility] chart review.     Brief Summary of Hospital Course: Dangelo presented to [facility] on 12/29/23 w/ weakness, and productive cough. He was found to have CAP, potential aspiration pneumonia w/ underlying bronchiectasis and serious hx of candidial and cryptogenic pneumonia previously requiring VATS and has apparently had COVID \"7 times\". 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 Overall, he improved w/ IVF, rocephin, doxy, O2, and discharged from hospital doxy and Cefdinir.     Updates since admission to transitional care unit: Dangelo presented to TCU on 1/2 s/p the above hospitalization. Today, Dangelo is having a hard time.  He did not sleep well last night, his back is a little flared up from pain, and he continues to have a cough.  He does have shortness of breath with activity, but denies fever or chills.  He remains on antibiotics for couple more days.  Dangelo says that his appetite has been poor for a while, he denies nausea or heartburn, and though he was constipated, he is happy that he finally had a bowel movement and things are moving better.  He does not think he needs any bowel medications to help with this.  He denies any bladder problems.  He reports some new swelling in his lower extremities, and wonders what this is from.  He denies chest pain or " palpitations.  He does get lightheaded sometimes, but denies headaches.  He thinks his mental health is okay, but questions what other people would think around him.  He is open to medication changes, and discussion around CODE STATUS.    CODE STATUS/ADVANCE DIRECTIVES DISCUSSION: DNR/DNI. Patient's living condition: lives alone. ALLERGIES: Amoxicillin and Lisinopril PAST MEDICAL HISTORY:  has a past medical history of ACS (acute coronary syndrome) (H) (10/15/2019), Acute kidney failure NEC (11/27/2011), Acute metabolic encephalopathy (09/05/2023), JESSICA (acute kidney injury) (H24) (09/05/2023), Altered mental status, unspecified (06/28/2023), Arthritis, Basal cell carcinoma, Bilateral pneumonia (06/15/2023), Blood transfusion, Bronchiectasis (H), CAD (coronary artery disease), Colon polyps, COVID-19 (12/07/2022), Cryptogenic organizing pneumonia (H) (12/07/2012), Depression, unspecified (06/21/2023), Endocarditis, valve unspecified (01/27/2022), GERD (gastroesophageal reflux disease), Hyperlipidemia, Hypertension, Laceration without foreign body of other part of head, subsequent encounter (06/28/2023), Mantle Cell Lymphoma S/P Autologous Transplant (05/12/2010), Pneumonia due to coronavirus disease 2019 (01/27/2022), Pneumonia in mycoses (09/09/2011), Pneumonia, candidial (H) (09/09/2011), S/P TAVR (transcatheter aortic valve replacement), and Septic shock (H) (06/15/2023).    He has no past medical history of Diabetes mellitus (H), Squamous cell carcinoma, or Unspecified cerebral artery occlusion with cerebral infarction.. PAST SURGICAL HISTORY:   has a past surgical history that includes surgical history of - ; surgical history of -  (1/19/84); surgical history of - ; surgical history of -  (2/19/90); surgical history of -  (10/20/92); surgical history of -  (6/14/2000); Bronchoscopy (rigid or flexible), diagnostic (7/14/2011); Thoracoscopic biopsy lung (9/12/2011); colonoscopy; orthopedic surgery; ENT surgery;  Bronchoscopy (rigid or flexible), diagnostic (N/A, 5/29/2019); Heart Catheterization with Possible Intervention (N/A, 10/16/2019); and Heart Catheterization with Possible Intervention (N/A, 11/13/2019).. FAMILY HISTORY: family history includes Cancer in his mother and sister; Cardiovascular in his father; Hypertension in his sister; Lipids in his sister.. SOCIAL HISTORY:   reports that he quit smoking about 49 years ago. His smoking use included cigarettes. He started smoking about 68 years ago. He has a 9.5 pack-year smoking history. He has quit using smokeless tobacco.  His smokeless tobacco use included snuff. He reports current alcohol use. He reports that he does not use drugs.  Post Discharge Medication Reconciliation Status: discharge medications reconciled and changed, per note/orders.  Current Outpatient Medications   Medication Sig Dispense Refill    acetaminophen (TYLENOL) 325 MG tablet Take 2 tablets (650 mg) by mouth daily as needed for mild pain or other (and adjunct with moderate or severe pain or per patient request) Separate from bedtime dose by 6 hours before or afterwards      acetaminophen (TYLENOL) 500 MG tablet Take 1,000 mg by mouth 3 times daily      apixaban ANTICOAGULANT (ELIQUIS) 5 MG tablet Take 1 tablet (5 mg) by mouth 2 times daily 60 tablet 3    azelastine-fluticasone (DYMISTA) 137-50 MCG/ACT nasal spray Spray 1 spray into both nostrils daily      cefdinir (OMNICEF) 300 MG capsule Take 1 capsule (300 mg) by mouth 2 times daily for 2 doses      clopidogrel (PLAVIX) 75 MG tablet Take 1 tablet (75 mg) by mouth daily 90 tablet 1    diphenhydrAMINE (BENADRYL) 50 MG capsule Take 50 mg by mouth daily      doxycycline hyclate (VIBRAMYCIN) 100 MG capsule Take 1 capsule (100 mg) by mouth every 12 hours for 9 doses      esomeprazole (NEXIUM) 20 MG DR capsule Take 1 capsule (20 mg) by mouth every morning (before breakfast) Take 30-60 minutes before eating. 90 capsule 1    [START ON 1/4/2024]  "ferrous sulfate (FEROSUL) 325 (65 Fe) MG tablet Take 1 tablet (325 mg) by mouth every other day      levothyroxine (SYNTHROID/LEVOTHROID) 50 MCG tablet Take 1 tablet (50 mcg) by mouth every morning (before breakfast) 90 tablet 1    metoprolol tartrate (LOPRESSOR) 25 MG tablet Take 0.5 tablets (12.5 mg) by mouth 2 times daily 90 tablet 1    nitroGLYcerin (NITROSTAT) 0.4 MG sublingual tablet Place 0.4 mg under the tongue every 5 minutes as needed for chest pain For chest pain place 1 tablet under the tongue every 5 minutes for 3 doses. If symptoms persist 5 minutes after 1st dose call 911.      oxyCODONE (ROXICODONE) 10 MG tablet Take 1 tablet (10 mg) by mouth nightly as needed for moderate pain 14 tablet 0    oxyCODONE (ROXICODONE) 5 MG tablet Take 1 tablet (5 mg) by mouth every 6 hours as needed for moderate pain 30 tablet 0    rosuvastatin (CRESTOR) 40 MG tablet Take 1 tablet (40 mg) by mouth daily 90 tablet 1    sacubitril-valsartan (ENTRESTO) 24-26 MG per tablet Take 1 tablet by mouth daily 90 tablet 1    vitamin C (ASCORBIC ACID) 500 MG tablet Take 500 mg by mouth daily      Vitamin D3 (CHOLECALCIFEROL) 25 mcg (1000 units) tablet Take 1 tablet by mouth daily       ROS: Limited secondary to cognitive impairment but today pt reports the above and 10 point ROS of systems including Constitutional, Eyes, Respiratory, Cardiovascular, Gastroenterology, Genitourinary, Integumentary, Musculoskeletal, Psychiatric were all negative except for pertinent positives noted in my HPI.    Vitals: BP (!) 172/84   Pulse 113   Temp 98  F (36.7  C)   Resp 22   Ht 1.676 m (5' 6\")   Wt 75.7 kg (166 lb 14.4 oz)   SpO2 93%   BMI 26.94 kg/m    BPs:    Exam:  GENERAL APPEARANCE: Alert, in no distress, cooperative.   ENT: Mouth/posterior oropharynx intact w/ moist mucous membranes, hearing acuity [place].  EYES: EOM, conjunctivae, lids, pupils and irises normal, PERRL2.   RESP: Respiratory effort fair, no respiratory distress, Lung " sounds clear/diminished, w/ inspiratory wheezing in the right posterior. On RA.   CV: Auscultation of heart reveals S1, S2, rate controlled and rhythm irregular, no murmur, no rub or gallop, Edema 1-2+ BLE. Peripheral pulses are 2+.  ABDOMEN: Normal bowel sounds, soft, non-tender abdomen, and no masses palpated.  SKIN: Inspection/Palpation of skin and subcutaneous tissue baseline w/ fragility. No wounds/rashes noted.   NEURO: CN II-XII at patient's baseline, sensation baseline PPS.  PSYCH: Insight, judgement, and memory are forgetful at baseline, affect and mood are flatpleasant.    Lab/Diagnostic data: Recent labs in [facility] reviewed by me today.     ASSESSMENT/PLAN:  Pneumonia due to infectious organism, unspecified laterality, unspecified part of lung  Bronchiectasis without complication (H)  Mantle cell lymphoma, unspecified body region (H)  Lymphoma, unspecified body region, unspecified lymphoma type (H)  History of COVID-19  Long term current use of anticoagulant therapy  Chronic obstructive pulmonary disease, unspecified COPD type (H)  Chronic pain syndrome  Primary osteoarthritis involving multiple joints  Hypertensive heart disease without heart failure  Muscle weakness (generalized)  Acute on chronic. Tenuous/Complex.  Provider reviewed records from hospitalization, facility, and interpreted most recent imaging/lab work, and vital signs.  Provider initiated advanced care planning discussion with patient and daughter Quynh who is at bedside.  They both confirm that Dangelo is to remain DNR/DNI, and this order should be changed.  Dangelo is still quite ill, he completes cefdinir today, and Doxy on 1/6.  Noting wheezing especially on the right posterior lung fields, productive cough, and historical smoking/COPD.  Would like to support Dangelo further with Zhang for short course as noted below.  Believe that nasal spray may be causing rebound congestion, patient apparently always has postnasal drip.  He has not  taken Benadryl for a while, but may benefit from Zyrtec both to help with his cough and postnasal drip, and potentially underlying COPD.  Pharmacy requesting switch from Nexium to omeprazole.  This is appropriate.  Provider consulted orthopedic specialists regarding significant history of back pain, shoulder issues, knee and hip issues.  Provider discussed pain options with Dangelo.  He is agreeable to trialing an injection if this would be helpful.  Will obtain x-rays, and consult orthopedic specialists for both left shoulder and bilateral hip consideration for injection.  For back, will add a lidocaine patch and hopeful that this improves with pneumonia improvement.  Will clarify oxycodone dosing as noted below.  Given patient is quite ill, will closely trend blood work with unique testing as noted below.  Provider coordinated care with nursing and rehabilitation services around the above items.  History of lymphoma, hypertensive heart disease, these appear to be at baseline, with exception of some bilateral lower extremity edema.  Will ask rehab to evaluate for lymphedema/compression assistance.  Follow up w/in 1 week or as needed.    Orders:  Discontinue Nasal spray.  Discontinue Benadryl.  Discontinue Nexium when omeprazole arrives.  Omeprazole 20mg PO Qday. Dx: GERD.  Decrease Oxycodone to 5mg PO Q6h PRN. Dx: severe pain.   Lidocaine patch 4% to midline upper back, on QAM, off at bedtime. Dx: back pain.   Discontinue Vitamin C.  Zyrtec 5mg PO Qday. Dx: chronic rhinitis/allergies, COPD.   Duonebs QID inhaled x 3 days. Dx: COPD/pneumonia/wheezing.   CBC, BMP x1 on 1/5/24. Dx: pneumonia.   DNR/DNI.   Left shoulder XR, x1 routine. Dx: pain.  XR bilateral hips (AP pelvis/lat hips) x1, routine. Dx: pain.   PT/OT eval and treat x 1, routine.  Dx: Bilateral lower extremity edema.    Electronically signed by:  Dr. Kelsea Parsons, APRN CNP DNP

## 2024-01-03 NOTE — LETTER
To:             Please give to facility    From:   Amita Patino RN, Care Coordinator   [facility]   Amita Patino RN, Care Coordinator   [facility]'s   E-mail [email or web address]   [phone]   Patient Name:  Dangelo Hernández YOB: 1935   Admit date: 1/2/2024      *Information Needed:  Please contact me when the patient will discharge (or if they will move to long term care)- include the discharge date, disposition, and main diagnosis   If the patient is discharged with home care services, please provide the name of the agency    Also- Please inform me if a care conference is being held.   [facility]   Amita Patino RN, Care Coordinator   [facility]   E-mail [email or web address]   [phone]                             Thank you

## 2024-01-03 NOTE — PROGRESS NOTES
Clinic Care Coordination Contact  Care Coordination Transition Communication    Clinical Data:   12/29/2023 - 1/2/2024 (4 days)  [facility]     Caeytano Peña MD  Last attending  Treatment team Pneumonia  Principal problem     Assessment: Patient has transitioned to TCU/ARU for short term rehabilitation:    Facility Name: [facility]   Transition Communication:  Notified facility of Primary Care- Care Coordination support via Epic fax.    Plan: Care Coordinator will await notification from facility staff informing of patient's discharge plans/needs. Care Coordinator will review chart and outreach to facility staff every 4 weeks and as needed.     [facility]   Amita Patino RN, Care Coordinator   [facility]'s   E-mail [email or web address]   [phone]

## 2024-01-08 NOTE — LETTER
1/8/2024        RE: Dangelo Hernández  [address] [facility] [address]        [facility] [facility]  Episodic/Acute/Follow-Up  [place] MRN: [id number]. Place of Service where encounter took place:  [facility] [1002]   Chief Complaint   Patient presents with     RECHECK    HPI: Dangelo Hernández  is a 88 year old (1935), who is being seen today for an episodic care visit. Today's concern is:    Dangelo seen today on routine follow-up as he continues to rehab in TCU.  Last week, we simplified his care by reducing polypharmacy, but noted that he was still quite ill from pneumonia.  At that time, we added Zyrtec and 3 days of DuoNebs.  We also added a lidocaine patch and obtained x-rays of the left shoulder and bilateral hips/pelvis.    Today, Dangelo says he is extremely tired.  His shortness of breath and cough are better he denies a fever or nausea, but still gets dizzy sometimes.  His bowels are moving well, and he denies any new bladder issues.  His swelling in bilateral lower extremities is improved since the addition of some compression socks.  His back pain is about the same as usual, and he is not sure if the lidocaine patch has been helpful.  He skipped a day over the weekend, but has it on today  And is not sure if it is helpful  But he has been to keep using it.    Past Medical and Surgical History reviewed in Epic today.  MEDICATIONS:  Current Outpatient Medications   Medication Sig Dispense Refill     gabapentin (NEURONTIN) 100 MG capsule Take 100 mg by mouth 2 times daily       tiotropium (SPIRIVA RESPIMAT) 2.5 MCG/ACT inhaler Inhale 2 puffs into the lungs daily       acetaminophen (TYLENOL) 325 MG tablet Take 2 tablets (650 mg) by mouth daily as needed for mild pain or other (and adjunct with moderate or severe pain or per patient request) Separate from bedtime dose by 6 hours before or afterwards       acetaminophen (TYLENOL) 500 MG tablet Take  "1,000 mg by mouth 3 times daily       apixaban ANTICOAGULANT (ELIQUIS) 5 MG tablet Take 1 tablet (5 mg) by mouth 2 times daily 60 tablet 3     cetirizine (ZYRTEC) 5 MG tablet Take 5 mg by mouth daily       clopidogrel (PLAVIX) 75 MG tablet Take 1 tablet (75 mg) by mouth daily 90 tablet 1     ferrous sulfate (FEROSUL) 325 (65 Fe) MG tablet Take 1 tablet (325 mg) by mouth every other day       levothyroxine (SYNTHROID/LEVOTHROID) 50 MCG tablet Take 1 tablet (50 mcg) by mouth every morning (before breakfast) 90 tablet 1     lidocaine (LIDODERM) 5 % patch Place onto the skin every 24 hours To prevent lidocaine toxicity, patient should be patch free for 12 hrs daily.       metoprolol tartrate (LOPRESSOR) 25 MG tablet Take 0.5 tablets (12.5 mg) by mouth 2 times daily 90 tablet 1     nitroGLYcerin (NITROSTAT) 0.4 MG sublingual tablet Place 0.4 mg under the tongue every 5 minutes as needed for chest pain For chest pain place 1 tablet under the tongue every 5 minutes for 3 doses. If symptoms persist 5 minutes after 1st dose call 911.       omeprazole (PRILOSEC) 20 MG DR capsule Take 20 mg by mouth daily       oxyCODONE (ROXICODONE) 5 MG tablet Take 1 tablet (5 mg) by mouth every 6 hours as needed for moderate pain 30 tablet 0     rosuvastatin (CRESTOR) 40 MG tablet Take 1 tablet (40 mg) by mouth daily 90 tablet 1     sacubitril-valsartan (ENTRESTO) 24-26 MG per tablet Take 1 tablet by mouth daily 90 tablet 1     Vitamin D3 (CHOLECALCIFEROL) 25 mcg (1000 units) tablet Take 1 tablet by mouth daily       Objective: BP (!) 143/79   Pulse 97   Temp 97.7  F (36.5  C)   Resp 20   Ht 1.676 m (5' 6\")   Wt 73.9 kg (162 lb 14.4 oz)   SpO2 92%   BMI 26.29 kg/m    Exam:  GENERAL APPEARANCE: Alert, in no distress, cooperative.   RESP: Respiratory effort fair, no respiratory distress, Lung sounds clear/diminished w/ very fine crackles in left posterior base which clear w/ deep breathing. On RA.   CV: Auscultation of heart reveals " S1, S2, rate and rhythm regular, no murmur, no rub or gallop, Edema 1+ BLE. Peripheral pulses are 2+.  PSYCH: Insight, judgement, and memory are forgetful at baseline, affect and mood are flat/pleasant.    Labs: Labs done in facility are in EPIC. Please refer to them using [facility]/Care Everywhere.    ASSESSMENT/PLAN:  Pneumonia due to infectious organism, unspecified laterality, unspecified part of lung  Bronchiectasis without complication (H)  History of COVID-19  JESSICA (acute kidney injury) (H24)  Hypertensive heart disease without heart failure  Long term current use of anticoagulant therapy  Chronic pain syndrome  Acute on chronic. Tenuous.   Provider reviewed records from facility, and interpreted most recent imaging/lab work, and vital signs.  Noting that blood pressures are better controlled, and his blood work returned today with improvements in renal function, white blood count, anemia, and electrolytes.  There is no concern for ongoing infection. JESSICA has resolved.  Of concern, is underlying pulmonary disease w/ bronchiectasis.  Provider specifically reviewed previous pulmonary notes, and there was mention of ILD, versus COPD.  Since last seen in pulmonology clinic (2021), patient has had COVID several times.  Cannot rule out COPD induced by COVID, or a long COVID condition.  Given his continued hospitalizations this year, continued lung disease, continued shortness of breath and chronic cough, and his good response to nebulizers, will start Spiriva as noted below.  Will also recommend he return to pulmonology at soonest available visit.  Notable ongoing back pain.  Interventions may be helpful, but this is unclear.  During previous visit, provider coordinated care with orthopedic specialist, who will come tomorrow to perform left shoulder injection, and potential hip injections as well.  This may help with overall pain control.  We note that back pain is chronic from compression fracture and neuropathic  component may be contributory.  Will trial increase in gabapentin in addition to injections for better pain control.    Follow up w/in 1 week or as needed.    Orders:  Gabapentin 100mg PO BID. Dx: pain  Spiriva 18mcg/cap, 2 puffs QAM, inhaled. Dx: COPD.  Pulmonary referral ([facility]) [phone]. Dx: long covid.     Electronically signed by:  Dr. Kelsea Parsons, MURALI CNP DNP        Sincerely,        MURALI Neal CNP

## 2024-01-09 NOTE — LETTER
1/9/2024        RE: Dangelo Hernández  [address] [facility] [address]        [facility] GERIATRICS    Chief Complaint   Patient presents with     RECHECK     Orhto - L shoulder Injection     HPI:  Dangelo Hernández is a 88 year old  (1935), who is being seen today for an ORTHO episodic care visit at: [facility] ([facility]) [[id number]].     Today's Visit:  Dangelo is seen today for L shoulder injection and assessment of bilateral lower extremity pain at request of his TCU provider, Kelsea Parsons DNP.  Dangelo is in TCU following hospitalization for pneumonia and associated generalized weakness.  Since coming to TCU  1/2/24 he has been complaining of pain, especially to L shoulder and bilateral legs.  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  Also degenerative disc disease in back, specifically multilevel disc space narrowing and h/o compression fracture at T12-- noted on CT scan in September 2023.  Has history of L shoulder AC joint osteoarthritis and SLAP tear (2015 MRI).  Prior to today's visit, an Xray was ordered of L shoulder which revealed significant OA to glenohumeral joint.  Updated hip x-rays also done which did not reveal any acute findings.  He follows with Dr. Veras (?) at [facility] for his hips.  Unable to retrieve these records.  Today, Dangelo is seen in his room resting.  He is fixated on his bilateral leg pain today and was initially refusing shoulder injection due to his focus on the leg pain.  Nursing brought in his lidocaine patches and pain meds following which he agreed to the shoulder injection.  He reports his leg pain is to bilateral anterior thighs, denies groin pain.  Pain seems to radiate down the entire front of both thighs but does not progress beyond knees.  He is very slow in position changes, visibly uncomfortable and requires assistance.  He remarks that he would like writer to  "\"shoot him\" because of his pain, would \"rather be dead.\"  Denies numbness/tingling to legs or groin, denies new onset bowel/bladder changes or incontinence.    No recent falls.    In review of Care Everywhere, does appear that Dangelo follows with [facility].      Allergies, and PMH/PSH reviewed in EPIC today.  REVIEW OF SYSTEMS:  Focused MSK exam- Positive noted above, otherwise negative    Objective:   BP (!) 143/79   Pulse 97   Temp 97.7  F (36.5  C)   Resp 20   Wt 73.9 kg (162 lb 14.4 oz)   SpO2 92%   BMI 26.29 kg/m    GENERAL APPEARANCE:  Alert, cooperative, slightly agitated d/t pain  M/S:   LUE:  Limited ROM, abduction to 20-30 degrees, forward flexion to 30-40 degrees.  Pain with motion.  No tenderness to palpation along joint space, proximal humerus.  No edema or ecchymosis.  Bilateral lower extremities:  Weakness, requires physical assist for bed mobility-- limited ability to march feet on the ground when seated.  Unable to reproduce the pain when palpating along thighs.  No ecchymosis or erythema.  PSYCH:  oriented to person, place, anxious        Assessment/Plan:  (M19.012) Primary osteoarthritis of left shoulder  (primary encounter diagnosis)  Recent x-rays demonstrating severe glenohumeral OA, also history of AC joint OA noted as far back as 2015.  History of SLAP tear to L shoulder likely contributing to the degenerative process.  Discussed with Dangelo indications for cortisone injection to L shoulder, to help alleviate some of the pain and hopefully allow for more participation in therapy.  He is agreeable.  Explained that he should notice some improvements relatively quickly with the lidocaine in the shot but may take 1-2 weeks for cortisone to take effect.   Plan:   -L shoulder cortisone injection today   -Can repeat cortisone injection to L shoulder every 3 months if needed  -Continue acetaminophen, oxycodone 5mg q6h PRN, gabapentin 100 BID, lidocaine patch.      (M53.9) " Multilevel degenerative disc disease  (Z87.81) H/O compression fracture of spine  (M87.00) Avascular necrosis  His bilateral leg pain seems to be more nerve related than musculoskeletal.  Pain radiates but does not progress beyond knees.  Unable to reproduce the pain with palpation of the areas.  No visible deformities, ecchymosis, etc.  Avascular necrosis is a chronic issue of both femoral heads for which he sees an orthopod at [facility] [place], unable to see records to know what treatment options have been discussed previously.  Unlikely to be total hip candidate given comorbidities and present generally frail health.  No signs of subchondral collapse on recent x-rays.  Recommend he follow-up with his Pain Clinic and/or orthopedic provider for further evaluation and treatment options for his pain.  Recommendations discussed with his TCU provider, Kelsea Parsons DNP.  Plan:   -Recommend f/up with orthopedic provider and/or Pain Clinic  -Consider possible increase in gabapentin to see if this helps with the leg pain         Procedure (L shoulder injection)  After risks, benefits and complications of steroid injection were discussed with the patient he elected to proceed.  Using sterile technique, the area was first prepped with betadyne and an alcohol swab.  A 22 gauge needle was used to inject 40 mg DepoMedrol and 4 cc of 1% lidocaine into antra-articular shoulder joint on the left via posterior approach.  Dangelo  tolerated the procedure well without complications.    CPT Code: 15741    Electronically signed by: MURALI Louis CNP         Sincerely,        MURALI Louis CNP

## 2024-01-09 NOTE — LETTER
1/9/2024        RE: Dangelo Hernández  [address] [facility] [address]        [facility] GERIATRICS    Chief Complaint   Patient presents with     RECHECK     Orhto - L shoulder Injection     HPI:  Dangelo Hernández is a 88 year old  (1935), who is being seen today for an ORTHO episodic care visit at: [facility] ([facility]) [[id number]].     Today's Visit:  Dangelo is seen today for L shoulder injection and assessment of bilateral lower extremity pain at request of his TCU provider, Kelsea Parsons DNP.  Dangelo is in TCU following hospitalization for pneumonia and associated generalized weakness.  Since coming to TCU  1/2/24 he has been complaining of pain, especially to L shoulder and bilateral legs.  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  Also degenerative disc disease in back, specifically multilevel disc space narrowing and h/o compression fracture at T12-- noted on CT scan in September 2023.  Has history of L shoulder AC joint osteoarthritis and SLAP tear (2015 MRI).  Prior to today's visit, an Xray was ordered of L shoulder which revealed significant OA to glenohumeral joint.  Updated hip x-rays also done which did not reveal any acute findings.  He follows with Dr. Veras (?) at [facility] for his hips.  Unable to retrieve these records.  Today, Dangelo is seen in his room resting.  He is fixated on his bilateral leg pain today and was initially refusing shoulder injection due to his focus on the leg pain.  Nursing brought in his lidocaine patches and pain meds following which he agreed to the shoulder injection.  He reports his leg pain is to bilateral anterior thighs, denies groin pain.  Pain seems to radiate down the entire front of both thighs but does not progress beyond knees.  He is very slow in position changes, visibly uncomfortable and requires assistance.  He remarks that he would like writer to  "\"shoot him\" because of his pain, would \"rather be dead.\"  Denies numbness/tingling to legs or groin, denies new onset bowel/bladder changes or incontinence.    No recent falls.    In review of Care Everywhere, does appear that Dangelo follows with [facility].      Allergies, and PMH/PSH reviewed in EPIC today.  REVIEW OF SYSTEMS:  Focused MSK exam- Positive noted above, otherwise negative    Objective:   BP (!) 143/79   Pulse 97   Temp 97.7  F (36.5  C)   Resp 20   Wt 73.9 kg (162 lb 14.4 oz)   SpO2 92%   BMI 26.29 kg/m    GENERAL APPEARANCE:  Alert, cooperative, slightly agitated d/t pain  M/S:   LUE:  Limited ROM, abduction to 20-30 degrees, forward flexion to 30-40 degrees.  Pain with motion.  No tenderness to palpation along joint space, proximal humerus.  No edema or ecchymosis.  Bilateral lower extremities:  Weakness, requires physical assist for bed mobility-- limited ability to march feet on the ground when seated.  Unable to reproduce the pain when palpating along thighs.  No ecchymosis or erythema.  PSYCH:  oriented to person, place, anxious        Assessment/Plan:  (M19.012) Primary osteoarthritis of left shoulder  (primary encounter diagnosis)  Recent x-rays demonstrating severe glenohumeral OA, also history of AC joint OA noted as far back as 2015.  History of SLAP tear to L shoulder likely contributing to the degenerative process.  Discussed with Dangelo indications for cortisone injection to L shoulder, to help alleviate some of the pain and hopefully allow for more participation in therapy.  He is agreeable.  Explained that he should notice some improvements relatively quickly with the lidocaine in the shot but may take 1-2 weeks for cortisone to take effect.   Plan:   -L shoulder cortisone injection today   -Can repeat cortisone injection to L shoulder every 3 months if needed  -Continue acetaminophen, oxycodone 5mg q6h PRN, gabapentin 100 BID, lidocaine patch.      (M53.9) " Multilevel degenerative disc disease  (Z87.81) H/O compression fracture of spine  (M87.00) Avascular necrosis  His bilateral leg pain seems to be more nerve related than musculoskeletal.  Pain radiates but does not progress beyond knees.  Unable to reproduce the pain with palpation of the areas.  No visible deformities, ecchymosis, etc.  Avascular necrosis is a chronic issue of both femoral heads for which he sees an orthopod at [facility], unable to see records to know what treatment options have been discussed previously.  Unlikely to be total hip candidate given comorbidities and present generally frail health.  No signs of subchondral collapse on recent x-rays.  Recommend he follow-up with his Pain Clinic and/or orthopedic provider for further evaluation and treatment options for his pain.  Recommendations discussed with his TCU provider, Kelsea Parsons DNP.  Plan:   -Recommend f/up with orthopedic provider and/or Pain Clinic  -Consider possible increase in gabapentin to see if this helps with the leg pain         Procedure (L shoulder injection)  After risks, benefits and complications of steroid injection were discussed with the patient he elected to proceed.  Using sterile technique, the area was first prepped with betadyne and an alcohol swab.  A 22 gauge needle was used to inject 40 mg DepoMedrol and 4 cc of 1% lidocaine into antra-articular shoulder joint on the left via posterior approach.  Dangelo  tolerated the procedure well without complications.    CPT Code: 59981    Electronically signed by: MURALI Louis CNP         Sincerely,        MURALI Louis CNP

## 2024-01-10 NOTE — PROGRESS NOTES
"[facility] GERIATRICS    Chief Complaint   Patient presents with    RECHECK     Orhto - L shoulder Injection     HPI:  Dangelo Hernández is a 88 year old  (1935), who is being seen today for an ORTHO episodic care visit at: [facility] ([facility]) [[id number]].     Today's Visit:  Dangelo is seen today for L shoulder injection and assessment of bilateral lower extremity pain at request of his TCU provider, Kelsea Parsons DNP.  Dangelo is in TCU following hospitalization for pneumonia and associated generalized weakness.  Since coming to TCU  1/2/24 he has been complaining of pain, especially to L shoulder and bilateral legs.  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  Also degenerative disc disease in back, specifically multilevel disc space narrowing and h/o compression fracture at T12-- noted on CT scan in September 2023.  Has history of L shoulder AC joint osteoarthritis and SLAP tear (2015 MRI).  Prior to today's visit, an Xray was ordered of L shoulder which revealed significant OA to glenohumeral joint.  Updated hip x-rays also done which did not reveal any acute findings.  He follows with Dr. Veras (?) at [facility] for his hips.  Unable to retrieve these records.  Today, Dangelo is seen in his room resting.  He is fixated on his bilateral leg pain today and was initially refusing shoulder injection due to his focus on the leg pain.  Nursing brought in his lidocaine patches and pain meds following which he agreed to the shoulder injection.  He reports his leg pain is to bilateral anterior thighs, denies groin pain.  Pain seems to radiate down the entire front of both thighs but does not progress beyond knees.  He is very slow in position changes, visibly uncomfortable and requires assistance.  He remarks that he would like writer to \"shoot him\" because of his pain, would \"rather be dead.\"  Denies numbness/tingling to legs or " groin, denies new onset bowel/bladder changes or incontinence.    No recent falls.    In review of Care Everywhere, does appear that Dangelo follows with [facility].      Allergies, and PMH/PSH reviewed in EPIC today.  REVIEW OF SYSTEMS:  Focused MSK exam- Positive noted above, otherwise negative    Objective:   BP (!) 143/79   Pulse 97   Temp 97.7  F (36.5  C)   Resp 20   Wt 73.9 kg (162 lb 14.4 oz)   SpO2 92%   BMI 26.29 kg/m    GENERAL APPEARANCE:  Alert, cooperative, slightly agitated d/t pain  M/S:   LUE:  Limited ROM, abduction to 20-30 degrees, forward flexion to 30-40 degrees.  Pain with motion.  No tenderness to palpation along joint space, proximal humerus.  No edema or ecchymosis.  Bilateral lower extremities:  Weakness, requires physical assist for bed mobility-- limited ability to march feet on the ground when seated.  Unable to reproduce the pain when palpating along thighs.  No ecchymosis or erythema.  PSYCH:  oriented to person, place, anxious        Assessment/Plan:  (M19.012) Primary osteoarthritis of left shoulder  (primary encounter diagnosis)  Recent x-rays demonstrating severe glenohumeral OA, also history of AC joint OA noted as far back as 2015.  History of SLAP tear to L shoulder likely contributing to the degenerative process.  Discussed with Dangelo indications for cortisone injection to L shoulder, to help alleviate some of the pain and hopefully allow for more participation in therapy.  He is agreeable.  Explained that he should notice some improvements relatively quickly with the lidocaine in the shot but may take 1-2 weeks for cortisone to take effect.   Plan:   -L shoulder cortisone injection today   -Can repeat cortisone injection to L shoulder every 3 months if needed  -Continue acetaminophen, oxycodone 5mg q6h PRN, gabapentin 100 BID, lidocaine patch.      (M53.9) Multilevel degenerative disc disease  (Z87.81) H/O compression fracture of spine  (M87.00) Avascular  necrosis  His bilateral leg pain seems to be more nerve related than musculoskeletal.  Pain radiates but does not progress beyond knees.  Unable to reproduce the pain with palpation of the areas.  No visible deformities, ecchymosis, etc.  Avascular necrosis is a chronic issue of both femoral heads for which he sees an orthopod at [facility], unable to see records to know what treatment options have been discussed previously.  Unlikely to be total hip candidate given comorbidities and present generally frail health.  No signs of subchondral collapse on recent x-rays.  Recommend he follow-up with his Pain Clinic and/or orthopedic provider for further evaluation and treatment options for his pain.  Recommendations discussed with his TCU provider, Kelsea Parsons DNP.  Plan:   -Recommend f/up with orthopedic provider and/or Pain Clinic  -Consider possible increase in gabapentin to see if this helps with the leg pain         Procedure (L shoulder injection)  After risks, benefits and complications of steroid injection were discussed with the patient he elected to proceed.  Using sterile technique, the area was first prepped with betadyne and an alcohol swab.  A 22 gauge needle was used to inject 40 mg DepoMedrol and 4 cc of 1% lidocaine into antra-articular shoulder joint on the left via posterior approach.  Dangelo  tolerated the procedure well without complications.    CPT Code: 29219    Electronically signed by: MURALI Louis CNP

## 2024-01-11 NOTE — TELEPHONE ENCOUNTER
Attempted to reach patient to reschedule to PM clinic per Dr Roque's bmt scheduling protocols. Voicemail box is full, unable to leave voicemail.

## 2024-01-15 NOTE — PROGRESS NOTES
[facility] GERIATRIC SERVICE  Episodic/Acute/Follow-Up  [place] MRN: [id number]. Place of Service where encounter took place:  [facility] ([facility]) [4002]   Chief Complaint   Patient presents with    RECHECK    HPI: Dangelo Hernández  is a 88 year old (1935), who is being seen today for an episodic care visit. Today's concern is:    Dangelo seen today on routine follow-up as he continues to rehab in TCU.  Last week, he had a left shoulder injection to help with severe pain, and we started gabapentin as his lower extremity pain was thought to be neuropathic.    Today, Dangelo says that therapy is moving along, and he was seen walking down the entire hallway from his room (with standby assistance/contact-guard).  He continues to report midline thoracic back pain, especially to touch, and bilateral leg pain that shoots up from his feet.  He denies nausea or heartburn, but still has a poor or labile appetite.  His constipation is much better, and he denies diarrhea as well.  He denies bladder problems.  He is not sure if his inhaler is helping, but he is coughing less, and does not feel as short of breath as before.  He is supposed to follow-up with pulmonary consult within the week.  He denies any new edema, chest pain, or palpitations.  He sleeps pretty well, and he is tired a lot.    Past Medical and Surgical History reviewed in Epic today.  MEDICATIONS:  Current Outpatient Medications   Medication Sig Dispense Refill    acetaminophen (TYLENOL) 325 MG tablet Take 2 tablets (650 mg) by mouth daily as needed for mild pain or other (and adjunct with moderate or severe pain or per patient request) Separate from bedtime dose by 6 hours before or afterwards      acetaminophen (TYLENOL) 500 MG tablet Take 1,000 mg by mouth 3 times daily      apixaban ANTICOAGULANT (ELIQUIS) 5 MG tablet Take 1 tablet (5 mg) by mouth 2 times daily 60 tablet 3    cetirizine (ZYRTEC) 5 MG tablet Take 5 mg by mouth daily       "clopidogrel (PLAVIX) 75 MG tablet Take 1 tablet (75 mg) by mouth daily 90 tablet 1    ferrous sulfate (FEROSUL) 325 (65 Fe) MG tablet Take 1 tablet (325 mg) by mouth every other day      gabapentin (NEURONTIN) 100 MG capsule Take 200 mg by mouth 2 times daily      levothyroxine (SYNTHROID/LEVOTHROID) 50 MCG tablet Take 1 tablet (50 mcg) by mouth every morning (before breakfast) 90 tablet 1    lidocaine (LIDODERM) 5 % patch Place onto the skin every 24 hours To prevent lidocaine toxicity, patient should be patch free for 12 hrs daily.      metoprolol tartrate (LOPRESSOR) 25 MG tablet Take 0.5 tablets (12.5 mg) by mouth 2 times daily 90 tablet 1    nitroGLYcerin (NITROSTAT) 0.4 MG sublingual tablet Place 0.4 mg under the tongue every 5 minutes as needed for chest pain For chest pain place 1 tablet under the tongue every 5 minutes for 3 doses. If symptoms persist 5 minutes after 1st dose call 911.      omeprazole (PRILOSEC) 20 MG DR capsule Take 20 mg by mouth daily      oxyCODONE (ROXICODONE) 5 MG tablet Take 1 tablet (5 mg) by mouth every 6 hours as needed for moderate pain 30 tablet 0    rosuvastatin (CRESTOR) 40 MG tablet Take 1 tablet (40 mg) by mouth daily 90 tablet 1    sacubitril-valsartan (ENTRESTO) 24-26 MG per tablet Take 1 tablet by mouth daily 90 tablet 1    tiotropium (SPIRIVA RESPIMAT) 2.5 MCG/ACT inhaler Inhale 2 puffs into the lungs daily      Vitamin D3 (CHOLECALCIFEROL) 25 mcg (1000 units) tablet Take 1 tablet by mouth daily       Objective: BP (!) 149/69   Pulse 106   Temp 97.1  F (36.2  C)   Resp 18   Ht 1.676 m (5' 6\")   SpO2 90%   BMI 26.29 kg/m    Exam:  GENERAL APPEARANCE: Alert, in no distress, cooperative.   RESP: Respiratory effort fair, no respiratory distress, Lung sounds clear/diminished.  On RA.   CV: Auscultation of heart reveals S1, S2, rate and rhythm regular today, no murmur, no rub or gallop, Edema 1-2+ BLE. Peripheral pulses are 2+.  PSYCH: Insight, judgement, and memory are " impaired at baseline, affect and mood are flat/pleasant.    Labs: Labs done in facility are in EPIC. Please refer to them using [facility]/Care Everywhere.    ASSESSMENT/PLAN:  H/O compression fracture of spine  Multilevel degenerative disc disease  Pneumonia due to infectious organism, unspecified laterality, unspecified part of lung  History of COVID-19  Chronic pain syndrome  Primary osteoarthritis of left shoulder  ACP (advance care planning)  Acute on chronic. Tenuous/complex.  Provider reviewed records from hospitalization, facility, and interpreted most recent imaging/lab work, and vital signs.  Dangelo does not feel that the left shoulder injection was effective.  He continues to point complain of pain here, and radiating pain in the legs.  Will increase gabapentin as noted below.  Fortunately, Dangelo is improving and his function.  Provider initiated discussion directly with Dangelo about home safety.  Provider questioned with his condition and repeated illnesses, if he would have more quality of life in a skilled or assisted setting.  Dangelo has thought about this in the past, and will talk it over with family.  Provider coordinated care separately with patient's daughter Quynh.  We discussed hospice, assisted living, and long-term care.  Quynh is open to moving him into a setting where he would have assistance, and we discussed his current cognitive impairments (SLUMS 12/30, MOCA 13/30).  This discussion includes advance care planning, and high stakes changes to plan of care.  Inhaler appears to be helping, even though Dangelo is not sure of it.  Lung sounds are improved and cough is improved.  Appreciate pulmonary consult.  Follow up w/in 1 week or as needed.    Orders:  Increase Gabapentin to 200mg PO BID. Dx: neuropathic pain.    Electronically signed by:  Dr. Kelsea Parsons, APRN CNP DNP

## 2024-01-15 NOTE — LETTER
1/15/2024        RE: Dangelo Hernández  [address] [facility] [address]        [facility] [place] [facility]  Episodic/Acute/Follow-Up  Yvonne MRN: [id number]. Place of Service where encounter took place:  [facility] ([facility]) [[id number]]   Chief Complaint   Patient presents with     RECHECK    HPI: Dangelo Hernández  is a 88 year old (1935), who is being seen today for an episodic care visit. Today's concern is:    Dangelo seen today on routine follow-up as he continues to rehab in [place].  Last week, he had a left shoulder injection to help with severe pain, and we started gabapentin as his lower extremity pain was thought to be neuropathic.    Today, Dangelo says that therapy is moving along, and he was seen walking down the entire hallway from his room (with standby assistance/contact-guard).  He continues to report midline thoracic back pain, especially to touch, and bilateral leg pain that shoots up from his feet.  He denies nausea or heartburn, but still has a poor or labile appetite.  His constipation is much better, and he denies diarrhea as well.  He denies bladder problems.  He is not sure if his inhaler is helping, but he is coughing less, and does not feel as short of breath as before.  He is supposed to follow-up with pulmonary consult within the week.  He denies any new edema, chest pain, or palpitations.  He sleeps pretty well, and he is tired a lot.    Past Medical and Surgical History reviewed in Epic today.  MEDICATIONS:  Current Outpatient Medications   Medication Sig Dispense Refill     acetaminophen (TYLENOL) 325 MG tablet Take 2 tablets (650 mg) by mouth daily as needed for mild pain or other (and adjunct with moderate or severe pain or per patient request) Separate from bedtime dose by 6 hours before or afterwards       acetaminophen (TYLENOL) 500 MG tablet Take 1,000 mg by mouth 3 times daily       apixaban ANTICOAGULANT (ELIQUIS) 5 MG tablet Take 1 tablet (5  "mg) by mouth 2 times daily 60 tablet 3     cetirizine (ZYRTEC) 5 MG tablet Take 5 mg by mouth daily       clopidogrel (PLAVIX) 75 MG tablet Take 1 tablet (75 mg) by mouth daily 90 tablet 1     ferrous sulfate (FEROSUL) 325 (65 Fe) MG tablet Take 1 tablet (325 mg) by mouth every other day       gabapentin (NEURONTIN) 100 MG capsule Take 200 mg by mouth 2 times daily       levothyroxine (SYNTHROID/LEVOTHROID) 50 MCG tablet Take 1 tablet (50 mcg) by mouth every morning (before breakfast) 90 tablet 1     lidocaine (LIDODERM) 5 % patch Place onto the skin every 24 hours To prevent lidocaine toxicity, patient should be patch free for 12 hrs daily.       metoprolol tartrate (LOPRESSOR) 25 MG tablet Take 0.5 tablets (12.5 mg) by mouth 2 times daily 90 tablet 1     nitroGLYcerin (NITROSTAT) 0.4 MG sublingual tablet Place 0.4 mg under the tongue every 5 minutes as needed for chest pain For chest pain place 1 tablet under the tongue every 5 minutes for 3 doses. If symptoms persist 5 minutes after 1st dose call 911.       omeprazole (PRILOSEC) 20 MG DR capsule Take 20 mg by mouth daily       oxyCODONE (ROXICODONE) 5 MG tablet Take 1 tablet (5 mg) by mouth every 6 hours as needed for moderate pain 30 tablet 0     rosuvastatin (CRESTOR) 40 MG tablet Take 1 tablet (40 mg) by mouth daily 90 tablet 1     sacubitril-valsartan (ENTRESTO) 24-26 MG per tablet Take 1 tablet by mouth daily 90 tablet 1     tiotropium (SPIRIVA RESPIMAT) 2.5 MCG/ACT inhaler Inhale 2 puffs into the lungs daily       Vitamin D3 (CHOLECALCIFEROL) 25 mcg (1000 units) tablet Take 1 tablet by mouth daily       Objective: BP (!) 149/69   Pulse 106   Temp 97.1  F (36.2  C)   Resp 18   Ht 1.676 m (5' 6\")   SpO2 90%   BMI 26.29 kg/m    Exam:  GENERAL APPEARANCE: Alert, in no distress, cooperative.   RESP: Respiratory effort fair, no respiratory distress, Lung sounds clear/diminished.  On RA.   CV: Auscultation of heart reveals S1, S2, rate and rhythm regular " today, no murmur, no rub or gallop, Edema 1-2+ BLE. Peripheral pulses are 2+.  PSYCH: Insight, judgement, and memory are impaired at baseline, affect and mood are flat/pleasant.    Labs: Labs done in facility are in EPIC. Please refer to them using EPIC/Care Everywhere.    ASSESSMENT/PLAN:  H/O compression fracture of spine  Multilevel degenerative disc disease  Pneumonia due to infectious organism, unspecified laterality, unspecified part of lung  History of COVID-19  Chronic pain syndrome  Primary osteoarthritis of left shoulder  ACP (advance care planning)  Acute on chronic. Tenuous/complex.  Provider reviewed records from hospitalization, facility, and interpreted most recent imaging/lab work, and vital signs.  Dangelo does not feel that the left shoulder injection was effective.  He continues to point complain of pain here, and radiating pain in the legs.  Will increase gabapentin as noted below.  Fortunately, Dangelo is improving and his function.  Provider initiated discussion directly with Dangelo about home safety.  Provider questioned with his condition and repeated illnesses, if he would have more quality of life in a skilled or assisted setting.  Dangelo has thought about this in the past, and will talk it over with family.  Provider coordinated care separately with patient's daughter Quynh.  We discussed hospice, assisted living, and long-term care.  Quynh is open to moving him into a setting where he would have assistance, and we discussed his current cognitive impairments (SLUMS 12/30, MOCA 13/30).  This discussion includes advance care planning, and high stakes changes to plan of care.  Inhaler appears to be helping, even though Dangelo is not sure of it.  Lung sounds are improved and cough is improved.  Appreciate pulmonary consult.  Follow up w/in 1 week or as needed.    Orders:  Increase Gabapentin to 200mg PO BID. Dx: neuropathic pain.    Electronically signed by:  MURALI Mcfadden CNP  DNP        Sincerely,        MURALI Neal CNP

## 2024-01-16 NOTE — PROGRESS NOTES
[facility] GERIATRICS    PRIMARY CARE PROVIDER AND CLINIC:  Elian Shafer DO, [address] [facility] [address]  Chief Complaint   Patient presents with    Hospital F/U      [facility] Medical Record Number:  [id number]  Place of Service where encounter took place:  [facility] ([facility]U) [[id number]]    Dangelo Hernández  is a 88 year old  (1935), admitted to the above facility from  [facility]. Hospital stay 12/29/23 through 1/2/24..   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      Today:  - Pulmonary: reports productive cough  whitish color.  Reports breathing is fine.   - Rehab: reports going on, was weak at one time,   - Pain: pt reports pain is in hip     ============================================================================    CODE STATUS/ADVANCE DIRECTIVES DISCUSSION:  No CPR- Do NOT Intubate    ALLERGIES:   Allergies   Allergen Reactions    Amoxicillin Diarrhea           Lisinopril Cough      PAST MEDICAL HISTORY:   Past Medical History:   Diagnosis Date    ACS (acute coronary syndrome) (H) 10/15/2019    Acute kidney failure NEC 11/27/2011    Acute metabolic encephalopathy 09/05/2023    JESSICA (acute kidney injury) (H24) 09/05/2023    Altered mental status, unspecified 06/28/2023    Arthritis     Basal cell carcinoma     Bilateral pneumonia 06/15/2023    Blood transfusion     Bronchiectasis (H)     CAD (coronary artery disease)      Colon polyps     COVID-19 12/07/2022    Cryptogenic organizing pneumonia (H) 12/07/2012    Depression, unspecified 06/21/2023    Endocarditis, valve unspecified 01/27/2022    GERD (gastroesophageal reflux disease)     Hyperlipidemia     Hypertension     Laceration without foreign body of other part of head, subsequent encounter 06/28/2023    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    Pneumonia due to coronavirus disease 2019 01/27/2022    Pneumonia in mycoses 09/09/2011    Pneumonia, candidial (H) 09/09/2011    S/P TAVR (transcatheter aortic valve replacement)     Septic shock (H) 06/15/2023      PAST SURGICAL HISTORY:   has a past surgical history that includes surgical history of - ; surgical history of -  (1/19/84); surgical history of - ; surgical history of -  (2/19/90); surgical history of -  (10/20/92); surgical history of -  (6/14/2000); Bronchoscopy (rigid or flexible), diagnostic (7/14/2011); Thoracoscopic biopsy lung (9/12/2011); colonoscopy; orthopedic surgery; ENT surgery; Bronchoscopy (rigid or flexible), diagnostic (N/A, 5/29/2019); Heart Catheterization with Possible Intervention (N/A, 10/16/2019); and Heart Catheterization with Possible Intervention (N/A, 11/13/2019).  FAMILY HISTORY: family history includes Cancer in his mother and sister; Cardiovascular in his father; Hypertension in his sister; Lipids in his sister.  SOCIAL HISTORY:   reports that he quit smoking about 49 years ago. His smoking use included cigarettes. He started smoking about 68 years ago. He has a 9.5 pack-year smoking history. He has quit using smokeless tobacco.  His smokeless tobacco use included snuff. He reports current alcohol use. He reports that he does not use drugs.  Patient's living condition: lives alone    Post Discharge Medication Reconciliation Status:   MED REC REQUIRED  Post Medication Reconciliation Status: discharge medications reconciled and changed, per note/orders       Current Outpatient  Medications   Medication Sig    acetaminophen (TYLENOL) 325 MG tablet Take 2 tablets (650 mg) by mouth daily as needed for mild pain or other (and adjunct with moderate or severe pain or per patient request) Separate from bedtime dose by 6 hours before or afterwards    acetaminophen (TYLENOL) 500 MG tablet Take 1,000 mg by mouth 3 times daily    apixaban ANTICOAGULANT (ELIQUIS) 5 MG tablet Take 1 tablet (5 mg) by mouth 2 times daily    cetirizine (ZYRTEC) 5 MG tablet Take 5 mg by mouth daily    clopidogrel (PLAVIX) 75 MG tablet Take 1 tablet (75 mg) by mouth daily    ferrous sulfate (FEROSUL) 325 (65 Fe) MG tablet Take 1 tablet (325 mg) by mouth every other day    gabapentin (NEURONTIN) 100 MG capsule Take 200 mg by mouth 2 times daily    levothyroxine (SYNTHROID/LEVOTHROID) 50 MCG tablet Take 1 tablet (50 mcg) by mouth every morning (before breakfast)    lidocaine (LIDODERM) 5 % patch Place onto the skin every 24 hours To prevent lidocaine toxicity, patient should be patch free for 12 hrs daily.    metoprolol tartrate (LOPRESSOR) 25 MG tablet Take 0.5 tablets (12.5 mg) by mouth 2 times daily    nitroGLYcerin (NITROSTAT) 0.4 MG sublingual tablet Place 0.4 mg under the tongue every 5 minutes as needed for chest pain For chest pain place 1 tablet under the tongue every 5 minutes for 3 doses. If symptoms persist 5 minutes after 1st dose call 911.    omeprazole (PRILOSEC) 20 MG DR capsule Take 20 mg by mouth daily    oxyCODONE (ROXICODONE) 5 MG tablet Take 1 tablet (5 mg) by mouth every 6 hours as needed for moderate pain    rosuvastatin (CRESTOR) 40 MG tablet Take 1 tablet (40 mg) by mouth daily    sacubitril-valsartan (ENTRESTO) 24-26 MG per tablet Take 1 tablet by mouth daily    tiotropium (SPIRIVA RESPIMAT) 2.5 MCG/ACT inhaler Inhale 2 puffs into the lungs daily    Vitamin D3 (CHOLECALCIFEROL) 25 mcg (1000 units) tablet Take 1 tablet by mouth daily     No current facility-administered medications for this visit.  "      ROS:  10 point ROS of systems including Constitutional, Eyes, Respiratory, Cardiovascular, Gastroenterology, Genitourinary, Integumentary, Musculoskeletal, Psychiatric were all negative except for pertinent positives noted in my HPI.    Vitals:  /63   Pulse 82   Temp 97.3  F (36.3  C)   Resp 18   Ht 1.676 m (5' 6\")   Wt 72.6 kg (160 lb)   SpO2 90%   BMI 25.82 kg/m    Pulse Readings from Last 4 Encounters:   01/18/24 82   01/15/24 106   01/09/24 97   01/08/24 97     BP Readings from Last 3 Encounters:   01/18/24 121/63   01/15/24 (!) 149/69   01/09/24 (!) 143/79     Exam:  GENERAL APPEARANCE: lying down in the bed, tired looking, coughing up thick grayish phlegm  RESP:  CTA onteh anterior surface, diminished on the right lower and coarse on the left side on the lateral surface.   CV:  S1S2 audible, regular HR, no murmur appreciated.   ABDOMEN:  soft, NT/ND, BS audible.   M/S:   no joint deformity noted on observation.   SKIN:  No rash noted on observation  NEURO:   No NFD appreciated on observation.   PSYCH:  affect and mood normal    Lab/Diagnostic data: Reviewed in the chart and EHR.    Normal     ASSESSMENT/PLAN:  -------------------------------  Bronchiectasis without complication (H)  History of pneumonia  History of COVID-19  -  Nursing staff report yesterday was up and walking around, today seems different, tired looking.  Noted to have cough with thick phlegm, the writer helped with wiping the phlegm from the oral cavity, after that speech was clear.   OE:  coarse sounds over left side, very diminished over right side.  VSS today.   - Will continue to monitor closely. Low threshold for sending out to ED.     Long term current use of anticoagulant therapy  Hypertension goal BP (blood pressure) < 140/90  S/P TAVR (transcatheter aortic valve replacement)  Combined hyperlipidemia  Presence of coronary angioplasty implant and graft  - cardiac wise compensated.   - on multiple agents. Continue. " Cardiology routine follow up    Spinal stenosis of lumbar region without neurogenic claudication  H/O compression fracture of spine  Chronic pain syndrome  - s/p left  shoulder steroid injection.   - analgesia optimal with the current regimen.   - started rehab program, making a progress, continue until desired goal is achieved.     Dementia, query AD of late onset  SLUM 12/30. No behavioral concern.     Mantle cell lymphoma, unspecified body region (H)  Hx of BM tx  Normocytic anemia  - no concern.   - Hb trending up      Orders:  NNO    Electronically signed by:  Stefanie Arguello MD

## 2024-01-18 NOTE — LETTER
1/18/2024        RE: Dangelo Hernández  [address] [facility] [address]        [facility] GERIATRICS    PRIMARY CARE PROVIDER AND CLINIC:  Elian Shafer DO, [address] [facility] [place] [address]  Chief Complaint   Patient presents with     Hospital F/U      [facility] Medical Record Number:  [id number]  Place of Service where encounter took place:  [facility] ([facility]) [[id number]]    Dangelo Hernández  is a 88 year old  (1935), admitted to the above facility from  [facility]. Hospital stay 12/29/23 through 1/2/24..   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      Today:  - Pulmonary: reports productive cough  whitish color.  Reports breathing is fine.   - Rehab: reports going on, was weak at one time,   - Pain: pt reports pain is in hip     ============================================================================    CODE STATUS/ADVANCE DIRECTIVES DISCUSSION:  No CPR- Do NOT Intubate    ALLERGIES:   Allergies   Allergen Reactions     Amoxicillin Diarrhea            Lisinopril Cough      PAST MEDICAL HISTORY:   Past Medical History:   Diagnosis Date     ACS (acute coronary syndrome) (H) 10/15/2019     Acute kidney failure NEC 11/27/2011     Acute metabolic encephalopathy 09/05/2023     JESSICA (acute kidney injury) (H24) 09/05/2023     Altered mental status, unspecified 06/28/2023     Arthritis      Basal cell carcinoma       Bilateral pneumonia 06/15/2023     Blood transfusion      Bronchiectasis (H)      CAD (coronary artery disease)      Colon polyps      COVID-19 12/07/2022     Cryptogenic organizing pneumonia (H) 12/07/2012     Depression, unspecified 06/21/2023     Endocarditis, valve unspecified 01/27/2022     GERD (gastroesophageal reflux disease)      Hyperlipidemia      Hypertension      Laceration without foreign body of other part of head, subsequent encounter 06/28/2023     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     Pneumonia due to coronavirus disease 2019 01/27/2022     Pneumonia in mycoses 09/09/2011     Pneumonia, candidial (H) 09/09/2011     S/P TAVR (transcatheter aortic valve replacement)      Septic shock (H) 06/15/2023      PAST SURGICAL HISTORY:   has a past surgical history that includes surgical history of - ; surgical history of -  (1/19/84); surgical history of - ; surgical history of -  (2/19/90); surgical history of -  (10/20/92); surgical history of -  (6/14/2000); Bronchoscopy (rigid or flexible), diagnostic (7/14/2011); Thoracoscopic biopsy lung (9/12/2011); colonoscopy; orthopedic surgery; ENT surgery; Bronchoscopy (rigid or flexible), diagnostic (N/A, 5/29/2019); Heart Catheterization with Possible Intervention (N/A, 10/16/2019); and Heart Catheterization with Possible Intervention (N/A, 11/13/2019).  FAMILY HISTORY: family history includes Cancer in his mother and sister; Cardiovascular in his father; Hypertension in his sister; Lipids in his sister.  SOCIAL HISTORY:   reports that he quit smoking about 49 years ago. His smoking use included cigarettes. He started smoking about 68 years ago. He has a 9.5 pack-year smoking history. He has quit using smokeless tobacco.  His smokeless tobacco use included snuff. He reports current alcohol use. He reports that he does not use drugs.  Patient's living condition: lives alone    Post Discharge Medication Reconciliation Status:   MED REC  REQUIRED  Post Medication Reconciliation Status: discharge medications reconciled and changed, per note/orders       Current Outpatient Medications   Medication Sig     acetaminophen (TYLENOL) 325 MG tablet Take 2 tablets (650 mg) by mouth daily as needed for mild pain or other (and adjunct with moderate or severe pain or per patient request) Separate from bedtime dose by 6 hours before or afterwards     acetaminophen (TYLENOL) 500 MG tablet Take 1,000 mg by mouth 3 times daily     apixaban ANTICOAGULANT (ELIQUIS) 5 MG tablet Take 1 tablet (5 mg) by mouth 2 times daily     cetirizine (ZYRTEC) 5 MG tablet Take 5 mg by mouth daily     clopidogrel (PLAVIX) 75 MG tablet Take 1 tablet (75 mg) by mouth daily     ferrous sulfate (FEROSUL) 325 (65 Fe) MG tablet Take 1 tablet (325 mg) by mouth every other day     gabapentin (NEURONTIN) 100 MG capsule Take 200 mg by mouth 2 times daily     levothyroxine (SYNTHROID/LEVOTHROID) 50 MCG tablet Take 1 tablet (50 mcg) by mouth every morning (before breakfast)     lidocaine (LIDODERM) 5 % patch Place onto the skin every 24 hours To prevent lidocaine toxicity, patient should be patch free for 12 hrs daily.     metoprolol tartrate (LOPRESSOR) 25 MG tablet Take 0.5 tablets (12.5 mg) by mouth 2 times daily     nitroGLYcerin (NITROSTAT) 0.4 MG sublingual tablet Place 0.4 mg under the tongue every 5 minutes as needed for chest pain For chest pain place 1 tablet under the tongue every 5 minutes for 3 doses. If symptoms persist 5 minutes after 1st dose call 911.     omeprazole (PRILOSEC) 20 MG DR capsule Take 20 mg by mouth daily     oxyCODONE (ROXICODONE) 5 MG tablet Take 1 tablet (5 mg) by mouth every 6 hours as needed for moderate pain     rosuvastatin (CRESTOR) 40 MG tablet Take 1 tablet (40 mg) by mouth daily     sacubitril-valsartan (ENTRESTO) 24-26 MG per tablet Take 1 tablet by mouth daily     tiotropium (SPIRIVA RESPIMAT) 2.5 MCG/ACT inhaler Inhale 2 puffs into the lungs daily      "Vitamin D3 (CHOLECALCIFEROL) 25 mcg (1000 units) tablet Take 1 tablet by mouth daily     No current facility-administered medications for this visit.       ROS:  10 point ROS of systems including Constitutional, Eyes, Respiratory, Cardiovascular, Gastroenterology, Genitourinary, Integumentary, Musculoskeletal, Psychiatric were all negative except for pertinent positives noted in my HPI.    Vitals:  /63   Pulse 82   Temp 97.3  F (36.3  C)   Resp 18   Ht 1.676 m (5' 6\")   Wt 72.6 kg (160 lb)   SpO2 90%   BMI 25.82 kg/m    Pulse Readings from Last 4 Encounters:   01/18/24 82   01/15/24 106   01/09/24 97   01/08/24 97     BP Readings from Last 3 Encounters:   01/18/24 121/63   01/15/24 (!) 149/69   01/09/24 (!) 143/79     Exam:  GENERAL APPEARANCE: lying down in the bed, tired looking, coughing up thick grayish phlegm  RESP:  CTA onteh anterior surface, diminished on the right lower and coarse on the left side on the lateral surface.   CV:  S1S2 audible, regular HR, no murmur appreciated.   ABDOMEN:  soft, NT/ND, BS audible.   M/S:   no joint deformity noted on observation.   SKIN:  No rash noted on observation  NEURO:   No NFD appreciated on observation.   PSYCH:  affect and mood normal    Lab/Diagnostic data: Reviewed in the chart and EHR.    Normal     ASSESSMENT/PLAN:  -------------------------------  Bronchiectasis without complication (H)  History of pneumonia  History of COVID-19  -  Nursing staff report yesterday was up and walking around, today seems different, tired looking.  Noted to have cough with thick phlegm, the writer helped with wiping the phlegm from the oral cavity, after that speech was clear.   OE:  coarse sounds over left side, very diminished over right side.  VSS today.   - Will continue to monitor closely. Low threshold for sending out to ED.     Long term current use of anticoagulant therapy  Hypertension goal BP (blood pressure) < 140/90  S/P TAVR (transcatheter aortic valve " replacement)  Combined hyperlipidemia  Presence of coronary angioplasty implant and graft  - cardiac wise compensated.   - on multiple agents. Continue. Cardiology routine follow up    Spinal stenosis of lumbar region without neurogenic claudication  H/O compression fracture of spine  Chronic pain syndrome  - s/p left  shoulder steroid injection.   - analgesia optimal with the current regimen.   - started rehab program, making a progress, continue until desired goal is achieved.     Dementia, query AD of late onset  SLUM 12/30. No behavioral concern.     Mantle cell lymphoma, unspecified body region (H)  Hx of BM tx  Normocytic anemia  - no concern.   - Hb trending up      Orders:  NNO    Electronically signed by:  Stefanie Arguello MD                     Sincerely,        Stefanie Arguello MD

## 2024-01-19 PROBLEM — R53.1 GENERALIZED WEAKNESS: Status: ACTIVE | Noted: 2023-01-01

## 2024-01-19 PROBLEM — D64.9 ANEMIA, UNSPECIFIED: Status: ACTIVE | Noted: 2023-01-01

## 2024-01-19 PROBLEM — J47.9 BRONCHIECTASIS (H): Chronic | Status: ACTIVE | Noted: 2023-01-01

## 2024-01-19 PROBLEM — R50.9 FEVER, UNSPECIFIED FEVER CAUSE: Status: ACTIVE | Noted: 2024-01-01

## 2024-01-19 PROBLEM — Z95.2 S/P TAVR (TRANSCATHETER AORTIC VALVE REPLACEMENT): Chronic | Status: ACTIVE | Noted: 2023-01-01

## 2024-01-19 PROBLEM — I48.91 ATRIAL FIBRILLATION WITH RAPID VENTRICULAR RESPONSE (H): Status: ACTIVE | Noted: 2024-01-01

## 2024-01-19 PROBLEM — R19.7 DIARRHEA, UNSPECIFIED TYPE: Status: ACTIVE | Noted: 2024-01-01

## 2024-01-19 PROBLEM — Z20.822 LAB TEST NEGATIVE FOR COVID-19 VIRUS: Status: ACTIVE | Noted: 2024-01-01

## 2024-01-19 PROBLEM — E03.9 ACQUIRED HYPOTHYROIDISM: Chronic | Status: ACTIVE | Noted: 2023-01-01

## 2024-01-19 PROBLEM — Z79.01 LONG TERM CURRENT USE OF ANTICOAGULANT THERAPY: Chronic | Status: ACTIVE | Noted: 2023-01-01

## 2024-01-19 PROBLEM — J47.9 BRONCHIECTASIS WITHOUT COMPLICATION (H): Status: ACTIVE | Noted: 2024-01-01

## 2024-01-19 PROBLEM — R53.1 WEAKNESS: Status: ACTIVE | Noted: 2024-01-01

## 2024-01-19 NOTE — PROVIDER NOTIFICATION
Provider contacted: MAAME Peña    Text: Sepsis triggered. 102.6F . PRN tylenol given. BP 96/48 Give metoprolol? In room now

## 2024-01-19 NOTE — ED TRIAGE NOTES
[facility] 1[facility]2 for pneumonia, finished abx on 1/6. Daughter noticed increased lethargy today. Usually walks with a walker and unable to ambulate today A&O X1-2

## 2024-01-19 NOTE — ED PROVIDER NOTES
"  History     Chief Complaint   Patient presents with    Altered Mental Status     HPI  Dangelo Hernández is a 88 year old male with history of pneumonia, bronchiectasis, dysphagia, coronary disease, and mantle cell lymphoma presenting for evaluation of cough and decreased alertness.  Patient was recently hospitalized over the new year and discharged January 2 for pneumonia.  Patient was discharged from the hospital to permanently where he has been recovering in the transitional care unit.  Reviewing recent notes, on January 15 he was noted to be walking down the hallway on his own and was reportedly coughing less at that time.  He also was reportedly feeling less short of breath.  Today patient reportedly was much less alert and seemed to coughing more and developed a fever at his care facility so was sent in for evaluation.  In the ED patient is fatigued appearing and only states that \"I feel terrible\".  Patient not able to further clarify with me what is bothering him but he does have a significant wet sounding cough.  Per report from EMS patient had some desaturations and was desaturating into the 80s upon arrival here but recovered fairly quickly and was able to be at 92% on room air.    ==================================================================    CHART REVIEW:      Echo 9/27/23 : EF 50-55%    END CHART REVIEW  ==================================================================        Allergies:  Allergies   Allergen Reactions    Amoxicillin Diarrhea           Lisinopril Cough       Problem List:    Patient Active Problem List    Diagnosis Date Noted    Hypertension goal BP (blood pressure) < 140/90 12/09/2010     Priority: High    Weakness 01/19/2024     Priority: Medium    Atrial fibrillation with rapid ventricular response (H) 01/19/2024     Priority: Medium    Fever, unspecified fever cause 01/19/2024     Priority: Medium    Diarrhea, unspecified type 01/19/2024     Priority: Medium    Pneumonia " "12/30/2023     Priority: Medium    Bronchiectasis (H) 12/30/2023     Priority: Medium     CT 12/29/2023       Generalized weakness 12/29/2023     Priority: Medium    Acquired hypothyroidism 09/05/2023     Priority: Medium    Long term current use of anticoagulant therapy 09/05/2023     Priority: Medium    Hypotension, unspecified hypotension type 09/04/2023     Priority: Medium    Dysphagia, oropharyngeal phase 06/21/2023     Priority: Medium    Repeated falls 06/21/2023     Priority: Medium    Low back pain, unspecified 06/20/2023     Priority: Medium    Anemia, unspecified 06/20/2023     Priority: Medium    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     Priority: Medium      TAVR procedure 03/2023 in [place]. No records in EPIC      Benign prostatic hyperplasia with lower urinary tract symptoms 01/27/2022     Priority: Medium    Difficulty in walking, not elsewhere classified 01/27/2022     Priority: Medium    CAD (coronary artery disease) 01/27/2022     Priority: Medium      s/p PCI 2013 and 10/2019 (ODIN to proximal LAD)       Stenosis of carotid artery 07/15/2021     Priority: Medium     B/l Carotid US 10/2019: 50-69% stenosis to bilateral ICA   ultrasound 2021 - Moderate to severe plaque formation, velocities consistent with 50-69% stenosis in the right internal carotid artery.  Moderate to severe plaque formation, velocities consistent with 50-69% stenosis in the left internal carotid artery.       Spinal stenosis of lumbar region without neurogenic claudication 05/10/2021     Priority: Medium    Arthritis of both hands 05/10/2021     Priority: Medium    Arthritis of knee 05/10/2021     Priority: Medium    Chronic midline thoracic back pain 05/10/2021     Priority: Medium    Advanced directives, counseling/discussion 08/12/2015     Priority: Medium     Patient does not have an Advance/Health Care Directive (HCD), given \"What is Advance Care Planning?\" rosendo Segura  August 12, 2015      " GERD (gastroesophageal reflux disease) 05/02/2012     Priority: Medium    Hyperlipidemia 10/31/2010     Priority: Medium     LDL goal <70      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     Priority: Medium     Patient had stem cell transplant in 10/2010.        Benign Colon Polyps 01/19/2006     Priority: Medium     2000 1 benign 4 mm Polyp biopsied at 20 cm.          Past Medical History:    Past Medical History:   Diagnosis Date    ACS (acute coronary syndrome) (H) 10/15/2019    Acute kidney failure NEC 11/27/2011    Acute metabolic encephalopathy 09/05/2023    JESSICA (acute kidney injury) (H24) 09/05/2023    Altered mental status, unspecified 06/28/2023    Arthritis     Basal cell carcinoma     Bilateral pneumonia 06/15/2023    Blood transfusion     Bronchiectasis (H)     CAD (coronary artery disease)     Colon polyps     COVID-19 12/07/2022    Cryptogenic organizing pneumonia (H) 12/07/2012    Depression, unspecified 06/21/2023    Endocarditis, valve unspecified 01/27/2022    GERD (gastroesophageal reflux disease)     Hyperlipidemia     Hypertension     Laceration without foreign body of other part of head, subsequent encounter 06/28/2023    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    Pneumonia due to coronavirus disease 2019 01/27/2022    Pneumonia in mycoses 09/09/2011    Pneumonia, candidial (H) 09/09/2011    S/P TAVR (transcatheter aortic valve replacement)     Septic shock (H) 06/15/2023       Past Surgical History:    Past Surgical History:   Procedure Laterality Date    BRONCHOSCOPY (RIGID OR FLEXIBLE), DIAGNOSTIC  7/14/2011    Procedure:COMBINED BRONCHOSCOPY (RIGID OR FLEXIBLE), LAVAGE; Surgeon:REYNA BAILEY; Location:[facility]    BRONCHOSCOPY (RIGID OR FLEXIBLE), DIAGNOSTIC N/A 5/29/2019    Procedure: BRONCHOSCOPY, WITH BRONCHOALVEOLAR LAVAGE;  Surgeon: Perlman, David Morris, MD;  Location: [facility]    COLONOSCOPY      CV HEART CATHETERIZATION WITH POSSIBLE INTERVENTION N/A  10/16/2019    Procedure: Heart Catheterization with Possible Intervention;  Surgeon: Robinson Robbins MD;  Location:  HEART CARDIAC CATH LAB    CV HEART CATHETERIZATION WITH POSSIBLE INTERVENTION N/A 2019    Procedure: Heart Catheterization with Possible Intervention;  Surgeon: Hannah Ashraf MD;  Location:  HEART CARDIAC CATH LAB    ENT SURGERY      tonsils    ORTHOPEDIC SURGERY      rt knee arthroscopy    SURGICAL HISTORY OF -       tonsils    SURGICAL HISTORY OF -   86    1.Exam under anesthesia, 2.Arthroscopy, 3.Arthroscopic medial meniscectomy, 4.arthroscopic trimming of patellar articular cartilage.    SURGICAL HISTORY OF -       vasectomy    SURGICAL HISTORY OF -   90    colonoscopy    SURGICAL HISTORY OF -   10/20/92    flexible sigmoidoscopy    SURGICAL HISTORY OF -   2000    colonoscopy and polypectomy    THORACOSCOPIC BIOPSY LUNG  2011    Procedure:THORACOSCOPIC BIOPSY LUNG; Video Assisted Right Thoracoscopy with Biopsy; Surgeon:JIM EPSTEIN; Location: OR       Family History:    Family History   Problem Relation Age of Onset    Cancer Mother         Lung--did not smoke    Cardiovascular Father         MI age 73    Lipids Sister     Hypertension Sister     Cancer Sister         Ovarian- at age 65       Social History:  Marital Status:   [5]  Social History     Tobacco Use    Smoking status: Former     Packs/day: 0.50     Years: 19.00     Additional pack years: 0.00     Total pack years: 9.50     Types: Cigarettes     Start date:      Quit date: 1975     Years since quittin.0    Smokeless tobacco: Former     Types: Snuff    Tobacco comments:     started smoking at 21 years of age   Vaping Use    Vaping Use: Never used   Substance Use Topics    Alcohol use: Yes     Alcohol/week: 0.0 standard drinks of alcohol     Comment: occ.     Drug use: No        Medications:    acetaminophen (TYLENOL) 325 MG tablet  acetaminophen (TYLENOL) 500  MG tablet  apixaban ANTICOAGULANT (ELIQUIS) 5 MG tablet  cetirizine (ZYRTEC) 5 MG tablet  clopidogrel (PLAVIX) 75 MG tablet  ferrous sulfate (FEROSUL) 325 (65 Fe) MG tablet  gabapentin (NEURONTIN) 100 MG capsule  levothyroxine (SYNTHROID/LEVOTHROID) 50 MCG tablet  lidocaine (LIDODERM) 5 % patch  metoprolol tartrate (LOPRESSOR) 25 MG tablet  nitroGLYcerin (NITROSTAT) 0.4 MG sublingual tablet  omeprazole (PRILOSEC) 20 MG DR capsule  oxyCODONE (ROXICODONE) 5 MG tablet  rosuvastatin (CRESTOR) 40 MG tablet  sacubitril-valsartan (ENTRESTO) 24-26 MG per tablet  tiotropium (SPIRIVA RESPIMAT) 2.5 MCG/ACT inhaler  Vitamin D3 (CHOLECALCIFEROL) 25 mcg (1000 units) tablet        Review of systems limited due to patient's altered mental status  Review of Systems   Respiratory:  Positive for cough.    All other systems reviewed and are negative.      Physical Exam   BP: 130/56  Pulse: 67  Temp: 99  F (37.2  C)  Resp: 24  SpO2: (!) 89 %      Physical Exam  Vitals and nursing note reviewed.   Constitutional:       Appearance: He is ill-appearing. He is not diaphoretic.      Comments: Patient awake and very fatigued appearing with limited verbal response to communicate   HENT:      Head: Atraumatic.      Mouth/Throat:      Mouth: Mucous membranes are moist.   Cardiovascular:      Rate and Rhythm: Regular rhythm. Tachycardia present.   Pulmonary:      Breath sounds: Rhonchi present.      Comments: Mildly tachypneic with a wet sounding cough  Abdominal:      Palpations: Abdomen is soft.      Tenderness: There is no abdominal tenderness.   Musculoskeletal:      Cervical back: Normal range of motion.   Skin:     General: Skin is warm and dry.      Capillary Refill: Capillary refill takes less than 2 seconds.   Neurological:      Mental Status: He is lethargic.      GCS: GCS eye subscore is 4. GCS verbal subscore is 4. GCS motor subscore is 6.         ED Course               EKG Interpretation:      Interpreted by Alvin Mcleod,  MD  Time reviewed:2221   Symptoms at time of EKG: cough, tachycardia   Rhythm: Atrial fibrillation - rapid  Rate: 126  Axis: Left Axis Deviation  Ectopy: None  Conduction: Normal  ST Segments/ T Waves: Non-specific ST-T wave changes  Q Waves: v1, II, III, and aVf  Comparison to prior: Similar to previous EKG 1/18/2024    Clinical Impression: Rapid atrial fibrillation with likely rate related ST changes, similar to previous         Procedures                Results for orders placed or performed during the hospital encounter of 01/18/24 (from the past 24 hour(s))   CBC with platelets differential    Narrative    The following orders were created for panel order CBC with platelets differential.  Procedure                               Abnormality         Status                     ---------                               -----------         ------                     CBC with platelets and d...[[id number]]  Abnormal            Final result                 Please view results for these tests on the individual orders.   Comprehensive metabolic panel   Result Value Ref Range    Sodium 138 135 - 145 mmol/L    Potassium 4.3 3.4 - 5.3 mmol/L    Carbon Dioxide (CO2) 21 (L) 22 - 29 mmol/L    Anion Gap 15 7 - 15 mmol/L    Urea Nitrogen 24.9 (H) 8.0 - 23.0 mg/dL    Creatinine 1.14 0.67 - 1.17 mg/dL    GFR Estimate 63 >60 mL/min/1.73m2    Calcium 9.4 8.8 - 10.2 mg/dL    Chloride 102 98 - 107 mmol/L    Glucose 121 (H) 70 - 99 mg/dL    Alkaline Phosphatase 58 40 - 150 U/L    AST 28 0 - 45 U/L    ALT 12 0 - 70 U/L    Protein Total 7.0 6.4 - 8.3 g/dL    Albumin 4.0 3.5 - 5.2 g/dL    Bilirubin Total 0.3 <=1.2 mg/dL   Troponin T, High Sensitivity   Result Value Ref Range    Troponin T, High Sensitivity 45 (H) <=22 ng/L   Nt probnp inpatient (BNP)   Result Value Ref Range    N terminal Pro BNP Inpatient 1,282 0 - 1,800 pg/mL   CRP inflammation   Result Value Ref Range    CRP Inflammation 64.95 (H) <5.00 mg/L   CBC with platelets and  differential   Result Value Ref Range    WBC Count 12.1 (H) 4.0 - 11.0 10e3/uL    RBC Count 3.69 (L) 4.40 - 5.90 10e6/uL    Hemoglobin 11.0 (L) 13.3 - 17.7 g/dL    Hematocrit 33.5 (L) 40.0 - 53.0 %    MCV 91 78 - 100 fL    MCH 29.8 26.5 - 33.0 pg    MCHC 32.8 31.5 - 36.5 g/dL    RDW 15.9 (H) 10.0 - 15.0 %    Platelet Count 296 150 - 450 10e3/uL    % Neutrophils 83 %    % Lymphocytes 6 %    % Monocytes 9 %    % Eosinophils 1 %    % Basophils 1 %    % Immature Granulocytes 0 %    NRBCs per 100 WBC 0 <1 /100    Absolute Neutrophils 10.1 (H) 1.6 - 8.3 10e3/uL    Absolute Lymphocytes 0.7 (L) 0.8 - 5.3 10e3/uL    Absolute Monocytes 1.1 0.0 - 1.3 10e3/uL    Absolute Eosinophils 0.1 0.0 - 0.7 10e3/uL    Absolute Basophils 0.1 0.0 - 0.2 10e3/uL    Absolute Immature Granulocytes 0.1 <=0.4 10e3/uL    Absolute NRBCs 0.0 10e3/uL   Procalcitonin   Result Value Ref Range    Procalcitonin 0.58 (H) <0.50 ng/mL   [facility] Draw *Canceled*    Narrative    The following orders were created for panel order [facility] Draw.  Procedure                               Abnormality         Status                     ---------                               -----------         ------                       Please view results for these tests on the individual orders.   Lactic acid whole blood   Result Value Ref Range    Lactic Acid 1.4 0.7 - 2.0 mmol/L   Blood gas venous   Result Value Ref Range    pH Venous 7.40 7.32 - 7.43    pCO2 Venous 37 (L) 40 - 50 mm Hg    pO2 Venous 30 25 - 47 mm Hg    Bicarbonate Venous 23 21 - 28 mmol/L    Base Excess/Deficit Venous -1.9 -3.0 - 3.0 mmol/L    FIO2 21     Oxyhemoglobin Venous 54 (L) 70 - 75 %    O2 Sat, Venous 54.8 (L) 70.0 - 75.0 %    Narrative    In healthy individuals, oxyhemoglobin (O2Hb) and oxygen saturation (SO2) are approximately equal. In the presence of dyshemoglobins, oxyhemoglobin can be considerably lower than oxygen saturation.   Symptomatic Influenza A/B, RSV, & SARS-CoV2 PCR (COVID-19)  Nasopharyngeal    Specimen: Nasopharyngeal; Swab   Result Value Ref Range    Influenza A PCR Negative Negative    Influenza B PCR Negative Negative    RSV PCR Negative Negative    SARS CoV2 PCR Negative Negative    Narrative    Testing was performed using the Xpert Xpress CoV2/Flu/RSV Assay on the Cepheid GeneXpert Instrument. This test should be ordered for the detection of SARS-CoV-2, influenza, and RSV viruses in individuals who meet clinical and/or epidemiological criteria. Test performance is unknown in asymptomatic patients. This test is for in vitro diagnostic use under the FDA EUA for laboratories certified under CLIA to perform high or moderate complexity testing. This test has not been FDA cleared or approved. A negative result does not rule out the presence of PCR inhibitors in the specimen or target RNA in concentration below the limit of detection for the assay. If only one viral target is positive but coinfection with multiple targets is suspected, the sample should be re-tested with another FDA cleared, approved, or authorized test, if coinfection would change clinical management. This test was validated by the [facility] [employer] ([employer]). These laboratories are certified under the Clinical Laboratory Improvement Amendments of 1988 (CLIA-88) as qualified to perform high complexity laboratory testing.   UA with Microscopic reflex to Culture    Specimen: Urine, Midstream   Result Value Ref Range    Color Urine Yellow Colorless, Straw, Light Yellow, Yellow    Appearance Urine Clear Clear    Glucose Urine Negative Negative mg/dL    Bilirubin Urine Negative Negative    Ketones Urine Negative Negative mg/dL    Specific Gravity Urine 1.023 1.003 - 1.035    Blood Urine Small (A) Negative    pH Urine 5.0 5.0 - 7.0    Protein Albumin Urine 30 (A) Negative mg/dL    Urobilinogen Urine Normal Normal, 2.0 mg/dL    Nitrite Urine Negative Negative    Leukocyte Esterase Urine Negative Negative    Mucus Urine  Present (A) None Seen /LPF    RBC Urine 3 (H) <=2 /HPF    WBC Urine 2 <=5 /HPF    Squamous Epithelials Urine 1 <=1 /HPF    Narrative    Urine Culture not indicated   XR Chest 2 Views    Narrative    EXAM: XR CHEST 2 VIEWS  LOCATION: [facility]  DATE: 1/18/2024    INDICATION: Cough and fever.  COMPARISON: CT chest 12/29/2023, chest radiograph 10/04/2021      Impression    IMPRESSION:     Chronic reticular interstitial opacities, likely representing underlying interstitial lung disease. Hypoinflated lungs. No new focal airspace disease. No pleural effusion or pneumothorax.    Stable, nonenlarged cardiomediastinal silhouette. TAVR. Coronary artery stents.    Multilevel degenerative changes of the spine.   CT Chest/Abdomen/Pelvis w Contrast    Narrative    EXAM: CT CHEST/ABDOMEN/PELVIS W CONTRAST  LOCATION: [facility]  DATE: 1/18/2024    INDICATION: fever, cough, diarrhea  COMPARISON: Chest radiographs from 01/18/2024, abdominopelvic CT from 12/29/2023. CT chest, abdomen, pelvis from 09/04/2023.  TECHNIQUE: CT scan of the chest, abdomen, and pelvis was performed following injection of IV contrast. Multiplanar reformats were obtained. Dose reduction techniques were used.   CONTRAST: 78mL isovue 370    FINDINGS:   LUNGS AND PLEURA: Redemonstrated changes of wedge resection in the right lateral lung base. Centrilobular emphysema with areas of bronchiectasis and interstitial thickening scattered throughout the lungs with similar distribution and severity when   compared to 09/04/2023. No definite acute infiltrate. No pneumothorax or pleural effusion. There is a small amount of airway debris in the lower thoracic trachea.    MEDIASTINUM/AXILLAE: Heart size is normal. Aortic valve replacement. Severe coronary artery calcifications and stents.    CORONARY ARTERY CALCIFICATION: Previous intervention (stents or CABG).    HEPATOBILIARY: Normal.    PANCREAS: Mildly  atrophic.    SPLEEN: Normal.    ADRENAL GLANDS: Normal.    KIDNEYS/BLADDER: Kidneys within normal limits. Trabeculated urinary bladder with posterior wall diverticulum.    BOWEL: Stomach is decompressed. Normal caliber small bowel. Mild wall thickening of the cecum as seen on coronal image 42. Mild wall thickening of the lower rectum as seen on coronal image 72. No appendicitis. No free air.    LYMPH NODES: Normal.    VASCULATURE: Severe aortoiliac atherosclerotic calcification without aneurysm.    PELVIC ORGANS: Normal.    MUSCULOSKELETAL: Osteopenia throughout. Bridging anterior endplate osteophytes in the lower thoracic spine. Postoperative change noted at the posterior elements of L4-L5. Chronic, healed proximal sternal body fracture. Osteonecrosis of the femoral heads   without height loss or collapse.      Impression    IMPRESSION:  1.  No significant change in emphysema, bronchiectasis, and interstitial thickening likely related to fibrosis. No definite acute pneumonia.    2.  Mild wall thickening of the cecum and rectum suggestive of infectious or inflammatory colitis/proctitis.   Head CT w/o contrast    Narrative    EXAM: CT HEAD W/O CONTRAST  LOCATION: [facility]  DATE: 1/19/2024    INDICATION: altered mental status, on anticoagulation   evalute for hemorrhage  COMPARISON: 12/29/2023  TECHNIQUE: Routine CT Head without IV contrast. Multiplanar reformats. Dose reduction techniques were used.    FINDINGS: There is contrast within the intracranial vascular system from recent contrast enhanced study of the chest abdomen and pelvis.  INTRACRANIAL CONTENTS: No intracranial hemorrhage, extraaxial collection, or mass effect.  No CT evidence of acute infarct. Mild to moderate presumed chronic small vessel ischemic changes. Mild to moderate generalized volume loss. No hydrocephalus.     VISUALIZED ORBITS/SINUSES/MASTOIDS: No intraorbital abnormality. No paranasal sinus mucosal disease. No  middle ear or mastoid effusion.    BONES/SOFT TISSUES: No acute abnormality.      Impression    IMPRESSION:  1.  No CT evidence for acute intracranial process.  2.  Brain atrophy and presumed chronic microvascular ischemic changes as above.     *Note: Due to a large number of results and/or encounters for the requested time period, some results have not been displayed. A complete set of results can be found in Results Review.       Medications   acetaminophen (TYLENOL) Suppository 650 mg (650 mg Rectal $Given 1/19/24 0007)   iopamidol (ISOVUE-370) solution 78 mL (78 mLs Intravenous $Given 1/18/24 2435)   sodium chloride 0.9 % bag 500mL for CT scan flush use (59 mLs Intravenous $Given 1/18/24 0666)   lactated ringers BOLUS 1,000 mL ( Intravenous Rate/Dose Verify 1/19/24 8753)         11:09 PM: Urinalysis negative.  Influenza/COVID/RSV negative.  VBG shows a normal pH with slightly low oxygen.  White count is elevated from previous as well as a CRP.  Creatinine and electrolytes are okay.  Troponin at baseline and BNP not significantly elevated.  X-ray without evidence of new infiltrate however he does continue to have significant wet sounding cough.  Given his fever and tachycardia as well as elevated white count and CRP, raises concern for an infectious process.  Blood cultures and stool evaluation pending.  Ordered a CT of the chest abdomen pelvis to make sure we are not missing another source of infection as he is not able to provide a good history at this time    11:59 PM: Nurse advised that he now has a fever.  Remains tachycardic.  Will give a bolus of fluids and rectal Tylenol.  Awaiting CT results.    1:09 AM: CT resulted showing no clear pneumonia.  Mild thickening of the cecum suggestive of colitis or proctitis.  Patient does have loose stools so this may be the source of his diarrhea.  Unclear whether this is the sole source of his fever.  He definitely has some residual symptomatic respiratory illness.   Will add on viral testing and will hold off on antibiotics for now.  Will add on procalcitonin to help differentiate.  Also adding on C. difficile given his recent antibiotic use.  Will discuss with hospitalist.  Also, patient is on blood thinners.  No report of fall however given his decreased alertness, this could also be secondary to an intracranial hemorrhage.  Will send patient back to CT for head imaging and then plan on admission.    3:00 AM: Discussed with hospitalist, Dr Cuba Stanford. Accepts for obs.     Assessments & Plan (with Medical Decision Making)  88-year-old with recent admission for pneumonia and bronchiectasis presented for evaluation of cough with decreased alertness and overall increased fatigue.  History limited due to patient fatigue however he is awake and does not appear toxic.  He is tachycardic with A-fib but has a history of A-fib.  Febrile in the ED with a low but reasonable blood pressure.  Moving all extremities.  Extensive septic workup initiated.  Reassuring normal lactate.  Does have a recurring white count as well as an elevated CRP.  Influenza, RSV, and COVID-negative.  VBG did not show any significant acidosis.  Troponin BNP normal despite his EKG showing some strain.  Recently normal electrolytes and kidney function.  Obtained blood cultures, stool cultures, and CT of the chest abdomen pelvis to help rule out any dangerous causes of infection.  Exact cause of his fever remains unclear at the time of admission.  Possible viral URI.  Less likely from colitis as a cause of his fever and altered mental status.  Obtain a CT of the head as he is on blood thinners and I wanted to be sure his altered mental status was not from an unwitnessed fall and intracranial hemorrhage: CT negative.  Admitted under observation status to the hospitalist service for continued monitoring and evaluation for his acute weakness with fever of unclear etiology     I have reviewed the nursing notes.    I  have reviewed the findings, diagnosis, plan and need for follow up with the patient.          New Prescriptions    No medications on file       Final diagnoses:   Diarrhea, unspecified type   Fever, unspecified fever cause   Atrial fibrillation with rapid ventricular response (H)   Weakness       1/18/2024   [facility] EMERGENCY DEPT       Mcleod, Alvin Velasquez MD  01/19/24 [id number]

## 2024-01-19 NOTE — H&P
Admit Date: 1/18/2024     History and Physical: Hospitalist Service    Chief Complaints:  Fever with altered mental status    HPI:    88 years old male with a past medical history of mantle cell lymphoma, coronary artery disease, pneumonia, bronchiectasis, dysphagia, recent hospitalization for pneumonia and multiple other medical issues was sent to the emergency room for shortness of breath with cough and altered mental status.  Patient was much less alert and coughing more with fever episodes.  Slow to communicate but cognition appears to have improved on arrival to the floor.  In the ED was fatigued and noted to be hypoxic with O2 saturation in the 80s on arrival.  Denies any chest pain but does have productive cough with grayish sputum.  Denies any palpitations.  In the ED was noted to be drowsy with active rhonchi and tachycardic.  EKG shows A-fib with rapid ventricular rate.  Labs revealed white count of 12,100 with a CRP of 64.95 and a BNP of thousand 282, procalcitonin of 0.58 and a lactic acid of 1.4.  ABG showed a pH of 7.4.  Influenza COVID and RSV was negative.  Urine analysis was negative for leukocyte esterase.  CT chest and abdomen shows bronchiectasis in the chest and mild thickening of the cecum/rectum suggestive of infectious/inflammatory colitis.  CT of the head shows no acute process.  Patient was admitted for further evaluation    Review of symptoms:  12 point review of system is negative unless otherwise stated in history of present illness    Clinically Significant Risk Factors Present on Admission               # Drug Induced Coagulation Defect: home medication list includes an anticoagulant medication  # Drug Induced Platelet Defect: home medication list includes an antiplatelet medication   # Hypertension: Noted on problem list  # Heart failure with preserved ejection fraction: EF >50% and home medication list includes sacubitril/valsartan (Entresto)         # Financial/Environmental  Concerns:              Past Medical History:   Diagnosis Date    ACS (acute coronary syndrome) (H) 10/15/2019    Acute kidney failure NEC 11/27/2011    Acute metabolic encephalopathy 09/05/2023    JESSICA (acute kidney injury) (H24) 09/05/2023    Altered mental status, unspecified 06/28/2023    Arthritis     Basal cell carcinoma     Bilateral pneumonia 06/15/2023    Blood transfusion     Bronchiectasis (H)     CAD (coronary artery disease)     Colon polyps     COVID-19 12/07/2022    Cryptogenic organizing pneumonia (H) 12/07/2012    Depression, unspecified 06/21/2023    Endocarditis, valve unspecified 01/27/2022    GERD (gastroesophageal reflux disease)     Hyperlipidemia     Hypertension     Laceration without foreign body of other part of head, subsequent encounter 06/28/2023    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    Pneumonia due to coronavirus disease 2019 01/27/2022    Pneumonia in mycoses 09/09/2011    Pneumonia, candidial (H) 09/09/2011    S/P TAVR (transcatheter aortic valve replacement)     Septic shock (H) 06/15/2023       Principal Problem:    Weakness  Active Problems:    Atrial fibrillation with rapid ventricular response (H)    Fever, unspecified fever cause    Diarrhea, unspecified type        Current Facility-Administered Medications:     acetaminophen (TYLENOL) Suppository 650 mg, 650 mg, Rectal, Q6H PRN, Alvin Mcleod MD, 650 mg at 01/19/24 0007    acetaminophen (TYLENOL) tablet 650 mg, 650 mg, Oral, Q4H PRN, Cuba Bennett MD    apixaban ANTICOAGULANT (ELIQUIS) tablet 5 mg, 5 mg, Oral, BID, Cuba Bennett MD    calcium carbonate (TUMS) chewable tablet 1,000 mg, 1,000 mg, Oral, 4x Daily PRN, Cuba Bennett MD    cetirizine (zyrTEC) tablet 5 mg, 5 mg, Oral, Daily, Cuba Bennett MD    clopidogrel (PLAVIX) tablet 75 mg, 75 mg, Oral, Daily, Cuba Bennett MD     ferrous sulfate (FEROSUL) tablet 325 mg, 325 mg, Oral, Every Other Day, Cuba Bennett MD    gabapentin (NEURONTIN) capsule 200 mg, 200 mg, Oral, BID, Cuba Bennett MD    ibuprofen (ADVIL/MOTRIN) tablet 200 mg, 200 mg, Oral, Q6H PRN, Cuba Bennett MD    levothyroxine (SYNTHROID/LEVOTHROID) tablet 50 mcg, 50 mcg, Oral, QAM AC, Cuba Bennett MD    metoprolol tartrate (LOPRESSOR) half-tab 12.5 mg, 12.5 mg, Oral, BID, Cuba Bennett MD    naloxone (NARCAN) injection 0.2 mg, 0.2 mg, Intravenous, Q2 Min PRN **OR** naloxone (NARCAN) injection 0.4 mg, 0.4 mg, Intravenous, Q2 Min PRN **OR** naloxone (NARCAN) injection 0.2 mg, 0.2 mg, Intramuscular, Q2 Min PRN **OR** naloxone (NARCAN) injection 0.4 mg, 0.4 mg, Intramuscular, Q2 Min PRN, Cuba Bennett MD    nitroGLYcerin (NITROSTAT) sublingual tablet 0.4 mg, 0.4 mg, Sublingual, Q5 Min PRN, Cuba Bennett MD    omeprazole (PriLOSEC) CR capsule 20 mg, 20 mg, Oral, Daily, Cuba Bennett MD    ondansetron (ZOFRAN ODT) ODT tab 4 mg, 4 mg, Oral, Q6H PRN **OR** ondansetron (ZOFRAN) injection 4 mg, 4 mg, Intravenous, Q6H PRN, Cuba Bennett MD    rosuvastatin (CRESTOR) tablet 40 mg, 40 mg, Oral, Daily, Cuba Bennett MD    tiotropium (SPIRIVA RESPIMAT) inhalation aerosol 2 puff, 2 puff, Inhalation, Daily, Cuba Bennett MD    Vitamin D3 (CHOLECALCIFEROL) tablet 25 mcg, 25 mcg, Oral, Daily, Cuba Bennett MD    Past Surgical History:   Procedure Laterality Date    BRONCHOSCOPY (RIGID OR FLEXIBLE), DIAGNOSTIC  7/14/2011    Procedure:COMBINED BRONCHOSCOPY (RIGID OR FLEXIBLE), LAVAGE; Surgeon:REYNA BAILEY; Location: GI    BRONCHOSCOPY (RIGID OR FLEXIBLE), DIAGNOSTIC N/A 5/29/2019    Procedure: BRONCHOSCOPY, WITH  BRONCHOALVEOLAR LAVAGE;  Surgeon: Perlman, David Morris, MD;  Location: [facility]    COLONOSCOPY      CV HEART CATHETERIZATION WITH POSSIBLE INTERVENTION N/A 10/16/2019    Procedure: Heart Catheterization with Possible Intervention;  Surgeon: Robinson Robbins MD;  Location:  HEART CARDIAC CATH LAB    CV HEART CATHETERIZATION WITH POSSIBLE INTERVENTION N/A 2019    Procedure: Heart Catheterization with Possible Intervention;  Surgeon: Hannah Ashraf MD;  Location:  HEART CARDIAC CATH LAB    ENT SURGERY      tonsils    ORTHOPEDIC SURGERY      rt knee arthroscopy    SURGICAL HISTORY OF -       tonsils    SURGICAL HISTORY OF -   86    1.Exam under anesthesia, 2.Arthroscopy, 3.Arthroscopic medial meniscectomy, 4.arthroscopic trimming of patellar articular cartilage.    SURGICAL HISTORY OF -       vasectomy    SURGICAL HISTORY OF -   89    colonoscopy    SURGICAL HISTORY OF -   10/20/92    flexible sigmoidoscopy    SURGICAL HISTORY OF -   2000    colonoscopy and polypectomy    THORACOSCOPIC BIOPSY LUNG  2011    Procedure:THORACOSCOPIC BIOPSY LUNG; Video Assisted Right Thoracoscopy with Biopsy; Surgeon:JIM EPSTEIN; Location:[facility]       Allergies   Allergen Reactions    Amoxicillin Diarrhea           Lisinopril Cough       Family History   Problem Relation Age of Onset    Cancer Mother         Lung--did not smoke    Cardiovascular Father         MI age 73    Lipids Sister     Hypertension Sister     Cancer Sister         Ovarian- at age 65       Social History     Socioeconomic History    Marital status:    Occupational History     Employer: RETIRED   Tobacco Use    Smoking status: Former     Packs/day: 0.50     Years: 19.00     Additional pack years: 0.00     Total pack years: 9.50     Types: Cigarettes     Start date:      Quit date: 1975     Years since quittin.0    Smokeless tobacco: Former     Types: Snuff    Tobacco comments:     started smoking at  "21 years of age   Vaping Use    Vaping Use: Never used   Substance and Sexual Activity    Alcohol use: Yes     Alcohol/week: 0.0 standard drinks of alcohol     Comment: occ.     Drug use: No    Sexual activity: Not Currently     Partners: Female   Other Topics Concern    Parent/sibling w/ CABG, MI or angioplasty before 65F 55M? No    Special Diet No    Exercise Yes     Comment: active     Social Determinants of Health     Financial Resource Strain: Low Risk  (2023)    Financial Resource Strain     Within the past 12 months, have you or your family members you live with been unable to get utilities (heat, electricity) when it was really needed?: No   Food Insecurity: Low Risk  (2023)    Food Insecurity     Within the past 12 months, did you worry that your food would run out before you got money to buy more?: No     Within the past 12 months, did the food you bought just not last and you didn t have money to get more?: No   Transportation Needs: Low Risk  (2023)    Transportation Needs     Within the past 12 months, has lack of transportation kept you from medical appointments, getting your medicines, non-medical meetings or appointments, work, or from getting things that you need?: No   Housing Stability: Low Risk  (2023)    Housing Stability     Do you have housing? : Yes     Are you worried about losing your housing?: No       Physical Exam:  Vitals:    24 0133 24 0200 24 0230 24 0418   BP:  102/50 96/47 126/51   BP Location:    Left arm   Pulse:  116 107 117   Resp:    20   Temp: 97.8  F (36.6  C)   100.2  F (37.9  C)   TempSrc: Oral   Oral   SpO2:  92% 95% 92%   Weight:    71.8 kg (158 lb 4.6 oz)   Height:    1.702 m (5' 7\")      /51 (BP Location: Left arm)   Pulse 117   Temp 100.2  F (37.9  C) (Oral)   Resp 20   Ht 1.702 m (5' 7\")   Wt 71.8 kg (158 lb 4.6 oz)   SpO2 92%   BMI 24.79 kg/m    Systolic (24hrs), Av , Min:92 , Max:130   Diastolic (24hrs), " "Av, Min:47, Max:63    No intake or output data in the 24 hours ending 24 0616    General:      not in pain  Head:  atraumatic, normocephalic, face symmetrical  Eye:  conjunctivae clear , anicteric  Ear:  normal external ears, grossly normal hearing  Neck:  supple, no JVD  Respiratory:   shortness of breath,  Lung Sounds:  air entry decreased at base  Cardiovascular:normal rate, regular rhythm, PMI normal, S1 - normal, S2 - normal splitting  Neurological Status:  alert, awake, oriented to person, oriented to place, oriented to time  Skin:  normal turgor, warm  PSYCH - awake and follows commands       I&O: No intake/output data recorded.    Lab Results:   CBC:   Lab Results   Component Value Date    WBC 12.1 2024    WBC 8.8 2021    RBC 3.69 2024    RBC 3.84 2021     BMP:   Lab Results   Component Value Date    POTASSIUM 4.3 2024    POTASSIUM 4.3 2022    POTASSIUM 3.9 2021    CHLORIDE 102 2024    CHLORIDE 106 2022    CHLORIDE 104 2021    BUN 24.9 2024    BUN 27 2022    BUN 20 2021     CMP:  Lab Results   Component Value Date    POTASSIUM 4.3 2024    POTASSIUM 4.3 2022    POTASSIUM 3.9 2021    CHLORIDE 102 2024    CHLORIDE 106 2022    CHLORIDE 104 2021    ANIONGAP 15 2024    ANIONGAP 7 2022    ANIONGAP 8 2021    BUN 24.9 2024    BUN 27 2022    BUN 20 2021    ALBUMIN 4.0 2024    ALBUMIN 3.4 2022    ALBUMIN 3.2 2021    AST 28 2024    AST 21 2021     Coagulation:   Lab Results   Component Value Date    INR 0.92 10/04/2021    INR 0.94 2015    PTT 29 2015     Cardiac markers: No results found for: \"TROPONINI\"  ABGs: No results found for: \"PHARTERIAL\"  Mg: No components found for: \"MAGNESIUM\"  BNP: No results found for: \"BNP\"  Thyroid:   Lab Results   Component Value Date    TSH 2.47 2023    TSH 4.22 2019           "     Assessment/Plan:    88 years old male with a past medical history of mantle cell lymphoma, coronary artery disease, pneumonia, bronchiectasis, dysphagia, recent hospitalization for pneumonia and multiple other medical issues was sent to the emergency room for shortness of breath with cough and altered mental status.  Patient was much less alert and coughing more with fever episodes.  Slow to communicate but cognition appears to have improved on arrival to the floor.  In the ED was fatigued and noted to be hypoxic with O2 saturation in the 80s on arrival.  Denies any chest pain but does have productive cough with grayish sputum.  Denies any palpitations.  In the ED was noted to be drowsy with active rhonchi and tachycardic.  EKG shows A-fib with rapid ventricular rate.  Labs revealed white count of 12,100 with a CRP of 64.95 and a BNP of thousand 282, procalcitonin of 0.58 and a lactic acid of 1.4.  ABG showed a pH of 7.4.  Influenza COVID and RSV was negative.  Urine analysis was negative for leukocyte esterase.  CT chest and abdomen shows bronchiectasis in the chest and mild thickening of the cecum/rectum suggestive of infectious/inflammatory colitis.  CT of the head shows no acute process.  Patient was admitted for further evaluation    1.  Altered mental status with fever episodes in a patient with recent hospitalization for pneumonia.  CT scan of the brain shows no acute process.  Check and treat for any reversible factors including electrolyte imbalance.  Does have fever but then he also has lymphoma which may cause fever episodes.  Treatment is supportive and monitor closely.  Avoid potent narcotics including oxycodone for now.  Plan closely    2.  Fever episodes.  Unclear etiology CT scan does show possible colitis.  Does also have lymphoma which could potentially cause fever episodes.  Treat the reversible factors and trend for now    3 atrial fibrillation with rapid ventricular rate.  Resume the home  metoprolol for rate control and anticoagulation with the home Eliquis    4.  Hypertension blood now borderline low normal blood pressures.  Continue the metoprolol but hold off on Entresto.  Eventually may resume the Entresto based on the patient's clinical progress    5.  Cough.  Check for aspiration and consult speech evaluation.  As needed Tessalon Perles    6.  Coronary artery disease.  On statin/Plavix/metoprolol.  Denies any chest pain    7.  Hypothyroidism resume the home levothyroxine      DVT prophylaxis currently on Eliquis    CODE STATUS is DNR per paperwork and also during previous hospital stay which will be continued        Our patient will be admitted under the hospitalist services and further management to be based on patient's clinical progress.    As the provider for the telehealth service, I attest that I introduced myself to the patient and provided my credentials. Based on review of the patient s chart and/or a discussion with members of the patient s treatment team, I determined that telemedicine via a real-time, two-way, interactive audio and video platform is an appropriate means of providing this service. The patient and I mutually agreed that this visit is appropriate for telemedicine as well.    The patient was located at [facility]. [place] was at [place], [place]. The encounter was approximately 10 minutes. The nurse was present for the duration of the encounter.    Chart reviewed,labs and available imaging reviewed,consultant notes reviewed. Previous available medical records have been reviewed  Care plan discussed with care team    I have seen and examined the patient today. Documentation for this visit was completed using a template. Everything documented was personally performed today with the necessary additions, deletions and changes made as appropriate with the diagnoses being listed in no particular order of clinical relevance.    Lucia Jacobo MD  [facility]  Securely message with the Vocera Web Console (learn more here)  Text page via [employer] Paging/Directory

## 2024-01-19 NOTE — PROGRESS NOTES
"SPIRITUAL HEALTH SERVICES  SPIRITUAL ASSESSMENT Progress Note  [facility] - Med Surg    Referral Source:Patient request when transitioning to comfort cares    I shared a reflective conversation with Dangelo and his daughters, Mily and Hilda, which incorporated elements of illness and family narrative along with spiritual history.  The daughters confirmed that they were the only two siblings.      - Initially Dangelo was resting when I arrived so I spoke with daughters.  They were teary and providing cares for him.  Mily said her two daughters were coming too. Hilda said her dad had always been a hard worker.  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    - Dangelo mentioned to them that he had asked for me to come and that this would probably be the end for him.  I introduced self and role to Dangelo.  He thanked me for coming.  He spoke of this being maybe his final day.  We talked for just a short time.  I provided a time of prayer as the daughters joined in and concluded with \"The Lord's Prayer.\"    - I encouraged them to take time to talk with their Dad speaking words of love, good bye and any other things that were appropriate for them      Plan:  will monitor patient's ongoing need for support during LOS.      Cayetano Ovalle M.A., [facility]  Staff Chaplain [facility]  Office: [phone]  Pager [phone]    "

## 2024-01-19 NOTE — PROGRESS NOTES
"[facility] Medicine Progress Note  Date of Service: 01/19/2024    Assessment & Plan   Dangelo Hernández is a 88 year old male with past medical hx of mantle cell lymphoma, recent PNA, CAD, s/p TAVR, bronchiectasis, dysphagia, hypothyroidism, GERD who presents on 1/18/2024 for increased cough, SOB, and AMS, and fevers.    Comfort measures  Generalized weakness  Bronchiectasis (H)  Fever, unspecified fever cause  Hx of candidal and cryptogenic organizing pneumonia    He had candida glabrata pneumonia in 7/2011 tx with high dose steroids with good response but progression in 4/2012. Repeat BAL showed filamentous fungus that was not able to be specified further, and was tx with Voriconazole and Prednisone.     He was recently admitted from 12/29/23-1/2/24 for pneumonia vs bronchiectasis exacerbation tx with Rocephin and Doxycycline, discharged on Cefdinir and Doxycycline. He was discharged to a TCU and was reportedly getting better, with a note from 1/15/24 stating he walked down the handley on his own without increased SOB. However, he presented 1/18 with AMS, productive cough, diarrhea, and fevers.     Head CT showed no acute process. Chest CT showed emphysema, bronchiectasis, and interstitial thickening likely related to fibrosis, all unchanged from prior study. Abdomen CT showed mild wall thickening of cecum and rectum suggesting infectious or inflammatory colitis/proctitis. EKG shows Afib with RVR.    Shortly after admission to the floor he had a temp of 102.6, HR 120s, RR 30s, BP 70s/30s. Initially 1L NS was started along with Vancomycin and Cefepime. However, after discussion with the daughter she expressed that he's been \"ready to go\" for the last year, and he would want comfort cares at this point. Additionally, when the RN was placing a second large bore IV the patient stated \"just let me go\". All medications were then stopped and comfort measures were initiated. " "    Atrial fibrillation with rapid ventricular response (H)  Long term current use of anticoagulant therapy  Hypertension goal BP (blood pressure) < 140/90  EKG yesterday showed Afib with rates 120s.  Managed prior to admission with Apixaban, Metoprolol, Entresto, Clopidogrel.     CAD s/p PCI 2013 and 10/2019 (ODIN to proximal LAD)   Aortic stenosis S/P TAVR   Patient had a TAVR procedure 03/2023.  Patient has been stable on Plavix and apixaban for CAD and aortic stenosis.  Troponin on admission was 45, which is stable from prior.  Patient denies any chest pain or ACS symptoms.  EKG had ST depressions changes which were unchanged from prior EKGs.    Mantle Cell Lymphoma S/P Autologous Transplant  Patient had stem cell transplant in 10/2010.  Currently in remission     Anemia, unspecified  11 on admission. At baseline and stable.  Managed prior to admission with Ferrous sulfate and vitamin D.    GERD   Stable on PTA esomeprazole 20 mg.      Acquired hypothyroidism  Stable. TSH 2.47 on previous admission.  Managed prior to admission with Levothyroxine.     Hyperlipidemia  Managed prior to admission with Rosuvastatin.                 Diet: Pureed Diet (level 4) Liquidized/Moderately Thick (level 3)    DVT Prophylaxis: Pneumatic Compression Devices  Ascencio Catheter: Not present  Lines: None     Code Status: No CPR- Do NOT Intubate       Attestation:  I have reviewed today's vital signs, notes, medications, labs and imaging.    I have discussed, or will be discussing, the patient with hospitalist physician, Dr. Peña.    Thiago Zabala PA-C       Interval History   Patient became hypotensive and upon placing a second IV he stated \"let me go\". Daughters were contacted and stated he would want comfort measures in this situation.    Physical Exam   Temp:  [97.8  F (36.6  C)-102.6  F (39.2  C)] 101  F (38.3  C)  Pulse:  [] 114  Resp:  [18-32] 24  BP: ()/(39-57) 94/39  SpO2:  [86 %-97 %] 91 %    Weights: "   Vitals:    01/19/24 0418   Weight: 71.8 kg (158 lb 4.6 oz)    Body mass index is 24.79 kg/m .    Constitutional: Lethargic. Uncooperative with exam, appears uncomfortable. Limited verbal response to communication.  HENT: No evidence of cranial trauma. Normocephalic. Eyes anicteric. EOM intact. PERRLA  Cardiovascular: Tachycardic. Irregular rhythm.  Normal S1 and S2, and no murmur noted. Good peripheral pulses in wrists bilaterally. No lower extremity edema.  Respiratory: Decreased breath sounds. Increased work of breathing.  GI: Soft, non-tender, normal bowel sounds, no hepatomegaly, no masses.  Musculoskeletal: Normal muscle bulk and tone. FROM in all extremities   Skin: Warm and dry, no rashes.   Neurologic: Cranial nerves 2-12 are grossly intact.       Data   Recent Labs   Lab 01/19/24  0636 01/18/24  2149   WBC 13.1* 12.1*   HGB 11.0* 11.0*   MCV 91 91    296    138   POTASSIUM 3.9 4.3   CHLORIDE 101 102   CO2 17* 21*   BUN 24.9* 24.9*   CR 1.08 1.14   ANIONGAP 18* 15   CHAVEZ 9.4 9.4   * 121*   ALBUMIN 3.7 4.0   PROTTOTAL 6.6 7.0   BILITOTAL 0.5 0.3   ALKPHOS 55 58   ALT 13 12   AST 30 28       Recent Labs   Lab 01/19/24  0636 01/18/24  2149   * 121*        Unresulted Labs Ordered in the Past 30 Days of this Admission       Date and Time Order Name Status Description    1/19/2024  2:43 PM C. difficile Antigen and Toxins A/B by Enzyme Immunoassay In process     1/19/2024  8:23 AM Blood Culture Arm, Right In process     1/19/2024  8:23 AM Blood Culture Arm, Right In process     1/18/2024  9:47 PM Blood Culture Peripheral Blood In process     1/18/2024  9:47 PM Blood Culture Arm, Right In process              Imaging  Recent Results (from the past 24 hour(s))   XR Chest 2 Views    Narrative    EXAM: XR CHEST 2 VIEWS  LOCATION: [facility] [place]  DATE: 1/18/2024    INDICATION: Cough and fever.  COMPARISON: CT chest 12/29/2023, chest radiograph 10/04/2021       Impression    IMPRESSION:     Chronic reticular interstitial opacities, likely representing underlying interstitial lung disease. Hypoinflated lungs. No new focal airspace disease. No pleural effusion or pneumothorax.    Stable, nonenlarged cardiomediastinal silhouette. TAVR. Coronary artery stents.    Multilevel degenerative changes of the spine.   CT Chest/Abdomen/Pelvis w Contrast    Narrative    EXAM: CT CHEST/ABDOMEN/PELVIS W CONTRAST  LOCATION: [facility]  DATE: 1/18/2024    INDICATION: fever, cough, diarrhea  COMPARISON: Chest radiographs from 01/18/2024, abdominopelvic CT from 12/29/2023. CT chest, abdomen, pelvis from 09/04/2023.  TECHNIQUE: CT scan of the chest, abdomen, and pelvis was performed following injection of IV contrast. Multiplanar reformats were obtained. Dose reduction techniques were used.   CONTRAST: 78mL isovue 370    FINDINGS:   LUNGS AND PLEURA: Redemonstrated changes of wedge resection in the right lateral lung base. Centrilobular emphysema with areas of bronchiectasis and interstitial thickening scattered throughout the lungs with similar distribution and severity when   compared to 09/04/2023. No definite acute infiltrate. No pneumothorax or pleural effusion. There is a small amount of airway debris in the lower thoracic trachea.    MEDIASTINUM/AXILLAE: Heart size is normal. Aortic valve replacement. Severe coronary artery calcifications and stents.    CORONARY ARTERY CALCIFICATION: Previous intervention (stents or CABG).    HEPATOBILIARY: Normal.    PANCREAS: Mildly atrophic.    SPLEEN: Normal.    ADRENAL GLANDS: Normal.    KIDNEYS/BLADDER: Kidneys within normal limits. Trabeculated urinary bladder with posterior wall diverticulum.    BOWEL: Stomach is decompressed. Normal caliber small bowel. Mild wall thickening of the cecum as seen on coronal image 42. Mild wall thickening of the lower rectum as seen on coronal image 72. No appendicitis. No free  air.    LYMPH NODES: Normal.    VASCULATURE: Severe aortoiliac atherosclerotic calcification without aneurysm.    PELVIC ORGANS: Normal.    MUSCULOSKELETAL: Osteopenia throughout. Bridging anterior endplate osteophytes in the lower thoracic spine. Postoperative change noted at the posterior elements of L4-L5. Chronic, healed proximal sternal body fracture. Osteonecrosis of the femoral heads   without height loss or collapse.      Impression    IMPRESSION:  1.  No significant change in emphysema, bronchiectasis, and interstitial thickening likely related to fibrosis. No definite acute pneumonia.    2.  Mild wall thickening of the cecum and rectum suggestive of infectious or inflammatory colitis/proctitis.   Head CT w/o contrast    Narrative    EXAM: CT HEAD W/O CONTRAST  LOCATION: [facility]  DATE: 1/19/2024    INDICATION: altered mental status, on anticoagulation   evalute for hemorrhage  COMPARISON: 12/29/2023  TECHNIQUE: Routine CT Head without IV contrast. Multiplanar reformats. Dose reduction techniques were used.    FINDINGS: There is contrast within the intracranial vascular system from recent contrast enhanced study of the chest abdomen and pelvis.  INTRACRANIAL CONTENTS: No intracranial hemorrhage, extraaxial collection, or mass effect.  No CT evidence of acute infarct. Mild to moderate presumed chronic small vessel ischemic changes. Mild to moderate generalized volume loss. No hydrocephalus.     VISUALIZED ORBITS/SINUSES/MASTOIDS: No intraorbital abnormality. No paranasal sinus mucosal disease. No middle ear or mastoid effusion.    BONES/SOFT TISSUES: No acute abnormality.      Impression    IMPRESSION:  1.  No CT evidence for acute intracranial process.  2.  Brain atrophy and presumed chronic microvascular ischemic changes as above.        I reviewed all new labs and imaging results over the last 24 hours. I personally reviewed no images or EKG's today.    Medications      [Held  by provider] apixaban ANTICOAGULANT  5 mg Oral BID    [Held by provider] cetirizine  5 mg Oral Daily    [Held by provider] clopidogrel  75 mg Oral Daily    [Held by provider] ferrous sulfate  325 mg Oral Every Other Day    gabapentin  200 mg Oral BID    [Held by provider] levothyroxine  50 mcg Oral QAM AC    [Held by provider] metoprolol tartrate  12.5 mg Oral BID    [Held by provider] pantoprazole  40 mg Oral Daily    [Held by provider] rosuvastatin  40 mg Oral Daily    [Held by provider] sacubitril-valsartan  1 tablet Oral Daily    sodium chloride (PF)  3 mL Intracatheter Q8H    [Held by provider] umeclidinium  1 puff Inhalation Daily    [Held by provider] Vitamin D3  25 mcg Oral Daily     Thiago Zabala PA-C

## 2024-01-19 NOTE — PLAN OF CARE
Problem: Adult Inpatient Plan of Care  Goal: Optimal Comfort and Wellbeing  Outcome: Progressing   Goal Outcome Evaluation:  Pt receiving palliative care per request after consulting with MD.  Family in to visit.  Pt pain managed per MAR

## 2024-01-19 NOTE — PHARMACY-VANCOMYCIN DOSING SERVICE
Pharmacy Vancomycin Initial Note  Date of Service 2024  Patient's  1935  88 year old, male    Indication: Sepsis    Current estimated CrCl = Estimated Creatinine Clearance: 49.9 mL/min (based on SCr of 1.08 mg/dL).    Creatinine for last 3 days  2024:  9:49 PM Creatinine 1.14 mg/dL  2024:  6:36 AM Creatinine 1.08 mg/dL    Recent Vancomycin Level(s) for last 3 days  No results found for requested labs within last 3 days.      Vancomycin IV Administrations (past 72 hours)        No vancomycin orders with administrations in past 72 hours.                    Nephrotoxins and other renal medications (From now, onward)      Start     Dose/Rate Route Frequency Ordered Stop    24 0900  vancomycin (VANCOCIN) 1,250 mg in sodium chloride 0.9 % 250 mL intermittent infusion         1,250 mg  over 90 Minutes Intravenous EVERY 24 HOURS 24 0845      24 0900  vancomycin (VANCOCIN) 1,750 mg in sodium chloride 0.9 % 500 mL intermittent infusion         1,750 mg  over 2 Hours Intravenous ONCE 24 0845      24 0608  ibuprofen (ADVIL/MOTRIN) tablet 200 mg         200 mg Oral EVERY 6 HOURS PRN 24 0609              Contrast Orders - past 72 hours (72h ago, onward)      Start     Dose/Rate Route Frequency Stop    24 2330  iopamidol (ISOVUE-370) solution 78 mL         78 mL Intravenous ONCE 24 2335            [employer] Prediction of Planned Initial Vancomycin Regimen  Loading dose: 1750 mg at 09:00 2024.  Regimen: 1250 mg IV every 24 hours.  Start time: 09:00 on 2024  Exposure target: AUC24 (range)400-600 mg/L.hr   AUC24,ss: 548 mg/L.hr  Probability of AUC24 > 400: 82 %  Ctrough,ss: 16.7 mg/L  Probability of Ctrough,ss > 20: 34 %  Probability of nephrotoxicity (Lodise YUDITH ): 12 %          Plan:  Start vancomycin  1750 mg IV x 1 then 1250 mg q24h.   Vancomycin monitoring method: AUC  Vancomycin therapeutic monitoring goal: 400-600 mg*h/L  Pharmacy  will check vancomycin levels as appropriate in 1-3 Days.    Serum creatinine levels will be ordered daily for the first week of therapy and at least twice weekly for subsequent weeks.      Gris Cary RP

## 2024-01-19 NOTE — ED NOTES
[facility]   Admission Handoff    The patient is Dangelo Hernández, 88 year old who arrived in the ED by AMBULANCE from TCU with a complaint of Altered Mental Status  . The patient's current symptoms are new and during this time the symptoms have remained the same. In the ED, patient was diagnosed with   Final diagnoses:   Diarrhea, unspecified type   Fever, unspecified fever cause   Atrial fibrillation with rapid ventricular response (H)   Weakness         Needed?: No    Allergies:    Allergies   Allergen Reactions    Amoxicillin Diarrhea           Lisinopril Cough       Past Medical Hx:   Past Medical History:   Diagnosis Date    ACS (acute coronary syndrome) (H) 10/15/2019    Acute kidney failure NEC 11/27/2011    Acute metabolic encephalopathy 09/05/2023    JESSICA (acute kidney injury) (H24) 09/05/2023    Altered mental status, unspecified 06/28/2023    Arthritis     Basal cell carcinoma     Bilateral pneumonia 06/15/2023    Blood transfusion     Bronchiectasis (H)     CAD (coronary artery disease)     Colon polyps     COVID-19 12/07/2022    Cryptogenic organizing pneumonia (H) 12/07/2012    Depression, unspecified 06/21/2023    Endocarditis, valve unspecified 01/27/2022    GERD (gastroesophageal reflux disease)     Hyperlipidemia     Hypertension     Laceration without foreign body of other part of head, subsequent encounter 06/28/2023    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    Pneumonia due to coronavirus disease 2019 01/27/2022    Pneumonia in mycoses 09/09/2011    Pneumonia, candidial (H) 09/09/2011    S/P TAVR (transcatheter aortic valve replacement)     Septic shock (H) 06/15/2023       Initial vitals were: BP: 130/56  Pulse: 67  Temp: 99  F (37.2  C)  Resp: 24  SpO2: (!) 89 %   Recent vital Signs: BP 96/47   Pulse 107   Temp 97.8  F (36.6  C) (Oral)   Resp 30   SpO2 95%     Elimination Status: Continent: No     Activity Level: 2  assist    Fall Status: Reason for falls risk:  Mobility, Reason for falls risk: Elimination, and Reason for falls risk: Cognition  bed/chair alarm on, nonskid shoes/slippers when out of bed, and arm band in place    Baseline Mental status: mild dementia  Current Mental Status changes: acute confusion    Infection present or suspected this encounter: no  Sepsis suspected: No    Isolation type:     Bariatric equipment needed?: No    In the ED these meds were given:   Medications   acetaminophen (TYLENOL) Suppository 650 mg (650 mg Rectal $Given 1/19/24 0007)   iopamidol (ISOVUE-370) solution 78 mL (78 mLs Intravenous $Given 1/18/24 2335)   sodium chloride 0.9 % bag 500mL for CT scan flush use (59 mLs Intravenous $Given 1/18/24 2336)   lactated ringers BOLUS 1,000 mL ( Intravenous Rate/Dose Verify 1/19/24 8537)       Drips running?  No    Home pump  No    Current LDAs: Peripheral IV: Site R bucep; Gauge 20  none     Results:   Labs/Imaging  Ordered and Resulted from Time of ED Arrival Up to the Time of Departure from the ED  Results for orders placed or performed during the hospital encounter of 01/18/24 (from the past 24 hour(s))   CBC with platelets differential    Narrative    The following orders were created for panel order CBC with platelets differential.  Procedure                               Abnormality         Status                     ---------                               -----------         ------                     CBC with platelets and d...[[id number]]  Abnormal            Final result                 Please view results for these tests on the individual orders.   Comprehensive metabolic panel   Result Value Ref Range    Sodium 138 135 - 145 mmol/L    Potassium 4.3 3.4 - 5.3 mmol/L    Carbon Dioxide (CO2) 21 (L) 22 - 29 mmol/L    Anion Gap 15 7 - 15 mmol/L    Urea Nitrogen 24.9 (H) 8.0 - 23.0 mg/dL    Creatinine 1.14 0.67 - 1.17 mg/dL    GFR Estimate 63 >60 mL/min/1.73m2    Calcium 9.4 8.8 - 10.2  mg/dL    Chloride 102 98 - 107 mmol/L    Glucose 121 (H) 70 - 99 mg/dL    Alkaline Phosphatase 58 40 - 150 U/L    AST 28 0 - 45 U/L    ALT 12 0 - 70 U/L    Protein Total 7.0 6.4 - 8.3 g/dL    Albumin 4.0 3.5 - 5.2 g/dL    Bilirubin Total 0.3 <=1.2 mg/dL   Troponin T, High Sensitivity   Result Value Ref Range    Troponin T, High Sensitivity 45 (H) <=22 ng/L   Nt probnp inpatient (BNP)   Result Value Ref Range    N terminal Pro BNP Inpatient 1,282 0 - 1,800 pg/mL   CRP inflammation   Result Value Ref Range    CRP Inflammation 64.95 (H) <5.00 mg/L   CBC with platelets and differential   Result Value Ref Range    WBC Count 12.1 (H) 4.0 - 11.0 10e3/uL    RBC Count 3.69 (L) 4.40 - 5.90 10e6/uL    Hemoglobin 11.0 (L) 13.3 - 17.7 g/dL    Hematocrit 33.5 (L) 40.0 - 53.0 %    MCV 91 78 - 100 fL    MCH 29.8 26.5 - 33.0 pg    MCHC 32.8 31.5 - 36.5 g/dL    RDW 15.9 (H) 10.0 - 15.0 %    Platelet Count 296 150 - 450 10e3/uL    % Neutrophils 83 %    % Lymphocytes 6 %    % Monocytes 9 %    % Eosinophils 1 %    % Basophils 1 %    % Immature Granulocytes 0 %    NRBCs per 100 WBC 0 <1 /100    Absolute Neutrophils 10.1 (H) 1.6 - 8.3 10e3/uL    Absolute Lymphocytes 0.7 (L) 0.8 - 5.3 10e3/uL    Absolute Monocytes 1.1 0.0 - 1.3 10e3/uL    Absolute Eosinophils 0.1 0.0 - 0.7 10e3/uL    Absolute Basophils 0.1 0.0 - 0.2 10e3/uL    Absolute Immature Granulocytes 0.1 <=0.4 10e3/uL    Absolute NRBCs 0.0 10e3/uL   Procalcitonin   Result Value Ref Range    Procalcitonin 0.58 (H) <0.50 ng/mL   [place] Draw *Canceled*    Narrative    The following orders were created for panel order [place] Draw.  Procedure                               Abnormality         Status                     ---------                               -----------         ------                       Please view results for these tests on the individual orders.   Lactic acid whole blood   Result Value Ref Range    Lactic Acid 1.4 0.7 - 2.0 mmol/L   Blood gas venous   Result Value  Ref Range    pH Venous 7.40 7.32 - 7.43    pCO2 Venous 37 (L) 40 - 50 mm Hg    pO2 Venous 30 25 - 47 mm Hg    Bicarbonate Venous 23 21 - 28 mmol/L    Base Excess/Deficit Venous -1.9 -3.0 - 3.0 mmol/L    FIO2 21     Oxyhemoglobin Venous 54 (L) 70 - 75 %    O2 Sat, Venous 54.8 (L) 70.0 - 75.0 %    Narrative    In healthy individuals, oxyhemoglobin (O2Hb) and oxygen saturation (SO2) are approximately equal. In the presence of dyshemoglobins, oxyhemoglobin can be considerably lower than oxygen saturation.   Symptomatic Influenza A/B, RSV, & SARS-CoV2 PCR (COVID-19) Nasopharyngeal    Specimen: Nasopharyngeal; Swab   Result Value Ref Range    Influenza A PCR Negative Negative    Influenza B PCR Negative Negative    RSV PCR Negative Negative    SARS CoV2 PCR Negative Negative    Narrative    Testing was performed using the Xpert Xpress CoV2/Flu/RSV Assay on the [employer] GeneXpert Instrument. This test should be ordered for the detection of SARS-CoV-2, influenza, and RSV viruses in individuals who meet clinical and/or epidemiological criteria. Test performance is unknown in asymptomatic patients. This test is for in vitro diagnostic use under the FDA EUA for laboratories certified under CLIA to perform high or moderate complexity testing. This test has not been FDA cleared or approved. A negative result does not rule out the presence of PCR inhibitors in the specimen or target RNA in concentration below the limit of detection for the assay. If only one viral target is positive but coinfection with multiple targets is suspected, the sample should be re-tested with another FDA cleared, approved, or authorized test, if coinfection would change clinical management. This test was validated by the [facility] [employer]. These laboratories are certified under the Clinical Laboratory Improvement Amendments of 1988 (CLIA-88) as qualified to perform high complexity laboratory testing.   UA with Microscopic reflex to Culture     Specimen: Urine, Midstream   Result Value Ref Range    Color Urine Yellow Colorless, Straw, Light Yellow, Yellow    Appearance Urine Clear Clear    Glucose Urine Negative Negative mg/dL    Bilirubin Urine Negative Negative    Ketones Urine Negative Negative mg/dL    Specific Gravity Urine 1.023 1.003 - 1.035    Blood Urine Small (A) Negative    pH Urine 5.0 5.0 - 7.0    Protein Albumin Urine 30 (A) Negative mg/dL    Urobilinogen Urine Normal Normal, 2.0 mg/dL    Nitrite Urine Negative Negative    Leukocyte Esterase Urine Negative Negative    Mucus Urine Present (A) None Seen /LPF    RBC Urine 3 (H) <=2 /HPF    WBC Urine 2 <=5 /HPF    Squamous Epithelials Urine 1 <=1 /HPF    Narrative    Urine Culture not indicated   XR Chest 2 Views    Narrative    EXAM: XR CHEST 2 VIEWS  LOCATION: [facility]  DATE: 1/18/2024    INDICATION: Cough and fever.  COMPARISON: CT chest 12/29/2023, chest radiograph 10/04/2021      Impression    IMPRESSION:     Chronic reticular interstitial opacities, likely representing underlying interstitial lung disease. Hypoinflated lungs. No new focal airspace disease. No pleural effusion or pneumothorax.    Stable, nonenlarged cardiomediastinal silhouette. TAVR. Coronary artery stents.    Multilevel degenerative changes of the spine.   CT Chest/Abdomen/Pelvis w Contrast    Narrative    EXAM: CT CHEST/ABDOMEN/PELVIS W CONTRAST  LOCATION: [facility]  DATE: 1/18/2024    INDICATION: fever, cough, diarrhea  COMPARISON: Chest radiographs from 01/18/2024, abdominopelvic CT from 12/29/2023. CT chest, abdomen, pelvis from 09/04/2023.  TECHNIQUE: CT scan of the chest, abdomen, and pelvis was performed following injection of IV contrast. Multiplanar reformats were obtained. Dose reduction techniques were used.   CONTRAST: 78mL isovue 370    FINDINGS:   LUNGS AND PLEURA: Redemonstrated changes of wedge resection in the right lateral lung base.  Centrilobular emphysema with areas of bronchiectasis and interstitial thickening scattered throughout the lungs with similar distribution and severity when   compared to 09/04/2023. No definite acute infiltrate. No pneumothorax or pleural effusion. There is a small amount of airway debris in the lower thoracic trachea.    MEDIASTINUM/AXILLAE: Heart size is normal. Aortic valve replacement. Severe coronary artery calcifications and stents.    CORONARY ARTERY CALCIFICATION: Previous intervention (stents or CABG).    HEPATOBILIARY: Normal.    PANCREAS: Mildly atrophic.    SPLEEN: Normal.    ADRENAL GLANDS: Normal.    KIDNEYS/BLADDER: Kidneys within normal limits. Trabeculated urinary bladder with posterior wall diverticulum.    BOWEL: Stomach is decompressed. Normal caliber small bowel. Mild wall thickening of the cecum as seen on coronal image 42. Mild wall thickening of the lower rectum as seen on coronal image 72. No appendicitis. No free air.    LYMPH NODES: Normal.    VASCULATURE: Severe aortoiliac atherosclerotic calcification without aneurysm.    PELVIC ORGANS: Normal.    MUSCULOSKELETAL: Osteopenia throughout. Bridging anterior endplate osteophytes in the lower thoracic spine. Postoperative change noted at the posterior elements of L4-L5. Chronic, healed proximal sternal body fracture. Osteonecrosis of the femoral heads   without height loss or collapse.      Impression    IMPRESSION:  1.  No significant change in emphysema, bronchiectasis, and interstitial thickening likely related to fibrosis. No definite acute pneumonia.    2.  Mild wall thickening of the cecum and rectum suggestive of infectious or inflammatory colitis/proctitis.   Head CT w/o contrast    Narrative    EXAM: CT HEAD W/O CONTRAST  LOCATION: [facility]  DATE: 1/19/2024    INDICATION: altered mental status, on anticoagulation   evalute for hemorrhage  COMPARISON: 12/29/2023  TECHNIQUE: Routine CT Head without IV  contrast. Multiplanar reformats. Dose reduction techniques were used.    FINDINGS: There is contrast within the intracranial vascular system from recent contrast enhanced study of the chest abdomen and pelvis.  INTRACRANIAL CONTENTS: No intracranial hemorrhage, extraaxial collection, or mass effect.  No CT evidence of acute infarct. Mild to moderate presumed chronic small vessel ischemic changes. Mild to moderate generalized volume loss. No hydrocephalus.     VISUALIZED ORBITS/SINUSES/MASTOIDS: No intraorbital abnormality. No paranasal sinus mucosal disease. No middle ear or mastoid effusion.    BONES/SOFT TISSUES: No acute abnormality.      Impression    IMPRESSION:  1.  No CT evidence for acute intracranial process.  2.  Brain atrophy and presumed chronic microvascular ischemic changes as above.     *Note: Due to a large number of results and/or encounters for the requested time period, some results have not been displayed. A complete set of results can be found in Results Review.       For the majority of the shift this patient's behavior was Green     Cardiac Rhythm: Normal Sinus  Pt needs tele? No  Skin/wound Issues: None    Code Status: DNR / DNI    Pain control: fair    Nausea control: fair    Abnormal labs/tests/findings requiring intervention:     Patient tested for COVID 19 prior to admission: YES     OBS brochure/video discussed/provided to patient/family: Yes     Family present during ED course? Yes     Family Comments/Social Situation comments:     Tasks needing completion: None    Dahlia Rush RN

## 2024-01-19 NOTE — PROGRESS NOTES
Clinic Care Coordination Contact  Ambulatory Care Coordination to Inpatient Care Management   Hand-In Communication    Date:  January 19, 2024  Name: Dangelo Hernández is enrolled in Ambulatory Care Coordination program and I am the Lead Care Coordinator.  CC Contact Information: Epic InCadence Bancorpsket + phone  Payor Source: Payor: [facility] / Plan: [employer] MEDICARE ADVANTAGE / Product Type: HMO /   Current services in place: PCA, Transitional Care (3x a week 2 hours each day)   Please see the CC Snaphot and Care Management Flowsheets for specific  details of this Dangelo Hernández care plan.   Additional details/specific concerns r/t this admission:    No additional information to share .  New to clinic care coordination     I will follow this admission in Epic. Please feel free to contact me with questions or for further collaboration in discharge planning.     [facility]   Amita Patino RN, Care Coordinator   [facility]'s   E-mail [email or web address]   [phone]

## 2024-01-19 NOTE — PROGRESS NOTES
"Impression:  Pt seen during breakfast.  Pt was tired and main focus was to be able to take medications.  Pt had difficulty swallowing with thin liquids and use of straw.  Throat clearing also noted with mildly thick x1.   Pt willing to try pureed/oatmeal and was WFL.  Limited assessment due to patient medical status and participation.  Recommend trial of pureed solids with moderately thick liquid.  SLP to follow and upgrade diet.     Bedside Swallow Evaluation  01/19/24    Appointment Info   Signing Clinician's Name / Credentials (SLP) Zulma Veras MA, CCC/SLP   General Information   Onset of Illness/Injury or Date of Surgery 01/18/24   Referring Physician Devante Rodarte, [place] Stanford MD   Patient/Family Therapy Goal Statement (SLP) Pt did not state when asked.   Pertinent History of Current Problem Dangelo Hernández is a 88 year old male with history of pneumonia, bronchiectasis, dysphagia, coronary disease, and mantle cell lymphoma presenting for evaluation of cough and decreased alertness.  Patient was recently hospitalized over the new year and discharged January 2 for pneumonia.  Patient was discharged from the hospital to [facility] where he has been recovering in the transitional care unit.  Reviewing recent notes, on January 15 he was noted to be walking down the hallway on his own and was reportedly coughing less at that time.  He also was reportedly feeling less short of breath.  Today patient reportedly was much less alert and seemed to coughing more and developed a fever at his care facility so was sent in for evaluation.  In the ED patient is fatigued appearing and only states that \"I feel terrible\".  Patient not able to further clarify with me what is bothering him but he does have a significant wet sounding cough. Referral for ST for swallow evaluation.   General Observations Pt seen bedside.  Pt appeared tired but able to answer some basic yes not question.   Pain Assessment   Patient " Currently in Pain No   Oral Motor   Oral Musculature unable to assess due to poor participation/comprehension  (pt tired)   Dentition (Oral Motor)   Comment, Dentition (Oral Motor) dentures not worn during evaluation.   General Swallowing Observations   Current Diet/Method of Nutritional Intake (General Swallowing Observations, NIS) regular diet   Past History of Dysphagia No   Swallowing Evaluation Clinical swallow evaluation   Comment, General Swallowing Observations Did have bedside swallow evauation 12/32/23 due to pneu;monia.   Clinical Swallow Evaluation   Feeding Assistance dependent   Clinical Swallow Evaluation Textures Trialed thin liquids;mildly thick liquids;pureed   Clinical Swallow Eval: Thin Liquid Texture Trial   Mode of Presentation, Thin Liquids straw;fed by clinician  (nurse offered during meds)   Volume of Liquid or Food Presented single   Oral Phase of Swallow premature pharyngeal entry; deficits with sucking/coordination with straw.   Pharyngeal Phase of Swallow impaired;coughing/choking;wet vocal quality after swallow   Diagnostic Statement Cough with thin liquid.  Delayed oral stage.   Clinical Swallow Eval: Mildly Thick Liquids   Mode of Presentation fed by clinician;cup   Volume Presented single sips   Oral Phase WFL  (mildl holding)   Pharyngeal Phase coughing/choking;impaired   Diagnostic Statement Cough and mild wet vocal quality x1/ 4 sips   Clinical Swallow Evaluation: Puree Solid Texture Trial   Mode of Presentation, Puree spoon;fed by clinician   Volume of Puree Presented appropriate bite size   Oral Phase, Puree WFL   Pharyngeal Phase, Puree intact   Diagnostic Statement WFL.   Clinical Swallow Eval: Soft & Bite Sized   Diagnostic Statement DNT   Clinical Swallow Evaluation: Solid Food Texture Trial   Diagnostic Statement DNT   Esophageal Phase of Swallow   Patient reports or presents with symptoms of esophageal dysphagia No   Swallowing Recommendations   Diet Consistency  Recommendations pureed (level 4);moderately thick liquids/liquidized (level 3)   Supervision Level for Intake 1:1 supervision needed   Comment, Swallowing Recommendations Pt seen during breakfast.  Pt was tired and main focus was to be able to take medications.  Pt had difficulty swallowing with thin liquids and use of straw.  Throat clearing also noted with mildly thick x1.   Pt willing to try pureed/oatmeal and was WFL.  Recommend trial of pureed solids with moderately thick liquid.  SLP to follow and upgrade diet.   General Therapy Interventions   Planned Therapy Interventions Dysphagia Treatment   Dysphagia treatment Modified diet education;Instruction of safe swallow strategies   Clinical Impression   Criteria for Skilled Therapeutic Interventions Met (SLP Eval) Yes, treatment indicated   SLP Diagnosis Dysphagia   Clinical Impression Comments Pt seen bedside   SLP Total Evaluation Time   Eval: oral/pharyngeal swallow function, clinical swallow Minutes (72020) 25   Total Session Time   Total Session Time (sum of timed and untimed services) 25

## 2024-01-19 NOTE — PROGRESS NOTES
Skin affirmation note    Admitting nurse completed full skin assessment, Raul score and Raul interventions. This writer agrees with the initial skin assessment findings.    hCanel Brian RN on 1/19/2024 at 6:26 AM

## 2024-01-20 NOTE — PROGRESS NOTES
Speech Language Therapy Discharge Summary    Reason for therapy discharge:    Pt is on comfort cares    Progress towards therapy goal(s). See goals on Care Plan in [facility] electronic health record for goal details.  Goals partially met.  Barriers to achieving goals:   transitioned to comfort cares.    Therapy recommendation(s):    No further therapy is recommended.

## 2024-01-20 NOTE — CONSULTS
Care Management Initial Consult    General Information  Assessment completed with: John Stevens  Type of CM/SW Visit: Initial Assessment    Primary Care Provider verified and updated as needed: Yes   Readmission within the last 30 days: current reason for admission unrelated to previous admission      Reason for Consult: discharge planning  Advance Care Planning: Advance Care Planning Reviewed: present on chart        Communication Assessment  Patient's communication style: spoken language (English or Bilingual)    Hearing Difficulty or Deaf: no   Wear Glasses or Blind: no    Cognitive  Cognitive/Neuro/Behavioral: .WDL except                 Speech: garbled    Living Environment:   People in home: alone     Current living Arrangements: house      Able to return to prior arrangements: yes     Family/Social Support:  Care provided by: self, child(ernesto)  Provides care for: no one, unable/limited ability to care for self  Marital Status:   Children          Description of Support System: Supportive, Involved    Support Assessment: Adequate family and caregiver support, Adequate social supports    Current Resources:   Patient receiving home care services: No     Community Resources: None  Equipment currently used at home: walker, standard  Supplies currently used at home: None    Employment/Financial:  Employment Status: retired        Financial Concerns: none      Does the patient's insurance plan have a 3 day qualifying hospital stay waiver?  No    Lifestyle & Psychosocial Needs:  Social Determinants of Health     Food Insecurity: Low Risk  (11/8/2023)    Food Insecurity     Within the past 12 months, did you worry that your food would run out before you got money to buy more?: No     Within the past 12 months, did the food you bought just not last and you didn t have money to get more?: No   Depression: Not at risk (7/26/2023)    PHQ-2     PHQ-2 Score: 0   Housing Stability: Low Risk  (11/8/2023)    Housing  Stability     Do you have housing? : Yes     Are you worried about losing your housing?: No   Tobacco Use: Medium Risk (1/15/2024)    Patient History     Smoking Tobacco Use: Former     Smokeless Tobacco Use: Former     Passive Exposure: Not on file   Financial Resource Strain: Low Risk  (11/8/2023)    Financial Resource Strain     Within the past 12 months, have you or your family members you live with been unable to get utilities (heat, electricity) when it was really needed?: No   Alcohol Use: Not on file   Transportation Needs: Low Risk  (11/8/2023)    Transportation Needs     Within the past 12 months, has lack of transportation kept you from medical appointments, getting your medicines, non-medical meetings or appointments, work, or from getting things that you need?: No   Physical Activity: Not on file   Interpersonal Safety: Not on file   Stress: Not on file   Social Connections: Not on file     Functional Status:  Prior to admission patient needed assistance:   Dependent ADLs:: Ambulation-walker, Eating, Grooming, Bathing  Dependent IADLs:: Cleaning, Cooking, Laundry, Shopping, Meal Preparation, Medication Management, Transportation     Mental Health Status:  Mental Health Status: No Current Concerns       Chemical Dependency Status:  Chemical Dependency Status: No Current Concerns           Values/Beliefs:  Spiritual, Cultural Beliefs, Druze Practices, Values that affect care: no               Additional Information:  Care Management me with patient's daughter Hilda - CM introduced self and explained role.     Patient was previously at [facility] prior to admission. Patient was admitted 1/19/24 to hospital and was expected to possibly pass away within the next day. As patient is not actively dying, physician asked CM to speak with family about discharge planning.     CM spoke with daughter Hilda and if patient is able to go home, they would like to take patient home on hospice care. Hilda is a home  health aid for [facility] and has worked with [facility] hospice previously so she would like referral sent to [facility] hospice. Family will help support patient at home as well and family will possibly hire [facility] to support patient at home. Patient has private pay funds to pay for HHC if needed.     Family would like hospital bed, over bed table, commode, and possibly a wheelchair at home for patient.     CM reassured family that if patient is actively dying, we will not discharge him to home.    Care Management will continue to follow for discharge planning.     Plan: Home with family support and [facility]  Transport: CINTHIA Dukes  Inpatient Care Coordinator   [facility] [phone]  [facility] [phone]

## 2024-01-20 NOTE — PLAN OF CARE
Problem: Adult Inpatient Plan of Care  Goal: Optimal Comfort and Wellbeing  Outcome: Progressing  Intervention: Monitor Pain and Promote Comfort  Recent Flowsheet Documentation  Taken 1/20/2024 9554 by Yessenia Connell RN  Pain Management Interventions: medication (see MAR)     Goal Outcome Evaluation:  Patient alert this morning, wanted to sit up, assisted to the chair with heavy assist of 2 and a gait belt.  Patient weak.  Taking in minimal sips of water and bites of pudding/pureed pear.  IV dilaudid given x 1 this morning for patient voicing pain, denies pain this afternoon.  Small loose stool x 1 today on commode, then back to bed and now resting comfortably.  Family has been present throughout the day.  Requested vitals be checked, patient was afebrile 98.9.

## 2024-01-20 NOTE — PLAN OF CARE
Problem: Adult Inpatient Plan of Care  Goal: Absence of Hospital-Acquired Illness or Injury  Intervention: Prevent Skin Injury  Recent Flowsheet Documentation  Taken 1/20/2024 0051 by Devick, Karen M, RN  Body Position:   turned   left  Taken 1/20/2024 0000 by Devick, Karen M, RN  Body Position: turned     Problem: Adult Inpatient Plan of Care  Goal: Optimal Comfort and Wellbeing  Outcome: Progressing     Goal Outcome Evaluation: Patient is comfort cares. Turn every 2-3 hours. Temp 100.1 axillary given Tylenol suppository. Dilaudid IV for discomfort. Daughter in room overnight. Incontinent of bowel and bladder.

## 2024-01-20 NOTE — PROGRESS NOTES
"[facility]    Hospitalist Progress Note    Date of Service (when I saw the patient): 01/20/2024    Assessment & Plan   Dangelo Hernández is a 88 year old male who presents on 1/18/2024 for increased cough, SOB, and AMS, and fevers.     Comfort measures  Generalized weakness  Bronchiectasis (H)  Fever, unspecified fever cause  Hx of candidal and cryptogenic organizing pneumonia    Shortly after admission to the floor he had a temp of 102.6, HR 120s, RR 30s, BP 70s/30s. Initially 1L NS was started along with Vancomycin and Cefepime. However, after discussion with the daughter she expressed that he's been \"ready to go\" for the last year, and he would want comfort cares at this point. Additionally, when the RN was placing a second large bore IV the patient stated \"just let me go\". All medications were then stopped and comfort measures were initiated.   -It does not appear as though patient is imminently dying, and will survive the next 24 hours.  Will ask care coordinator to begin looking for discharge options.     Atrial fibrillation with rapid ventricular response (H)  Long term current use of anticoagulant therapy  Hypertension goal BP (blood pressure) < 140/90  - Discontinue prior to admission with Apixaban, Metoprolol, Entresto, Clopidogrel with comfort cares.     CAD s/p PCI 2013 and 10/2019 (ODIN to proximal LAD)   Aortic stenosis S/P TAVR   Patient had a TAVR procedure 03/2023.  - EKG had ST depressions changes which were unchanged from prior EKGs.     Mantle Cell Lymphoma S/P Autologous Transplant  Patient had stem cell transplant in 10/2010.  Currently in remission     Anemia, unspecified  11 on admission. At baseline and stable.  -Discontinue prior to admission with Ferrous sulfate and vitamin D with comfort cares.     GERD   - Discontinue PTA esomeprazole 20 mg with comfort cares.     Acquired hypothyroidism  Stable. TSH 2.47 on previous admission.  - Discontinue prior to " admission with Levothyroxine with comfort cares.     Hyperlipidemia  Managed prior to admission with Rosuvastatin.    Diet: Pureed Diet (level 4) Liquidized/Moderately Thick (level 3)    DVT Prophylaxis: Pneumatic Compression Devices  Ascencio Catheter: Not present  Lines: None     Code Status: No CPR- Do NOT Intubate.  Comfort cares.     Disposition: Patient's death is likely not to occur within the next 24 hours.  Will ask care coordination to begin looking at discharge options.    25 MINUTES SPENT BY ME on the date of service doing chart review, history, exam, documentation & further activities per the note.    Cayetano Peña MD    Interval History   Patient resting in bed.  He is confused but denies pain or nausea.    -Data reviewed today: I reviewed all new labs and imaging results over the last 24 hours. I personally reviewed no images or EKG's today.    Physical Exam   Temp: 98.9  F (37.2  C) Temp src: Oral BP: 97/48 Pulse: 119   Resp: 20 SpO2: 95 % O2 Device: Nasal cannula Oxygen Delivery: 2 LPM  Vitals:    01/19/24 0418   Weight: 71.8 kg (158 lb 4.6 oz)     Vital Signs with Ranges  Temp:  [98.7  F (37.1  C)-100.6  F (38.1  C)] 98.9  F (37.2  C)  Pulse:  [119] 119  Resp:  [20] 20  BP: (97)/(48) 97/48  SpO2:  [86 %-95 %] 95 %  No intake/output data recorded.    Gen: Thin debilitated elderly male, alert and oriented x 1, no acute distressed  HEENT: Atraumatic, normocephalic; sclera non-injected, anicterric; oral mucosa moist, no lesion, no exudate  Lungs: Clear to ausculation, no wheezes, no rhonchi, no rales  Heart: Tachycardic irregular rhythm, no gallops, no rubs, no murmurs  GI: Bowel sound normal, no hepatosplenomegaly, no masses, non-tender, non-distended, no guarding, no rebound tenderness  Lymph: No lymphadenopathy, trace BLE edema  Skin: No rashes, no chronic venous stasis     Medications      [Held by provider] apixaban ANTICOAGULANT  5 mg Oral BID    [Held by provider] cetirizine  5 mg Oral Daily     [Held by provider] clopidogrel  75 mg Oral Daily    [Held by provider] ferrous sulfate  325 mg Oral Every Other Day    gabapentin  200 mg Oral BID    [Held by provider] levothyroxine  50 mcg Oral QAM AC    [Held by provider] metoprolol tartrate  12.5 mg Oral BID    [Held by provider] pantoprazole  40 mg Oral Daily    [Held by provider] rosuvastatin  40 mg Oral Daily    [Held by provider] sacubitril-valsartan  1 tablet Oral Daily    sodium chloride (PF)  3 mL Intracatheter Q8H    [Held by provider] umeclidinium  1 puff Inhalation Daily    [Held by provider] Vitamin D3  25 mcg Oral Daily       Data   Recent Labs   Lab 01/19/24  0636 01/18/24  2149   WBC 13.1* 12.1*   HGB 11.0* 11.0*   MCV 91 91    296    138   POTASSIUM 3.9 4.3   CHLORIDE 101 102   CO2 17* 21*   BUN 24.9* 24.9*   CR 1.08 1.14   ANIONGAP 18* 15   CHAVEZ 9.4 9.4   * 121*   ALBUMIN 3.7 4.0   PROTTOTAL 6.6 7.0   BILITOTAL 0.5 0.3   ALKPHOS 55 58   ALT 13 12   AST 30 28       No results found for this or any previous visit (from the past 24 hour(s)).

## 2024-01-21 PROBLEM — I48.91 ATRIAL FIBRILLATION WITH RAPID VENTRICULAR RESPONSE (H): Status: RESOLVED | Noted: 2024-01-01 | Resolved: 2024-01-01

## 2024-01-21 NOTE — PLAN OF CARE
Problem: Risk for Delirium  Goal: Optimal Coping  Outcome: Progressing  Goal: Improved Attention and Thought Clarity  Outcome: Progressing  Goal: Improved Sleep  Outcome: Progressing     Goal Outcome Evaluation: Patient is alert with intermittent confusion. Enteric Precautions. Incontinent of bowel and bladder. Small area of excoriation bottom of scrotum. Turn every 2-3 hours. Swallows pills well with applesauce. On room air, frequent productive cough. Lung sounds coarse. Is on comfort cares but was up in the chair for most of the day and evening yesterday, alert, talking, eating and drinking which is an improvement from the day before.

## 2024-01-21 NOTE — PROGRESS NOTES
Pt has been turned every 1-2 hours and incontinence care done at same time.  There is a small abrasion on bottom of scrotum that this writer cleaned and put protective cream on.  Mouth cares done throughout the day.  Patient stated no pain whenever this writer asked.  Family assisting with po intake/pt mostly drinking.  Comfort cares

## 2024-01-21 NOTE — PROGRESS NOTES
Increase alertness on elias shift, voicing requests. Up in chair, taking in jello with dtrs assist.   Dtrs here and voicing confusion re:plan of care. Per their understanding, is to discharge tomorrow to home on hospice, but home is not ready for that and caregivers for 24 hour care is not available. Also, patient status has greatly changed since yesterday of somnolence with little verbal interaction. Thiago Zabala did agree to see and speak to the family, which was much appreciated as he had met the patient prior during this hospitalization. Note placed for MD/care team to discuss further discharge plans and goals with family tomorrow.

## 2024-01-21 NOTE — PROGRESS NOTES
"[facility]    Hospitalist Progress Note    Date of Service (when I saw the patient): 01/21/2024    Assessment & Plan   Dangelo Hernández is a 88 year old male who presents on 1/18/2024 for increased cough, SOB, and AMS, and fevers.     Comfort measures  Generalized weakness  Bronchiectasis (H)  Fever, unspecified fever cause  Hx of candidal and cryptogenic organizing pneumonia    Shortly after admission to the floor he had a temp of 102.6, HR 120s, RR 30s, BP 70s/30s. Initially 1L NS was started along with Vancomycin and Cefepime. However, after discussion with the daughter she expressed that he's been \"ready to go\" for the last year, and he would want comfort cares at this point. Additionally, when the RN was placing a second large bore IV the patient stated \"just let me go\". All medications were then stopped and comfort measures were initiated.   -It does not appear as though patient is imminently dying, and will survive the next 24 hours.  Will ask care coordinator to begin looking for discharge options.  -Discussed discharge plans with daughters Hilda and Quynh.  I reassured them that we would not discharge their father on home until we have agreed upon a safe disposition plan with hospice either to LTC or home and all necessary equipment delivered.     Atrial fibrillation with rapid ventricular response (H)  Long term current use of anticoagulant therapy  Hypertension goal BP (blood pressure) < 140/90  - Discontinue prior to admission with Apixaban, Metoprolol, Entresto, Clopidogrel with comfort cares.     CAD s/p PCI 2013 and 10/2019 (ODIN to proximal LAD)   Aortic stenosis S/P TAVR   Patient had a TAVR procedure 03/2023.  - EKG had ST depressions changes which were unchanged from prior EKGs.     Mantle Cell Lymphoma S/P Autologous Transplant  Patient had stem cell transplant in 10/2010.  Currently in remission     Anemia, unspecified  11 on admission. At baseline and " stable.  -Discontinue prior to admission with Ferrous sulfate and vitamin D with comfort cares.     GERD   - Discontinue PTA esomeprazole 20 mg with comfort cares.     Acquired hypothyroidism  Stable. TSH 2.47 on previous admission.  - Discontinue prior to admission with Levothyroxine with comfort cares.     Hyperlipidemia  Managed prior to admission with Rosuvastatin.    Diet: Pureed Diet (level 4) Liquidized/Moderately Thick (level 3)    DVT Prophylaxis: Pneumatic Compression Devices  Ascencio Catheter: Not present  Lines: None     Code Status: No CPR- Do NOT Intubate.  Comfort cares.     Disposition: Patient's death is likely not to occur within the next 24 hours.  Will ask care coordination to begin looking at discharge options.    35 MINUTES SPENT BY ME on the date of service doing chart review, history, exam, documentation & further activities per the note.    Cayetano Peña MD    Interval History   Patient resting in bed.  He is confused but denies pain or nausea.    -Data reviewed today: I reviewed all new labs and imaging results over the last 24 hours. I personally reviewed no images or EKG's today.    Physical Exam   Temp: 99  F (37.2  C) Temp src: Oral BP: 109/50 Pulse: 104   Resp: 20 SpO2: 95 % O2 Device: None (Room air)    Vitals:    01/19/24 0418   Weight: 71.8 kg (158 lb 4.6 oz)     Vital Signs with Ranges  Temp:  [98.6  F (37  C)-99  F (37.2  C)] 99  F (37.2  C)  Pulse:  [104-111] 104  Resp:  [20] 20  BP: ()/(39-50) 109/50  SpO2:  [93 %-95 %] 95 %  No intake/output data recorded.    Gen: Thin debilitated elderly male, alert and oriented x 1, no acute distressed  HEENT: Atraumatic, normocephalic; sclera non-injected, anicterric; oral mucosa moist, no lesion, no exudate  Lungs: Clear to ausculation, no wheezes, no rhonchi, no rales  Heart: Tachycardic irregular rhythm, no gallops, no rubs, no murmurs  GI: Bowel sound normal, no hepatosplenomegaly, no masses, non-tender, non-distended, no  guarding, no rebound tenderness  Lymph: No lymphadenopathy, trace BLE edema  Skin: No rashes, no chronic venous stasis     Medications      [Held by provider] apixaban ANTICOAGULANT  5 mg Oral BID    [Held by provider] cetirizine  5 mg Oral Daily    [Held by provider] clopidogrel  75 mg Oral Daily    [Held by provider] ferrous sulfate  325 mg Oral Every Other Day    gabapentin  200 mg Oral BID    [Held by provider] levothyroxine  50 mcg Oral QAM AC    [Held by provider] metoprolol tartrate  12.5 mg Oral BID    [Held by provider] pantoprazole  40 mg Oral Daily    [Held by provider] rosuvastatin  40 mg Oral Daily    [Held by provider] sacubitril-valsartan  1 tablet Oral Daily    sodium chloride (PF)  3 mL Intracatheter Q8H    [Held by provider] umeclidinium  1 puff Inhalation Daily    vancomycin  125 mg Oral 4x Daily    [Held by provider] Vitamin D3  25 mcg Oral Daily       Data   Recent Labs   Lab 01/19/24  0636 01/18/24  2149   WBC 13.1* 12.1*   HGB 11.0* 11.0*   MCV 91 91    296    138   POTASSIUM 3.9 4.3   CHLORIDE 101 102   CO2 17* 21*   BUN 24.9* 24.9*   CR 1.08 1.14   ANIONGAP 18* 15   CHAVEZ 9.4 9.4   * 121*   ALBUMIN 3.7 4.0   PROTTOTAL 6.6 7.0   BILITOTAL 0.5 0.3   ALKPHOS 55 58   ALT 13 12   AST 30 28       No results found for this or any previous visit (from the past 24 hour(s)).

## 2024-01-21 NOTE — PROGRESS NOTES
Care Management Follow Up    Length of Stay (days): 2    Expected Discharge Date: 01/22/2024     Concerns to be Addressed: discharge planning     Patient plan of care discussed at interdisciplinary rounds: Yes    Anticipated Discharge Disposition: Assisted Living, Hospice, Skilled Nursing Facility, Long Term Care     Anticipated Discharge Services: Transportation Services  Anticipated Discharge DME:  (hospice to provide all DME)    Patient/family educated on Medicare website which has current facility and service quality ratings: yes  Education Provided on the Discharge Plan: Yes  Patient/Family in Agreement with the Plan: yes    Referrals Placed by CM/SW: Internal Clinic Care Coordination, Hospice, Post Acute Facilities, Transportation  Private pay costs discussed: private room/amenity fees and transportation costs    Additional Information:  Per IDT rounds today, MD team states that pt IS medically stable for discharge today.  At time of IDT rounds, SW awaiting confirmation from [facility] of acceptance for cares.    Per rounds, it was reported that family upset with hospital as they were called by [facility] stating pt was discharging today & DME would be delivered today.  SW contacted [facility] to request they confirm acceptance into their program with hospice CM staff before contacting family as it can cause confusion.  [facility] informed they are uncertain if anyone has spoken with family.    Sw able to speak with pt's daughter, Quynh, via phone, to discuss discharge plans.  Per Quynh, family sharing they are unable to care for the pt at home & now requesting placement into either an [facility] or LTC-SNF  SW educated on cost of cares in a facility & Quynh verbalizing understanding & states this is not a concern. Per discussion with pt/family, they requested referrals be sent to:  [facility] (Phone: [phone].Fax:[phone])- sent by secure email & fax to Katalina, Director of Wellness as  no information in Epic discharge on the double.      Service Provider Request Status Selected Services Address Phone Fax Patient Preferred   [facility] (custodial)  Pending - Request Sent N/A [address] [facility] [address] [phone] --   [facility] ()  Pending - Request Sent N/A [address] [facility] [address] [phone] --   [facility] ()  Pending - Request Sent N/A [address] [address] [phone] --   Home Medical Care    Service Provider Request Status Selected Services Address Phone Fax Patient Preferred   [facility]  Accepted N/A [address] [facility] [id number] [phone] --        Transportation discussed with pt/family.  [facility] will be used.  Pt and family was informed on cost of transportation services for out of pocket costs.  PCS form completed by AIYANA.        Plan:  Pt to discharge LTC or [facility] with [facility] once location is secured.    Care Management team to follow for discharge plans.      CINTIHA Meier

## 2024-01-22 NOTE — PROGRESS NOTES
"[facility]    Hospitalist Progress Note    Date of Service (when I saw the patient): 01/22/2024    Assessment & Plan   Dangelo Hernández is a 88 year old male who presents on 1/18/2024 for increased cough, SOB, and AMS, and fevers.     Comfort measures  Generalized weakness  Bronchiectasis (H)  Fever, unspecified fever cause  Hx of candidal and cryptogenic organizing pneumonia    Shortly after admission to the floor he had a temp of 102.6, HR 120s, RR 30s, BP 70s/30s. Initially 1L NS was started along with Vancomycin and Cefepime. However, after discussion with the daughter she expressed that he's been \"ready to go\" for the last year, and he would want comfort cares at this point. Additionally, when the RN was placing a second large bore IV the patient stated \"just let me go\". All medications were then stopped and comfort measures were initiated.   -It does not appear as though patient is imminently dying, and will survive the next 24 hours.  Will ask care coordinator to begin looking for discharge options.  -Discussed discharge plans with daughters Hilda and Quynh.  I reassured them that we would not discharge their father on home until we have established a safe disposition plan with hospice to LTC    C. difficile colitis  Tested positive for C. difficile toxin on 1/19, after noted to have multiple loose stools.  -Oral vancomycin started 1/21, continue as a comfort measure     Atrial fibrillation with rapid ventricular response (H)  Long term current use of anticoagulant therapy  Hypertension goal BP (blood pressure) < 140/90  - Discontinue prior to admission with Apixaban, Metoprolol, Entresto, Clopidogrel with comfort cares.     CAD s/p PCI 2013 and 10/2019 (ODIN to proximal LAD)   Aortic stenosis S/P TAVR   Patient had a TAVR procedure 03/2023.  - EKG had ST depressions changes which were unchanged from prior EKGs.     Mantle Cell Lymphoma S/P Autologous Transplant  Patient had " stem cell transplant in 10/2010.  Currently in remission     Anemia, unspecified  11 on admission. At baseline and stable.  -Discontinue prior to admission with Ferrous sulfate and vitamin D with comfort cares.     GERD   - Discontinue PTA esomeprazole 20 mg with comfort cares.     Acquired hypothyroidism  Stable. TSH 2.47 on previous admission.  - Discontinue prior to admission with Levothyroxine with comfort cares.     Hyperlipidemia  Managed prior to admission with Rosuvastatin.    Diet: Pureed Diet (level 4) Liquidized/Moderately Thick (level 3)    DVT Prophylaxis: Pneumatic Compression Devices  Ascencio Catheter: Not present  Lines: None     Code Status: No CPR- Do NOT Intubate.  Comfort cares.     Disposition: Patient's death is likely not to occur within the next 24 hours.  Will ask care coordination to begin looking at discharge options.    25 MINUTES SPENT BY ME on the date of service doing chart review, history, exam, documentation & further activities per the note.    Cayetano Peña MD    Interval History   Patient resting in bed.  He eats his meals intermittently.  Currently denies abdominal pain, nausea, or dysuria.    -Data reviewed today: I reviewed all new labs and imaging results over the last 24 hours. I personally reviewed no images or EKG's today.    Physical Exam   Temp: 99.8  F (37.7  C) Temp src: Oral                Vitals:    01/19/24 0418   Weight: 71.8 kg (158 lb 4.6 oz)     Vital Signs with Ranges  Temp:  [99.8  F (37.7  C)] 99.8  F (37.7  C)  I/O last 3 completed shifts:  In: 10 [P.O.:10]  Out: -     Gen: Thin debilitated elderly male, alert and oriented x 2, no acute distressed  HEENT: Atraumatic, normocephalic; sclera non-injected, anicterric; oral mucosa moist, no lesion, no exudate  Lungs: Clear to ausculation, no wheezes, no rhonchi, no rales  Heart: Tachycardic irregular rhythm, no gallops, no rubs, no murmurs  GI: Bowel sound normal, no hepatosplenomegaly, no masses, non-tender,  non-distended, no guarding, no rebound tenderness  Lymph: No lymphadenopathy, trace BLE edema  Skin: No rashes, no chronic venous stasis     Medications      gabapentin  200 mg Oral BID    sodium chloride (PF)  3 mL Intracatheter Q8H    vancomycin  125 mg Oral 4x Daily       Data   Recent Labs   Lab 01/19/24  0636 01/18/24  2149   WBC 13.1* 12.1*   HGB 11.0* 11.0*   MCV 91 91    296    138   POTASSIUM 3.9 4.3   CHLORIDE 101 102   CO2 17* 21*   BUN 24.9* 24.9*   CR 1.08 1.14   ANIONGAP 18* 15   CHAVEZ 9.4 9.4   * 121*   ALBUMIN 3.7 4.0   PROTTOTAL 6.6 7.0   BILITOTAL 0.5 0.3   ALKPHOS 55 58   ALT 13 12   AST 30 28       No results found for this or any previous visit (from the past 24 hour(s)).

## 2024-01-22 NOTE — PROGRESS NOTES
Care Management Follow Up    Length of Stay (days): 3    Expected Discharge Date: 1/23/24     Concerns to be Addressed: discharge planning     Patient plan of care discussed at interdisciplinary rounds: Yes    Anticipated Discharge Disposition: [facility] (Main: [phone]/ Fax: [phone]) LTC bed with [facility] (Phone: [phone] Fax: [phone])     Anticipated Discharge Services: Transportation Services  Anticipated Discharge DME:  (hospice to provide all DME)    Patient/family educated on Medicare website which has current facility and service quality ratings: yes  Education Provided on the Discharge Plan: Yes  Patient/Family in Agreement with the Plan: yes    Referrals Placed by CM/SW: Internal Clinic Care Coordination, Hospice, Post Acute Facilities, Transportation  Private pay costs discussed: private room/amenity fees and transportation costs    Additional Information:   Patient is medically ready for discharge per provider.  Patient has been medically ready since 1/20/22.      Background information:  Pt came to the hospital from [facility] (Main: [phone]/ Fax: [phone]) TCU and per pt & family, wish to pursue comfort measures.    Current status and recommendations:  LTC bed with [facility] (Phone: [phone] Fax: [phone])    Current disposition goal: Assisted Living facility, LTC, with [facility] (Phone: [phone] Fax: [phone])    Barriers to discharge:  Plan changed from home with family & hospice to LTC facility on Sunday.  SW able to secure facility to accept the pt for cares tomorrow.    Key contacts:  Daughter & Health Care Agents:  Quynh Florentino [phone]  Marcie Almonte [phone]    Discharge Plan of Care:  1/23/24- pt will discharge to [facility] (Main: [phone]/ Fax: [phone] LT via [facility] stretcher between 5108-6958 and enroll with [facility] (Phone: [phone] Fax: [phone]) to enroll at 1pm.    Corine spoke with  Quynh and educated on picking up 1 day of comfort medications at [facility] at time of discharge.  Quynh verbalized understanding and agreed to do so.  Quynh in agreement with plan above & is aware [facility] Anjali, will be calling her to coordinate plan of care.    MD is aware pt will need 1 day of comfort meds to be sent to [facility] for discharge tomorrow.    Care Management team to follow for discharge plans.      CINTHIA Meier

## 2024-01-22 NOTE — PROGRESS NOTES
Clinic Care Coordination Contact  Ambulatory Care Coordination to Inpatient Care Management   Hand-In Communication    Date:  January 22, 2024  Name: Dangelo Hernández is enrolled in Ambulatory Care Coordination program and I am the Lead Care Coordinator.  CC Contact Information: Epic [place] + phone  Payor Source: Payor: [facility] / Plan: [employer] MEDICARE ADVANTAGE / Product Type: HMO /   Current services in place: PCA, Transitional Care (3x a week 2 hours each day)   Please see the CC [place] and Care Management Flowsheets for specific  details of this Dangelo Hernández care plan.   Additional details/specific concerns r/t this admission:    New to clinic care coordination     I will follow this admission in Epic. Please feel free to contact me with questions or for further collaboration in discharge planning.   [facility]   Amita Patino RN, Care Coordinator   [facility]'s   E-mail [email or web address]   [phone]

## 2024-01-22 NOTE — PLAN OF CARE
"  Problem: Adult Inpatient Plan of Care  Goal: Plan of Care Review  Description: The Plan of Care Review/Shift note should be completed every shift.  The Outcome Evaluation is a brief statement about your assessment that the patient is improving, declining, or no change.  This information will be displayed automatically on your shift  note.  Outcome: Progressing  Flowsheets (Taken 1/22/2024 1501)  Outcome Evaluation: Plan for [facility] on Hospice 1/23/24.  Plan of Care Reviewed With: (daughter Quynh)   patient   other (see comments)  Goal: Patient-Specific Goal (Individualized)  Description: You can add care plan individualizations to a care plan. Examples of Individualization might be:  \"Parent requests to be called daily at 9am for status\", \"I have a hard time hearing out of my right ear\", or \"Do not touch me to wake me up as it startles  me\".  Outcome: Progressing  Goal: Absence of Hospital-Acquired Illness or Injury  Outcome: Progressing  Intervention: Prevent Skin Injury  Recent Flowsheet Documentation  Taken 1/22/2024 1418 by Sis Pacheco RN  Body Position:   legs elevated   heels elevated   weight shifting  Taken 1/22/2024 0858 by Sis Pacheco RN  Body Position:   log-rolled   weight shifting   heels elevated   legs elevated   turned   right  Goal: Optimal Comfort and Wellbeing  Outcome: Progressing  Intervention: Monitor Pain and Promote Comfort  Recent Flowsheet Documentation  Taken 1/22/2024 1418 by Sis Pacheco RN  Pain Management Interventions: medication offered but refused  Taken 1/22/2024 0906 by Sis Pacheco RN  Pain Management Interventions: (tylenol given) medication (see MAR)     Problem: Risk for Delirium  Goal: Optimal Coping  Outcome: Progressing  Intervention: Optimize Psychosocial Adjustment to Delirium  Recent Flowsheet Documentation  Taken 1/22/2024 1501 by Sis Pacheco RN  Supportive Measures:   active listening utilized   relaxation techniques " promoted   verbalization of feelings encouraged  Family/Support System Care:   involvement promoted   presence promoted   support provided  Goal: Improved Behavioral Control  Outcome: Progressing  Goal: Improved Attention and Thought Clarity  Outcome: Progressing  Intervention: Maximize Cognitive Function  Recent Flowsheet Documentation  Taken 1/22/2024 1501 by Sis Pacheco RN  Sensory Stimulation Regulation:   care clustered   lighting decreased  Goal: Improved Sleep  Outcome: Progressing     Problem: End-of-Life Care  Goal: Comfort, Peace and Preserved Dignity  Outcome: Progressing  Intervention: Promote Physical Comfort  Flowsheets (Taken 1/22/2024 1501)  Airway/Ventilation Support: (Patient denies shortness of breath.) comfort measures provided  Sensory Stimulation Regulation:   care clustered   lighting decreased  Environmental Support:   calm environment promoted   rest periods encouraged  Intervention: Promote Peace and Maintain Dignity  Flowsheets (Taken 1/22/2024 1501)  Supportive Measures:   active listening utilized   relaxation techniques promoted   verbalization of feelings encouraged  Intervention: Support the Grieving Process  Flowsheets (Taken 1/22/2024 1501)  Family/Support System Care:   involvement promoted   presence promoted   support provided   Goal Outcome Evaluation:  Patient has been up in the recliner since before lunch.  Declined to lay down; so patient was repositioned in the recliner.  Patient's intake is poor; does not like the thickened liquids.  Medicated with tylenol this morning for minimal pain in left hip; declined medication this afternoon.      Plan of Care Reviewed With: patient, other (see comments) (daughter Quynh)          Outcome Evaluation: Plan for [facility] LTC on Hospice 1/23/24.

## 2024-01-22 NOTE — PLAN OF CARE
Problem: Risk for Delirium  Goal: Improved Sleep  Outcome: Progressing     Patient did not get out of bed tonight and continued repositioning with pillows every 2-3 hours. Incontinent of stool 2 times tonight. Patient continues to have productive phlegm. Suction placed at bedside to assist with removal.

## 2024-01-23 NOTE — PLAN OF CARE
[facility] DISCHARGE NOTE    Patient discharged to [facility] term [facility] at 11:32 AM via  Health stretcher. Accompanied by other:EMS Transport staff. Discharge instructions reviewed with  [facility] nurse at [facility] [phone] , opportunity offered to ask questions. Prescriptions filled and daughter Quynh picked up upon discharge. Copy of AVS provided to daughter Quynh.  All belongings sent with patient.    Sis Pacheco RN    Goal Outcome Evaluation:      Plan of Care Reviewed With: patient, other (see comments) (Daughter Quynh)          Outcome Evaluation: Discharged to [facility] today.

## 2024-01-23 NOTE — DISCHARGE SUMMARY
[facility]  Hospitalist Discharge Summary      Date of Admission:  1/18/2024  Date of Discharge:  1/23/2024  Discharging Provider: Gurpreet Ferreira MD  Discharge Service: Hospitalist Service    Discharge Diagnoses      # Comfort measures  # Generalized weakness  # Bronchiectasis (H)  # Fever, unspecified fever cause  # Hx of candidal and cryptogenic organizing pneumonia  # C diff infection   # Atrial fibrillation with rapid ventricular response (H)  # Long term current use of anticoagulant therapy  # Hypertension goal BP (blood pressure) < 140/90  # CAD s/p PCI 2013 and 10/2019 (ODIN to proximal LAD)   # Aortic stenosis S/P TAVR   # Mantle Cell Lymphoma S/P Autologous Transplant  # Chronic normocytic anemia   # GERD   # Acquired hypothyroidism  # Hyperlipidemia    Clinically Significant Risk Factors          Follow-ups Needed After Discharge   Follow-up Appointments     Follow Up and recommended labs and tests      Follow up with [facility] physician.  The following labs/tests are   recommended: CMP and CBC.        Follow up with hospice would also be appropriate       Unresulted Labs Ordered in the Past 30 Days of this Admission       Date and Time Order Name Status Description    1/19/2024  8:23 AM Blood Culture Arm, Right Preliminary     1/19/2024  8:23 AM Blood Culture Arm, Right Preliminary     1/18/2024  9:47 PM Blood Culture Peripheral Blood Preliminary     1/18/2024  9:47 PM Blood Culture Arm, Right Preliminary         These results will be followed up by Gurpreet Ferreira MD       Discharge Disposition   Discharged to long-term care facility with hospice   Condition at discharge: Stable    Hospital Course   Dangelo Hernández is a 88 year old male who presents on 1/18/2024 for increased cough, SOB, and AMS, and fevers possibly secondary to C diff infection. Given patients constellation of co morbidities as well as current illness discussion occurred with patient and  "family during admission with decision to transition to a comfort focused care plan. Patient is discharging to long term care with hospice.      # Comfort measures  # Generalized weakness  # Bronchiectasis (H)  # Fever, unspecified fever cause  # Hx of candidal and cryptogenic organizing pneumonia    Shortly after admission to the floor he had a temp of 102.6, HR 120s, RR 30s, BP 70s/30s. Initially 1L NS was started along with Vancomycin and Cefepime. However, after discussion with the daughter she expressed that he's been \"ready to go\" for the last year, and he would want comfort cares at this point. Additionally, when the RN was placing a second large bore IV the patient stated \"just let me go\". All medications were then stopped and comfort measures were initiated. With expectation that patient will live for at least days placement for patient was found at long term care with hospice.     # C diff infection   With patient testing positive for C. diff as well as having diarrhea and leukocytosis during this admission treatment was started with oral vancomycin. Despite plan for hospice and comfort focused care plan will still plan on completing treatment with 10 day course of oral vancomycin.      # Atrial fibrillation with rapid ventricular response (H)  # Long term current use of anticoagulant therapy  # Hypertension goal BP (blood pressure) < 140/90  - Discontinue prior to admission with Apixaban, Metoprolol, Entresto, Clopidogrel with comfort cares.     # CAD s/p PCI 2013 and 10/2019 (ODIN to proximal LAD)   # Aortic stenosis S/P TAVR   Patient had a TAVR procedure 03/2023.  - EKG had ST depressions changes which were unchanged from prior EKGs.     # Mantle Cell Lymphoma S/P Autologous Transplant  Patient had stem cell transplant in 10/2010. Currently in remission     # Chronic normocytic anemia   11 on admission. At baseline and stable.  -Discontinue prior to admission with Ferrous sulfate and vitamin D with " comfort cares.     # GERD   - Discontinue PTA esomeprazole 20 mg with comfort cares.     # Acquired hypothyroidism  Stable. TSH 2.47 on previous admission.  - Discontinue prior to admission with Levothyroxine with comfort cares.     # Hyperlipidemia  Managed prior to admission with Rosuvastatin.  - Stopped with transition to comfort focused care plan     Consultations This Hospital Stay   SPEECH LANGUAGE PATH ADULT IP CONSULT  PHARMACY TO DOSE [facility] SERVICES IP CONSULT  CARE MANAGEMENT / SOCIAL WORK IP CONSULT    Code Status   No CPR- Do NOT Intubate    Time Spent on this Encounter   I, Gurpreet Ferreira MD, personally saw the patient today and spent greater than 30 minutes discharging this patient.       Gurpreet Ferreira MD  [facility] SURGICAL  [address] [address]  Phone: [phone]  Fax: [phone]  ______________________________________________________________________    Physical Exam   Vital Signs:   Temp src: Oral BP: 121/44 Pulse: 96   Resp: 20 SpO2: 96 % O2 Device: None (Room air)    Weight: 158 lbs 4.64 oz  General Appearance: Awake sitting up in bed appears comfortable but confused   Respiratory: Breathing easily but intermittently coughs   Cardiovascular: Extremities warm and well perfused   GI: Abdomen soft and non-distended   Skin: No rashes or lesions   Other: Confused and stating that he had fallen into a basement requested a step ladder several times        Primary Care Physician   Elian Shafer    Discharge Orders      Hospice Referral      General info for SNF    Length of Stay Estimate: Long Term Care  Condition at Discharge: Terminal  Level of care:skilled   Rehabilitation Potential: Poor  Admission H&P remains valid and up-to-date: No (If no, please update H&P)  Recent Chemotherapy: N/A  Use Nursing Home Standing Orders: Yes     Mantoux instructions    Give two-step Mantoux (PPD) Per Facility Policy Yes     Reason for your  hospital stay    This is an 88 year old male admitted with sepsis, now on comfort cares.     Follow Up and recommended labs and tests    Follow up with skilled nursing physician.  The following labs/tests are recommended: CMP and CBC.     Activity - Up with nursing assistance     Fall precautions     Diet    Follow this diet upon discharge: Regular Diet Adult       Significant Results and Procedures   Results for orders placed or performed during the hospital encounter of 01/18/24   XR Chest 2 Views    Narrative    EXAM: XR CHEST 2 VIEWS  LOCATION: [facility]  DATE: 1/18/2024    INDICATION: Cough and fever.  COMPARISON: CT chest 12/29/2023, chest radiograph 10/04/2021      Impression    IMPRESSION:     Chronic reticular interstitial opacities, likely representing underlying interstitial lung disease. Hypoinflated lungs. No new focal airspace disease. No pleural effusion or pneumothorax.    Stable, nonenlarged cardiomediastinal silhouette. TAVR. Coronary artery stents.    Multilevel degenerative changes of the spine.   CT Chest/Abdomen/Pelvis w Contrast    Narrative    EXAM: CT CHEST/ABDOMEN/PELVIS W CONTRAST  LOCATION: [facility]  DATE: 1/18/2024    INDICATION: fever, cough, diarrhea  COMPARISON: Chest radiographs from 01/18/2024, abdominopelvic CT from 12/29/2023. CT chest, abdomen, pelvis from 09/04/2023.  TECHNIQUE: CT scan of the chest, abdomen, and pelvis was performed following injection of IV contrast. Multiplanar reformats were obtained. Dose reduction techniques were used.   CONTRAST: 78mL isovue 370    FINDINGS:   LUNGS AND PLEURA: Redemonstrated changes of wedge resection in the right lateral lung base. Centrilobular emphysema with areas of bronchiectasis and interstitial thickening scattered throughout the lungs with similar distribution and severity when   compared to 09/04/2023. No definite acute infiltrate. No pneumothorax or pleural effusion. There  is a small amount of airway debris in the lower thoracic trachea.    MEDIASTINUM/AXILLAE: Heart size is normal. Aortic valve replacement. Severe coronary artery calcifications and stents.    CORONARY ARTERY CALCIFICATION: Previous intervention (stents or CABG).    HEPATOBILIARY: Normal.    PANCREAS: Mildly atrophic.    SPLEEN: Normal.    ADRENAL GLANDS: Normal.    KIDNEYS/BLADDER: Kidneys within normal limits. Trabeculated urinary bladder with posterior wall diverticulum.    BOWEL: Stomach is decompressed. Normal caliber small bowel. Mild wall thickening of the cecum as seen on coronal image 42. Mild wall thickening of the lower rectum as seen on coronal image 72. No appendicitis. No free air.    LYMPH NODES: Normal.    VASCULATURE: Severe aortoiliac atherosclerotic calcification without aneurysm.    PELVIC ORGANS: Normal.    MUSCULOSKELETAL: Osteopenia throughout. Bridging anterior endplate osteophytes in the lower thoracic spine. Postoperative change noted at the posterior elements of L4-L5. Chronic, healed proximal sternal body fracture. Osteonecrosis of the femoral heads   without height loss or collapse.      Impression    IMPRESSION:  1.  No significant change in emphysema, bronchiectasis, and interstitial thickening likely related to fibrosis. No definite acute pneumonia.    2.  Mild wall thickening of the cecum and rectum suggestive of infectious or inflammatory colitis/proctitis.   Head CT w/o contrast    Narrative    EXAM: CT HEAD W/O CONTRAST  LOCATION: [facility]  DATE: 1/19/2024    INDICATION: altered mental status, on anticoagulation   evalute for hemorrhage  COMPARISON: 12/29/2023  TECHNIQUE: Routine CT Head without IV contrast. Multiplanar reformats. Dose reduction techniques were used.    FINDINGS: There is contrast within the intracranial vascular system from recent contrast enhanced study of the chest abdomen and pelvis.  INTRACRANIAL CONTENTS: No intracranial  hemorrhage, extraaxial collection, or mass effect.  No CT evidence of acute infarct. Mild to moderate presumed chronic small vessel ischemic changes. Mild to moderate generalized volume loss. No hydrocephalus.     VISUALIZED ORBITS/SINUSES/MASTOIDS: No intraorbital abnormality. No paranasal sinus mucosal disease. No middle ear or mastoid effusion.    BONES/SOFT TISSUES: No acute abnormality.      Impression    IMPRESSION:  1.  No CT evidence for acute intracranial process.  2.  Brain atrophy and presumed chronic microvascular ischemic changes as above.     *Note: Due to a large number of results and/or encounters for the requested time period, some results have not been displayed. A complete set of results can be found in Results Review.       Discharge Medications   Current Discharge Medication List        START taking these medications    Details   glycopyrrolate (GLYCATE) 2 MG tablet Take 1 tablet (2 mg) by mouth every 4 hours as needed (secretions, may also be given via G-tube or J-tube if needed.)  Qty: 4 tablet, Refills: 0    Associated Diagnoses: Sepsis with acute renal failure without septic shock, due to unspecified organism, unspecified acute renal failure type (H)      ibuprofen (ADVIL/MOTRIN) 200 MG tablet Take 1 tablet (200 mg) by mouth every 6 hours as needed for inflammatory pain    Associated Diagnoses: Fever, unspecified fever cause      LORazepam (ATIVAN) 1 MG tablet Place 1 tablet (1 mg) under the tongue every 3 hours as needed for anxiety  Qty: 3 tablet, Refills: 0    Associated Diagnoses: Sepsis with acute renal failure without septic shock, due to unspecified organism, unspecified acute renal failure type (H)      OLANZapine zydis (ZYPREXA) 5 MG ODT Take 1 tablet (5 mg) by mouth every 6 hours as needed for agitation (delirium)  Qty: 12 tablet, Refills: 0    Associated Diagnoses: Sepsis with acute renal failure without septic shock, due to unspecified organism, unspecified acute renal failure  type (H)      senna-docusate (SENOKOT-S/PERICOLACE) 8.6-50 MG tablet Take 1 tablet by mouth 2 times daily as needed for constipation    Associated Diagnoses: Bronchiectasis without complication (H); Generalized weakness      vancomycin (VANCOCIN) 125 MG capsule Take 1 capsule (125 mg) by mouth 4 times daily for 20 doses    Associated Diagnoses: C. difficile colitis           CONTINUE these medications which have CHANGED    Details   gabapentin (NEURONTIN) 100 MG capsule Take 2 capsules (200 mg) by mouth 2 times daily for 10 doses  Qty: 20 capsule, Refills: 0    Associated Diagnoses: Sepsis with acute renal failure without septic shock, due to unspecified organism, unspecified acute renal failure type (H)      omeprazole (PRILOSEC) 20 MG DR capsule Take 1 capsule (20 mg) by mouth daily as needed (Heartburn)    Associated Diagnoses: Gastroesophageal reflux disease without esophagitis      oxyCODONE (ROXICODONE) 5 MG tablet Take 1 tablet (5 mg) by mouth every 6 hours as needed for moderate pain  Qty: 12 tablet, Refills: 0    Associated Diagnoses: Low back pain without sciatica, unspecified back pain laterality, unspecified chronicity; Spinal stenosis of lumbar region without neurogenic claudication           CONTINUE these medications which have NOT CHANGED    Details   acetaminophen (TYLENOL) 325 MG tablet Take 2 tablets (650 mg) by mouth daily as needed for mild pain or other (and adjunct with moderate or severe pain or per patient request) Separate from bedtime dose by 6 hours before or afterwards    Associated Diagnoses: Spinal stenosis of lumbar region without neurogenic claudication      lidocaine (LIDODERM) 5 % patch Place onto the skin every 24 hours To prevent lidocaine toxicity, patient should be patch free for 12 hrs daily.           STOP taking these medications       apixaban ANTICOAGULANT (ELIQUIS) 5 MG tablet Comments:   Reason for Stopping:         cetirizine (ZYRTEC) 5 MG tablet Comments:   Reason for  Stopping:         clopidogrel (PLAVIX) 75 MG tablet Comments:   Reason for Stopping:         ferrous sulfate (FEROSUL) 325 (65 Fe) MG tablet Comments:   Reason for Stopping:         levothyroxine (SYNTHROID/LEVOTHROID) 50 MCG tablet Comments:   Reason for Stopping:         metoprolol tartrate (LOPRESSOR) 25 MG tablet Comments:   Reason for Stopping:         nitroGLYcerin (NITROSTAT) 0.4 MG sublingual tablet Comments:   Reason for Stopping:         rosuvastatin (CRESTOR) 40 MG tablet Comments:   Reason for Stopping:         sacubitril-valsartan (ENTRESTO) 24-26 MG per tablet Comments:   Reason for Stopping:         tiotropium (SPIRIVA RESPIMAT) 2.5 MCG/ACT inhaler Comments:   Reason for Stopping:         Vitamin D3 (CHOLECALCIFEROL) 25 mcg (1000 units) tablet Comments:   Reason for Stopping:             Allergies   Allergies   Allergen Reactions    Amoxicillin Diarrhea           Lisinopril Cough

## 2024-01-23 NOTE — PROGRESS NOTES
Care Management Discharge Note    Discharge Date: 01/23/2024       Discharge Disposition: [facility] (Main: [phone]/ Fax: [phone]) LTC with [facility] (Phone: [phone] Fax: [phone])      Discharge Services: Transportation Services    Discharge DME:  (hospice to provide all DME)    Discharge Transportation: agency    Private pay costs discussed: private room/amenity fees and transportation costs    Does the patient's insurance plan have a 3 day qualifying hospital stay waiver?  Yes     Which insurance plan 3 day waiver is available? Alternative insurance waiver    Will the waiver be used for post-acute placement? No    PAS Confirmation Code:  NA  Patient/family educated on Medicare website which has current facility and service quality ratings: yes    Education Provided on the Discharge Plan: Yes  Persons Notified of Discharge Plans: Daughter, Quynh  Patient/Family in Agreement with the Plan: yes    Handoff Referral Completed: Yes    Additional Information:  pt will discharge to [facility] (Main: [phone]/ Fax: [phone] LTC via [facility] stretcher between 0564-1279 and enroll with [facility] (Phone: [phone] Fax: [phone]) to enroll at 1pm.     Corine spoke with Quynh and educated on picking up 1 day of comfort medications at [facility] at time of discharge.  Quynh verbalized understanding and agreed to do so.     PCS form completed by CORINE.    CINTHIA Meier

## 2024-01-24 NOTE — PROGRESS NOTES
Clinic Care Coordination Contact    Situation: Patient chart reviewed by care coordinator.    Background: [facility]  Hospitalist Discharge Summary       Date of Admission:  1/18/2024  Date of Discharge:  1/23/2024  Discharging Provider: Gurpreet Ferreira MD  Discharge Service: Hospitalist Service        Discharge Diagnoses     # Comfort measures  # Generalized weakness  # Bronchiectasis (H)  # Fever, unspecified fever cause  # Hx of candidal and cryptogenic organizing pneumonia  # C diff infection   # Atrial fibrillation with rapid ventricular response (H)  # Long term current use of anticoagulant therapy  # Hypertension goal BP (blood pressure) < 140/90  # CAD s/p PCI 2013 and 10/2019 (ODIN to proximal LAD)   # Aortic stenosis S/P TAVR   # Mantle Cell Lymphoma S/P Autologous Transplant  # Chronic normocytic anemia   # GERD   # Acquired hypothyroidism  # Hyperlipidemia    Assessment: pt will discharge to [facility] (Main: [phone]/ Fax: [phone] LTC via [facility] stretcher between 1177-6293 and enroll with [facility] (Phone: [phone] Fax: [phone]) to enroll at 1pm.     Plan/Recommendations: No outreach made Closed to care coordination     [facility]   Amita Patino RN, Care Coordinator   [facility]'s   E-mail [email or web address]   [phone]

## 2024-01-24 NOTE — LETTER
1/24/2024        RE: Dangelo Hernández  [address] [facility] [id number]        [facility] Follow Up  PCP & CLINIC: Elian Shafer DO, [address] [facility] / [place] [address]  Chief Complaint   Patient presents with     Hospital F/U   [place] MRN: [id number]. Place of Service where encounter took place:  [facility] [place] ([place]) [4002] Dangelo Hernández  is a 88 year old  (1935), admitted to the above facility from  [facility]. Hospital stay 1/18 through 1/23/24. Admitted to this facility for  rehab, medical management, and nursing care. HPI information obtained from: facility chart records, facility staff, patient report, [facility] chart review, and Care Everywhere [facility] chart review.     Brief Summary of Hospital Course: Dangelo was sent from TCU to [facility] on 1/18 w/ cough, SOB, AMS, fever. He was found to have a CDIFF infection. He was given fluids, abx, and supported conservatively. Ultimately, comfort cares were decided.     Updates since return to transitional care unit: Dangelo returned to TCU on 1/18 status post the above hospitalization.  He is enrolled with [facility] hospice, and they have made several medication changes.  Today, Dangelo is relaxing in bed after lunch.  He says for the first time that he denies pain.  Provider probed to further asking if his shoulder, hips, or back are bothering him, and he reiterated that he has not had pain.  He had a headache yesterday, but this is gone now.  He denies dizziness.  He does not feel short of breath and denies a cough or fever.  He thinks his bowels are moving.  His appetite is minimal.  He drifts off to sleep as we are talking.    CODE STATUS/ADVANCE DIRECTIVES DISCUSSION: DNR / DNI. Patient's living condition: lives alone. ALLERGIES: Amoxicillin and Lisinopril PAST MEDICAL HISTORY:  has a past medical history of ACS (acute coronary syndrome) (H) (10/15/2019), Acute kidney failure NEC  (11/27/2011), Acute metabolic encephalopathy (09/05/2023), JESSICA (acute kidney injury) (H24) (09/05/2023), Altered mental status, unspecified (06/28/2023), Arthritis, Basal cell carcinoma, Bilateral pneumonia (06/15/2023), Blood transfusion, Bronchiectasis (H), CAD (coronary artery disease), Colon polyps, COVID-19 (12/07/2022), Cryptogenic organizing pneumonia (H) (12/07/2012), Depression, unspecified (06/21/2023), Endocarditis, valve unspecified (01/27/2022), GERD (gastroesophageal reflux disease), Hyperlipidemia, Hypertension, Laceration without foreign body of other part of head, subsequent encounter (06/28/2023), Mantle Cell Lymphoma S/P Autologous Transplant (05/12/2010), Pneumonia due to coronavirus disease 2019 (01/27/2022), Pneumonia in mycoses (09/09/2011), Pneumonia, candidial (H) (09/09/2011), S/P TAVR (transcatheter aortic valve replacement), and Septic shock (H) (06/15/2023).    He has no past medical history of Diabetes mellitus (H), Squamous cell carcinoma, or Unspecified cerebral artery occlusion with cerebral infarction.. PAST SURGICAL HISTORY:   has a past surgical history that includes surgical history of - ; surgical history of -  (1/19/84); surgical history of - ; surgical history of -  (2/19/90); surgical history of -  (10/20/92); surgical history of -  (6/14/2000); Bronchoscopy (rigid or flexible), diagnostic (7/14/2011); Thoracoscopic biopsy lung (9/12/2011); colonoscopy; orthopedic surgery; ENT surgery; Bronchoscopy (rigid or flexible), diagnostic (N/A, 5/29/2019); Heart Catheterization with Possible Intervention (N/A, 10/16/2019); and Heart Catheterization with Possible Intervention (N/A, 11/13/2019).. FAMILY HISTORY: family history includes Cancer in his mother and sister; Cardiovascular in his father; Hypertension in his sister; Lipids in his sister.. SOCIAL HISTORY:   reports that he quit smoking about 49 years ago. His smoking use included cigarettes. He started smoking about 68 years  ago. He has a 9.5 pack-year smoking history. He has quit using smokeless tobacco.  His smokeless tobacco use included snuff. He reports current alcohol use. He reports that he does not use drugs.  Post Discharge Medication Reconciliation Status: discharge medications reconciled and changed, per note/orders.  Current Outpatient Medications   Medication Sig Dispense Refill     acetaminophen (TYLENOL) 325 MG tablet Take 2 tablets (650 mg) by mouth daily as needed for mild pain or other (and adjunct with moderate or severe pain or per patient request) Separate from bedtime dose by 6 hours before or afterwards       gabapentin (NEURONTIN) 100 MG capsule Take 2 capsules (200 mg) by mouth 2 times daily for 10 doses 20 capsule 0     glycopyrrolate (GLYCATE) 2 MG tablet Take 1 tablet (2 mg) by mouth every 4 hours as needed (secretions, may also be given via G-tube or J-tube if needed.) 4 tablet 0     lidocaine (LIDODERM) 5 % patch Place onto the skin every 24 hours To prevent lidocaine toxicity, patient should be patch free for 12 hrs daily.       LORazepam (ATIVAN) 1 MG tablet Place 1 tablet (1 mg) under the tongue every 3 hours as needed for anxiety 3 tablet 0     OLANZapine zydis (ZYPREXA) 5 MG ODT Take 1 tablet (5 mg) by mouth every 6 hours as needed for agitation (delirium) 12 tablet 0     oxyCODONE (ROXICODONE) 5 MG tablet Take 1 tablet (5 mg) by mouth every 6 hours as needed for moderate pain 12 tablet 0     senna-docusate (SENOKOT-S/PERICOLACE) 8.6-50 MG tablet Take 1 tablet by mouth 2 times daily as needed for constipation       vancomycin (VANCOCIN) 125 MG capsule Take 1 capsule (125 mg) by mouth 4 times daily for 20 doses       ROS: Limited secondary to cognitive impairment but today pt reports the above and 4 point ROS including Respiratory, CV, GI and , other than that noted in the HPI, is negative.    Vitals: /57   Pulse 108   Temp 98  F (36.7  C)   Resp 18   Wt 72.5 kg (159 lb 14.4 oz)   SpO2 94%    BMI 25.04 kg/m    Exam:  GENERAL APPEARANCE: Tired, in no distress, cooperative.   ENT: Mouth/posterior oropharynx intact w/ moist mucous membranes, hearing acuity [place].  EYES: EOM, conjunctivae, lids, pupils and irises normal, PERRL2.   RESP: Respiratory effort poor, no respiratory distress, Lung sounds diminished.  On RA.   CV: Auscultation of heart reveals S1, S2, rate controlled and rhythm irregular, no murmur, no rub or gallop, Edema 1+ BLE. Peripheral pulses are 2+.  ABDOMEN: Normal bowel sounds, soft, non-tender abdomen, and no masses palpated.  SKIN: Inspection/Palpation of skin and subcutaneous tissue baseline w/ fragility. No wounds/rashes noted.   NEURO: CN II-XII at patient's baseline, sensation baseline PPS.  PSYCH: Insight, judgement, and memory are impaired at baseline, affect and mood are flat/withdrawn.    Lab/Diagnostic data: Recent labs in [facility] reviewed by me today.     ASSESSMENT/PLAN:  Colitis, Clostridium difficile  History of COVID-19  Mantle cell lymphoma, unspecified body region (H)  History of pneumonia  Bronchiectasis without complication (H)  Chronic pain syndrome  Hospice care patient  Acute on chronic.  Progressive/terminal.  Provider reviewed records from hospitalization, facility, and interpreted most recent imaging/lab work, and vital signs.  With the assistance of hospice services, it is reassuring to see that Dangelo is finally comfortable.  Will discontinue as needed ibuprofen, as other regimen is more effective.  Will discontinue omeprazole, as this does not work on an as-needed basis.  Appreciate hospice changes otherwise, and will apply CMS required parameter of 14 days to both lorazepam and Zyprexa. Noted PRN orders for antipsychotic and antipsychotropic medications are limited to 14 days. Face to Face encounter done today. Evaluation indicates non-pharmacological interventions are not always effective.  C. difficile infection is being treated with vancomycin for several more  days.  It appears that diarrhea is well-controlled at this time.  Family present at bedside and visiting, hospice available to support also.  Follow-up within 2 to 4 weeks or as needed.    Orders:  RENEW PRN Lorazepam x 14 days. Dx: anxiety.   RENEW PRN Zyprexa x 14 days. Dx: atypical psychosis.   Discontinue Ibuprofen.  Discontinue Omeprazole.     Electronically signed by:  Dr. Kelsea Parsons, MURALI CNP DNP                      Sincerely,        MURALI Neal CNP

## 2024-01-24 NOTE — PROGRESS NOTES
[facility] Follow Up  PCP & CLINIC: Elian Shafer, DO, [address] [facility] / [facility] [address]  Chief Complaint   Patient presents with    Hospital F/U   [place] MRN: [id number]. Place of Service where encounter took place:  [facility] [place] ([place]) [4002] Dangelo Hernández  is a 88 year old  (1935), admitted to the above facility from  [facility]. Hospital stay 1/18 through 1/23/24. Admitted to this facility for  rehab, medical management, and nursing care. HPI information obtained from: facility chart records, facility staff, patient report, [facility] chart review, and Care Everywhere [facility] chart review.     Brief Summary of Hospital Course: Dangelo was sent from TCU to [facility] on 1/18 w/ cough, SOB, AMS, fever. He was found to have a CDIFF infection. He was given fluids, abx, and supported conservatively. Ultimately, comfort cares were decided.     Updates since return to transitional care unit: Dangelo returned to TCU on 1/18 status post the above hospitalization.  He is enrolled with [facility] hospice, and they have made several medication changes.  Today, Dangelo is relaxing in bed after lunch.  He says for the first time that he denies pain.  Provider probed to further asking if his shoulder, hips, or back are bothering him, and he reiterated that he has not had pain.  He had a headache yesterday, but this is gone now.  He denies dizziness.  He does not feel short of breath and denies a cough or fever.  He thinks his bowels are moving.  His appetite is minimal.  He drifts off to sleep as we are talking.    CODE STATUS/ADVANCE DIRECTIVES DISCUSSION: DNR / DNI. Patient's living condition: lives alone. ALLERGIES: Amoxicillin and Lisinopril PAST MEDICAL HISTORY:  has a past medical history of ACS (acute coronary syndrome) (H) (10/15/2019), Acute kidney failure NEC (11/27/2011), Acute metabolic encephalopathy (09/05/2023), JESSICA (acute kidney injury) (H24) (09/05/2023),  Altered mental status, unspecified (06/28/2023), Arthritis, Basal cell carcinoma, Bilateral pneumonia (06/15/2023), Blood transfusion, Bronchiectasis (H), CAD (coronary artery disease), Colon polyps, COVID-19 (12/07/2022), Cryptogenic organizing pneumonia (H) (12/07/2012), Depression, unspecified (06/21/2023), Endocarditis, valve unspecified (01/27/2022), GERD (gastroesophageal reflux disease), Hyperlipidemia, Hypertension, Laceration without foreign body of other part of head, subsequent encounter (06/28/2023), Mantle Cell Lymphoma S/P Autologous Transplant (05/12/2010), Pneumonia due to coronavirus disease 2019 (01/27/2022), Pneumonia in mycoses (09/09/2011), Pneumonia, candidial (H) (09/09/2011), S/P TAVR (transcatheter aortic valve replacement), and Septic shock (H) (06/15/2023).    He has no past medical history of Diabetes mellitus (H), Squamous cell carcinoma, or Unspecified cerebral artery occlusion with cerebral infarction.. PAST SURGICAL HISTORY:   has a past surgical history that includes surgical history of - ; surgical history of -  (1/19/84); surgical history of - ; surgical history of -  (2/19/90); surgical history of -  (10/20/92); surgical history of -  (6/14/2000); Bronchoscopy (rigid or flexible), diagnostic (7/14/2011); Thoracoscopic biopsy lung (9/12/2011); colonoscopy; orthopedic surgery; ENT surgery; Bronchoscopy (rigid or flexible), diagnostic (N/A, 5/29/2019); Heart Catheterization with Possible Intervention (N/A, 10/16/2019); and Heart Catheterization with Possible Intervention (N/A, 11/13/2019).. FAMILY HISTORY: family history includes Cancer in his mother and sister; Cardiovascular in his father; Hypertension in his sister; Lipids in his sister.. SOCIAL HISTORY:   reports that he quit smoking about 49 years ago. His smoking use included cigarettes. He started smoking about 68 years ago. He has a 9.5 pack-year smoking history. He has quit using smokeless tobacco.  His smokeless tobacco use  included snuff. He reports current alcohol use. He reports that he does not use drugs.  Post Discharge Medication Reconciliation Status: discharge medications reconciled and changed, per note/orders.  Current Outpatient Medications   Medication Sig Dispense Refill    acetaminophen (TYLENOL) 325 MG tablet Take 2 tablets (650 mg) by mouth daily as needed for mild pain or other (and adjunct with moderate or severe pain or per patient request) Separate from bedtime dose by 6 hours before or afterwards      gabapentin (NEURONTIN) 100 MG capsule Take 2 capsules (200 mg) by mouth 2 times daily for 10 doses 20 capsule 0    glycopyrrolate (GLYCATE) 2 MG tablet Take 1 tablet (2 mg) by mouth every 4 hours as needed (secretions, may also be given via G-tube or J-tube if needed.) 4 tablet 0    lidocaine (LIDODERM) 5 % patch Place onto the skin every 24 hours To prevent lidocaine toxicity, patient should be patch free for 12 hrs daily.      LORazepam (ATIVAN) 1 MG tablet Place 1 tablet (1 mg) under the tongue every 3 hours as needed for anxiety 3 tablet 0    OLANZapine zydis (ZYPREXA) 5 MG ODT Take 1 tablet (5 mg) by mouth every 6 hours as needed for agitation (delirium) 12 tablet 0    oxyCODONE (ROXICODONE) 5 MG tablet Take 1 tablet (5 mg) by mouth every 6 hours as needed for moderate pain 12 tablet 0    senna-docusate (SENOKOT-S/PERICOLACE) 8.6-50 MG tablet Take 1 tablet by mouth 2 times daily as needed for constipation      vancomycin (VANCOCIN) 125 MG capsule Take 1 capsule (125 mg) by mouth 4 times daily for 20 doses       ROS: Limited secondary to cognitive impairment but today pt reports the above and 4 point ROS including Respiratory, CV, GI and , other than that noted in the HPI, is negative.    Vitals: /57   Pulse 108   Temp 98  F (36.7  C)   Resp 18   Wt 72.5 kg (159 lb 14.4 oz)   SpO2 94%   BMI 25.04 kg/m    Exam:  GENERAL APPEARANCE: Tired, in no distress, cooperative.   ENT: Mouth/posterior oropharynx  intact w/ moist mucous membranes, hearing acuity [place].  EYES: EOM, conjunctivae, lids, pupils and irises normal, PERRL2.   RESP: Respiratory effort poor, no respiratory distress, Lung sounds diminished.  On RA.   CV: Auscultation of heart reveals S1, S2, rate controlled and rhythm irregular, no murmur, no rub or gallop, Edema 1+ BLE. Peripheral pulses are 2+.  ABDOMEN: Normal bowel sounds, soft, non-tender abdomen, and no masses palpated.  SKIN: Inspection/Palpation of skin and subcutaneous tissue baseline w/ fragility. No wounds/rashes noted.   NEURO: CN II-XII at patient's baseline, sensation baseline PPS.  PSYCH: Insight, judgement, and memory are impaired at baseline, affect and mood are flat/withdrawn.    Lab/Diagnostic data: Recent labs in [facility] reviewed by me today.     ASSESSMENT/PLAN:  Colitis, Clostridium difficile  History of COVID-19  Mantle cell lymphoma, unspecified body region (H)  History of pneumonia  Bronchiectasis without complication (H)  Chronic pain syndrome  Hospice care patient  Acute on chronic.  Progressive/terminal.  Provider reviewed records from hospitalization, facility, and interpreted most recent imaging/lab work, and vital signs.  With the assistance of hospice services, it is reassuring to see that Dangelo is finally comfortable.  Will discontinue as needed ibuprofen, as other regimen is more effective.  Will discontinue omeprazole, as this does not work on an as-needed basis.  Appreciate hospice changes otherwise, and will apply CMS required parameter of 14 days to both lorazepam and Zyprexa. Noted PRN orders for antipsychotic and antipsychotropic medications are limited to 14 days. Face to Face encounter done today. Evaluation indicates non-pharmacological interventions are not always effective.  C. difficile infection is being treated with vancomycin for several more days.  It appears that diarrhea is well-controlled at this time.  Family present at bedside and visiting, hospice  available to support also.  Follow-up within 2 to 4 weeks or as needed.    Orders:  RENEW PRN Lorazepam x 14 days. Dx: anxiety.   RENEW PRN Zyprexa x 14 days. Dx: atypical psychosis.   Discontinue Ibuprofen.  Discontinue Omeprazole.     Electronically signed by:  Dr. Kelsea Parsons, APRN CNP DNP

## (undated) DEVICE — CATH DIAG 4FR JL 4.5 538417

## (undated) DEVICE — CATH ANGIO INFINITI AL1 4FRX100CM 538445

## (undated) DEVICE — CATH ANGIO INFINITI JL4 4FRX100CM 538420

## (undated) DEVICE — TOTE ANGIO CORP PC15AT SAN32CC83O

## (undated) DEVICE — MANIFOLD KIT ANGIO AUTOMATED 014613

## (undated) DEVICE — DEFIB PRO-PADZ LVP LQD GEL ADULT 8900-2105-01

## (undated) DEVICE — INFL DVC KIT W/10CC NITRO IN4530

## (undated) DEVICE — KIT HAND CONTROL ANGIOTOUCH ACIST 65CM AT-P65

## (undated) DEVICE — WIRE GUIDE 0.035"X260CM SAFE-T-J EXCHANGE G00517

## (undated) DEVICE — CATH BALLOON NC EMERGE 3.00X8MM H7493926708300

## (undated) DEVICE — CATH ANGIO JUDKINS R4 6FRX100CM INFINITI 534621T

## (undated) DEVICE — CATH DIAGNOSTIC RADIAL 5FR TIG 4.0

## (undated) DEVICE — GUIDEWIRE VASC 0.014IN DIA 185

## (undated) DEVICE — CATH LAUNCHER 6FR EBU 3.5 LA6EBU35

## (undated) DEVICE — NDL PERC ENTRY THINWALL 18GA 7.0" G00166

## (undated) DEVICE — CATH BALLOON NC EMERGE 3.25X8MM H7493926708320

## (undated) DEVICE — CATH DIAG 4FR ANG PIG 538453S

## (undated) DEVICE — Device

## (undated) DEVICE — CATH LAUNCHER 6FR JL 4.0 LA6JL40

## (undated) DEVICE — CATH BALLOON NC EMERGE 2.50X12MM H7493926712250

## (undated) DEVICE — INTRO GLIDESHEATH SLENDER 6FR 10X45CM 60-1060

## (undated) DEVICE — CATH ANGIO INFINITI 3DRC 4FRX100CM 538476

## (undated) DEVICE — INTRO SHEATH 4FRX10CM PINNACLE RSS402

## (undated) DEVICE — INTRO SHEATH 6FRX10CM PINNACLE RSS602

## (undated) DEVICE — VALVE HEMOSTASIS .096" COPILOT MECH 1003331

## (undated) DEVICE — CLOSURE ANGIOSEAL 6FR 610130

## (undated) DEVICE — CATH BALLOON NC EUPHORA 2.00X12MM NCEUP2012X

## (undated) DEVICE — CATH GUIDELINER 6FR 5571

## (undated) DEVICE — WIRE GLIDE 0.035"X150CM VASC GR3506

## (undated) DEVICE — KIT LG BORE TOUHY ACCESS PLUS MAP152

## (undated) DEVICE — SLEEVE TR BAND RADIAL COMPRESSION DEVICE 24CM TRB24-REG

## (undated) DEVICE — CATH ANGIO JUDKINS JL4 6FRX100CM INFINITI 534620T

## (undated) DEVICE — SYR ANGIOGRAPHY MULTIUSE KIT ACIST 014612

## (undated) DEVICE — CATH BALLOON NC EMERGE 3.00X12MM H7493926712300

## (undated) DEVICE — GUIDEWIRE VASC 0.014INX180CM RUNTHROUGH 25-1011

## (undated) RX ORDER — ADENOSINE 3 MG/ML
INJECTION, SOLUTION INTRAVENOUS
Status: DISPENSED
Start: 2019-11-13

## (undated) RX ORDER — VERAPAMIL HYDROCHLORIDE 2.5 MG/ML
INJECTION, SOLUTION INTRAVENOUS
Status: DISPENSED
Start: 2019-10-16

## (undated) RX ORDER — BUPIVACAINE HYDROCHLORIDE 5 MG/ML
INJECTION, SOLUTION EPIDURAL; INTRACAUDAL
Status: DISPENSED
Start: 2018-06-01

## (undated) RX ORDER — METHYLPREDNISOLONE ACETATE 40 MG/ML
INJECTION, SUSPENSION INTRA-ARTICULAR; INTRALESIONAL; INTRAMUSCULAR; SOFT TISSUE
Status: DISPENSED
Start: 2018-07-19

## (undated) RX ORDER — LIDOCAINE HYDROCHLORIDE 10 MG/ML
INJECTION, SOLUTION EPIDURAL; INFILTRATION; INTRACAUDAL; PERINEURAL
Status: DISPENSED
Start: 2019-11-07

## (undated) RX ORDER — LIDOCAINE HYDROCHLORIDE AND EPINEPHRINE 10; 10 MG/ML; UG/ML
INJECTION, SOLUTION INFILTRATION; PERINEURAL
Status: DISPENSED
Start: 2018-07-19

## (undated) RX ORDER — HEPARIN SODIUM 1000 [USP'U]/ML
INJECTION, SOLUTION INTRAVENOUS; SUBCUTANEOUS
Status: DISPENSED
Start: 2019-11-13

## (undated) RX ORDER — NITROGLYCERIN 5 MG/ML
VIAL (ML) INTRAVENOUS
Status: DISPENSED
Start: 2019-11-13

## (undated) RX ORDER — HEPARIN SODIUM 1000 [USP'U]/ML
INJECTION, SOLUTION INTRAVENOUS; SUBCUTANEOUS
Status: DISPENSED
Start: 2019-10-16

## (undated) RX ORDER — METHYLPREDNISOLONE ACETATE 40 MG/ML
INJECTION, SUSPENSION INTRA-ARTICULAR; INTRALESIONAL; INTRAMUSCULAR; SOFT TISSUE
Status: DISPENSED
Start: 2018-06-01

## (undated) RX ORDER — DEXAMETHASONE SODIUM PHOSPHATE 10 MG/ML
INJECTION, SOLUTION INTRAMUSCULAR; INTRAVENOUS
Status: DISPENSED
Start: 2018-08-07

## (undated) RX ORDER — LIDOCAINE HYDROCHLORIDE 20 MG/ML
SOLUTION OROPHARYNGEAL
Status: DISPENSED
Start: 2019-05-29

## (undated) RX ORDER — BUPIVACAINE HYDROCHLORIDE 5 MG/ML
INJECTION, SOLUTION EPIDURAL; INTRACAUDAL
Status: DISPENSED
Start: 2018-08-07

## (undated) RX ORDER — LIDOCAINE HYDROCHLORIDE 10 MG/ML
INJECTION, SOLUTION EPIDURAL; INFILTRATION; INTRACAUDAL; PERINEURAL
Status: DISPENSED
Start: 2019-05-29

## (undated) RX ORDER — LIDOCAINE HYDROCHLORIDE 10 MG/ML
INJECTION, SOLUTION EPIDURAL; INFILTRATION; INTRACAUDAL; PERINEURAL
Status: DISPENSED
Start: 2019-11-13

## (undated) RX ORDER — FENTANYL CITRATE 50 UG/ML
INJECTION, SOLUTION INTRAMUSCULAR; INTRAVENOUS
Status: DISPENSED
Start: 2019-05-29

## (undated) RX ORDER — NITROGLYCERIN 5 MG/ML
VIAL (ML) INTRAVENOUS
Status: DISPENSED
Start: 2019-10-16

## (undated) RX ORDER — FENTANYL CITRATE 50 UG/ML
INJECTION, SOLUTION INTRAMUSCULAR; INTRAVENOUS
Status: DISPENSED
Start: 2019-11-13

## (undated) RX ORDER — HEPARIN SODIUM 200 [USP'U]/100ML
INJECTION, SOLUTION INTRAVENOUS
Status: DISPENSED
Start: 2019-10-16

## (undated) RX ORDER — DEXAMETHASONE SODIUM PHOSPHATE 10 MG/ML
INJECTION, SOLUTION INTRAMUSCULAR; INTRAVENOUS
Status: DISPENSED
Start: 2019-11-07

## (undated) RX ORDER — ALBUTEROL SULFATE 0.83 MG/ML
SOLUTION RESPIRATORY (INHALATION)
Status: DISPENSED
Start: 2019-05-29

## (undated) RX ORDER — LIDOCAINE HYDROCHLORIDE AND EPINEPHRINE 10; 10 MG/ML; UG/ML
INJECTION, SOLUTION INFILTRATION; PERINEURAL
Status: DISPENSED
Start: 2018-08-07

## (undated) RX ORDER — DEXAMETHASONE SODIUM PHOSPHATE 10 MG/ML
INJECTION, SOLUTION INTRAMUSCULAR; INTRAVENOUS
Status: DISPENSED
Start: 2018-06-01

## (undated) RX ORDER — DEXAMETHASONE SODIUM PHOSPHATE 10 MG/ML
INJECTION, SOLUTION INTRAMUSCULAR; INTRAVENOUS
Status: DISPENSED
Start: 2018-07-19

## (undated) RX ORDER — VERAPAMIL HYDROCHLORIDE 2.5 MG/ML
INJECTION, SOLUTION INTRAVENOUS
Status: DISPENSED
Start: 2019-11-13

## (undated) RX ORDER — FENTANYL CITRATE 50 UG/ML
INJECTION, SOLUTION INTRAMUSCULAR; INTRAVENOUS
Status: DISPENSED
Start: 2019-10-16

## (undated) RX ORDER — METHYLPREDNISOLONE ACETATE 40 MG/ML
INJECTION, SUSPENSION INTRA-ARTICULAR; INTRALESIONAL; INTRAMUSCULAR; SOFT TISSUE
Status: DISPENSED
Start: 2018-08-07

## (undated) RX ORDER — LIDOCAINE HYDROCHLORIDE 10 MG/ML
INJECTION, SOLUTION EPIDURAL; INFILTRATION; INTRACAUDAL; PERINEURAL
Status: DISPENSED
Start: 2019-10-16

## (undated) RX ORDER — BUPIVACAINE HYDROCHLORIDE 5 MG/ML
INJECTION, SOLUTION EPIDURAL; INTRACAUDAL
Status: DISPENSED
Start: 2018-07-19